# Patient Record
Sex: FEMALE | Race: WHITE | NOT HISPANIC OR LATINO | URBAN - METROPOLITAN AREA
[De-identification: names, ages, dates, MRNs, and addresses within clinical notes are randomized per-mention and may not be internally consistent; named-entity substitution may affect disease eponyms.]

---

## 2017-04-02 ENCOUNTER — INPATIENT (INPATIENT)
Facility: HOSPITAL | Age: 82
LOS: 1 days | Discharge: HOME HEALTH | End: 2017-04-04
Attending: INTERNAL MEDICINE | Admitting: INTERNAL MEDICINE

## 2017-06-27 DIAGNOSIS — K57.90 DIVERTICULOSIS OF INTESTINE, PART UNSPECIFIED, WITHOUT PERFORATION OR ABSCESS WITHOUT BLEEDING: ICD-10-CM

## 2017-06-27 DIAGNOSIS — R00.2 PALPITATIONS: ICD-10-CM

## 2017-06-27 DIAGNOSIS — M54.5 LOW BACK PAIN: ICD-10-CM

## 2017-06-27 DIAGNOSIS — Z85.048 PERSONAL HISTORY OF OTHER MALIGNANT NEOPLASM OF RECTUM, RECTOSIGMOID JUNCTION, AND ANUS: ICD-10-CM

## 2017-06-27 DIAGNOSIS — L89.152 PRESSURE ULCER OF SACRAL REGION, STAGE 2: ICD-10-CM

## 2017-06-27 DIAGNOSIS — I10 ESSENTIAL (PRIMARY) HYPERTENSION: ICD-10-CM

## 2017-06-27 DIAGNOSIS — N39.0 URINARY TRACT INFECTION, SITE NOT SPECIFIED: ICD-10-CM

## 2017-06-27 DIAGNOSIS — Z87.81 PERSONAL HISTORY OF (HEALED) TRAUMATIC FRACTURE: ICD-10-CM

## 2017-06-27 DIAGNOSIS — G89.29 OTHER CHRONIC PAIN: ICD-10-CM

## 2017-06-27 DIAGNOSIS — J44.9 CHRONIC OBSTRUCTIVE PULMONARY DISEASE, UNSPECIFIED: ICD-10-CM

## 2017-06-27 DIAGNOSIS — Z87.891 PERSONAL HISTORY OF NICOTINE DEPENDENCE: ICD-10-CM

## 2017-06-27 DIAGNOSIS — Z90.49 ACQUIRED ABSENCE OF OTHER SPECIFIED PARTS OF DIGESTIVE TRACT: ICD-10-CM

## 2017-08-18 VITALS
HEIGHT: 63 IN | DIASTOLIC BLOOD PRESSURE: 77 MMHG | OXYGEN SATURATION: 95 % | SYSTOLIC BLOOD PRESSURE: 186 MMHG | HEART RATE: 67 BPM | WEIGHT: 110.89 LBS | RESPIRATION RATE: 16 BRPM | TEMPERATURE: 97 F

## 2017-08-18 NOTE — H&P ADULT - NSHPPHYSICALEXAM_GEN_ALL_CORE
MSK:  back - limited ROM secondary to pain        Rest of PE per medical clearance note MSK:   spine - limited ROM secondary to pain  EHL/FHL/TA/GS 5/5 motor strength bilateral lower extremities  SLT in tact and equal to bilateral lower extremities   DP pulses palpable bilaterally  Skin warm and well perfused, no visible wounds, ecchymoses, erythema    Remainder of PE per medical clearance note

## 2017-08-18 NOTE — H&P ADULT - HISTORY OF PRESENT ILLNESS
82 y.o F c/o     Presents for elective spine surgery - insertion/replacement of neurostimulator implant 82F c/o back pain > 14 years. Pt has history of cervical and lumbar fusion in 90s. Pt has had spinal cord stimulator in place for over 14 years, most recent stimulator has been in place for 6 years. Approximately 1 month ago pt states her spinal cord stimulator remote was non functioning - she was evaluated by Dr. Sanders,  who placed the stimulator, and she was told the battery is dead and needs to be changed. Pt states her back pain currently radiates to her bilateral lower extremities. Pt reports numbness of toes of right foot; reports bilateral lower extremity weakness. She takes hydrocone 10 mg at home and reports medical marijuana use for pain control. Pt ambulates with a walker and cane at baseline. Denies DVT hx; pt takes ASA 81mg which she discontinued 5-7 days preoperatively.  Pt denies chest pain, SOB, n/v, palpitations, tactile fevers today.    Presents for elective spine surgery - revision of neurostimulator implant; battery change

## 2017-08-18 NOTE — H&P ADULT - PROBLEM SELECTOR PLAN 1
Presents today for elective revision of spinal cord stimulator - battery replacement  Pt medically stable and cleared for procedure today by Dr. Vu; Anesthesia clearance pending normal lab values   Per Dr. Amaya pt can be discharged today post op

## 2017-08-18 NOTE — H&P ADULT - NSHPOUTPATIENTPROVIDERS_GEN_ALL_CORE
Medical clearance per Dr. Vu (Dr. Sanders attempt to reach clearing DrAngelita at 830 AM without response - per Dr. Sanders verbal clearance was given)

## 2017-08-18 NOTE — PATIENT PROFILE ADULT. - PSH
H/O breast surgery  30 yrs. ago  History of back surgery  10 yrs. ago stimulator implant 2001, 2011 H/O breast surgery  39 yrs. ago, bilateral  History of back surgery  10 yrs. ago stimulator implant 2001, 2011

## 2017-08-18 NOTE — H&P ADULT - NSHPLABSRESULTS_GEN_ALL_CORE
Preop CBC, BMP, PT/PTT/INR - WNL per medical clearance   CBC and BMP redrawn DOS due to illegible Hgb/Hct, K values  Pre op EKG - sinus bradycardia  Preop CXR - WNL per medical clearance Preop CBC, BMP, PT/PTT/INR - WNL per medical clearance   CBC and BMP redrawn DOS due to illegible Hgb/Hct, K values - values WNL - ok to proceed per anesthesia  Pre op EKG - sinus bradycardia  Preop CXR - WNL per medical clearance

## 2017-08-21 ENCOUNTER — INPATIENT (INPATIENT)
Facility: HOSPITAL | Age: 82
LOS: 0 days | Discharge: HOME | DRG: 42 | End: 2017-08-21
Attending: ORTHOPAEDIC SURGERY | Admitting: ORTHOPAEDIC SURGERY
Payer: COMMERCIAL

## 2017-08-21 VITALS
OXYGEN SATURATION: 94 % | HEART RATE: 72 BPM | DIASTOLIC BLOOD PRESSURE: 48 MMHG | SYSTOLIC BLOOD PRESSURE: 137 MMHG | RESPIRATION RATE: 16 BRPM

## 2017-08-21 DIAGNOSIS — M19.90 UNSPECIFIED OSTEOARTHRITIS, UNSPECIFIED SITE: ICD-10-CM

## 2017-08-21 DIAGNOSIS — Z45.42 ENCOUNTER FOR ADJUSTMENT AND MANAGEMENT OF NEUROSTIMULATOR: ICD-10-CM

## 2017-08-21 DIAGNOSIS — Z79.51 LONG TERM (CURRENT) USE OF INHALED STEROIDS: ICD-10-CM

## 2017-08-21 DIAGNOSIS — M54.5 LOW BACK PAIN: ICD-10-CM

## 2017-08-21 DIAGNOSIS — Z98.890 OTHER SPECIFIED POSTPROCEDURAL STATES: Chronic | ICD-10-CM

## 2017-08-21 DIAGNOSIS — Z87.891 PERSONAL HISTORY OF NICOTINE DEPENDENCE: ICD-10-CM

## 2017-08-21 DIAGNOSIS — Z79.82 LONG TERM (CURRENT) USE OF ASPIRIN: ICD-10-CM

## 2017-08-21 DIAGNOSIS — Z79.891 LONG TERM (CURRENT) USE OF OPIATE ANALGESIC: ICD-10-CM

## 2017-08-21 DIAGNOSIS — Z98.1 ARTHRODESIS STATUS: ICD-10-CM

## 2017-08-21 LAB
ANION GAP SERPL CALC-SCNC: 12 MMOL/L — SIGNIFICANT CHANGE UP (ref 5–17)
BUN SERPL-MCNC: 21 MG/DL — SIGNIFICANT CHANGE UP (ref 7–23)
CALCIUM SERPL-MCNC: 9.2 MG/DL — SIGNIFICANT CHANGE UP (ref 8.4–10.5)
CHLORIDE SERPL-SCNC: 100 MMOL/L — SIGNIFICANT CHANGE UP (ref 96–108)
CO2 SERPL-SCNC: 25 MMOL/L — SIGNIFICANT CHANGE UP (ref 22–31)
CREAT SERPL-MCNC: 0.9 MG/DL — SIGNIFICANT CHANGE UP (ref 0.5–1.3)
GLUCOSE SERPL-MCNC: 118 MG/DL — HIGH (ref 70–99)
HCT VFR BLD CALC: 31.7 % — LOW (ref 34.5–45)
HGB BLD-MCNC: 10.7 G/DL — LOW (ref 11.5–15.5)
MCHC RBC-ENTMCNC: 32.2 PG — SIGNIFICANT CHANGE UP (ref 27–34)
MCHC RBC-ENTMCNC: 33.8 G/DL — SIGNIFICANT CHANGE UP (ref 32–36)
MCV RBC AUTO: 95.5 FL — SIGNIFICANT CHANGE UP (ref 80–100)
PLATELET # BLD AUTO: 165 K/UL — SIGNIFICANT CHANGE UP (ref 150–400)
POTASSIUM SERPL-MCNC: 4.5 MMOL/L — SIGNIFICANT CHANGE UP (ref 3.5–5.3)
POTASSIUM SERPL-SCNC: 4.5 MMOL/L — SIGNIFICANT CHANGE UP (ref 3.5–5.3)
RBC # BLD: 3.32 M/UL — LOW (ref 3.8–5.2)
RBC # FLD: 13.6 % — SIGNIFICANT CHANGE UP (ref 10.3–16.9)
SODIUM SERPL-SCNC: 137 MMOL/L — SIGNIFICANT CHANGE UP (ref 135–145)
WBC # BLD: 7.6 K/UL — SIGNIFICANT CHANGE UP (ref 3.8–10.5)
WBC # FLD AUTO: 7.6 K/UL — SIGNIFICANT CHANGE UP (ref 3.8–10.5)

## 2017-08-21 PROCEDURE — 80048 BASIC METABOLIC PNL TOTAL CA: CPT

## 2017-08-21 PROCEDURE — 85027 COMPLETE CBC AUTOMATED: CPT

## 2017-08-21 PROCEDURE — 86901 BLOOD TYPING SEROLOGIC RH(D): CPT

## 2017-08-21 PROCEDURE — C1883: CPT

## 2017-08-21 PROCEDURE — 86850 RBC ANTIBODY SCREEN: CPT

## 2017-08-21 PROCEDURE — C1787: CPT

## 2017-08-21 PROCEDURE — 36415 COLL VENOUS BLD VENIPUNCTURE: CPT

## 2017-08-21 PROCEDURE — 86900 BLOOD TYPING SEROLOGIC ABO: CPT

## 2017-08-21 PROCEDURE — C1820: CPT

## 2017-08-21 RX ORDER — MORPHINE SULFATE 50 MG/1
2 CAPSULE, EXTENDED RELEASE ORAL
Qty: 0 | Refills: 0 | Status: DISCONTINUED | OUTPATIENT
Start: 2017-08-21 | End: 2017-08-21

## 2017-08-21 RX ORDER — ONDANSETRON 8 MG/1
4 TABLET, FILM COATED ORAL EVERY 4 HOURS
Qty: 0 | Refills: 0 | Status: DISCONTINUED | OUTPATIENT
Start: 2017-08-21 | End: 2017-08-21

## 2017-08-21 RX ADMIN — MORPHINE SULFATE 2 MILLIGRAM(S): 50 CAPSULE, EXTENDED RELEASE ORAL at 10:55

## 2017-08-23 DIAGNOSIS — Z88.2 ALLERGY STATUS TO SULFONAMIDES: ICD-10-CM

## 2017-08-23 DIAGNOSIS — Z45.1 ENCOUNTER FOR ADJUSTMENT AND MANAGEMENT OF INFUSION PUMP: ICD-10-CM

## 2017-08-23 DIAGNOSIS — M54.40 LUMBAGO WITH SCIATICA, UNSPECIFIED SIDE: ICD-10-CM

## 2017-08-23 DIAGNOSIS — Z45.42 ENCOUNTER FOR ADJUSTMENT AND MANAGEMENT OF NEUROSTIMULATOR: ICD-10-CM

## 2017-08-23 DIAGNOSIS — J44.9 CHRONIC OBSTRUCTIVE PULMONARY DISEASE, UNSPECIFIED: ICD-10-CM

## 2017-08-23 DIAGNOSIS — I10 ESSENTIAL (PRIMARY) HYPERTENSION: ICD-10-CM

## 2018-09-18 ENCOUNTER — INPATIENT (INPATIENT)
Facility: HOSPITAL | Age: 83
LOS: 5 days | Discharge: HOME HEALTH | End: 2018-09-24
Attending: INTERNAL MEDICINE | Admitting: INTERNAL MEDICINE

## 2018-09-18 VITALS
SYSTOLIC BLOOD PRESSURE: 203 MMHG | RESPIRATION RATE: 20 BRPM | HEART RATE: 89 BPM | OXYGEN SATURATION: 96 % | DIASTOLIC BLOOD PRESSURE: 91 MMHG

## 2018-09-18 DIAGNOSIS — Z98.890 OTHER SPECIFIED POSTPROCEDURAL STATES: Chronic | ICD-10-CM

## 2018-09-18 LAB
BASE EXCESS BLDV CALC-SCNC: 0.5 MMOL/L — SIGNIFICANT CHANGE UP (ref -2–2)
BASOPHILS # BLD AUTO: 0.05 K/UL — SIGNIFICANT CHANGE UP (ref 0–0.2)
BASOPHILS NFR BLD AUTO: 0.3 % — SIGNIFICANT CHANGE UP (ref 0–1)
CA-I SERPL-SCNC: 1.14 MMOL/L — SIGNIFICANT CHANGE UP (ref 1.12–1.3)
EOSINOPHIL # BLD AUTO: 0.28 K/UL — SIGNIFICANT CHANGE UP (ref 0–0.7)
EOSINOPHIL NFR BLD AUTO: 1.9 % — SIGNIFICANT CHANGE UP (ref 0–8)
GAS PNL BLDV: 136 MMOL/L — SIGNIFICANT CHANGE UP (ref 136–145)
GAS PNL BLDV: SIGNIFICANT CHANGE UP
HCO3 BLDV-SCNC: 28 MMOL/L — SIGNIFICANT CHANGE UP (ref 22–29)
HCT VFR BLD CALC: 32 % — LOW (ref 37–47)
HCT VFR BLDA CALC: 32.6 % — LOW (ref 34–44)
HGB BLD CALC-MCNC: 10.6 G/DL — LOW (ref 14–18)
HGB BLD-MCNC: 10.3 G/DL — LOW (ref 12–16)
IMM GRANULOCYTES NFR BLD AUTO: 0.4 % — HIGH (ref 0.1–0.3)
LACTATE BLDV-MCNC: 0.7 MMOL/L — SIGNIFICANT CHANGE UP (ref 0.5–1.6)
LYMPHOCYTES # BLD AUTO: 0.84 K/UL — LOW (ref 1.2–3.4)
LYMPHOCYTES # BLD AUTO: 5.6 % — LOW (ref 20.5–51.1)
MCHC RBC-ENTMCNC: 32.2 G/DL — SIGNIFICANT CHANGE UP (ref 32–37)
MCHC RBC-ENTMCNC: 32.4 PG — HIGH (ref 27–31)
MCV RBC AUTO: 100.6 FL — HIGH (ref 81–99)
MONOCYTES # BLD AUTO: 0.48 K/UL — SIGNIFICANT CHANGE UP (ref 0.1–0.6)
MONOCYTES NFR BLD AUTO: 3.2 % — SIGNIFICANT CHANGE UP (ref 1.7–9.3)
NEUTROPHILS # BLD AUTO: 13.31 K/UL — HIGH (ref 1.4–6.5)
NEUTROPHILS NFR BLD AUTO: 88.6 % — HIGH (ref 42.2–75.2)
NRBC # BLD: 0 /100 WBCS — SIGNIFICANT CHANGE UP (ref 0–0)
PCO2 BLDV: 59 MMHG — HIGH (ref 41–51)
PH BLDV: 7.29 — SIGNIFICANT CHANGE UP (ref 7.26–7.43)
PLATELET # BLD AUTO: 182 K/UL — SIGNIFICANT CHANGE UP (ref 130–400)
PO2 BLDV: 31 MMHG — SIGNIFICANT CHANGE UP (ref 20–40)
POTASSIUM BLDV-SCNC: 4.6 MMOL/L — SIGNIFICANT CHANGE UP (ref 3.3–5.6)
RBC # BLD: 3.18 M/UL — LOW (ref 4.2–5.4)
RBC # FLD: 17.4 % — HIGH (ref 11.5–14.5)
SAO2 % BLDV: 46 % — SIGNIFICANT CHANGE UP
WBC # BLD: 15.02 K/UL — HIGH (ref 4.8–10.8)
WBC # FLD AUTO: 15.02 K/UL — HIGH (ref 4.8–10.8)

## 2018-09-18 RX ORDER — SODIUM CHLORIDE 9 MG/ML
1000 INJECTION INTRAMUSCULAR; INTRAVENOUS; SUBCUTANEOUS
Qty: 0 | Refills: 0 | Status: DISCONTINUED | OUTPATIENT
Start: 2018-09-18 | End: 2018-09-20

## 2018-09-18 RX ORDER — IPRATROPIUM/ALBUTEROL SULFATE 18-103MCG
3 AEROSOL WITH ADAPTER (GRAM) INHALATION
Qty: 0 | Refills: 0 | Status: COMPLETED | OUTPATIENT
Start: 2018-09-18 | End: 2018-09-19

## 2018-09-18 NOTE — ED ADULT NURSE NOTE - PMH
Anal cancer    Breast tumor  removal benign bl breasts  COPD (chronic obstructive pulmonary disease)    Hypertension    Lung disease  COPD

## 2018-09-18 NOTE — ED ADULT NURSE NOTE - PSH
H/O breast surgery  39 yrs. ago, bilateral  History of back surgery  10 yrs. ago stimulator implant 2001, 2011

## 2018-09-18 NOTE — ED ADULT NURSE NOTE - OBJECTIVE STATEMENT
Pt was brought by EMS for difficulty breathing as per EMS the O2 saturation was in 60s. Pt denies any fever, Has prod cough w yellow phlegm.

## 2018-09-18 NOTE — ED ADULT NURSE NOTE - NSIMPLEMENTINTERV_GEN_ALL_ED
Implemented All Fall with Harm Risk Interventions:  [place] to call system. Call bell, personal items and telephone within reach. Instruct patient to call for assistance. Room bathroom lighting operational. Non-slip footwear when patient is off stretcher. Physically safe environment: no spills, clutter or unnecessary equipment. Stretcher in lowest position, wheels locked, appropriate side rails in place. Provide visual cue, wrist band, yellow gown, etc. Monitor gait and stability. Monitor for mental status changes and reorient to person, place, and time. Review medications for side effects contributing to fall risk. Reinforce activity limits and safety measures with patient and family. Provide visual clues: red socks.

## 2018-09-19 PROBLEM — J98.4 OTHER DISORDERS OF LUNG: Chronic | Status: ACTIVE | Noted: 2017-08-18

## 2018-09-19 PROBLEM — I10 ESSENTIAL (PRIMARY) HYPERTENSION: Chronic | Status: ACTIVE | Noted: 2017-08-18

## 2018-09-19 LAB
ALBUMIN SERPL ELPH-MCNC: 4.3 G/DL — SIGNIFICANT CHANGE UP (ref 3.5–5.2)
ALP SERPL-CCNC: 75 U/L — SIGNIFICANT CHANGE UP (ref 30–115)
ALT FLD-CCNC: 20 U/L — SIGNIFICANT CHANGE UP (ref 0–41)
ANION GAP SERPL CALC-SCNC: 13 MMOL/L — SIGNIFICANT CHANGE UP (ref 7–14)
APTT BLD: 35 SEC — SIGNIFICANT CHANGE UP (ref 27–39.2)
AST SERPL-CCNC: 20 U/L — SIGNIFICANT CHANGE UP (ref 0–41)
BILIRUB SERPL-MCNC: 0.7 MG/DL — SIGNIFICANT CHANGE UP (ref 0.2–1.2)
BUN SERPL-MCNC: 24 MG/DL — HIGH (ref 10–20)
CALCIUM SERPL-MCNC: 8.7 MG/DL — SIGNIFICANT CHANGE UP (ref 8.5–10.1)
CHLORIDE SERPL-SCNC: 99 MMOL/L — SIGNIFICANT CHANGE UP (ref 98–110)
CO2 SERPL-SCNC: 25 MMOL/L — SIGNIFICANT CHANGE UP (ref 17–32)
CREAT SERPL-MCNC: 0.9 MG/DL — SIGNIFICANT CHANGE UP (ref 0.7–1.5)
GLUCOSE SERPL-MCNC: 142 MG/DL — HIGH (ref 70–99)
INR BLD: 1.07 RATIO — SIGNIFICANT CHANGE UP (ref 0.65–1.3)
LACTATE SERPL-SCNC: 0.8 MMOL/L — SIGNIFICANT CHANGE UP (ref 0.5–2.2)
NT-PROBNP SERPL-SCNC: 2241 PG/ML — HIGH (ref 0–300)
POTASSIUM SERPL-MCNC: 5.3 MMOL/L — HIGH (ref 3.5–5)
POTASSIUM SERPL-SCNC: 5.3 MMOL/L — HIGH (ref 3.5–5)
PROT SERPL-MCNC: 7.8 G/DL — SIGNIFICANT CHANGE UP (ref 6–8)
PROTHROM AB SERPL-ACNC: 11.6 SEC — SIGNIFICANT CHANGE UP (ref 9.95–12.87)
SODIUM SERPL-SCNC: 137 MMOL/L — SIGNIFICANT CHANGE UP (ref 135–146)
TROPONIN T SERPL-MCNC: <0.01 NG/ML — SIGNIFICANT CHANGE UP

## 2018-09-19 RX ORDER — MORPHINE SULFATE 50 MG/1
4 CAPSULE, EXTENDED RELEASE ORAL ONCE
Qty: 0 | Refills: 0 | Status: DISCONTINUED | OUTPATIENT
Start: 2018-09-19 | End: 2018-09-19

## 2018-09-19 RX ORDER — AMLODIPINE BESYLATE 2.5 MG/1
5 TABLET ORAL DAILY
Qty: 0 | Refills: 0 | Status: DISCONTINUED | OUTPATIENT
Start: 2018-09-19 | End: 2018-09-24

## 2018-09-19 RX ORDER — MORPHINE SULFATE 50 MG/1
2 CAPSULE, EXTENDED RELEASE ORAL EVERY 4 HOURS
Qty: 0 | Refills: 0 | Status: DISCONTINUED | OUTPATIENT
Start: 2018-09-19 | End: 2018-09-19

## 2018-09-19 RX ORDER — ATORVASTATIN CALCIUM 80 MG/1
1 TABLET, FILM COATED ORAL
Qty: 0 | Refills: 0 | COMMUNITY

## 2018-09-19 RX ORDER — CEFTRIAXONE 500 MG/1
2 INJECTION, POWDER, FOR SOLUTION INTRAMUSCULAR; INTRAVENOUS ONCE
Qty: 0 | Refills: 0 | Status: COMPLETED | OUTPATIENT
Start: 2018-09-19 | End: 2018-09-19

## 2018-09-19 RX ORDER — AZITHROMYCIN 500 MG/1
500 TABLET, FILM COATED ORAL EVERY 24 HOURS
Qty: 0 | Refills: 0 | Status: DISCONTINUED | OUTPATIENT
Start: 2018-09-19 | End: 2018-09-21

## 2018-09-19 RX ORDER — IPRATROPIUM/ALBUTEROL SULFATE 18-103MCG
3 AEROSOL WITH ADAPTER (GRAM) INHALATION EVERY 6 HOURS
Qty: 0 | Refills: 0 | Status: DISCONTINUED | OUTPATIENT
Start: 2018-09-19 | End: 2018-09-24

## 2018-09-19 RX ORDER — HYDROCODONE BITARTRATE 50 MG/1
0 CAPSULE, EXTENDED RELEASE ORAL
Qty: 0 | Refills: 0 | COMMUNITY

## 2018-09-19 RX ORDER — ASPIRIN/CALCIUM CARB/MAGNESIUM 324 MG
1 TABLET ORAL
Qty: 0 | Refills: 0 | COMMUNITY

## 2018-09-19 RX ORDER — ALBUTEROL 90 UG/1
2 AEROSOL, METERED ORAL EVERY 6 HOURS
Qty: 0 | Refills: 0 | Status: DISCONTINUED | OUTPATIENT
Start: 2018-09-19 | End: 2018-09-24

## 2018-09-19 RX ORDER — AZITHROMYCIN 500 MG/1
500 TABLET, FILM COATED ORAL ONCE
Qty: 0 | Refills: 0 | Status: COMPLETED | OUTPATIENT
Start: 2018-09-19 | End: 2018-09-19

## 2018-09-19 RX ORDER — HEPARIN SODIUM 5000 [USP'U]/ML
5000 INJECTION INTRAVENOUS; SUBCUTANEOUS EVERY 8 HOURS
Qty: 0 | Refills: 0 | Status: DISCONTINUED | OUTPATIENT
Start: 2018-09-19 | End: 2018-09-24

## 2018-09-19 RX ORDER — CEFTRIAXONE 500 MG/1
1 INJECTION, POWDER, FOR SOLUTION INTRAMUSCULAR; INTRAVENOUS EVERY 24 HOURS
Qty: 0 | Refills: 0 | Status: DISCONTINUED | OUTPATIENT
Start: 2018-09-19 | End: 2018-09-21

## 2018-09-19 RX ORDER — METOPROLOL TARTRATE 50 MG
50 TABLET ORAL DAILY
Qty: 0 | Refills: 0 | Status: DISCONTINUED | OUTPATIENT
Start: 2018-09-19 | End: 2018-09-24

## 2018-09-19 RX ORDER — CHLORHEXIDINE GLUCONATE 213 G/1000ML
1 SOLUTION TOPICAL
Qty: 0 | Refills: 0 | Status: DISCONTINUED | OUTPATIENT
Start: 2018-09-19 | End: 2018-09-24

## 2018-09-19 RX ORDER — INFLUENZA VIRUS VACCINE 15; 15; 15; 15 UG/.5ML; UG/.5ML; UG/.5ML; UG/.5ML
0.5 SUSPENSION INTRAMUSCULAR ONCE
Qty: 0 | Refills: 0 | Status: DISCONTINUED | OUTPATIENT
Start: 2018-09-19 | End: 2018-09-24

## 2018-09-19 RX ORDER — ETHACRYNIC ACID 25 MG/1
25 TABLET ORAL DAILY
Qty: 0 | Refills: 0 | Status: DISCONTINUED | OUTPATIENT
Start: 2018-09-19 | End: 2018-09-19

## 2018-09-19 RX ADMIN — Medication 50 MILLIGRAM(S): at 06:46

## 2018-09-19 RX ADMIN — HEPARIN SODIUM 5000 UNIT(S): 5000 INJECTION INTRAVENOUS; SUBCUTANEOUS at 22:11

## 2018-09-19 RX ADMIN — HEPARIN SODIUM 5000 UNIT(S): 5000 INJECTION INTRAVENOUS; SUBCUTANEOUS at 13:58

## 2018-09-19 RX ADMIN — MORPHINE SULFATE 2 MILLIGRAM(S): 50 CAPSULE, EXTENDED RELEASE ORAL at 07:45

## 2018-09-19 RX ADMIN — Medication 3 MILLILITER(S): at 13:59

## 2018-09-19 RX ADMIN — Medication 3 MILLILITER(S): at 00:09

## 2018-09-19 RX ADMIN — HEPARIN SODIUM 5000 UNIT(S): 5000 INJECTION INTRAVENOUS; SUBCUTANEOUS at 06:46

## 2018-09-19 RX ADMIN — AZITHROMYCIN 255 MILLIGRAM(S): 500 TABLET, FILM COATED ORAL at 00:53

## 2018-09-19 RX ADMIN — AZITHROMYCIN 255 MILLIGRAM(S): 500 TABLET, FILM COATED ORAL at 07:49

## 2018-09-19 RX ADMIN — Medication 60 MILLIGRAM(S): at 06:55

## 2018-09-19 RX ADMIN — CHLORHEXIDINE GLUCONATE 1 APPLICATION(S): 213 SOLUTION TOPICAL at 06:45

## 2018-09-19 RX ADMIN — AMLODIPINE BESYLATE 5 MILLIGRAM(S): 2.5 TABLET ORAL at 06:46

## 2018-09-19 RX ADMIN — CEFTRIAXONE 100 GRAM(S): 500 INJECTION, POWDER, FOR SOLUTION INTRAMUSCULAR; INTRAVENOUS at 00:53

## 2018-09-19 RX ADMIN — MORPHINE SULFATE 2 MILLIGRAM(S): 50 CAPSULE, EXTENDED RELEASE ORAL at 03:50

## 2018-09-19 RX ADMIN — Medication 3 MILLILITER(S): at 07:49

## 2018-09-19 RX ADMIN — SODIUM CHLORIDE 125 MILLILITER(S): 9 INJECTION INTRAMUSCULAR; INTRAVENOUS; SUBCUTANEOUS at 00:11

## 2018-09-19 RX ADMIN — MORPHINE SULFATE 4 MILLIGRAM(S): 50 CAPSULE, EXTENDED RELEASE ORAL at 01:30

## 2018-09-19 RX ADMIN — Medication 60 MILLIGRAM(S): at 19:59

## 2018-09-19 RX ADMIN — CEFTRIAXONE 100 GRAM(S): 500 INJECTION, POWDER, FOR SOLUTION INTRAMUSCULAR; INTRAVENOUS at 06:33

## 2018-09-19 RX ADMIN — MORPHINE SULFATE 4 MILLIGRAM(S): 50 CAPSULE, EXTENDED RELEASE ORAL at 00:52

## 2018-09-19 RX ADMIN — Medication 3 MILLILITER(S): at 00:10

## 2018-09-19 RX ADMIN — Medication 3 MILLILITER(S): at 00:00

## 2018-09-19 RX ADMIN — SODIUM CHLORIDE 125 MILLILITER(S): 9 INJECTION INTRAMUSCULAR; INTRAVENOUS; SUBCUTANEOUS at 06:39

## 2018-09-19 NOTE — ED PROVIDER NOTE - NS ED ROS FT
Constitutional: no fever, chills, no recent weight loss, change in appetite or malaise  Eyes: no redness/discharge/pain/vision changes  ENT: no rhinorrhea/ear pain/sore throat  Cardiac: No chest pain, SOB or edema.  Respiratory: see HPI  GI: No nausea, vomiting, diarrhea or abdominal pain.  : No dysuria, frequency, urgency or hematuria  MS: no pain to back or extremities, no loss of ROM, no weakness  Neuro: No headache or weakness. No LOC.  Skin: No skin rash.  Endocrine: No history of thyroid disease or diabetes.  Except as documented in the HPI, all other systems are negative.

## 2018-09-19 NOTE — ED PROVIDER NOTE - PHYSICAL EXAMINATION
CONSTITUTIONAL: Well-appearing; well-nourished; in no apparent distress.   EYES: PERRL; EOM intact.   NECK: Supple; non-tender; no cervical lymphadenopathy. No JVD.   CARDIOVASCULAR: Normal S1, S2; no murmurs, rubs, or gallops.   RESPIRATORY: + decreased BS b/l. RR - 20. no accessory muscles use. CPAP in place.no wheezes or rales.  GI/: Normal bowel sounds; non-distended; non-tender; no palpable organomegaly.   MS: trace edema to b/l ankles.   SKIN: Normal for age and race; warm; dry; good turgor; no apparent lesions or exudate.   NEURO/PSYCH: A & O x 4; grossly unremarkable. mood and manner are appropriate. Grooming and personal hygiene are appropriate. CONSTITUTIONAL: Well-appearing; well-nourished; in no apparent distress.   EYES: PERRL; EOM intact.   NECK: Supple; non-tender; no cervical lymphadenopathy. No JVD.   CARDIOVASCULAR: Normal S1, S2; no murmurs, rubs, or gallops.   RESPIRATORY: + decreased BS b/l. RR - 20. no accessory muscles use. CPAP in placebo wheezes or rales.  GI/: Normal bowel sounds; non-distended; non-tender; no palpable organomegaly.   MS: trace edema to b/l ankles.   SKIN: Normal for age and race; warm; dry; good turgor; no apparent lesions or exudate.   NEURO/PSYCH: A & O x 4; grossly unremarkable. mood and manner are appropriate. Grooming and personal hygiene are appropriate.

## 2018-09-19 NOTE — H&P ADULT - HISTORY OF PRESENT ILLNESS
84 yo F with a PMH of COPD ( previously has seen Dr. Donis once), HTN, HLD, and Chronic Back Pain with spinal stimulator was brought in by ambulance for sob x 1 day. Patients daughter is at beside. Patient lives with daughter. She states she has been using her inhaler more often. Daughter states mother called her for acute onset of sob and asked to be sent to ED for evaluation. Patient states she has been coughing for the last week, brown/tan in color but no blood. She notes increased fatigue and also swelling of lower extremities. Patient states she has been using her inhaler more often with relief occurring sometimes. She states that the sob is positional worse when laying flat and improved when sitting up. Patient does not ambulate much and as such could not comment to [facility]. She denies f/chills/n/v/d/c, head ache, light headedness, dizziness, chest pain, palpitations, abdominal pain. Chronic back pain. Denies dysuria, hematuria, urgency.

## 2018-09-19 NOTE — ED PROVIDER NOTE - OBJECTIVE STATEMENT
82 yo female hx of COPD/HTN/HLD/chronic back pain with spinal stimulator BIBA 2/2 worsening SOB over the past  few days. as per patient, she wasn't feeling well in the past few days and her breathing got really bad in the past few hours. Used nebs without much improvement. EMS found patient SaO2 - 60%. CPAP/125mg Solumedrol and Combivent given by EMS and SaO2 improved to the 90s. reported off/on yellow and brown sputum.   Denies fever/chill/HA/dizziness/chest pain/palpitation/abd pain/n/v/d/ black stool/bloody stool/urinary sxs

## 2018-09-19 NOTE — H&P ADULT - ATTENDING COMMENTS
pt seen and examined independently, I have read and agree with above exam and poa,    kaur, sob, cough, daughter at bedside extensive discussion    Physical Exam:  · Constitutional  detailed exam  · Constitutional Details  well-developed; well-groomed; well-nourished  · Constitutional Comments  bipap, no distress  · Eyes  detailed exam  · Eyes Details  conjunctiva clear  · Respiratory  detailed exam  · Respiratory Details  airway patent; breath sounds equal; good air movement; no rales; no rhonchi  · Respiratory Comments  bilateral rales  · Cardiovascular  detailed exam  · Cardiovascular Details  regular rate and rhythm  · Cardiovascular Details  positive S1; positive S2  · Gastrointestinal  detailed exam  · GI Normal  soft; nontender; no distention  · Extremities  detailed exam  · Extremities Details  pedal edema  · Pedal Edema Severity  1+  · Pedal Edema Type  pitting  · Neurological  detailed exam  · Neurological Details  alert and oriented x 3  · Psychiatric  detailed exam  · Psychiatric Details  normal affect      ASSESSMENT / PLAN:    84 yo F with a PMH of COPD  HTN, HLD, and Chronic Back Pain with spinal stimulator was brought in by ambulance for sob x 1 day associated with cough and LE edema.    # COPD Exacerbation  --  o2 , duonebs,  steroids (taper upon improvement)  --  iv abx:  Azithromycin and rocephin  pulm eval dr philip        # Possible CHF component given LE edema, cough however LE edema way be dependent edema due to decreased mobility and sedentary state,  --  pro-bnp: 2241  --  keep i < o,  trend daily weights, daily bmp  --  check tte  --  Consider Diuretics if ECHO with reduced EF: Ethacrynic Acid 25 mg PO qd for geriatric population Patient with Sulfa Allergy.  --  consider cardio    # HTN: continue amlodipine    dvt ppx  gi ppx not indicated

## 2018-09-19 NOTE — H&P ADULT - ASSESSMENT
ASSESSMENT / PLAN:    84 yo F with a PMH of COPD ( previously has seen Dr. Donis once), HTN, HLD, and Chronic Back Pain with spinal stimulator was brought in by ambulance for sob x 1 day associated with cough and LE edema.    # Likely CHF component given LE edema, cough   --  pro-bnp: 2241  --  keep i < o,  trend daily weights, daily bmp  --  check tte  --  iv diuresis: lasix 40 iv bid  --  cardio eval    # COPD Exacerbation  --  o2 , duonebs,  steroids (taper upon improvement)  --  iv abx:  Azithromycin and rocephin    # HTN: continue amlodipine    dvt ppx  gi ppx not indicated ASSESSMENT / PLAN:    82 yo F with a PMH of COPD ( previously has seen Dr. Donis once), HTN, HLD, and Chronic Back Pain with spinal stimulator was brought in by ambulance for sob x 1 day associated with cough and LE edema.    # Likely CHF component given LE edema, cough   --  pro-bnp: 2241  --  keep i < o,  trend daily weights, daily bmp  --  check tte  --  Diuretics: Ethacrynic Acid 25 mg PO qd for geriatric population Patient with Sulfa Allergy.  --  consider cardio eval     # COPD Exacerbation  --  o2 , duonebs,  steroids (taper upon improvement)  --  iv abx:  Azithromycin and rocephin    # HTN: continue amlodipine    dvt ppx  gi ppx not indicated ASSESSMENT / PLAN:    84 yo F with a PMH of COPD ( previously has seen Dr. Donis once), HTN, HLD, and Chronic Back Pain with spinal stimulator was brought in by ambulance for sob x 1 day associated with cough and LE edema.    # COPD Exacerbation  --  o2 , duonebs,  steroids (taper upon improvement)  --  iv abx:  Azithromycin and rocephin    # Possible CHF component given LE edema, cough however LE edema way be dependent edema due to decreased mobility and sedentary state,  --  pro-bnp: 2241  --  keep i < o,  trend daily weights, daily bmp  --  check tte  --  Consider Diuretics if ECHO with reduced EF: Ethacrynic Acid 25 mg PO qd for geriatric population Patient with Sulfa Allergy.  --  consider cardio eval if ECHO with reduced EF    # HTN: continue amlodipine    dvt ppx  gi ppx not indicated

## 2018-09-19 NOTE — H&P ADULT - NSHPLABSRESULTS_GEN_ALL_CORE
CBC Full  -  ( 18 Sep 2018 23:43 )  WBC Count : 15.02 K/uL  Hemoglobin : 10.3 g/dL  Hematocrit : 32.0 %  Platelet Count - Automated : 182 K/uL  Mean Cell Volume : 100.6 fL  Mean Cell Hemoglobin : 32.4 pg  Mean Cell Hemoglobin Concentration : 32.2 g/dL  Auto Neutrophil # : 13.31 K/uL  Auto Lymphocyte # : 0.84 K/uL  Auto Monocyte # : 0.48 K/uL  Auto Eosinophil # : 0.28 K/uL  Auto Basophil # : 0.05 K/uL  Auto Neutrophil % : 88.6 %  Auto Lymphocyte % : 5.6 %  Auto Monocyte % : 3.2 %  Auto Eosinophil % : 1.9 %  Auto Basophil % : 0.3 %  09-18    137  |  99  |  24<H>  ----------------------------<  142<H>  5.3<H>   |  25  |  0.9    Ca    8.7      18 Sep 2018 23:43    TPro  7.8  /  Alb  4.3  /  TBili  0.7  /  DBili  x   /  AST  20  /  ALT  20  /  AlkPhos  75  09-18  PT/INR - ( 18 Sep 2018 23:43 )   PT: 11.60 sec;   INR: 1.07 ratio         PTT - ( 18 Sep 2018 23:43 )  PTT:35.0 sec  CARDIAC MARKERS ( 18 Sep 2018 23:43 )  x     / <0.01 ng/mL / x     / x     / x        Blood Gas Profile - Venous (09.18.18 @ 23:39)    pH, Venous: 7.29    pCO2, Venous: 59 mmHg    pO2, Venous: 31 mmHg    HCO3, Venous: 28 mmoL/L    Base Excess, Venous: 0.5 mmoL/L    Oxygen Saturation, Venous: 46 %

## 2018-09-19 NOTE — ED PROVIDER NOTE - ATTENDING CONTRIBUTION TO CARE
I personally evaluated the patient. I reviewed the Resident’s or Physician Assistant’s note (as assigned above), and agree with the findings and plan except as documented in my note.  82 yo woman with COPD exacerbation and ?left lower lob pneumonia.  Nebs, corticosteroids, and IV antibiotics.  Admit for treatment and monitoring.

## 2018-09-20 LAB
ANION GAP SERPL CALC-SCNC: 13 MMOL/L — SIGNIFICANT CHANGE UP (ref 7–14)
BUN SERPL-MCNC: 28 MG/DL — HIGH (ref 10–20)
CALCIUM SERPL-MCNC: 8.1 MG/DL — LOW (ref 8.5–10.1)
CHLORIDE SERPL-SCNC: 101 MMOL/L — SIGNIFICANT CHANGE UP (ref 98–110)
CO2 SERPL-SCNC: 24 MMOL/L — SIGNIFICANT CHANGE UP (ref 17–32)
CREAT SERPL-MCNC: 0.8 MG/DL — SIGNIFICANT CHANGE UP (ref 0.7–1.5)
GLUCOSE SERPL-MCNC: 183 MG/DL — HIGH (ref 70–99)
HCT VFR BLD CALC: 28 % — LOW (ref 37–47)
HGB BLD-MCNC: 9.1 G/DL — LOW (ref 12–16)
MAGNESIUM SERPL-MCNC: 2.3 MG/DL — SIGNIFICANT CHANGE UP (ref 1.8–2.4)
MCHC RBC-ENTMCNC: 32.5 G/DL — SIGNIFICANT CHANGE UP (ref 32–37)
MCHC RBC-ENTMCNC: 32.5 PG — HIGH (ref 27–31)
MCV RBC AUTO: 100 FL — HIGH (ref 81–99)
NRBC # BLD: 0 /100 WBCS — SIGNIFICANT CHANGE UP (ref 0–0)
PLATELET # BLD AUTO: 163 K/UL — SIGNIFICANT CHANGE UP (ref 130–400)
POTASSIUM SERPL-MCNC: 5.2 MMOL/L — HIGH (ref 3.5–5)
POTASSIUM SERPL-SCNC: 5.2 MMOL/L — HIGH (ref 3.5–5)
RBC # BLD: 2.8 M/UL — LOW (ref 4.2–5.4)
RBC # FLD: 17.5 % — HIGH (ref 11.5–14.5)
SODIUM SERPL-SCNC: 138 MMOL/L — SIGNIFICANT CHANGE UP (ref 135–146)
WBC # BLD: 8.97 K/UL — SIGNIFICANT CHANGE UP (ref 4.8–10.8)
WBC # FLD AUTO: 8.97 K/UL — SIGNIFICANT CHANGE UP (ref 4.8–10.8)

## 2018-09-20 RX ORDER — ETHACRYNIC ACID 25 MG/1
25 TABLET ORAL DAILY
Qty: 0 | Refills: 0 | Status: DISCONTINUED | OUTPATIENT
Start: 2018-09-20 | End: 2018-09-24

## 2018-09-20 RX ORDER — LISINOPRIL 2.5 MG/1
5 TABLET ORAL DAILY
Qty: 0 | Refills: 0 | Status: DISCONTINUED | OUTPATIENT
Start: 2018-09-20 | End: 2018-09-24

## 2018-09-20 RX ADMIN — Medication 3 MILLILITER(S): at 20:19

## 2018-09-20 RX ADMIN — Medication 3 MILLILITER(S): at 01:29

## 2018-09-20 RX ADMIN — Medication 3 MILLILITER(S): at 14:09

## 2018-09-20 RX ADMIN — AZITHROMYCIN 255 MILLIGRAM(S): 500 TABLET, FILM COATED ORAL at 09:05

## 2018-09-20 RX ADMIN — Medication 60 MILLIGRAM(S): at 06:28

## 2018-09-20 RX ADMIN — CEFTRIAXONE 100 GRAM(S): 500 INJECTION, POWDER, FOR SOLUTION INTRAMUSCULAR; INTRAVENOUS at 12:36

## 2018-09-20 RX ADMIN — Medication 60 MILLIGRAM(S): at 16:22

## 2018-09-20 RX ADMIN — Medication 3 MILLILITER(S): at 08:39

## 2018-09-20 RX ADMIN — HEPARIN SODIUM 5000 UNIT(S): 5000 INJECTION INTRAVENOUS; SUBCUTANEOUS at 07:09

## 2018-09-20 RX ADMIN — AMLODIPINE BESYLATE 5 MILLIGRAM(S): 2.5 TABLET ORAL at 06:28

## 2018-09-20 RX ADMIN — Medication 50 MILLIGRAM(S): at 06:28

## 2018-09-20 RX ADMIN — CHLORHEXIDINE GLUCONATE 1 APPLICATION(S): 213 SOLUTION TOPICAL at 07:09

## 2018-09-20 RX ADMIN — HEPARIN SODIUM 5000 UNIT(S): 5000 INJECTION INTRAVENOUS; SUBCUTANEOUS at 21:56

## 2018-09-20 RX ADMIN — LISINOPRIL 5 MILLIGRAM(S): 2.5 TABLET ORAL at 16:22

## 2018-09-20 NOTE — PROGRESS NOTE ADULT - ASSESSMENT
84 yo F with a PMH of COPD ( previously has seen Dr. Donis once), HTN, HLD, and Chronic Back Pain with spinal stimulator was brought in by ambulance for sob x 1 day associated with cough and LE edema.    # COPD Exacerbation( 2/2 recent cold, viral URI)  --  c/w oxygen, Duonebs  - C/w Methylprednisolone   -- c/w  Azithromycin and rocephin  - Chest x ray- Pending read. ( opacity with effusion?)  - pending pulmonary consult.     # CHF ( Diastolic)  --  pro-bnp: 2241  -- ECHO- Diastolic HF, EF 60% as per Dr. Flannery.   --  keep i < o,  trend daily weights, daily bmp  --  Appreciate CArdio consult- As per cardio:  --start edicrin 25 qd for now if not improved increase to 50 qd   watch bun/cr       # HTN- 166/72   continue amlodipine      dvt ppx- lovenox.   gi ppx not indicated  dispo- Home 84 yo F with a PMH of COPD ( previously has seen Dr. Donis once), HTN, HLD, and Chronic Back Pain with spinal stimulator was brought in by ambulance for sob x 1 day associated with cough and LE edema.    # COPD Exacerbation( 2/2 recent cold, viral URI)  --  c/w oxygen, Duonebs  - C/w Methylprednisolone   -- c/w  Azithromycin and rocephin  - Chest x ray- Pending read. ( opacity with effusion?)  - pending pulmonary consult.     # CHF ( Diastolic)  --  pro-bnp: 2241  -- ECHO- Diastolic HF, EF 60% as per Dr. Flannery.   --  keep i < o,  trend daily weights, daily bmp  --  Appreciate CArdio consult- As per cardio:  --start edicrin 25 qd for now if not improved increase to 50 qd   watch bun/cr       # HTN- 166/72   continue amlodipine  - will add lisinopril 5 mg.       dvt ppx- lovenox.   gi ppx not indicated  dispo- Home

## 2018-09-20 NOTE — CONSULT NOTE ADULT - ASSESSMENT
copd   DHF acute new onset   htn   back pain   echo: normal lv funciton lvef 60%, mild LAE, RV normal function, DD1, RVSP 53   pulmonary htn   ekg; nsr rbbb     pt with exp wheeze and dhf acute   start edicrin 25 qd for now if not improved increase to 50 qd   watch bun/cr   get pulmonary still wheezing   pts sob multifactorial   on d/c f/u with dr. mancuso

## 2018-09-20 NOTE — CONSULT NOTE ADULT - ASSESSMENT
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

## 2018-09-20 NOTE — PROGRESS NOTE ADULT - SUBJECTIVE AND OBJECTIVE BOX
SUBJECTIVE:    Patient is a 83y old Female who presents with a chief complaint of COPD exacerbation (20 Sep 2018 13:25)    Currently admitted to medicine with the primary diagnosis of COPD exacerbation     Today is hospital day 1d. This morning she is resting in bed, on nasal canula 2l. Pt states that she had a cold few days   ago, before the presentation, likely exacerbating her COPD. Pt has been on nebs and oxygen and says her breathing is better. Says she slept fine after nebs on NC. States that cough is still the same but she is coughing  out a lot more than before.   Pt is an ex smoker ( quit 1.5 years ago). Pt is not on home O2.     PAST MEDICAL & SURGICAL HISTORY  Breast tumor: removal benign bl breasts  Anal cancer  COPD (chronic obstructive pulmonary disease)  Lung disease: COPD  Hypertension  H/O breast surgery: 39 yrs. ago, bilateral  History of back surgery: 10 yrs. ago stimulator implant 2001, 2011    SOCIAL HISTORY:  Negative for smoking/alcohol/drug use.     ALLERGIES:  hair dyes (Hives)  Originally Entered as [Unknown] reaction to [hair dye] (Unknown)  sulfa drugs (Headache)  sulfonamides (Other (Severe))    MEDICATIONS:  STANDING MEDICATIONS  ALBUTerol/ipratropium for Nebulization 3 milliLiter(s) Nebulizer every 6 hours  amLODIPine   Tablet 5 milliGRAM(s) Oral daily  azithromycin  IVPB 500 milliGRAM(s) IV Intermittent every 24 hours  cefTRIAXone   IVPB 1 Gram(s) IV Intermittent every 24 hours  chlorhexidine 4% Liquid 1 Application(s) Topical <User Schedule>  ethacrynic acid 25 milliGRAM(s) Oral daily  heparin  Injectable 5000 Unit(s) SubCutaneous every 8 hours  influenza   Vaccine 0.5 milliLiter(s) IntraMuscular once  methylPREDNISolone sodium succinate Injectable 60 milliGRAM(s) IV Push two times a day  metoprolol succinate ER 50 milliGRAM(s) Oral daily  sodium chloride 0.9%. 1000 milliLiter(s) IV Continuous <Continuous>    PRN MEDICATIONS  ALBUTerol    90 MICROgram(s) HFA Inhaler 2 Puff(s) Inhalation every 6 hours PRN  HYDROcodone 10 mG/acetaminophen 325 mG 1 Tablet(s) Oral every 4 hours PRN    VITALS:   T(F): 97  HR: 81  BP: 166/72  RR: 18  SpO2: 98%    LABS:                        9.1    8.97  )-----------( 163      ( 20 Sep 2018 09:17 )             28.0     09-20    138  |  101  |  28<H>  ----------------------------<  183<H>  5.2<H>   |  24  |  0.8    Ca    8.1<L>      20 Sep 2018 09:17  Mg     2.3     09-20    TPro  7.8  /  Alb  4.3  /  TBili  0.7  /  DBili  x   /  AST  20  /  ALT  20  /  AlkPhos  75  09-18    PT/INR - ( 18 Sep 2018 23:43 )   PT: 11.60 sec;   INR: 1.07 ratio         PTT - ( 18 Sep 2018 23:43 )  PTT:35.0 sec          Culture - Blood (collected 18 Sep 2018 23:43)  Source: .Blood Blood  Preliminary Report (20 Sep 2018 06:01):    No growth to date.    Culture - Blood (collected 18 Sep 2018 23:43)  Source: .Blood Blood  Preliminary Report (20 Sep 2018 06:01):    No growth to date.      CARDIAC MARKERS ( 18 Sep 2018 23:43 )  x     / <0.01 ng/mL / x     / x     / x          RADIOLOGY:    PHYSICAL EXAM:  GEN: No acute distress, on nasal canula.   LUNGS: wheezing b/l , with coarse crackles.   HEART: S1/S2 present. RRR. Abdominojugular reflex present   ABD: Soft, non-tender, non-distended. Bowel sounds present  EXT: NC/NC/NE/2+PP/SARAVIA, B/l LE edema Right greater than left.   NEURO: AAOX3

## 2018-09-20 NOTE — CONSULT NOTE ADULT - SUBJECTIVE AND OBJECTIVE BOX
Patient is a 83y old  Female who presents with a chief complaint of difficulty breathing, shortness of breath (19 Sep 2018 15:22)  pt with long h/o COPD former smoker stopped 1.5 years ago htn with sob worsening progressively         PAST MEDICAL & SURGICAL HISTORY:  Breast tumor: removal benign bl breasts  Anal cancer  COPD (chronic obstructive pulmonary disease)  Lung disease: COPD  Hypertension  H/O breast surgery: 39 yrs. ago, bilateral  History of back surgery: 10 yrs. ago stimulator implant ,       HPI:                PREVIOUS DIAGNOSTIC TESTING:      ECHO  FINDINGS:    STRESS  FINDINGS:    CATHETERIZATION  FINDINGS:    ELECTROPHYSIOLOGY STUDY  FINDINGS:    CAROTID ULTRASOUND:  FINDINGS    VENOUS DUPLEX SCAN:  FINDINGS:    CHEST CT PULMONARY ANGIO with IV Contrast:  FINDINGS:  MEDICATIONS  (STANDING):  ALBUTerol/ipratropium for Nebulization 3 milliLiter(s) Nebulizer every 6 hours  amLODIPine   Tablet 5 milliGRAM(s) Oral daily  azithromycin  IVPB 500 milliGRAM(s) IV Intermittent every 24 hours  cefTRIAXone   IVPB 1 Gram(s) IV Intermittent every 24 hours  chlorhexidine 4% Liquid 1 Application(s) Topical <User Schedule>  ethacrynic acid 25 milliGRAM(s) Oral daily  heparin  Injectable 5000 Unit(s) SubCutaneous every 8 hours  influenza   Vaccine 0.5 milliLiter(s) IntraMuscular once  methylPREDNISolone sodium succinate Injectable 60 milliGRAM(s) IV Push two times a day  metoprolol succinate ER 50 milliGRAM(s) Oral daily  sodium chloride 0.9%. 1000 milliLiter(s) (125 mL/Hr) IV Continuous <Continuous>    MEDICATIONS  (PRN):  ALBUTerol    90 MICROgram(s) HFA Inhaler 2 Puff(s) Inhalation every 6 hours PRN Shortness of Breath and/or Wheezing  HYDROcodone 10 mG/acetaminophen 325 mG 1 Tablet(s) Oral every 4 hours PRN Severe Pain (7 - 10)      FAMILY HISTORY:  No pertinent family history in first degree relatives      SOCIAL HISTORY:    CIGARETTES:    ALCOHOL:    REVIEW OF SYSTEMS:      RESPIRATORY: SEE HPI   CARDIOVASCULAR: SEE HPI   GASTROINTESTINAL: No abdominal pain  NEUROLOGICAL: grossly intact motor and sensory   ENDOCRINE: No heat or cold intolerance, or hair loss  MUSCULOSKELETAL: No joint pain or swelling, muscle, back, or extremity pain  HEME/LYMPH: No easy bruising or bleeding gums      Vital Signs Last 24 Hrs  T(C): 36.1 (20 Sep 2018 05:28), Max: 36.7 (19 Sep 2018 15:37)  T(F): 97 (20 Sep 2018 05:28), Max: 98 (19 Sep 2018 15:37)  HR: 81 (20 Sep 2018 05:28) (81 - 82)  BP: 166/72 (20 Sep 2018 05:28) (153/66 - 166/72)  BP(mean): --  RR: 18 (20 Sep 2018 05:28) (18 - 20)  SpO2: 98% (19 Sep 2018 15:37) (98% - 98%)    PHYSICAL EXAM:    GENERAL: In no apparent distress, well nourished, and hydrated.  HEAD:  Atraumatic, Normocephalic  EYES: EOMI, PERRLA, conjunctiva and sclera clear  ENMT: No tonsillar erythema, exudates, or enlargement; Moist mucous membranes, Good dentition, No lesions  NECK: Supple and normal thyroid.  No JVD or carotid bruit.  Carotid pulse is 2+ bilaterally.  HEART: Regular rate and rhythm; No murmurs, rubs, or gallops.  PULMONARY: decreased in bases with expiratory wheeze   ABDOMEN: Soft, Nontender, Nondistended; Bowel sounds present  EXTREMITIES:  2+ Peripheral Pulses, No clubbing, cyanosis, or edema  LYMPH: No lymphadenopathy noted  NEUROLOGICAL: Grossly nonfocal          INTERPRETATION OF TELEMETRY:    ECG:    I&O's Detail    19 Sep 2018 07:01  -  20 Sep 2018 07:00  --------------------------------------------------------  IN:    IV PiggyBack: 250 mL    sodium chloride 0.9%.: 1375 mL  Total IN: 1625 mL    OUT:  Total OUT: 0 mL    Total NET: 1625 mL          LABS:                        9.1    8.97  )-----------( 163      ( 20 Sep 2018 09:17 )             28.0     09-20    138  |  101  |  28<H>  ----------------------------<  183<H>  5.2<H>   |  24  |  0.8    Ca    8.1<L>      20 Sep 2018 09:17  Mg     2.3     -    TPro  7.8  /  Alb  4.3  /  TBili  0.7  /  DBili  x   /  AST  20  /  ALT  20  /  AlkPhos  75  09-18    CARDIAC MARKERS ( 18 Sep 2018 23:43 )  x     / <0.01 ng/mL / x     / x     / x          PT/INR - ( 18 Sep 2018 23:43 )   PT: 11.60 sec;   INR: 1.07 ratio         PTT - ( 18 Sep 2018 23:43 )  PTT:35.0 sec    BNP  I&O's Detail    19 Sep 2018 07:01  -  20 Sep 2018 07:00  --------------------------------------------------------  IN:    IV PiggyBack: 250 mL    sodium chloride 0.9%.: 1375 mL  Total IN: 1625 mL    OUT:  Total OUT: 0 mL    Total NET: 1625 mL        Daily Height in cm: 157.48 (19 Sep 2018 15:22)    Daily Weight in k.6 (19 Sep 2018 15:22)    RADIOLOGY & ADDITIONAL STUDIES:

## 2018-09-20 NOTE — CONSULT NOTE ADULT - SUBJECTIVE AND OBJECTIVE BOX
KOTA SCHAFER  MRN-[id number]    HISTORY OF PRESENT ILLNESS:    82 yo F with a PMH of COPD not oxygen dependent HTN, HLD, and Chronic Back Pain with spinal stimulator was brought in by ambulance for sob x 1 day. Pt complainig of increased cough sputum production shortness of breath and wheezing. She notes increased fatigue and also swelling of lower extremities. Patient states she has been using her inhaler more often with relief occurring sometimes. She states that the sob is positional worse when laying flat and improved when sitting up. Denies chest pain, fever chills or myalgias    PMH/PSH:  PAST MEDICAL & SURGICAL HISTORY:  Breast tumor: removal benign bl breasts  Anal cancer  COPD (chronic obstructive pulmonary disease)  Hypertension  H/O breast surgery: 39 yrs. ago, bilateral  History of back surgery: 10 yrs. ago stimulator implant 2001, 2011    ALLERGIES:  Allergies    hair dyes (Hives)  Originally Entered as [Unknown] reaction to [hair dye] (Unknown)  sulfa drugs (Headache)  sulfonamides (Other (Severe))    Intolerances      SOCIAL HABITS:  >120 pack years    FAMILY HISTORY:   FAMILY HISTORY:  No pertinent family history in first degree relatives      REVIEW OF SYSTEM:  Elements of review of systems are negative or non-applicable except as noted above in HPI section.       HOME MEDICATIONS:  amLODIPine 5 mg oral tablet  Calcium 500+D oral tablet, chewable  Centrum Adults oral tablet  HYDROcodone 10 mg oral capsule, extended release  metoprolol succinate 50 mg oral tablet, extended release  ProAir HFA 90 mcg/inh inhalation aerosol  Vitamin D2 50,000 intl units (1.25 mg) oral capsule    MEDICATIONS:  MEDICATIONS  (STANDING):  ALBUTerol/ipratropium for Nebulization 3 milliLiter(s) Nebulizer every 6 hours  amLODIPine   Tablet 5 milliGRAM(s) Oral daily  azithromycin  IVPB 500 milliGRAM(s) IV Intermittent every 24 hours  cefTRIAXone   IVPB 1 Gram(s) IV Intermittent every 24 hours  chlorhexidine 4% Liquid 1 Application(s) Topical <User Schedule>  ethacrynic acid 25 milliGRAM(s) Oral daily  heparin  Injectable 5000 Unit(s) SubCutaneous every 8 hours  influenza   Vaccine 0.5 milliLiter(s) IntraMuscular once  lisinopril 5 milliGRAM(s) Oral daily  methylPREDNISolone sodium succinate Injectable 60 milliGRAM(s) IV Push two times a day  metoprolol succinate ER 50 milliGRAM(s) Oral daily    MEDICATIONS  (PRN):  ALBUTerol    90 MICROgram(s) HFA Inhaler 2 Puff(s) Inhalation every 6 hours PRN Shortness of Breath and/or Wheezing  HYDROcodone 10 mG/acetaminophen 325 mG 1 Tablet(s) Oral every 4 hours PRN Severe Pain (7 - 10)        VITALS:   Vital Signs Last 24 Hrs  T(C): 36.7 (20 Sep 2018 13:45), Max: 36.7 (20 Sep 2018 13:45)  T(F): 98.1 (20 Sep 2018 13:45), Max: 98.1 (20 Sep 2018 13:45)  HR: 75 (20 Sep 2018 18:07) (75 - 87)  BP: 135/70 (20 Sep 2018 18:07) (135/70 - 166/72)  BP(mean): --  RR: 18 (20 Sep 2018 13:45) (18 - 18)  SpO2: --        PHYSICAL EXAM:    GENERAL: NAD, well-developed  HEAD:  Atraumatic, Normocephalic  NECK: Supple, No JVD  CHEST/LUNG: diffuse wheeze  HEART: Regular rate and rhythm; No murmurs, rubs, or gallops  ABDOMEN: Soft, Nontender, Nondistended; Bowel sounds present  EXTREMITIES:  Good peripheral Pulses, No clubbing, cyanosis, trace edema      LABS:           Complete Blood Count + Automated Diff (09.18.18 @ 23:43)    WBC Count: 15.02 K/uL                        9.1    8.97  )-----------( 163      ( 20 Sep 2018 09:17 )             28.0     09-20    138  |  101  |  28<H>  ----------------------------<  183<H>  5.2<H>   |  24  |  0.8    Ca    8.1<L>      20 Sep 2018 09:17  Mg     2.3     09-20    TPro  7.8  /  Alb  4.3  /  TBili  0.7  /  DBili  x   /  AST  20  /  ALT  20  /  AlkPhos  75  09-18    LIVER FUNCTIONS - ( 18 Sep 2018 23:43 )  Alb: 4.3 g/dL / Pro: 7.8 g/dL / ALK PHOS: 75 U/L / ALT: 20 U/L / AST: 20 U/L / GGT: x           CARDIAC MARKERS ( 18 Sep 2018 23:43 )  x     / <0.01 ng/mL / x     / x     / x          PT/INR - ( 18 Sep 2018 23:43 )   PT: 11.60 sec;   INR: 1.07 ratio         PTT - ( 18 Sep 2018 23:43 )  PTT:35.0 sec    Culture - Blood (collected 09-18-18 @ 23:43)  Source: .Blood Blood  Preliminary Report (09-20-18 @ 06:01):    No growth to date.    Culture - Blood (collected 09-18-18 @ 23:43)  Source: .Blood Blood  Preliminary Report (09-20-18 @ 06:01):    No growth to date.            ABG & VENT SETTINGS (when applicable)        DIAGNOSTIC STUDIES:    < from: Xray Chest 2 Views PA/Lat (09.20.18 @ 12:03) >  Impression:      Bilateral basilar opacifications and effusions, increased since prior.     < end of copied text >

## 2018-09-21 ENCOUNTER — TRANSCRIPTION ENCOUNTER (OUTPATIENT)
Age: 83
End: 2018-09-21

## 2018-09-21 DIAGNOSIS — Z86.39 PERSONAL HISTORY OF OTHER ENDOCRINE, NUTRITIONAL AND METABOLIC DISEASE: ICD-10-CM

## 2018-09-21 DIAGNOSIS — J44.1 CHRONIC OBSTRUCTIVE PULMONARY DISEASE WITH (ACUTE) EXACERBATION: ICD-10-CM

## 2018-09-21 DIAGNOSIS — Z87.09 PERSONAL HISTORY OF OTHER DISEASES OF THE RESPIRATORY SYSTEM: ICD-10-CM

## 2018-09-21 DIAGNOSIS — Z86.79 PERSONAL HISTORY OF OTHER DISEASES OF THE CIRCULATORY SYSTEM: ICD-10-CM

## 2018-09-21 PROBLEM — Z00.00 ENCOUNTER FOR PREVENTIVE HEALTH EXAMINATION: Status: ACTIVE | Noted: 2018-09-21

## 2018-09-21 LAB
ANION GAP SERPL CALC-SCNC: 13 MMOL/L — SIGNIFICANT CHANGE UP (ref 7–14)
BASOPHILS # BLD AUTO: 0.01 K/UL — SIGNIFICANT CHANGE UP (ref 0–0.2)
BASOPHILS NFR BLD AUTO: 0.1 % — SIGNIFICANT CHANGE UP (ref 0–1)
BUN SERPL-MCNC: 39 MG/DL — HIGH (ref 10–20)
CALCIUM SERPL-MCNC: 8.5 MG/DL — SIGNIFICANT CHANGE UP (ref 8.5–10.1)
CHLORIDE SERPL-SCNC: 101 MMOL/L — SIGNIFICANT CHANGE UP (ref 98–110)
CO2 SERPL-SCNC: 25 MMOL/L — SIGNIFICANT CHANGE UP (ref 17–32)
CREAT SERPL-MCNC: 1 MG/DL — SIGNIFICANT CHANGE UP (ref 0.7–1.5)
EOSINOPHIL # BLD AUTO: 0 K/UL — SIGNIFICANT CHANGE UP (ref 0–0.7)
EOSINOPHIL NFR BLD AUTO: 0 % — SIGNIFICANT CHANGE UP (ref 0–8)
GLUCOSE SERPL-MCNC: 192 MG/DL — HIGH (ref 70–99)
HCT VFR BLD CALC: 27 % — LOW (ref 37–47)
HGB BLD-MCNC: 8.7 G/DL — LOW (ref 12–16)
IMM GRANULOCYTES NFR BLD AUTO: 0.6 % — HIGH (ref 0.1–0.3)
LYMPHOCYTES # BLD AUTO: 0.27 K/UL — LOW (ref 1.2–3.4)
LYMPHOCYTES # BLD AUTO: 3.7 % — LOW (ref 20.5–51.1)
MCHC RBC-ENTMCNC: 32.2 G/DL — SIGNIFICANT CHANGE UP (ref 32–37)
MCHC RBC-ENTMCNC: 32.2 PG — HIGH (ref 27–31)
MCV RBC AUTO: 100 FL — HIGH (ref 81–99)
MONOCYTES # BLD AUTO: 0.27 K/UL — SIGNIFICANT CHANGE UP (ref 0.1–0.6)
MONOCYTES NFR BLD AUTO: 3.7 % — SIGNIFICANT CHANGE UP (ref 1.7–9.3)
NEUTROPHILS # BLD AUTO: 6.62 K/UL — HIGH (ref 1.4–6.5)
NEUTROPHILS NFR BLD AUTO: 91.9 % — HIGH (ref 42.2–75.2)
NRBC # BLD: 0 /100 WBCS — SIGNIFICANT CHANGE UP (ref 0–0)
PLATELET # BLD AUTO: 173 K/UL — SIGNIFICANT CHANGE UP (ref 130–400)
POTASSIUM SERPL-MCNC: 4.7 MMOL/L — SIGNIFICANT CHANGE UP (ref 3.5–5)
POTASSIUM SERPL-SCNC: 4.7 MMOL/L — SIGNIFICANT CHANGE UP (ref 3.5–5)
RBC # BLD: 2.7 M/UL — LOW (ref 4.2–5.4)
RBC # FLD: 17.4 % — HIGH (ref 11.5–14.5)
SODIUM SERPL-SCNC: 139 MMOL/L — SIGNIFICANT CHANGE UP (ref 135–146)
WBC # BLD: 7.21 K/UL — SIGNIFICANT CHANGE UP (ref 4.8–10.8)
WBC # FLD AUTO: 7.21 K/UL — SIGNIFICANT CHANGE UP (ref 4.8–10.8)

## 2018-09-21 RX ORDER — BUDESONIDE AND FORMOTEROL FUMARATE DIHYDRATE 160; 4.5 UG/1; UG/1
2 AEROSOL RESPIRATORY (INHALATION)
Qty: 0 | Refills: 0 | Status: DISCONTINUED | OUTPATIENT
Start: 2018-09-21 | End: 2018-09-24

## 2018-09-21 RX ADMIN — Medication 60 MILLIGRAM(S): at 16:47

## 2018-09-21 RX ADMIN — LISINOPRIL 5 MILLIGRAM(S): 2.5 TABLET ORAL at 05:02

## 2018-09-21 RX ADMIN — AMLODIPINE BESYLATE 5 MILLIGRAM(S): 2.5 TABLET ORAL at 05:01

## 2018-09-21 RX ADMIN — CHLORHEXIDINE GLUCONATE 1 APPLICATION(S): 213 SOLUTION TOPICAL at 06:33

## 2018-09-21 RX ADMIN — HEPARIN SODIUM 5000 UNIT(S): 5000 INJECTION INTRAVENOUS; SUBCUTANEOUS at 05:02

## 2018-09-21 RX ADMIN — Medication 60 MILLIGRAM(S): at 05:02

## 2018-09-21 RX ADMIN — Medication 3 MILLILITER(S): at 07:42

## 2018-09-21 RX ADMIN — Medication 50 MILLIGRAM(S): at 05:01

## 2018-09-21 RX ADMIN — CEFTRIAXONE 100 GRAM(S): 500 INJECTION, POWDER, FOR SOLUTION INTRAMUSCULAR; INTRAVENOUS at 05:16

## 2018-09-21 RX ADMIN — Medication 3 MILLILITER(S): at 19:19

## 2018-09-21 RX ADMIN — ETHACRYNIC ACID 25 MILLIGRAM(S): 25 TABLET ORAL at 05:03

## 2018-09-21 RX ADMIN — AZITHROMYCIN 255 MILLIGRAM(S): 500 TABLET, FILM COATED ORAL at 07:13

## 2018-09-21 RX ADMIN — HEPARIN SODIUM 5000 UNIT(S): 5000 INJECTION INTRAVENOUS; SUBCUTANEOUS at 21:47

## 2018-09-21 RX ADMIN — Medication 3 MILLILITER(S): at 13:33

## 2018-09-21 RX ADMIN — HEPARIN SODIUM 5000 UNIT(S): 5000 INJECTION INTRAVENOUS; SUBCUTANEOUS at 15:18

## 2018-09-21 RX ADMIN — Medication 100 MILLIGRAM(S): at 17:57

## 2018-09-21 NOTE — PROGRESS NOTE ADULT - ASSESSMENT
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

## 2018-09-21 NOTE — DISCHARGE NOTE ADULT - HOSPITAL COURSE
Patient is a 83y old Female who presents with a chief complaint of COPD exacerbation.  CXR showed Bilateral basilar opacifications and effusions.   Pt was treated with antibiotics, nebulizers, oxygen and steroids. Pt was saturating at 87 on Room air and will be going home on home oxygen . Pt is being discharged in improved condition with advise to take prednisone 40mg for 4 days and take doxycycline 100mg twice a day for 4 days.  Pt is advised to follow up with Dr nando johnson 3-5 days for the current episode for further management.     Echo showed Ef of 60% with Diastolic dysfunction. Pt is started on ethacrynic acid 25mg and is advised to follow up Dr mancuso for further management. Patient is a 83y old Female who presents with a chief complaint of COPD exacerbation.  CXR showed Bilateral basilar opacifications and effusions.   Pt was treated with antibiotics, nebulizers, oxygen and steroids. Pt was saturating at 87 on Room air and will be going home on home oxygen . Pt is being discharged in improved condition with advise to take prednisone 40mg for 2 more days and take doxycycline 100mg twice a day for 2 more days days.  Pt is advised to follow up with Dr nando johnson 3-5 days for the current episode for further management.     Echo showed Ef of 60% with Diastolic dysfunction. Pt is started on ethacrynic acid 25mg and is advised to follow up Dr mancuso for further management.

## 2018-09-21 NOTE — DISCHARGE NOTE ADULT - CARE PLAN
Principal Discharge DX:	COPD (chronic obstructive pulmonary disease)  Goal:	optimize medical management  Assessment and plan of treatment:	You came in with Shortness of breath and increased cough.  You were diagnosed with copd exacerbation.  we treated you with antibiotics, nebulizers, oxygen and steroids.  Please take prednisone 40mg for 4 days and take doxycycline 100mg twice a day for 4 days.     your pharmacy has extra prescription for prednisone and doxycycline for future use. use that when you are in yellow zone and contact you PMD or lung doctor at that time.     Please follow up with Dr nando Yan win 3-5 days for the current episode for further management.   You will be going home on home oxygen for COPD. Please use it at all times.  Secondary Diagnosis:	Congestive heart failure  Goal:	optimize medical management  Assessment and plan of treatment:	you came in with SOB and cough. Echo revealed you had CHF with diastolic dysfunction.   Dr flannery started you on ethacrynic acid for optimization.   Please take ethacrynic acid 25 mg daily and follow up with Dr. Flannery for further management in 10- 14 days. Principal Discharge DX:	COPD (chronic obstructive pulmonary disease)  Goal:	optimize medical management  Assessment and plan of treatment:	You came in with Shortness of breath and increased cough.  You were diagnosed with copd exacerbation.  we treated you with antibiotics, nebulizers, oxygen and steroids.  Please take prednisone 40mg for 2 days and take doxycycline 100mg twice a day for 2 days.     your pharmacy has extra prescription for prednisone and doxycycline for future use. use that when you are in yellow zone and contact you PMD or lung doctor at that time.     Please follow up with Dr nando Yan win 3-5 days for the current episode for further management.   You will be going home on home oxygen for COPD. Please use it at all times.  Secondary Diagnosis:	Congestive heart failure  Goal:	optimize medical management  Assessment and plan of treatment:	you came in with SOB and cough. Echo revealed you had CHF with diastolic dysfunction.   Dr flannery started you on ethacrynic acid for optimization.   Please take ethacrynic acid 25 mg daily and follow up with Dr. Flannery for further management in 10- 14 days. [phone]

## 2018-09-21 NOTE — DISCHARGE NOTE ADULT - OTHER SIGNIFICANT FINDINGS
< from: Transthoracic Echocardiogram (09.19.18 @ 13:06) >       Summary:   1. Left ventricular ejection fraction, by visual estimation, is 60 to   65%.   2. Normal left ventricular size and wall thicknesses, with normal   systolic function.   3. Spectral Doppler shows impaired relaxation pattern of left   ventricular myocardial filling (Grade I diastolic dysfunction).   4. Mildly enlarged left atrium.   5. Mild mitral valve regurgitation.   6. Mild-moderate tricuspid regurgitation.   7. Mild to moderate pulmonic valve regurgitation.   8. Estimated pulmonary artery systolic pressure is 60.3 mmHg assuming a   right atrial pressure of 15 mmHg, which is consistent with severe   pulmonary hypertension.    PHYSICIAN INTERPRETATION:  Left Ventricle: Normal left ventricular size and wall thicknesses, with   normal systolic function. There is no left ventricular hypertrophy. Left   ventricular ejection fraction, by visual estimation, is 60 to 65%.   Spectral Doppler shows impaired relaxation pattern of left ventricular   myocardial filling (Grade I diastolic dysfunction).  Right Ventricle: Normal right ventricular size and function.  Left Atrium: Mildly enlarged left atrium.  Right Atrium: Normal right atrial size.  Pericardium: Thereis no evidence of pericardial effusion.  Mitral Valve: Mild mitral valve regurgitation is seen. No evidence of   mitral stenosis.  Tricuspid Valve: Structurally normal tricuspid valve, with normal leaflet   excursion. Mild-moderate tricuspid regurgitation is visualized. Estimated   pulmonary artery systolic pressure is 60.3 mmHg assuming a right atrial   pressure of 15 mmHg, which is consistent with severe pulmonary   hypertension.  Aortic Valve: Trivial aortic valve regurgitation is seen. No evidence of   aortic stenosis.  Pulmonic Valve: Structurally normal pulmonic valve, with normal leaflet   excursion. Mild to moderate pulmonic valve regurgitation.  Aorta: The aortic root and ascending aorta are structurally normal, with   no evidence ofdilitation.  Pulmonary Artery: The main pulmonary artery is normal in size.  Venous: The inferior vena cava was dilated, with respiratory size   variation less than 50%, consistent with elevated right atrial pressure.       < end of copied text >

## 2018-09-21 NOTE — DISCHARGE NOTE ADULT - PATIENT PORTAL LINK FT
You can access the [facility] Patient Portal, offered by [facility], by registering with the following website: [email or web address]

## 2018-09-21 NOTE — DISCHARGE NOTE ADULT - MEDICATION SUMMARY - MEDICATIONS TO TAKE
I will START or STAY ON the medications listed below when I get home from the hospital:    predniSONE 20 mg oral tablet  -- 2 tab(s) by mouth once a day  -- Indication: For COPD (chronic obstructive pulmonary disease)    predniSONE 20 mg oral tablet  -- 2 tab(s) by mouth once a day- Keep in medicine cabinet for future use when you are in yellow zone, contact doctor  -- It is very important that you take or use this exactly as directed.  Do not skip doses or discontinue unless directed by your doctor.  Obtain medical advice before taking any non-prescription drugs as some may affect the action of this medication.  Take with food or milk.    -- Indication: For COPD (chronic obstructive pulmonary disease)    HYDROcodone 10 mg oral capsule, extended release  -- orally every 4 hours  -- Indication: For pain    doxycycline monohydrate 100 mg oral capsule  -- 1 cap(s) by mouth every 12 hours  -- Indication: For COPD (chronic obstructive pulmonary disease)    doxycycline hyclate 100 mg oral capsule  -- 1 cap(s) by mouth 2 times a day- not for present episode. Keep in medicine cabinet- when you  in yellow zone, contact doctor  -- Avoid prolonged or excessive exposure to direct and/or artificial sunlight while taking this medication.  Do not take this drug if you are pregnant.  Finish all this medication unless otherwise directed by prescriber.  Medication should be taken with plenty of water.    -- Indication: For COPD (chronic obstructive pulmonary disease)    metoprolol succinate 50 mg oral tablet, extended release  -- 1 tab(s) by mouth once a day  -- Indication: For hypertension    ProAir HFA 90 mcg/inh inhalation aerosol  -- 2 puff(s) inhaled 4 times a day  -- Indication: For COPD (chronic obstructive pulmonary disease)    budesonide-formoterol 160 mcg-4.5 mcg/inh inhalation aerosol  -- 2 puff(s) inhaled 2 times a day   -- Indication: For COPD (chronic obstructive pulmonary disease)    amLODIPine 5 mg oral tablet  -- 1 tab(s) by mouth once a day  -- Indication: For Blood pressure    ethacrynic acid 25 mg oral tablet  -- 1 tab(s) by mouth once a day  -- Indication: For COngestive heart failure    Centrum Adults oral tablet  -- 1 tab(s) by mouth once a day  -- Indication: For supplement    Calcium 500+D oral tablet, chewable  -- 1 tab(s) by mouth 2 times a day  -- Indication: For supplement    Vitamin D2 50,000 intl units (1.25 mg) oral capsule  -- 1 cap(s) by mouth once a month  -- Indication: For supplement

## 2018-09-21 NOTE — DISCHARGE NOTE ADULT - INSTRUCTIONS
please follow a diet that is low in sodium and cholesterol. please adhere to the diet chart provided.

## 2018-09-21 NOTE — DISCHARGE NOTE ADULT - CARE PROVIDER_API CALL
Gayatri Arango), Internal Medicine  [address] [place] [address]  Phone: [phone]  Fax: [phone]    Rylan Flannery), Cardiovascular Disease  [address] [place] [address]  Phone: [phone]  Fax: [phone]

## 2018-09-21 NOTE — PROGRESS NOTE ADULT - ASSESSMENT
84 yo F with a PMH of COPD ( previously has seen Dr. Donis once), HTN, HLD, and Chronic Back Pain with spinal stimulator was brought in by ambulance for sob x 1 day associated with cough and LE edema.    # COPD Exacerbation( 2/2 recent cold, viral URI)  --  c/w oxygen, Duonebs  - switch to prednisone 40mg for 5 days as per pulm.   --switch to doxycycline 100mg po q12 for 5 days.  - Chest x ray- see above  - appreciate pulmonary consult- as per pulm:    monitor 02 sat-now on ra    d/c iv steroids    prednisone 40 mg for 5 days    d/c iv abx    start doxycyxline 100 mg po [id number] x 5 days  - cleared by pulmonary for discharge.     # CHF ( Diastolic)  --  pro-bnp: 2241  -- ECHO- Diastolic HF, EF 60% as per Dr. Flannery.   --  keep i < o,  trend daily weights, daily bmp  --  Appreciate CArdio consult- As per cardio:  --start edicrin 25 qd for now if not improved increase to 50 qd   -watch bun/cr       # HTN-    -continue amlodipine  - c/w lisinopril 5 mg.  - monitor.       dvt ppx- lovenox.   gi ppx not indicated  dispo- Home 84 yo F with a PMH of COPD ( previously has seen Dr. Donis once), HTN, HLD, and Chronic Back Pain with spinal stimulator was brought in by ambulance for sob x 1 day associated with cough and LE edema.    # COPD Exacerbation( 2/2 recent cold, viral URI)  --  c/w oxygen, Duonebs  - switch to prednisone 40mg for 5 days as per pulm.   --switch to doxycycline 100mg po q12 for 5 days.  - Chest x ray- see above  - appreciate pulmonary consult- as per pulm:    monitor 02 sat-now on ra    d/c iv steroids    prednisone 40 mg for 5 days    d/c iv abx    start doxycyxline 100 mg po [id number] x 5 days  - cleared by pulmonary for discharge.     COPD Discharge Checklist (48 hours prior to DC day):     [  ] Verify appropriate discharge disposition with team (e.g. home, home with services, hospice, SNF)- tomorrow.    [x  ] Educate patient and/or caregiver on the COPD ACTION PLAN and provide patient with the completed form.    [  ] Review discharge medications with RN and patient/care giver- tomorrow.     [ x ] Emphasize smoking cessation options for smokers    [  ] Switch to nebulizers if patient has difficulty using inhalers    [  ] Verify and document outpatient follow-up within 3 days with PMD or Pulmonary specialist    [  ] Referal to outpatient pulmonary rehab within 2 weeks for eligible patients (discuss with pulmonary specialist)                # CHF ( Diastolic)  --  pro-bnp: 2241  -- ECHO- Diastolic HF, EF 60% as per Dr. Flannery.   --  keep i < o,  trend daily weights, daily bmp  --  Appreciate CArdio consult- As per cardio:  --start edicrin 25 qd for now if not improved increase to 50 qd   -watch bun/cr       # HTN-    -continue amlodipine  - c/w lisinopril 5 mg.  - monitor.       dvt ppx- lovenox.   gi ppx not indicated  dispo- Home 84 yo F with a PMH of COPD ( previously has seen Dr. Donis once), HTN, HLD, and Chronic Back Pain with spinal stimulator was brought in by ambulance for sob x 1 day associated with cough and LE edema.    # COPD Exacerbation( 2/2 recent cold, viral URI)  --  c/w oxygen, Duonebs  - switch to prednisone 40mg for 5 days as per pulm.   --switch to doxycycline 100mg po q12 for 5 days.  - Chest x ray- see above  - appreciate pulmonary consult- as per pulm:    monitor 02 sat-now on ra    d/c iv steroids    prednisone 40 mg for 5 days    d/c iv abx    start doxycyxline 100 mg po [id number] x 5 days  - cleared by pulmonary for discharge.     COPD Discharge Checklist (48 hours prior to DC day):     [  ] Verify appropriate discharge disposition with team (e.g. home, home with services, hospice, SNF)- tomorrow.    [x  ] Educate patient and/or caregiver on the COPD ACTION PLAN and provide patient with the completed form.    [  ] Review discharge medications with RN and patient/care giver- tomorrow.     [ x ] Emphasize smoking cessation options for smokers    [  ] Switch to nebulizers if patient has difficulty using inhalers    [  ] Verify and document outpatient follow-up within 3 days with PMD or Pulmonary specialist- tomorrow    [  ] Referal to outpatient pulmonary rehab within 2 weeks for eligible patients (discuss with pulmonary specialist)                # CHF ( Diastolic)  --  pro-bnp: 2241  -- ECHO- Diastolic HF, EF 60% as per Dr. Flannery.   --  keep i < o,  trend daily weights, daily bmp  --  Appreciate CArdio consult- As per cardio:  --start edicrin 25 qd for now if not improved increase to 50 qd   -watch bun/cr       # HTN-    -continue amlodipine  - c/w lisinopril 5 mg.  - monitor.       dvt ppx- lovenox.   gi ppx not indicated  dispo- Home

## 2018-09-21 NOTE — PROGRESS NOTE ADULT - SUBJECTIVE AND OBJECTIVE BOX
KOTA SCHAFER  83y, Female  Allergy: hair dyes (Hives)  Originally Entered as [Unknown] reaction to [hair dye] (Unknown)  sulfa drugs (Headache)  sulfonamides (Other (Severe))        LAST 24-Hr EVENTS:  pt seen examined   cough shortness of breath improved    VITALS:  T(F): 96.9 (09-21-18 @ 12:25), Max: 99 (09-20-18 @ 20:04)  HR: 84 (09-21-18 @ 12:25)  BP: 139/52 (09-21-18 @ 12:25) (135/70 - 165/66)  RR: 18 (09-21-18 @ 12:25)  SpO2: --    PHYSICAL EXAM:    GENERAL: NAD  NECK: Supple, No JVD  CHEST/LUNG: no wheeze- dec bs b/l bases  HEART: Regular rate and rhythm; No murmurs.  ABDOMEN: Soft, Nontender, Nondistended; Bowel sounds present  EXTREMITIES:  No clubbing, edema absent        TESTS & MEASUREMENTS:    IN: 1625 mL / OUT: 50 mL / NET: 1575 mL    IN: 300 mL / OUT: 950 mL / NET: -650 mL    IN: 6 mL / OUT: 100 mL / NET: -94 mL                            8.7    7.21  )-----------( 173      ( 21 Sep 2018 09:34 )             27.0       09-21    139  |  101  |  39<H>  ----------------------------<  192<H>  4.7   |  25  |  1.0    Ca    8.5      21 Sep 2018 09:34  Mg     2.3     09-20              Culture - Blood (collected 09-18-18 @ 23:43)  Source: .Blood Blood  Preliminary Report (09-20-18 @ 06:01):    No growth to date.    Culture - Blood (collected 09-18-18 @ 23:43)  Source: .Blood Blood  Preliminary Report (09-20-18 @ 06:01):    No growth to date.          MEDICATIONS:  MEDICATIONS  (STANDING):  ALBUTerol/ipratropium for Nebulization 3 milliLiter(s) Nebulizer every 6 hours  amLODIPine   Tablet 5 milliGRAM(s) Oral daily  azithromycin  IVPB 500 milliGRAM(s) IV Intermittent every 24 hours  buDESOnide 160 MICROgram(s)/formoterol 4.5 MICROgram(s) Inhaler 2 Puff(s) Inhalation two times a day  cefTRIAXone   IVPB 1 Gram(s) IV Intermittent every 24 hours  chlorhexidine 4% Liquid 1 Application(s) Topical <User Schedule>  ethacrynic acid 25 milliGRAM(s) Oral daily  heparin  Injectable 5000 Unit(s) SubCutaneous every 8 hours  influenza   Vaccine 0.5 milliLiter(s) IntraMuscular once  lisinopril 5 milliGRAM(s) Oral daily  methylPREDNISolone sodium succinate Injectable 60 milliGRAM(s) IV Push two times a day  metoprolol succinate ER 50 milliGRAM(s) Oral daily    MEDICATIONS  (PRN):  ALBUTerol    90 MICROgram(s) HFA Inhaler 2 Puff(s) Inhalation every 6 hours PRN Shortness of Breath and/or Wheezing  HYDROcodone 10 mG/acetaminophen 325 mG 1 Tablet(s) Oral every 4 hours PRN Severe Pain (7 - 10)        < from: Transthoracic Echocardiogram (09.19.18 @ 13:06) >  Summary:   1. Left ventricular ejection fraction, by visual estimation, is 60 to   65%.   2. Normal left ventricular size and wall thicknesses, with normal   systolic function.   3. Spectral Doppler shows impaired relaxation pattern of left   ventricular myocardial filling (Grade I diastolic dysfunction).   4. Mildly enlarged left atrium.   5. Mild mitral valve regurgitation.   6. Mild-moderate tricuspid regurgitation.   7. Mild to moderate pulmonic valve regurgitation.   8. Estimated pulmonary artery systolic pressure is 60.3 mmHg assuming a   right atrial pressure of 15 mm    < end of copied text >

## 2018-09-21 NOTE — PROGRESS NOTE ADULT - SUBJECTIVE AND OBJECTIVE BOX
SUBJECTIVE:    Patient is a 83y old Female who presents with a chief complaint of COPD exacerbation (21 Sep 2018 14:22)    Currently admitted to medicine with the primary diagnosis of COPD exacerbation     Today is hospital day 2d. This morning she is resting comfortably in bed and reports no new issues or overnight events. Pt states her breathing is better. She  is on room air, states the cough has improved and she is basically forcing herself to cough.  pt seen by cardiology yesterday, started on ethacrynic acid.     PAST MEDICAL & SURGICAL HISTORY  Breast tumor: removal benign bl breasts  Anal cancer  COPD (chronic obstructive pulmonary disease)  Lung disease: COPD  Hypertension  H/O breast surgery: 39 yrs. ago, bilateral  History of back surgery: 10 yrs. ago stimulator implant 2001, 2011    SOCIAL HISTORY:  Negative for smoking/alcohol/drug use.     ALLERGIES:  hair dyes (Hives)  Originally Entered as [Unknown] reaction to [hair dye] (Unknown)  sulfa drugs (Headache)  sulfonamides (Other (Severe))    MEDICATIONS:  STANDING MEDICATIONS  ALBUTerol/ipratropium for Nebulization 3 milliLiter(s) Nebulizer every 6 hours  amLODIPine   Tablet 5 milliGRAM(s) Oral daily  azithromycin  IVPB 500 milliGRAM(s) IV Intermittent every 24 hours  buDESOnide 160 MICROgram(s)/formoterol 4.5 MICROgram(s) Inhaler 2 Puff(s) Inhalation two times a day  cefTRIAXone   IVPB 1 Gram(s) IV Intermittent every 24 hours  chlorhexidine 4% Liquid 1 Application(s) Topical <User Schedule>  ethacrynic acid 25 milliGRAM(s) Oral daily  heparin  Injectable 5000 Unit(s) SubCutaneous every 8 hours  influenza   Vaccine 0.5 milliLiter(s) IntraMuscular once  lisinopril 5 milliGRAM(s) Oral daily  methylPREDNISolone sodium succinate Injectable 60 milliGRAM(s) IV Push two times a day  metoprolol succinate ER 50 milliGRAM(s) Oral daily    PRN MEDICATIONS  ALBUTerol    90 MICROgram(s) HFA Inhaler 2 Puff(s) Inhalation every 6 hours PRN  HYDROcodone 10 mG/acetaminophen 325 mG 1 Tablet(s) Oral every 4 hours PRN    VITALS:   T(F): 96.9  HR: 84  BP: 139/52  RR: 18  SpO2: --    LABS:                        8.7    7.21  )-----------( 173      ( 21 Sep 2018 09:34 )             27.0     09-21    139  |  101  |  39<H>  ----------------------------<  192<H>  4.7   |  25  |  1.0    Ca    8.5      21 Sep 2018 09:34  Mg     2.3     09-20                Culture - Blood (collected 18 Sep 2018 23:43)  Source: .Blood Blood  Preliminary Report (20 Sep 2018 06:01):    No growth to date.    Culture - Blood (collected 18 Sep 2018 23:43)  Source: .Blood Blood  Preliminary Report (20 Sep 2018 06:01):    No growth to date.          RADIOLOGY:  < from: Xray Chest 2 Views PA/Lat (09.20.18 @ 12:03) >  Impression:      Bilateral basilar opacifications and effusions, increased since prior.     < end of copied text >      < from: Xray Chest 1 View AP/PA (09.19.18 @ 00:07) >    Impression:      Left basilar opacity and effusion.    < end of copied text >          < from: Transthoracic Echocardiogram (09.19.18 @ 13:06) >  Summary:   1. Left ventricular ejection fraction, by visual estimation, is 60 to   65%.   2. Normal left ventricular size and wall thicknesses, with normal   systolic function.   3. Spectral Doppler shows impaired relaxation pattern of left   ventricular myocardial filling (Grade I diastolic dysfunction).   4. Mildly enlarged left atrium.   5. Mild mitral valve regurgitation.   6. Mild-moderate tricuspid regurgitation.   7. Mild to moderate pulmonic valve regurgitation.   8. Estimated pulmonary artery systolic pressure is 60.3 mmHg assuming a   right atrial pressure of 15 mmHg, which is consistent with severe   pulmonary hypertension.    < end of copied text >        PHYSICAL EXAM:  GEN: No acute distress, off room air.   LUNGS: Clear to auscultation bilaterally, much better. no wheezing.   HEART: S1/S2 present. RRR.   ABD: Soft, non-tender, non-distended. Bowel sounds present  EXT: NC/NC/NE/2+PP/SARAVIA  NEURO: AAOX3 SUBJECTIVE:    Patient is a 83y old Female who presents with a chief complaint of COPD exacerbation (21 Sep 2018 14:22)    Currently admitted to medicine with the primary diagnosis of COPD exacerbation     Today is hospital day 2d. This morning she is resting comfortably in bed and reports no new issues or overnight events. Pt states her breathing is better. She  is on room air, states the cough has improved and she is basically forcing herself to cough.  pt seen by cardiology yesterday, started on ethacrynic acid.   Pt seen again in the evening. Pt desaturated on ambulation, back on Nasal canula. Pt may need home oxygen evaluation.   Had extensive discussion with family and patient about COPD and CHF. Provided COPD and CHF action plan.     PAST MEDICAL & SURGICAL HISTORY  Breast tumor: removal benign bl breasts  Anal cancer  COPD (chronic obstructive pulmonary disease)  Lung disease: COPD  Hypertension  H/O breast surgery: 39 yrs. ago, bilateral  History of back surgery: 10 yrs. ago stimulator implant 2001, 2011    SOCIAL HISTORY:  Negative for smoking/alcohol/drug use.     ALLERGIES:  hair dyes (Hives)  Originally Entered as [Unknown] reaction to [hair dye] (Unknown)  sulfa drugs (Headache)  sulfonamides (Other (Severe))    MEDICATIONS:  STANDING MEDICATIONS  ALBUTerol/ipratropium for Nebulization 3 milliLiter(s) Nebulizer every 6 hours  amLODIPine   Tablet 5 milliGRAM(s) Oral daily  azithromycin  IVPB 500 milliGRAM(s) IV Intermittent every 24 hours  buDESOnide 160 MICROgram(s)/formoterol 4.5 MICROgram(s) Inhaler 2 Puff(s) Inhalation two times a day  cefTRIAXone   IVPB 1 Gram(s) IV Intermittent every 24 hours  chlorhexidine 4% Liquid 1 Application(s) Topical <User Schedule>  ethacrynic acid 25 milliGRAM(s) Oral daily  heparin  Injectable 5000 Unit(s) SubCutaneous every 8 hours  influenza   Vaccine 0.5 milliLiter(s) IntraMuscular once  lisinopril 5 milliGRAM(s) Oral daily  methylPREDNISolone sodium succinate Injectable 60 milliGRAM(s) IV Push two times a day  metoprolol succinate ER 50 milliGRAM(s) Oral daily    PRN MEDICATIONS  ALBUTerol    90 MICROgram(s) HFA Inhaler 2 Puff(s) Inhalation every 6 hours PRN  HYDROcodone 10 mG/acetaminophen 325 mG 1 Tablet(s) Oral every 4 hours PRN    VITALS:   T(F): 96.9  HR: 84  BP: 139/52  RR: 18  SpO2: --    LABS:                        8.7    7.21  )-----------( 173      ( 21 Sep 2018 09:34 )             27.0     09-21    139  |  101  |  39<H>  ----------------------------<  192<H>  4.7   |  25  |  1.0    Ca    8.5      21 Sep 2018 09:34  Mg     2.3     09-20                Culture - Blood (collected 18 Sep 2018 23:43)  Source: .Blood Blood  Preliminary Report (20 Sep 2018 06:01):    No growth to date.    Culture - Blood (collected 18 Sep 2018 23:43)  Source: .Blood Blood  Preliminary Report (20 Sep 2018 06:01):    No growth to date.          RADIOLOGY:  < from: Xray Chest 2 Views PA/Lat (09.20.18 @ 12:03) >  Impression:      Bilateral basilar opacifications and effusions, increased since prior.     < end of copied text >      < from: Xray Chest 1 View AP/PA (09.19.18 @ 00:07) >    Impression:      Left basilar opacity and effusion.    < end of copied text >          < from: Transthoracic Echocardiogram (09.19.18 @ 13:06) >  Summary:   1. Left ventricular ejection fraction, by visual estimation, is 60 to   65%.   2. Normal left ventricular size and wall thicknesses, with normal   systolic function.   3. Spectral Doppler shows impaired relaxation pattern of left   ventricular myocardial filling (Grade I diastolic dysfunction).   4. Mildly enlarged left atrium.   5. Mild mitral valve regurgitation.   6. Mild-moderate tricuspid regurgitation.   7. Mild to moderate pulmonic valve regurgitation.   8. Estimated pulmonary artery systolic pressure is 60.3 mmHg assuming a   right atrial pressure of 15 mmHg, which is consistent with severe   pulmonary hypertension.    < end of copied text >        PHYSICAL EXAM:  GEN: No acute distress, off room air.   LUNGS: Clear to auscultation bilaterally, much better. no wheezing.   HEART: S1/S2 present. RRR.   ABD: Soft, non-tender, non-distended. Bowel sounds present  EXT: NC/NC/NE/2+PP/SARAVIA  NEURO: AAOX3

## 2018-09-21 NOTE — PROGRESS NOTE ADULT - ASSESSMENT
Assessment and Plan:   · Assessment		  84 yo F with a PMH of COPD ( previously has seen Dr. Donis once), HTN, HLD, and Chronic Back Pain with spinal stimulator was brought in by ambulance for sob x 1 day associated with cough and LE edema.    # COPD Exacerbation( 2/2 recent cold, viral URI)  --  c/w oxygen, Duonebs  - C/w Methylprednisolone   -- c/w  Azithromycin and rocephin  - Chest x ray- Pending read. ( opacity with effusion?)  - pending pulmonary consult.     # CHF ( Diastolic)  --  pro-bnp: 2241  -- ECHO- Diastolic HF, EF 60% as per Dr. Flannery.   --  keep i < o,  trend daily weights, daily bmp  --  Appreciate CArdio consult- As per cardio:  --start edicrin 25 qd for now if not improved increase to 50 qd   watch bun/cr       # HTN- 166/72   continue amlodipine  - will add lisinopril 5 mg.       dvt ppx- lovenox.   gi ppx not indicated  dispo- Home

## 2018-09-21 NOTE — PROGRESS NOTE ADULT - SUBJECTIVE AND OBJECTIVE BOX
Patient was seen and examined. Spoke with RN. Chart reviewed.    No events overnight.  Vital Signs Last 24 Hrs  T(F): 96.9 (21 Sep 2018 12:25), Max: 99 (20 Sep 2018 20:04)  HR: 84 (21 Sep 2018 12:25) (75 - 87)  BP: 139/52 (21 Sep 2018 12:25) (135/70 - 165/66)  SpO2: --  MEDICATIONS  (STANDING):  ALBUTerol/ipratropium for Nebulization 3 milliLiter(s) Nebulizer every 6 hours  amLODIPine   Tablet 5 milliGRAM(s) Oral daily  azithromycin  IVPB 500 milliGRAM(s) IV Intermittent every 24 hours  buDESOnide 160 MICROgram(s)/formoterol 4.5 MICROgram(s) Inhaler 2 Puff(s) Inhalation two times a day  cefTRIAXone   IVPB 1 Gram(s) IV Intermittent every 24 hours  chlorhexidine 4% Liquid 1 Application(s) Topical <User Schedule>  ethacrynic acid 25 milliGRAM(s) Oral daily  heparin  Injectable 5000 Unit(s) SubCutaneous every 8 hours  influenza   Vaccine 0.5 milliLiter(s) IntraMuscular once  lisinopril 5 milliGRAM(s) Oral daily  methylPREDNISolone sodium succinate Injectable 60 milliGRAM(s) IV Push two times a day  metoprolol succinate ER 50 milliGRAM(s) Oral daily    MEDICATIONS  (PRN):  ALBUTerol    90 MICROgram(s) HFA Inhaler 2 Puff(s) Inhalation every 6 hours PRN Shortness of Breath and/or Wheezing  HYDROcodone 10 mG/acetaminophen 325 mG 1 Tablet(s) Oral every 4 hours PRN Severe Pain (7 - 10)    Labs:                        8.7    7.21  )-----------( 173      ( 21 Sep 2018 09:34 )             27.0                         9.1    8.97  )-----------( 163      ( 20 Sep 2018 09:17 )             28.0     21 Sep 2018 09:34    139    |  101    |  39     ----------------------------<  192    4.7     |  25     |  1.0    20 Sep 2018 09:17    138    |  101    |  28     ----------------------------<  183    5.2     |  24     |  0.8      Ca    8.5        21 Sep 2018 09:34  Ca    8.1        20 Sep 2018 09:17  Mg     2.3       20 Sep 2018 09:17            Culture - Blood (collected 18 Sep 2018 23:43)  Source: .Blood Blood  Preliminary Report (20 Sep 2018 06:01):    No growth to date.    Culture - Blood (collected 18 Sep 2018 23:43)  Source: .Blood Blood  Preliminary Report (20 Sep 2018 06:01):    No growth to date.        Radiology:    General: comfortable, NAD  Head:  Normocephalic, atraumatic  ENT:  Mucosa moist, no ulcerations  Neck:  Supple, no JVD,   Resp: CTA B/L  CV: RRR, S1S2,   GI: Soft, NT, bowel sounds  MS: No edema, + peripheral pulses, FROM all 4 extremity

## 2018-09-21 NOTE — DISCHARGE NOTE ADULT - PLAN OF CARE
optimize medical management You came in with Shortness of breath and increased cough.  You were diagnosed with copd exacerbation.  we treated you with antibiotics, nebulizers, oxygen and steroids.  Please take prednisone 40mg for 4 days and take doxycycline 100mg twice a day for 4 days.     your pharmacy has extra prescription for prednisone and doxycycline for future use. use that when you are in yellow zone and contact you PMD or lung doctor at that time.     Please follow up with Dr nando johnson 3-5 days for the current episode for further management.   You will be going home on home oxygen for COPD. Please use it at all times. you came in with SOB and cough. Echo revealed you had CHF with diastolic dysfunction.   Dr flannery started you on ethacrynic acid for optimization.   Please take ethacrynic acid 25 mg daily and follow up with Dr. Flannery for further management in 10- 14 days. You came in with Shortness of breath and increased cough.  You were diagnosed with copd exacerbation.  we treated you with antibiotics, nebulizers, oxygen and steroids.  Please take prednisone 40mg for 2 days and take doxycycline 100mg twice a day for 2 days.     your pharmacy has extra prescription for prednisone and doxycycline for future use. use that when you are in yellow zone and contact you PMD or lung doctor at that time.     Please follow up with Dr anndo johnson 3-5 days for the current episode for further management.   You will be going home on home oxygen for COPD. Please use it at all times. you came in with SOB and cough. Echo revealed you had CHF with diastolic dysfunction.   Dr flannery started you on ethacrynic acid for optimization.   Please take ethacrynic acid 25 mg daily and follow up with Dr. Flannery for further management in 10- 14 days. [phone]

## 2018-09-22 LAB
ANION GAP SERPL CALC-SCNC: 8 MMOL/L — SIGNIFICANT CHANGE UP (ref 7–14)
BASOPHILS # BLD AUTO: 0 K/UL — SIGNIFICANT CHANGE UP (ref 0–0.2)
BASOPHILS NFR BLD AUTO: 0 % — SIGNIFICANT CHANGE UP (ref 0–1)
BUN SERPL-MCNC: 45 MG/DL — HIGH (ref 10–20)
CALCIUM SERPL-MCNC: 8.5 MG/DL — SIGNIFICANT CHANGE UP (ref 8.5–10.1)
CHLORIDE SERPL-SCNC: 101 MMOL/L — SIGNIFICANT CHANGE UP (ref 98–110)
CO2 SERPL-SCNC: 29 MMOL/L — SIGNIFICANT CHANGE UP (ref 17–32)
CREAT SERPL-MCNC: 1 MG/DL — SIGNIFICANT CHANGE UP (ref 0.7–1.5)
EOSINOPHIL # BLD AUTO: 0 K/UL — SIGNIFICANT CHANGE UP (ref 0–0.7)
EOSINOPHIL NFR BLD AUTO: 0 % — SIGNIFICANT CHANGE UP (ref 0–8)
GLUCOSE SERPL-MCNC: 143 MG/DL — HIGH (ref 70–99)
HCT VFR BLD CALC: 26.8 % — LOW (ref 37–47)
HGB BLD-MCNC: 8.6 G/DL — LOW (ref 12–16)
IMM GRANULOCYTES NFR BLD AUTO: 1.2 % — HIGH (ref 0.1–0.3)
LYMPHOCYTES # BLD AUTO: 0.34 K/UL — LOW (ref 1.2–3.4)
LYMPHOCYTES # BLD AUTO: 5.3 % — LOW (ref 20.5–51.1)
MCHC RBC-ENTMCNC: 32.1 G/DL — SIGNIFICANT CHANGE UP (ref 32–37)
MCHC RBC-ENTMCNC: 32.1 PG — HIGH (ref 27–31)
MCV RBC AUTO: 100 FL — HIGH (ref 81–99)
MONOCYTES # BLD AUTO: 0.37 K/UL — SIGNIFICANT CHANGE UP (ref 0.1–0.6)
MONOCYTES NFR BLD AUTO: 5.7 % — SIGNIFICANT CHANGE UP (ref 1.7–9.3)
NEUTROPHILS # BLD AUTO: 5.66 K/UL — SIGNIFICANT CHANGE UP (ref 1.4–6.5)
NEUTROPHILS NFR BLD AUTO: 87.8 % — HIGH (ref 42.2–75.2)
NRBC # BLD: 0 /100 WBCS — SIGNIFICANT CHANGE UP (ref 0–0)
PLATELET # BLD AUTO: 154 K/UL — SIGNIFICANT CHANGE UP (ref 130–400)
POTASSIUM SERPL-MCNC: 5.7 MMOL/L — HIGH (ref 3.5–5)
POTASSIUM SERPL-SCNC: 5.7 MMOL/L — HIGH (ref 3.5–5)
RBC # BLD: 2.68 M/UL — LOW (ref 4.2–5.4)
RBC # FLD: 17.2 % — HIGH (ref 11.5–14.5)
SODIUM SERPL-SCNC: 138 MMOL/L — SIGNIFICANT CHANGE UP (ref 135–146)
WBC # BLD: 6.45 K/UL — SIGNIFICANT CHANGE UP (ref 4.8–10.8)
WBC # FLD AUTO: 6.45 K/UL — SIGNIFICANT CHANGE UP (ref 4.8–10.8)

## 2018-09-22 RX ORDER — BUDESONIDE AND FORMOTEROL FUMARATE DIHYDRATE 160; 4.5 UG/1; UG/1
2 AEROSOL RESPIRATORY (INHALATION)
Qty: 1 | Refills: 0 | OUTPATIENT
Start: 2018-09-22 | End: 2018-10-21

## 2018-09-22 RX ORDER — ETHACRYNIC ACID 25 MG/1
1 TABLET ORAL
Qty: 30 | Refills: 0 | OUTPATIENT
Start: 2018-09-22 | End: 2018-10-21

## 2018-09-22 RX ADMIN — ETHACRYNIC ACID 25 MILLIGRAM(S): 25 TABLET ORAL at 06:34

## 2018-09-22 RX ADMIN — Medication 40 MILLIGRAM(S): at 06:34

## 2018-09-22 RX ADMIN — HEPARIN SODIUM 5000 UNIT(S): 5000 INJECTION INTRAVENOUS; SUBCUTANEOUS at 06:33

## 2018-09-22 RX ADMIN — Medication 100 MILLIGRAM(S): at 06:34

## 2018-09-22 RX ADMIN — HEPARIN SODIUM 5000 UNIT(S): 5000 INJECTION INTRAVENOUS; SUBCUTANEOUS at 13:14

## 2018-09-22 RX ADMIN — LISINOPRIL 5 MILLIGRAM(S): 2.5 TABLET ORAL at 06:34

## 2018-09-22 RX ADMIN — Medication 3 MILLILITER(S): at 13:32

## 2018-09-22 RX ADMIN — CHLORHEXIDINE GLUCONATE 1 APPLICATION(S): 213 SOLUTION TOPICAL at 06:47

## 2018-09-22 RX ADMIN — BUDESONIDE AND FORMOTEROL FUMARATE DIHYDRATE 2 PUFF(S): 160; 4.5 AEROSOL RESPIRATORY (INHALATION) at 21:03

## 2018-09-22 RX ADMIN — AMLODIPINE BESYLATE 5 MILLIGRAM(S): 2.5 TABLET ORAL at 06:34

## 2018-09-22 RX ADMIN — HEPARIN SODIUM 5000 UNIT(S): 5000 INJECTION INTRAVENOUS; SUBCUTANEOUS at 21:04

## 2018-09-22 RX ADMIN — Medication 50 MILLIGRAM(S): at 06:34

## 2018-09-22 RX ADMIN — BUDESONIDE AND FORMOTEROL FUMARATE DIHYDRATE 2 PUFF(S): 160; 4.5 AEROSOL RESPIRATORY (INHALATION) at 08:15

## 2018-09-22 RX ADMIN — Medication 100 MILLIGRAM(S): at 17:01

## 2018-09-22 RX ADMIN — Medication 3 MILLILITER(S): at 02:23

## 2018-09-22 NOTE — PROGRESS NOTE ADULT - SUBJECTIVE AND OBJECTIVE BOX
Interval history and ROS:    Feels better but because dyspneic with exertion yesterday    MEDICATIONS:  ALBUTerol    90 MICROgram(s) HFA Inhaler 2 Puff(s) Inhalation every 6 hours PRN  ALBUTerol/ipratropium for Nebulization 3 milliLiter(s) Nebulizer every 6 hours  amLODIPine   Tablet 5 milliGRAM(s) Oral daily  buDESOnide 160 MICROgram(s)/formoterol 4.5 MICROgram(s) Inhaler 2 Puff(s) Inhalation two times a day  chlorhexidine 4% Liquid 1 Application(s) Topical <User Schedule>  doxycycline hyclate Capsule 100 milliGRAM(s) Oral every 12 hours  ethacrynic acid 25 milliGRAM(s) Oral daily  heparin  Injectable 5000 Unit(s) SubCutaneous every 8 hours  HYDROcodone 10 mG/acetaminophen 325 mG 1 Tablet(s) Oral every 4 hours PRN  influenza   Vaccine 0.5 milliLiter(s) IntraMuscular once  lisinopril 5 milliGRAM(s) Oral daily  metoprolol succinate ER 50 milliGRAM(s) Oral daily  predniSONE   Tablet 40 milliGRAM(s) Oral daily      VITAL SIGNS:  Vital Signs Last 24 Hrs  T(C): 36.2 (22 Sep 2018 04:45), Max: 36.2 (22 Sep 2018 04:45)  T(F): 97.2 (22 Sep 2018 04:45), Max: 97.2 (22 Sep 2018 04:45)  HR: 85 (22 Sep 2018 04:45) (84 - 89)  BP: 169/71 (22 Sep 2018 04:45) (139/52 - 179/75)  BP(mean): --  RR: 18 (22 Sep 2018 04:45) (18 - 18)  SpO2: 95% (21 Sep 2018 19:40) (95% - 95%) on NC 2 lpm        PHYSICAL EXAM:  Awake and alert NAD  Neck supple, no LN  S1 and S2 no murmur  Lungs are clear without wheeze, rhonchi or rales  Abdomen is soft and non tender  There is no edema  Neurologically non focal        LABS:                        8.7    7.21  )-----------( 173      ( 21 Sep 2018 09:34 )             27.0     09-21    139  |  101  |  39<H>  ----------------------------<  192<H>  4.7   |  25  |  1.0    Ca    8.5      21 Sep 2018 09:34  Mg     2.3     09-20

## 2018-09-22 NOTE — PROGRESS NOTE ADULT - SUBJECTIVE AND OBJECTIVE BOX
Patient was seen and examined. Spoke with RN. Chart reviewed.    No events overnight.  Vital Signs Last 24 Hrs  T(F): 97 (22 Sep 2018 12:27), Max: 97.2 (22 Sep 2018 04:45)  HR: 80 (22 Sep 2018 12:27) (80 - 89)  BP: 134/64 (22 Sep 2018 12:27) (134/64 - 179/75)  SpO2: 95% (21 Sep 2018 19:40) (95% - 95%)  MEDICATIONS  (STANDING):  ALBUTerol/ipratropium for Nebulization 3 milliLiter(s) Nebulizer every 6 hours  amLODIPine   Tablet 5 milliGRAM(s) Oral daily  buDESOnide 160 MICROgram(s)/formoterol 4.5 MICROgram(s) Inhaler 2 Puff(s) Inhalation two times a day  chlorhexidine 4% Liquid 1 Application(s) Topical <User Schedule>  doxycycline hyclate Capsule 100 milliGRAM(s) Oral every 12 hours  ethacrynic acid 25 milliGRAM(s) Oral daily  heparin  Injectable 5000 Unit(s) SubCutaneous every 8 hours  influenza   Vaccine 0.5 milliLiter(s) IntraMuscular once  lisinopril 5 milliGRAM(s) Oral daily  metoprolol succinate ER 50 milliGRAM(s) Oral daily  predniSONE   Tablet 40 milliGRAM(s) Oral daily    MEDICATIONS  (PRN):  ALBUTerol    90 MICROgram(s) HFA Inhaler 2 Puff(s) Inhalation every 6 hours PRN Shortness of Breath and/or Wheezing  HYDROcodone 10 mG/acetaminophen 325 mG 1 Tablet(s) Oral every 4 hours PRN Severe Pain (7 - 10)    Labs:                        8.6    6.45  )-----------( 154      ( 22 Sep 2018 10:35 )             26.8                         8.7    7.21  )-----------( 173      ( 21 Sep 2018 09:34 )             27.0     22 Sep 2018 10:35    138    |  101    |  45     ----------------------------<  143    5.7     |  29     |  1.0    21 Sep 2018 09:34    139    |  101    |  39     ----------------------------<  192    4.7     |  25     |  1.0      Ca    8.5        22 Sep 2018 10:35  Ca    8.5        21 Sep 2018 09:34              Radiology:    General: comfortable, NAD  Neurology: A&Ox3, nonfocal  Head:  Normocephalic, atraumatic  ENT:  Mucosa moist, no ulcerations  Neck:  Supple, no JVD,   Skin: no breakdowns (as per RN)  Resp: CTA B/L  CV: RRR, S1S2,   GI: Soft, NT, bowel sounds  MS: No edema, + peripheral pulses, FROM all 4 extremity

## 2018-09-22 NOTE — PROGRESS NOTE ADULT - SUBJECTIVE AND OBJECTIVE BOX
SUBJECTIVE:    Patient is a 83y old Female who presents with a chief complaint of COPD exacerbation (22 Sep 2018 08:14)    Currently admitted to medicine with the primary diagnosis of COPD exacerbation     Today is hospital day 3d. This morning she is resting comfortably in bed on nasal canula.   Pt states that she slept fine, breathing is better, still has some cough.  Pt is saturating at 87% on room air at rest. Pt is saturating at 94% on 2LPM via nasal canula at rest.    Pt is saturating at 70% on room air during ambulation.   pt has COPD and is aware that she will be going home on home oxygen.   Pt was tested in a chronic stable state.       PAST MEDICAL & SURGICAL HISTORY  Breast tumor: removal benign bl breasts  Anal cancer  COPD (chronic obstructive pulmonary disease)  Lung disease: COPD  Hypertension  H/O breast surgery: 39 yrs. ago, bilateral  History of back surgery: 10 yrs. ago stimulator implant 2001, 2011    SOCIAL HISTORY:  Negative for smoking/alcohol/drug use.     ALLERGIES:  hair dyes (Hives)  Originally Entered as [Unknown] reaction to [hair dye] (Unknown)  sulfa drugs (Headache)  sulfonamides (Other (Severe))    MEDICATIONS:  STANDING MEDICATIONS  ALBUTerol/ipratropium for Nebulization 3 milliLiter(s) Nebulizer every 6 hours  amLODIPine   Tablet 5 milliGRAM(s) Oral daily  buDESOnide 160 MICROgram(s)/formoterol 4.5 MICROgram(s) Inhaler 2 Puff(s) Inhalation two times a day  chlorhexidine 4% Liquid 1 Application(s) Topical <User Schedule>  doxycycline hyclate Capsule 100 milliGRAM(s) Oral every 12 hours  ethacrynic acid 25 milliGRAM(s) Oral daily  heparin  Injectable 5000 Unit(s) SubCutaneous every 8 hours  influenza   Vaccine 0.5 milliLiter(s) IntraMuscular once  lisinopril 5 milliGRAM(s) Oral daily  metoprolol succinate ER 50 milliGRAM(s) Oral daily  predniSONE   Tablet 40 milliGRAM(s) Oral daily    PRN MEDICATIONS  ALBUTerol    90 MICROgram(s) HFA Inhaler 2 Puff(s) Inhalation every 6 hours PRN  HYDROcodone 10 mG/acetaminophen 325 mG 1 Tablet(s) Oral every 4 hours PRN    VITALS:   T(F): 97.2  HR: 85  BP: 169/71  RR: 18  SpO2: 95% ( on 2lPM via nasal canula)    LABS:                        8.7    7.21  )-----------( 173      ( 21 Sep 2018 09:34 )             27.0     09-21    139  |  101  |  39<H>  ----------------------------<  192<H>  4.7   |  25  |  1.0    Ca    8.5      21 Sep 2018 09:34                    RADIOLOGY:  < from: Xray Chest 2 Views PA/Lat (09.20.18 @ 12:03) >    Impression:      Bilateral basilar opacifications and effusions, increased since prior.     < end of copied text >    PHYSICAL EXAM:  GEN: No acute distress, on nasal canula at 2lpm.   LUNGS: Clear to auscultation bilaterally, no wheezing.    HEART: S1/S2 present. RRR.   ABD: Soft, non-tender, non-distended. Bowel sounds present  EXT: NC/NC/NE/2+PP/SARAVIA, 1+ pitting edema ( r>L)  NEURO: AAOX3            COPD Discharge Checklist (48 hours prior to DC day):     [  ] Verify appropriate discharge disposition with team (e.g. home, home with services, hospice, SNF)- tomorrow.    [x  ] Educate patient and/or caregiver on the COPD ACTION PLAN and provide patient with the completed form.    [  ] Review discharge medications with RN and patient/care giver- tomorrow.     [ x ] Emphasize smoking cessation options for smokers    [  ] Switch to nebulizers if patient has difficulty using inhalers    [  ] Verify and document outpatient follow-up within 3 days with PMD or Pulmonary specialist- tomorrow    [  ] Referal to outpatient pulmonary rehab within 2 weeks for eligible patients (discuss with pulmonary specialist

## 2018-09-22 NOTE — PROGRESS NOTE ADULT - ASSESSMENT
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

## 2018-09-22 NOTE — PROGRESS NOTE ADULT - ASSESSMENT
· Assessment		  84 yo F with a PMH of COPD ( previously has seen Dr. Donis once), HTN, HLD, and Chronic Back Pain with spinal stimulator was brought in by ambulance for sob x 1 day associated with cough and LE edema.    # COPD Exacerbation( 2/2 recent cold, viral URI)  --  c/w oxygen, Duonebs  - switch to prednisone 40mg for 5 days as per pulm.   --switch to doxycycline 100mg po q12 for 5 days.  - Chest x ray- see above  - appreciate pulmonary consult- as per pulm:    d/c iv steroids    prednisone 40 mg for 5 days    d/c iv abx    start doxycyxline 100 mg po [id number] x 5 days  - cleared by pulmonary for discharge.     --Pt is saturating at 87% on room air at rest. Pt is saturating at 94% on 2LPM via nasal canula at rest.    -Pt is saturating at 70% on room air during ambulation.   -pt has COPD and is aware that she will be going home on home oxygen.   -Pt was tested in a chronic stable state.         # CHF ( Diastolic)  --  pro-bnp: 2241  -- ECHO- Diastolic HF, EF 60% as per Dr. Flannery.   --  keep i < o,  trend daily weights, daily bmp  --  Appreciate CArdio consult- As per cardio:  --start edicrin 25 qd for now if not improved increase to 50 qd   -watch bun/cr       # HTN-    -continue amlodipine  - c/w lisinopril 5 mg.  - monitor.       dvt ppx- lovenox.   gi ppx not indicated  dispo- Home  dc home

## 2018-09-22 NOTE — PROGRESS NOTE ADULT - ASSESSMENT
84 yo F with a PMH of COPD ( previously has seen Dr. Donis once), HTN, HLD, and Chronic Back Pain with spinal stimulator was brought in by ambulance for sob x 1 day associated with cough and LE edema.    # COPD Exacerbation( 2/2 recent cold, viral URI)  --  c/w oxygen, Duonebs  - switch to prednisone 40mg for 5 days as per pulm.   --switch to doxycycline 100mg po q12 for 5 days.  - Chest x ray- see above  - appreciate pulmonary consult- as per pulm:    d/c iv steroids    prednisone 40 mg for 5 days    d/c iv abx    start doxycyxline 100 mg po [id number] x 5 days  - cleared by pulmonary for discharge.     --Pt is saturating at 87% on room air at rest. Pt is saturating at 94% on 2LPM via nasal canula at rest.    -Pt is saturating at 70% on room air during ambulation.   -pt has COPD and is aware that she will be going home on home oxygen.   -Pt was tested in a chronic stable state.         # CHF ( Diastolic)  --  pro-bnp: 2241  -- ECHO- Diastolic HF, EF 60% as per Dr. Flannery.   --  keep i < o,  trend daily weights, daily bmp  --  Appreciate CArdio consult- As per cardio:  --start edicrin 25 qd for now if not improved increase to 50 qd   -watch bun/cr       # HTN-    -continue amlodipine  - c/w lisinopril 5 mg.  - monitor.       dvt ppx- lovenox.   gi ppx not indicated  dispo- Home

## 2018-09-23 LAB
ANION GAP SERPL CALC-SCNC: 9 MMOL/L — SIGNIFICANT CHANGE UP (ref 7–14)
BASOPHILS # BLD AUTO: 0.01 K/UL — SIGNIFICANT CHANGE UP (ref 0–0.2)
BASOPHILS NFR BLD AUTO: 0.1 % — SIGNIFICANT CHANGE UP (ref 0–1)
BUN SERPL-MCNC: 45 MG/DL — HIGH (ref 10–20)
CALCIUM SERPL-MCNC: 8.6 MG/DL — SIGNIFICANT CHANGE UP (ref 8.5–10.1)
CHLORIDE SERPL-SCNC: 98 MMOL/L — SIGNIFICANT CHANGE UP (ref 98–110)
CO2 SERPL-SCNC: 33 MMOL/L — HIGH (ref 17–32)
CREAT SERPL-MCNC: 1 MG/DL — SIGNIFICANT CHANGE UP (ref 0.7–1.5)
EOSINOPHIL # BLD AUTO: 0 K/UL — SIGNIFICANT CHANGE UP (ref 0–0.7)
EOSINOPHIL NFR BLD AUTO: 0 % — SIGNIFICANT CHANGE UP (ref 0–8)
GLUCOSE SERPL-MCNC: 146 MG/DL — HIGH (ref 70–99)
HCT VFR BLD CALC: 26.4 % — LOW (ref 37–47)
HGB BLD-MCNC: 8.6 G/DL — LOW (ref 12–16)
IMM GRANULOCYTES NFR BLD AUTO: 1.5 % — HIGH (ref 0.1–0.3)
LYMPHOCYTES # BLD AUTO: 0.35 K/UL — LOW (ref 1.2–3.4)
LYMPHOCYTES # BLD AUTO: 3.4 % — LOW (ref 20.5–51.1)
MCHC RBC-ENTMCNC: 32.3 PG — HIGH (ref 27–31)
MCHC RBC-ENTMCNC: 32.6 G/DL — SIGNIFICANT CHANGE UP (ref 32–37)
MCV RBC AUTO: 99.2 FL — HIGH (ref 81–99)
MONOCYTES # BLD AUTO: 0.36 K/UL — SIGNIFICANT CHANGE UP (ref 0.1–0.6)
MONOCYTES NFR BLD AUTO: 3.5 % — SIGNIFICANT CHANGE UP (ref 1.7–9.3)
NEUTROPHILS # BLD AUTO: 9.49 K/UL — HIGH (ref 1.4–6.5)
NEUTROPHILS NFR BLD AUTO: 91.5 % — HIGH (ref 42.2–75.2)
NRBC # BLD: 0 /100 WBCS — SIGNIFICANT CHANGE UP (ref 0–0)
PLATELET # BLD AUTO: 154 K/UL — SIGNIFICANT CHANGE UP (ref 130–400)
POTASSIUM SERPL-MCNC: 5.9 MMOL/L — HIGH (ref 3.5–5)
POTASSIUM SERPL-SCNC: 5.9 MMOL/L — HIGH (ref 3.5–5)
RBC # BLD: 2.66 M/UL — LOW (ref 4.2–5.4)
RBC # FLD: 16.7 % — HIGH (ref 11.5–14.5)
SODIUM SERPL-SCNC: 140 MMOL/L — SIGNIFICANT CHANGE UP (ref 135–146)
WBC # BLD: 10.37 K/UL — SIGNIFICANT CHANGE UP (ref 4.8–10.8)
WBC # FLD AUTO: 10.37 K/UL — SIGNIFICANT CHANGE UP (ref 4.8–10.8)

## 2018-09-23 RX ADMIN — ETHACRYNIC ACID 25 MILLIGRAM(S): 25 TABLET ORAL at 05:35

## 2018-09-23 RX ADMIN — BUDESONIDE AND FORMOTEROL FUMARATE DIHYDRATE 2 PUFF(S): 160; 4.5 AEROSOL RESPIRATORY (INHALATION) at 22:33

## 2018-09-23 RX ADMIN — Medication 3 MILLILITER(S): at 19:28

## 2018-09-23 RX ADMIN — Medication 50 MILLIGRAM(S): at 05:32

## 2018-09-23 RX ADMIN — Medication 100 MILLIGRAM(S): at 18:05

## 2018-09-23 RX ADMIN — BUDESONIDE AND FORMOTEROL FUMARATE DIHYDRATE 2 PUFF(S): 160; 4.5 AEROSOL RESPIRATORY (INHALATION) at 08:22

## 2018-09-23 RX ADMIN — LISINOPRIL 5 MILLIGRAM(S): 2.5 TABLET ORAL at 05:31

## 2018-09-23 RX ADMIN — HEPARIN SODIUM 5000 UNIT(S): 5000 INJECTION INTRAVENOUS; SUBCUTANEOUS at 22:34

## 2018-09-23 RX ADMIN — Medication 3 MILLILITER(S): at 07:35

## 2018-09-23 RX ADMIN — AMLODIPINE BESYLATE 5 MILLIGRAM(S): 2.5 TABLET ORAL at 05:35

## 2018-09-23 RX ADMIN — HEPARIN SODIUM 5000 UNIT(S): 5000 INJECTION INTRAVENOUS; SUBCUTANEOUS at 05:34

## 2018-09-23 RX ADMIN — CHLORHEXIDINE GLUCONATE 1 APPLICATION(S): 213 SOLUTION TOPICAL at 05:36

## 2018-09-23 RX ADMIN — Medication 100 MILLIGRAM(S): at 05:35

## 2018-09-23 RX ADMIN — Medication 3 MILLILITER(S): at 13:31

## 2018-09-23 RX ADMIN — Medication 40 MILLIGRAM(S): at 05:32

## 2018-09-23 RX ADMIN — HEPARIN SODIUM 5000 UNIT(S): 5000 INJECTION INTRAVENOUS; SUBCUTANEOUS at 13:05

## 2018-09-23 NOTE — PROGRESS NOTE ADULT - SUBJECTIVE AND OBJECTIVE BOX
Patient was seen and examined. Spoke with RN. Chart reviewed.  No events overnight.  Vital Signs Last 24 Hrs  T(F): 96.2 (23 Sep 2018 04:52), Max: 97.6 (22 Sep 2018 20:05)  HR: 77 (23 Sep 2018 04:52) (76 - 80)  BP: 124/45 (23 Sep 2018 04:52) (124/45 - 167/72)  SpO2: 93% (23 Sep 2018 08:08) (70% - 94%)  MEDICATIONS  (STANDING):  ALBUTerol/ipratropium for Nebulization 3 milliLiter(s) Nebulizer every 6 hours  amLODIPine   Tablet 5 milliGRAM(s) Oral daily  buDESOnide 160 MICROgram(s)/formoterol 4.5 MICROgram(s) Inhaler 2 Puff(s) Inhalation two times a day  chlorhexidine 4% Liquid 1 Application(s) Topical <User Schedule>  doxycycline hyclate Capsule 100 milliGRAM(s) Oral every 12 hours  ethacrynic acid 25 milliGRAM(s) Oral daily  heparin  Injectable 5000 Unit(s) SubCutaneous every 8 hours  influenza   Vaccine 0.5 milliLiter(s) IntraMuscular once  lisinopril 5 milliGRAM(s) Oral daily  metoprolol succinate ER 50 milliGRAM(s) Oral daily  predniSONE   Tablet 40 milliGRAM(s) Oral daily    MEDICATIONS  (PRN):  ALBUTerol    90 MICROgram(s) HFA Inhaler 2 Puff(s) Inhalation every 6 hours PRN Shortness of Breath and/or Wheezing  HYDROcodone 10 mG/acetaminophen 325 mG 1 Tablet(s) Oral every 4 hours PRN Severe Pain (7 - 10)    Labs:                        8.6    6.45  )-----------( 154      ( 22 Sep 2018 10:35 )             26.8     22 Sep 2018 10:35    138    |  101    |  45     ----------------------------<  143    5.7     |  29     |  1.0      Ca    8.5        22 Sep 2018 10:35            General: comfortable, NAD  Neurology: A&Ox3, nonfocal  Head:  Normocephalic, atraumatic  ENT:  Mucosa moist, no ulcerations  Neck:  Supple, no JVD,   Skin: no breakdowns (as per RN)  Resp: CTA B/L  CV: RRR, S1S2,   GI: Soft, NT, bowel sounds  MS: No edema, + peripheral pulses, FROM all 4 extremity      A/P:  82 yo female with a history of COPD  Copd Exacerbation  CHF - diastolic failure  Pleural effusions  PHTN Group 2/3  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

## 2018-09-23 NOTE — PROGRESS NOTE ADULT - SUBJECTIVE AND OBJECTIVE BOX
Interval history and ROS:    The patient offers no new complaints  No worsening dyspnea cough  requiring O2 to maintain saturation with ambulation      MEDICATIONS:  ALBUTerol    90 MICROgram(s) HFA Inhaler 2 Puff(s) Inhalation every 6 hours PRN  ALBUTerol/ipratropium for Nebulization 3 milliLiter(s) Nebulizer every 6 hours  amLODIPine   Tablet 5 milliGRAM(s) Oral daily  buDESOnide 160 MICROgram(s)/formoterol 4.5 MICROgram(s) Inhaler 2 Puff(s) Inhalation two times a day  chlorhexidine 4% Liquid 1 Application(s) Topical <User Schedule>  doxycycline hyclate Capsule 100 milliGRAM(s) Oral every 12 hours  ethacrynic acid 25 milliGRAM(s) Oral daily  heparin  Injectable 5000 Unit(s) SubCutaneous every 8 hours  HYDROcodone 10 mG/acetaminophen 325 mG 1 Tablet(s) Oral every 4 hours PRN  influenza   Vaccine 0.5 milliLiter(s) IntraMuscular once  lisinopril 5 milliGRAM(s) Oral daily  metoprolol succinate ER 50 milliGRAM(s) Oral daily  predniSONE   Tablet 40 milliGRAM(s) Oral daily      VITAL SIGNS:  Vital Signs Last 24 Hrs  T(C): 35.7 (23 Sep 2018 04:52), Max: 36.4 (22 Sep 2018 20:05)  T(F): 96.2 (23 Sep 2018 04:52), Max: 97.6 (22 Sep 2018 20:05)  HR: 77 (23 Sep 2018 04:52) (76 - 80)  BP: 124/45 (23 Sep 2018 04:52) (124/45 - 167/72)  BP(mean): --  RR: 18 (23 Sep 2018 04:52) (18 - 18)  SpO2: 70% (22 Sep 2018 09:52) (70% - 94%) on NC O2 2lpm        PHYSICAL EXAM:  Awake and alert NAD  Neck supple, no LN  S1 and S2 no murmur  Lungs decreased BS, no wheeze  Abdomen is soft and non tender  There is no edema  Neurologically non focal        LABS:                        8.6    6.45  )-----------( 154                 26.8     138  |  101  |  45<H>  ----------------------------<  143<H>  5.7<H>   |  29  |  1.0    Ca    8.5

## 2018-09-23 NOTE — PROGRESS NOTE ADULT - ASSESSMENT
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

## 2018-09-24 VITALS
HEART RATE: 80 BPM | TEMPERATURE: 99 F | DIASTOLIC BLOOD PRESSURE: 75 MMHG | SYSTOLIC BLOOD PRESSURE: 176 MMHG | RESPIRATION RATE: 18 BRPM

## 2018-09-24 LAB
ANION GAP SERPL CALC-SCNC: 8 MMOL/L — SIGNIFICANT CHANGE UP (ref 7–14)
BUN SERPL-MCNC: 38 MG/DL — HIGH (ref 10–20)
CALCIUM SERPL-MCNC: 9 MG/DL — SIGNIFICANT CHANGE UP (ref 8.5–10.1)
CHLORIDE SERPL-SCNC: 98 MMOL/L — SIGNIFICANT CHANGE UP (ref 98–110)
CO2 SERPL-SCNC: 34 MMOL/L — HIGH (ref 17–32)
CREAT SERPL-MCNC: 0.9 MG/DL — SIGNIFICANT CHANGE UP (ref 0.7–1.5)
CULTURE RESULTS: SIGNIFICANT CHANGE UP
CULTURE RESULTS: SIGNIFICANT CHANGE UP
GLUCOSE SERPL-MCNC: 124 MG/DL — HIGH (ref 70–99)
HCT VFR BLD CALC: 28.5 % — LOW (ref 37–47)
HGB BLD-MCNC: 9.4 G/DL — LOW (ref 12–16)
MCHC RBC-ENTMCNC: 32.4 PG — HIGH (ref 27–31)
MCHC RBC-ENTMCNC: 33 G/DL — SIGNIFICANT CHANGE UP (ref 32–37)
MCV RBC AUTO: 98.3 FL — SIGNIFICANT CHANGE UP (ref 81–99)
NRBC # BLD: 0 /100 WBCS — SIGNIFICANT CHANGE UP (ref 0–0)
PLATELET # BLD AUTO: 190 K/UL — SIGNIFICANT CHANGE UP (ref 130–400)
POTASSIUM SERPL-MCNC: 4.9 MMOL/L — SIGNIFICANT CHANGE UP (ref 3.5–5)
POTASSIUM SERPL-SCNC: 4.9 MMOL/L — SIGNIFICANT CHANGE UP (ref 3.5–5)
RBC # BLD: 2.9 M/UL — LOW (ref 4.2–5.4)
RBC # FLD: 16.7 % — HIGH (ref 11.5–14.5)
SODIUM SERPL-SCNC: 140 MMOL/L — SIGNIFICANT CHANGE UP (ref 135–146)
SPECIMEN SOURCE: SIGNIFICANT CHANGE UP
SPECIMEN SOURCE: SIGNIFICANT CHANGE UP
WBC # BLD: 15.48 K/UL — HIGH (ref 4.8–10.8)
WBC # FLD AUTO: 15.48 K/UL — HIGH (ref 4.8–10.8)

## 2018-09-24 RX ORDER — ETHACRYNIC ACID 25 MG/1
1 TABLET ORAL
Qty: 30 | Refills: 0 | OUTPATIENT
Start: 2018-09-24 | End: 2018-10-23

## 2018-09-24 RX ORDER — BUDESONIDE AND FORMOTEROL FUMARATE DIHYDRATE 160; 4.5 UG/1; UG/1
2 AEROSOL RESPIRATORY (INHALATION)
Qty: 1 | Refills: 0 | OUTPATIENT
Start: 2018-09-24 | End: 2018-10-23

## 2018-09-24 RX ORDER — ALBUTEROL 90 UG/1
2 AEROSOL, METERED ORAL
Qty: 0 | Refills: 0 | COMMUNITY

## 2018-09-24 RX ADMIN — CHLORHEXIDINE GLUCONATE 1 APPLICATION(S): 213 SOLUTION TOPICAL at 06:00

## 2018-09-24 RX ADMIN — ETHACRYNIC ACID 25 MILLIGRAM(S): 25 TABLET ORAL at 06:00

## 2018-09-24 RX ADMIN — Medication 40 MILLIGRAM(S): at 06:23

## 2018-09-24 RX ADMIN — HEPARIN SODIUM 5000 UNIT(S): 5000 INJECTION INTRAVENOUS; SUBCUTANEOUS at 06:00

## 2018-09-24 RX ADMIN — LISINOPRIL 5 MILLIGRAM(S): 2.5 TABLET ORAL at 06:24

## 2018-09-24 RX ADMIN — Medication 3 MILLILITER(S): at 08:55

## 2018-09-24 RX ADMIN — AMLODIPINE BESYLATE 5 MILLIGRAM(S): 2.5 TABLET ORAL at 06:24

## 2018-09-24 RX ADMIN — Medication 100 MILLIGRAM(S): at 06:24

## 2018-09-24 RX ADMIN — HEPARIN SODIUM 5000 UNIT(S): 5000 INJECTION INTRAVENOUS; SUBCUTANEOUS at 14:02

## 2018-09-24 RX ADMIN — Medication 50 MILLIGRAM(S): at 06:24

## 2018-09-24 NOTE — PROGRESS NOTE ADULT - REASON FOR ADMISSION
COPD exacerbation

## 2018-09-24 NOTE — MEDICAL STUDENT PROGRESS NOTE(EDUCATION) - NS MD HP STUD ASPLAN ASSES FT
84 yo F w PMHx of HTN, DLD, COPD, chronic back pain w spinal stimulator was brought in by ambulance for SOB for 1 day.

## 2018-09-24 NOTE — MEDICAL STUDENT PROGRESS NOTE(EDUCATION) - NS MD HP STUD ASPLAN PLAN FT
#) COPD Exacerbation  - Pulm consulted: cont O2 via nasal cannula to maintain saturation >90%  - Complete Prednisone 40 mg for 5 days  (started 9/21, today is day 4)  - Complete doxycycline 100 mg PO q12 hrs x5 days (started 9/21, today is day 4)  - Cont Symbicort 160/4.5 2 puffs BID  - Cont Albuterol/Atrovent nebs q6 hrs     #) Hyperkalemia (5.9 9/23)  - Resolved, 4.9 this morning  - D/c Lisinopril  - EKG ordered, f/u    DVT ppx: Heparin 5000 Units sq q 8 hrs  Diet: DASH  Dispo: d/c to home, possibly today #) COPD Exacerbation, 2/2 recent cold, viral URI  - Pulm consulted: cont O2 via nasal cannula to maintain saturation >90%  - Complete Prednisone 40 mg for 5 days  (started 9/21, today is day 4)  - Complete doxycycline 100 mg PO q12 hrs x5 days (started 9/21, today is day 4)  - Cont Symbicort 160/4.5 2 puffs BID  - Cont Albuterol/Atrovent nebs q6 hrs   - Pt is saturating at 93% on 2L via nasal cannula, aware she will be going home on home oxygen    #) Hyperkalemia (5.9 9/23)  - Resolved, 4.9 this morning  - D/c Lisinopril  - EKG ordered, f/u    #) CHF (diastolic)  - Pro BNP: 2241  - ECHO - Diastolic HF, EF 60% as per Dr. Flannery  - Keep i < o, trend daily weights, daily bmp  - On Edicrin 25 q daily  - Cont monitor BUN/Cr (BUN 38, Cr 0.9 (9/24))    #) HTN  - Cont Amlodipine  - C/w Lisinopril 5 mg  - Cont to monitor    DVT ppx: Heparin 5000 Units sq q 8 hrs  Diet: DASH  Dispo: d/c to home, possibly today

## 2018-09-24 NOTE — PROGRESS NOTE ADULT - ASSESSMENT
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

## 2018-09-24 NOTE — PROGRESS NOTE ADULT - PROVIDER SPECIALTY LIST ADULT
Internal Medicine
Pulmonology
Internal Medicine
Pulmonology

## 2018-09-24 NOTE — MEDICAL STUDENT PROGRESS NOTE(EDUCATION) - SUBJECTIVE AND OBJECTIVE BOX
82 yo F w PMHx of HTN, DLD, COPD, chronic back pain w spinal stimulator was brought in by ambulance for SOB for 1 day. She had been coughing for 1 week, brown/tan in color but no blood, as well as feeling fatigued and lower extremity swelling.  Today is hospital day 5, pt is resting comfortably in bed and reports feeling better. She continues to cough, occasionally producing brown/tan sputum, but reports less edema.    Vital Signs Last 24 Hrs  T(F): 97.4 (24 Sep 2018 04:48), Max: 97.6 (22 Sep 2018 20:05)  HR: 80 (24 Sep 2018 04:48) (76 - 80)  BP: 153/65 (24 Sep 2018 04:48) (124/45 - 167/72)  SpO2: 93% (23 Sep 2018 08:08) (70% - 94%)  MEDICATIONS  (STANDING):  ALBUTerol/ipratropium for Nebulization 3 milliLiter(s) Nebulizer every 6 hours  amLODIPine   Tablet 5 milliGRAM(s) Oral daily  buDESOnide 160 MICROgram(s)/formoterol 4.5 MICROgram(s) Inhaler 2 Puff(s) Inhalation two times a day  chlorhexidine 4% Liquid 1 Application(s) Topical <User Schedule>  doxycycline hyclate Capsule 100 milliGRAM(s) Oral every 12 hours  ethacrynic acid 25 milliGRAM(s) Oral daily  heparin  Injectable 5000 Unit(s) SubCutaneous every 8 hours  influenza   Vaccine 0.5 milliLiter(s) IntraMuscular once  lisinopril 5 milliGRAM(s) Oral daily  metoprolol succinate ER 50 milliGRAM(s) Oral daily  predniSONE   Tablet 40 milliGRAM(s) Oral daily    MEDICATIONS  (PRN):  ALBUTerol    90 MICROgram(s) HFA Inhaler 2 Puff(s) Inhalation every 6 hours PRN Shortness of Breath and/or Wheezing  HYDROcodone 10 mG/acetaminophen 325 mG 1 Tablet(s) Oral every 4 hours PRN Severe Pain (7 - 10)      Labs:               9.4  15.48 )------( 190             28.5    140 | 98 | 38  4.9  | 34 | 0.9    Physical Exam:  General: Well appearing women in no acute distress, resting comfortably in bed w nasal cannula.  Cardiac: RRR. No murmurs, rubs, or gallops.  Abdomen: Soft, non tender, non distended. Normal bowel sounds.  Extremities: No cyanosis or edema. 2+ peripheral pulses.  Neuro: AAO x3 84 yo F w PMHx of HTN, DLD, COPD, chronic back pain w spinal stimulator was brought in by ambulance for SOB for 1 day. She had been coughing for 1 week, brown/tan in color but no blood, as well as feeling fatigued and lower extremity swelling.  Today is hospital day 5, pt is resting comfortably in bed and reports feeling better. She continues to cough, occasionally producing brown/tan sputum, but reports less edema.    Vital Signs Last 24 Hrs  T(F): 97.4 (24 Sep 2018 04:48), Max: 97.6 (22 Sep 2018 20:05)  HR: 80 (24 Sep 2018 04:48) (76 - 80)  BP: 153/65 (24 Sep 2018 04:48) (124/45 - 167/72)  SpO2: 93% (23 Sep 2018 08:08) (70% - 94%)  MEDICATIONS  (STANDING):  ALBUTerol/ipratropium for Nebulization 3 milliLiter(s) Nebulizer every 6 hours  amLODIPine   Tablet 5 milliGRAM(s) Oral daily  buDESOnide 160 MICROgram(s)/formoterol 4.5 MICROgram(s) Inhaler 2 Puff(s) Inhalation two times a day  chlorhexidine 4% Liquid 1 Application(s) Topical <User Schedule>  doxycycline hyclate Capsule 100 milliGRAM(s) Oral every 12 hours  ethacrynic acid 25 milliGRAM(s) Oral daily  heparin  Injectable 5000 Unit(s) SubCutaneous every 8 hours  influenza   Vaccine 0.5 milliLiter(s) IntraMuscular once  lisinopril 5 milliGRAM(s) Oral daily  metoprolol succinate ER 50 milliGRAM(s) Oral daily  predniSONE   Tablet 40 milliGRAM(s) Oral daily    MEDICATIONS  (PRN):  ALBUTerol    90 MICROgram(s) HFA Inhaler 2 Puff(s) Inhalation every 6 hours PRN Shortness of Breath and/or Wheezing  HYDROcodone 10 mG/acetaminophen 325 mG 1 Tablet(s) Oral every 4 hours PRN Severe Pain (7 - 10)      Labs:               9.4  15.48 )------( 190             28.5    140 | 98 | 38  4.9  | 34 | 0.9    Physical Exam:  General: Well appearing women in no acute distress, resting comfortably in bed w nasal cannula.  Cardiac: RRR. No murmurs, rubs, or gallops.  Lungs: Clear to auscultation bilaterally. No wheezes, rhonchi or rales.  Abdomen: Soft, non tender, non distended. Normal bowel sounds.  Extremities: No cyanosis or edema. 2+ peripheral pulses.  Neuro: AAO x3

## 2018-09-24 NOTE — PROGRESS NOTE ADULT - SUBJECTIVE AND OBJECTIVE BOX
HANHEARLN  83y, Female  Allergy: hair dyes (Hives)  Originally Entered as [Unknown] reaction to [hair dye] (Unknown)  sulfa drugs (Headache)  sulfonamides (Other (Severe))        LAST 24-Hr EVENTS:  pt seen examined  sob improved  feeling better overall  wanting to go home    VITALS:  T(F): 98.7 (09-24-18 @ 13:05), Max: 98.7 (09-24-18 @ 13:05)  HR: 80 (09-24-18 @ 13:05)  BP: 176/75 (09-24-18 @ 13:05) (148/60 - 176/75)  RR: 18 (09-24-18 @ 13:05)  SpO2: --    PHYSICAL EXAM:    GENERAL: NAD, well-developed  NECK: Supple, No JVD  CHEST/LUNG: CTA bilaterally  HEART: Regular rate and rhythm; No murmurs.  ABDOMEN: Soft, Nontender, Nondistended; Bowel sounds present  EXTREMITIES:  No clubbing, edema absent        TESTS & MEASUREMENTS:    IN: 0 mL / OUT: 600 mL / NET: -600 mL    IN: 0 mL / OUT: 380 mL / NET: -380 mL                            9.4    15.48 )-----------( 190      ( 24 Sep 2018 09:09 )             28.5       09-24    140  |  98  |  38<H>  ----------------------------<  124<H>  4.9   |  34<H>  |  0.9    Ca    9.0      24 Sep 2018 09:09              Culture - Blood (collected 09-18-18 @ 23:43)  Source: .Blood Blood  Final Report (09-24-18 @ 06:00):    No growth at 5 days.    Culture - Blood (collected 09-18-18 @ 23:43)  Source: .Blood Blood  Final Report (09-24-18 @ 06:00):    No growth at 5 days.        MEDICATIONS:  MEDICATIONS  (STANDING):  ALBUTerol/ipratropium for Nebulization 3 milliLiter(s) Nebulizer every 6 hours  amLODIPine   Tablet 5 milliGRAM(s) Oral daily  buDESOnide 160 MICROgram(s)/formoterol 4.5 MICROgram(s) Inhaler 2 Puff(s) Inhalation two times a day  chlorhexidine 4% Liquid 1 Application(s) Topical <User Schedule>  doxycycline hyclate Capsule 100 milliGRAM(s) Oral every 12 hours  ethacrynic acid 25 milliGRAM(s) Oral daily  heparin  Injectable 5000 Unit(s) SubCutaneous every 8 hours  influenza   Vaccine 0.5 milliLiter(s) IntraMuscular once  metoprolol succinate ER 50 milliGRAM(s) Oral daily  predniSONE   Tablet 40 milliGRAM(s) Oral daily    MEDICATIONS  (PRN):  ALBUTerol    90 MICROgram(s) HFA Inhaler 2 Puff(s) Inhalation every 6 hours PRN Shortness of Breath and/or Wheezing  HYDROcodone 10 mG/acetaminophen 325 mG 1 Tablet(s) Oral every 4 hours PRN Severe Pain (7 - 10)

## 2018-09-24 NOTE — PROGRESS NOTE ADULT - ASSESSMENT
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

## 2018-09-24 NOTE — PROGRESS NOTE ADULT - SUBJECTIVE AND OBJECTIVE BOX
Patient was seen and examined. Spoke with RN. Chart reviewed.    No events overnight.  Vital Signs Last 24 Hrs  T(F): 98.7 (24 Sep 2018 13:05), Max: 98.7 (24 Sep 2018 13:05)  HR: 80 (24 Sep 2018 13:05) (80 - 81)  BP: 176/75 (24 Sep 2018 13:05) (148/60 - 176/75)  SpO2: --  MEDICATIONS  (STANDING):  ALBUTerol/ipratropium for Nebulization 3 milliLiter(s) Nebulizer every 6 hours  amLODIPine   Tablet 5 milliGRAM(s) Oral daily  buDESOnide 160 MICROgram(s)/formoterol 4.5 MICROgram(s) Inhaler 2 Puff(s) Inhalation two times a day  chlorhexidine 4% Liquid 1 Application(s) Topical <User Schedule>  doxycycline hyclate Capsule 100 milliGRAM(s) Oral every 12 hours  ethacrynic acid 25 milliGRAM(s) Oral daily  heparin  Injectable 5000 Unit(s) SubCutaneous every 8 hours  influenza   Vaccine 0.5 milliLiter(s) IntraMuscular once  metoprolol succinate ER 50 milliGRAM(s) Oral daily  predniSONE   Tablet 40 milliGRAM(s) Oral daily    MEDICATIONS  (PRN):  ALBUTerol    90 MICROgram(s) HFA Inhaler 2 Puff(s) Inhalation every 6 hours PRN Shortness of Breath and/or Wheezing  HYDROcodone 10 mG/acetaminophen 325 mG 1 Tablet(s) Oral every 4 hours PRN Severe Pain (7 - 10)    Labs:                        9.4    15.48 )-----------( 190      ( 24 Sep 2018 09:09 )             28.5                         8.6    10.37 )-----------( 154      ( 23 Sep 2018 10:25 )             26.4     24 Sep 2018 09:09    140    |  98     |  38     ----------------------------<  124    4.9     |  34     |  0.9    23 Sep 2018 10:25    140    |  98     |  45     ----------------------------<  146    5.9     |  33     |  1.0      Ca    9.0        24 Sep 2018 09:09  Ca    8.6        23 Sep 2018 10:25              Radiology:    General: comfortable, NAD  Neurology: A&Ox3, nonfocal  Head:  Normocephalic, atraumatic  ENT:  Mucosa moist, no ulcerations  Neck:  Supple, no JVD,   Skin: no breakdowns (as per RN)  Resp: CTA B/L  CV: RRR, S1S2,   GI: Soft, NT, bowel sounds  MS: No edema, + peripheral pulses, FROM all 4 extremity

## 2018-09-26 RX ORDER — SPIRONOLACTONE 25 MG/1
25 TABLET ORAL DAILY
Refills: 0 | Status: ACTIVE | COMMUNITY

## 2018-09-26 RX ORDER — OXYCODONE 10 MG/1
10 TABLET ORAL
Refills: 0 | Status: ACTIVE | COMMUNITY

## 2018-09-26 RX ORDER — DOXYCYCLINE HYCLATE 100 MG/1
100 CAPSULE ORAL DAILY
Refills: 0 | Status: ACTIVE | COMMUNITY

## 2018-09-26 RX ORDER — BUDESONIDE AND FORMOTEROL FUMARATE DIHYDRATE 80; 4.5 UG/1; UG/1
80-4.5 AEROSOL RESPIRATORY (INHALATION) TWICE DAILY
Refills: 0 | Status: ACTIVE | COMMUNITY

## 2018-09-26 RX ORDER — ALBUTEROL SULFATE 90 UG/1
AEROSOL, METERED RESPIRATORY (INHALATION)
Refills: 0 | Status: ACTIVE | COMMUNITY

## 2018-09-26 RX ORDER — AMLODIPINE BESYLATE 5 MG/1
5 TABLET ORAL DAILY
Refills: 0 | Status: ACTIVE | COMMUNITY

## 2018-09-26 RX ORDER — METOPROLOL TARTRATE 50 MG/1
50 TABLET, FILM COATED ORAL DAILY
Refills: 0 | Status: ACTIVE | COMMUNITY

## 2018-09-26 RX ORDER — PREDNISONE 20 MG/1
20 TABLET ORAL
Refills: 0 | Status: ACTIVE | COMMUNITY

## 2018-10-02 DIAGNOSIS — Z86.018 PERSONAL HISTORY OF OTHER BENIGN NEOPLASM: ICD-10-CM

## 2018-10-02 DIAGNOSIS — Z96.89 PRESENCE OF OTHER SPECIFIED FUNCTIONAL IMPLANTS: ICD-10-CM

## 2018-10-02 DIAGNOSIS — M54.9 DORSALGIA, UNSPECIFIED: ICD-10-CM

## 2018-10-02 DIAGNOSIS — J44.1 CHRONIC OBSTRUCTIVE PULMONARY DISEASE WITH (ACUTE) EXACERBATION: ICD-10-CM

## 2018-10-02 DIAGNOSIS — I50.31 ACUTE DIASTOLIC (CONGESTIVE) HEART FAILURE: ICD-10-CM

## 2018-10-02 DIAGNOSIS — Z87.891 PERSONAL HISTORY OF NICOTINE DEPENDENCE: ICD-10-CM

## 2018-10-02 DIAGNOSIS — I11.0 HYPERTENSIVE HEART DISEASE WITH HEART FAILURE: ICD-10-CM

## 2018-10-02 DIAGNOSIS — R06.02 SHORTNESS OF BREATH: ICD-10-CM

## 2018-10-02 DIAGNOSIS — E78.5 HYPERLIPIDEMIA, UNSPECIFIED: ICD-10-CM

## 2018-10-02 DIAGNOSIS — G89.29 OTHER CHRONIC PAIN: ICD-10-CM

## 2018-10-02 DIAGNOSIS — J06.9 ACUTE UPPER RESPIRATORY INFECTION, UNSPECIFIED: ICD-10-CM

## 2018-10-02 DIAGNOSIS — Z85.048 PERSONAL HISTORY OF OTHER MALIGNANT NEOPLASM OF RECTUM, RECTOSIGMOID JUNCTION, AND ANUS: ICD-10-CM

## 2018-10-02 DIAGNOSIS — I27.20 PULMONARY HYPERTENSION, UNSPECIFIED: ICD-10-CM

## 2018-10-17 ENCOUNTER — INPATIENT (INPATIENT)
Facility: HOSPITAL | Age: 83
LOS: 8 days | Discharge: NURSING, REHAB OR LONG-TERM CARE | End: 2018-10-26
Attending: INTERNAL MEDICINE | Admitting: INTERNAL MEDICINE
Payer: MEDICARE

## 2018-10-17 VITALS
TEMPERATURE: 98 F | DIASTOLIC BLOOD PRESSURE: 80 MMHG | RESPIRATION RATE: 18 BRPM | OXYGEN SATURATION: 98 % | SYSTOLIC BLOOD PRESSURE: 131 MMHG | HEART RATE: 66 BPM

## 2018-10-17 DIAGNOSIS — Z92.21 PERSONAL HISTORY OF ANTINEOPLASTIC CHEMOTHERAPY: ICD-10-CM

## 2018-10-17 DIAGNOSIS — C67.9 MALIGNANT NEOPLASM OF BLADDER, UNSPECIFIED: ICD-10-CM

## 2018-10-17 DIAGNOSIS — Z85.048 PERSONAL HISTORY OF OTHER MALIGNANT NEOPLASM OF RECTUM, RECTOSIGMOID JUNCTION, AND ANUS: ICD-10-CM

## 2018-10-17 DIAGNOSIS — Z91.09 OTHER ALLERGY STATUS, OTHER THAN TO DRUGS AND BIOLOGICAL SUBSTANCES: ICD-10-CM

## 2018-10-17 DIAGNOSIS — M54.9 DORSALGIA, UNSPECIFIED: ICD-10-CM

## 2018-10-17 DIAGNOSIS — I11.0 HYPERTENSIVE HEART DISEASE WITH HEART FAILURE: ICD-10-CM

## 2018-10-17 DIAGNOSIS — G89.29 OTHER CHRONIC PAIN: ICD-10-CM

## 2018-10-17 DIAGNOSIS — E87.1 HYPO-OSMOLALITY AND HYPONATREMIA: ICD-10-CM

## 2018-10-17 DIAGNOSIS — Z98.890 OTHER SPECIFIED POSTPROCEDURAL STATES: Chronic | ICD-10-CM

## 2018-10-17 DIAGNOSIS — E87.5 HYPERKALEMIA: ICD-10-CM

## 2018-10-17 DIAGNOSIS — N13.30 UNSPECIFIED HYDRONEPHROSIS: ICD-10-CM

## 2018-10-17 DIAGNOSIS — B95.2 ENTEROCOCCUS AS THE CAUSE OF DISEASES CLASSIFIED ELSEWHERE: ICD-10-CM

## 2018-10-17 DIAGNOSIS — I50.30 UNSPECIFIED DIASTOLIC (CONGESTIVE) HEART FAILURE: ICD-10-CM

## 2018-10-17 DIAGNOSIS — R31.9 HEMATURIA, UNSPECIFIED: ICD-10-CM

## 2018-10-17 DIAGNOSIS — E78.5 HYPERLIPIDEMIA, UNSPECIFIED: ICD-10-CM

## 2018-10-17 DIAGNOSIS — Z96.89 PRESENCE OF OTHER SPECIFIED FUNCTIONAL IMPLANTS: ICD-10-CM

## 2018-10-17 DIAGNOSIS — J44.9 CHRONIC OBSTRUCTIVE PULMONARY DISEASE, UNSPECIFIED: ICD-10-CM

## 2018-10-17 DIAGNOSIS — Z87.891 PERSONAL HISTORY OF NICOTINE DEPENDENCE: ICD-10-CM

## 2018-10-17 DIAGNOSIS — I27.20 PULMONARY HYPERTENSION, UNSPECIFIED: ICD-10-CM

## 2018-10-17 DIAGNOSIS — D62 ACUTE POSTHEMORRHAGIC ANEMIA: ICD-10-CM

## 2018-10-17 DIAGNOSIS — Z92.3 PERSONAL HISTORY OF IRRADIATION: ICD-10-CM

## 2018-10-17 DIAGNOSIS — Z88.2 ALLERGY STATUS TO SULFONAMIDES: ICD-10-CM

## 2018-10-17 DIAGNOSIS — N39.0 URINARY TRACT INFECTION, SITE NOT SPECIFIED: ICD-10-CM

## 2018-10-17 PROBLEM — D49.3 NEOPLASM OF UNSPECIFIED BEHAVIOR OF BREAST: Chronic | Status: ACTIVE | Noted: 2018-09-19

## 2018-10-17 LAB
ABO RH CONFIRMATION: SIGNIFICANT CHANGE UP
ALBUMIN SERPL ELPH-MCNC: 3.9 G/DL — SIGNIFICANT CHANGE UP (ref 3.5–5.2)
ALP SERPL-CCNC: 94 U/L — SIGNIFICANT CHANGE UP (ref 30–115)
ALT FLD-CCNC: 8 U/L — SIGNIFICANT CHANGE UP (ref 0–41)
ANION GAP SERPL CALC-SCNC: 10 MMOL/L — SIGNIFICANT CHANGE UP (ref 7–14)
APPEARANCE UR: ABNORMAL
APTT BLD: 30 SEC — SIGNIFICANT CHANGE UP (ref 27–39.2)
AST SERPL-CCNC: 10 U/L — SIGNIFICANT CHANGE UP (ref 0–41)
BASOPHILS # BLD AUTO: 0.02 K/UL — SIGNIFICANT CHANGE UP (ref 0–0.2)
BASOPHILS NFR BLD AUTO: 0.3 % — SIGNIFICANT CHANGE UP (ref 0–1)
BILIRUB SERPL-MCNC: 0.4 MG/DL — SIGNIFICANT CHANGE UP (ref 0.2–1.2)
BILIRUB UR-MCNC: NEGATIVE — SIGNIFICANT CHANGE UP
BLD GP AB SCN SERPL QL: SIGNIFICANT CHANGE UP
BUN SERPL-MCNC: 33 MG/DL — HIGH (ref 10–20)
CALCIUM SERPL-MCNC: 8.9 MG/DL — SIGNIFICANT CHANGE UP (ref 8.5–10.1)
CHLORIDE SERPL-SCNC: 96 MMOL/L — LOW (ref 98–110)
CO2 SERPL-SCNC: 28 MMOL/L — SIGNIFICANT CHANGE UP (ref 17–32)
COLOR SPEC: ABNORMAL
CREAT SERPL-MCNC: 1 MG/DL — SIGNIFICANT CHANGE UP (ref 0.7–1.5)
DIFF PNL FLD: ABNORMAL
EOSINOPHIL # BLD AUTO: 0.3 K/UL — SIGNIFICANT CHANGE UP (ref 0–0.7)
EOSINOPHIL NFR BLD AUTO: 4.8 % — SIGNIFICANT CHANGE UP (ref 0–8)
GLUCOSE SERPL-MCNC: 107 MG/DL — HIGH (ref 70–99)
GLUCOSE UR QL: NEGATIVE MG/DL — SIGNIFICANT CHANGE UP
HCT VFR BLD CALC: 19.5 % — LOW (ref 37–47)
HGB BLD-MCNC: 6.4 G/DL — CRITICAL LOW (ref 12–16)
IMM GRANULOCYTES NFR BLD AUTO: 0.5 % — HIGH (ref 0.1–0.3)
INR BLD: 0.99 RATIO — SIGNIFICANT CHANGE UP (ref 0.65–1.3)
KETONES UR-MCNC: NEGATIVE — SIGNIFICANT CHANGE UP
LEUKOCYTE ESTERASE UR-ACNC: ABNORMAL
LYMPHOCYTES # BLD AUTO: 1.11 K/UL — LOW (ref 1.2–3.4)
LYMPHOCYTES # BLD AUTO: 17.8 % — LOW (ref 20.5–51.1)
MCHC RBC-ENTMCNC: 32.5 PG — HIGH (ref 27–31)
MCHC RBC-ENTMCNC: 32.8 G/DL — SIGNIFICANT CHANGE UP (ref 32–37)
MCV RBC AUTO: 99 FL — SIGNIFICANT CHANGE UP (ref 81–99)
MONOCYTES # BLD AUTO: 0.34 K/UL — SIGNIFICANT CHANGE UP (ref 0.1–0.6)
MONOCYTES NFR BLD AUTO: 5.4 % — SIGNIFICANT CHANGE UP (ref 1.7–9.3)
NEUTROPHILS # BLD AUTO: 4.45 K/UL — SIGNIFICANT CHANGE UP (ref 1.4–6.5)
NEUTROPHILS NFR BLD AUTO: 71.2 % — SIGNIFICANT CHANGE UP (ref 42.2–75.2)
NITRITE UR-MCNC: NEGATIVE — SIGNIFICANT CHANGE UP
NRBC # BLD: 0 /100 WBCS — SIGNIFICANT CHANGE UP (ref 0–0)
PH UR: 6 — SIGNIFICANT CHANGE UP (ref 5–8)
PLATELET # BLD AUTO: 205 K/UL — SIGNIFICANT CHANGE UP (ref 130–400)
POTASSIUM SERPL-MCNC: 6.6 MMOL/L — CRITICAL HIGH (ref 3.5–5)
POTASSIUM SERPL-SCNC: 6.6 MMOL/L — CRITICAL HIGH (ref 3.5–5)
PROT SERPL-MCNC: 6.2 G/DL — SIGNIFICANT CHANGE UP (ref 6–8)
PROT UR-MCNC: >=300 MG/DL
PROTHROM AB SERPL-ACNC: 11.4 SEC — SIGNIFICANT CHANGE UP (ref 9.95–12.87)
RBC # BLD: 1.97 M/UL — LOW (ref 4.2–5.4)
RBC # FLD: 16.6 % — HIGH (ref 11.5–14.5)
RBC CASTS # UR COMP ASSIST: >50 /HPF
SODIUM SERPL-SCNC: 134 MMOL/L — LOW (ref 135–146)
SP GR SPEC: 1.01 — SIGNIFICANT CHANGE UP (ref 1.01–1.03)
TYPE + AB SCN PNL BLD: SIGNIFICANT CHANGE UP
UROBILINOGEN FLD QL: 0.2 MG/DL — SIGNIFICANT CHANGE UP (ref 0.2–0.2)
WBC # BLD: 6.25 K/UL — SIGNIFICANT CHANGE UP (ref 4.8–10.8)
WBC # FLD AUTO: 6.25 K/UL — SIGNIFICANT CHANGE UP (ref 4.8–10.8)

## 2018-10-17 PROCEDURE — 99222 1ST HOSP IP/OBS MODERATE 55: CPT

## 2018-10-17 RX ORDER — IPRATROPIUM/ALBUTEROL SULFATE 18-103MCG
3 AEROSOL WITH ADAPTER (GRAM) INHALATION EVERY 6 HOURS
Qty: 0 | Refills: 0 | Status: DISCONTINUED | OUTPATIENT
Start: 2018-10-17 | End: 2018-10-24

## 2018-10-17 RX ORDER — ALBUTEROL 90 UG/1
2.5 AEROSOL, METERED ORAL ONCE
Qty: 0 | Refills: 0 | Status: COMPLETED | OUTPATIENT
Start: 2018-10-17 | End: 2018-10-17

## 2018-10-17 RX ORDER — ATORVASTATIN CALCIUM 80 MG/1
20 TABLET, FILM COATED ORAL AT BEDTIME
Qty: 0 | Refills: 0 | Status: DISCONTINUED | OUTPATIENT
Start: 2018-10-17 | End: 2018-10-24

## 2018-10-17 RX ORDER — DEXTROSE 50 % IN WATER 50 %
50 SYRINGE (ML) INTRAVENOUS ONCE
Qty: 0 | Refills: 0 | Status: COMPLETED | OUTPATIENT
Start: 2018-10-17 | End: 2018-10-17

## 2018-10-17 RX ORDER — METOPROLOL TARTRATE 50 MG
50 TABLET ORAL DAILY
Qty: 0 | Refills: 0 | Status: DISCONTINUED | OUTPATIENT
Start: 2018-10-17 | End: 2018-10-24

## 2018-10-17 RX ORDER — ASPIRIN/CALCIUM CARB/MAGNESIUM 324 MG
81 TABLET ORAL DAILY
Qty: 0 | Refills: 0 | Status: DISCONTINUED | OUTPATIENT
Start: 2018-10-17 | End: 2018-10-24

## 2018-10-17 RX ORDER — INSULIN HUMAN 100 [IU]/ML
10 INJECTION, SOLUTION SUBCUTANEOUS ONCE
Qty: 0 | Refills: 0 | Status: COMPLETED | OUTPATIENT
Start: 2018-10-17 | End: 2018-10-17

## 2018-10-17 RX ORDER — ERGOCALCIFEROL 1.25 MG/1
1 CAPSULE ORAL
Qty: 0 | Refills: 0 | COMMUNITY

## 2018-10-17 RX ORDER — AMLODIPINE BESYLATE 2.5 MG/1
5 TABLET ORAL DAILY
Qty: 0 | Refills: 0 | Status: DISCONTINUED | OUTPATIENT
Start: 2018-10-17 | End: 2018-10-24

## 2018-10-17 RX ORDER — HYDROCODONE BITARTRATE 50 MG/1
0 CAPSULE, EXTENDED RELEASE ORAL
Qty: 0 | Refills: 0 | COMMUNITY

## 2018-10-17 RX ORDER — ALBUTEROL 90 UG/1
1 AEROSOL, METERED ORAL EVERY 4 HOURS
Qty: 0 | Refills: 0 | Status: DISCONTINUED | OUTPATIENT
Start: 2018-10-17 | End: 2018-10-23

## 2018-10-17 RX ORDER — BUDESONIDE AND FORMOTEROL FUMARATE DIHYDRATE 160; 4.5 UG/1; UG/1
2 AEROSOL RESPIRATORY (INHALATION)
Qty: 0 | Refills: 0 | Status: DISCONTINUED | OUTPATIENT
Start: 2018-10-17 | End: 2018-10-23

## 2018-10-17 RX ORDER — PANTOPRAZOLE SODIUM 20 MG/1
40 TABLET, DELAYED RELEASE ORAL ONCE
Qty: 0 | Refills: 0 | Status: DISCONTINUED | OUTPATIENT
Start: 2018-10-17 | End: 2018-10-17

## 2018-10-17 RX ADMIN — Medication 50 MILLILITER(S): at 18:50

## 2018-10-17 RX ADMIN — ALBUTEROL 2.5 MILLIGRAM(S): 90 AEROSOL, METERED ORAL at 18:51

## 2018-10-17 RX ADMIN — INSULIN HUMAN 10 UNIT(S): 100 INJECTION, SOLUTION SUBCUTANEOUS at 18:50

## 2018-10-17 NOTE — ED ADULT NURSE NOTE - OBJECTIVE STATEMENT
Pt complains of abdominal pain and perineal bleeding. Pt states she had rectal cancer with radiation treatment.

## 2018-10-17 NOTE — H&P ADULT - RS GEN PE MLT RESP DETAILS PC
normal/clear to auscultation bilaterally/breath sounds equal/respirations non-labored/airway patent/good air movement

## 2018-10-17 NOTE — H&P ADULT - PMH
Anal cancer    Breast tumor  removal benign bl breasts  CHF (congestive heart failure)    COPD (chronic obstructive pulmonary disease)    Hypertension    Lung disease  COPD

## 2018-10-17 NOTE — H&P ADULT - ATTENDING COMMENTS
82 yo F with a PMH of COPD ( previously has seen Dr. Donis once), HTN, HLD, and Chronic Back Pain with spinal stimulator, anal Ca, CHF (GIDD) brought in for lanie hematuria    Pt seen and examined in Ed bed 3b    No active complaints; comfortable    Chart reviewed, agree with initial plan    f/u repeat CBC    plan as per - ?cysto    DC when cleared by  for outpt f/u

## 2018-10-17 NOTE — ED PROVIDER NOTE - MEDICAL DECISION MAKING DETAILS
wrap up note: pt with drop in hemoglobin. pt in no distress. seen at bedside by urology pa and attending, 24G placed by urology. large hematuria. likely source of bleeding from urine. family on further report states that pt last time in hospital had dark urine. pt consented and transfused blood. hyper K w/o ekg changes. insulin/dextrose, albuterol. repeat vbg to be sent and inpatient team will follow

## 2018-10-17 NOTE — H&P ADULT - NSHPLABSRESULTS_GEN_ALL_CORE
6.4    6.25  )-----------( 205      ( 17 Oct 2018 15:46 )             19.5     10-17    134<L>  |  96<L>  |  33<H>  ----------------------------<  107<H>  6.6<HH>   |  28  |  1.0    Ca    8.9      17 Oct 2018 15:46    TPro  6.2  /  Alb  3.9  /  TBili  0.4  /  DBili  x   /  AST  10  /  ALT  8   /  AlkPhos  94  10-17

## 2018-10-17 NOTE — ED ADULT NURSE REASSESSMENT NOTE - NS ED NURSE REASSESS COMMENT FT1
Pt's home medication, Vicodin (Hydrocodone/Acetaminophen) 10/325 mg tablets brought to Pharmacy for storage. 24 tablets given in own patient's pill bottle. Pt does not want medication at this time.

## 2018-10-17 NOTE — CONSULT NOTE ADULT - SUBJECTIVE AND OBJECTIVE BOX
82 yo female admitted for anemia and hematuria, pt has had blood in the urine on/off for several weeks. Denies recurrent urinary tract infections or difficulty voiding. Pt has hx of rectal ca s/p xrt many years ago.Pt currently on Lovenox injections for PE    PAST MEDICAL & SURGICAL HISTORY:  Breast tumor: removal benign bl breasts  Anal cancer  COPD (chronic obstructive pulmonary disease)  Lung disease: COPD  Hypertension  H/O breast surgery: 39 yrs. ago, bilateral  History of back surgery: 10 yrs. ago stimulator implant ,     Home Medications:  amLODIPine 5 mg oral tablet: 1 tab(s) orally once a day (19 Sep 2018 03:29)  Calcium 500+D oral tablet, chewable: 1 tab(s) orally 2 times a day (19 Sep 2018 03:29)  Centrum Adults oral tablet: 1 tab(s) orally once a day (19 Sep 2018 03:29)  HYDROcodone 10 mg oral capsule, extended release: orally every 4 hours (19 Sep 2018 03:29)  metoprolol succinate 50 mg oral tablet, extended release: 1 tab(s) orally once a day (19 Sep 2018 03:29)  ProAir HFA 90 mcg/inh inhalation aerosol: 2 puff(s) inhaled 4 times a day (24 Sep 2018 14:55)  Vitamin D2 50,000 intl units (1.25 mg) oral capsule: 1 cap(s) orally once a month (19 Sep 2018 03:29)  Lovenox    Allergies    hair dyes (Hives)  Originally Entered as [Unknown] reaction to [hair dye] (Unknown)  sulfa drugs (Headache)  sulfonamides (Other (Severe))    Intolerances    SHx - NC    FHx - NC    Vital Signs Last 24 Hrs  T(C): 36.8 (17 Oct 2018 14:39), Max: 36.8 (17 Oct 2018 14:39)  T(F): 98.3 (17 Oct 2018 14:39), Max: 98.3 (17 Oct 2018 14:39)  HR: 66 (17 Oct 2018 14:39) (66 - 66)  BP: 131/80 (17 Oct 2018 14:39) (131/80 - 131/80)  RR: 18 (17 Oct 2018 14:39) (18 - 18)  SpO2: 98% (17 Oct 2018 14:39) (98% - 98%)    pt seen and examined at bedside  a+ox3, nad, non toxic  abd - soft, nd, nttp, +diffuse ecchymoses from SC Lovenox injections  gu - no cvat b/l, under sterile technique an 18 fr 3 way catheter was placed w/o issue, red-brown, clear, no clots, around 400cc evacuated, pt tolerated procedure well                          6.4    6.25  )-----------( 205      ( 17 Oct 2018 15:46 )             19.5   10-17    134<L>  |  96<L>  |  33<H>  ----------------------------<  107<H>  6.6<HH>   |  28  |  1.0    Ca    8.9      17 Oct 2018 15:46    TPro  6.2  /  Alb  3.9  /  TBili  0.4  /  DBili  x   /  AST  10  /  ALT  8   /  AlkPhos  94  10-17    PT/INR - ( 17 Oct 2018 15:46 )   PT: 11.40 sec;   INR: 0.99 ratio         PTT - ( 17 Oct 2018 15:46 )  PTT:30.0 sec    Urinalysis Basic - ( 17 Oct 2018 15:46 )    Color: Red / Appearance: Turbid / S.015 / pH: x  Gluc: x / Ketone: Negative  / Bili: Negative / Urobili: 0.2 mg/dL   Blood: x / Protein: >=300 mg/dL / Nitrite: Negative   Leuk Esterase: Trace / RBC: >50 /HPF / WBC x   Sq Epi: x / Non Sq Epi: x / Bacteria: x

## 2018-10-17 NOTE — CONSULT NOTE ADULT - ASSESSMENT
82 yo female with anemia and hematuria, pmhx of anal ca s/p xrt    plan  -admit to medicine  -transfuse  -trend cbc  -ucx  -ct urogram  -cystoscopy      will follow

## 2018-10-17 NOTE — CONSULT NOTE ADULT - ATTENDING COMMENTS
Pt seen and evaluated. Cont to trend CBC. Follow up CTU. Monitor lynne output.   to continue to follow .

## 2018-10-17 NOTE — H&P ADULT - HISTORY OF PRESENT ILLNESS
82 yo F with a PMH of COPD ( previously has seen Dr. Donis once), HTN, HLD, and Chronic Back Pain with spinal stimulator, CHF (GIDD) brought in for lanie hematuria. Pt reports she noticed some blood in her urine for the past month, however within the last 2 days it has become much worse. Pt denies any trauma, abd pain, dysuria, fever, chills, vaginal bleeding or rectal bleeding.   Pt recently admitted for COPD and CHF exacerbation  On presentation /80 HR 66 T98.3. Hgb 6.4 (baseline 9), K 6.6 w/o EKG changes.  Pt received 2 unit PRBC in ED, Insulin, D50

## 2018-10-17 NOTE — ED PROVIDER NOTE - NS ED ROS FT
Review of Systems    Constitutional: (-) fever  Eyes/ENT: (-) blurry vision, (-) epistaxis  Cardiovascular: (-) chest pain, (-) syncope  Respiratory: (-) cough, (-) shortness of breath  Gastrointestinal: (-) vomiting, (-) diarrhea see hpi  Musculoskeletal: (-) neck pain, (-) back pain, (-) joint pain  Integumentary: (-) rash, (-) edema  Neurological: (-) headache, (-) altered mental status  Psychiatric: (-) hallucinations  Allergic/Immunologic: (-) pruritus

## 2018-10-17 NOTE — ED CLERICAL - NS ED CLERK NOTE PRE-ARRIVAL INFORMATION; ADDITIONAL PRE-ARRIVAL INFORMATION
This patient is enrolled in the STARS readmission reduction initiative and has active care navigation. This patient can be followed up by the care navigation team within 24 hours.  To arrange close follow-up or to obtain additional clinical information, please call the contact number above. The on call [facility] Hospitalist has been notified and will coordinate care in concert with the ED Physician including consults as necessary. Call [phone] (North), [phone] (South) to reach the hospitalist. Consider CDU for management per guidelines

## 2018-10-17 NOTE — ED PROVIDER NOTE - PHYSICAL EXAMINATION
CON: ao x 3, HENMT: clear oropharynx,  neck supple,  CV: rrr, equal pulses b/l, RESP: cta b/l, GI:  soft, nontender, no rebound, no guarding, no lanie blood on rectal exam. no blood to vaginal canal. no blood on sanitary pad.SKIN: no rash, MSK: no deformities, NEURO: no gross motor or sensory deficit Psychiatric: appropriate mood, appropriate affect

## 2018-10-17 NOTE — ED ADULT NURSE NOTE - NSIMPLEMENTINTERV_GEN_ALL_ED
Implemented All Fall Risk Interventions:  [place] to call system. Call bell, personal items and telephone within reach. Instruct patient to call for assistance. Room bathroom lighting operational. Non-slip footwear when patient is off stretcher. Physically safe environment: no spills, clutter or unnecessary equipment. Stretcher in lowest position, wheels locked, appropriate side rails in place. Provide visual cue, wrist band, yellow gown, etc. Monitor gait and stability. Monitor for mental status changes and reorient to person, place, and time. Review medications for side effects contributing to fall risk. Reinforce activity limits and safety measures with patient and family.

## 2018-10-17 NOTE — ED PROVIDER NOTE - OBJECTIVE STATEMENT
83yr old female no blood thinner here for eval of blood in toilet bowl. pt reports she was on bowl, urinated and saw blood in bowl. pt did not have bowel mvt. pt unsure where blood is coming from. was admitted to hospital last month, and was on lovenox previuosly. pt has no urinary sx's, weakness, numbness. 83yr old female no blood thinner, baby asa here for eval of blood in toilet bowl. pt reports she was on bowl, urinated and saw blood in bowl. pt did not have bowel mvt. pt unsure where blood is coming from. was admitted to hospital last month, and was on lovenox previuosly. pt has no urinary sx's, weakness, numbness.

## 2018-10-17 NOTE — H&P ADULT - ASSESSMENT
82 yo F with a PMH of COPD ( previously has seen Dr. Donis once), HTN, HLD, and Chronic Back Pain with spinal stimulator, CHF (GIDD) brought in for lanie hematuria    #) Lanie Hematuria r/o bladder cancer  - Admit to telemetry  - Vitals stable on presentation  - Hgb 6.4 s/p 2 unit PRBC. F/U repeat CBC  - Active type and screen  - f/u Urine culture  - F/u CT urogram  - Urology following possible cystoscopy    #) Hyperkalemia  - K 6.6 without EKG changes  - Received D50 and insulin  - F/U repeat BMP  - Keep on telemetry    #) Hx of COPD  - Stable  - Cont Duaneb and Symbicort    #) Hx of CHF  - Cont Metoprolol and Aspirin  - unclear why pt on spirolactone alone. Will hold  - No sign of fluid overload    #) Hx HTN  - Cont Amlodipine    #) hx of Severe back pain s/p spinal fusion  - Cont Home dose of Hydrocodone / Tylenol 10-325mg q4hr PRN    DVT PPX Hold given bleeding for now  DASH diet

## 2018-10-18 LAB
ANION GAP SERPL CALC-SCNC: 11 MMOL/L — SIGNIFICANT CHANGE UP (ref 7–14)
ANION GAP SERPL CALC-SCNC: 12 MMOL/L — SIGNIFICANT CHANGE UP (ref 7–14)
BASE EXCESS BLDV CALC-SCNC: 2 MMOL/L — SIGNIFICANT CHANGE UP (ref -2–2)
BUN SERPL-MCNC: 24 MG/DL — HIGH (ref 10–20)
BUN SERPL-MCNC: 30 MG/DL — HIGH (ref 10–20)
CA-I SERPL-SCNC: 1.16 MMOL/L — SIGNIFICANT CHANGE UP (ref 1.12–1.3)
CALCIUM SERPL-MCNC: 8 MG/DL — LOW (ref 8.5–10.1)
CALCIUM SERPL-MCNC: 8.4 MG/DL — LOW (ref 8.5–10.1)
CHLORIDE SERPL-SCNC: 97 MMOL/L — LOW (ref 98–110)
CHLORIDE SERPL-SCNC: 98 MMOL/L — SIGNIFICANT CHANGE UP (ref 98–110)
CO2 SERPL-SCNC: 25 MMOL/L — SIGNIFICANT CHANGE UP (ref 17–32)
CO2 SERPL-SCNC: 26 MMOL/L — SIGNIFICANT CHANGE UP (ref 17–32)
CREAT SERPL-MCNC: 0.9 MG/DL — SIGNIFICANT CHANGE UP (ref 0.7–1.5)
CREAT SERPL-MCNC: 0.9 MG/DL — SIGNIFICANT CHANGE UP (ref 0.7–1.5)
GAS PNL BLDV: 130 MMOL/L — LOW (ref 136–145)
GAS PNL BLDV: SIGNIFICANT CHANGE UP
GLUCOSE SERPL-MCNC: 86 MG/DL — SIGNIFICANT CHANGE UP (ref 70–99)
GLUCOSE SERPL-MCNC: 90 MG/DL — SIGNIFICANT CHANGE UP (ref 70–99)
HCO3 BLDV-SCNC: 28 MMOL/L — SIGNIFICANT CHANGE UP (ref 22–29)
HCT VFR BLD CALC: 28.7 % — LOW (ref 37–47)
HCT VFR BLDA CALC: 18.3 % — LOW (ref 34–44)
HGB BLD CALC-MCNC: 6 G/DL — LOW (ref 14–18)
HGB BLD-MCNC: 9.8 G/DL — LOW (ref 12–16)
LACTATE BLDV-MCNC: 0.7 MMOL/L — SIGNIFICANT CHANGE UP (ref 0.5–1.6)
MAGNESIUM SERPL-MCNC: 2.3 MG/DL — SIGNIFICANT CHANGE UP (ref 1.8–2.4)
MCHC RBC-ENTMCNC: 32.2 PG — HIGH (ref 27–31)
MCHC RBC-ENTMCNC: 34.1 G/DL — SIGNIFICANT CHANGE UP (ref 32–37)
MCV RBC AUTO: 94.4 FL — SIGNIFICANT CHANGE UP (ref 81–99)
NRBC # BLD: 0 /100 WBCS — SIGNIFICANT CHANGE UP (ref 0–0)
PCO2 BLDV: 52 MMHG — HIGH (ref 41–51)
PH BLDV: 7.34 — SIGNIFICANT CHANGE UP (ref 7.26–7.43)
PLATELET # BLD AUTO: 177 K/UL — SIGNIFICANT CHANGE UP (ref 130–400)
PO2 BLDV: 60 MMHG — HIGH (ref 20–40)
POTASSIUM BLDV-SCNC: 4.9 MMOL/L — SIGNIFICANT CHANGE UP (ref 3.3–5.6)
POTASSIUM SERPL-MCNC: 5.3 MMOL/L — HIGH (ref 3.5–5)
POTASSIUM SERPL-MCNC: 5.6 MMOL/L — HIGH (ref 3.5–5)
POTASSIUM SERPL-SCNC: 5.3 MMOL/L — HIGH (ref 3.5–5)
POTASSIUM SERPL-SCNC: 5.6 MMOL/L — HIGH (ref 3.5–5)
RBC # BLD: 3.04 M/UL — LOW (ref 4.2–5.4)
RBC # FLD: 17 % — HIGH (ref 11.5–14.5)
SAO2 % BLDV: 90 % — SIGNIFICANT CHANGE UP
SODIUM SERPL-SCNC: 134 MMOL/L — LOW (ref 135–146)
SODIUM SERPL-SCNC: 135 MMOL/L — SIGNIFICANT CHANGE UP (ref 135–146)
WBC # BLD: 7.67 K/UL — SIGNIFICANT CHANGE UP (ref 4.8–10.8)
WBC # FLD AUTO: 7.67 K/UL — SIGNIFICANT CHANGE UP (ref 4.8–10.8)

## 2018-10-18 PROCEDURE — 99232 SBSQ HOSP IP/OBS MODERATE 35: CPT

## 2018-10-18 RX ORDER — INSULIN HUMAN 100 [IU]/ML
10 INJECTION, SOLUTION SUBCUTANEOUS ONCE
Qty: 0 | Refills: 0 | Status: COMPLETED | OUTPATIENT
Start: 2018-10-18 | End: 2018-10-18

## 2018-10-18 RX ORDER — BUDESONIDE AND FORMOTEROL FUMARATE DIHYDRATE 160; 4.5 UG/1; UG/1
2 AEROSOL RESPIRATORY (INHALATION)
Qty: 0 | Refills: 0 | Status: DISCONTINUED | OUTPATIENT
Start: 2018-10-18 | End: 2018-10-24

## 2018-10-18 RX ORDER — DEXTROSE 50 % IN WATER 50 %
50 SYRINGE (ML) INTRAVENOUS ONCE
Qty: 0 | Refills: 0 | Status: COMPLETED | OUTPATIENT
Start: 2018-10-18 | End: 2018-10-18

## 2018-10-18 RX ADMIN — AMLODIPINE BESYLATE 5 MILLIGRAM(S): 2.5 TABLET ORAL at 06:38

## 2018-10-18 RX ADMIN — ATORVASTATIN CALCIUM 20 MILLIGRAM(S): 80 TABLET, FILM COATED ORAL at 23:08

## 2018-10-18 RX ADMIN — Medication 81 MILLIGRAM(S): at 11:00

## 2018-10-18 RX ADMIN — INSULIN HUMAN 10 UNIT(S): 100 INJECTION, SOLUTION SUBCUTANEOUS at 16:48

## 2018-10-18 RX ADMIN — Medication 50 MILLILITER(S): at 16:48

## 2018-10-18 RX ADMIN — Medication 3 MILLILITER(S): at 07:56

## 2018-10-18 RX ADMIN — Medication 50 MILLIGRAM(S): at 06:38

## 2018-10-18 RX ADMIN — Medication 3 MILLILITER(S): at 16:04

## 2018-10-18 NOTE — PROGRESS NOTE ADULT - SUBJECTIVE AND OBJECTIVE BOX
83 y.o F with gross hematuria, Rowe catheter was placed for I&O as well bladder irrigation if needed. Pt. seen and examined at bedside togethter with Dr. Sanchez Pt. in NAD, Rowe catheter draining bloody urine no clots. Pt. denies fever, chills, SOB.    Vital Signs Last 24 Hrs  T(C): 36.2 (18 Oct 2018 07:20), Max: 36.8 (17 Oct 2018 14:39)  T(F): 97.1 (18 Oct 2018 07:20), Max: 98.3 (17 Oct 2018 14:39)  HR: 68 (18 Oct 2018 07:20) (66 - 78)  BP: 130/61 (18 Oct 2018 07:20) (130/60 - 181/73)  RR: 18 (18 Oct 2018 07:20) (16 - 20)  SpO2: 98% (18 Oct 2018 07:20) (98% - 100%)    MEDICATIONS  (STANDING):  ALBUTerol    90 MICROgram(s) HFA Inhaler 1 Puff(s) Inhalation every 4 hours  ALBUTerol/ipratropium for Nebulization 3 milliLiter(s) Nebulizer every 6 hours  amLODIPine   Tablet 5 milliGRAM(s) Oral daily  aspirin  chewable 81 milliGRAM(s) Oral daily  atorvastatin 20 milliGRAM(s) Oral at bedtime  buDESOnide 160 MICROgram(s)/formoterol 4.5 MICROgram(s) Inhaler 2 Puff(s) Inhalation two times a day  metoprolol succinate ER 50 milliGRAM(s) Oral daily    MEDICATIONS  (PRN):  hydrocodone-acetaminophen 10/325 10 milliGRAM(s) 10 milliGRAM(s) Oral every 4 hours PRN pain    PE:  General: Well nourished, Well developed in NAD  HEENT: NC/AT, EOMI  Resp: No accessory muscle use  Abd: soft, ND, + TTP to left suprapubic area.  : Rowe 3 way w bloody urine , no clots  Extremities: SARAVIA x 4     I&O's Detail    17 Oct 2018 07:01  -  18 Oct 2018 07:00  --------------------------------------------------------  IN:  Total IN: 0 mL    OUT:    Indwelling Catheter - Urethral: 500 mL  Total OUT: 500 mL    Total NET: -500 mL                          9.8    7.67  )-----------( 177      ( 18 Oct 2018 08:06 )             28.7     10-18    135  |  97<L>  |  24<H>  ----------------------------<  90  5.6<H>   |  26  |  0.9    Ca    8.4<L>      18 Oct 2018 08:06  Mg     2.3     10-18    TPro  6.2  /  Alb  3.9  /  TBili  0.4  /  DBili  x   /  AST  10  /  ALT  8   /  AlkPhos  94  10-17      PT/INR - ( 17 Oct 2018 15:46 )   PT: 11.40 sec;   INR: 0.99 ratio         PTT - ( 17 Oct 2018 15:46 )  PTT:30.0 sec        Radiology:    < from: CT Abdomen and Pelvis w/ IV Cont (10.18.18 @ 10:23) >  EXAM:  CT ABDOMEN AND PELVIS IC            PROCEDURE DATE:  10/18/2018            INTERPRETATION:  CLINICAL STATEMENT: Hematuria.      TECHNIQUE: Contiguous axial CT images were obtained from the lower chest   to the pubic symphysis following administration of 100cc Optiray 320   intravenous contrast.  Oral contrast was not administered.  Reformatted   images in the coronal and sagittal planes were acquired.    COMPARISON CT: December 15, 2015      FINDINGS:    LOWER CHEST: Bibasilar nodular opacities are present measuring up to 1.0   x 0.7 cm (series 5 image 34), right lower lobe.    HEPATOBILIARY: Distended gallbladder with layering sludge/cholelithiasis.   Enhancing 0.8 cm nodule along the dependent gallbladder wall (series 5   image 135), new. Mild intrahepatic biliary ductal dilatation.   Indeterminate bilobar hypodensities too small to further characterize.   Likely right hepatic lobe cyst.    SPLEEN: Unremarkable.    PANCREAS: No pancreatic ductal dilatation. Essentially unchanged   pancreatic neck 0.8 cm cystic lesion consistent with IPMN (series 5 image   141).    ADRENAL GLANDS: Left adrenal gland nodular thickening, unchanged.    KIDNEYS: Symmetric renal enhancement with bilateral renal cysts. New mild   right hydronephrosis with ureteral wall enhancement.    ABDOMINOPELVIC NODES: Unremarkable.    PELVIC ORGANS: Enhancing 3.0 x 1.5 cm mass along the right posterior wall   of the urinary bladder (series 5 image 335) with new masslike soft tissue   nodularity measuring 7.1 x 3.8 cm extending to the region of the uterus.   Diffuse bladder wall thickening. Rowe catheter within the bladder..   Dilated gonadal veins.    PERITONEUM/MESENTERY/BOWEL: No bowel obstruction, pneumatosis,   pneumoperitoneum or ascites..    BONES/SOFT TISSUES: Diffuse osteopenia with chronic deformity of the   right iliac bone. Spinal stimulator with perihepatic located in the right   gluteal soft tissues. Post L4-5 fusion and laminectomy..    OTHER: Extensive atherosclerotic calcifications of the abdominal aorta   and branches. Infrarenal abdominal aortic ectasia measuring 2.5 x 2.1 cm,   unchanged.      IMPRESSION:     1. Enhancing 3.0 x 1.5 cm mass along the right urinary bladder posterior   wall with mild right sided hydronephrosis. Enhancement of the right   ureteral wall may reflect ascending infection versus malignancy.     2. Additional 7.1 x 3.8 cm soft tissue mass extending from the bladder   posteriorly to the region of the uterus.     3. Bibasilar nodular opacities measuring up to 1.0 cm, nonspecific.    4. Distended gallbladder with sludge/cholelithiasis and mild intrahepatic   biliary ductal dilatation, new.    5. New 0.8 cm enhancing nodule along the dependent gallbladder wall.   Findings may reflect adenomyomatosis.    < end of copied text >

## 2018-10-18 NOTE — PROGRESS NOTE ADULT - SUBJECTIVE AND OBJECTIVE BOX
Patient is a 83y old  Female who presents with a chief complaint of José Miguel Hematuria (18 Oct 2018 14:28)      Overnight events:   No acute event overnight. Patient's Hb stable s/p 2u PRBC.   Patient still having hematuria.         PAST MEDICAL & SURGICAL HISTORY:  CHF (congestive heart failure)  Breast tumor: removal benign bl breasts  Anal cancer  COPD (chronic obstructive pulmonary disease)  Lung disease: COPD  Hypertension  H/O breast surgery: 39 yrs. ago, bilateral  History of back surgery: 10 yrs. ago stimulator implant ,       Allergies    hair dyes (Hives)  sulfa drugs (Headache)    Intolerances        MEDICATIONS  (STANDING):  ALBUTerol    90 MICROgram(s) HFA Inhaler 1 Puff(s) Inhalation every 4 hours  ALBUTerol/ipratropium for Nebulization 3 milliLiter(s) Nebulizer every 6 hours  amLODIPine   Tablet 5 milliGRAM(s) Oral daily  aspirin  chewable 81 milliGRAM(s) Oral daily  atorvastatin 20 milliGRAM(s) Oral at bedtime  buDESOnide 160 MICROgram(s)/formoterol 4.5 MICROgram(s) Inhaler 2 Puff(s) Inhalation two times a day  metoprolol succinate ER 50 milliGRAM(s) Oral daily    MEDICATIONS  (PRN):  hydrocodone-acetaminophen 10/325 10 milliGRAM(s) 10 milliGRAM(s) Oral every 4 hours PRN pain        Vital Signs Last 24 Hrs  T(C): 36.2 (18 Oct 2018 07:20), Max: 36.7 (17 Oct 2018 23:31)  T(F): 97.1 (18 Oct 2018 07:20), Max: 98 (17 Oct 2018 23:31)  HR: 68 (18 Oct 2018 07:20) (68 - 78)  BP: 130/61 (18 Oct 2018 07:20) (130/60 - 181/73)  BP(mean): --  RR: 18 (18 Oct 2018 07:20) (16 - 20)  SpO2: 98% (18 Oct 2018 07:20) (98% - 100%)  CAPILLARY BLOOD GLUCOSE        I&O's Summary    17 Oct 2018 07:01  -  18 Oct 2018 07:00  --------------------------------------------------------  IN: 0 mL / OUT: 500 mL / NET: -500 mL        Physical Exam:    -     General : Not in acute distress.    -      HEENT: PEERLA    -      Cardiac: S1 & S2. No murmur/gallop/rubs.     -      Pulm: clear to auscultate b/l lung field.     -      GI: positive BS. No tenderness/rigidity/rebound.     -      Musculoskeletal: no pitting edema.     -      Neuro: A & O3.     Labs:                        9.8    7.67  )-----------( 177      ( 18 Oct 2018 08:06 )             28.7                           10-18    135  |  97<L>  |  24<H>  ----------------------------<  90  5.6<H>   |  26  |  0.9    Ca    8.4<L>      18 Oct 2018 08:06  Mg     2.3     10-18    TPro  6.2  /  Alb  3.9  /  TBili  0.4  /  DBili  x   /  AST  10  /  ALT  8   /  AlkPhos  94  10-17    LIVER FUNCTIONS - ( 17 Oct 2018 15:46 )  Alb: 3.9 g/dL / Pro: 6.2 g/dL / ALK PHOS: 94 U/L / ALT: 8 U/L / AST: 10 U/L / GGT: x                              PT/INR - ( 17 Oct 2018 15:46 )   PT: 11.40 sec;   INR: 0.99 ratio         PTT - ( 17 Oct 2018 15:46 )  PTT:30.0 sec                           Urinalysis Basic - ( 17 Oct 2018 15:46 )    Color: Red / Appearance: Turbid / S.015 / pH: x  Gluc: x / Ketone: Negative  / Bili: Negative / Urobili: 0.2 mg/dL   Blood: x / Protein: >=300 mg/dL / Nitrite: Negative   Leuk Esterase: Trace / RBC: >50 /HPF / WBC x   Sq Epi: x / Non Sq Epi: x / Bacteria: x              Imaging:    ECG:

## 2018-10-18 NOTE — PROGRESS NOTE ADULT - ASSESSMENT
82 yo F with a PMH of COPD ( previously has seen Dr. Donis once), HTN, HLD, and Chronic Back Pain with spinal stimulator, CHF (GIDD) brought in for lanie hematuria    Lanie Hematuria r/o bladder cancer  - Hgb 6.4 on admission, s/p 2 unit PRBC\  - monitor CBC  - Active type and screen  -c/w Rowe for now   - f/u Urine culture  - f/u CT urogram  - f/u Urology    mass like tissue extending from Uterus  - GYN consulted.     Hyperkalemia (improved)  - K 6.6 without EKG changes  - Received D50 and insulin  -monitor BMP    Hx of COPD  - Stable  - Cont Duaneb and Symbicort    Hx of CHF  - Cont Metoprolol and Aspirin  - unclear why pt on spirolactone alone. Will hold  - No sign of fluid overload    Hx HTN  - Cont Amlodipine    hx of Severe back pain s/p spinal fusion  - Cont Home dose of Hydrocodone / Tylenol 10-325mg q4hr PRN    DVT PPX Hold given bleeding for now  DASH diet

## 2018-10-18 NOTE — PROGRESS NOTE ADULT - ASSESSMENT
83 y.o F with gross hematuria CT A/P findings suggestive of right posterior bladder wall mass with mild right hydronephrosis with ureteral wall enhancement.  New masslike soft tissue nodularity measuring 7.1 x 3.8 cm extending to the region of the uterus.      Plan:  Please obtain CT urogram Tomorrow.  Monitor H/H, transfuse as necessary.   Please obtain GYN consult for evaluation of masslike tissue extending from the region of the uterus.  Encourage water intake.   Irrigate Rowe catheter as needed.  Pt. was not in Urinary retention. If Rowe catheter clears up tomorrow, will need manual irrigation to re-assess for blood clots, than can D/C.    service will F/U  D/W Dr. Sanchez

## 2018-10-19 ENCOUNTER — RESULT REVIEW (OUTPATIENT)
Age: 83
End: 2018-10-19

## 2018-10-19 LAB
ALBUMIN SERPL ELPH-MCNC: 3.8 G/DL — SIGNIFICANT CHANGE UP (ref 3.5–5.2)
ALP SERPL-CCNC: 97 U/L — SIGNIFICANT CHANGE UP (ref 30–115)
ALT FLD-CCNC: 8 U/L — SIGNIFICANT CHANGE UP (ref 0–41)
ANION GAP SERPL CALC-SCNC: 10 MMOL/L — SIGNIFICANT CHANGE UP (ref 7–14)
AST SERPL-CCNC: 11 U/L — SIGNIFICANT CHANGE UP (ref 0–41)
BASOPHILS # BLD AUTO: 0.03 K/UL — SIGNIFICANT CHANGE UP (ref 0–0.2)
BASOPHILS NFR BLD AUTO: 0.3 % — SIGNIFICANT CHANGE UP (ref 0–1)
BILIRUB SERPL-MCNC: 0.8 MG/DL — SIGNIFICANT CHANGE UP (ref 0.2–1.2)
BUN SERPL-MCNC: 24 MG/DL — HIGH (ref 10–20)
CALCIUM SERPL-MCNC: 8.7 MG/DL — SIGNIFICANT CHANGE UP (ref 8.5–10.1)
CHLORIDE SERPL-SCNC: 98 MMOL/L — SIGNIFICANT CHANGE UP (ref 98–110)
CO2 SERPL-SCNC: 28 MMOL/L — SIGNIFICANT CHANGE UP (ref 17–32)
CREAT SERPL-MCNC: 0.9 MG/DL — SIGNIFICANT CHANGE UP (ref 0.7–1.5)
EOSINOPHIL # BLD AUTO: 0.28 K/UL — SIGNIFICANT CHANGE UP (ref 0–0.7)
EOSINOPHIL NFR BLD AUTO: 2.9 % — SIGNIFICANT CHANGE UP (ref 0–8)
GLUCOSE BLDC GLUCOMTR-MCNC: 138 MG/DL — HIGH (ref 70–99)
GLUCOSE SERPL-MCNC: 108 MG/DL — HIGH (ref 70–99)
HCT VFR BLD CALC: 29.7 % — LOW (ref 37–47)
HGB BLD-MCNC: 10.1 G/DL — LOW (ref 12–16)
IMM GRANULOCYTES NFR BLD AUTO: 0.4 % — HIGH (ref 0.1–0.3)
LYMPHOCYTES # BLD AUTO: 1.07 K/UL — LOW (ref 1.2–3.4)
LYMPHOCYTES # BLD AUTO: 11.2 % — LOW (ref 20.5–51.1)
MAGNESIUM SERPL-MCNC: 2.2 MG/DL — SIGNIFICANT CHANGE UP (ref 1.8–2.4)
MCHC RBC-ENTMCNC: 32.5 PG — HIGH (ref 27–31)
MCHC RBC-ENTMCNC: 34 G/DL — SIGNIFICANT CHANGE UP (ref 32–37)
MCV RBC AUTO: 95.5 FL — SIGNIFICANT CHANGE UP (ref 81–99)
MONOCYTES # BLD AUTO: 0.61 K/UL — HIGH (ref 0.1–0.6)
MONOCYTES NFR BLD AUTO: 6.4 % — SIGNIFICANT CHANGE UP (ref 1.7–9.3)
NEUTROPHILS # BLD AUTO: 7.49 K/UL — HIGH (ref 1.4–6.5)
NEUTROPHILS NFR BLD AUTO: 78.8 % — HIGH (ref 42.2–75.2)
NRBC # BLD: 0 /100 WBCS — SIGNIFICANT CHANGE UP (ref 0–0)
PLATELET # BLD AUTO: 193 K/UL — SIGNIFICANT CHANGE UP (ref 130–400)
POTASSIUM SERPL-MCNC: 5.9 MMOL/L — HIGH (ref 3.5–5)
POTASSIUM SERPL-SCNC: 5.9 MMOL/L — HIGH (ref 3.5–5)
PROT SERPL-MCNC: 6.3 G/DL — SIGNIFICANT CHANGE UP (ref 6–8)
RBC # BLD: 3.11 M/UL — LOW (ref 4.2–5.4)
RBC # FLD: 16.7 % — HIGH (ref 11.5–14.5)
SODIUM SERPL-SCNC: 136 MMOL/L — SIGNIFICANT CHANGE UP (ref 135–146)
WBC # BLD: 9.52 K/UL — SIGNIFICANT CHANGE UP (ref 4.8–10.8)
WBC # FLD AUTO: 9.52 K/UL — SIGNIFICANT CHANGE UP (ref 4.8–10.8)

## 2018-10-19 PROCEDURE — 99232 SBSQ HOSP IP/OBS MODERATE 35: CPT

## 2018-10-19 RX ORDER — AMPICILLIN SODIUM AND SULBACTAM SODIUM 250; 125 MG/ML; MG/ML
3 INJECTION, POWDER, FOR SUSPENSION INTRAMUSCULAR; INTRAVENOUS ONCE
Qty: 0 | Refills: 0 | Status: COMPLETED | OUTPATIENT
Start: 2018-10-19 | End: 2018-10-19

## 2018-10-19 RX ORDER — LACTULOSE 10 G/15ML
10 SOLUTION ORAL ONCE
Qty: 0 | Refills: 0 | Status: COMPLETED | OUTPATIENT
Start: 2018-10-19 | End: 2018-10-19

## 2018-10-19 RX ORDER — DEXTROSE 50 % IN WATER 50 %
50 SYRINGE (ML) INTRAVENOUS ONCE
Qty: 0 | Refills: 0 | Status: COMPLETED | OUTPATIENT
Start: 2018-10-19 | End: 2018-10-19

## 2018-10-19 RX ORDER — AMPICILLIN SODIUM AND SULBACTAM SODIUM 250; 125 MG/ML; MG/ML
INJECTION, POWDER, FOR SUSPENSION INTRAMUSCULAR; INTRAVENOUS
Qty: 0 | Refills: 0 | Status: DISCONTINUED | OUTPATIENT
Start: 2018-10-19 | End: 2018-10-24

## 2018-10-19 RX ORDER — SODIUM POLYSTYRENE SULFONATE 4.1 MEQ/G
30 POWDER, FOR SUSPENSION ORAL ONCE
Qty: 0 | Refills: 0 | Status: COMPLETED | OUTPATIENT
Start: 2018-10-19 | End: 2018-10-19

## 2018-10-19 RX ORDER — INSULIN HUMAN 100 [IU]/ML
10 INJECTION, SOLUTION SUBCUTANEOUS ONCE
Qty: 0 | Refills: 0 | Status: COMPLETED | OUTPATIENT
Start: 2018-10-19 | End: 2018-10-19

## 2018-10-19 RX ORDER — AMPICILLIN SODIUM AND SULBACTAM SODIUM 250; 125 MG/ML; MG/ML
3 INJECTION, POWDER, FOR SUSPENSION INTRAMUSCULAR; INTRAVENOUS EVERY 6 HOURS
Qty: 0 | Refills: 0 | Status: DISCONTINUED | OUTPATIENT
Start: 2018-10-20 | End: 2018-10-24

## 2018-10-19 RX ADMIN — Medication 3 MILLILITER(S): at 19:35

## 2018-10-19 RX ADMIN — BUDESONIDE AND FORMOTEROL FUMARATE DIHYDRATE 2 PUFF(S): 160; 4.5 AEROSOL RESPIRATORY (INHALATION) at 09:03

## 2018-10-19 RX ADMIN — SODIUM POLYSTYRENE SULFONATE 30 GRAM(S): 4.1 POWDER, FOR SUSPENSION ORAL at 17:31

## 2018-10-19 RX ADMIN — Medication 3 MILLILITER(S): at 08:31

## 2018-10-19 RX ADMIN — AMPICILLIN SODIUM AND SULBACTAM SODIUM 200 GRAM(S): 250; 125 INJECTION, POWDER, FOR SUSPENSION INTRAMUSCULAR; INTRAVENOUS at 17:30

## 2018-10-19 RX ADMIN — Medication 81 MILLIGRAM(S): at 11:19

## 2018-10-19 RX ADMIN — Medication 50 MILLIGRAM(S): at 06:06

## 2018-10-19 RX ADMIN — LACTULOSE 10 GRAM(S): 10 SOLUTION ORAL at 09:51

## 2018-10-19 RX ADMIN — Medication 3 MILLILITER(S): at 14:25

## 2018-10-19 RX ADMIN — Medication 50 MILLILITER(S): at 11:19

## 2018-10-19 RX ADMIN — BUDESONIDE AND FORMOTEROL FUMARATE DIHYDRATE 2 PUFF(S): 160; 4.5 AEROSOL RESPIRATORY (INHALATION) at 21:05

## 2018-10-19 RX ADMIN — Medication 10 MILLIGRAM(S): at 21:06

## 2018-10-19 RX ADMIN — AMPICILLIN SODIUM AND SULBACTAM SODIUM 200 GRAM(S): 250; 125 INJECTION, POWDER, FOR SUSPENSION INTRAMUSCULAR; INTRAVENOUS at 23:19

## 2018-10-19 RX ADMIN — ATORVASTATIN CALCIUM 20 MILLIGRAM(S): 80 TABLET, FILM COATED ORAL at 21:05

## 2018-10-19 RX ADMIN — AMLODIPINE BESYLATE 5 MILLIGRAM(S): 2.5 TABLET ORAL at 06:06

## 2018-10-19 RX ADMIN — INSULIN HUMAN 10 UNIT(S): 100 INJECTION, SOLUTION SUBCUTANEOUS at 11:19

## 2018-10-19 NOTE — CONSULT NOTE ADULT - ASSESSMENT
Hyperkalemia with normal renal function - due to long acting effects of aldactone  gross hematuria  Bladder mass with mild right hydronephrosis  s/p IV dye exposure  remote history of anal ca s/p chemo / RT  COPD  CHF  chronic back pain with spinal stimulator  enterococcus UTI    plan:    low K+ diet  keep off aldactone  kayexalate 30g PO x 1 now and PRN for K+ > 5.6  f/u  / GynOnc  cont iv abx  lynne with frequent flushes  f/u BMP

## 2018-10-19 NOTE — CONSULT NOTE ADULT - SUBJECTIVE AND OBJECTIVE BOX
NEPHROLOGY CONSULTATION NOTE    Patient is a 83y Female with PMH as below, recently hospitalized for COPD and CHF exacerbation and on oupt spironolactone.  Rehospitalized for gross hematuria and found to have bladder mass on CT imaging.  Pt with persistent hyperkalemia during hospitalization therefore renal consulted.  Spoke with pt, daughter was on phone and with resident.      PAST MEDICAL & SURGICAL HISTORY:  CHF (congestive heart failure)  Breast tumor: removal benign bl breasts  Anal cancer  COPD (chronic obstructive pulmonary disease)  Lung disease: COPD  Hypertension  H/O breast surgery: 39 yrs. ago, bilateral  History of back surgery: 10 yrs. ago stimulator implant ,     Allergies:  hair dyes (Hives)  sulfa drugs (Headache)    Home Medications Reviewed    SOCIAL HISTORY:  Denies ETOH,Smoking,   FAMILY HISTORY:  No pertinent family history in first degree relatives        REVIEW OF SYSTEMS:  All other review of systems is negative unless indicated above.    PHYSICAL EXAM:  NAD  awake and alert  moist mm  no jvd  decreased BS bl  rrr  soft, + LLQ tenderness  no edema  foely - gross hematuria with clots    Hospital Medications:   MEDICATIONS  (STANDING):  ALBUTerol    90 MICROgram(s) HFA Inhaler 1 Puff(s) Inhalation every 4 hours  ALBUTerol/ipratropium for Nebulization 3 milliLiter(s) Nebulizer every 6 hours  amLODIPine   Tablet 5 milliGRAM(s) Oral daily  ampicillin/sulbactam  IVPB      ampicillin/sulbactam  IVPB 3 Gram(s) IV Intermittent once  aspirin  chewable 81 milliGRAM(s) Oral daily  atorvastatin 20 milliGRAM(s) Oral at bedtime  buDESOnide 160 MICROgram(s)/formoterol 4.5 MICROgram(s) Inhaler 2 Puff(s) Inhalation two times a day  buDESOnide 160 MICROgram(s)/formoterol 4.5 MICROgram(s) Inhaler 2 Puff(s) Inhalation two times a day  metoprolol succinate ER 50 milliGRAM(s) Oral daily  sodium polystyrene sulfonate Suspension 30 Gram(s) Oral once        VITALS:  T(F): 97.9 (10-19-18 @ 14:23), Max: 97.9 (10-19-18 @ 14:23)  HR: 63 (10-19-18 @ 14:23)  BP: 121/56 (10-19-18 @ 14:23)  RR: 18 (10-19-18 @ 14:23)  SpO2: 96% (10-19-18 @ 07:07)  Wt(kg): --    10-17 @ 07:01  -  10-18 @ 07:00  --------------------------------------------------------  IN: 0 mL / OUT: 500 mL / NET: -500 mL    10-18 @ 07:01  -  10-19 @ 07:00  --------------------------------------------------------  IN: 0 mL / OUT: 1625 mL / NET: -1625 mL    10-19 @ 07:01  -  10-19 @ 16:27  --------------------------------------------------------  IN: 0 mL / OUT: 300 mL / NET: -300 mL      Height (cm): 160.02 (10-18 @ 21:02)    LABS:  10-19    136  |  98  |  24<H>  ----------------------------<  108<H>  5.9<H>   |  28  |  0.9    Ca    8.7      19 Oct 2018 08:45  Mg     2.2     10-19    TPro  6.3  /  Alb  3.8  /  TBili  0.8  /  DBili      /  AST  11  /  ALT  8   /  AlkPhos  97  10-19                          10.1   9.52  )-----------( 193      ( 19 Oct 2018 08:45 )             29.7       Urine Studies:  Urinalysis Basic - ( 17 Oct 2018 15:46 )    Color: Red / Appearance: Turbid / S.015 / pH:   Gluc:  / Ketone: Negative  / Bili: Negative / Urobili: 0.2 mg/dL   Blood:  / Protein: >=300 mg/dL / Nitrite: Negative   Leuk Esterase: Trace / RBC: >50 /HPF / WBC    Sq Epi:  / Non Sq Epi:  / Bacteria:           RADIOLOGY & ADDITIONAL STUDIES:

## 2018-10-19 NOTE — CONSULT NOTE ADULT - SUBJECTIVE AND OBJECTIVE BOX
Chief Complaint: lanie hematuria    HPI: 82 y/o, P3, postmenopausal, h/o HTN, HLD, COPD, CHF, chronic back pain with spinal stimulator, anal cancer 25 years ago s/p surgery and chemo/radiation, admitted to medicine for lanie hematuria to r/o bladder cancer. Patient reports that in the past several weeks she noted blood with urination. Initially only saw blood when she wiped but in the several days before admission saw bright blood in the toilet when she urinated. Denies vaginal bleeding since LMP at age 49 and rectal bleeding. Denies fever, nausea, vomiting, chest pain, SOB, dysuria, night sweats, recent weight changes, bloating, early satiety. Has chronic constipation unchanged. Reports moderate pain in the LLQ, constant, nonradiating which she relates to the lovenox she received on her admission several weeks ago.     Ob/Gyn History: NSVDX3                   LMP - age 49                    Denies history of ovarian cysts, uterine fibroids, abnormal paps, or STIs  Last Pap Smear - 25 years ago normal per patient  Last Mammogram - several years ago normal per patient  Last Colonoscopy - 10 years ago normal per patient    Denies the following: constitutional symptoms, visual symptoms, cardiovascular symptoms, respiratory symptoms, GI symptoms, musculoskeletal symptoms, skin symptoms, neurologic symptoms, hematologic symptoms, allergic symptoms, psychiatric symptoms  Except any pertinent positives listed.     Home Medications:  amLODIPine 5 mg oral tablet: 1 tab(s) orally once a day (17 Oct 2018 23:37)  atorvastatin 20 mg oral tablet: 1 tab(s) orally once a day (at bedtime) (17 Oct 2018 23:37)  Calcium 500+D oral tablet, chewable: 1 tab(s) orally 2 times a day (17 Oct 2018 23:37)  Centrum Adults oral tablet: 1 tab(s) orally once a day (17 Oct 2018 23:37)  HYDROcodone 10 mg oral capsule, extended release: 1 cap(s) orally every 4 hours, As Needed (17 Oct 2018 23:39)  metoprolol succinate 50 mg oral tablet, extended release: 1 tab(s) orally once a day (17 Oct 2018 23:37)  ProAir HFA 90 mcg/inh inhalation aerosol: 2 puff(s) inhaled 4 times a day (17 Oct 2018 23:37)  spironolactone 25 mg oral tablet: 1 tab(s) orally 2 times a day (17 Oct 2018 23:37)      Allergies    hair dyes (Hives)  sulfa drugs (Headache)      PAST MEDICAL & SURGICAL HISTORY:  CHF (congestive heart failure)  Breast tumor: removal benign bilateral masses  Anal cancer  COPD (chronic obstructive pulmonary disease)  Lung disease: COPD  Hypertension  H/O breast surgery: 39 yrs. ago, bilateral  History of back surgery: 10 yrs. ago stimulator implant ,       FAMILY HISTORY:  father prostate cancer      SOCIAL HISTORY: H/o smoking 1-2 PPD for 50 years, quit 3 years ago. Denies alcohol use or illicit drug use    Vital Signs Last 24 Hrs  T(F): 96.1 (19 Oct 2018 04:39), Max: 97.8 (18 Oct 2018 21:02)  HR: 63 (19 Oct 2018 04:39) (62 - 64)  BP: 146/48 (19 Oct 2018 04:39) (137/44 - 158/71)  RR: 18 (19 Oct 2018 04:39) (18 - 19)    Physical Exam:  Constitutional: AAOx3, NAD  Respiratory: CTAB  Cardiovascular: NL S1/S2, RRR  Gastrointestinal: Soft, mild LLQ tenderness, nondistended, no rebound, guarding, or rigidity, ecchymoses in lower abdomen.  : Rowe draining urine with lanie blood   Pelvic: Limited exam due to patient intolerance. 1.5 cm in diameter black nevus on mons pubis. Normal vulva, normal vagina, no vaginal bleeding and no lesions. Cervix could not be visualized. On bimanual exam narrow vagina with hard walls but no masses palpated. Cervix could not be palpated due to patient intolerance. Normal sized uterus, nontender, no adnexal masses or tenderness    LABS:                        9.8    7.67  )-----------( 177      ( 18 Oct 2018 08:06 )             28.7         10-18    135  |  97<L>  |  24<H>  ----------------------------<  90  5.6<H>   |  26  |  0.9    Ca    8.4<L>      18 Oct 2018 08:06  Mg     2.3     10-18    TPro  6.2  /  Alb  3.9  /  TBili  0.4  /  DBili  x   /  AST  10  /  ALT  8   /  AlkPhos  94  10-17    PT/INR - ( 17 Oct 2018 15:46 )   PT: 11.40 sec;   INR: 0.99 ratio         PTT - ( 17 Oct 2018 15:46 )  PTT:30.0 sec  Urinalysis Basic - ( 17 Oct 2018 15:46 )    Color: Red / Appearance: Turbid / S.015 / pH: x  Gluc: x / Ketone: Negative  / Bili: Negative / Urobili: 0.2 mg/dL   Blood: x / Protein: >=300 mg/dL / Nitrite: Negative   Leuk Esterase: Trace / RBC: >50 /HPF / WBC x   Sq Epi: x / Non Sq Epi: x / Bacteria: x        Culture - Urine (collected 10-17-18 @ 15:46)  Source: .Urine Clean Catch (Midstream)  Preliminary Report (10-18-18 @ 17:41):    >100,000 CFU/ml Enterococcus faecalis        RADIOLOGY & ADDITIONAL STUDIES:    CT abdomen/pelvis:    LOWER CHEST: Bibasilar nodular opacities are present measuring up to 1.0   x 0.7 cm (series 5 image 34), right lower lobe.    HEPATOBILIARY: Distended gallbladder with layering sludge/cholelithiasis.   Enhancing 0.8 cm nodule along the dependent gallbladder wall (series 5   image 135), new. Mild intrahepatic biliary ductal dilatation.   Indeterminate bilobar hypodensities too small to further characterize.   Likely right hepatic lobe cyst.    SPLEEN: Unremarkable.    PANCREAS: No pancreatic ductal dilatation. Essentially unchanged   pancreatic neck 0.8 cm cystic lesion consistent with IPMN (series 5 image   141).    ADRENAL GLANDS: Left adrenal gland nodular thickening, unchanged.    KIDNEYS: Symmetric renal enhancement with bilateral renal cysts. New mild   right hydronephrosis with ureteral wall enhancement.    ABDOMINOPELVIC NODES: Unremarkable.    PELVIC ORGANS: Enhancing 3.0 x 1.5 cm mass along the right posterior wall   of the urinary bladder (series 5 image 335) with new masslike soft tissue   nodularity measuring 7.1 x 3.8 cm extending to the region of the uterus.   Diffuse bladder wall thickening. Rowe catheter within the bladder..   Dilated gonadal veins.    PERITONEUM/MESENTERY/BOWEL: No bowel obstruction, pneumatosis,   pneumoperitoneum or ascites..    BONES/SOFT TISSUES: Diffuse osteopenia with chronic deformity of the   right iliac bone. Spinal stimulator with perihepatic located in the right   gluteal soft tissues. Post L4-5 fusion and laminectomy..    OTHER: Extensive atherosclerotic calcifications of the abdominal aorta   and branches. Infrarenal abdominal aortic ectasia measuring 2.5 x 2.1 cm,   unchanged.      IMPRESSION:     1. Enhancing 3.0 x 1.5 cm mass along the right urinary bladder posterior   wall with mild right sided hydronephrosis. Enhancement of the right   ureteral wall may reflect ascending infection versus malignancy.     2. Additional 7.1 x 3.8 cm soft tissue mass extending from the bladder   posteriorly to the region of the uterus.     3. Bibasilar nodular opacities measuring up to 1.0 cm, nonspecific.    4. Distended gallbladder with sludge/cholelithiasis and mild intrahepatic   biliary ductal dilatation, new.    5. New 0.8 cm enhancing nodule along the dependent gallbladder wall.   Findings may reflect adenomyomatosis.

## 2018-10-19 NOTE — PROGRESS NOTE ADULT - SUBJECTIVE AND OBJECTIVE BOX
83 y.o F with gross hematuria CT A/P findings suggestive of right posterior bladder wall mass with mild right hydronephrosis with ureteral wall enhancement. New masslike soft tissue nodularity measuring 7.1 x 3.8 cm extending to the region of the uterus. Pt. seen and examined at bedside in NAD, Rowe catheter drainig burgundy color urine small clots. Urinary bladder irrifgated with 500 cc until slightly tinged with blood output, and all blood clots removed. Pt. tollerated procedure well left in NAD.      Vital Signs Last 24 Hrs  T(C): 35.6 (19 Oct 2018 04:39), Max: 36.6 (18 Oct 2018 21:02)  T(F): 96.1 (19 Oct 2018 04:39), Max: 97.8 (18 Oct 2018 21:02)  HR: 63 (19 Oct 2018 04:39) (62 - 64)  BP: 146/48 (19 Oct 2018 04:39) (137/44 - 158/71)  RR: 18 (19 Oct 2018 04:39) (18 - 19)  SpO2: 96% (19 Oct 2018 07:07) (96% - 99%)      PE:  General: WN/WD in [facility]  HEENT: NC/AT, EOMI  Respiratory: No accessory muscle use.  Abdomen: Soft , + TTP to LLQ, ND.  : + 3 way Rowe catheter  : MANJIT       I&O's Detail    18 Oct 2018 07:01  -  19 Oct 2018 07:00  --------------------------------------------------------  IN:  Total IN: 0 mL    OUT:    Indwelling Catheter - Urethral: 1625 mL  Total OUT: 1625 mL    Total NET: -1625 mL         LABS:                        10.1   9.52  )-----------( 193      ( 19 Oct 2018 08:45 )             29.7     10-19    136  |  98  |  24<H>  ----------------------------<  108<H>  5.9<H>   |  28  |  0.9    Ca    8.7      19 Oct 2018 08:45  Mg     2.2     10-19    TPro  6.3  /  Alb  3.8  /  TBili  0.8  /  DBili  x   /  AST  11  /  ALT  8   /  AlkPhos  97  10-19    Urinalysis (10.17.18 @ 15:46)    pH Urine: 6.0    Glucose Qualitative, Urine: Negative mg/dL    Blood, Urine: Large    Color: Red    Urine Appearance: Turbid    Bilirubin: Negative    Ketone - Urine: Negative    Specific Gravity: 1.015    Protein, Urine: >=300 mg/dL    Urobilinogen: 0.2 mg/dL    Nitrite: Negative    Leukocyte Esterase Concentration: Trace    UCulture - Urine (10.17.18 @ 15:46)    Specimen Source: .Urine Clean Catch (Midstream)    Culture Results:   >100,000 CFU/ml Enterococcus faecalis

## 2018-10-19 NOTE — PROGRESS NOTE ADULT - ASSESSMENT
83 y.o F with gross hematuria, bladder mass, UTI      Plan:  Abx as per ID  F/U recommendations GYN oncology  Please obtain Urine cytology and CT Urogram  Monitor Rowe catheter UO, irrigate PRN  Monitor H/H, BUN/Cr  Cystoscopy and TURBT as an outpatient.   attending will F/U

## 2018-10-19 NOTE — CONSULT NOTE ADULT - ASSESSMENT
84 y/o, P3, postmenopausal, h/o multiple comorbidities, with lanie hematuria, on CT 7 cm soft tissue mass extending from bladder to uterus, likely bladder cancer r/o gyn malignancy.   - recommend transvaginal ultrasound  - GI consult to r/o GI malignancy given history of anal cancer    Dr. Prieto aware. To be discussed with Dr. Gaxiola (gyn oncology attending). 82 y/o, P3, postmenopausal, h/o multiple comorbidities, with lanie hematuria, on CT 7 cm soft tissue mass extending from bladder to uterus, likely primary bladder malignancy  - recommend transvaginal ultrasound  - GI consult to r/o GI malignancy given history of anal cancer  - patient may follow up in outpatient clinic with GYN/ONC at [facility] ([phone]) AFTER cystoscopy by urology. If findings consistent with primary bladder malignancy then no need for follow with GYN/ONC.     D/w Dr. Gaxiola

## 2018-10-19 NOTE — PROGRESS NOTE ADULT - ASSESSMENT
84 yo F with a PMH of COPD ( previously has seen Dr. Donis once), HTN, HLD, and Chronic Back Pain with spinal stimulator, CHF (GIDD) brought in for lanie hematuria    Lanie Hematuria r/o bladder cancer  - Hgb 6.4 on admission, s/p 2 unit PRBC  - monitor CBC  - Active type and screen  -c/w Rowe for now, monitor urine output. flush PRN  - f/u Urine culture -prelim shows e. faecalis.   -will start antibiotics today  - f/u CT urogram -to be done today.  - as per urology outpatient cystoscopy and TURBT    mass like tissue extending from Uterus  - GYN consulted, awaiting attending recs     Hyperkalemia recurrent/persisitent  - K 6.6 without EKG changes on admission  -this am 5.9  pending stat EKG  - Received D50 and insulin  -monitor BMP    Hx of COPD  - Stable  - Cont Duaneb and Symbicort    Hx of CHF  - Cont Metoprolol and Aspirin  - unclear why pt on spirolactone alone. holding  - No sign of fluid overload    Hx HTN  - Cont Amlodipine    hx of Severe back pain s/p spinal fusion  - Cont Home dose of Hydrocodone / Tylenol 10-325mg q4hr PRN    DVT PPX Hold given bleeding for now  DASH diet 82 yo F with a PMH of COPD ( previously has seen Dr. Donis once), HTN, HLD, and Chronic Back Pain with spinal stimulator, CHF (GIDD) brought in for lanie hematuria    Lanie Hematuria r/o bladder cancer  - Hgb 6.4 on admission, s/p 2 unit PRBC  - monitor CBC  - Active type and screen  -c/w Lynne for now, monitor urine output. flush PRN  - f/u Urine culture -prelim shows e. faecalis.   -will start antibiotics today  - f/u CT urogram -to be done today. still pending  - as per urology outpatient cystoscopy and TURBT  -removing lynne as per urology    UTI:  -e faecalis  unasyn started  -f/u ID  mass like tissue extending from Uterus  - GYN consulted  - patient may follow up in outpatient clinic with GYN/ONC at [facility] ([phone]) AFTER cystoscopy by urology. If findings consistent with primary bladder malignancy then no need for follow with GYN/ONC.     Hyperkalemia recurrent/persisitent  - K 6.6 without EKG changes on admission  -this am 5.9  pending stat EKG  - Received D50 and insulin  -monitor BMP    Hx of COPD  - Stable  - Cont Duaneb and Symbicort    Hx of CHF  - Cont Metoprolol and Aspirin  - unclear why pt on spirolactone alone. holding  - No sign of fluid overload    Hx HTN  - Cont Amlodipine    hx of Severe back pain s/p spinal fusion  - Cont Home dose of Hydrocodone / Tylenol 10-325mg q4hr PRN    DVT PPX Hold given bleeding for now  DASH diet

## 2018-10-19 NOTE — PROGRESS NOTE ADULT - SUBJECTIVE AND OBJECTIVE BOX
82 yo F with a PMH of COPD ( previously has seen Dr. Donis once), HTN, HLD, and Chronic Back Pain with spinal stimulator, CHF (GIDD) brought in for lanie hematuria. Pt reports she noticed some blood in her urine for the past month, however within the last 2 days it has become much worse. Pt denies any trauma, abd pain, dysuria, fever, chills, vaginal bleeding or rectal bleeding.   Pt recently admitted for COPD and CHF exacerbation  On presentation /80 HR 66 T98.3. Hgb 6.4 (baseline 9), K 6.6 w/o EKG changes.  Pt received 2 unit PRBC in ED, Insulin, D50    Interval hx:  ct abd and pelvis with Iv contrast showed bright mass in bladder and mass in the uterus. As per urology patient requires additional ct urogram with and without iv contrast with delayed images as well to confirm enhancement. patient to follow up outpatient with urology for cystoscopy and TURBT. GYN onc consult placed, awaiting attending recs.     10/19/19  patient noted to have persistent Hyperkalemia, more insulin and dextrose given, EKG ordered. Urine culture came back positive for prelim e faecalis, patient will be placed on antibiotics today. 84 yo F with a PMH of COPD ( previously has seen Dr. Donis once), HTN, HLD, and Chronic Back Pain with spinal stimulator, CHF (GIDD) brought in for lanie hematuria. Pt reports she noticed some blood in her urine for the past month, however within the last 2 days it has become much worse. Pt denies any trauma, abd pain, dysuria, fever, chills, vaginal bleeding or rectal bleeding.   Pt recently admitted for COPD and CHF exacerbation  On presentation /80 HR 66 T98.3. Hgb 6.4 (baseline 9), K 6.6 w/o EKG changes.  Pt received 2 unit PRBC in ED, Insulin, D50    Interval hx:  ct abd and pelvis with Iv contrast showed bright mass in bladder and mass in the uterus. As per urology patient requires additional ct urogram with and without iv contrast with delayed images as well to confirm enhancement. patient to follow up outpatient with urology for cystoscopy and TURBT. GYN onc consult placed, awaiting attending recs.     10/19/19  hospital day 2  patient noted to have persistent Hyperkalemia, more insulin and dextrose given, EKG ordered. Urine culture came back positive for prelim e faecalis, patient will be placed on antibiotics today. (unasyn 3 mg q6 for 14 days per stacey, senior)  urology requested stat cbc with differential at 5am Saturday morning, will sign out.    PAST MEDICAL & SURGICAL HISTORY  CHF (congestive heart failure)  Breast tumor: removal benign bl breasts  Anal cancer  COPD (chronic obstructive pulmonary disease)  Lung disease: COPD  Hypertension  H/O breast surgery: 39 yrs. ago, bilateral  History of back surgery: 10 yrs. ago stimulator implant ,     SOCIAL HISTORY:  Negative for smoking/alcohol/drug use.     ALLERGIES:  hair dyes (Hives)  sulfa drugs (Headache)    MEDICATIONS:  STANDING MEDICATIONS  ALBUTerol    90 MICROgram(s) HFA Inhaler 1 Puff(s) Inhalation every 4 hours  ALBUTerol/ipratropium for Nebulization 3 milliLiter(s) Nebulizer every 6 hours  amLODIPine   Tablet 5 milliGRAM(s) Oral daily  aspirin  chewable 81 milliGRAM(s) Oral daily  atorvastatin 20 milliGRAM(s) Oral at bedtime  buDESOnide 160 MICROgram(s)/formoterol 4.5 MICROgram(s) Inhaler 2 Puff(s) Inhalation two times a day  buDESOnide 160 MICROgram(s)/formoterol 4.5 MICROgram(s) Inhaler 2 Puff(s) Inhalation two times a day  metoprolol succinate ER 50 milliGRAM(s) Oral daily  sodium polystyrene sulfonate Suspension 30 Gram(s) Oral once    PRN MEDICATIONS  hydrocodone-acetaminophen 10/325 10 milliGRAM(s) 10 milliGRAM(s) Oral every 4 hours PRN    VITALS:   T(F): 97.9  HR: 63  BP: 121/56  RR: 18  SpO2: 96%    LABS:                        10.1   9.52  )-----------( 193      ( 19 Oct 2018 08:45 )             29.7     10-    136  |  98  |  24<H>  ----------------------------<  108<H>  5.9<H>   |  28  |  0.9    Ca    8.7      19 Oct 2018 08:45  Mg     2.2     10-    TPro  6.3  /  Alb  3.8  /  TBili  0.8  /  DBili  x   /  AST  11  /  ALT  8   /  AlkPhos  97  10-    PT/INR - ( 17 Oct 2018 15:46 )   PT: 11.40 sec;   INR: 0.99 ratio         PTT - ( 17 Oct 2018 15:46 )  PTT:30.0 sec  Urinalysis Basic - ( 17 Oct 2018 15:46 )    Color: Red / Appearance: Turbid / S.015 / pH: x  Gluc: x / Ketone: Negative  / Bili: Negative / Urobili: 0.2 mg/dL   Blood: x / Protein: >=300 mg/dL / Nitrite: Negative   Leuk Esterase: Trace / RBC: >50 /HPF / WBC x   Sq Epi: x / Non Sq Epi: x / Bacteria: x        Culture - Urine (collected 17 Oct 2018 15:46)  Source: .Urine Clean Catch (Midstream)  Preliminary Report (18 Oct 2018 17:41):    >100,000 CFU/ml Enterococcus faecalis              RADIOLOGY:    PHYSICAL EXAM:  patient refused exam this am because of back pain for a few hours after gyn exam.

## 2018-10-20 DIAGNOSIS — N32.89 OTHER SPECIFIED DISORDERS OF BLADDER: ICD-10-CM

## 2018-10-20 LAB
-  AMPICILLIN: SIGNIFICANT CHANGE UP
-  CIPROFLOXACIN: SIGNIFICANT CHANGE UP
-  LEVOFLOXACIN: SIGNIFICANT CHANGE UP
-  NITROFURANTOIN: SIGNIFICANT CHANGE UP
-  TETRACYCLINE: SIGNIFICANT CHANGE UP
-  VANCOMYCIN: SIGNIFICANT CHANGE UP
ANION GAP SERPL CALC-SCNC: 10 MMOL/L — SIGNIFICANT CHANGE UP (ref 7–14)
ANION GAP SERPL CALC-SCNC: 11 MMOL/L — SIGNIFICANT CHANGE UP (ref 7–14)
APTT BLD: 29.7 SEC — SIGNIFICANT CHANGE UP (ref 27–39.2)
BLD GP AB SCN SERPL QL: SIGNIFICANT CHANGE UP
BUN SERPL-MCNC: 24 MG/DL — HIGH (ref 10–20)
BUN SERPL-MCNC: 25 MG/DL — HIGH (ref 10–20)
CALCIUM SERPL-MCNC: 7.8 MG/DL — LOW (ref 8.5–10.1)
CALCIUM SERPL-MCNC: 8.3 MG/DL — LOW (ref 8.5–10.1)
CHLORIDE SERPL-SCNC: 95 MMOL/L — LOW (ref 98–110)
CHLORIDE SERPL-SCNC: 96 MMOL/L — LOW (ref 98–110)
CO2 SERPL-SCNC: 26 MMOL/L — SIGNIFICANT CHANGE UP (ref 17–32)
CO2 SERPL-SCNC: 28 MMOL/L — SIGNIFICANT CHANGE UP (ref 17–32)
CREAT SERPL-MCNC: 1 MG/DL — SIGNIFICANT CHANGE UP (ref 0.7–1.5)
CREAT SERPL-MCNC: 1.1 MG/DL — SIGNIFICANT CHANGE UP (ref 0.7–1.5)
CULTURE RESULTS: SIGNIFICANT CHANGE UP
GLUCOSE SERPL-MCNC: 112 MG/DL — HIGH (ref 70–99)
GLUCOSE SERPL-MCNC: 166 MG/DL — HIGH (ref 70–99)
HCT VFR BLD CALC: 25.6 % — LOW (ref 37–47)
HGB BLD-MCNC: 8.5 G/DL — LOW (ref 12–16)
INR BLD: 1.08 RATIO — SIGNIFICANT CHANGE UP (ref 0.65–1.3)
MCHC RBC-ENTMCNC: 31.4 PG — HIGH (ref 27–31)
MCHC RBC-ENTMCNC: 33.2 G/DL — SIGNIFICANT CHANGE UP (ref 32–37)
MCV RBC AUTO: 94.5 FL — SIGNIFICANT CHANGE UP (ref 81–99)
METHOD TYPE: SIGNIFICANT CHANGE UP
NRBC # BLD: 0 /100 WBCS — SIGNIFICANT CHANGE UP (ref 0–0)
ORGANISM # SPEC MICROSCOPIC CNT: SIGNIFICANT CHANGE UP
ORGANISM # SPEC MICROSCOPIC CNT: SIGNIFICANT CHANGE UP
PLATELET # BLD AUTO: 156 K/UL — SIGNIFICANT CHANGE UP (ref 130–400)
POTASSIUM SERPL-MCNC: 5.3 MMOL/L — HIGH (ref 3.5–5)
POTASSIUM SERPL-MCNC: 5.4 MMOL/L — HIGH (ref 3.5–5)
POTASSIUM SERPL-SCNC: 5.3 MMOL/L — HIGH (ref 3.5–5)
POTASSIUM SERPL-SCNC: 5.4 MMOL/L — HIGH (ref 3.5–5)
PROTHROM AB SERPL-ACNC: 12.4 SEC — SIGNIFICANT CHANGE UP (ref 9.95–12.87)
RBC # BLD: 2.71 M/UL — LOW (ref 4.2–5.4)
RBC # FLD: 16.2 % — HIGH (ref 11.5–14.5)
SODIUM SERPL-SCNC: 132 MMOL/L — LOW (ref 135–146)
SODIUM SERPL-SCNC: 134 MMOL/L — LOW (ref 135–146)
SPECIMEN SOURCE: SIGNIFICANT CHANGE UP
TYPE + AB SCN PNL BLD: SIGNIFICANT CHANGE UP
WBC # BLD: 10.02 K/UL — SIGNIFICANT CHANGE UP (ref 4.8–10.8)
WBC # FLD AUTO: 10.02 K/UL — SIGNIFICANT CHANGE UP (ref 4.8–10.8)

## 2018-10-20 PROCEDURE — 99233 SBSQ HOSP IP/OBS HIGH 50: CPT

## 2018-10-20 RX ADMIN — AMPICILLIN SODIUM AND SULBACTAM SODIUM 200 GRAM(S): 250; 125 INJECTION, POWDER, FOR SUSPENSION INTRAMUSCULAR; INTRAVENOUS at 05:48

## 2018-10-20 RX ADMIN — BUDESONIDE AND FORMOTEROL FUMARATE DIHYDRATE 2 PUFF(S): 160; 4.5 AEROSOL RESPIRATORY (INHALATION) at 21:20

## 2018-10-20 RX ADMIN — Medication 3 MILLILITER(S): at 08:49

## 2018-10-20 RX ADMIN — AMPICILLIN SODIUM AND SULBACTAM SODIUM 200 GRAM(S): 250; 125 INJECTION, POWDER, FOR SUSPENSION INTRAMUSCULAR; INTRAVENOUS at 17:39

## 2018-10-20 RX ADMIN — BUDESONIDE AND FORMOTEROL FUMARATE DIHYDRATE 2 PUFF(S): 160; 4.5 AEROSOL RESPIRATORY (INHALATION) at 12:40

## 2018-10-20 RX ADMIN — ATORVASTATIN CALCIUM 20 MILLIGRAM(S): 80 TABLET, FILM COATED ORAL at 21:22

## 2018-10-20 RX ADMIN — Medication 50 MILLIGRAM(S): at 05:48

## 2018-10-20 RX ADMIN — AMLODIPINE BESYLATE 5 MILLIGRAM(S): 2.5 TABLET ORAL at 05:48

## 2018-10-20 RX ADMIN — AMPICILLIN SODIUM AND SULBACTAM SODIUM 200 GRAM(S): 250; 125 INJECTION, POWDER, FOR SUSPENSION INTRAMUSCULAR; INTRAVENOUS at 12:25

## 2018-10-20 RX ADMIN — Medication 81 MILLIGRAM(S): at 12:25

## 2018-10-20 NOTE — PROGRESS NOTE ADULT - ASSESSMENT
82 yo female with gross hematuria and bladder mass extending to uterus on CT    hg stable at 10 s/p transfusion    repeat CT a/p done 10/19, f/u report    further recommendations to follow s/p d/w Dr. Sanchez 84 yo female with gross hematuria and bladder mass    repeat CT a/p done 10/19, f/u report    further recommendations to follow s/p d/w Dr. Sanchez

## 2018-10-20 NOTE — PROGRESS NOTE ADULT - SUBJECTIVE AND OBJECTIVE BOX
KOTA SCHAFER  83y  Female    Patient is a 83y old  Female who presents with a chief complaint of José Miguel Hematuria (20 Oct 2018 10:21)    ALLERGIES:  hair dyes (Hives)  sulfa drugs (Headache)      INTERVAL HPI/OVERNIGHT EVENTS:    VITALS:  T(F): 99.2 (10-20-18 @ 05:31), Max: 99.2 (10-20-18 @ 05:31)  HR: 71 (10-20-18 @ 05:31) (63 - 86)  BP: 129/36 (10-20-18 @ 05:31) (121/56 - 148/49)  RR: 18 (10-20-18 @ 05:31) (18 - 19)  SpO2: 98% (10-20-18 @ 03:06) (98% - 98%)    LABS:  10-20    134<L>  |  95<L>  |  24<H>  ----------------------------<  166<H>  5.3<H>   |  28  |  1.1    Ca    7.8<L>      20 Oct 2018 10:34  Mg     2.2     10-19    TPro  6.3  /  Alb  3.8  /  TBili  0.8  /  DBili  x   /  AST  11  /  ALT  8   /  AlkPhos  97  10-19    MICROBIOLOGY:    MEDICATION:  ALBUTerol    90 MICROgram(s) HFA Inhaler 1 Puff(s) Inhalation every 4 hours  ALBUTerol/ipratropium for Nebulization 3 milliLiter(s) Nebulizer every 6 hours  amLODIPine   Tablet 5 milliGRAM(s) Oral daily  ampicillin/sulbactam  IVPB      ampicillin/sulbactam  IVPB 3 Gram(s) IV Intermittent every 6 hours  aspirin  chewable 81 milliGRAM(s) Oral daily  atorvastatin 20 milliGRAM(s) Oral at bedtime  bisacodyl Suppository 10 milliGRAM(s) Rectal daily PRN  buDESOnide 160 MICROgram(s)/formoterol 4.5 MICROgram(s) Inhaler 2 Puff(s) Inhalation two times a day  buDESOnide 160 MICROgram(s)/formoterol 4.5 MICROgram(s) Inhaler 2 Puff(s) Inhalation two times a day  hydrocodone-acetaminophen 10/325 10 milliGRAM(s) 10 milliGRAM(s) Oral every 4 hours PRN  metoprolol succinate ER 50 milliGRAM(s) Oral daily    RADIOLOGY & ADDITIONAL TESTS:

## 2018-10-20 NOTE — PROGRESS NOTE ADULT - ASSESSMENT
Hyperkalemia with normal renal function - due to long acting effects of aldactone  gross hematuria  Bladder mass with mild right hydronephrosis  s/p IV dye exposure  remote history of anal ca s/p chemo / RT  COPD  CHF  chronic back pain with spinal stimulator  enterococcus UTI    plan:    low K+ diet  keep off aldactone  kayexalate PRN for K+ > 5.6  f/u  / GynOnc  cont iv abx  lynne with frequent flushes  f/u BMP

## 2018-10-20 NOTE — CONSULT NOTE ADULT - SUBJECTIVE AND OBJECTIVE BOX
Patient is a 83y old  Female who presents with a chief complaint of José Miguel Hematuria (20 Oct 2018 12:09)      INTERVAL HPI/OVERNIGHT EVENTS:  T(C): 37.2 (10-20-18 @ 13:00), Max: 37.3 (10-20-18 @ 05:31)  HR: 84 (10-20-18 @ 13:00) (71 - 86)  BP: 115/50 (10-20-18 @ 13:00) (115/50 - 148/49)  RR: 18 (10-20-18 @ 13:00) (18 - 19)  SpO2: 98% (10-20-18 @ 03:06) (98% - 98%)  Wt(kg): --  I&O's Summary    19 Oct 2018 07:01  -  20 Oct 2018 07:00  --------------------------------------------------------  IN: 0 mL / OUT: 1025 mL / NET: -1025 mL    20 Oct 2018 07:01  -  20 Oct 2018 20:15  --------------------------------------------------------  IN: 330 mL / OUT: 0 mL / NET: 330 mL        PAST MEDICAL & SURGICAL HISTORY:  CHF (congestive heart failure)  Breast tumor: removal benign bl breasts  Anal cancer  COPD (chronic obstructive pulmonary disease)  Lung disease: COPD  Hypertension  H/O breast surgery: 39 yrs. ago, bilateral  History of back surgery: 10 yrs. ago stimulator implant 2001, 2011      SOCIAL HISTORY  Alcohol:  Tobacco:  Illicit substance use:      FAMILY HISTORY:      LABS:                        8.5    10.02 )-----------( 156      ( 20 Oct 2018 10:34 )             25.6     10-20    134<L>  |  95<L>  |  24<H>  ----------------------------<  166<H>  5.3<H>   |  28  |  1.1    Ca    7.8<L>      20 Oct 2018 10:34  Mg     2.2     10-19    TPro  6.3  /  Alb  3.8  /  TBili  0.8  /  DBili  x   /  AST  11  /  ALT  8   /  AlkPhos  97  10-19    PT/INR - ( 20 Oct 2018 10:34 )   PT: 12.40 sec;   INR: 1.08 ratio         PTT - ( 20 Oct 2018 10:34 )  PTT:29.7 sec    CAPILLARY BLOOD GLUCOSE                MEDICATIONS  (STANDING):  ALBUTerol    90 MICROgram(s) HFA Inhaler 1 Puff(s) Inhalation every 4 hours  ALBUTerol/ipratropium for Nebulization 3 milliLiter(s) Nebulizer every 6 hours  amLODIPine   Tablet 5 milliGRAM(s) Oral daily  ampicillin/sulbactam  IVPB      ampicillin/sulbactam  IVPB 3 Gram(s) IV Intermittent every 6 hours  aspirin  chewable 81 milliGRAM(s) Oral daily  atorvastatin 20 milliGRAM(s) Oral at bedtime  buDESOnide 160 MICROgram(s)/formoterol 4.5 MICROgram(s) Inhaler 2 Puff(s) Inhalation two times a day  buDESOnide 160 MICROgram(s)/formoterol 4.5 MICROgram(s) Inhaler 2 Puff(s) Inhalation two times a day  metoprolol succinate ER 50 milliGRAM(s) Oral daily    MEDICATIONS  (PRN):  bisacodyl Suppository 10 milliGRAM(s) Rectal daily PRN Constipation  hydrocodone-acetaminophen 10/325 10 milliGRAM(s) 10 milliGRAM(s) Oral every 4 hours PRN pain      REVIEW OF SYSTEMS:  CONSTITUTIONAL: No fever, weight loss, or fatigue  EYES: No eye pain, visual disturbances, or discharge  ENMT:  No difficulty hearing, tinnitus, vertigo; No sinus or throat pain  NECK: No pain or stiffness  RESPIRATORY: No cough, wheezing, chills or hemoptysis; No shortness of breath  CARDIOVASCULAR: No chest pain, palpitations, dizziness, or leg swelling  GASTROINTESTINAL: No abdominal or epigastric pain. No nausea, vomiting, or hematemesis; No diarrhea or constipation. No melena or hematochezia.  GENITOURINARY: No dysuria, frequency, hematuria, or incontinence  NEUROLOGICAL: No headaches, memory loss, loss of strength, numbness, or tremors  SKIN: No itching, burning, rashes, or lesions   LYMPH NODES: No enlarged glands  ENDOCRINE: No heat or cold intolerance; No hair loss  MUSCULOSKELETAL: No joint pain or swelling; No muscle, back, or extremity pain  PSYCHIATRIC: No depression, anxiety, mood swings, or difficulty sleeping  HEME/LYMPH: No easy bruising, or bleeding gums  ALLERY AND IMMUNOLOGIC: No hives or eczema    RADIOLOGY & ADDITIONAL TESTS:    Imaging Personally Reviewed:  [ ] YES  [ ] NO    Consultant(s) Notes Reviewed:  [ ] YES  [ ] NO    PHYSICAL EXAM:  GENERAL: NAD, well-groomed, well-developed  HEAD:  Atraumatic, Normocephalic  EYES: EOMI, PERRLA, conjunctiva and sclera clear  ENMT: No tonsillar erythema, exudates, or enlargement; Moist mucous membranes, Good dentition, No lesions  NECK: Supple, No JVD, Normal thyroid  NERVOUS SYSTEM:  Alert & Oriented X3, Good concentration; Motor Strength 5/5 B/L upper and lower extremities; DTRs 2+ intact and symmetric  CHEST/LUNG: Clear to percussion bilaterally; No rales, rhonchi, wheezing, or rubs  HEART: Regular rate and rhythm; No murmurs, rubs, or gallops  ABDOMEN: Soft, Nontender, Nondistended; Bowel sounds present  EXTREMITIES:  2+ Peripheral Pulses, No clubbing, cyanosis, or edema  LYMPH: No lymphadenopathy noted  SKIN: No rashes or lesions    Care Discussed with Consultants/Other Providers [ ] YES  [ ] NO Patient is a 83y old  Female who presents with a chief complaint of José Miguel Hematuria (20 Oct 2018 12:09)      REVIEW OF SYSTEMS: Total of twelve systems have been reviewed with patient and found to be negative unless mentioned in HPI        PAST MEDICAL & SURGICAL HISTORY:  CHF (congestive heart failure)  Breast tumor: removal benign bl breasts  Anal cancer  COPD (chronic obstructive pulmonary disease)  Lung disease: COPD  Hypertension  H/O breast surgery: 39 yrs. ago, bilateral  History of back surgery: 10 yrs. ago stimulator implant 2001, 2011        SOCIAL HISTORY  Alcohol: Does not drink   Tobacco: Does not smoke  Illicit substance use: None      FAMILY HISTORY: Non contributory to the present illness      ALLERGIES: Sulfa ( loose hair and headache )        T(C): 37.2 (10-20-18 @ 13:00), Max: 37.3 (10-20-18 @ 05:31)  HR: 84 (10-20-18 @ 13:00) (71 - 86)  BP: 115/50 (10-20-18 @ 13:00) (115/50 - 148/49)  RR: 18 (10-20-18 @ 13:00) (18 - 19)  SpO2: 98% (10-20-18 @ 03:06) (98% - 98%)  Wt(kg): --  I&O's Summary        PHYSICAL EXAM:  GENERAL: Not in distress  CHEST/LUNG: Air entry  bilaterally  HEART: s1 and s2 present   ABDOMEN: Nontender and Nondistended  : s/p removal of lynne catheter  EXTREMITIES: No pedal  edema  CNS: Awake and  alert          LABS:                        8.5    10.02 )-----------( 156      ( 20 Oct 2018 10:34 )             25.6         10-20    134<L>  |  95<L>  |  24<H>  ----------------------------<  166<H>  5.3<H>   |  28  |  1.1    Ca    7.8<L>      20 Oct 2018 10:34  Mg     2.2     10-19    TPro  6.3  /  Alb  3.8  /  TBili  0.8  /  DBili  x   /  AST  11  /  ALT  8   /  AlkPhos  97  10-19    PT/INR - ( 20 Oct 2018 10:34 )   PT: 12.40 sec;   INR: 1.08 ratio         PTT - ( 20 Oct 2018 10:34 )  PTT:29.7 sec      MEDICATIONS  (STANDING):  ALBUTerol    90 MICROgram(s) HFA Inhaler 1 Puff(s) Inhalation every 4 hours  ALBUTerol/ipratropium for Nebulization 3 milliLiter(s) Nebulizer every 6 hours  amLODIPine   Tablet 5 milliGRAM(s) Oral daily  ampicillin/sulbactam  IVPB      ampicillin/sulbactam  IVPB 3 Gram(s) IV Intermittent every 6 hours  aspirin  chewable 81 milliGRAM(s) Oral daily  atorvastatin 20 milliGRAM(s) Oral at bedtime  buDESOnide 160 MICROgram(s)/formoterol 4.5 MICROgram(s) Inhaler 2 Puff(s) Inhalation two times a day  buDESOnide 160 MICROgram(s)/formoterol 4.5 MICROgram(s) Inhaler 2 Puff(s) Inhalation two times a day  metoprolol succinate ER 50 milliGRAM(s) Oral daily    MEDICATIONS  (PRN):  bisacodyl Suppository 10 milliGRAM(s) Rectal daily PRN Constipation  hydrocodone-acetaminophen 10/325 10 milliGRAM(s) 10 milliGRAM(s) Oral every 4 hours PRN pain      RADIOLOGY & ADDITIONAL TESTS:    < from: CT Abdomen and Pelvis w/ IV Cont (10.18.18 @ 10:23) >  1. Enhancing 3.0 x 1.5 cm mass along the right urinary bladder posterior   wall with mild right sided hydronephrosis. Enhancement of the right   ureteral wall may reflect ascending infection versus malignancy.     2. Additional 7.1 x 3.8 cm soft tissue mass extending from the bladder   posteriorly to the region of the uterus.     3. Bibasilar nodular opacities measuring up to 1.0 cm, nonspecific.    4. Distended gallbladder with sludge/cholelithiasis and mild intrahepatic   biliary ductal dilatation, new.    5. New 0.8 cm enhancing nodule along the dependent gallbladder wall.   Findings may reflect adenomyomatosis.      < end of copied text >            MICROBIOLOGY DATA:    Urine Microscopic-Add On (NC) (10.17.18 @ 15:46)    Red Blood Cell - Urine: >50 /HPF    Culture - Urine (10.17.18 @ 15:46)    -  Ampicillin: S <=2 Predicts results to ampicillin/sulbactam, amoxacillin-clavulanate and  piperacillin-tazobactam.    -  Ciprofloxacin: S <=1    -  Levofloxacin: S 2    -  Nitrofurantoin: S <=32 Should not be used to treat pyelonephritis.    -  Tetra/Doxy: R >8    -  Vancomycin: S 1    Specimen Source: .Urine Clean Catch (Midstream)    Culture Results:   >100,000 CFU/ml Enterococcus faecalis    Organism Identification: Enterococcus faecalis    Organism: Enterococcus faecalis    Method Type: ANUM

## 2018-10-20 NOTE — PROGRESS NOTE ADULT - SUBJECTIVE AND OBJECTIVE BOX
[place] [facility] FOLLOW UP NOTE/COVERING DR PARSON  --------------------------------------------------------------------------------  24 hour events/subjective: Patient examined. Appears comfortable.    PAST HISTORY  --------------------------------------------------------------------------------  No significant changes to PMH, PSH, FHx, SHx, unless otherwise noted    ALLERGIES & MEDICATIONS  --------------------------------------------------------------------------------  Allergies    hair dyes (Hives)  sulfa drugs (Headache)    Standing Inpatient Medications  ALBUTerol    90 MICROgram(s) HFA Inhaler 1 Puff(s) Inhalation every 4 hours  ALBUTerol/ipratropium for Nebulization 3 milliLiter(s) Nebulizer every 6 hours  amLODIPine   Tablet 5 milliGRAM(s) Oral daily  ampicillin/sulbactam  IVPB      ampicillin/sulbactam  IVPB 3 Gram(s) IV Intermittent every 6 hours  aspirin  chewable 81 milliGRAM(s) Oral daily  atorvastatin 20 milliGRAM(s) Oral at bedtime  buDESOnide 160 MICROgram(s)/formoterol 4.5 MICROgram(s) Inhaler 2 Puff(s) Inhalation two times a day  buDESOnide 160 MICROgram(s)/formoterol 4.5 MICROgram(s) Inhaler 2 Puff(s) Inhalation two times a day  metoprolol succinate ER 50 milliGRAM(s) Oral daily    PRN Inpatient Medications  bisacodyl Suppository 10 milliGRAM(s) Rectal daily PRN  hydrocodone-acetaminophen 10/325 10 milliGRAM(s) 10 milliGRAM(s) Oral every 4 hours PRN    VITALS/PHYSICAL EXAM  --------------------------------------------------------------------------------  T(C): 37.3 (10-20-18 @ 05:31), Max: 37.3 (10-20-18 @ 05:31)  HR: 71 (10-20-18 @ 05:31) (63 - 86)  BP: 129/36 (10-20-18 @ 05:31) (121/56 - 148/49)  RR: 18 (10-20-18 @ 05:31) (18 - 19)  SpO2: 98% (10-20-18 @ 03:06) (98% - 98%)  Height (cm): 160.02 (10-18-18 @ 21:02)    10-19-18 @ 07:01  -  10-20-18 @ 07:00  --------------------------------------------------------  IN: 0 mL / OUT: 1025 mL / NET: -1025 mL    10-20-18 @ 07:01  -  10-20-18 @ 10:22  --------------------------------------------------------  IN: 330 mL / OUT: 0 mL / NET: 330 mL    Physical Exam:  	Gen: NAD  	Pulm: CTA B/L  	CV: RRR, S1S2  	Abd: +BS, soft, nontender/nondistended  	: No suprapubic tenderness  	LE: Warm, no edema    LABS/STUDIES  --------------------------------------------------------------------------------              10.1   9.52  >-----------<  193      [10-19-18 @ 08:45]              29.7     132  |  96  |  25  ----------------------------<  112      [10-20-18 @ 01:05]  5.4   |  26  |  1.0        Ca     8.3     [10-20-18 @ 01:05]      Mg     2.2     [10-19-18 @ 08:45]    TPro  6.3  /  Alb  3.8  /  TBili  0.8  /  DBili  x   /  AST  11  /  ALT  8   /  AlkPhos  97  [10-19-18 @ 08:45]    Creatinine Trend:  SCr 1.0 [10-20 @ 01:05]  SCr 0.9 [10-19 @ 08:45]  SCr 0.9 [10-18 @ 08:06]  SCr 0.9 [10-18 @ 00:12]  SCr 1.0 [10-17 @ 15:46]    Urinalysis - [10-17-18 @ 15:46]      Color Red / Appearance Turbid / SG 1.015 / pH 6.0      Gluc Negative / Ketone Negative  / Bili Negative / Urobili 0.2       Blood Large / Protein >=300 / Leuk Est Trace / Nitrite Negative      RBC >50 / WBC  / Hyaline  / Gran  / Sq Epi  / Non Sq Epi  / Bacteria

## 2018-10-20 NOTE — CONSULT NOTE ADULT - ASSESSMENT
# UTI- Enterococcus faecalis   # Mild right hydronephrosis   # ? Bladder Mass  #  Distended gallbladder with sludge/cholelithiasis and mild intrahepatic  biliary ductal dilatation, new.    would recommend:   1. Abdominal sono and HIDA scan to rule out cholecystitis  2. Repeat Urine analysis   3. continue Unasyn until work  up is done    d/w Patient     will follow the patient with you and make further recommendation based on the clinical course and Lab results  Thank you for the opportunity to participate in Ms. SCHAFER's care

## 2018-10-20 NOTE — PROGRESS NOTE ADULT - SUBJECTIVE AND OBJECTIVE BOX
82 yo female with gross hematuria  no events noted over night  pt denies catheter discomfort    Vital Signs Last 24 Hrs  T(C): 37.3 (20 Oct 2018 05:31), Max: 37.3 (20 Oct 2018 05:31)  T(F): 99.2 (20 Oct 2018 05:31), Max: 99.2 (20 Oct 2018 05:31)  HR: 71 (20 Oct 2018 05:31) (63 - 86)  BP: 129/36 (20 Oct 2018 05:31) (121/56 - 148/49  RR: 18 (20 Oct 2018 05:31) (18 - 19)  SpO2: 98% (20 Oct 2018 03:06) (98% - 98%)    pt seen and examine darin bedside  a+ox3, nad, non toxic  abd - soft, nd, nttp, no guarding, no rebound tenderness  gu - +lynne, uirne cranbery juice color, no clots                          10.1   9.52  )-----------( 193      ( 19 Oct 2018 08:45 )             29.7     10-20    132<L>  |  96<L>  |  25<H>  ----------------------------<  112<H>  5.4<H>   |  26  |  1.0    Ca    8.3<L>      20 Oct 2018 01:05  Mg     2.2     10-19    TPro  6.3  /  Alb  3.8  /  TBili  0.8  /  DBili  x   /  AST  11  /  ALT  8   /  AlkPhos  97  10-19    Culture - Urine (10.17.18 @ 15:46)    Specimen Source: .Urine Clean Catch (Midstream)    Culture Results:   >100,000 CFU/ml Enterococcus faecalis    < from: CT Abdomen and Pelvis w/ IV Cont (10.18.18 @ 10:23) >  IMPRESSION:     1. Enhancing 3.0 x 1.5 cm mass along the right urinary bladder posterior   wall with mild right sided hydronephrosis. Enhancement of the right   ureteral wall may reflect ascending infection versus malignancy.     2. Additional 7.1 x 3.8 cm soft tissue mass extending from the bladder   posteriorly to the region of the uterus.     3. Bibasilar nodular opacities measuring up to 1.0 cm, nonspecific.    4. Distended gallbladder with sludge/cholelithiasis and mild intrahepatic   biliary ductal dilatation, new.    5. New 0.8 cm enhancing nodule along the dependent gallbladder wall.   Findings may reflect adenomyomatosis.        < end of copied text >

## 2018-10-21 LAB
ANION GAP SERPL CALC-SCNC: 10 MMOL/L — SIGNIFICANT CHANGE UP (ref 7–14)
BUN SERPL-MCNC: 29 MG/DL — HIGH (ref 10–20)
CALCIUM SERPL-MCNC: 7.6 MG/DL — LOW (ref 8.5–10.1)
CHLORIDE SERPL-SCNC: 100 MMOL/L — SIGNIFICANT CHANGE UP (ref 98–110)
CO2 SERPL-SCNC: 26 MMOL/L — SIGNIFICANT CHANGE UP (ref 17–32)
CREAT SERPL-MCNC: 1.2 MG/DL — SIGNIFICANT CHANGE UP (ref 0.7–1.5)
GLUCOSE SERPL-MCNC: 100 MG/DL — HIGH (ref 70–99)
HCT VFR BLD CALC: 23.8 % — LOW (ref 37–47)
HCT VFR BLD CALC: 24.4 % — LOW (ref 37–47)
HGB BLD-MCNC: 7.9 G/DL — LOW (ref 12–16)
HGB BLD-MCNC: 8.2 G/DL — LOW (ref 12–16)
MCHC RBC-ENTMCNC: 31.9 PG — HIGH (ref 27–31)
MCHC RBC-ENTMCNC: 32.2 PG — HIGH (ref 27–31)
MCHC RBC-ENTMCNC: 33.2 G/DL — SIGNIFICANT CHANGE UP (ref 32–37)
MCHC RBC-ENTMCNC: 33.6 G/DL — SIGNIFICANT CHANGE UP (ref 32–37)
MCV RBC AUTO: 95.7 FL — SIGNIFICANT CHANGE UP (ref 81–99)
MCV RBC AUTO: 96 FL — SIGNIFICANT CHANGE UP (ref 81–99)
NRBC # BLD: 0 /100 WBCS — SIGNIFICANT CHANGE UP (ref 0–0)
NRBC # BLD: 0 /100 WBCS — SIGNIFICANT CHANGE UP (ref 0–0)
PLATELET # BLD AUTO: 150 K/UL — SIGNIFICANT CHANGE UP (ref 130–400)
PLATELET # BLD AUTO: 150 K/UL — SIGNIFICANT CHANGE UP (ref 130–400)
POTASSIUM SERPL-MCNC: 5 MMOL/L — SIGNIFICANT CHANGE UP (ref 3.5–5)
POTASSIUM SERPL-SCNC: 5 MMOL/L — SIGNIFICANT CHANGE UP (ref 3.5–5)
RBC # BLD: 2.48 M/UL — LOW (ref 4.2–5.4)
RBC # BLD: 2.55 M/UL — LOW (ref 4.2–5.4)
RBC # FLD: 16.2 % — HIGH (ref 11.5–14.5)
RBC # FLD: 16.4 % — HIGH (ref 11.5–14.5)
SODIUM SERPL-SCNC: 136 MMOL/L — SIGNIFICANT CHANGE UP (ref 135–146)
WBC # BLD: 7.76 K/UL — SIGNIFICANT CHANGE UP (ref 4.8–10.8)
WBC # BLD: 9.71 K/UL — SIGNIFICANT CHANGE UP (ref 4.8–10.8)
WBC # FLD AUTO: 7.76 K/UL — SIGNIFICANT CHANGE UP (ref 4.8–10.8)
WBC # FLD AUTO: 9.71 K/UL — SIGNIFICANT CHANGE UP (ref 4.8–10.8)

## 2018-10-21 RX ADMIN — Medication 50 MILLIGRAM(S): at 06:09

## 2018-10-21 RX ADMIN — BUDESONIDE AND FORMOTEROL FUMARATE DIHYDRATE 2 PUFF(S): 160; 4.5 AEROSOL RESPIRATORY (INHALATION) at 21:18

## 2018-10-21 RX ADMIN — AMLODIPINE BESYLATE 5 MILLIGRAM(S): 2.5 TABLET ORAL at 06:09

## 2018-10-21 RX ADMIN — BUDESONIDE AND FORMOTEROL FUMARATE DIHYDRATE 2 PUFF(S): 160; 4.5 AEROSOL RESPIRATORY (INHALATION) at 11:57

## 2018-10-21 RX ADMIN — AMPICILLIN SODIUM AND SULBACTAM SODIUM 200 GRAM(S): 250; 125 INJECTION, POWDER, FOR SUSPENSION INTRAMUSCULAR; INTRAVENOUS at 19:05

## 2018-10-21 RX ADMIN — ATORVASTATIN CALCIUM 20 MILLIGRAM(S): 80 TABLET, FILM COATED ORAL at 21:13

## 2018-10-21 RX ADMIN — AMPICILLIN SODIUM AND SULBACTAM SODIUM 200 GRAM(S): 250; 125 INJECTION, POWDER, FOR SUSPENSION INTRAMUSCULAR; INTRAVENOUS at 06:10

## 2018-10-21 RX ADMIN — AMPICILLIN SODIUM AND SULBACTAM SODIUM 200 GRAM(S): 250; 125 INJECTION, POWDER, FOR SUSPENSION INTRAMUSCULAR; INTRAVENOUS at 11:58

## 2018-10-21 RX ADMIN — Medication 81 MILLIGRAM(S): at 11:57

## 2018-10-21 RX ADMIN — AMPICILLIN SODIUM AND SULBACTAM SODIUM 200 GRAM(S): 250; 125 INJECTION, POWDER, FOR SUSPENSION INTRAMUSCULAR; INTRAVENOUS at 00:21

## 2018-10-21 NOTE — PROGRESS NOTE ADULT - SUBJECTIVE AND OBJECTIVE BOX
82 yo F with a PMH of COPD ( previously has seen Dr. Donis once), HTN, HLD, and Chronic Back Pain with spinal stimulator, CHF (GIDD) brought in for lanie hematuria. Pt reports she noticed some blood in her urine for the past month, however within the last 2 days it has become much worse. Pt denies any trauma, abd pain, dysuria, fever, chills, vaginal bleeding or rectal bleeding.   Pt recently admitted for COPD and CHF exacerbation  On presentation /80 HR 66 T98.3. Hgb 6.4 (baseline 9), K 6.6 w/o EKG changes.  Pt received 2 unit PRBC in ED, Insulin, D50    Interval hx:  ct abd and pelvis with Iv contrast showed bright mass in bladder and mass in the uterus. As per urology patient requires additional ct urogram with and without iv contrast with delayed images as well to confirm enhancement. patient to follow up outpatient with urology for cystoscopy and TURBT. GYN onc consult placed, awaiting attending recs.     10/19/18  hospital day 2  patient noted to have persistent Hyperkalemia, more insulin and dextrose given, EKG ordered. Urine culture came back positive for prelim e faecalis, patient will be placed on antibiotics today. (unasyn 3 mg q6 for 14 days per stacey, senior)  urology requested stat cbc with differential at 5am Saturday morning, will sign out.    10/20/18  Hospital day 4  awaiting results of CT urogram with/without IV contrast. Urology to f/u. Id recommended repeat UA, which was ordered. ID to f/u afterward.   Narrow antibiotics per ID.    PAST MEDICAL & SURGICAL HISTORY  CHF (congestive heart failure)  Breast tumor: removal benign bl breasts  Anal cancer  COPD (chronic obstructive pulmonary disease)  Lung disease: COPD  Hypertension  H/O breast surgery: 39 yrs. ago, bilateral  History of back surgery: 10 yrs. ago stimulator implant 2001, 2011    SOCIAL HISTORY:  Negative for smoking/alcohol/drug use.     ALLERGIES:  hair dyes (Hives)  sulfa drugs (Headache)    MEDICATIONS:  STANDING MEDICATIONS  ALBUTerol    90 MICROgram(s) HFA Inhaler 1 Puff(s) Inhalation every 4 hours  ALBUTerol/ipratropium for Nebulization 3 milliLiter(s) Nebulizer every 6 hours  amLODIPine   Tablet 5 milliGRAM(s) Oral daily  ampicillin/sulbactam  IVPB      ampicillin/sulbactam  IVPB 3 Gram(s) IV Intermittent every 6 hours  aspirin  chewable 81 milliGRAM(s) Oral daily  atorvastatin 20 milliGRAM(s) Oral at bedtime  buDESOnide 160 MICROgram(s)/formoterol 4.5 MICROgram(s) Inhaler 2 Puff(s) Inhalation two times a day  buDESOnide 160 MICROgram(s)/formoterol 4.5 MICROgram(s) Inhaler 2 Puff(s) Inhalation two times a day  metoprolol succinate ER 50 milliGRAM(s) Oral daily    PRN MEDICATIONS  bisacodyl Suppository 10 milliGRAM(s) Rectal daily PRN  hydrocodone-acetaminophen 10/325 10 milliGRAM(s) 10 milliGRAM(s) Oral every 4 hours PRN    VITALS:   T(F): 98.2  HR: 72  BP: 104/50  RR: 18  SpO2: --    LABS:                        8.2    7.76  )-----------( 150      ( 21 Oct 2018 06:40 )             24.4     10-21    136  |  100  |  29<H>  ----------------------------<  100<H>  5.0   |  26  |  1.2    Ca    7.6<L>      21 Oct 2018 06:40      PT/INR - ( 20 Oct 2018 10:34 )   PT: 12.40 sec;   INR: 1.08 ratio         PTT - ( 20 Oct 2018 10:34 )  PTT:29.7 sec    RADIOLOGY:    PHYSICAL EXAM:  patient declined exam again today. 84 yo F with a PMH of COPD ( previously has seen Dr. Donis once), HTN, HLD, and Chronic Back Pain with spinal stimulator, CHF (GIDD) brought in for lanie hematuria. Pt reports she noticed some blood in her urine for the past month, however within the last 2 days it has become much worse. Pt denies any trauma, abd pain, dysuria, fever, chills, vaginal bleeding or rectal bleeding.   Pt recently admitted for COPD and CHF exacerbation  On presentation /80 HR 66 T98.3. Hgb 6.4 (baseline 9), K 6.6 w/o EKG changes.  Pt received 2 unit PRBC in ED, Insulin, D50    Interval hx:  ct abd and pelvis with Iv contrast showed bright mass in bladder and mass in the uterus. As per urology patient requires additional ct urogram with and without iv contrast with delayed images as well to confirm enhancement. patient to follow up outpatient with urology for cystoscopy and TURBT. GYN onc consult placed, awaiting attending recs.     10/19/18  hospital day 2  patient noted to have persistent Hyperkalemia, more insulin and dextrose given, EKG ordered. Urine culture came back positive for prelim e faecalis, patient will be placed on antibiotics today. (unasyn 3 mg q6 for 14 days per stacey, senior)  urology requested stat cbc with differential at 5am Saturday morning, will sign out.    10/20-21/18  Hospital day 3-4  awaiting results of CT urogram with/without IV contrast. Urology to f/u. Id recommended repeat UA, which was ordered. ID to f/u afterward.   Narrow antibiotics per ID. f/u transvaginal sonogram prior to discharge as per hemonc.     PAST MEDICAL & SURGICAL HISTORY  CHF (congestive heart failure)  Breast tumor: removal benign bl breasts  Anal cancer  COPD (chronic obstructive pulmonary disease)  Lung disease: COPD  Hypertension  H/O breast surgery: 39 yrs. ago, bilateral  History of back surgery: 10 yrs. ago stimulator implant 2001, 2011    SOCIAL HISTORY:  Negative for smoking/alcohol/drug use.     ALLERGIES:  hair dyes (Hives)  sulfa drugs (Headache)    MEDICATIONS:  STANDING MEDICATIONS  ALBUTerol    90 MICROgram(s) HFA Inhaler 1 Puff(s) Inhalation every 4 hours  ALBUTerol/ipratropium for Nebulization 3 milliLiter(s) Nebulizer every 6 hours  amLODIPine   Tablet 5 milliGRAM(s) Oral daily  ampicillin/sulbactam  IVPB      ampicillin/sulbactam  IVPB 3 Gram(s) IV Intermittent every 6 hours  aspirin  chewable 81 milliGRAM(s) Oral daily  atorvastatin 20 milliGRAM(s) Oral at bedtime  buDESOnide 160 MICROgram(s)/formoterol 4.5 MICROgram(s) Inhaler 2 Puff(s) Inhalation two times a day  buDESOnide 160 MICROgram(s)/formoterol 4.5 MICROgram(s) Inhaler 2 Puff(s) Inhalation two times a day  metoprolol succinate ER 50 milliGRAM(s) Oral daily    PRN MEDICATIONS  bisacodyl Suppository 10 milliGRAM(s) Rectal daily PRN  hydrocodone-acetaminophen 10/325 10 milliGRAM(s) 10 milliGRAM(s) Oral every 4 hours PRN    VITALS:   T(F): 98.2  HR: 72  BP: 104/50  RR: 18  SpO2: --    LABS:                        8.2    7.76  )-----------( 150      ( 21 Oct 2018 06:40 )             24.4     10-21    136  |  100  |  29<H>  ----------------------------<  100<H>  5.0   |  26  |  1.2    Ca    7.6<L>      21 Oct 2018 06:40      PT/INR - ( 20 Oct 2018 10:34 )   PT: 12.40 sec;   INR: 1.08 ratio         PTT - ( 20 Oct 2018 10:34 )  PTT:29.7 sec    RADIOLOGY:    PHYSICAL EXAM:  patient declined exam again today.

## 2018-10-21 NOTE — PROGRESS NOTE ADULT - ASSESSMENT
84 yo F with a PMH of COPD ( previously has seen Dr. Donis once), HTN, HLD, and Chronic Back Pain with spinal stimulator, CHF (GIDD) brought in for lanie hematuria    Lanie Hematuria r/o bladder cancer  hematuria improving per urology over the weekend, they removed lynne.  - Hgb 6.4 on admission, s/p 2 unit PRBC  - monitor CBC daily  - Active type and screen  - f/u Urine culture, e. faecalis.   -antibiotic adjustment per ID  - f/u CT urogram -performed, f/u results  - as per urology outpatient cystoscopy and TURBT    UTI:  -e faecalis  -unasyn started  -f/u ID for narrowing of antibiotics and possible oral regimen for discharge    mass like tissue extending from Uterus seen on first abdominal CT  - GYN consulted  - patient may follow up in outpatient clinic with GYN/ONC at [facility] ([phone]) AFTER cystoscopy by urology. If findings consistent with primary bladder malignancy then no need for follow with GYN/ONC.     Hyperkalemia recurrent/persisitent  -6.6 on admission, 5 today.  -kayexalate for >5.6  -monitor BMP    Hx of COPD  - Stable  - Cont Duaneb and Symbicort    Hx of CHF  - Cont Metoprolol and Aspirin  - unclear why pt on spirolactone alone. holding  -no fluid overload on admission, no vital sign derangement, patient refused exam this am.    Hx HTN  - Cont Amlodipine    hx of Severe back pain s/p spinal fusion  - Cont Home dose of Hydrocodone / Tylenol 10-325mg q4hr PRN    DVT PPX Hold given bleeding for now  DASH diet 84 yo F with a PMH of COPD ( previously has seen Dr. Donis once), HTN, HLD, and Chronic Back Pain with spinal stimulator, CHF (GIDD) brought in for lanie hematuria    Lanie Hematuria r/o bladder cancer  hematuria improving per urology over the weekend, they removed lynne.  - Hgb 6.4 on admission, s/p 2 unit PRBC  - monitor CBC daily  - Active type and screen  - f/u Urine culture, e. faecalis.   -antibiotic adjustment per ID  - f/u CT urogram -performed, f/u results  - as per urology outpatient cystoscopy and TURBT    UTI:  -e faecalis  -unasyn started  -f/u ID for narrowing of antibiotics and possible oral regimen for discharge    mass like tissue extending from Uterus seen on first abdominal CT  -f/u transvaginal sonogram prior to discharge  - GYN consulted  - patient may follow up in outpatient clinic with GYN/ONC at [facility] ([phone]) AFTER cystoscopy by urology. If findings consistent with primary bladder malignancy then no need for follow with GYN/ONC.     Hyperkalemia recurrent/persisitent  -6.6 on admission, 5 today.  -kayexalate for >5.6  -monitor BMP    Hx of COPD  - Stable  - Cont Duaneb and Symbicort    Hx of CHF  - Cont Metoprolol and Aspirin  - unclear why pt on spirolactone alone. holding  -no fluid overload on admission, no vital sign derangement, patient refused exam this am.    Hx HTN  - Cont Amlodipine    hx of Severe back pain s/p spinal fusion  - Cont Home dose of Hydrocodone / Tylenol 10-325mg q4hr PRN    DVT PPX Hold given bleeding for now  DASH diet

## 2018-10-21 NOTE — PROGRESS NOTE ADULT - SUBJECTIVE AND OBJECTIVE BOX
[facility] FOLLOW UP NOTE  --------------------------------------------------------------------------------  24 hour events/subjective: Patient examined. Appears comfortable.    PAST HISTORY  --------------------------------------------------------------------------------  No significant changes to PMH, PSH, FHx, SHx, unless otherwise noted    ALLERGIES & MEDICATIONS  --------------------------------------------------------------------------------  Allergies    hair dyes (Hives)  sulfa drugs (Headache)    Standing Inpatient Medications  ALBUTerol    90 MICROgram(s) HFA Inhaler 1 Puff(s) Inhalation every 4 hours  ALBUTerol/ipratropium for Nebulization 3 milliLiter(s) Nebulizer every 6 hours  amLODIPine   Tablet 5 milliGRAM(s) Oral daily  ampicillin/sulbactam  IVPB      ampicillin/sulbactam  IVPB 3 Gram(s) IV Intermittent every 6 hours  aspirin  chewable 81 milliGRAM(s) Oral daily  atorvastatin 20 milliGRAM(s) Oral at bedtime  buDESOnide 160 MICROgram(s)/formoterol 4.5 MICROgram(s) Inhaler 2 Puff(s) Inhalation two times a day  buDESOnide 160 MICROgram(s)/formoterol 4.5 MICROgram(s) Inhaler 2 Puff(s) Inhalation two times a day  metoprolol succinate ER 50 milliGRAM(s) Oral daily    PRN Inpatient Medications  bisacodyl Suppository 10 milliGRAM(s) Rectal daily PRN  hydrocodone-acetaminophen 10/325 10 milliGRAM(s) 10 milliGRAM(s) Oral every 4 hours PRN    VITALS/PHYSICAL EXAM  --------------------------------------------------------------------------------  T(C): 36.3 (10-21-18 @ 05:00), Max: 37.2 (10-20-18 @ 13:00)  HR: 72 (10-21-18 @ 05:00) (71 - 84)  BP: 156/59 (10-21-18 @ 05:00) (115/50 - 156/59)  RR: 19 (10-21-18 @ 05:00) (18 - 19)  Height (cm): 160.02 (10-20-18 @ 12:09)    10-20-18 @ 07:01  -  10-21-18 @ 07:00  --------------------------------------------------------  IN: 690 mL / OUT: 400 mL / NET: 290 mL    10-21-18 @ 07:01  -  10-21-18 @ 12:15  --------------------------------------------------------  IN: 310 mL / OUT: 400 mL / NET: -90 mL    Physical Exam:  	Gen: NAD  	Pulm: CTA B/L  	CV: RRR, S1S2  	Abd: +BS, soft, nontender/nondistended  	: No suprapubic tenderness  	LE: Warm, no edema    LABS/STUDIES  --------------------------------------------------------------------------------              8.2    7.76  >-----------<  150      [10-21-18 @ 06:40]              24.4     136  |  100  |  29  ----------------------------<  100      [10-21-18 @ 06:40]  5.0   |  26  |  1.2        Ca     7.6     [10-21-18 @ 06:40]      PT/INR: PT 12.40, INR 1.08       [10-20-18 @ 10:34]  PTT: 29.7       [10-20-18 @ 10:34]      Creatinine Trend:  SCr 1.2 [10-21 @ 06:40]  SCr 1.1 [10-20 @ 10:34]  SCr 1.0 [10-20 @ 01:05]  SCr 0.9 [10-19 @ 08:45]  SCr 0.9 [10-18 @ 08:06]    Urinalysis - [10-17-18 @ 15:46]      Color Red / Appearance Turbid / SG 1.015 / pH 6.0      Gluc Negative / Ketone Negative  / Bili Negative / Urobili 0.2       Blood Large / Protein >=300 / Leuk Est Trace / Nitrite Negative      RBC >50 / WBC  / Hyaline  / Gran  / Sq Epi  / Non Sq Epi  / Bacteria

## 2018-10-21 NOTE — PROGRESS NOTE ADULT - SUBJECTIVE AND OBJECTIVE BOX
Patient was seen and examined. Spoke with RN. Chart reviewed.  No events overnight.  Vital Signs Last 24 Hrs  T(F): 97.3 (21 Oct 2018 05:00), Max: 98.9 (20 Oct 2018 13:00)  HR: 72 (21 Oct 2018 05:00) (71 - 84)  BP: 156/59 (21 Oct 2018 05:00) (115/50 - 156/59)  SpO2: --  MEDICATIONS  (STANDING):  ALBUTerol    90 MICROgram(s) HFA Inhaler 1 Puff(s) Inhalation every 4 hours  ALBUTerol/ipratropium for Nebulization 3 milliLiter(s) Nebulizer every 6 hours  amLODIPine   Tablet 5 milliGRAM(s) Oral daily  ampicillin/sulbactam  IVPB      ampicillin/sulbactam  IVPB 3 Gram(s) IV Intermittent every 6 hours  aspirin  chewable 81 milliGRAM(s) Oral daily  atorvastatin 20 milliGRAM(s) Oral at bedtime  buDESOnide 160 MICROgram(s)/formoterol 4.5 MICROgram(s) Inhaler 2 Puff(s) Inhalation two times a day  buDESOnide 160 MICROgram(s)/formoterol 4.5 MICROgram(s) Inhaler 2 Puff(s) Inhalation two times a day  metoprolol succinate ER 50 milliGRAM(s) Oral daily    MEDICATIONS  (PRN):  bisacodyl Suppository 10 milliGRAM(s) Rectal daily PRN Constipation  hydrocodone-acetaminophen 10/325 10 milliGRAM(s) 10 milliGRAM(s) Oral every 4 hours PRN pain    Labs:                        8.2    7.76  )-----------( 150      ( 21 Oct 2018 06:40 )             24.4                         8.5    10.02 )-----------( 156      ( 20 Oct 2018 10:34 )             25.6     21 Oct 2018 06:40    136    |  100    |  29     ----------------------------<  100    5.0     |  26     |  1.2    20 Oct 2018 10:34    134    |  95     |  24     ----------------------------<  166    5.3     |  28     |  1.1      Ca    7.6        21 Oct 2018 06:40  Ca    7.8        20 Oct 2018 10:34      PT/INR - ( 20 Oct 2018 10:34 )   PT: 12.40 sec;   INR: 1.08 ratio         PTT - ( 20 Oct 2018 10:34 )  PTT:29.7 sec      General: comfortable, NAD  Neurology: A&Ox3, nonfocal  Head:  Normocephalic, atraumatic  ENT:  Mucosa moist, no ulcerations  Neck:  Supple, no JVD,   Skin: no breakdowns (as per RN)  Resp: CTA B/L  CV: RRR, S1S2,   GI: Soft, NT, bowel sounds  MS: No edema, + peripheral pulses, FROM all 4 extremity      A/P:  82 yo F with a PMH of COPD ( previously has seen Dr. Donis once), HTN, HLD, and Chronic Back Pain with spinal stimulator, CHF (GIDD) brought in for lanie hematuria    Lanie Hematuria r/o bladder cancer  - monitor CBC  - Active type and screen  - f/u Urine culture -prelim shows e. faecalis.   - antibiotics  - f/u CT urogram   - as per urology outpatient cystoscopy and TURBT      UTI:  -e faecalis  unasyn started  -f/u ID      DVT prophylaxis  Decubitus prevention- all measures as per RN protocol  Please call or text me with any questions or updates

## 2018-10-22 LAB
ANION GAP SERPL CALC-SCNC: 11 MMOL/L — SIGNIFICANT CHANGE UP (ref 7–14)
APPEARANCE UR: ABNORMAL
BACTERIA # UR AUTO: ABNORMAL /HPF
BASOPHILS # BLD AUTO: 0.03 K/UL — SIGNIFICANT CHANGE UP (ref 0–0.2)
BASOPHILS NFR BLD AUTO: 0.2 % — SIGNIFICANT CHANGE UP (ref 0–1)
BILIRUB UR-MCNC: ABNORMAL
BUN SERPL-MCNC: 30 MG/DL — HIGH (ref 10–20)
CALCIUM SERPL-MCNC: 7.8 MG/DL — LOW (ref 8.5–10.1)
CHLORIDE SERPL-SCNC: 96 MMOL/L — LOW (ref 98–110)
CO2 SERPL-SCNC: 25 MMOL/L — SIGNIFICANT CHANGE UP (ref 17–32)
COLOR SPEC: ABNORMAL
CREAT SERPL-MCNC: 1.1 MG/DL — SIGNIFICANT CHANGE UP (ref 0.7–1.5)
DIFF PNL FLD: ABNORMAL
EOSINOPHIL # BLD AUTO: 0.05 K/UL — SIGNIFICANT CHANGE UP (ref 0–0.7)
EOSINOPHIL NFR BLD AUTO: 0.4 % — SIGNIFICANT CHANGE UP (ref 0–8)
EPI CELLS # UR: ABNORMAL /HPF
GLUCOSE SERPL-MCNC: 110 MG/DL — HIGH (ref 70–99)
GLUCOSE UR QL: NEGATIVE MG/DL — SIGNIFICANT CHANGE UP
HCT VFR BLD CALC: 23.5 % — LOW (ref 37–47)
HCT VFR BLD CALC: 26.2 % — LOW (ref 37–47)
HGB BLD-MCNC: 7.8 G/DL — LOW (ref 12–16)
HGB BLD-MCNC: 8.5 G/DL — LOW (ref 12–16)
IMM GRANULOCYTES NFR BLD AUTO: 0.6 % — HIGH (ref 0.1–0.3)
KETONES UR-MCNC: 40
LEUKOCYTE ESTERASE UR-ACNC: ABNORMAL
LYMPHOCYTES # BLD AUTO: 0.46 K/UL — LOW (ref 1.2–3.4)
LYMPHOCYTES # BLD AUTO: 3.2 % — LOW (ref 20.5–51.1)
MCHC RBC-ENTMCNC: 31.6 PG — HIGH (ref 27–31)
MCHC RBC-ENTMCNC: 32 PG — HIGH (ref 27–31)
MCHC RBC-ENTMCNC: 32.4 G/DL — SIGNIFICANT CHANGE UP (ref 32–37)
MCHC RBC-ENTMCNC: 33.2 G/DL — SIGNIFICANT CHANGE UP (ref 32–37)
MCV RBC AUTO: 96.3 FL — SIGNIFICANT CHANGE UP (ref 81–99)
MCV RBC AUTO: 97.4 FL — SIGNIFICANT CHANGE UP (ref 81–99)
MONOCYTES # BLD AUTO: 0.85 K/UL — HIGH (ref 0.1–0.6)
MONOCYTES NFR BLD AUTO: 6 % — SIGNIFICANT CHANGE UP (ref 1.7–9.3)
NEUTROPHILS # BLD AUTO: 12.69 K/UL — HIGH (ref 1.4–6.5)
NEUTROPHILS NFR BLD AUTO: 89.6 % — HIGH (ref 42.2–75.2)
NITRITE UR-MCNC: POSITIVE
NRBC # BLD: 0 /100 WBCS — SIGNIFICANT CHANGE UP (ref 0–0)
PH UR: 5.5 — SIGNIFICANT CHANGE UP (ref 5–8)
PLATELET # BLD AUTO: 138 K/UL — SIGNIFICANT CHANGE UP (ref 130–400)
PLATELET # BLD AUTO: 166 K/UL — SIGNIFICANT CHANGE UP (ref 130–400)
POTASSIUM SERPL-MCNC: 4.6 MMOL/L — SIGNIFICANT CHANGE UP (ref 3.5–5)
POTASSIUM SERPL-SCNC: 4.6 MMOL/L — SIGNIFICANT CHANGE UP (ref 3.5–5)
PROT UR-MCNC: >=300 MG/DL
RBC # BLD: 2.44 M/UL — LOW (ref 4.2–5.4)
RBC # BLD: 2.69 M/UL — LOW (ref 4.2–5.4)
RBC # FLD: 16.2 % — HIGH (ref 11.5–14.5)
RBC # FLD: 16.3 % — HIGH (ref 11.5–14.5)
RBC CASTS # UR COMP ASSIST: >50 /HPF
SODIUM SERPL-SCNC: 132 MMOL/L — LOW (ref 135–146)
SP GR SPEC: 1.02 — SIGNIFICANT CHANGE UP (ref 1.01–1.03)
UROBILINOGEN FLD QL: 1 MG/DL (ref 0.2–0.2)
WBC # BLD: 14.16 K/UL — HIGH (ref 4.8–10.8)
WBC # BLD: 9.89 K/UL — SIGNIFICANT CHANGE UP (ref 4.8–10.8)
WBC # FLD AUTO: 14.16 K/UL — HIGH (ref 4.8–10.8)
WBC # FLD AUTO: 9.89 K/UL — SIGNIFICANT CHANGE UP (ref 4.8–10.8)
WBC UR QL: ABNORMAL /HPF

## 2018-10-22 PROCEDURE — 99232 SBSQ HOSP IP/OBS MODERATE 35: CPT

## 2018-10-22 RX ADMIN — Medication 3 MILLILITER(S): at 07:49

## 2018-10-22 RX ADMIN — AMLODIPINE BESYLATE 5 MILLIGRAM(S): 2.5 TABLET ORAL at 05:11

## 2018-10-22 RX ADMIN — AMPICILLIN SODIUM AND SULBACTAM SODIUM 200 GRAM(S): 250; 125 INJECTION, POWDER, FOR SUSPENSION INTRAMUSCULAR; INTRAVENOUS at 17:42

## 2018-10-22 RX ADMIN — BUDESONIDE AND FORMOTEROL FUMARATE DIHYDRATE 2 PUFF(S): 160; 4.5 AEROSOL RESPIRATORY (INHALATION) at 08:17

## 2018-10-22 RX ADMIN — Medication 3 MILLILITER(S): at 13:53

## 2018-10-22 RX ADMIN — Medication 50 MILLIGRAM(S): at 05:11

## 2018-10-22 RX ADMIN — Medication 81 MILLIGRAM(S): at 12:52

## 2018-10-22 RX ADMIN — AMPICILLIN SODIUM AND SULBACTAM SODIUM 200 GRAM(S): 250; 125 INJECTION, POWDER, FOR SUSPENSION INTRAMUSCULAR; INTRAVENOUS at 00:33

## 2018-10-22 RX ADMIN — AMPICILLIN SODIUM AND SULBACTAM SODIUM 200 GRAM(S): 250; 125 INJECTION, POWDER, FOR SUSPENSION INTRAMUSCULAR; INTRAVENOUS at 05:11

## 2018-10-22 RX ADMIN — AMPICILLIN SODIUM AND SULBACTAM SODIUM 200 GRAM(S): 250; 125 INJECTION, POWDER, FOR SUSPENSION INTRAMUSCULAR; INTRAVENOUS at 23:08

## 2018-10-22 RX ADMIN — BUDESONIDE AND FORMOTEROL FUMARATE DIHYDRATE 2 PUFF(S): 160; 4.5 AEROSOL RESPIRATORY (INHALATION) at 20:08

## 2018-10-22 RX ADMIN — ATORVASTATIN CALCIUM 20 MILLIGRAM(S): 80 TABLET, FILM COATED ORAL at 21:57

## 2018-10-22 RX ADMIN — AMPICILLIN SODIUM AND SULBACTAM SODIUM 200 GRAM(S): 250; 125 INJECTION, POWDER, FOR SUSPENSION INTRAMUSCULAR; INTRAVENOUS at 12:52

## 2018-10-22 NOTE — CONSULT NOTE ADULT - ASSESSMENT
Hematuria secondary to .8 x 1.5 x 1.4 cm mass along the right superoposterior wall  of the urinary bladder with diffuse bladder wall thickening. suspicious for malignancy.   COPD ( previously has seen Dr. Donis once),   HTN,   HLD,   Chronic Back Pain with spinal stimulator,   CHF  recurrent hyperkalemia.  Pulmonary HTN    Adv  May hold aspirin for 5-7 days before if needed.   At this time DO NOT GIVE SPIRONOLACTONE since it causes hyperkalemia. apparently was on it at home.   patient can undergo bladder biopsy with low to moderate risk of perioperative CV complications from same  no antibiotic prophylaxis needed.   avoid fluid overload. Monitor perioperatively for arrhythmias.   Please recall us prn.     Please rcall us prn

## 2018-10-22 NOTE — CONSULT NOTE ADULT - SUBJECTIVE AND OBJECTIVE BOX
KOTA SCHAFER 83y (1935) Female    Daily Weight in k.2 (22 Oct 2018 05:00)    Patient is a 83y old  Female who presents with a chief complaint of José Miguel Hematuria (22 Oct 2018 17:05)    HPI:  84 yo F with a PMH of COPD ( previously has seen Dr. Donis once), HTN, HLD, and Chronic Back Pain with spinal stimulator, CHF (GIDD) brought in for josé miguel hematuria. Pt reports she noticed some blood in her urine for the past month, however within the last 2 days it has become much worse. Pt denies any trauma, abd pain, dysuria, fever, chills, vaginal bleeding or rectal bleeding.   Pt recently admitted for COPD and CHF exacerbation  On presentation /80 HR 66 T98.3. Hgb 6.4 (baseline 9), K 6.6 w/o EKG changes.  Pt received 2 unit PRBC in ED, Insulin, D50 (17 Oct 2018 23:24)      Cardiology addendum  above reviewed. pt interviewed. says she sees dr. Flannery for episode of heart failure she had few months ago. has chronic dyspnea due to copd.   no cp no palpitations no orthopnea or pnd. no leg edema. able to walk a block.    PAST MEDICAL & SURGICAL HISTORY:  CHF (congestive heart failure)  Breast tumor: removal benign bl breasts  Anal cancer  COPD (chronic obstructive pulmonary disease)  Lung disease: COPD  Hypertension  H/O breast surgery: 39 yrs. ago, bilateral  History of back surgery: 10 yrs. ago stimulator implant ,       FAMILY HISTORY:  No pertinent family history in first degree relatives      Home Medications:  amLODIPine 5 mg oral tablet: 1 tab(s) orally once a day (17 Oct 2018 23:37)  atorvastatin 20 mg oral tablet: 1 tab(s) orally once a day (at bedtime) (17 Oct 2018 23:37)  Calcium 500+D oral tablet, chewable: 1 tab(s) orally 2 times a day (17 Oct 2018 23:37)  Centrum Adults oral tablet: 1 tab(s) orally once a day (17 Oct 2018 23:37)  HYDROcodone 10 mg oral capsule, extended release: 1 cap(s) orally every 4 hours, As Needed (17 Oct 2018 23:39)  metoprolol succinate 50 mg oral tablet, extended release: 1 tab(s) orally once a day (17 Oct 2018 23:37)  ProAir HFA 90 mcg/inh inhalation aerosol: 2 puff(s) inhaled 4 times a day (17 Oct 2018 23:37)  spironolactone 25 mg oral tablet: 1 tab(s) orally 2 times a day (17 Oct 2018 23:37)      REVIEW OF SYSTEMS:    CONSTITUTIONAL: No weakness, fevers or chills  EYES/ENT: No visual changes;  No vertigo or throat pain   NECK: No pain or stiffness  RESPIRATORY: see HPI  CARDIOVASCULAR: see HPI  GASTROINTESTINAL: No abdominal or epigastric pain. No nausea, vomiting, or hematemesis; No diarrhea or constipation. No melena or hematochezia.  GENITOURINARY: ++ hematuria  NEUROLOGICAL: No numbness or weakness  SKIN: No itching, rashes    ON EXAM:  Vital Signs Last 24 Hrs  T(C): 35.7 (22 Oct 2018 12:26), Max: 37.3 (21 Oct 2018 20:11)  T(F): 96.3 (22 Oct 2018 12:26), Max: 99.2 (21 Oct 2018 20:11)  HR: 94 (22 Oct 2018 12:26) (70 - 94)  BP: 149/80 (22 Oct 2018 12:26) (124/83 - 149/80)    RR: 19 (22 Oct 2018 12:26) (18 - 19)  SpO2: 91% (22 Oct 2018 11:18) (91% - 91%)    GENERAL: NAD  SKIN: No rashes or lesions  HEAD:  Atraumatic, Normocephalic  EYES:  conjunctiva and sclera clear  ENMT: Moist mucous membranes  NECK: Supple, No JVD  CHEST/LUNG: decreased air entry b/l  HEART: S1, S2 appreciated. Rate and Rythm are regular.  ABDOMEN/GI: Soft, Nontender, Nondistended; Bowel sounds present  EXTREMITIES: No edema  NERVOUS SYSTEM:  Alert & Oriented X3, Good concentration    EKg sr vr 69/min qtc 441 ms. rbbb.     LABS:                       8.5    14.16 )-----------( 166      ( 22 Oct 2018 12:26 )             26.2       CBC Full  -  ( 22 Oct 2018 12:26 )  WBC Count : 14.16 K/uL  Hemoglobin : 8.5 g/dL  Hematocrit : 26.2 %  Platelet Count - Automated : 166 K/uL  Mean Cell Volume : 97.4 fL  Mean Cell Hemoglobin : 31.6 pg  Mean Cell Hemoglobin Concentration : 32.4 g/dL  Auto Neutrophil # : 12.69 K/uL  Auto Lymphocyte # : 0.46 K/uL  Auto Monocyte # : 0.85 K/uL  Auto Eosinophil # : 0.05 K/uL  Auto Basophil # : 0.03 K/uL  Auto Neutrophil % : 89.6 %  Auto Lymphocyte % : 3.2 %  Auto Monocyte % : 6.0 %  Auto Eosinophil % : 0.4 %  Auto Basophil % : 0.2 %      10-22    132<L>  |  96<L>  |  30<H>  ----------------------------<  110<H>  4.6   |  25  |  1.1    Ca    7.8<L>      22 Oct 2018 05:58        MEDICATIONS  (STANDING):  ALBUTerol    90 MICROgram(s) HFA Inhaler 1 Puff(s) Inhalation every 4 hours  ALBUTerol/ipratropium for Nebulization 3 milliLiter(s) Nebulizer every 6 hours  amLODIPine   Tablet 5 milliGRAM(s) Oral daily  ampicillin/sulbactam  IVPB      ampicillin/sulbactam  IVPB 3 Gram(s) IV Intermittent every 6 hours  aspirin  chewable 81 milliGRAM(s) Oral daily  atorvastatin 20 milliGRAM(s) Oral at bedtime  buDESOnide 160 MICROgram(s)/formoterol 4.5 MICROgram(s) Inhaler 2 Puff(s) Inhalation two times a day  buDESOnide 160 MICROgram(s)/formoterol 4.5 MICROgram(s) Inhaler 2 Puff(s) Inhalation two times a day  metoprolol succinate ER 50 milliGRAM(s) Oral daily    MEDICATIONS  (PRN):  bisacodyl Suppository 10 milliGRAM(s) Rectal daily PRN Constipation  hydrocodone-acetaminophen 10/325 10 milliGRAM(s) 10 milliGRAM(s) Oral every 4 hours PRN pain      10-22-18 @ 17:50

## 2018-10-22 NOTE — PROGRESS NOTE ADULT - SUBJECTIVE AND OBJECTIVE BOX
Patient was seen and examined. Spoke with RN. Chart reviewed.  recurrent hematuria,  awaiting custo/turbt  for cardio periop eval  discussed with ho    No events overnight.  Vital Signs Last 24 Hrs  T(F): 96.3 (22 Oct 2018 12:26), Max: 99.2 (21 Oct 2018 20:11)  HR: 94 (22 Oct 2018 12:26) (70 - 94)  BP: 149/80 (22 Oct 2018 12:26) (124/83 - 149/80)  SpO2: 91% (22 Oct 2018 11:18) (91% - 91%)  MEDICATIONS  (STANDING):  ALBUTerol    90 MICROgram(s) HFA Inhaler 1 Puff(s) Inhalation every 4 hours  ALBUTerol/ipratropium for Nebulization 3 milliLiter(s) Nebulizer every 6 hours  amLODIPine   Tablet 5 milliGRAM(s) Oral daily  ampicillin/sulbactam  IVPB      ampicillin/sulbactam  IVPB 3 Gram(s) IV Intermittent every 6 hours  aspirin  chewable 81 milliGRAM(s) Oral daily  atorvastatin 20 milliGRAM(s) Oral at bedtime  buDESOnide 160 MICROgram(s)/formoterol 4.5 MICROgram(s) Inhaler 2 Puff(s) Inhalation two times a day  buDESOnide 160 MICROgram(s)/formoterol 4.5 MICROgram(s) Inhaler 2 Puff(s) Inhalation two times a day  metoprolol succinate ER 50 milliGRAM(s) Oral daily    MEDICATIONS  (PRN):  bisacodyl Suppository 10 milliGRAM(s) Rectal daily PRN Constipation  hydrocodone-acetaminophen 10/325 10 milliGRAM(s) 10 milliGRAM(s) Oral every 4 hours PRN pain    Labs:                        8.5    14.16 )-----------( 166      ( 22 Oct 2018 12:26 )             26.2                         7.8    9.89  )-----------( 138      ( 22 Oct 2018 05:58 )             23.5     22 Oct 2018 05:58    132    |  96     |  30     ----------------------------<  110    4.6     |  25     |  1.1    21 Oct 2018 06:40    136    |  100    |  29     ----------------------------<  100    5.0     |  26     |  1.2      Ca    7.8        22 Oct 2018 05:58  Ca    7.6        21 Oct 2018 06:40              Radiology:    General: comfortable, NAD  Neurology: A&Ox3, nonfocal  Head:  Normocephalic, atraumatic  ENT:  Mucosa moist, no ulcerations  Neck:  Supple, no JVD,   Skin: no breakdowns (as per RN)  Resp: CTA B/L  CV: RRR, S1S2,   GI: Soft, NT, bowel sounds  MS: No edema, + peripheral pulses, FROM all 4 extremity

## 2018-10-22 NOTE — PROGRESS NOTE ADULT - SUBJECTIVE AND OBJECTIVE BOX
NEPHROLOGY FOLLOW UP NOTE    for cysto this week  K+ better  lynne out  no cp  / fever / leg swelling    PAST MEDICAL & SURGICAL HISTORY:  CHF (congestive heart failure)  Breast tumor: removal benign bl breasts  Anal cancer  COPD (chronic obstructive pulmonary disease)  Lung disease: COPD  Hypertension  H/O breast surgery: 39 yrs. ago, bilateral  History of back surgery: 10 yrs. ago stimulator implant ,     Allergies:  hair dyes (Hives)  sulfa drugs (Headache)    Home Medications Reviewed    SOCIAL HISTORY:  Denies ETOH,Smoking,   FAMILY HISTORY:  No pertinent family history in first degree relatives        REVIEW OF SYSTEMS:  All other review of systems is negative unless indicated above.    PHYSICAL EXAM:  NAD  awake and alert  moist mm  no jvd  decreased BS bl  rrr  soft, + LLQ tenderness  no edema    Hospital Medications:   MEDICATIONS  (STANDING):  ALBUTerol    90 MICROgram(s) HFA Inhaler 1 Puff(s) Inhalation every 4 hours  ALBUTerol/ipratropium for Nebulization 3 milliLiter(s) Nebulizer every 6 hours  amLODIPine   Tablet 5 milliGRAM(s) Oral daily  ampicillin/sulbactam  IVPB      ampicillin/sulbactam  IVPB 3 Gram(s) IV Intermittent every 6 hours  aspirin  chewable 81 milliGRAM(s) Oral daily  atorvastatin 20 milliGRAM(s) Oral at bedtime  buDESOnide 160 MICROgram(s)/formoterol 4.5 MICROgram(s) Inhaler 2 Puff(s) Inhalation two times a day  buDESOnide 160 MICROgram(s)/formoterol 4.5 MICROgram(s) Inhaler 2 Puff(s) Inhalation two times a day  metoprolol succinate ER 50 milliGRAM(s) Oral daily        VITALS:  T(F): 96.3 (10-22-18 @ 12:26), Max: 99.2 (10-21-18 @ 20:11)  HR: 94 (10-22-18 @ 12:26)  BP: 149/80 (10-22-18 @ 12:26)  RR: 19 (10-22-18 @ 12:26)  SpO2: 91% (10-22-18 @ 11:18)  Wt(kg): --    10-20 @ 07:01  -  10-21 @ 07:00  --------------------------------------------------------  IN: 690 mL / OUT: 400 mL / NET: 290 mL    10-21 @ 07:01  -  10-22 @ 07:00  --------------------------------------------------------  IN: 540 mL / OUT: 650 mL / NET: -110 mL          LABS:  10-22    132<L>  |  96<L>  |  30<H>  ----------------------------<  110<H>  4.6   |  25  |  1.1    Ca    7.8<L>      22 Oct 2018 05:58                            8.5    14.16 )-----------( 166      ( 22 Oct 2018 12:26 )             26.2       Urine Studies:  Urinalysis Basic - ( 17 Oct 2018 15:46 )    Color: Red / Appearance: Turbid / S.015 / pH:   Gluc:  / Ketone: Negative  / Bili: Negative / Urobili: 0.2 mg/dL   Blood:  / Protein: >=300 mg/dL / Nitrite: Negative   Leuk Esterase: Trace / RBC: >50 /HPF / WBC    Sq Epi:  / Non Sq Epi:  / Bacteria:           RADIOLOGY & ADDITIONAL STUDIES:          RADIOLOGY & ADDITIONAL STUDIES:

## 2018-10-22 NOTE — PROGRESS NOTE ADULT - SUBJECTIVE AND OBJECTIVE BOX
SUBJECTIVE:    Patient is a 83y old Female who presents with a chief complaint of José Miguel Hematuria (22 Oct 2018 16:40)    Currently admitted to medicine with the primary diagnosis of Anemia     Today is hospital day 5d. This morning she is resting comfortably in bed. Patient had another bleeding episode this morning. Awaiting bladder mass biopsy on Wednesday.     PAST MEDICAL & SURGICAL HISTORY  CHF (congestive heart failure)  Breast tumor: removal benign bl breasts  Anal cancer  COPD (chronic obstructive pulmonary disease)  Lung disease: COPD  Hypertension  H/O breast surgery: 39 yrs. ago, bilateral  History of back surgery: 10 yrs. ago stimulator implant 2001, 2011    SOCIAL HISTORY:  Negative for smoking/alcohol/drug use.     ALLERGIES:  hair dyes (Hives)  sulfa drugs (Headache)    MEDICATIONS:  STANDING MEDICATIONS  ALBUTerol    90 MICROgram(s) HFA Inhaler 1 Puff(s) Inhalation every 4 hours  ALBUTerol/ipratropium for Nebulization 3 milliLiter(s) Nebulizer every 6 hours  amLODIPine   Tablet 5 milliGRAM(s) Oral daily  ampicillin/sulbactam  IVPB      ampicillin/sulbactam  IVPB 3 Gram(s) IV Intermittent every 6 hours  aspirin  chewable 81 milliGRAM(s) Oral daily  atorvastatin 20 milliGRAM(s) Oral at bedtime  buDESOnide 160 MICROgram(s)/formoterol 4.5 MICROgram(s) Inhaler 2 Puff(s) Inhalation two times a day  buDESOnide 160 MICROgram(s)/formoterol 4.5 MICROgram(s) Inhaler 2 Puff(s) Inhalation two times a day  metoprolol succinate ER 50 milliGRAM(s) Oral daily    PRN MEDICATIONS  bisacodyl Suppository 10 milliGRAM(s) Rectal daily PRN  hydrocodone-acetaminophen 10/325 10 milliGRAM(s) 10 milliGRAM(s) Oral every 4 hours PRN    VITALS:   T(F): 96.3  HR: 94  BP: 149/80  RR: 19  SpO2: 91%    LABS:                        8.5    14.16 )-----------( 166      ( 22 Oct 2018 12:26 )             26.2     10-22    132<L>  |  96<L>  |  30<H>  ----------------------------<  110<H>  4.6   |  25  |  1.1    Ca    7.8<L>      22 Oct 2018 05:58          RADIOLOGY:    < from: CT Abdomen and Pelvis w/wo IV Cont (10.19.18 @ 18:56) >  IMPRESSION:    Again seen is a 3.8 x 1.5 x 1.4 cm mass along the right superoposterior   wall of the urinary bladder with diffuse bladder wall thickening. There   is apparent enhancement of this mass, but limited in evaluation as the   bladder is collapsed. Findings are suspicious for malignancy.    No change in mild right hydronephrosis    Previously noted 7 x 4 cm soft tissue mass posterior to the urinary   bladder is felt to likely represent uterus. There is a large left adnexal   lesion measuring 4.0 x 2 5 cm, stable since prior exam. Continued   interval follow-up is recommended    Indeterminate left lower pole renal lesion measuring 1.8 x 1.8 cm, not   well-seen on prior examination due to differences in technique. However,   this is new since December 15, 2015 and a neoplastic process is not   excluded.    Stable biliary duct dilatation with distended gallbladder containing   sludge and/or stones. Further evaluation with right quadrant ultrasound    Spinal stimulator is present; preventing further workup with MRI    < end of copied text >      PHYSICAL EXAM:  GEN: No acute distress  LUNGS: Decreased sounds B/L  HEART: Regular  ABD: Soft, non-tender, non-distended.  EXT: NC/NC/NE/2+PP/SARAVIA/Skin Intact  NEURO: [id number]    Intravenous access: PIV

## 2018-10-22 NOTE — PROGRESS NOTE ADULT - SUBJECTIVE AND OBJECTIVE BOX
83y old Female with acute blood loss anemia 2* gross hematuria found to have posterior bladder mass. Patient seen and examined at bedside. No events overnight. Pt currently without Rowe catheter, no issues voiding.    VS: T(F): 97, Max: 99.2 (10-21-18 @ 20:11)  HR: 70 (70 - 83)  BP: 124/83 (104/50 - 139/49)  RR: 19  GEN: Awake, alert, NAD  ABD: soft, NT/ND, non palpable bladder, no suprapubic TTP    LABS/IMAGIN.8    9.89  )-----------( 138      ( 22 Oct 2018 05:58 )             23.5     Hemoglobin: 7.9 g/dL (10.21.18 @ 18:43)  Hematocrit: 23.8 % (10.21.18 @ 18:43)    Hemoglobin: 8.5 g/dL (10.20.18 @ 10:34)  Hematocrit: 25.6 % (10.20.18 @ 10:34)    10-22    132<L>  |  96<L>  |  30<H>  ----------------------------<  110<H>  4.6   |  25  |  1.1    Ca    7.8<L>      22 Oct 2018 05:58    PT/INR - ( 20 Oct 2018 10:34 )   PT: 12.40 sec;   INR: 1.08 ratio  PTT - ( 20 Oct 2018 10:34 )  PTT:29.7 sec    Culture - Urine (10.17.18 @ 15:46)    -  Ampicillin: S <=2 Predicts results to ampicillin/sulbactam, amoxacillin-clavulanate and  piperacillin-tazobactam.    -  Ciprofloxacin: S <=1    -  Levofloxacin: S 2    -  Nitrofurantoin: S <=32 Should not be used to treat pyelonephritis.    -  Tetra/Doxy: R >8    -  Vancomycin: S 1    Specimen Source: .Urine Clean Catch (Midstream)    Culture Results:   >100,000 CFU/ml Enterococcus faecalis    Organism Identification: Enterococcus faecalis    Organism: Enterococcus faecalis    Method Type: ANUM

## 2018-10-22 NOTE — PROGRESS NOTE ADULT - ASSESSMENT
84 yo F with a PMH of COPD ( previously has seen Dr. Donis once), HTN, HLD, and Chronic Back Pain with spinal stimulator, CHF (GIDD) brought in for lanie hematuria    Lanie Hematuria r/o bladder cancer  hematuria improving per urology over the weekend, they removed lynne - another bleeding episode today  - Hgb 6.4 on admission, s/p 2 unit PRBC  - monitor CBC daily and active T&S  - f/u Urine culture, e. faecalis.   -antibiotic adjustment per ID  - CT A/P - mass along superoposterior wall of urinary bladder  - Urology planning cysto/TURBT Tues or Wed - medical and cardiac clearance for Anesthesia    UTI:  -e faecalis  -unasyn started  -f/u ID for narrowing of antibiotics and possible oral regimen for discharge  - Repeat UCx    mass like tissue extending from Uterus seen on first abdominal CT - likely represents uterus (2nd CT)  -f/u transvaginal sonogram prior to discharge  - GYN consulted  - patient may follow up in outpatient clinic with GYN/ON[facility] at [facility] ([phone]) AFTER cystoscopy by urology. If findings consistent with primary bladder malignancy then no need for follow with GYN/ONC.     Hyperkalemia recurrent/persisitent  -6.6 on admission, 4.6 today  - Kayexalate for >5.6  -monitor BMP    Hx of COPD  - Stable  - Cont Duaneb and Symbicort    Hx of CHF  - Cont Metoprolol and Aspirin  - unclear why pt on spirolactone alone. holding  -no fluid overload on admission, no vital sign derangement, patient refused exam this am.    Hx HTN  - Cont Amlodipine    hx of Severe back pain s/p spinal fusion  - Cont Home dose of Hydrocodone / Tylenol 10-325mg q4hr PRN    DVT PPX Hold given bleeding for now  DASH diet

## 2018-10-22 NOTE — PROGRESS NOTE ADULT - ASSESSMENT
Tmax 99.2 with wbc 14.16    On ampicillin/sulbactam  IVPB 3 Gram(s) IV Intermittent every 6 hours (Pt with Ampi S E faecalis UTI)    Allergies  sulfa drugs (Headache)    Pt with hyponatremia, bladder mass    10/19 LOWER CHEST CT with no change in the right lower lobe mucus plugging   and bibasilar nodular densities. These are likely infectious in nature.    PLAN  Repeat wbc Tmax 99.2 with wbc 14.16    On ampicillin/sulbactam  IVPB 3 Gram(s) IV Intermittent every 6 hours (Pt with Ampi S E faecalis UTI)    Allergies  sulfa drugs (Headache)    Pt with hyponatremia, bladder mass    10/19 LOWER CHEST CT with no change in the right lower lobe mucus plugging   and bibasilar nodular densities. These are likely infectious in nature.    Pt without pulmonary symptomatology    PLAN  Repeat wbc  Continue Unasyn for now  Await bladder mass Bx

## 2018-10-22 NOTE — PROGRESS NOTE ADULT - ASSESSMENT
· Assessment		  82 yo F with a PMH of COPD ( previously has seen Dr. Donis once), HTN, HLD, and Chronic Back Pain with spinal stimulator, CHF (GIDD) brought in for lanie hematuria    Lanie Hematuria r/o bladder cancer  hematuria improving per urology over the weekend, they removed lynne.  - Hgb 6.4 on admission, s/p 2 unit PRBC  - monitor CBC daily  - Active type and screen  - f/u Urine culture, e. faecalis.   -antibiotic adjustment per ID  - f/u CT urogram -performed, f/u results  - as per urology outpatient cystoscopy and TURBT  cardio clearance    UTI:  -e faecalis  -unasyn started  -f/u ID for narrowing of antibiotics and possible oral regimen for discharge    mass like tissue extending from Uterus seen on first abdominal CT  -f/u transvaginal sonogram prior to discharge  - GYN consulted  - patient may follow up in outpatient clinic with GYN/ONC at [facility] ([phone]) AFTER cystoscopy by urology. If findings consistent with primary bladder malignancy then no need for follow with GYN/ONC.     Hyperkalemia recurrent/persisitent  -6.6 on admission, 5 today.  -kayexalate for >5.6  -monitor BMP    Hx of COPD  - Stable  - Cont Duaneb and Symbicort    Hx of CHF  - Cont Metoprolol and Aspirin  - unclear why pt on spirolactone alone. holding  -no fluid overload on admission, no vital sign derangement, patient refused exam this am.    Hx HTN  - Cont Amlodipine    hx of Severe back pain s/p spinal fusion  - Cont Home dose of Hydrocodone / Tylenol 10-325mg q4hr PRN    sequentials for dvt proph

## 2018-10-22 NOTE — PROGRESS NOTE ADULT - SUBJECTIVE AND OBJECTIVE BOX
infectious diseases progress note:  KOTA SCHAFER is a 83yFemale patient    ANEMIA HEMATURIA HYPERKALEMIA    Bladder mass      ROS:  CONSTITUTIONAL:  Negative fever or chills, feels well, good appetite  EYES:  Negative  blurry vision or double vision  CARDIOVASCULAR:  Negative for chest pain or palpitations  RESPIRATORY:  Negative for cough, wheezing, or SOB   GASTROINTESTINAL:  Negative for nausea, vomiting, diarrhea, constipation, or abdominal pain  GENITOURINARY:  Negative frequency, urgency or dysuria  NEUROLOGIC:  No headache, confusion, dizziness, lightheadedness    Allergies    hair dyes (Hives)  sulfa drugs (Headache)    Intolerances        ANTIBIOTICS/RELEVANT:  antimicrobials  ampicillin/sulbactam  IVPB      ampicillin/sulbactam  IVPB 3 Gram(s) IV Intermittent every 6 hours    immunologic:    OTHER:  ALBUTerol    90 MICROgram(s) HFA Inhaler 1 Puff(s) Inhalation every 4 hours  ALBUTerol/ipratropium for Nebulization 3 milliLiter(s) Nebulizer every 6 hours  amLODIPine   Tablet 5 milliGRAM(s) Oral daily  aspirin  chewable 81 milliGRAM(s) Oral daily  atorvastatin 20 milliGRAM(s) Oral at bedtime  bisacodyl Suppository 10 milliGRAM(s) Rectal daily PRN  buDESOnide 160 MICROgram(s)/formoterol 4.5 MICROgram(s) Inhaler 2 Puff(s) Inhalation two times a day  buDESOnide 160 MICROgram(s)/formoterol 4.5 MICROgram(s) Inhaler 2 Puff(s) Inhalation two times a day  hydrocodone-acetaminophen 10/325 10 milliGRAM(s) 10 milliGRAM(s) Oral every 4 hours PRN  metoprolol succinate ER 50 milliGRAM(s) Oral daily      Objective:  T(F): 96.3 (10-22-18 @ 12:26), Max: 99.2 (10-21-18 @ 20:11)  HR: 94 (10-22-18 @ 12:26) (70 - 94)  BP: 149/80 (10-22-18 @ 12:26) (124/83 - 149/80)  RR: 19 (10-22-18 @ 12:26) (18 - 19)  SpO2: 91% (10-22-18 @ 11:18) (91% - 91%)    PHYSICAL EXAM  Constitutional:Well-developed, well nourished  Eyes:ANDRÉS, EOMI  Ear/Nose/Throat: no oral lesion, no sinus tenderness on percussion	  Neck:no JVD, no lymphadenopathy, supple  Respiratory: CTA dalia  Cardiovascular: S1S2 RRR, no murmurs  Gastrointestinal:soft, (+) BS, no HSM  Extremities:no e/e/c    10-22    132<L>  |  96<L>  |  30<H>  ----------------------------<  110<H>  4.6   |  25  |  1.1                                8.5    14.16 )-----------( 166      ( 22 Oct 2018 12:26 )             26.2           Culture - Urine (collected 17 Oct 2018 15:46)  Source: .Urine Clean Catch (Midstream)  Final Report (20 Oct 2018 21:17):    >100,000 CFU/ml Enterococcus faecalis  Organism: Enterococcus faecalis (20 Oct 2018 21:17)  Organism: Enterococcus faecalis (20 Oct 2018 21:17)      -  Ampicillin: S <=2 Predicts results to ampicillin/sulbactam, amoxacillin-clavulanate and  piperacillin-tazobactam.      -  Ciprofloxacin: S <=1      -  Levofloxacin: S 2      -  Nitrofurantoin: S <=32 Should not be used to treat pyelonephritis.      -  Tetra/Doxy: R >8      -  Vancomycin: S 1      Method Type: ANUM

## 2018-10-22 NOTE — PROGRESS NOTE ADULT - ASSESSMENT
resolved hyperkalemia  ckd 2-3  gross hematuria  Bladder mass with mild right hydronephrosis  remote history of anal ca s/p chemo / RT  COPD  CHF  chronic back pain with spinal stimulator  enterococcus UTI    plan:     off aldactone  cysto / TURBT this week  f/u   cont unasyn

## 2018-10-22 NOTE — PROGRESS NOTE ADULT - ASSESSMENT
acute blood loss anemia 2* hematuria   3cm R posterior bladder mass  mild R hydronephrosis  Enterococcus  faecalis UTI    PLAN:  1.	Monitor H/H, transfuse prn  2.	Med/cardiac clearance for cysto/TURBT -- will possibly be done Tues 10/23 or Wed 10/24  3.	Cont abx for UTI  4.	Please send repeat urine cx 83y old Female with:  ·	acute blood loss anemia 2* hematuria  ·	3cm R posterior bladder mass  ·	mild R hydronephrosis  ·	Enterococcus  faecalis UTI    PLAN:  1.	Monitor H/H, transfuse prn  2.	Please provide med/cardiac clearance for general anesthesia -- cysto/TURBT will possibly be done Tues 10/23 or Wed 10/24  3.	Cont abx for UTI  4.	Please send repeat urine cx    Pt seen and examined with Dr. Sanchez

## 2018-10-23 ENCOUNTER — TRANSCRIPTION ENCOUNTER (OUTPATIENT)
Age: 83
End: 2018-10-23

## 2018-10-23 LAB
ANION GAP SERPL CALC-SCNC: 10 MMOL/L — SIGNIFICANT CHANGE UP (ref 7–14)
BASOPHILS # BLD AUTO: 0.03 K/UL — SIGNIFICANT CHANGE UP (ref 0–0.2)
BASOPHILS NFR BLD AUTO: 0.3 % — SIGNIFICANT CHANGE UP (ref 0–1)
BLD GP AB SCN SERPL QL: SIGNIFICANT CHANGE UP
BUN SERPL-MCNC: 33 MG/DL — HIGH (ref 10–20)
CALCIUM SERPL-MCNC: 7.9 MG/DL — LOW (ref 8.5–10.1)
CHLORIDE SERPL-SCNC: 101 MMOL/L — SIGNIFICANT CHANGE UP (ref 98–110)
CO2 SERPL-SCNC: 27 MMOL/L — SIGNIFICANT CHANGE UP (ref 17–32)
CREAT SERPL-MCNC: 1.1 MG/DL — SIGNIFICANT CHANGE UP (ref 0.7–1.5)
EOSINOPHIL # BLD AUTO: 0.31 K/UL — SIGNIFICANT CHANGE UP (ref 0–0.7)
EOSINOPHIL NFR BLD AUTO: 3.3 % — SIGNIFICANT CHANGE UP (ref 0–8)
GLUCOSE SERPL-MCNC: 93 MG/DL — SIGNIFICANT CHANGE UP (ref 70–99)
HCT VFR BLD CALC: 22.2 % — LOW (ref 37–47)
HGB BLD-MCNC: 7.2 G/DL — CRITICAL LOW (ref 12–16)
IMM GRANULOCYTES NFR BLD AUTO: 0.4 % — HIGH (ref 0.1–0.3)
LYMPHOCYTES # BLD AUTO: 0.94 K/UL — LOW (ref 1.2–3.4)
LYMPHOCYTES # BLD AUTO: 10 % — LOW (ref 20.5–51.1)
MCHC RBC-ENTMCNC: 31.9 PG — HIGH (ref 27–31)
MCHC RBC-ENTMCNC: 32.4 G/DL — SIGNIFICANT CHANGE UP (ref 32–37)
MCV RBC AUTO: 98.2 FL — SIGNIFICANT CHANGE UP (ref 81–99)
MONOCYTES # BLD AUTO: 0.83 K/UL — HIGH (ref 0.1–0.6)
MONOCYTES NFR BLD AUTO: 8.8 % — SIGNIFICANT CHANGE UP (ref 1.7–9.3)
NEUTROPHILS # BLD AUTO: 7.28 K/UL — HIGH (ref 1.4–6.5)
NEUTROPHILS NFR BLD AUTO: 77.2 % — HIGH (ref 42.2–75.2)
NON-GYNECOLOGICAL CYTOLOGY STUDY: SIGNIFICANT CHANGE UP
NRBC # BLD: 0 /100 WBCS — SIGNIFICANT CHANGE UP (ref 0–0)
PLATELET # BLD AUTO: 127 K/UL — LOW (ref 130–400)
POTASSIUM SERPL-MCNC: 4.6 MMOL/L — SIGNIFICANT CHANGE UP (ref 3.5–5)
POTASSIUM SERPL-SCNC: 4.6 MMOL/L — SIGNIFICANT CHANGE UP (ref 3.5–5)
RBC # BLD: 2.26 M/UL — LOW (ref 4.2–5.4)
RBC # FLD: 16.4 % — HIGH (ref 11.5–14.5)
SODIUM SERPL-SCNC: 138 MMOL/L — SIGNIFICANT CHANGE UP (ref 135–146)
TYPE + AB SCN PNL BLD: SIGNIFICANT CHANGE UP
WBC # BLD: 9.43 K/UL — SIGNIFICANT CHANGE UP (ref 4.8–10.8)
WBC # FLD AUTO: 9.43 K/UL — SIGNIFICANT CHANGE UP (ref 4.8–10.8)

## 2018-10-23 PROCEDURE — 99232 SBSQ HOSP IP/OBS MODERATE 35: CPT

## 2018-10-23 RX ORDER — ASPIRIN/CALCIUM CARB/MAGNESIUM 324 MG
1 TABLET ORAL
Qty: 0 | Refills: 0 | COMMUNITY
Start: 2018-10-23

## 2018-10-23 RX ORDER — AMPICILLIN SODIUM AND SULBACTAM SODIUM 250; 125 MG/ML; MG/ML
3 INJECTION, POWDER, FOR SUSPENSION INTRAMUSCULAR; INTRAVENOUS
Qty: 0 | Refills: 0 | COMMUNITY
Start: 2018-10-23

## 2018-10-23 RX ORDER — SPIRONOLACTONE 25 MG/1
1 TABLET, FILM COATED ORAL
Qty: 0 | Refills: 0 | COMMUNITY

## 2018-10-23 RX ORDER — HYDROCODONE BITARTRATE 50 MG/1
1 CAPSULE, EXTENDED RELEASE ORAL
Qty: 0 | Refills: 0 | COMMUNITY

## 2018-10-23 RX ORDER — IPRATROPIUM/ALBUTEROL SULFATE 18-103MCG
3 AEROSOL WITH ADAPTER (GRAM) INHALATION
Qty: 0 | Refills: 0 | COMMUNITY
Start: 2018-10-23

## 2018-10-23 RX ADMIN — AMPICILLIN SODIUM AND SULBACTAM SODIUM 200 GRAM(S): 250; 125 INJECTION, POWDER, FOR SUSPENSION INTRAMUSCULAR; INTRAVENOUS at 18:19

## 2018-10-23 RX ADMIN — Medication 3 MILLILITER(S): at 07:56

## 2018-10-23 RX ADMIN — Medication 81 MILLIGRAM(S): at 11:40

## 2018-10-23 RX ADMIN — Medication 3 MILLILITER(S): at 13:59

## 2018-10-23 RX ADMIN — AMPICILLIN SODIUM AND SULBACTAM SODIUM 200 GRAM(S): 250; 125 INJECTION, POWDER, FOR SUSPENSION INTRAMUSCULAR; INTRAVENOUS at 11:40

## 2018-10-23 RX ADMIN — ATORVASTATIN CALCIUM 20 MILLIGRAM(S): 80 TABLET, FILM COATED ORAL at 21:49

## 2018-10-23 RX ADMIN — Medication 50 MILLIGRAM(S): at 05:11

## 2018-10-23 RX ADMIN — AMLODIPINE BESYLATE 5 MILLIGRAM(S): 2.5 TABLET ORAL at 05:11

## 2018-10-23 RX ADMIN — AMPICILLIN SODIUM AND SULBACTAM SODIUM 200 GRAM(S): 250; 125 INJECTION, POWDER, FOR SUSPENSION INTRAMUSCULAR; INTRAVENOUS at 05:11

## 2018-10-23 NOTE — PROGRESS NOTE ADULT - SUBJECTIVE AND OBJECTIVE BOX
NEPHROLOGY FOLLOW UP NOTE    for cysto tomorrow  K+ better  lynne out  no cp  / fever / leg swelling  for prbc transf    PAST MEDICAL & SURGICAL HISTORY:  CHF (congestive heart failure)  Breast tumor: removal benign bl breasts  Anal cancer  COPD (chronic obstructive pulmonary disease)  Lung disease: COPD  Hypertension  H/O breast surgery: 39 yrs. ago, bilateral  History of back surgery: 10 yrs. ago stimulator implant ,     Allergies:  hair dyes (Hives)  sulfa drugs (Headache)    Home Medications Reviewed    SOCIAL HISTORY:  Denies ETOH,Smoking,   FAMILY HISTORY:  No pertinent family history in first degree relatives        REVIEW OF SYSTEMS:  All other review of systems is negative unless indicated above.    PHYSICAL EXAM:  NAD  awake and alert  moist mm  no jvd  decreased BS bl  rrr  soft, + LLQ tenderness  no edema    Hospital Medications:   MEDICATIONS  (STANDING):  ALBUTerol/ipratropium for Nebulization 3 milliLiter(s) Nebulizer every 6 hours  amLODIPine   Tablet 5 milliGRAM(s) Oral daily  ampicillin/sulbactam  IVPB      ampicillin/sulbactam  IVPB 3 Gram(s) IV Intermittent every 6 hours  aspirin  chewable 81 milliGRAM(s) Oral daily  atorvastatin 20 milliGRAM(s) Oral at bedtime  buDESOnide 160 MICROgram(s)/formoterol 4.5 MICROgram(s) Inhaler 2 Puff(s) Inhalation two times a day  metoprolol succinate ER 50 milliGRAM(s) Oral daily        VITALS:  T(F): 99.1 (10-23-18 @ 15:46), Max: 99.1 (10-23-18 @ 15:46)  HR: 75 (10-23-18 @ 15:46)  BP: 135/59 (10-23-18 @ 15:46)  RR: 18 (10-23-18 @ 15:46)  SpO2: 97% (10-23-18 @ 04:18)  Wt(kg): --    10-21 @ 07:01  -  10-22 @ 07:00  --------------------------------------------------------  IN: 540 mL / OUT: 650 mL / NET: -110 mL    10-22 @ 07:01  -  10-23 @ 07:00  --------------------------------------------------------  IN: 200 mL / OUT: 0 mL / NET: 200 mL          LABS:  10-23    138  |  101  |  33<H>  ----------------------------<  93  4.6   |  27  |  1.1    Ca    7.9<L>      23 Oct 2018 05:35                            7.2    9.43  )-----------( 127      ( 23 Oct 2018 05:35 )             22.2       Urine Studies:  Urinalysis Basic - ( 22 Oct 2018 21:10 )    Color: Red / Appearance: Turbid / S.025 / pH:   Gluc:  / Ketone: 40  / Bili: Large / Urobili: 1.0 mg/dL   Blood:  / Protein: >=300 mg/dL / Nitrite: Positive   Leuk Esterase: Large / RBC: >50 /HPF / WBC 10-25 /HPF   Sq Epi:  / Non Sq Epi: Occasional /HPF / Bacteria: Moderate /HPF          RADIOLOGY & ADDITIONAL STUDIES:

## 2018-10-23 NOTE — PROGRESS NOTE ADULT - ASSESSMENT
83y old Female with:  ·	acute blood loss anemia 2* hematuria  ·	3cm R posterior bladder mass  ·	mild R hydronephrosis  ·	Enterococcus  faecalis UTI  gu apprec  PLAN:  1.	Transfuse 2U PRBCs today  2.	Cardiac clearance noted  3.	Cont abx for UTI  4.	Repeat urine cx  5.	Pt needs to be transferred to [facility] this afternoon or tomorrow AM. She is scheduled for OR tomorrow 10/24 at the [facility] with Dr. Sanchez

## 2018-10-23 NOTE — PROGRESS NOTE ADULT - ASSESSMENT
resolved hyperkalemia  ckd 2-3  gross hematuria  Bladder mass with mild right hydronephrosis  remote history of anal ca s/p chemo / RT  COPD  CHF  chronic back pain with spinal stimulator  enterococcus UTI    plan:     off aldactone  cysto / TURB tomorrow  prbc transfusion  f/u   cont unasyn

## 2018-10-23 NOTE — DISCHARGE NOTE ADULT - PATIENT PORTAL LINK FT
You can access the [facility] Patient Portal, offered by [facility], by registering with the following website: [email or web address]

## 2018-10-23 NOTE — DISCHARGE NOTE ADULT - MEDICATION SUMMARY - MEDICATIONS TO TAKE
I will START or STAY ON the medications listed below when I get home from the hospital:    freetext medication  -  -- 10 milligram(s) by mouth every 4 hours, As needed, pain  -- Indication: For pain    aspirin 81 mg oral tablet, chewable  -- 1 tab(s) by mouth once a day  -- Indication: For Cardioprotective    atorvastatin 20 mg oral tablet  -- 1 tab(s) by mouth once a day (at bedtime)  -- Indication: For Hypertension    metoprolol succinate 50 mg oral tablet, extended release  -- 1 tab(s) by mouth once a day  -- Indication: For Hypertension    budesonide-formoterol 160 mcg-4.5 mcg/inh inhalation aerosol  -- 2 puff(s) inhaled 2 times a day   -- Indication: For COPD (chronic obstructive pulmonary disease)    ProAir HFA 90 mcg/inh inhalation aerosol  -- 2 puff(s) inhaled 4 times a day  -- Indication: For COPD (chronic obstructive pulmonary disease)    ipratropium-albuterol 0.5 mg-2.5 mg/3 mLinhalation solution  -- 3 milliliter(s) inhaled every 6 hours  -- Indication: For COPD (chronic obstructive pulmonary disease)    amLODIPine 5 mg oral tablet  -- 1 tab(s) by mouth once a day  -- Indication: For Hypertension    bisacodyl 10 mg rectal suppository  -- 1 suppository(ies) rectally once a day, As needed, Constipation  -- Indication: For Constipation    ampicillin-sulbactam  -- 3 gram(s) intravenous every 6 hours  -- Indication: For UTI    Centrum Adults oral tablet  -- 1 tab(s) by mouth once a day  -- Indication: For Supplement    Calcium 500+D oral tablet, chewable  -- 1 tab(s) by mouth 2 times a day  -- Indication: For Supplement

## 2018-10-23 NOTE — CONSULT NOTE ADULT - ASSESSMENT
IMPRESSION: Rehab for    PRECAUTIONS: [  ] Cardiac  [  ] Respiratory  [  ] Seizures [  ] Contact Isolation  [  ] Droplet Isolation  [ X ] Other:  No e-stim (has spinal stimulator)    Weight Bearing Status:  WBAT    RECOMMENDATION:    Needs orders for Out of Bed to Chair     DVT/Decubiti Prophylaxis    REHAB PLAN:     [  X ] Bedside P/T 3-5 times a week   [   ]   Bedside O/T  2-3 times a week             [   ] No Rehab Therapy Indicated                   [   ]  Speech Therapy   Conditioning/ROM                                    ADL  Bed Mobility                                               Conditioning/ROM  Transfers                                                     Bed Mobility  Sitting /Standing Balance                         Transfers                                        Gait Training                                               Sitting/Standing Balance  Stair Training [   ]Applicable                    Home equipment Eval                                                                        Splinting  [   ] Only      GOALS:   ADL   [   ]   Independent                    Transfers  [  X ] Independent                          Ambulation  [ X  ] Independent     [  X  ] With device                            [   ]  CG                                                         [   ]  CG                                                                  [   ] CG                            [    ] Min A                                                   [   ] Min A                                                              [   ] Min  A          DISCHARGE PLAN:   [   ]  Good candidate for Intensive Rehabilitation/Hospital based-[facility]A [facility]                                             Will tolerate 3hrs Intensive Rehab Daily                                       [   X ]  Short Term Rehab in Skilled Nursing Facility                                       [  X  ]  Home with Outpatient or VN services                                         [    ]  Possible Candidate for Intensive Hospital based Rehab      Thank you. IMPRESSION: Rehab for deconditoining    PRECAUTIONS: [  ] Cardiac  [  ] Respiratory  [  ] Seizures [  ] Contact Isolation  [  ] Droplet Isolation  [ X ] Other:  No e-stim (has spinal stimulator)    Weight Bearing Status:  WBAT    RECOMMENDATION:    Needs orders for Out of Bed to Chair     DVT/Decubiti Prophylaxis    REHAB PLAN:     [  X ] Bedside P/T 3-5 times a week   [   ]   Bedside O/T  2-3 times a week             [   ] No Rehab Therapy Indicated                   [   ]  Speech Therapy   Conditioning/ROM                                    ADL  Bed Mobility                                               Conditioning/ROM  Transfers                                                     Bed Mobility  Sitting /Standing Balance                         Transfers                                        Gait Training                                               Sitting/Standing Balance  Stair Training [   ]Applicable                    Home equipment Eval                                                                        Splinting  [   ] Only      GOALS:   ADL   [   ]   Independent                    Transfers  [  X ] Independent                          Ambulation  [ X  ] Independent     [  X  ] With device                            [   ]  CG                                                         [   ]  CG                                                                  [   ] CG                            [    ] Min A                                                   [   ] Min A                                                              [   ] Min  A          DISCHARGE PLAN:   [   ]  Good candidate for Intensive Rehabilitation/Hospital based-4A [facility]                                             Will tolerate 3hrs Intensive Rehab Daily                                       [   X ]  Short Term Rehab in Skilled Nursing Facility                                       [  X  ]  Home with Outpatient or VN services                                         [    ]  Possible Candidate for Intensive Hospital based Rehab      Thank you.

## 2018-10-23 NOTE — PROGRESS NOTE ADULT - SUBJECTIVE AND OBJECTIVE BOX
Patient was seen and examined. Spoke with RN. Chart reviewed.  comf, awaiting transfer to [facility] for or  case discussed with ho/ to receive 2u prbc    No events overnight.  Vital Signs Last 24 Hrs  T(F): 97.2 (23 Oct 2018 07:32), Max: 98.3 (22 Oct 2018 23:10)  HR: 66 (23 Oct 2018 07:32) (66 - 99)  BP: 129/57 (23 Oct 2018 07:32) (129/57 - 162/68)  SpO2: 97% (23 Oct 2018 04:18) (91% - 97%)  MEDICATIONS  (STANDING):  ALBUTerol    90 MICROgram(s) HFA Inhaler 1 Puff(s) Inhalation every 4 hours  ALBUTerol/ipratropium for Nebulization 3 milliLiter(s) Nebulizer every 6 hours  amLODIPine   Tablet 5 milliGRAM(s) Oral daily  ampicillin/sulbactam  IVPB      ampicillin/sulbactam  IVPB 3 Gram(s) IV Intermittent every 6 hours  aspirin  chewable 81 milliGRAM(s) Oral daily  atorvastatin 20 milliGRAM(s) Oral at bedtime  buDESOnide 160 MICROgram(s)/formoterol 4.5 MICROgram(s) Inhaler 2 Puff(s) Inhalation two times a day  buDESOnide 160 MICROgram(s)/formoterol 4.5 MICROgram(s) Inhaler 2 Puff(s) Inhalation two times a day  metoprolol succinate ER 50 milliGRAM(s) Oral daily    MEDICATIONS  (PRN):  bisacodyl Suppository 10 milliGRAM(s) Rectal daily PRN Constipation  hydrocodone-acetaminophen 10/325 10 milliGRAM(s) 10 milliGRAM(s) Oral every 4 hours PRN pain    Labs:                        7.2    9.43  )-----------( 127      ( 23 Oct 2018 05:35 )             22.2                         8.5    14.16 )-----------( 166      ( 22 Oct 2018 12:26 )             26.2     23 Oct 2018 05:35    138    |  101    |  33     ----------------------------<  93     4.6     |  27     |  1.1    22 Oct 2018 05:58    132    |  96     |  30     ----------------------------<  110    4.6     |  25     |  1.1      Ca    7.9        23 Oct 2018 05:35  Ca    7.8        22 Oct 2018 05:58        Urinalysis Basic - ( 22 Oct 2018 21:10 )    Color: Red / Appearance: Turbid / S.025 / pH: x  Gluc: x / Ketone: 40  / Bili: Large / Urobili: 1.0 mg/dL   Blood: x / Protein: >=300 mg/dL / Nitrite: Positive   Leuk Esterase: Large / RBC: >50 /HPF / WBC 10-25 /HPF   Sq Epi: x / Non Sq Epi: Occasional /HPF / Bacteria: Moderate /HPF          Radiology:    General: comfortable, NAD  Neurology: A&Ox3, nonfocal  Head:  Normocephalic, atraumatic  ENT:  Mucosa moist, no ulcerations  Neck:  Supple, no JVD,   Resp: CTA B/L  CV: RRR, S1S2,   GI: Soft, NT, bowel sounds  MS: No edema, + peripheral pulses, FROM all 4 extremity

## 2018-10-23 NOTE — PROGRESS NOTE ADULT - ASSESSMENT
83y old Female with:  ·	acute blood loss anemia 2* hematuria  ·	3cm R posterior bladder mass  ·	mild R hydronephrosis  ·	Enterococcus  faecalis UTI    PLAN:  1.	Transfuse 2U PRBCs today  2.	Cardiac clearance noted  3.	Cont abx for UTI  4.	Repeat urine cx  5.	Pt needs to be transferred to [facility] hospital this afternoon or tomorrow AM. She is scheduled for OR tomorrow 10/24 at the [facility] with Dr. Sanchez

## 2018-10-23 NOTE — DISCHARGE NOTE ADULT - CARE PROVIDERS DIRECT ADDRESSES
[email or web address]

## 2018-10-23 NOTE — DISCHARGE NOTE ADULT - CARE PROVIDER_API CALL
William Hooks), Urology  [address]  [address]  [place] [address]  Phone: [phone]  Fax: [phone]    José Miguel Gerber (), Infectious Disease; Internal Medicine  [address] [place] [id number]  Phone: [phone]  Fax: [phone]

## 2018-10-23 NOTE — DISCHARGE NOTE ADULT - HOSPITAL COURSE
84 yo F with a PMH of COPD ( previously has seen Dr. Donis once), HTN, HLD, and Chronic Back Pain with spinal stimulator, CHF (GIDD) brought in for lanie hematuria. Pt reports she noticed some blood in her urine for the past month, however within the last 2 days it has become much worse. Pt denies any trauma, abd pain, dysuria, fever, chills, vaginal bleeding or rectal bleeding.   Pt recently admitted for COPD and CHF exacerbation  On presentation /80 HR 66 T98.3. Hgb 6.4 (baseline 9), K 6.6 w/o EKG changes.  Pt received 2 unit PRBC in ED, Insulin, D50    < from: CT Abdomen and Pelvis w/wo IV Cont (10.19.18 @ 18:56) >  Again seen is a 3.8 x 1.5 x 1.4 cm mass along the right superoposterior   wall of the urinary bladder with diffuse bladder wall thickening. There   is apparent enhancement of this mass, but limited in evaluation as the   bladder is collapsed. Findings are suspicious for malignancy.    No change in mild right hydronephrosis    Previously noted 7 x 4 cm soft tissue mass posterior to the urinary   bladder is felt to likely represent uterus. There is a large left adnexal   lesion measuring 4.0 x 2 5 cm, stable since prior exam. Continued   interval follow-up is recommended    Indeterminate left lower pole renal lesion measuring 1.8 x 1.8 cm, not   well-seen on prior examination due to differences in technique. However,   this is new since December 15, 2015 and a neoplastic process is not   excluded.    Stable biliary duct dilatation with distended gallbladder containing   sludge and/or stones. Further evaluation with right quadrant ultrasound    Spinal stimulator is present; preventing further workup with MRI    ------------------------------ 82 yo F with a PMH of COPD ( previously has seen Dr. Donis once), HTN, HLD, and Chronic Back Pain with spinal stimulator, CHF (GIDD) brought in for lanie hematuria. Pt reports she noticed some blood in her urine for the past month, however within the last 2 days it has become much worse. Pt denies any trauma, abd pain, dysuria, fever, chills, vaginal bleeding or rectal bleeding.   Pt recently admitted for COPD and CHF exacerbation  On presentation /80 HR 66 T98.3. Hgb 6.4 (baseline 9), K 6.6 w/o EKG changes.  Pt received 2 unit PRBC in ED, Insulin, D50    < from: CT Abdomen and Pelvis w/wo IV Cont (10.19.18 @ 18:56) >  Again seen is a 3.8 x 1.5 x 1.4 cm mass along the right superoposterior   wall of the urinary bladder with diffuse bladder wall thickening. There   is apparent enhancement of this mass, but limited in evaluation as the   bladder is collapsed. Findings are suspicious for malignancy.    No change in mild right hydronephrosis    Previously noted 7 x 4 cm soft tissue mass posterior to the urinary   bladder is felt to likely represent uterus. There is a large left adnexal   lesion measuring 4.0 x 2 5 cm, stable since prior exam. Continued   interval follow-up is recommended    Indeterminate left lower pole renal lesion measuring 1.8 x 1.8 cm, not   well-seen on prior examination due to differences in technique. However,   this is new since December 15, 2015 and a neoplastic process is not   excluded.    Stable biliary duct dilatation with distended gallbladder containing   sludge and/or stones. Further evaluation with right quadrant ultrasound    Spinal stimulator is present; preventing further workup with MRI    ------------------------------    Per Nephro, patient is to be kept off the aldactone.    The patient is scheduled tentatively for cystoscopy/transurethral resection by Dr. Sanchez of bladder upon transfer to [facility].    Per Cardio, the patient can undergo bladder biopsy with low to moderate risk of perioperative CV complications.     The patient is to continue Unasyn until further evaluation by Infectious Disease.    The patient is scheduled for transfusion of 2 units prbcs 10/23 for Hb of 7.2 from 8.5, after which she can be transferred to [facility]. She is stable for transfer to [facility].

## 2018-10-23 NOTE — PROGRESS NOTE ADULT - ATTENDING COMMENTS
Patient seen and examined. Rowe draining well, HCT stable and pt not requiring transfusions.   also pt has UTI.  Recommending tx UTI, CTU and tomorrow we will likely perform tov.  Patient will require outpatient TURBT
Patient seen and examined. her HCT responded appropriately to 2u prbcs.  CT report shows --PELVIC ORGANS: Enhancing 3.0 x 1.5 cm mass along the right posterior wall of   the urinary bladder (series 5 image 335) with new masslike soft tissue   nodularity measuring 7.1 x 3.8 cm extending to the region of the uterus.   Diffuse bladder wall thickening    Ct visualized and I do see the bladder mass however this study was only WITH contrast. We need a CT urogram study (with and with contrast and delays) to confirm enhancement, i.e. this is not a clot in the bladder. please send urine cytology and consult gynecologic oncology (or just gyn) for ?mass like soft tissue in uterus.   Urology to continue to follow. If HCT is stable and lynne draining well tomorrow, we can plan for TOV and outpatient cystoscopy, transurethral resection bladder tumor
pt seen and examined independently, I have read and agree with above exam and poa,  pt with hematuria,   enterococus ucx+  ID eval  awaiting gyn onc  Iv abx  gu apprec  fu cbc  hyperkalemia, renal DR ngo  continue current mgmt  discussed with ho in detail
I spoke with the patient and family after introducing myself as a partner of Dr. Sanchez.   R/B of surgery (no good alternative) reviewed and what possible post op courses d/c dates existed    [place] at a more reasonable time there then here    all questions answered
Pt seen and examined. Hgb slowly drifting down. Patient is comfortable and voiding without lynne, as she was prior to admission. Plan for operating room for cystoscopy, clot evacuation fulguration Wednesday. Please obtain medical clearance, transfuse PRN
Pt seen and examined. Rowe appears clear tan color today, indicative of old blood, catheter flushed w no clots. Rowe removed as she was also not in retention when she came in. Continue to trend CBC, Follow up CTU final read, f/u gynecologic oncology recs. UTI tx/

## 2018-10-23 NOTE — CONSULT NOTE ADULT - SUBJECTIVE AND OBJECTIVE BOX
HPI:  83 y.o. F with a PMH of COPD (previously has seen Dr. Donis once), HTN, HLD, and Chronic Back Pain with spinal stimulator, CHF (GIDD) brought in for lanie hematuria. Pt reports she noticed some blood in her urine for the past month, however within the last 2 days it has become much worse. Pt denies any trauma, abd pain, dysuria, fever, chills, vaginal bleeding or rectal bleeding.   Pt recently admitted for COPD and CHF exacerbation  On presentation /80 HR 66 T98.3. Hgb 6.4 (baseline 9), K 6.6 w/o EKG changes.  Pt received 2 unit PRBC in ED, Insulin, D50 (17 Oct 2018 23:24)      PAST MEDICAL & SURGICAL HISTORY:  CHF (congestive heart failure)  Breast tumor: removal benign bl breasts  Anal cancer  COPD (chronic obstructive pulmonary disease)  Lung disease: COPD  Hypertension  H/O breast surgery: 39 yrs. ago, bilateral  History of back surgery: 10 yrs. ago stimulator implant ,       Hospital Course:  Receiving IV ampicillin/sulbactam    TODAY'S SUBJECTIVE & REVIEW OF SYMPTOMS:     Constitutional WNL   Cardio WNL   Resp WNL   GI WNL  Heme WNL  Endo WNL  Skin WNL  MSK WNL  Neuro WNL  Cognitive WNL  Psych WNL      MEDICATIONS  (STANDING):  ALBUTerol    90 MICROgram(s) HFA Inhaler 1 Puff(s) Inhalation every 4 hours  ALBUTerol/ipratropium for Nebulization 3 milliLiter(s) Nebulizer every 6 hours  amLODIPine   Tablet 5 milliGRAM(s) Oral daily  ampicillin/sulbactam  IVPB      ampicillin/sulbactam  IVPB 3 Gram(s) IV Intermittent every 6 hours  aspirin  chewable 81 milliGRAM(s) Oral daily  atorvastatin 20 milliGRAM(s) Oral at bedtime  buDESOnide 160 MICROgram(s)/formoterol 4.5 MICROgram(s) Inhaler 2 Puff(s) Inhalation two times a day  buDESOnide 160 MICROgram(s)/formoterol 4.5 MICROgram(s) Inhaler 2 Puff(s) Inhalation two times a day  metoprolol succinate ER 50 milliGRAM(s) Oral daily    MEDICATIONS  (PRN):  bisacodyl Suppository 10 milliGRAM(s) Rectal daily PRN Constipation  hydrocodone-acetaminophen 10/325 10 milliGRAM(s) 10 milliGRAM(s) Oral every 4 hours PRN pain      FAMILY HISTORY:  No pertinent family history in first degree relatives      Allergies    hair dyes (Hives)  sulfa drugs (Headache)    Intolerances        SOCIAL HISTORY:    [  ] EtOH  [  ] Smoking  [  ] Substance abuse     Home Environment:  [  ] Home Alone  [  ] Lives with Family  [  ] Home Health Aide    Dwelling:  [  ] Apartment  [  ] Private House  [  ] Adult Home  [  ] Skilled Nursing Facility      [  ] Short Term  [  ] Long Term  [  ] Stairs       Elevator [  ]    FUNCTIONAL STATUS PTA: (Check all that apply)  Ambulation: [   ]Independent    [  ] Dependent     [  ] Non-Ambulatory  Assistive Device: [  ] SA Cane  [  ]  Q Cane  [  ] Walker  [  ]  Wheelchair  ADL : [  ] Independent  [  ]  Dependent       Vital Signs Last 24 Hrs  T(C): 36.2 (23 Oct 2018 07:32), Max: 36.8 (22 Oct 2018 23:10)  T(F): 97.2 (23 Oct 2018 07:32), Max: 98.3 (22 Oct 2018 23:10)  HR: 66 (23 Oct 2018 07:32) (66 - 99)  BP: 129/57 (23 Oct 2018 07:32) (129/57 - 162/68)  BP(mean): --  RR: 18 (23 Oct 2018 07:32) (18 - 19)  SpO2: 97% (23 Oct 2018 04:18) (91% - 97%)      PHYSICAL EXAM: Alert & Oriented X 3  GENERAL: NAD, well-groomed, well-developed  HEAD:  Atraumatic, Normocephalic  EYES: EOMI, PERRLA, conjunctiva and sclera clear  NECK: Supple, No JVD, Normal thyroid  CHEST/LUNG: Clear to percussion bilaterally; No rales, rhonchi, wheezing, or rubs  HEART: Regular rate and rhythm; No murmurs, rubs, or gallops  ABDOMEN: Soft, Nontender, Nondistended; Bowel sounds present  EXTREMITIES:  2+ Peripheral Pulses, No clubbing, cyanosis, or edema    NERVOUS SYSTEM:  Cranial Nerves 2-12 intact [  ] Abnormal  [  ]  ROM: WFL all extremities [  ]  Abnormal [  ]  Motor Strength: WFL all extremities  [  ]  Abnormal [  ]  Sensation: intact to light touch [  ] Abnormal [  ]  Reflexes: Symmetric [  ]  Abnormal [  ]    FUNCTIONAL STATUS:  Bed Mobility: Independent [  ]  Supervision [  ]  Needs Assistance [  ]  N/A [  ]  Transfers: Independent [  ]  Supervision [  ]  Needs Assistance [  ]  N/A [  ]   Ambulation: Independent [  ]  Supervision [  ]  Needs Assistance [  ]  N/A [  ]  ADL: Independent [  ] Requires Assistance [  ] N/A [  ]      LABS:                        7.2    9.43  )-----------( 127      ( 23 Oct 2018 05:35 )             22.2     10-23    138  |  101  |  33<H>  ----------------------------<  93  4.6   |  27  |  1.1    Ca    7.9<L>      23 Oct 2018 05:35        Urinalysis Basic - ( 22 Oct 2018 21:10 )    Color: Red / Appearance: Turbid / S.025 / pH: x  Gluc: x / Ketone: 40  / Bili: Large / Urobili: 1.0 mg/dL   Blood: x / Protein: >=300 mg/dL / Nitrite: Positive   Leuk Esterase: Large / RBC: >50 /HPF / WBC 10-25 /HPF   Sq Epi: x / Non Sq Epi: Occasional /HPF / Bacteria: Moderate /HPF        RADIOLOGY & ADDITIONAL STUDIES:    EXAM:  CT ABDOMEN AND PELVIS [facility] [place]            PROCEDURE DATE:  10/19/2018            INTERPRETATION:  CLINICAL HISTORY/REASON FOR EXAM: Clinical concern for   bladder mass.    TECHNIQUE: Contiguous axial CT images were obtained from the lower chest   to the pubic symphysis following administration of Optiray 320   intravenous contrast. Precontrast, postcontrast, and 12 minute delays   were obtained. Oral contrast was not administered. Reformatted images in   the coronal and sagittal planes were acquired.    COMPARISON: CT abdomen pelvis December 15, 2015 and 2018.      FINDINGS:    LOWER CHEST: There is no change in the right lower lobe mucus plugging   and bibasilar nodular densities. These are likely infectious in nature.    HEPATOBILIARY: Previously seen enhancing nodule along the dependent   portion of the gallbladder wall is no longer visualized. However, this   may be related to differences in technique and right upper quadrant   ultrasound is recommended. There is stable biliary duct dilatation, which   can be better evaluated at time of the ultrasound. There is a stable   subcentimeter hepatic hypodensity, too small to characterize. Distended   gallbladder with layering sludge/cholelithiasis.     SPLEEN: Unchanged.    PANCREAS: Unchanged.    ADRENAL GLANDS: Unchanged.    KIDNEYS: Again noted is mild right-sided hydronephrosis. No urothelial   wall enhancement noted.  Evaluation the collecting system is limited due   to residual contrast excretion from prior exam.Renal cysts and bilateral   subcentimeter hypodensities are too small to characterize. Not well-seen   on prior examination is a complex left lower pole lateral lesion on image   31 of series 5 measuring 1.8 x 1.8 cm. This was not present on abdominal   pelvic CT dated December 15, 2015. Bilateral collecting systems are   incompletely opacified and cannot fully evaluated.    ABDOMINOPELVIC NODES: No lymphadenopathy.    PELVIC ORGANS: Again seen is 3.8 x 1.5 x 1.4 cm mass along the right   superoposterior wall of the urinary bladder. There appears to be   enhancement. However, this is severely limited in evaluation as the   bladder is collapsed about a Rowe catheter. Again seen is diffuse   bladder wall thickening.Previously noted 7 x 4 cm soft tissue mass   posterior to the urinary bladder is felt to likely represent uterus.   There is a large left adnexal lesion measuring 4.0 x 2 5 cm, likely   unrelated. This appears essentially unchanged in size since abdominal   pelvic CT dated December 15, 2015. Rowe catheter within bladder with   iatrogenic air anti-dependently. Dilated gonadal veins noted again.    PERITONEUM/MESENTERY/BOWEL: No bowel obstruction. No ascites or   pneumoperitoneum. Findings unchanged    BONES/SOFT TISSUES:Essentially  unchanged. Again seen is a generator in   the subcutaneous tissues of the right buttock with leads extending into   the spinal canal.    OTHER: Unchanged.      IMPRESSION:    Again seen is a 3.8 x 1.5 x 1.4 cm mass along the right superoposterior   wall of the urinary bladder with diffuse bladder wall thickening. There   is apparent enhancement of this mass, but limited in evaluation as the   bladder is collapsed. Findings are suspicious for malignancy.    No change in mild right hydronephrosis    Previously noted 7 x 4 cm soft tissue mass posterior to the urinary   bladder is felt to likely represent uterus. There is a large left adnexal   lesion measuring 4.0 x 2 5 cm, stable since prior exam. Continued   interval follow-up is recommended    Indeterminate left lower pole renal lesion measuring 1.8 x 1.8 cm, not   well-seen on prior examination due to differences in technique. However,   this is new since December 15, 2015 and a neoplastic process is not   excluded.    Stable biliary duct dilatation with distended gallbladder containing   sludge and/or stones. Further evaluation with right quadrant ultrasound    Spinal stimulator is present; preventing further workup with MRI HPI:  83 y.o. right-handed F with a PMH of COPD (previously has seen Dr. Donis once), HTN, HLD, and Chronic Back Pain with spinal stimulator, CHF (GIDD) brought in for lanie hematuria. Pt reports she noticed some blood in her urine for the past month, however within the last 2 days it has become much worse. Pt denies any trauma, abd pain, dysuria, fever, chills, vaginal bleeding or rectal bleeding.   Pt recently admitted for COPD and CHF exacerbation  On presentation /80 HR 66 T98.3. Hgb 6.4 (baseline 9), K 6.6 w/o EKG changes.  Pt received 2 unit PRBC in ED, Insulin, D50 (17 Oct 2018 23:24)      PAST MEDICAL & SURGICAL HISTORY:  CHF (congestive heart failure)  Breast tumor: removal benign bl breasts  Anal cancer  COPD (chronic obstructive pulmonary disease)  Lung disease: COPD  Hypertension  H/O breast surgery: 39 yrs. ago, bilateral  History of back surgery: 10 yrs. ago stimulator implant ,       Hospital Course:  Receiving IV ampicillin/sulbactam. Scheduled for OR tomorrow 10/24/2018 at the [facility] with Dr. Sanchez.    TODAY'S SUBJECTIVE & REVIEW OF SYMPTOMS:     Constitutional WNL   Cardio WNL   Resp WNL   GI WNL  Heme WNL  Endo WNL  Skin WNL  MSK WNL  Neuro WNL  Cognitive WNL  Psych WNL      MEDICATIONS  (STANDING):  ALBUTerol    90 MICROgram(s) HFA Inhaler 1 Puff(s) Inhalation every 4 hours  ALBUTerol/ipratropium for Nebulization 3 milliLiter(s) Nebulizer every 6 hours  amLODIPine   Tablet 5 milliGRAM(s) Oral daily  ampicillin/sulbactam  IVPB      ampicillin/sulbactam  IVPB 3 Gram(s) IV Intermittent every 6 hours  aspirin  chewable 81 milliGRAM(s) Oral daily  atorvastatin 20 milliGRAM(s) Oral at bedtime  buDESOnide 160 MICROgram(s)/formoterol 4.5 MICROgram(s) Inhaler 2 Puff(s) Inhalation two times a day  buDESOnide 160 MICROgram(s)/formoterol 4.5 MICROgram(s) Inhaler 2 Puff(s) Inhalation two times a day  metoprolol succinate ER 50 milliGRAM(s) Oral daily    MEDICATIONS  (PRN):  bisacodyl Suppository 10 milliGRAM(s) Rectal daily PRN Constipation  hydrocodone-acetaminophen 10/325 10 milliGRAM(s) 10 milliGRAM(s) Oral every 4 hours PRN pain      FAMILY HISTORY:  No pertinent family history in first degree relatives      Allergies    hair dyes (Hives)  sulfa drugs (Headache)    Intolerances        SOCIAL HISTORY:    [  ] EtOH  [ X ] Smoking--quit  [  ] Substance abuse     Home Environment:  [ X ] Home Alone--has apartment in daughter's private house  [  ] Lives with Family  [X] Home Health Aide--only one visit prior to this hospitalization    Dwelling:  [  ] Apartment  [X] Private House--ground-level entry to her apartment  [  ] Adult Home  [  ] Skilled Nursing Facility      [  ] Short Term  [  ] Long Term  [  ] Stairs       Elevator [  ]    FUNCTIONAL STATUS PTA: (Check all that apply)  Ambulation: [  X ]Independent    [  ] Dependent     [  ] Non-Ambulatory  Assistive Device: [  ] SA Cane  [  ]  Q Cane  [ X ] Walker  [  ]  Wheelchair  ADL : [ X ] Independent  [  ]  Dependent       Vital Signs Last 24 Hrs  T(C): 36.2 (23 Oct 2018 07:32), Max: 36.8 (22 Oct 2018 23:10)  T(F): 97.2 (23 Oct 2018 07:32), Max: 98.3 (22 Oct 2018 23:10)  HR: 66 (23 Oct 2018 07:32) (66 - 99)  BP: 129/57 (23 Oct 2018 07:32) (129/57 - 162/68)  BP(mean): --  RR: 18 (23 Oct 2018 07:32) (18 - 19)  SpO2: 97% (23 Oct 2018 04:18) (91% - 97%)      PHYSICAL EXAM: Alert & Oriented X 3  GENERAL: NAD, well-groomed, well-developed  HEAD:  Atraumatic, Normocephalic  EYES: EOMI, PERRLA, conjunctiva and sclera clear  NECK: Supple, No JVD, Normal thyroid  CHEST/LUNG: decreased breath sounds  HEART: Regular rate and rhythm; No murmurs, rubs, or gallops  ABDOMEN: Soft, Nontender, Nondistended; Bowel sounds present  EXTREMITIES:  2+ Peripheral Pulses, No clubbing, cyanosis, or edema    NERVOUS SYSTEM:  Cranial Nerves 2-12 intact [ XX ] Abnormal  [  ]  ROM: WFL all extremities [  ]  Abnormal [  ]  Motor Strength: WFL all extremities  [  ]  Abnormal [ X ]  Sensation: intact to light touch [ X ] Abnormal [  ]  Reflexes: Symmetric [  ]  Abnormal [  ]    FUNCTIONAL STATUS:  Bed Mobility: Independent [  ]  Supervision [ X ]  Needs Assistance [  ]  N/A [  ]  Transfers: Independent [  ]  Supervision [  ]  Needs Assistance [ X ]  N/A [  ]   Ambulation: Independent [  ]  Supervision [  ]  Needs Assistance [ X ]  N/A [  ]  ADL: Independent [ X ] Requires Assistance [  ] N/A [  ]      LABS:                        7.2    9.43  )-----------( 127      ( 23 Oct 2018 05:35 )             22.2     10-23    138  |  101  |  33<H>  ----------------------------<  93  4.6   |  27  |  1.1    Ca    7.9<L>      23 Oct 2018 05:35        Urinalysis Basic - ( 22 Oct 2018 21:10 )    Color: Red / Appearance: Turbid / S.025 / pH: x  Gluc: x / Ketone: 40  / Bili: Large / Urobili: 1.0 mg/dL   Blood: x / Protein: >=300 mg/dL / Nitrite: Positive   Leuk Esterase: Large / RBC: >50 /HPF / WBC 10-25 /HPF   Sq Epi: x / Non Sq Epi: Occasional /HPF / Bacteria: Moderate /HPF        RADIOLOGY & ADDITIONAL STUDIES:    EXAM:  CT ABDOMEN AND PELVIS [facility]            PROCEDURE DATE:  10/19/2018            INTERPRETATION:  CLINICAL HISTORY/REASON FOR EXAM: Clinical concern for   bladder mass.    TECHNIQUE: Contiguous axial CT images were obtained from the lower chest   to the pubic symphysis following administration of Optiray 320   intravenous contrast. Precontrast, postcontrast, and 12 minute delays   were obtained. Oral contrast was not administered. Reformatted images in   the coronal and sagittal planes were acquired.    COMPARISON: CT abdomen pelvis December 15, 2015 and 2018.      FINDINGS:    LOWER CHEST: There is no change in the right lower lobe mucus plugging   and bibasilar nodular densities. These are likely infectious in nature.    HEPATOBILIARY: Previously seen enhancing nodule along the dependent   portion of the gallbladder wall is no longer visualized. However, this   may be related to differences in technique and right upper quadrant   ultrasound is recommended. There is stable biliary duct dilatation, which   can be better evaluated at time of the ultrasound. There is a stable   subcentimeter hepatic hypodensity, too small to characterize. Distended   gallbladder with layering sludge/cholelithiasis.     SPLEEN: Unchanged.    PANCREAS: Unchanged.    ADRENAL GLANDS: Unchanged.    KIDNEYS: Again noted is mild right-sided hydronephrosis. No urothelial   wall enhancement noted.  Evaluation the collecting system is limited due   to residual contrast excretion from prior exam.Renal cysts and bilateral   subcentimeter hypodensities are too small to characterize. Not well-seen   on prior examination is a complex left lower pole lateral lesion on image   31 of series 5 measuring 1.8 x 1.8 cm. This was not present on abdominal   pelvic CT dated December 15, 2015. Bilateral collecting systems are   incompletely opacified and cannot fully evaluated.    ABDOMINOPELVIC NODES: No lymphadenopathy.    PELVIC ORGANS: Again seen is 3.8 x 1.5 x 1.4 cm mass along the right   superoposterior wall of the urinary bladder. There appears to be   enhancement. However, this is severely limited in evaluation as the   bladder is collapsed about a Rowe catheter. Again seen is diffuse   bladder wall thickening.Previously noted 7 x 4 cm soft tissue mass   posterior to the urinary bladder is felt to likely represent uterus.   There is a large left adnexal lesion measuring 4.0 x 2 5 cm, likely   unrelated. This appears essentially unchanged in size since abdominal   pelvic CT dated December 15, 2015. Rowe catheter within bladder with   iatrogenic air anti-dependently. Dilated gonadal veins noted again.    PERITONEUM/MESENTERY/BOWEL: No bowel obstruction. No ascites or   pneumoperitoneum. Findings unchanged    BONES/SOFT TISSUES:Essentially  unchanged. Again seen is a generator in   the subcutaneous tissues of the right buttock with leads extending into   the spinal canal.    OTHER: Unchanged.      IMPRESSION:    Again seen is a 3.8 x 1.5 x 1.4 cm mass along the right superoposterior   wall of the urinary bladder with diffuse bladder wall thickening. There   is apparent enhancement of this mass, but limited in evaluation as the   bladder is collapsed. Findings are suspicious for malignancy.    No change in mild right hydronephrosis    Previously noted 7 x 4 cm soft tissue mass posterior to the urinary   bladder is felt to likely represent uterus. There is a large left adnexal   lesion measuring 4.0 x 2 5 cm, stable since prior exam. Continued   interval follow-up is recommended    Indeterminate left lower pole renal lesion measuring 1.8 x 1.8 cm, not   well-seen on prior examination due to differences in technique. However,   this is new since December 15, 2015 and a neoplastic process is not   excluded.    Stable biliary duct dilatation with distended gallbladder containing   sludge and/or stones. Further evaluation with right quadrant ultrasound    Spinal stimulator is present; preventing further workup with MRI

## 2018-10-23 NOTE — DISCHARGE NOTE ADULT - MEDICATION SUMMARY - MEDICATIONS TO STOP TAKING
I will STOP taking the medications listed below when I get home from the hospital:    spironolactone 25 mg oral tablet  -- 1 tab(s) by mouth 2 times a day

## 2018-10-23 NOTE — DISCHARGE NOTE ADULT - PLAN OF CARE
Prevent exacerbation, control symptoms Please follow up with your urologist at [facility] for inpatient cystoscopy vs transurethral resection of bladder tumor. Please take your medications as prescribed. Prevent symptoms and complications Please take your medications as prescribed and follow up with your urologist and primary outpatient physician. If you develop sudden shortness of breath, chest pain, or dizziness, please seek immediate medical attention. Prevent complications and symptoms Please take your medications as prescribed and follow up with your primary outpatient physician. If you develop sudden shortness of breath, chest pain, or dizziness, please seek immediate medical attention. Prevent symptoms and exacerbations

## 2018-10-23 NOTE — PROGRESS NOTE ADULT - SUBJECTIVE AND OBJECTIVE BOX
83y old Female with acute blood loss anemia 2* gross hematuria found to have posterior bladder mass. Patient seen and examined at bedside. No events overnight. +hematuria, voiding without issue.    VS: T(F): 97.2, Max: 98.3 (10-22- @ 23:10)  HR: 66 (66 - 99)  BP: 129/57 (129/57 - 162/68)  RR: 18  SpO2: 97%  GEN: Awake, alert, NAD  ABD: soft, NT/ND, non palpable bladder, no suprapubic TTP    LABS/IMAGIN.2    9.43  )-----------( 127      ( 23 Oct 2018 05:35 )             22.2     10-    138  |  101  |  33<H>  ----------------------------<  93  4.6   |  27  |  1.1    Ca    7.9<L>      23 Oct 2018 05:35    Urinalysis Basic - ( 22 Oct 2018 21:10 )  Color: Red / Appearance: Turbid / S.025 / pH: x  Gluc: x / Ketone: 40  / Bili: Large / Urobili: 1.0 mg/dL   Blood: x / Protein: >=300 mg/dL / Nitrite: Positive   Leuk Esterase: Large / RBC: >50 /HPF / WBC 10-25 /HPF   Sq Epi: x / Non Sq Epi: Occasional /HPF / Bacteria: Moderate /HPF 83y old Female with acute blood loss anemia 2* gross hematuria found to have posterior bladder mass. Patient seen and examined at bedside. No events overnight. +hematuria, voiding without issue.    VS: T(F): 97.2, Max: 98.3 (10-22- @ 23:10)  HR: 66 (66 - 99)  BP: 129/57 (129/57 - 162/68)  RR: 18  SpO2: 97%  GEN: Awake, alert, NAD  ABD: soft, NT/ND, non palpable bladder, no suprapubic TTP  no lynne she says current voided urine is clear    LABS/IMAGIN.2    9.43  )-----------( 127      ( 23 Oct 2018 05:35 )             22.2     10-    138  |  101  |  33<H>  ----------------------------<  93  4.6   |  27  |  1.1    Ca    7.9<L>      23 Oct 2018 05:35    Urinalysis Basic - ( 22 Oct 2018 21:10 )  Color: Red / Appearance: Turbid / S.025 / pH: x  Gluc: x / Ketone: 40  / Bili: Large / Urobili: 1.0 mg/dL   Blood: x / Protein: >=300 mg/dL / Nitrite: Positive   Leuk Esterase: Large / RBC: >50 /HPF / WBC 10-25 /HPF   Sq Epi: x / Non Sq Epi: Occasional /HPF / Bacteria: Moderate /HPF

## 2018-10-23 NOTE — DISCHARGE NOTE ADULT - CARE PLAN
Principal Discharge DX:	Bladder mass  Goal:	Prevent exacerbation, control symptoms  Assessment and plan of treatment:	Please follow up with your urologist at [facility] for inpatient cystoscopy vs transurethral resection of bladder tumor. Please take your medications as prescribed.  Secondary Diagnosis:	Anemia  Goal:	Prevent symptoms and complications  Assessment and plan of treatment:	Please take your medications as prescribed and follow up with your urologist and primary outpatient physician. If you develop sudden shortness of breath, chest pain, or dizziness, please seek immediate medical attention.  Secondary Diagnosis:	Hypertension  Goal:	Prevent complications and symptoms  Assessment and plan of treatment:	Please take your medications as prescribed and follow up with your primary outpatient physician. If you develop sudden shortness of breath, chest pain, or dizziness, please seek immediate medical attention.  Secondary Diagnosis:	COPD (chronic obstructive pulmonary disease)  Goal:	Prevent symptoms and exacerbations  Assessment and plan of treatment:	Please take your medications as prescribed and follow up with your primary outpatient physician. If you develop sudden shortness of breath, chest pain, or dizziness, please seek immediate medical attention.

## 2018-10-24 ENCOUNTER — RESULT REVIEW (OUTPATIENT)
Age: 83
End: 2018-10-24

## 2018-10-24 LAB
ANION GAP SERPL CALC-SCNC: 10 MMOL/L — SIGNIFICANT CHANGE UP (ref 7–14)
BASOPHILS # BLD AUTO: 0.04 K/UL — SIGNIFICANT CHANGE UP (ref 0–0.2)
BASOPHILS NFR BLD AUTO: 0.5 % — SIGNIFICANT CHANGE UP (ref 0–1)
BUN SERPL-MCNC: 28 MG/DL — HIGH (ref 10–20)
CALCIUM SERPL-MCNC: 8.2 MG/DL — LOW (ref 8.5–10.1)
CHLORIDE SERPL-SCNC: 100 MMOL/L — SIGNIFICANT CHANGE UP (ref 98–110)
CO2 SERPL-SCNC: 29 MMOL/L — SIGNIFICANT CHANGE UP (ref 17–32)
CREAT SERPL-MCNC: 0.8 MG/DL — SIGNIFICANT CHANGE UP (ref 0.7–1.5)
EOSINOPHIL # BLD AUTO: 0.48 K/UL — SIGNIFICANT CHANGE UP (ref 0–0.7)
EOSINOPHIL NFR BLD AUTO: 6.5 % — SIGNIFICANT CHANGE UP (ref 0–8)
GLUCOSE SERPL-MCNC: 93 MG/DL — SIGNIFICANT CHANGE UP (ref 70–99)
HCT VFR BLD CALC: 30.8 % — LOW (ref 37–47)
HGB BLD-MCNC: 10.1 G/DL — LOW (ref 12–16)
IMM GRANULOCYTES NFR BLD AUTO: 0.4 % — HIGH (ref 0.1–0.3)
LYMPHOCYTES # BLD AUTO: 1 K/UL — LOW (ref 1.2–3.4)
LYMPHOCYTES # BLD AUTO: 13.5 % — LOW (ref 20.5–51.1)
MAGNESIUM SERPL-MCNC: 2.1 MG/DL — SIGNIFICANT CHANGE UP (ref 1.8–2.4)
MCHC RBC-ENTMCNC: 29.3 PG — SIGNIFICANT CHANGE UP (ref 27–31)
MCHC RBC-ENTMCNC: 32.8 G/DL — SIGNIFICANT CHANGE UP (ref 32–37)
MCV RBC AUTO: 89.3 FL — SIGNIFICANT CHANGE UP (ref 81–99)
MONOCYTES # BLD AUTO: 0.65 K/UL — HIGH (ref 0.1–0.6)
MONOCYTES NFR BLD AUTO: 8.7 % — SIGNIFICANT CHANGE UP (ref 1.7–9.3)
NEUTROPHILS # BLD AUTO: 5.23 K/UL — SIGNIFICANT CHANGE UP (ref 1.4–6.5)
NEUTROPHILS NFR BLD AUTO: 70.4 % — SIGNIFICANT CHANGE UP (ref 42.2–75.2)
NRBC # BLD: 0 /100 WBCS — SIGNIFICANT CHANGE UP (ref 0–0)
PLATELET # BLD AUTO: 162 K/UL — SIGNIFICANT CHANGE UP (ref 130–400)
POTASSIUM SERPL-MCNC: 4.9 MMOL/L — SIGNIFICANT CHANGE UP (ref 3.5–5)
POTASSIUM SERPL-SCNC: 4.9 MMOL/L — SIGNIFICANT CHANGE UP (ref 3.5–5)
RBC # BLD: 3.45 M/UL — LOW (ref 4.2–5.4)
RBC # FLD: 25 % — HIGH (ref 11.5–14.5)
SODIUM SERPL-SCNC: 139 MMOL/L — SIGNIFICANT CHANGE UP (ref 135–146)
TYPE + AB SCN PNL BLD: SIGNIFICANT CHANGE UP
WBC # BLD: 7.43 K/UL — SIGNIFICANT CHANGE UP (ref 4.8–10.8)
WBC # FLD AUTO: 7.43 K/UL — SIGNIFICANT CHANGE UP (ref 4.8–10.8)

## 2018-10-24 RX ORDER — HYDROMORPHONE HYDROCHLORIDE 2 MG/ML
0.5 INJECTION INTRAMUSCULAR; INTRAVENOUS; SUBCUTANEOUS
Qty: 0 | Refills: 0 | Status: DISCONTINUED | OUTPATIENT
Start: 2018-10-24 | End: 2018-10-25

## 2018-10-24 RX ORDER — SODIUM CHLORIDE 9 MG/ML
1000 INJECTION INTRAMUSCULAR; INTRAVENOUS; SUBCUTANEOUS
Qty: 0 | Refills: 0 | Status: DISCONTINUED | OUTPATIENT
Start: 2018-10-24 | End: 2018-10-25

## 2018-10-24 RX ORDER — ONDANSETRON 8 MG/1
4 TABLET, FILM COATED ORAL ONCE
Qty: 0 | Refills: 0 | Status: DISCONTINUED | OUTPATIENT
Start: 2018-10-24 | End: 2018-10-26

## 2018-10-24 RX ORDER — OXYCODONE AND ACETAMINOPHEN 5; 325 MG/1; MG/1
1 TABLET ORAL ONCE
Qty: 0 | Refills: 0 | Status: DISCONTINUED | OUTPATIENT
Start: 2018-10-24 | End: 2018-10-25

## 2018-10-24 RX ADMIN — AMPICILLIN SODIUM AND SULBACTAM SODIUM 200 GRAM(S): 250; 125 INJECTION, POWDER, FOR SUSPENSION INTRAMUSCULAR; INTRAVENOUS at 06:07

## 2018-10-24 RX ADMIN — AMPICILLIN SODIUM AND SULBACTAM SODIUM 200 GRAM(S): 250; 125 INJECTION, POWDER, FOR SUSPENSION INTRAMUSCULAR; INTRAVENOUS at 14:03

## 2018-10-24 RX ADMIN — AMLODIPINE BESYLATE 5 MILLIGRAM(S): 2.5 TABLET ORAL at 06:07

## 2018-10-24 RX ADMIN — BUDESONIDE AND FORMOTEROL FUMARATE DIHYDRATE 2 PUFF(S): 160; 4.5 AEROSOL RESPIRATORY (INHALATION) at 08:19

## 2018-10-24 RX ADMIN — SODIUM CHLORIDE 50 MILLILITER(S): 9 INJECTION INTRAMUSCULAR; INTRAVENOUS; SUBCUTANEOUS at 18:47

## 2018-10-24 RX ADMIN — Medication 50 MILLIGRAM(S): at 06:07

## 2018-10-24 NOTE — PROGRESS NOTE ADULT - SUBJECTIVE AND OBJECTIVE BOX
KOTA SCHAFRE  83y  Female    Patient is a 83y old  Female who presents with a chief complaint of José Miguel Hematuria (24 Oct 2018 09:33)  transferred to [facility]	   ALLERGIES:  hair dyes (Hives)  sulfa drugs (Headache)      INTERVAL HPI/OVERNIGHT EVENTS:    VITALS:  T(F): 97.6 (10-24-18 @ 09:58), Max: 99.1 (10-23-18 @ 15:46)  HR: 77 (10-24-18 @ 09:58) (75 - 82)  BP: 150/67 (10-24-18 @ 09:58) (135/59 - 163/69)  RR: 18 (10-24-18 @ 09:58) (18 - 18)  SpO2: 97% (10-24-18 @ 01:00) (97% - 97%)    LABS:  10-24    139  |  100  |  28<H>  ----------------------------<  93  4.9   |  29  |  0.8    Ca    8.2<L>      24 Oct 2018 07:03  Mg     2.1     10-24      MICROBIOLOGY:    MEDICATION:  ampicillin/sulbactam  IVPB      ampicillin/sulbactam  IVPB 3 Gram(s) IV Intermittent every 6 hours  bisacodyl Suppository 10 milliGRAM(s) Rectal daily PRN    RADIOLOGY & ADDITIONAL TESTS:

## 2018-10-24 NOTE — PROGRESS NOTE ADULT - SUBJECTIVE AND OBJECTIVE BOX
NEPHROLOGY FOLLOW UP NOTE    transferred to [facility]  s/p prbc transfusion  still with hematuria  c/o voiding related pelvic pain  no change in usual sob    PAST MEDICAL & SURGICAL HISTORY:  CHF (congestive heart failure)  Breast tumor: removal benign bl breasts  Anal cancer  COPD (chronic obstructive pulmonary disease)  Lung disease: COPD  Hypertension  H/O breast surgery: 39 yrs. ago, bilateral  History of back surgery: 10 yrs. ago stimulator implant ,     Allergies:  hair dyes (Hives)  sulfa drugs (Headache)    Home Medications Reviewed    SOCIAL HISTORY:  Denies ETOH,Smoking,   FAMILY HISTORY:  No pertinent family history in first degree relatives        REVIEW OF SYSTEMS:  All other review of systems is negative unless indicated above.    advance care planning and advance care directives reviewed and d/w pt in detail    PHYSICAL EXAM:  NAD  awake and alert  moist mm  no jvd  decreased BS bl  rrr  soft, + LLQ tenderness  no edema    Hospital Medications:   MEDICATIONS  (STANDING):  ALBUTerol/ipratropium for Nebulization 3 milliLiter(s) Nebulizer every 6 hours  amLODIPine   Tablet 5 milliGRAM(s) Oral daily  ampicillin/sulbactam  IVPB      ampicillin/sulbactam  IVPB 3 Gram(s) IV Intermittent every 6 hours  aspirin  chewable 81 milliGRAM(s) Oral daily  atorvastatin 20 milliGRAM(s) Oral at bedtime  buDESOnide 160 MICROgram(s)/formoterol 4.5 MICROgram(s) Inhaler 2 Puff(s) Inhalation two times a day  metoprolol succinate ER 50 milliGRAM(s) Oral daily        VITALS:  T(F): 97.6 (10-24-18 @ 05:45), Max: 99.1 (10-23-18 @ 15:46)  HR: 77 (10-24-18 @ 05:45)  BP: 150/67 (10-24-18 @ 05:45)  RR: 18 (10-24-18 @ 05:45)  SpO2: 97% (10-24-18 @ 01:00)  Wt(kg): --    10-22 @ 07:01  -  10-23 @ 07:00  --------------------------------------------------------  IN: 200 mL / OUT: 0 mL / NET: 200 mL    10-23 @ 07:01  -  10-24 @ 07:00  --------------------------------------------------------  IN: 0 mL / OUT: 250 mL / NET: -250 mL      Height (cm): 157.48 (10-23 @ 23:55)  Weight (kg): 53.4 (10-23 @ 23:55)  BMI (kg/m2): 21.5 (10-23 @ 23:55)  BSA (m2): 1.53 (10-23 @ 23:55)    LABS:  10-24    139  |  100  |  28<H>  ----------------------------<  93  4.9   |  29  |  0.8    Ca    8.2<L>      24 Oct 2018 07:03  Mg     2.1     10-24                            10.1   7.43  )-----------( 162      ( 24 Oct 2018 07:03 )             30.8       Urine Studies:  Urinalysis Basic - ( 22 Oct 2018 21:10 )    Color: Red / Appearance: Turbid / S.025 / pH:   Gluc:  / Ketone: 40  / Bili: Large / Urobili: 1.0 mg/dL   Blood:  / Protein: >=300 mg/dL / Nitrite: Positive   Leuk Esterase: Large / RBC: >50 /HPF / WBC 10-25 /HPF   Sq Epi:  / Non Sq Epi: Occasional /HPF / Bacteria: Moderate /HPF          RADIOLOGY & ADDITIONAL STUDIES:

## 2018-10-24 NOTE — PRE-ANESTHESIA EVALUATION ADULT - NSANTHPMHFT_GEN_ALL_CORE
MED, Cardiology note, and cardiology recommendation seen. Labs, EKG, CXR report seen.  H/O severe COPD, S/P CHF. PT O2 dependent ( 2l/min ), no Pulmonary evaluation seen on the charts.

## 2018-10-24 NOTE — CONSULT NOTE ADULT - ASSESSMENT
IMPRESSION:  Hematuria   copd stable   CHF     PLAN: no need for ICU   tele monitoring for 24 hr 3S     CNS: no sedation     HEENT: oral care     PULMONARY: keep pox > 92 % , Symbicort Q 12 hrs   nebulizer as needed Q 6 hrs     CARDIOVASCULAR: keep SI =Os   continue gentle hydration     GI: GI prophylaxis.  NPO now     RENAL: follow urology bun ,cr   monitor urine output     INFECTIOUS DISEASE: pan  cx     HEMATOLOGICAL:  DVT prophylaxis. follow up h/h     ENDOCRINE:  Follow up FS.  Insulin protocol if needed.            CRITICAL CARE TIME SPENT: ***

## 2018-10-24 NOTE — PRE-ANESTHESIA EVALUATION ADULT - HEIGHT IN INCHES
Continue: prednisolone acetate (prednisolone acetate): drops,suspension: 1% 1 drop as directed into affected eye 03-
General:
KEEP GAS BRACELET IN PLACE UNTIL BUBBLE GONE.  AVOID AIR TRAVEL, SCUBA DIVING, OR TRAVEL AT ALTITUDE UNTIL GAS BUBBLE HAS RESOLVED.
Medications:
POST-OP MONTH 1 EXAM: s/p SB/PPV/C3F8 for RETINAL DETACHMENT REPAIR OS. Doing well today. Retina attached. IOP within acceptable limits. Finish eyedrops in the surgical eye as instructed. Retinal detachment and endophthalmitis precautions reviewed. Instructed to call immediately for worsening vision, eye pain, or eye discharge.
Patient called back.  Informed her of 7/3/17 abdominal x-ray results showing constipation and Dr. Valladares's recommendation for bowel cleanse, with further follow up prn.  Instructed patient to mix 64 ounces of sports drink (not red or purple) and 16 scoops of miralax, then drink this within about 2 hours (8 ounces every 15 minutes).  Do this when you are near the bathroom, as there will likely be multiple urgent bowel movements within a couple of hours.  Patient verbalizes understanding and repeated procedure.  Denies questions at this time.    
REVIEWED PVD/RD PRECAUTIONS WITH PATIENT AND ADVISED TO CALL IF EXPERIENCES NEW FLASHES OF LIGHT, INCREASE IN FLOATERS, OR DECREASE VISION IN EITHER EYE.
UPRIGHT WHILE AWAKE.  RIGHT SIDE DOWN POSITIONING WHILE SLEEPING UNTIL NEXT VISIT
wk1: 4x daily, wk2: 3x daily, wk3: 2x daily, wk4: 1x daily, THEN STOP !
2

## 2018-10-24 NOTE — PROGRESS NOTE ADULT - ASSESSMENT
resolved hyperkalemia  ckd 2-3  gross hematuria  Bladder mass with mild right hydronephrosis  remote history of anal ca s/p chemo / RT  COPD  CHF  chronic back pain with spinal stimulator  enterococcus UTI  anemia - s/p PRBC transfusion    plan:    off aldactone  cysto / TURB today  f/u   cont unasyn  cont norvasc / lopressor / statin  off any anticoag/plt  full code

## 2018-10-24 NOTE — PRE-ANESTHESIA EVALUATION ADULT - NSANTHADDINFOFT_GEN_ALL_CORE
surgeon requesting GA with Muscle relaxant. All R/B/a D/W Pt and family the possibility of post po ventilation, also discussed with her Pulmonologist And Dr Pham, pulmonologist in the ICU both agreed with the plan and F/u the Pt post op. surgeon doesn't want to wait for pulmonary evaluation as pt continued to bleed and requesting GA with Muscle relaxant. All R/B/a D/W Pt and family and the possibility of post op ventilation Pt and family understood and agreed, also discussed with her Pulmonologist, Dr. Arango And Dr Solo, pulmonologist in the ICU both agreed with the plan and F/u  Pt post op.

## 2018-10-24 NOTE — BRIEF OPERATIVE NOTE - PROCEDURE
<<-----Click on this checkbox to enter Procedure TURBT (transurethral resection of bladder tumor)  10/24/2018  with cystoscopy, clot evacution and fulguration  Active  MADELINE

## 2018-10-25 PROBLEM — I50.9 HEART FAILURE, UNSPECIFIED: Chronic | Status: ACTIVE | Noted: 2018-10-17

## 2018-10-25 RX ORDER — AMLODIPINE BESYLATE 2.5 MG/1
5 TABLET ORAL DAILY
Qty: 0 | Refills: 0 | Status: DISCONTINUED | OUTPATIENT
Start: 2018-10-25 | End: 2018-10-26

## 2018-10-25 RX ORDER — BUDESONIDE AND FORMOTEROL FUMARATE DIHYDRATE 160; 4.5 UG/1; UG/1
2 AEROSOL RESPIRATORY (INHALATION)
Qty: 0 | Refills: 0 | Status: DISCONTINUED | OUTPATIENT
Start: 2018-10-25 | End: 2018-10-26

## 2018-10-25 RX ORDER — AMPICILLIN SODIUM AND SULBACTAM SODIUM 250; 125 MG/ML; MG/ML
INJECTION, POWDER, FOR SUSPENSION INTRAMUSCULAR; INTRAVENOUS
Qty: 0 | Refills: 0 | Status: DISCONTINUED | OUTPATIENT
Start: 2018-10-25 | End: 2018-10-25

## 2018-10-25 RX ORDER — AMPICILLIN SODIUM AND SULBACTAM SODIUM 250; 125 MG/ML; MG/ML
3 INJECTION, POWDER, FOR SUSPENSION INTRAMUSCULAR; INTRAVENOUS EVERY 6 HOURS
Qty: 0 | Refills: 0 | Status: DISCONTINUED | OUTPATIENT
Start: 2018-10-25 | End: 2018-10-26

## 2018-10-25 RX ORDER — ASPIRIN/CALCIUM CARB/MAGNESIUM 324 MG
81 TABLET ORAL DAILY
Qty: 0 | Refills: 0 | Status: DISCONTINUED | OUTPATIENT
Start: 2018-10-25 | End: 2018-10-25

## 2018-10-25 RX ORDER — AMPICILLIN SODIUM AND SULBACTAM SODIUM 250; 125 MG/ML; MG/ML
3 INJECTION, POWDER, FOR SUSPENSION INTRAMUSCULAR; INTRAVENOUS ONCE
Qty: 0 | Refills: 0 | Status: DISCONTINUED | OUTPATIENT
Start: 2018-10-25 | End: 2018-10-25

## 2018-10-25 RX ORDER — ATORVASTATIN CALCIUM 80 MG/1
20 TABLET, FILM COATED ORAL AT BEDTIME
Qty: 0 | Refills: 0 | Status: DISCONTINUED | OUTPATIENT
Start: 2018-10-25 | End: 2018-10-26

## 2018-10-25 RX ORDER — AMPICILLIN SODIUM AND SULBACTAM SODIUM 250; 125 MG/ML; MG/ML
3 INJECTION, POWDER, FOR SUSPENSION INTRAMUSCULAR; INTRAVENOUS EVERY 6 HOURS
Qty: 0 | Refills: 0 | Status: DISCONTINUED | OUTPATIENT
Start: 2018-10-25 | End: 2018-10-25

## 2018-10-25 RX ORDER — METOPROLOL TARTRATE 50 MG
50 TABLET ORAL DAILY
Qty: 0 | Refills: 0 | Status: DISCONTINUED | OUTPATIENT
Start: 2018-10-25 | End: 2018-10-26

## 2018-10-25 RX ADMIN — Medication 50 MILLIGRAM(S): at 18:06

## 2018-10-25 RX ADMIN — ATORVASTATIN CALCIUM 20 MILLIGRAM(S): 80 TABLET, FILM COATED ORAL at 21:59

## 2018-10-25 RX ADMIN — BUDESONIDE AND FORMOTEROL FUMARATE DIHYDRATE 2 PUFF(S): 160; 4.5 AEROSOL RESPIRATORY (INHALATION) at 20:09

## 2018-10-25 RX ADMIN — AMPICILLIN SODIUM AND SULBACTAM SODIUM 200 GRAM(S): 250; 125 INJECTION, POWDER, FOR SUSPENSION INTRAMUSCULAR; INTRAVENOUS at 14:28

## 2018-10-25 RX ADMIN — AMPICILLIN SODIUM AND SULBACTAM SODIUM 200 GRAM(S): 250; 125 INJECTION, POWDER, FOR SUSPENSION INTRAMUSCULAR; INTRAVENOUS at 17:59

## 2018-10-25 RX ADMIN — AMLODIPINE BESYLATE 5 MILLIGRAM(S): 2.5 TABLET ORAL at 18:06

## 2018-10-25 NOTE — PROGRESS NOTE ADULT - SUBJECTIVE AND OBJECTIVE BOX
S; Pt c/o back pain, which is chronic, and jas shoulder pain. Denies any significant abdominal pain. Rowe in place, yellow urine in tubing  O; Vital Signs Last 24 Hrs  T(C): 36.7 (25 Oct 2018 06:21), Max: 36.7 (25 Oct 2018 06:21)  T(F): 98 (25 Oct 2018 06:21), Max: 98 (25 Oct 2018 06:21)  HR: 75 (25 Oct 2018 06:21) (65 - 83)  BP: 166/72 (25 Oct 2018 06:21) (150/67 - 190/79)  RR: 16 (25 Oct 2018 06:21) (16 - 20)  SpO2: 98% (24 Oct 2018 19:22) (97% - 99%)    I&O's Detail    24 Oct 2018 07:01  -  25 Oct 2018 07:00  --------------------------------------------------------  IN:    sodium chloride 0.9%.: 50 mL  Total IN: 50 mL    OUT:    Indwelling Catheter - Urethral: 400 mL (yellow urine)  Total OUT: 400 mL    Total NET: -350 mL    EXAM:    abd: soft, NT/ND    Labs: PENDING

## 2018-10-25 NOTE — PHYSICAL THERAPY INITIAL EVALUATION ADULT - BED MOBILITY LIMITATIONS, REHAB EVAL
Patient encountered already out of bed in chair, No apparent distress. Per patient, required assistx1

## 2018-10-25 NOTE — PROGRESS NOTE ADULT - SUBJECTIVE AND OBJECTIVE BOX
NEPHROLOGY FOLLOW UP NOTE    s/p TURBT  hematuria clearing  tolerating PO  lynne still in place    PAST MEDICAL & SURGICAL HISTORY:  CHF (congestive heart failure)  Breast tumor: removal benign bl breasts  Anal cancer  COPD (chronic obstructive pulmonary disease)  Lung disease: COPD  Hypertension  H/O breast surgery: 39 yrs. ago, bilateral  History of back surgery: 10 yrs. ago stimulator implant ,     Allergies:  hair dyes (Hives)  sulfa drugs (Headache)    Home Medications Reviewed    SOCIAL HISTORY:  Denies ETOH,Smoking,   FAMILY HISTORY:  No pertinent family history in first degree relatives        REVIEW OF SYSTEMS:  All other review of systems is negative unless indicated above.      PHYSICAL EXAM:  NAD  awake and alert  moist mm  no jvd  decreased BS bl  rrr  soft, + LLQ tenderness  no edema    Hospital Medications:   MEDICATIONS  (STANDING):  sodium chloride 0.9%. 1000 milliLiter(s) (50 mL/Hr) IV Continuous <Continuous>        VITALS:  T(F): 98 (10-25-18 @ 06:21), Max: 98 (10-25-18 @ 06:21)  HR: 75 (10-25-18 @ 06:21)  BP: 166/72 (10-25-18 @ 06:21)  RR: 16 (10-25-18 @ 06:21)  SpO2: 98% (10-24-18 @ 19:22)  Wt(kg): --    10-23 @ :  -  10-24 @ 07:00  --------------------------------------------------------  IN: 0 mL / OUT: 250 mL / NET: -250 mL    10-24 @ 07:  -  10-25 @ 07:00  --------------------------------------------------------  IN: 50 mL / OUT: 400 mL / NET: -350 mL      Height (cm): 157.48 (10-24 @ 19:34)  Weight (kg): 51.2 (10-24 @ 19:34)  BMI (kg/m2): 20.6 (10-24 @ 19:34)  BSA (m2): 1.5 (10-24 @ 19:34)    LABS:  10-24    139  |  100  |  28<H>  ----------------------------<  93  4.9   |  29  |  0.8    Ca    8.2<L>      24 Oct 2018 07:03  Mg     2.1     10-24                            10.1   7.43  )-----------( 162      ( 24 Oct 2018 07:03 )             30.8       Urine Studies:  Urinalysis Basic - ( 22 Oct 2018 21:10 )    Color: Red / Appearance: Turbid / S.025 / pH:   Gluc:  / Ketone: 40  / Bili: Large / Urobili: 1.0 mg/dL   Blood:  / Protein: >=300 mg/dL / Nitrite: Positive   Leuk Esterase: Large / RBC: >50 /HPF / WBC 10-25 /HPF   Sq Epi:  / Non Sq Epi: Occasional /HPF / Bacteria: Moderate /HPF          RADIOLOGY & ADDITIONAL STUDIES:

## 2018-10-25 NOTE — CONSULT NOTE ADULT - SUBJECTIVE AND OBJECTIVE BOX
HPI:  82 yo F with a PMH of COPD ( previously has seen Dr. Donis once), HTN, HLD, and Chronic Back Pain with spinal stimulator, CHF (GIDD) brought in for lanie hematuria. Pt reports she noticed some blood in her urine for the past month, however within the last 2 days it has become much worse. Pt denies any trauma, abd pain, dysuria, fever, chills, vaginal bleeding or rectal bleeding.   Pt recently admitted for COPD and CHF exacerbation  On presentation /80 HR 66 T98.3. Hgb 6.4 (baseline 9), K 6.6 w/o EKG changes.  Pt received 2 unit PRBC in ED, Insulin, D50     PTN  REFERRED TO ACUTE  REHAB  FOR  EVAL AND  TX   PAST MEDICAL & SURGICAL HISTORY:  CHF (congestive heart failure)  Breast tumor: removal benign bl breasts  Anal cancer  COPD (chronic obstructive pulmonary disease)  Lung disease: COPD  Hypertension  H/O breast surgery: 39 yrs. ago, bilateral  History of back surgery: 10 yrs. ago stimulator implant 2001, 2011      Hospital Course:    TODAY'S SUBJECTIVE & REVIEW OF SYMPTOMS:     Constitutional WNL   Cardio WNL   Resp WNL   GI WNL  Heme WNL  Endo WNL  Skin WNL  MSK WNL  Neuro WNL  Cognitive WNL  Psych WNL      MEDICATIONS  (STANDING):  amLODIPine   Tablet 5 milliGRAM(s) Oral daily  ampicillin/sulbactam  IVPB 3 Gram(s) IV Intermittent every 6 hours  atorvastatin 20 milliGRAM(s) Oral at bedtime  buDESOnide 160 MICROgram(s)/formoterol 4.5 MICROgram(s) Inhaler 2 Puff(s) Inhalation two times a day  metoprolol succinate ER 50 milliGRAM(s) Oral daily    MEDICATIONS  (PRN):  bisacodyl Suppository 10 milliGRAM(s) Rectal daily PRN Constipation  hydrocodone-acetaminophen 10/325 10 milliGRAM(s),hydrocodone-acetaminophen  mg 10 milliGRAM(s) 10 milliGRAM(s) Oral every 4 hours PRN pain  ondansetron Injectable 4 milliGRAM(s) IV Push once PRN Nausea and/or Vomiting      FAMILY HISTORY:  No pertinent family history in first degree relatives      Allergies    hair dyes (Hives)  sulfa drugs (Headache)    Intolerances        SOCIAL HISTORY:    [  ] Etoh  [  ] Smoking  [  ] Substance abuse     Home Environment:  [ xx] Home Alone  [  ] Lives with Family  [  ] Home Health Aid    Dwelling:  [  ] Apartment  [ x ] Private House  [  ] Adult Home  [  ] Skilled Nursing Facility      [  ] Short Term  [  ] Long Term  [ x ] Stairs       Elevator [  ]    FUNCTIONAL STATUS PTA: (Check all that apply)  Ambulation: [ x  ]Independent    [  ] Dependent     [  ] Non-Ambulatory  Assistive Device: [  ] SA Cane  [  ]  Q Cane  [  ] Walker  [  ]  Wheelchair  ADL : [ x ] Independent  [  ]  Dependent       Vital Signs Last 24 Hrs  T(C): 37.1 (25 Oct 2018 14:24), Max: 37.1 (25 Oct 2018 14:24)  T(F): 98.7 (25 Oct 2018 14:24), Max: 98.7 (25 Oct 2018 14:24)  HR: 92 (25 Oct 2018 14:24) (65 - 92)  BP: 135/52 (25 Oct 2018 14:24) (135/52 - 190/79)  BP(mean): --  RR: 16 (25 Oct 2018 14:24) (16 - 20)  SpO2: 98% (24 Oct 2018 19:22) (97% - 99%)      PHYSICAL EXAM: Alert & Oriented X3  GENERAL: NAD, well-groomed, well-developed  HEAD:  Atraumatic, Normocephalic  EYES: EOMI, PERRLA, conjunctiva and sclera clear  NECK: Supple, No JVD, Normal thyroid  CHEST/LUNG: Clear to percussion bilaterally; No rales, rhonchi, wheezing, or rubs  HEART: Regular rate and rhythm; No murmurs, rubs, or gallops  ABDOMEN: Soft, Nontender, Nondistended; Bowel sounds present  EXTREMITIES:  2+ Peripheral Pulses, No clubbing, cyanosis, or edema    NERVOUS SYSTEM:  Cranial Nerves 2-12 intact [ x ] Abnormal  [  ]  ROM: WFL all extremities [  ]  Abnormal [x  ]  Motor Strength: WFL all extremities  [  ]  Abnormal [ x ]  Sensation: intact to light touch [  ] Abnormal [  ]  Reflexes: Symmetric [  ]  Abnormal [x  ]    FUNCTIONAL STATUS:  Bed Mobility: Independent [  ]  Supervision [  ]  Needs Assistance [x  ]  N/A [  ]  Transfers: Independent [  ]  Supervision [  ]  Needs Assistance [ x ]  N/A [  ]   Ambulation: Independent [  ]  Supervision [  ]  Needs Assistance [ x ]  N/A [  ]  ADL: Independent [x  ] Requires Assistance [  ] N/A [  ]  see  PT  IE NOTES    LABS:                        10.1   7.43  )-----------( 162      ( 24 Oct 2018 07:03 )             30.8     10-24    139  |  100  |  28<H>  ----------------------------<  93  4.9   |  29  |  0.8    Ca    8.2<L>      24 Oct 2018 07:03  Mg     2.1     10-24            RADIOLOGY & ADDITIONAL STUDIES:    Assesment:

## 2018-10-25 NOTE — CONSULT NOTE ADULT - CONSULT REQUESTED BY NAME
Anesthesia
DR GALLARDO
ED
Private MD
attending physician
dr horn
medicine
Medicine
Dr. Chong / Dr. Boggs

## 2018-10-25 NOTE — PHYSICAL THERAPY INITIAL EVALUATION ADULT - IMPAIRED TRANSFERS: SIT/STAND, REHAB EVAL
impaired balance/narrow base of support/decreased endurance/impaired postural control/decreased strength

## 2018-10-25 NOTE — PHYSICAL THERAPY INITIAL EVALUATION ADULT - MANUAL MUSCLE TESTING RESULTS, REHAB EVAL
RUE/RLE ms strength grossly graded 3 to 3+/5; LUE/LLE ms strength grossly graded 3- t0 3/5 with atrophy (L) hand

## 2018-10-25 NOTE — PROGRESS NOTE ADULT - ASSESSMENT
resolved hyperkalemia  ckd 2-3  gross hematuria  Bladder mass with mild right hydronephrosis  S/P TURBT  remote history of anal ca s/p chemo / RT  COPD  CHF  chronic back pain with spinal stimulator  enterococcus UTI  anemia - s/p PRBC transfusion    plan:    needs pre-op meds resumed   f/u with ID re unasyn course   off aldactone  dc lynne per   oob and ambulate

## 2018-10-25 NOTE — CONSULT NOTE ADULT - CONSULT REASON
bladder mass protruding to uterus on CT
CC:  deconditioning
COPD
UTI
armaan rivera
hematuria
hyperkalemia
post op
pre op eval

## 2018-10-25 NOTE — CONSULT NOTE ADULT - ASSESSMENT
IMPRESSION: Rehab of 84 y/o  f  rehab  for  gd  sp  fall  debility      PRECAUTIONS: [  ] Cardiac  [  ] Respiratory  [  ] Seizures [  ] Contact Isolation  [  ] Droplet Isolation  [ fall ] Other    Weight Bearing Status:     RECOMMENDATION:    Out of Bed to Chair     DVT/Decubiti Prophylaxis    REHAB PLAN:     [ xx  ] Bedside P/T 3-5 times a week   [   ]   Bedside O/T  2-3 times a week             [   ] No Rehab Therapy Indicated                   [   ]  Speech Therapy   Conditioning/ROM                                    ADL  Bed Mobility                                               Conditioning/ROM  Transfers                                                     Bed Mobility  Sitting /Standing Balance                         Transfers                                        Gait Training                                               Sitting/Standing Balance  Stair Training [   ]Applicable                    Home equipment Eval                                                                        Splinting  [   ] Only      GOALS:   ADL   [ x  ]   Independent                    Transfers  [ x  ] Independent                          Ambulation  [  x] Independent     [  x  ] With device                            [   ]  CG                                                         [   ]  CG                                                                  [   ] CG                            [    ] Min A                                                   [   ] Min A                                                              [   ] Min  A          DISCHARGE PLAN:   [   ]  Good candidate for Intensive Rehabilitation/Hospital based-[facility]A [facility]                                             Will tolerate 3hrs Intensive Rehab Daily                                       [ x   xx]  Short Term Rehab in Skilled Nursing Facility                                       [    ]  Home with Outpatient or VN services                                         [    ]  Possible Candidate for Intensive Hospital based Rehab

## 2018-10-25 NOTE — PHYSICAL THERAPY INITIAL EVALUATION ADULT - GAIT DEVIATIONS NOTED, PT EVAL
stooped posture, dec heel strike/pushoff/decreased step length/decreased alena/decreased weight-shifting ability

## 2018-10-25 NOTE — PROGRESS NOTE ADULT - ASSESSMENT
POD #1 s/p TURBT ; also noted to have ?suspicious mole on suprapubic area near mons    - Case D/W Dr. Sanchez: may D/C lynne today; pt must be OOB/ambulating thereafter for successful TOV  - No further  intervention - she may F/U in office in 2 weeks; she needs to complete antibiotic treatment for cystitis noted upon cystscopy  - Pt also with ?suspicious mole on suprapubic/mons area - she needs outpatient dermatology eval. Pt made aware. POD #1 s/p TURBT ; also noted to have ?suspicious mole on suprapubic area near mons    - Case D/W Dr. Sanchez: may D/C lynne today; pt must be OOB/ambulating thereafter for successful TOV   - Also recommend to hold Aspitin for 1 week if medically feasible. If not, recommend ASA 81 mg daily  - No further  intervention - she may F/U in office in 2 weeks; she needs to complete antibiotic treatment for cystitis noted upon cystscopy  - Pt also with ?suspicious mole on suprapubic/mons area - she needs outpatient dermatology eval. Pt made aware.

## 2018-10-25 NOTE — CONSULT NOTE ADULT - SUBJECTIVE AND OBJECTIVE BOX
Patient is a 83y old  Female who presents with a chief complaint of Lanie Hematuria (24 Oct 2018 19:38)      HPI:  84 yo F with a PMH of COPD followed by Dr Arango, HTN, HLD, and Chronic Back Pain with spinal stimulator, CHF (GIDD) brought in for lanie hematuria. Pt reports she noticed some blood in her urine for the past month, however within the last 2 days it has become much worse. Pt denies any trauma, abd pain, dysuria, fever, chills, vaginal bleeding or rectal bleeding.   Pt recently admitted for COPD and CHF exacerbation  On presentation /80 HR 66 T98.3. Hgb 6.4 (baseline 9), K 6.6 w/o EKG changes.  Pt received 2 unit PRBC in ED, Insulin, D50 (17 Oct 2018 23:24)      PAST MEDICAL & SURGICAL HISTORY:  CHF (congestive heart failure)  Breast tumor: removal benign bl breasts  Anal cancer  COPD (chronic obstructive pulmonary disease)  Lung disease: COPD  Hypertension  H/O breast surgery: 39 yrs. ago, bilateral  History of back surgery: 10 yrs. ago stimulator implant 2001, 2011      Smoking History:  Ex smoker    Review of Systems:  Hematuria lower abdominal discomfort  No dyspnea cough sputum    Allergies:  hair dyes (Hives)  sulfa drugs (Headache)      MEDICATIONS  (STANDING):  sodium chloride 0.9%. 1000 milliLiter(s) (50 mL/Hr) IV Continuous <Continuous>    MEDICATIONS  (PRN):  hydrocodone-acetaminophen 10/325 10 milliGRAM(s),hydrocodone-acetaminophen  mg 10 milliGRAM(s) 10 milliGRAM(s) Oral every 4 hours PRN pain  HYDROmorphone  Injectable 0.5 milliGRAM(s) IV Push every 15 minutes PRN Severe Pain (7 - 10)  ondansetron Injectable 4 milliGRAM(s) IV Push once PRN Nausea and/or Vomiting  oxyCODONE    5 mG/acetaminophen 325 mG 1 Tablet(s) Oral once PRN Moderate Pain (4 - 6)      PHYSICAL EXAM  Vital Signs Last 24 Hrs  T(C): 36.7 (25 Oct 2018 06:21), Max: 36.7 (25 Oct 2018 06:21)  T(F): 98 (25 Oct 2018 06:21), Max: 98 (25 Oct 2018 06:21)  HR: 75 (25 Oct 2018 06:21) (65 - 83)  BP: 166/72 (25 Oct 2018 06:21) (150/67 - 190/79)  BP(mean): --  RR: 16 (25 Oct 2018 06:21) (16 - 20)  SpO2: 98% (24 Oct 2018 19:22) (97% - 99%) on NC    Awake and alert NAD  Neck supple, no LN  S1 and S2 no murmur  Lungs are clear without wheeze, rhonchi or rales  Abdomen is soft and non tender  There is no edema  Neurologically non focal        LABS:                        10.1   7.43  )-----------( 162      ( 24 Oct 2018 07:03 )             30.8                                               10-24    139  |  100  |  28<H>  ----------------------------<  93  4.9   |  29  |  0.8    Ca    8.2<L>      24 Oct 2018 07:03  Mg     2.1     10-24

## 2018-10-25 NOTE — PHYSICAL THERAPY INITIAL EVALUATION ADULT - GENERAL OBSERVATIONS, REHAB EVAL
13:35-14:00 Chart reviewed. Pt encountered sitting in chair,  may be seen by Physical Therapist as confirmed with Nurse, inc spO2 monitoring on room air with ambulation. Patient denied pain at rest and would like to walk, +O2 via NC; +tele; +lynne

## 2018-10-25 NOTE — PROGRESS NOTE ADULT - SUBJECTIVE AND OBJECTIVE BOX
KTOA SCHAFER  83y  Female    Patient is a 83y old  Female who presents with a chief complaint of José Miguel Hematuria (25 Oct 2018 09:23)    ALLERGIES:  hair dyes (Hives)  sulfa drugs (Headache)      INTERVAL HPI/OVERNIGHT EVENTS:    VITALS:  T(F): 98 (10-25-18 @ 06:21), Max: 98 (10-25-18 @ 06:21)  HR: 75 (10-25-18 @ 06:21) (65 - 83)  BP: 166/72 (10-25-18 @ 06:21) (152/67 - 190/79)  RR: 16 (10-25-18 @ 06:21) (16 - 20)  SpO2: 98% (10-24-18 @ 19:22) (97% - 99%)    LABS:  10-24    139  |  100  |  28<H>  ----------------------------<  93  4.9   |  29  |  0.8    Ca    8.2<L>      24 Oct 2018 07:03  Mg     2.1     10-24      MICROBIOLOGY:    MEDICATION:  amLODIPine   Tablet 5 milliGRAM(s) Oral daily  ampicillin/sulbactam  IVPB 3 Gram(s) IV Intermittent every 6 hours  atorvastatin 20 milliGRAM(s) Oral at bedtime  bisacodyl Suppository 10 milliGRAM(s) Rectal daily PRN  buDESOnide 160 MICROgram(s)/formoterol 4.5 MICROgram(s) Inhaler 2 Puff(s) Inhalation two times a day  hydrocodone-acetaminophen 10/325 10 milliGRAM(s),hydrocodone-acetaminophen  mg 10 milliGRAM(s) 10 milliGRAM(s) Oral every 4 hours PRN  metoprolol succinate ER 50 milliGRAM(s) Oral daily  ondansetron Injectable 4 milliGRAM(s) IV Push once PRN    RADIOLOGY & ADDITIONAL TESTS:

## 2018-10-25 NOTE — CONSULT NOTE ADULT - ASSESSMENT
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

## 2018-10-25 NOTE — CONSULT NOTE ADULT - REASON FOR ADMISSION
José Miguel Hematuria

## 2018-10-25 NOTE — CONSULT NOTE ADULT - CONSULT REQUESTED DATE/TIME
17-Oct-2018 21:57
19-Oct-2018 16:21
20-Oct-2018 20:15
22-Oct-2018 17:49
23-Oct-2018 10:24
24-Oct-2018 19:39
25-Oct-2018 07:06
25-Oct-2018 17:34
19-Oct-2018 07:34

## 2018-10-25 NOTE — PROGRESS NOTE ADULT - ASSESSMENT
resolved hyperkalemia  ckd 2-3  gross hematuria  Bladder mass with mild right hydronephrosis  S/P TURBT  remote history of anal ca s/p chemo / RT  COPD  CHF  chronic back pain with spinal stimulator  enterococcus UTI  anemia - s/p PRBC transfusion    plan:    needs pre-op meds resumed   f/u with ID re unasyn course   off aldactone  patient like to go to REHAB d/w SW  dc lynne per   oob and ambulate

## 2018-10-26 VITALS — WEIGHT: 112.88 LBS | HEIGHT: 62 IN

## 2018-10-26 LAB — SURGICAL PATHOLOGY STUDY: SIGNIFICANT CHANGE UP

## 2018-10-26 RX ADMIN — AMPICILLIN SODIUM AND SULBACTAM SODIUM 200 GRAM(S): 250; 125 INJECTION, POWDER, FOR SUSPENSION INTRAMUSCULAR; INTRAVENOUS at 05:37

## 2018-10-26 RX ADMIN — Medication 50 MILLIGRAM(S): at 05:37

## 2018-10-26 RX ADMIN — AMPICILLIN SODIUM AND SULBACTAM SODIUM 200 GRAM(S): 250; 125 INJECTION, POWDER, FOR SUSPENSION INTRAMUSCULAR; INTRAVENOUS at 00:01

## 2018-10-26 RX ADMIN — BUDESONIDE AND FORMOTEROL FUMARATE DIHYDRATE 2 PUFF(S): 160; 4.5 AEROSOL RESPIRATORY (INHALATION) at 07:47

## 2018-10-26 RX ADMIN — AMLODIPINE BESYLATE 5 MILLIGRAM(S): 2.5 TABLET ORAL at 05:37

## 2018-10-26 NOTE — PROGRESS NOTE ADULT - REASON FOR ADMISSION
José Miguel Hematuria

## 2018-10-26 NOTE — DIETITIAN INITIAL EVALUATION ADULT. - ENERGY NEEDS
Calories: 1110-1203kcals/day (MSJ A.F 1.2-1.3)   Protein: 51-61g/day (1-1.2g/kg)   Fluid: 1:1ml/kcal

## 2018-10-26 NOTE — PROGRESS NOTE ADULT - SUBJECTIVE AND OBJECTIVE BOX
NEPHROLOGY FOLLOW UP NOTE    d/w team  no hematuria  K+ fine  tolerating PO  for dc to str    PAST MEDICAL & SURGICAL HISTORY:  CHF (congestive heart failure)  Breast tumor: removal benign bl breasts  Anal cancer  COPD (chronic obstructive pulmonary disease)  Lung disease: COPD  Hypertension  H/O breast surgery: 39 yrs. ago, bilateral  History of back surgery: 10 yrs. ago stimulator implant ,     Allergies:  hair dyes (Hives)  sulfa drugs (Headache)    Home Medications Reviewed    SOCIAL HISTORY:  Denies ETOH,Smoking,   FAMILY HISTORY:  No pertinent family history in first degree relatives        REVIEW OF SYSTEMS:  All other review of systems is negative unless indicated above.      PHYSICAL EXAM:  NAD  awake and alert  moist mm  no jvd  decreased BS bl  rrr  soft, + LLQ tenderness  no edema    Hospital Medications:   MEDICATIONS  (STANDING):  amLODIPine   Tablet 5 milliGRAM(s) Oral daily  ampicillin/sulbactam  IVPB 3 Gram(s) IV Intermittent every 6 hours  atorvastatin 20 milliGRAM(s) Oral at bedtime  buDESOnide 160 MICROgram(s)/formoterol 4.5 MICROgram(s) Inhaler 2 Puff(s) Inhalation two times a day  metoprolol succinate ER 50 milliGRAM(s) Oral daily        VITALS:  T(F): 96.1 (10-26-18 @ 06:00), Max: 97.8 (10-25-18 @ 22:25)  HR: 68 (10-26-18 @ 06:00)  BP: 156/67 (10-26-18 @ 06:00)  RR: 16 (10-26-18 @ 06:00)  SpO2: --  Wt(kg): --    10-24 @ :  -  10-25 @ 07:00  --------------------------------------------------------  IN: 50 mL / OUT: 400 mL / NET: -350 mL    10-25 @ 07:  -  10-26 @ 07:00  --------------------------------------------------------  IN: 730 mL / OUT: 300 mL / NET: 430 mL    10-26 @ 07:01  -  10-26 @ 16:26  --------------------------------------------------------  IN: 310 mL / OUT: 150 mL / NET: 160 mL      Height (cm): 157.48 (10-26 @ 10:51)  Weight (kg): 51.2 (10-26 @ 10:51)  BMI (kg/m2): 20.6 (10-26 @ 10:51)  BSA (m2): 1.5 (10-26 @ 10:51)    LABS:            Urine Studies:  Urinalysis Basic - ( 22 Oct 2018 21:10 )    Color: Red / Appearance: Turbid / S.025 / pH:   Gluc:  / Ketone: 40  / Bili: Large / Urobili: 1.0 mg/dL   Blood:  / Protein: >=300 mg/dL / Nitrite: Positive   Leuk Esterase: Large / RBC: >50 /HPF / WBC 10-25 /HPF   Sq Epi:  / Non Sq Epi: Occasional /HPF / Bacteria: Moderate /HPF          RADIOLOGY & ADDITIONAL STUDIES:

## 2018-10-26 NOTE — PROGRESS NOTE ADULT - SUBJECTIVE AND OBJECTIVE BOX
KOTA SCHAFER  83y  Female    Patient is a 83y old  Female who presents with a chief complaint of José Miguel Hematuria (25 Oct 2018 17:33)    ALLERGIES:  hair dyes (Hives)  sulfa drugs (Headache)      INTERVAL HPI/OVERNIGHT EVENTS:    VITALS:  T(F): 96.1 (10-26-18 @ 06:00), Max: 98.7 (10-25-18 @ 14:24)  HR: 68 (10-26-18 @ 06:00) (68 - 92)  BP: 156/67 (10-26-18 @ 06:00) (135/52 - 156/67)  RR: 16 (10-26-18 @ 06:00) (16 - 16)  SpO2: --    LABS:        MICROBIOLOGY:    MEDICATION:  amLODIPine   Tablet 5 milliGRAM(s) Oral daily  ampicillin/sulbactam  IVPB 3 Gram(s) IV Intermittent every 6 hours  atorvastatin 20 milliGRAM(s) Oral at bedtime  bisacodyl Suppository 10 milliGRAM(s) Rectal daily PRN  buDESOnide 160 MICROgram(s)/formoterol 4.5 MICROgram(s) Inhaler 2 Puff(s) Inhalation two times a day  hydrocodone-acetaminophen 10/325 10 milliGRAM(s),hydrocodone-acetaminophen  mg 10 milliGRAM(s) 10 milliGRAM(s) Oral every 4 hours PRN  metoprolol succinate ER 50 milliGRAM(s) Oral daily  ondansetron Injectable 4 milliGRAM(s) IV Push once PRN    RADIOLOGY & ADDITIONAL TESTS:

## 2018-10-26 NOTE — CHART NOTE - NSCHARTNOTEFT_GEN_A_CORE
Called by attending; Ok to discharge to SNF when bed available.  Discharge paperwork completed.  d/w CM and patient has bed at [facility].  Order placed for discharge

## 2018-10-26 NOTE — PROGRESS NOTE ADULT - PROVIDER SPECIALTY LIST ADULT
Infectious Disease
Internal Medicine
Nephrology
Urology
Internal Medicine

## 2018-10-26 NOTE — PROGRESS NOTE ADULT - ASSESSMENT
resolved hyperkalemia  ckd 2-3  gross hematuria  Bladder mass with mild right hydronephrosis  S/P TURBT  remote history of anal ca s/p chemo / RT  COPD  CHF  chronic back pain with spinal stimulator  enterococcus UTI  anemia - s/p PRBC transfusion    plan:    dc to snf  f/u   f/u path  no aldactone  cont norvasc / lopressor  abx per ID

## 2018-10-26 NOTE — DIETITIAN INITIAL EVALUATION ADULT. - OTHER INFO
Reason for assessment: LOS. PMH: anal CA, breast tumor, CHF, COPD, HTN, HLD. Pt presented to ED w/ blood in urine for about one month. Pt is admitted w/ lanie hematuria. Hyperkalemia (resolved), CKD ll-lll, anemia (s/p PRBC transfusion), and UTI noted. Pt is pending d/c to rehab. Pt denies any recent wt loss. Last BM was about 1-2 days ago. Fecal incontinence noted. Pt reports constipation due to pain killers. NKFA.

## 2018-10-27 ENCOUNTER — OUTPATIENT (OUTPATIENT)
Dept: OUTPATIENT SERVICES | Facility: HOSPITAL | Age: 83
LOS: 1 days | Discharge: HOME | End: 2018-10-27

## 2018-10-27 DIAGNOSIS — Z98.890 OTHER SPECIFIED POSTPROCEDURAL STATES: Chronic | ICD-10-CM

## 2018-10-30 DIAGNOSIS — D64.9 ANEMIA, UNSPECIFIED: ICD-10-CM

## 2018-10-30 DIAGNOSIS — I10 ESSENTIAL (PRIMARY) HYPERTENSION: ICD-10-CM

## 2018-11-13 ENCOUNTER — APPOINTMENT (OUTPATIENT)
Dept: UROLOGY | Facility: CLINIC | Age: 83
End: 2018-11-13
Payer: MEDICARE

## 2018-11-13 VITALS
DIASTOLIC BLOOD PRESSURE: 53 MMHG | WEIGHT: 110 LBS | SYSTOLIC BLOOD PRESSURE: 121 MMHG | HEIGHT: 62 IN | BODY MASS INDEX: 20.24 KG/M2 | HEART RATE: 71 BPM

## 2018-11-13 DIAGNOSIS — Z82.49 FAMILY HISTORY OF ISCHEMIC HEART DISEASE AND OTHER DISEASES OF THE CIRCULATORY SYSTEM: ICD-10-CM

## 2018-11-13 DIAGNOSIS — Z80.42 FAMILY HISTORY OF MALIGNANT NEOPLASM OF PROSTATE: ICD-10-CM

## 2018-11-13 DIAGNOSIS — Z87.891 PERSONAL HISTORY OF NICOTINE DEPENDENCE: ICD-10-CM

## 2018-11-13 DIAGNOSIS — Z85.048 PERSONAL HISTORY OF OTHER MALIGNANT NEOPLASM OF RECTUM, RECTOSIGMOID JUNCTION, AND ANUS: ICD-10-CM

## 2018-11-13 PROCEDURE — 99214 OFFICE O/P EST MOD 30 MIN: CPT

## 2018-11-17 ENCOUNTER — OUTPATIENT (OUTPATIENT)
Dept: OUTPATIENT SERVICES | Facility: HOSPITAL | Age: 83
LOS: 1 days | Discharge: HOME | End: 2018-11-17

## 2018-11-17 DIAGNOSIS — Z98.890 OTHER SPECIFIED POSTPROCEDURAL STATES: Chronic | ICD-10-CM

## 2018-11-17 DIAGNOSIS — D64.9 ANEMIA, UNSPECIFIED: ICD-10-CM

## 2018-11-19 ENCOUNTER — OUTPATIENT (OUTPATIENT)
Dept: OUTPATIENT SERVICES | Facility: HOSPITAL | Age: 83
LOS: 1 days | Discharge: HOME | End: 2018-11-19

## 2018-11-19 DIAGNOSIS — Z98.890 OTHER SPECIFIED POSTPROCEDURAL STATES: Chronic | ICD-10-CM

## 2018-11-19 DIAGNOSIS — D64.9 ANEMIA, UNSPECIFIED: ICD-10-CM

## 2018-11-20 ENCOUNTER — OUTPATIENT (OUTPATIENT)
Dept: OUTPATIENT SERVICES | Facility: HOSPITAL | Age: 83
LOS: 1 days | Discharge: HOME | End: 2018-11-20

## 2018-11-20 DIAGNOSIS — Z98.890 OTHER SPECIFIED POSTPROCEDURAL STATES: Chronic | ICD-10-CM

## 2018-11-20 DIAGNOSIS — R79.9 ABNORMAL FINDING OF BLOOD CHEMISTRY, UNSPECIFIED: ICD-10-CM

## 2018-11-23 ENCOUNTER — OUTPATIENT (OUTPATIENT)
Dept: OUTPATIENT SERVICES | Facility: HOSPITAL | Age: 83
LOS: 1 days | Discharge: HOME | End: 2018-11-23

## 2018-11-23 DIAGNOSIS — Z98.890 OTHER SPECIFIED POSTPROCEDURAL STATES: Chronic | ICD-10-CM

## 2018-11-23 DIAGNOSIS — D64.9 ANEMIA, UNSPECIFIED: ICD-10-CM

## 2018-11-26 ENCOUNTER — OUTPATIENT (OUTPATIENT)
Dept: OUTPATIENT SERVICES | Facility: HOSPITAL | Age: 83
LOS: 1 days | Discharge: HOME | End: 2018-11-26

## 2018-11-26 DIAGNOSIS — D64.9 ANEMIA, UNSPECIFIED: ICD-10-CM

## 2018-11-26 DIAGNOSIS — Z98.890 OTHER SPECIFIED POSTPROCEDURAL STATES: Chronic | ICD-10-CM

## 2018-11-27 ENCOUNTER — OUTPATIENT (OUTPATIENT)
Dept: OUTPATIENT SERVICES | Facility: HOSPITAL | Age: 83
LOS: 1 days | Discharge: HOME | End: 2018-11-27

## 2018-11-27 DIAGNOSIS — R30.0 DYSURIA: ICD-10-CM

## 2018-11-27 DIAGNOSIS — Z98.890 OTHER SPECIFIED POSTPROCEDURAL STATES: Chronic | ICD-10-CM

## 2018-11-27 DIAGNOSIS — R31.9 HEMATURIA, UNSPECIFIED: ICD-10-CM

## 2018-12-03 ENCOUNTER — OUTPATIENT (OUTPATIENT)
Dept: OUTPATIENT SERVICES | Facility: HOSPITAL | Age: 83
LOS: 1 days | Discharge: HOME | End: 2018-12-03

## 2018-12-03 DIAGNOSIS — R79.9 ABNORMAL FINDING OF BLOOD CHEMISTRY, UNSPECIFIED: ICD-10-CM

## 2018-12-03 DIAGNOSIS — Z98.890 OTHER SPECIFIED POSTPROCEDURAL STATES: Chronic | ICD-10-CM

## 2018-12-27 ENCOUNTER — OUTPATIENT (OUTPATIENT)
Dept: OUTPATIENT SERVICES | Facility: HOSPITAL | Age: 83
LOS: 1 days | Discharge: HOME | End: 2018-12-27

## 2018-12-27 DIAGNOSIS — I10 ESSENTIAL (PRIMARY) HYPERTENSION: ICD-10-CM

## 2018-12-27 DIAGNOSIS — Z98.890 OTHER SPECIFIED POSTPROCEDURAL STATES: Chronic | ICD-10-CM

## 2019-01-17 ENCOUNTER — OUTPATIENT (OUTPATIENT)
Dept: OUTPATIENT SERVICES | Facility: HOSPITAL | Age: 84
LOS: 1 days | Discharge: HOME | End: 2019-01-17

## 2019-01-17 VITALS
DIASTOLIC BLOOD PRESSURE: 68 MMHG | HEIGHT: 63 IN | SYSTOLIC BLOOD PRESSURE: 156 MMHG | TEMPERATURE: 98 F | HEART RATE: 73 BPM | WEIGHT: 115.08 LBS | RESPIRATION RATE: 18 BRPM | OXYGEN SATURATION: 94 %

## 2019-01-17 DIAGNOSIS — Z01.818 ENCOUNTER FOR OTHER PREPROCEDURAL EXAMINATION: ICD-10-CM

## 2019-01-17 DIAGNOSIS — Z98.890 OTHER SPECIFIED POSTPROCEDURAL STATES: Chronic | ICD-10-CM

## 2019-01-17 DIAGNOSIS — C67.9 MALIGNANT NEOPLASM OF BLADDER, UNSPECIFIED: ICD-10-CM

## 2019-01-17 LAB
ALBUMIN SERPL ELPH-MCNC: 4.2 G/DL — SIGNIFICANT CHANGE UP (ref 3.5–5.2)
ALP SERPL-CCNC: 64 U/L — SIGNIFICANT CHANGE UP (ref 30–115)
ALT FLD-CCNC: 8 U/L — SIGNIFICANT CHANGE UP (ref 0–41)
ANION GAP SERPL CALC-SCNC: 15 MMOL/L — HIGH (ref 7–14)
APTT BLD: 32.3 SEC — SIGNIFICANT CHANGE UP (ref 27–39.2)
AST SERPL-CCNC: 10 U/L — SIGNIFICANT CHANGE UP (ref 0–41)
BASOPHILS # BLD AUTO: 0.04 K/UL — SIGNIFICANT CHANGE UP (ref 0–0.2)
BASOPHILS NFR BLD AUTO: 0.4 % — SIGNIFICANT CHANGE UP (ref 0–1)
BILIRUB SERPL-MCNC: 0.4 MG/DL — SIGNIFICANT CHANGE UP (ref 0.2–1.2)
BLD GP AB SCN SERPL QL: SIGNIFICANT CHANGE UP
BUN SERPL-MCNC: 19 MG/DL — SIGNIFICANT CHANGE UP (ref 10–20)
CALCIUM SERPL-MCNC: 8.8 MG/DL — SIGNIFICANT CHANGE UP (ref 8.5–10.1)
CHLORIDE SERPL-SCNC: 93 MMOL/L — LOW (ref 98–110)
CO2 SERPL-SCNC: 26 MMOL/L — SIGNIFICANT CHANGE UP (ref 17–32)
CREAT SERPL-MCNC: 0.9 MG/DL — SIGNIFICANT CHANGE UP (ref 0.7–1.5)
EOSINOPHIL # BLD AUTO: 0.77 K/UL — HIGH (ref 0–0.7)
EOSINOPHIL NFR BLD AUTO: 7.2 % — SIGNIFICANT CHANGE UP (ref 0–8)
GLUCOSE SERPL-MCNC: 83 MG/DL — SIGNIFICANT CHANGE UP (ref 70–99)
HCT VFR BLD CALC: 26 % — LOW (ref 37–47)
HGB BLD-MCNC: 8.6 G/DL — LOW (ref 12–16)
IMM GRANULOCYTES NFR BLD AUTO: 0.6 % — HIGH (ref 0.1–0.3)
INR BLD: 0.99 RATIO — SIGNIFICANT CHANGE UP (ref 0.65–1.3)
LYMPHOCYTES # BLD AUTO: 1.26 K/UL — SIGNIFICANT CHANGE UP (ref 1.2–3.4)
LYMPHOCYTES # BLD AUTO: 11.8 % — LOW (ref 20.5–51.1)
MCHC RBC-ENTMCNC: 33.1 G/DL — SIGNIFICANT CHANGE UP (ref 32–37)
MCHC RBC-ENTMCNC: 33.3 PG — HIGH (ref 27–31)
MCV RBC AUTO: 100.8 FL — HIGH (ref 81–99)
MONOCYTES # BLD AUTO: 0.55 K/UL — SIGNIFICANT CHANGE UP (ref 0.1–0.6)
MONOCYTES NFR BLD AUTO: 5.1 % — SIGNIFICANT CHANGE UP (ref 1.7–9.3)
NEUTROPHILS # BLD AUTO: 8.01 K/UL — HIGH (ref 1.4–6.5)
NEUTROPHILS NFR BLD AUTO: 74.9 % — SIGNIFICANT CHANGE UP (ref 42.2–75.2)
NRBC # BLD: 0 /100 WBCS — SIGNIFICANT CHANGE UP (ref 0–0)
PLATELET # BLD AUTO: 155 K/UL — SIGNIFICANT CHANGE UP (ref 130–400)
POTASSIUM SERPL-MCNC: 5.2 MMOL/L — HIGH (ref 3.5–5)
POTASSIUM SERPL-SCNC: 5.2 MMOL/L — HIGH (ref 3.5–5)
PROT SERPL-MCNC: 6.9 G/DL — SIGNIFICANT CHANGE UP (ref 6–8)
PROTHROM AB SERPL-ACNC: 11.4 SEC — SIGNIFICANT CHANGE UP (ref 9.95–12.87)
RBC # BLD: 2.58 M/UL — LOW (ref 4.2–5.4)
RBC # FLD: 15.6 % — HIGH (ref 11.5–14.5)
SODIUM SERPL-SCNC: 134 MMOL/L — LOW (ref 135–146)
TYPE + AB SCN PNL BLD: SIGNIFICANT CHANGE UP
WBC # BLD: 10.69 K/UL — SIGNIFICANT CHANGE UP (ref 4.8–10.8)
WBC # FLD AUTO: 10.69 K/UL — SIGNIFICANT CHANGE UP (ref 4.8–10.8)

## 2019-01-17 NOTE — H&P PST ADULT - HISTORY OF PRESENT ILLNESS
(On 10/23/2018 82 yo F with a PMH of COPD , HTN, HLD,and Chronic Back Pain with spinal stimulator, CHF (GIDD) brought in for lanie  hematuria. Pt reports she noticed some blood in her urine for the past month,however within the last 2 days it has become much worse. Pt denies any trauma,  abd pain, dysuria, fever, chills, vaginal bleeding or rectal bleeding.  Pt recently admitted for COPD and CHF exacerbation  On presentation /80 HR 66 T98.3. Hgb 6.4 (baseline 9), K 6.6 w/o EKG  changes.  Pt received 2 unit PRBC in ED, Insulin, D50)  On 10/24/2018 TURBT (transurethral resection of bladder tumor)  with cystoscopy,  clot evacuation and fulguration Active Dr. MONAHAN.   1/17/2019 Patient presents to [facility] via rolling walker and Oxygen 2 L via NC and  accompanied by daughters. Patient denies any cp, palpitations, fever, cough or dysuria. Patient c/o mild SOB and difficulty in urination. No FARIDA. Ex tolerance very limited.

## 2019-01-17 NOTE — H&P PST ADULT - PMH
Anal cancer    Bladder cancer  2018  Breast tumor  removal benign bl breasts  CHF (congestive heart failure)    COPD (chronic obstructive pulmonary disease)    Hypertension    Lung disease  COPD Anal cancer  Chemo and radiation 1992  Anemia  4 PRBC 11/2018  Bladder cancer  2018  Breast tumor  removal benign bl breasts  CHF (congestive heart failure)    COPD (chronic obstructive pulmonary disease)    Hypertension    Lung disease  COPD  Oxygen dependent  O2 2L Via NC

## 2019-01-17 NOTE — H&P PST ADULT - REASON FOR ADMISSION
85 yo F presents to [facility] for cystoscopy, resection of bladder tumor under LSB on 1/30/2018 [facility] Dr. Story.

## 2019-01-17 NOTE — H&P PST ADULT - PSH
H/O breast surgery  39 yrs. ago, bilateral  History of back surgery  10 yrs. ago stimulator implant 2001, 2011 H/O breast surgery  39 yrs. ago, bilateral  History of back surgery  10 yrs. ago stimulator implant 2001, 2011( cervical and Lumbar)  History of cystoscopy  10/2018

## 2019-01-17 NOTE — H&P PST ADULT - NSANTHOSAYNRD_GEN_A_CORE
No. FARIDA screening performed.  STOP BANG Legend: 0-2 = LOW Risk; 3-4 = INTERMEDIATE Risk; 5-8 = HIGH Risk

## 2019-01-18 PROBLEM — C21.0 MALIGNANT NEOPLASM OF ANUS, UNSPECIFIED: Chronic | Status: ACTIVE | Noted: 2018-09-19

## 2019-01-18 LAB
APPEARANCE UR: ABNORMAL
BACTERIA # UR AUTO: ABNORMAL /HPF
BILIRUB UR-MCNC: NEGATIVE — SIGNIFICANT CHANGE UP
COLOR SPEC: YELLOW — SIGNIFICANT CHANGE UP
DIFF PNL FLD: ABNORMAL
GLUCOSE UR QL: NEGATIVE MG/DL — SIGNIFICANT CHANGE UP
KETONES UR-MCNC: NEGATIVE — SIGNIFICANT CHANGE UP
LEUKOCYTE ESTERASE UR-ACNC: ABNORMAL
NITRITE UR-MCNC: NEGATIVE — SIGNIFICANT CHANGE UP
PH UR: 7 — SIGNIFICANT CHANGE UP (ref 5–8)
PROT UR-MCNC: ABNORMAL MG/DL
RBC CASTS # UR COMP ASSIST: ABNORMAL /HPF
SP GR SPEC: 1.01 — SIGNIFICANT CHANGE UP (ref 1.01–1.03)
UROBILINOGEN FLD QL: 0.2 MG/DL — SIGNIFICANT CHANGE UP (ref 0.2–0.2)
WBC UR QL: >50 /HPF

## 2019-01-22 RX ORDER — CIPROFLOXACIN HYDROCHLORIDE 500 MG/1
500 TABLET, FILM COATED ORAL
Qty: 10 | Refills: 0 | Status: ACTIVE | COMMUNITY
Start: 2019-01-22 | End: 1900-01-01

## 2019-01-26 ENCOUNTER — OUTPATIENT (OUTPATIENT)
Dept: OUTPATIENT SERVICES | Facility: HOSPITAL | Age: 84
LOS: 1 days | Discharge: HOME | End: 2019-01-26

## 2019-01-26 DIAGNOSIS — Z98.890 OTHER SPECIFIED POSTPROCEDURAL STATES: Chronic | ICD-10-CM

## 2019-01-26 DIAGNOSIS — N39.0 URINARY TRACT INFECTION, SITE NOT SPECIFIED: ICD-10-CM

## 2019-01-26 PROBLEM — D64.9 ANEMIA, UNSPECIFIED: Chronic | Status: ACTIVE | Noted: 2019-01-17

## 2019-01-26 PROBLEM — C67.9 MALIGNANT NEOPLASM OF BLADDER, UNSPECIFIED: Chronic | Status: ACTIVE | Noted: 2019-01-17

## 2019-01-26 PROBLEM — Z99.81 DEPENDENCE ON SUPPLEMENTAL OXYGEN: Chronic | Status: ACTIVE | Noted: 2019-01-17

## 2019-01-28 RX ORDER — AMPICILLIN 500 MG/1
500 CAPSULE ORAL 3 TIMES DAILY
Qty: 15 | Refills: 0 | Status: ACTIVE | COMMUNITY
Start: 2019-01-28 | End: 1900-01-01

## 2019-01-29 LAB — BACTERIA UR CULT: ABNORMAL

## 2019-01-29 NOTE — ASU PATIENT PROFILE, ADULT - PMH
Anal cancer  Chemo and radiation 1992  Anemia  4 PRBC 11/2018  Bladder cancer  2018  Breast tumor  removal benign bl breasts  CHF (congestive heart failure)    COPD (chronic obstructive pulmonary disease)    Hypertension    Lung disease  COPD  Oxygen dependent  O2 2L Via NC

## 2019-01-29 NOTE — ASU PATIENT PROFILE, ADULT - PSH
H/O breast surgery  39 yrs. ago, bilateral  History of back surgery  10 yrs. ago stimulator implant 2001, 2011( cervical and Lumbar)  History of cystoscopy  10/2018

## 2019-01-30 ENCOUNTER — OUTPATIENT (OUTPATIENT)
Dept: OUTPATIENT SERVICES | Facility: HOSPITAL | Age: 84
LOS: 1 days | Discharge: HOME | End: 2019-01-30

## 2019-01-30 ENCOUNTER — RESULT REVIEW (OUTPATIENT)
Age: 84
End: 2019-01-30

## 2019-01-30 ENCOUNTER — APPOINTMENT (OUTPATIENT)
Dept: UROLOGY | Facility: HOSPITAL | Age: 84
End: 2019-01-30
Payer: MEDICARE

## 2019-01-30 ENCOUNTER — RX RENEWAL (OUTPATIENT)
Age: 84
End: 2019-01-30

## 2019-01-30 VITALS — RESPIRATION RATE: 17 BRPM | DIASTOLIC BLOOD PRESSURE: 76 MMHG | HEART RATE: 64 BPM | SYSTOLIC BLOOD PRESSURE: 145 MMHG

## 2019-01-30 VITALS
OXYGEN SATURATION: 93 % | SYSTOLIC BLOOD PRESSURE: 209 MMHG | HEART RATE: 76 BPM | TEMPERATURE: 97 F | RESPIRATION RATE: 17 BRPM | WEIGHT: 115.08 LBS | DIASTOLIC BLOOD PRESSURE: 79 MMHG | HEIGHT: 63 IN

## 2019-01-30 DIAGNOSIS — Z98.890 OTHER SPECIFIED POSTPROCEDURAL STATES: Chronic | ICD-10-CM

## 2019-01-30 PROCEDURE — 52240 CYSTOSCOPY AND TREATMENT: CPT

## 2019-01-30 RX ORDER — OXYBUTYNIN CHLORIDE 5 MG
5 TABLET ORAL ONCE
Qty: 0 | Refills: 0 | Status: COMPLETED | OUTPATIENT
Start: 2019-01-30 | End: 2019-01-30

## 2019-01-30 RX ORDER — SODIUM CHLORIDE 9 MG/ML
1000 INJECTION, SOLUTION INTRAVENOUS
Qty: 0 | Refills: 0 | Status: DISCONTINUED | OUTPATIENT
Start: 2019-01-30 | End: 2019-01-30

## 2019-01-30 RX ORDER — PHENAZOPYRIDINE HCL 100 MG
200 TABLET ORAL ONCE
Qty: 0 | Refills: 0 | Status: COMPLETED | OUTPATIENT
Start: 2019-01-30 | End: 2019-01-30

## 2019-01-30 RX ORDER — OXYBUTYNIN CHLORIDE 5 MG/1
5 TABLET, EXTENDED RELEASE ORAL
Qty: 90 | Refills: 0 | Status: ACTIVE | COMMUNITY
Start: 2019-01-30 | End: 1900-01-01

## 2019-01-30 RX ORDER — HYDROMORPHONE HYDROCHLORIDE 2 MG/ML
0.5 INJECTION INTRAMUSCULAR; INTRAVENOUS; SUBCUTANEOUS
Qty: 0 | Refills: 0 | Status: DISCONTINUED | OUTPATIENT
Start: 2019-01-30 | End: 2019-01-30

## 2019-01-30 RX ORDER — ONDANSETRON 8 MG/1
4 TABLET, FILM COATED ORAL ONCE
Qty: 0 | Refills: 0 | Status: DISCONTINUED | OUTPATIENT
Start: 2019-01-30 | End: 2019-01-30

## 2019-01-30 RX ADMIN — Medication 200 MILLIGRAM(S): at 08:57

## 2019-01-30 RX ADMIN — HYDROMORPHONE HYDROCHLORIDE 0.5 MILLIGRAM(S): 2 INJECTION INTRAMUSCULAR; INTRAVENOUS; SUBCUTANEOUS at 08:45

## 2019-01-30 RX ADMIN — Medication 5 MILLIGRAM(S): at 09:03

## 2019-01-30 NOTE — ASU DISCHARGE PLAN (ADULT/PEDIATRIC). - SPECIAL INSTRUCTIONS
You have a catheter which will stay in until your next appointment with Dr Sanchez. Please complete antibiotic course. Save one antibiotic tablet for the morning of catheter removal. You may take tylenol for pain as needed.  Blood In urine is expected. If the catheter stops draining or if you develop fevers or chills >100.4 not improving w tylenol you should visit emergency room and alert Dr Sanchez.  Dr Sanchez's office will call you for a follow up appointment for Monday Feb 4th for catheter removal

## 2019-01-30 NOTE — BRIEF OPERATIVE NOTE - OPERATION/FINDINGS
prior resection site at right posterior wall with necrotic debris and small nodule vs edema, no obvious papillary tumor regrowth. Posterior wall prior biopsy site also resected, there was scar and necrotic debris there as well

## 2019-01-30 NOTE — BRIEF OPERATIVE NOTE - PROCEDURE
<<-----Click on this checkbox to enter Procedure TURBT, using bipolar cautery  01/30/2019    Active  MADELINE

## 2019-02-04 ENCOUNTER — APPOINTMENT (OUTPATIENT)
Dept: UROLOGY | Facility: CLINIC | Age: 84
End: 2019-02-04
Payer: MEDICARE

## 2019-02-04 VITALS
BODY MASS INDEX: 20.24 KG/M2 | HEIGHT: 62 IN | WEIGHT: 110 LBS | DIASTOLIC BLOOD PRESSURE: 60 MMHG | HEART RATE: 72 BPM | SYSTOLIC BLOOD PRESSURE: 167 MMHG

## 2019-02-04 DIAGNOSIS — N28.89 OTHER SPECIFIED DISORDERS OF KIDNEY AND URETER: ICD-10-CM

## 2019-02-04 DIAGNOSIS — C67.9 MALIGNANT NEOPLASM OF BLADDER, UNSPECIFIED: ICD-10-CM

## 2019-02-04 PROCEDURE — 99214 OFFICE O/P EST MOD 30 MIN: CPT

## 2019-02-04 NOTE — ASSESSMENT
[FreeTextEntry1] : KOTA SCHAFER is a 83 year old female who presented With gross hematuria and severe anemia requiring cystoscopy with clot evacuation and fulguration on October 24, 2018 found to have a bladder tumor status post a resection with pathology demonstrating high-grade T1 urothelial carcinoma with no muscle in the specimen and restaging TURBT 1/30/19, findings prior resection site at right posterior wall with necrotic debris and small nodule vs edema, no obvious papillary tumor regrowth. Posterior wall prior biopsy site also resected, there was scar and necrotic debris there as well\par PATHOLOGY pending, however discussed case w Dr Rae and this appears to be a T1 w no muscle invasion, possible nested variant.\par \par If pathology is infact T1, especially w variant histology I discussed that cystectomy is in fact an option however she is a very poor surgical candidate (home O2, etc.). \par We will proceed with intravesical therapy with induction BCG and if she responds we can pursue maintenance BCG. We will reimage the renal mass next visit but for now we will continue active surveillance. \par \par The patient understands the risk of BCG sepsis, fever, urinary frequency, urgency and LUTS.\par The patient will come directly to the ER if she develops chills or fever >101.\par \par Rowe dced today, TOV. ER if f/c explained.\par \par

## 2019-02-04 NOTE — LETTER BODY
[Dear  ___] : Dear  [unfilled], [Consult Letter:] : I had the pleasure of evaluating your patient, [unfilled]. [Please see my note below.] : Please see my note below. [Consult Closing:] : Thank you very much for allowing me to participate in the care of this patient.  If you have any questions, please do not hesitate to contact me. [Sincerely,] : Sincerely, [FreeTextEntry2] : José Miguel Gerber MD [FreeTextEntry3] : Nate Sanchez MD\par [facility] Urologic [facility]imally Invasive Surgery\par [facility]\par Division of Urology\par

## 2019-02-04 NOTE — PHYSICAL EXAM

## 2019-02-04 NOTE — HISTORY OF PRESENT ILLNESS
[FreeTextEntry1] : KOTA SCHAFER is a 83 year old female who presented With gross hematuria and severe anemia requiring cystoscopy with clot evacuation and fulguration on October 24, 2018 found to have a bladder tumor status post a resection with pathology demonstrating high-grade T1 urothelial carcinoma with no muscle in the specimen and restaging TURBT 1/30/19, findings prior resection site at right posterior wall with necrotic debris and small nodule vs edema, no obvious papillary tumor regrowth. Posterior wall prior biopsy site also resected, there was scar and necrotic debris there as well\par Doing well denies f/c. \par PATHOLOGY pending, however discussed case w Dr Rae and this appears to be a T1 w no muscle invasion.\par On CT urogram she was also noted to have a questionable new 1.8 cm left renal lesion.\par Images visualized and reviewed and the 1.8 cm left renal lesion.\par \par \par PATH-\par Final Diagnosis 1. Right lateral wall, bladder tumor: -Papillary urothelial carcinoma, high grade, focally invasive into subepithelial connective tissue, detached fragments, admixed with abundant blood clot.  -No superficial or deep smooth muscle is identified in this entirely submitted specimen.  2. Bladder dome erythema: - Partially  denuded fragment of urothelial mucosa with edema, mild chronic inflammation, vascular congestion and focal fresh hemorrhage. - No evidence of a neoplastic process is identified.\par \par Images:\par CTU\par Again seen is a 3.8 x 1.5 x 1.4 cm mass along the right superoposterior  wall of the urinary bladder with diffuse bladder wall thickening. There  is apparent enhancement of this mass, but limited in evaluation as the  bladder is collapsed. Findings are suspicious for malignancy.  No change in mild right hydronephrosis  Previously noted 7 x 4 cm soft tissue mass posterior to the urinary  bladder is felt to likely represent uterus. There is a large left adnexal  lesion measuring 4.0 x 2 5 cm, stable since prior exam. Continued  interval follow-up is recommended  Indeterminate left lower pole renal lesion measuring 1.8 x 1.8 cm, not  well-seen on prior examination due to differences in technique. However,  this is new since December 15, 2015 and a neoplastic process is not  excluded. \par Labs:\par

## 2019-02-05 ENCOUNTER — INPATIENT (INPATIENT)
Facility: HOSPITAL | Age: 84
LOS: 35 days | End: 2019-03-13
Attending: INTERNAL MEDICINE | Admitting: INTERNAL MEDICINE
Payer: MEDICARE

## 2019-02-05 VITALS
DIASTOLIC BLOOD PRESSURE: 79 MMHG | HEIGHT: 62 IN | WEIGHT: 104.94 LBS | TEMPERATURE: 98 F | RESPIRATION RATE: 20 BRPM | OXYGEN SATURATION: 96 % | SYSTOLIC BLOOD PRESSURE: 181 MMHG | HEART RATE: 90 BPM

## 2019-02-05 DIAGNOSIS — N39.0 URINARY TRACT INFECTION, SITE NOT SPECIFIED: ICD-10-CM

## 2019-02-05 DIAGNOSIS — R65.21 SEVERE SEPSIS WITH SEPTIC SHOCK: ICD-10-CM

## 2019-02-05 DIAGNOSIS — E87.5 HYPERKALEMIA: ICD-10-CM

## 2019-02-05 DIAGNOSIS — E87.2 ACIDOSIS: ICD-10-CM

## 2019-02-05 DIAGNOSIS — J44.9 CHRONIC OBSTRUCTIVE PULMONARY DISEASE, UNSPECIFIED: ICD-10-CM

## 2019-02-05 DIAGNOSIS — N13.6 PYONEPHROSIS: ICD-10-CM

## 2019-02-05 DIAGNOSIS — J96.01 ACUTE RESPIRATORY FAILURE WITH HYPOXIA: ICD-10-CM

## 2019-02-05 DIAGNOSIS — I50.9 HEART FAILURE, UNSPECIFIED: ICD-10-CM

## 2019-02-05 DIAGNOSIS — A41.52 SEPSIS DUE TO PSEUDOMONAS: ICD-10-CM

## 2019-02-05 DIAGNOSIS — K51.00 ULCERATIVE (CHRONIC) PANCOLITIS WITHOUT COMPLICATIONS: ICD-10-CM

## 2019-02-05 DIAGNOSIS — J98.11 ATELECTASIS: ICD-10-CM

## 2019-02-05 DIAGNOSIS — E87.1 HYPO-OSMOLALITY AND HYPONATREMIA: ICD-10-CM

## 2019-02-05 DIAGNOSIS — N17.9 ACUTE KIDNEY FAILURE, UNSPECIFIED: ICD-10-CM

## 2019-02-05 DIAGNOSIS — Z88.2 ALLERGY STATUS TO SULFONAMIDES: ICD-10-CM

## 2019-02-05 DIAGNOSIS — E43 UNSPECIFIED SEVERE PROTEIN-CALORIE MALNUTRITION: ICD-10-CM

## 2019-02-05 DIAGNOSIS — R33.9 RETENTION OF URINE, UNSPECIFIED: ICD-10-CM

## 2019-02-05 DIAGNOSIS — A09 INFECTIOUS GASTROENTERITIS AND COLITIS, UNSPECIFIED: ICD-10-CM

## 2019-02-05 DIAGNOSIS — Z98.890 OTHER SPECIFIED POSTPROCEDURAL STATES: Chronic | ICD-10-CM

## 2019-02-05 DIAGNOSIS — D64.9 ANEMIA, UNSPECIFIED: ICD-10-CM

## 2019-02-05 DIAGNOSIS — Z51.5 ENCOUNTER FOR PALLIATIVE CARE: ICD-10-CM

## 2019-02-05 DIAGNOSIS — T83.511A INFECTION AND INFLAMMATORY REACTION DUE TO INDWELLING URETHRAL CATHETER, INITIAL ENCOUNTER: ICD-10-CM

## 2019-02-05 DIAGNOSIS — Z99.81 DEPENDENCE ON SUPPLEMENTAL OXYGEN: ICD-10-CM

## 2019-02-05 DIAGNOSIS — C67.9 MALIGNANT NEOPLASM OF BLADDER, UNSPECIFIED: ICD-10-CM

## 2019-02-05 DIAGNOSIS — J18.9 PNEUMONIA, UNSPECIFIED ORGANISM: ICD-10-CM

## 2019-02-05 DIAGNOSIS — D46.9 MYELODYSPLASTIC SYNDROME, UNSPECIFIED: ICD-10-CM

## 2019-02-05 DIAGNOSIS — A04.72 ENTEROCOLITIS DUE TO CLOSTRIDIUM DIFFICILE, NOT SPECIFIED AS RECURRENT: ICD-10-CM

## 2019-02-05 DIAGNOSIS — Z88.8 ALLERGY STATUS TO OTHER DRUGS, MEDICAMENTS AND BIOLOGICAL SUBSTANCES: ICD-10-CM

## 2019-02-05 DIAGNOSIS — N32.89 OTHER SPECIFIED DISORDERS OF BLADDER: ICD-10-CM

## 2019-02-05 DIAGNOSIS — I11.0 HYPERTENSIVE HEART DISEASE WITH HEART FAILURE: ICD-10-CM

## 2019-02-05 DIAGNOSIS — R18.8 OTHER ASCITES: ICD-10-CM

## 2019-02-05 DIAGNOSIS — T17.990A OTHER FOREIGN OBJECT IN RESPIRATORY TRACT, PART UNSPECIFIED IN CAUSING ASPHYXIATION, INITIAL ENCOUNTER: ICD-10-CM

## 2019-02-05 DIAGNOSIS — E87.8 OTHER DISORDERS OF ELECTROLYTE AND FLUID BALANCE, NOT ELSEWHERE CLASSIFIED: ICD-10-CM

## 2019-02-05 LAB
ALBUMIN SERPL ELPH-MCNC: 4.4 G/DL — SIGNIFICANT CHANGE UP (ref 3.5–5.2)
ALP SERPL-CCNC: 62 U/L — SIGNIFICANT CHANGE UP (ref 30–115)
ALT FLD-CCNC: 9 U/L — SIGNIFICANT CHANGE UP (ref 0–41)
ANION GAP SERPL CALC-SCNC: 10 MMOL/L — SIGNIFICANT CHANGE UP (ref 7–14)
ANION GAP SERPL CALC-SCNC: 12 MMOL/L — SIGNIFICANT CHANGE UP (ref 7–14)
ANION GAP SERPL CALC-SCNC: 13 MMOL/L — SIGNIFICANT CHANGE UP (ref 7–14)
APPEARANCE UR: ABNORMAL
AST SERPL-CCNC: 17 U/L — SIGNIFICANT CHANGE UP (ref 0–41)
BACTERIA # UR AUTO: ABNORMAL
BASE EXCESS BLDV CALC-SCNC: -0.2 MMOL/L — SIGNIFICANT CHANGE UP (ref -2–2)
BASOPHILS # BLD AUTO: 0.04 K/UL — SIGNIFICANT CHANGE UP (ref 0–0.2)
BASOPHILS NFR BLD AUTO: 0.2 % — SIGNIFICANT CHANGE UP (ref 0–1)
BILIRUB SERPL-MCNC: 0.7 MG/DL — SIGNIFICANT CHANGE UP (ref 0.2–1.2)
BILIRUB UR-MCNC: NEGATIVE — SIGNIFICANT CHANGE UP
BUN SERPL-MCNC: 25 MG/DL — HIGH (ref 10–20)
BUN SERPL-MCNC: 33 MG/DL — HIGH (ref 10–20)
BUN SERPL-MCNC: 37 MG/DL — HIGH (ref 10–20)
CA-I SERPL-SCNC: 1.17 MMOL/L — SIGNIFICANT CHANGE UP (ref 1.12–1.3)
CALCIUM SERPL-MCNC: 8.3 MG/DL — LOW (ref 8.5–10.1)
CALCIUM SERPL-MCNC: 8.7 MG/DL — SIGNIFICANT CHANGE UP (ref 8.5–10.1)
CALCIUM SERPL-MCNC: 8.9 MG/DL — SIGNIFICANT CHANGE UP (ref 8.5–10.1)
CHLORIDE SERPL-SCNC: 89 MMOL/L — LOW (ref 98–110)
CHLORIDE SERPL-SCNC: 92 MMOL/L — LOW (ref 98–110)
CHLORIDE SERPL-SCNC: 93 MMOL/L — LOW (ref 98–110)
CO2 SERPL-SCNC: 25 MMOL/L — SIGNIFICANT CHANGE UP (ref 17–32)
CO2 SERPL-SCNC: 25 MMOL/L — SIGNIFICANT CHANGE UP (ref 17–32)
CO2 SERPL-SCNC: 26 MMOL/L — SIGNIFICANT CHANGE UP (ref 17–32)
COLOR SPEC: ABNORMAL
CREAT SERPL-MCNC: 0.9 MG/DL — SIGNIFICANT CHANGE UP (ref 0.7–1.5)
CREAT SERPL-MCNC: 1.2 MG/DL — SIGNIFICANT CHANGE UP (ref 0.7–1.5)
CREAT SERPL-MCNC: 1.6 MG/DL — HIGH (ref 0.7–1.5)
DIFF PNL FLD: ABNORMAL
EOSINOPHIL # BLD AUTO: 0 K/UL — SIGNIFICANT CHANGE UP (ref 0–0.7)
EOSINOPHIL NFR BLD AUTO: 0 % — SIGNIFICANT CHANGE UP (ref 0–8)
EPI CELLS # UR: ABNORMAL /HPF
GAS PNL BLDV: 125 MMOL/L — LOW (ref 136–145)
GAS PNL BLDV: SIGNIFICANT CHANGE UP
GLUCOSE BLDC GLUCOMTR-MCNC: 129 MG/DL — HIGH (ref 70–99)
GLUCOSE BLDC GLUCOMTR-MCNC: 183 MG/DL — HIGH (ref 70–99)
GLUCOSE SERPL-MCNC: 127 MG/DL — HIGH (ref 70–99)
GLUCOSE SERPL-MCNC: 131 MG/DL — HIGH (ref 70–99)
GLUCOSE SERPL-MCNC: 163 MG/DL — HIGH (ref 70–99)
GLUCOSE UR QL: 100 MG/DL
HCO3 BLDV-SCNC: 27 MMOL/L — SIGNIFICANT CHANGE UP (ref 22–29)
HCT VFR BLD CALC: 36.2 % — LOW (ref 37–47)
HCT VFR BLDA CALC: 48.9 % — HIGH (ref 34–44)
HGB BLD CALC-MCNC: 16 G/DL — SIGNIFICANT CHANGE UP (ref 14–18)
HGB BLD-MCNC: 11.6 G/DL — LOW (ref 12–16)
HOROWITZ INDEX BLDV+IHG-RTO: 21 — SIGNIFICANT CHANGE UP
IMM GRANULOCYTES NFR BLD AUTO: 0.7 % — HIGH (ref 0.1–0.3)
KETONES UR-MCNC: ABNORMAL
LACTATE BLDV-MCNC: 0.6 MMOL/L — SIGNIFICANT CHANGE UP (ref 0.5–1.6)
LEUKOCYTE ESTERASE UR-ACNC: NEGATIVE — SIGNIFICANT CHANGE UP
LYMPHOCYTES # BLD AUTO: 0.6 K/UL — LOW (ref 1.2–3.4)
LYMPHOCYTES # BLD AUTO: 3.6 % — LOW (ref 20.5–51.1)
MAGNESIUM SERPL-MCNC: 2 MG/DL — SIGNIFICANT CHANGE UP (ref 1.8–2.4)
MCHC RBC-ENTMCNC: 32 G/DL — SIGNIFICANT CHANGE UP (ref 32–37)
MCHC RBC-ENTMCNC: 32.9 PG — HIGH (ref 27–31)
MCV RBC AUTO: 102.5 FL — HIGH (ref 81–99)
MONOCYTES # BLD AUTO: 0.55 K/UL — SIGNIFICANT CHANGE UP (ref 0.1–0.6)
MONOCYTES NFR BLD AUTO: 3.3 % — SIGNIFICANT CHANGE UP (ref 1.7–9.3)
NEUTROPHILS # BLD AUTO: 15.36 K/UL — HIGH (ref 1.4–6.5)
NEUTROPHILS NFR BLD AUTO: 92.2 % — HIGH (ref 42.2–75.2)
NITRITE UR-MCNC: POSITIVE
NRBC # BLD: 0 /100 WBCS — SIGNIFICANT CHANGE UP (ref 0–0)
PCO2 BLDV: 55 MMHG — HIGH (ref 41–51)
PH BLDV: 7.3 — SIGNIFICANT CHANGE UP (ref 7.26–7.43)
PH UR: 5.5 — SIGNIFICANT CHANGE UP (ref 5–8)
PHOSPHATE SERPL-MCNC: 4.2 MG/DL — SIGNIFICANT CHANGE UP (ref 2.1–4.9)
PLATELET # BLD AUTO: 249 K/UL — SIGNIFICANT CHANGE UP (ref 130–400)
PO2 BLDV: 68 MMHG — HIGH (ref 20–40)
POTASSIUM BLDV-SCNC: 6.5 MMOL/L — HIGH (ref 3.3–5.6)
POTASSIUM SERPL-MCNC: 5.1 MMOL/L — HIGH (ref 3.5–5)
POTASSIUM SERPL-MCNC: 5.8 MMOL/L — HIGH (ref 3.5–5)
POTASSIUM SERPL-MCNC: 7.5 MMOL/L — CRITICAL HIGH (ref 3.5–5)
POTASSIUM SERPL-SCNC: 5.1 MMOL/L — HIGH (ref 3.5–5)
POTASSIUM SERPL-SCNC: 5.8 MMOL/L — HIGH (ref 3.5–5)
POTASSIUM SERPL-SCNC: 7.5 MMOL/L — CRITICAL HIGH (ref 3.5–5)
PROT SERPL-MCNC: 7.3 G/DL — SIGNIFICANT CHANGE UP (ref 6–8)
PROT UR-MCNC: >=300 MG/DL
RBC # BLD: 3.53 M/UL — LOW (ref 4.2–5.4)
RBC # FLD: 16.3 % — HIGH (ref 11.5–14.5)
RBC CASTS # UR COMP ASSIST: >50 /HPF
SAO2 % BLDV: 90 % — SIGNIFICANT CHANGE UP
SODIUM SERPL-SCNC: 126 MMOL/L — LOW (ref 135–146)
SODIUM SERPL-SCNC: 129 MMOL/L — LOW (ref 135–146)
SODIUM SERPL-SCNC: 130 MMOL/L — LOW (ref 135–146)
SP GR SPEC: 1.02 — SIGNIFICANT CHANGE UP (ref 1.01–1.03)
SURGICAL PATHOLOGY STUDY: SIGNIFICANT CHANGE UP
UROBILINOGEN FLD QL: 1 MG/DL (ref 0.2–0.2)
WBC # BLD: 16.66 K/UL — HIGH (ref 4.8–10.8)
WBC # FLD AUTO: 16.66 K/UL — HIGH (ref 4.8–10.8)
WBC UR QL: ABNORMAL /HPF

## 2019-02-05 PROCEDURE — 99284 EMERGENCY DEPT VISIT MOD MDM: CPT

## 2019-02-05 RX ORDER — CALCIUM GLUCONATE 100 MG/ML
2 VIAL (ML) INTRAVENOUS ONCE
Qty: 0 | Refills: 0 | Status: COMPLETED | OUTPATIENT
Start: 2019-02-05 | End: 2019-02-05

## 2019-02-05 RX ORDER — AMLODIPINE BESYLATE 2.5 MG/1
5 TABLET ORAL DAILY
Qty: 0 | Refills: 0 | Status: DISCONTINUED | OUTPATIENT
Start: 2019-02-05 | End: 2019-02-09

## 2019-02-05 RX ORDER — METOPROLOL TARTRATE 50 MG
50 TABLET ORAL DAILY
Qty: 0 | Refills: 0 | Status: DISCONTINUED | OUTPATIENT
Start: 2019-02-05 | End: 2019-03-09

## 2019-02-05 RX ORDER — HEPARIN SODIUM 5000 [USP'U]/ML
5000 INJECTION INTRAVENOUS; SUBCUTANEOUS EVERY 8 HOURS
Qty: 0 | Refills: 0 | Status: DISCONTINUED | OUTPATIENT
Start: 2019-02-05 | End: 2019-02-23

## 2019-02-05 RX ORDER — IPRATROPIUM/ALBUTEROL SULFATE 18-103MCG
3 AEROSOL WITH ADAPTER (GRAM) INHALATION EVERY 6 HOURS
Qty: 0 | Refills: 0 | Status: DISCONTINUED | OUTPATIENT
Start: 2019-02-05 | End: 2019-02-25

## 2019-02-05 RX ORDER — INSULIN HUMAN 100 [IU]/ML
10 INJECTION, SOLUTION SUBCUTANEOUS ONCE
Qty: 0 | Refills: 0 | Status: COMPLETED | OUTPATIENT
Start: 2019-02-05 | End: 2019-02-05

## 2019-02-05 RX ORDER — MORPHINE SULFATE 50 MG/1
2 CAPSULE, EXTENDED RELEASE ORAL EVERY 4 HOURS
Qty: 0 | Refills: 0 | Status: DISCONTINUED | OUTPATIENT
Start: 2019-02-05 | End: 2019-02-05

## 2019-02-05 RX ORDER — ONDANSETRON 8 MG/1
4 TABLET, FILM COATED ORAL ONCE
Qty: 0 | Refills: 0 | Status: COMPLETED | OUTPATIENT
Start: 2019-02-05 | End: 2019-02-05

## 2019-02-05 RX ORDER — HYDRALAZINE HCL 50 MG
10 TABLET ORAL ONCE
Qty: 0 | Refills: 0 | Status: COMPLETED | OUTPATIENT
Start: 2019-02-05 | End: 2019-02-05

## 2019-02-05 RX ORDER — OXYCODONE HYDROCHLORIDE 5 MG/1
5 TABLET ORAL EVERY 4 HOURS
Qty: 0 | Refills: 0 | Status: DISCONTINUED | OUTPATIENT
Start: 2019-02-05 | End: 2019-02-05

## 2019-02-05 RX ORDER — ALBUTEROL 90 UG/1
2 AEROSOL, METERED ORAL
Qty: 0 | Refills: 0 | COMMUNITY

## 2019-02-05 RX ORDER — OXYBUTYNIN CHLORIDE 5 MG
10 TABLET ORAL ONCE
Qty: 0 | Refills: 0 | Status: COMPLETED | OUTPATIENT
Start: 2019-02-05 | End: 2019-02-05

## 2019-02-05 RX ORDER — ACETAMINOPHEN 500 MG
650 TABLET ORAL EVERY 6 HOURS
Qty: 0 | Refills: 0 | Status: DISCONTINUED | OUTPATIENT
Start: 2019-02-05 | End: 2019-02-05

## 2019-02-05 RX ORDER — ATORVASTATIN CALCIUM 80 MG/1
20 TABLET, FILM COATED ORAL AT BEDTIME
Qty: 0 | Refills: 0 | Status: DISCONTINUED | OUTPATIENT
Start: 2019-02-05 | End: 2019-03-10

## 2019-02-05 RX ORDER — DEXTROSE 50 % IN WATER 50 %
50 SYRINGE (ML) INTRAVENOUS ONCE
Qty: 0 | Refills: 0 | Status: COMPLETED | OUTPATIENT
Start: 2019-02-05 | End: 2019-02-05

## 2019-02-05 RX ORDER — SODIUM CHLORIDE 9 MG/ML
1000 INJECTION INTRAMUSCULAR; INTRAVENOUS; SUBCUTANEOUS ONCE
Qty: 0 | Refills: 0 | Status: COMPLETED | OUTPATIENT
Start: 2019-02-05 | End: 2019-02-05

## 2019-02-05 RX ORDER — ASPIRIN/CALCIUM CARB/MAGNESIUM 324 MG
81 TABLET ORAL DAILY
Qty: 0 | Refills: 0 | Status: DISCONTINUED | OUTPATIENT
Start: 2019-02-05 | End: 2019-03-13

## 2019-02-05 RX ORDER — HYDROXYZINE HCL 10 MG
25 TABLET ORAL ONCE
Qty: 0 | Refills: 0 | Status: COMPLETED | OUTPATIENT
Start: 2019-02-05 | End: 2019-02-05

## 2019-02-05 RX ORDER — METOPROLOL TARTRATE 50 MG
25 TABLET ORAL ONCE
Qty: 0 | Refills: 0 | Status: COMPLETED | OUTPATIENT
Start: 2019-02-05 | End: 2019-02-05

## 2019-02-05 RX ORDER — ALBUTEROL 90 UG/1
2.5 AEROSOL, METERED ORAL
Qty: 0 | Refills: 0 | Status: COMPLETED | OUTPATIENT
Start: 2019-02-05 | End: 2019-02-05

## 2019-02-05 RX ORDER — SODIUM POLYSTYRENE SULFONATE 4.1 MEQ/G
15 POWDER, FOR SUSPENSION ORAL ONCE
Qty: 0 | Refills: 0 | Status: COMPLETED | OUTPATIENT
Start: 2019-02-05 | End: 2019-02-05

## 2019-02-05 RX ORDER — CEFEPIME 1 G/1
1000 INJECTION, POWDER, FOR SOLUTION INTRAMUSCULAR; INTRAVENOUS DAILY
Qty: 0 | Refills: 0 | Status: DISCONTINUED | OUTPATIENT
Start: 2019-02-05 | End: 2019-02-06

## 2019-02-05 RX ORDER — CEFEPIME 1 G/1
2000 INJECTION, POWDER, FOR SOLUTION INTRAMUSCULAR; INTRAVENOUS ONCE
Qty: 0 | Refills: 0 | Status: COMPLETED | OUTPATIENT
Start: 2019-02-05 | End: 2019-02-05

## 2019-02-05 RX ORDER — SPIRONOLACTONE 25 MG/1
1 TABLET, FILM COATED ORAL
Qty: 0 | Refills: 0 | COMMUNITY

## 2019-02-05 RX ORDER — PANTOPRAZOLE SODIUM 20 MG/1
40 TABLET, DELAYED RELEASE ORAL
Qty: 0 | Refills: 0 | Status: DISCONTINUED | OUTPATIENT
Start: 2019-02-05 | End: 2019-02-14

## 2019-02-05 RX ORDER — BUDESONIDE AND FORMOTEROL FUMARATE DIHYDRATE 160; 4.5 UG/1; UG/1
2 AEROSOL RESPIRATORY (INHALATION)
Qty: 0 | Refills: 0 | Status: DISCONTINUED | OUTPATIENT
Start: 2019-02-05 | End: 2019-03-09

## 2019-02-05 RX ORDER — CHLORHEXIDINE GLUCONATE 213 G/1000ML
1 SOLUTION TOPICAL
Qty: 0 | Refills: 0 | Status: DISCONTINUED | OUTPATIENT
Start: 2019-02-05 | End: 2019-03-13

## 2019-02-05 RX ORDER — MORPHINE SULFATE 50 MG/1
4 CAPSULE, EXTENDED RELEASE ORAL ONCE
Qty: 0 | Refills: 0 | Status: DISCONTINUED | OUTPATIENT
Start: 2019-02-05 | End: 2019-02-05

## 2019-02-05 RX ORDER — ERYTHROPOIETIN 10000 [IU]/ML
0 INJECTION, SOLUTION INTRAVENOUS; SUBCUTANEOUS
Qty: 0 | Refills: 0 | COMMUNITY

## 2019-02-05 RX ORDER — SODIUM CHLORIDE 9 MG/ML
1000 INJECTION INTRAMUSCULAR; INTRAVENOUS; SUBCUTANEOUS
Qty: 0 | Refills: 0 | Status: DISCONTINUED | OUTPATIENT
Start: 2019-02-05 | End: 2019-02-08

## 2019-02-05 RX ADMIN — Medication 81 MILLIGRAM(S): at 11:35

## 2019-02-05 RX ADMIN — Medication 50 MILLILITER(S): at 15:14

## 2019-02-05 RX ADMIN — CEFEPIME 100 MILLIGRAM(S): 1 INJECTION, POWDER, FOR SOLUTION INTRAMUSCULAR; INTRAVENOUS at 11:34

## 2019-02-05 RX ADMIN — Medication 200 GRAM(S): at 06:35

## 2019-02-05 RX ADMIN — Medication 3 MILLILITER(S): at 20:39

## 2019-02-05 RX ADMIN — HEPARIN SODIUM 5000 UNIT(S): 5000 INJECTION INTRAVENOUS; SUBCUTANEOUS at 21:44

## 2019-02-05 RX ADMIN — Medication 25 MILLIGRAM(S): at 23:38

## 2019-02-05 RX ADMIN — Medication 10 MILLIGRAM(S): at 06:40

## 2019-02-05 RX ADMIN — HEPARIN SODIUM 5000 UNIT(S): 5000 INJECTION INTRAVENOUS; SUBCUTANEOUS at 14:11

## 2019-02-05 RX ADMIN — ALBUTEROL 2.5 MILLIGRAM(S): 90 AEROSOL, METERED ORAL at 06:00

## 2019-02-05 RX ADMIN — ALBUTEROL 2.5 MILLIGRAM(S): 90 AEROSOL, METERED ORAL at 06:15

## 2019-02-05 RX ADMIN — Medication 2 GRAM(S): at 06:56

## 2019-02-05 RX ADMIN — Medication 50 MILLILITER(S): at 06:29

## 2019-02-05 RX ADMIN — MORPHINE SULFATE 4 MILLIGRAM(S): 50 CAPSULE, EXTENDED RELEASE ORAL at 04:18

## 2019-02-05 RX ADMIN — CEFEPIME 2000 MILLIGRAM(S): 1 INJECTION, POWDER, FOR SOLUTION INTRAMUSCULAR; INTRAVENOUS at 06:49

## 2019-02-05 RX ADMIN — SODIUM POLYSTYRENE SULFONATE 15 GRAM(S): 4.1 POWDER, FOR SUSPENSION ORAL at 06:56

## 2019-02-05 RX ADMIN — INSULIN HUMAN 10 UNIT(S): 100 INJECTION, SOLUTION SUBCUTANEOUS at 06:30

## 2019-02-05 RX ADMIN — SODIUM CHLORIDE 2000 MILLILITER(S): 9 INJECTION INTRAMUSCULAR; INTRAVENOUS; SUBCUTANEOUS at 06:30

## 2019-02-05 RX ADMIN — SODIUM CHLORIDE 50 MILLILITER(S): 9 INJECTION INTRAMUSCULAR; INTRAVENOUS; SUBCUTANEOUS at 11:36

## 2019-02-05 RX ADMIN — SODIUM POLYSTYRENE SULFONATE 15 GRAM(S): 4.1 POWDER, FOR SUSPENSION ORAL at 15:15

## 2019-02-05 RX ADMIN — SODIUM CHLORIDE 50 MILLILITER(S): 9 INJECTION INTRAMUSCULAR; INTRAVENOUS; SUBCUTANEOUS at 08:47

## 2019-02-05 RX ADMIN — ATORVASTATIN CALCIUM 20 MILLIGRAM(S): 80 TABLET, FILM COATED ORAL at 21:44

## 2019-02-05 RX ADMIN — Medication 5 MILLIGRAM(S): at 18:11

## 2019-02-05 RX ADMIN — MORPHINE SULFATE 4 MILLIGRAM(S): 50 CAPSULE, EXTENDED RELEASE ORAL at 06:30

## 2019-02-05 RX ADMIN — ALBUTEROL 2.5 MILLIGRAM(S): 90 AEROSOL, METERED ORAL at 06:29

## 2019-02-05 RX ADMIN — Medication 10 MILLIGRAM(S): at 11:24

## 2019-02-05 RX ADMIN — ONDANSETRON 4 MILLIGRAM(S): 8 TABLET, FILM COATED ORAL at 04:18

## 2019-02-05 RX ADMIN — INSULIN HUMAN 10 UNIT(S): 100 INJECTION, SOLUTION SUBCUTANEOUS at 15:14

## 2019-02-05 RX ADMIN — CEFEPIME 100 MILLIGRAM(S): 1 INJECTION, POWDER, FOR SOLUTION INTRAMUSCULAR; INTRAVENOUS at 06:18

## 2019-02-05 NOTE — ED PROVIDER NOTE - ATTENDING CONTRIBUTION TO CARE
85 yo F with history of HTn, CHF, COPD on home O2 2L, bladder ca sp TUBRT with indwelling lynne which was removed yesterday here for inability to appropriately urinate, suprapubic pain. Also reports associated nausea, no vomiting, CP or new dyspnea. Last normal urination was just after lynne was removed, has had dribbling of orange urine but is on pyridium.    VS normal, on exam looks uncomfortable, has suprapubic fullness, mild tenderness.    Likely has urinary retention, will place lynne, check labs, UA and reassess.

## 2019-02-05 NOTE — H&P ADULT - PROBLEM SELECTOR PLAN 4
..  - baseline with normal renal function  - recheck BMP at 1100  - gentle IV hydration with 0.9% NS at 50/hr.  D/C if s/s of volume-overload  - renal c/s (Dr. Vines)  - check retroperitoneal u/s and urine lytes  - check Mg/Phos

## 2019-02-05 NOTE — H&P ADULT - NSHPLABSRESULTS_GEN_ALL_CORE
11.6   16.66 )-----------( 249      ( 2019 04:05 )             36.2       02-05    126<L>  |  89<L>  |  37<H>  ----------------------------<  163<H>  7.5<HH>   |  25  |  1.6<H>    Ca    8.7      2019 04:05    TPro  7.3  /  Alb  4.4  /  TBili  0.7  /  DBili  x   /  AST  17  /  ALT  9   /  AlkPhos  62  02-05                  Urinalysis Basic - ( 2019 04:25 )    Color: Orange / Appearance: Slightly Cloudy / S.025 / pH: x  Gluc: x / Ketone: Trace  / Bili: Negative / Urobili: 1.0 mg/dL   Blood: x / Protein: >=300 mg/dL / Nitrite: Positive   Leuk Esterase: Negative / RBC: >50 /HPF / WBC 10-25 /HPF   Sq Epi: x / Non Sq Epi: Few /HPF / Bacteria: Moderate    Culture Results:   10,000 - 49,000 CFU/mL Enterococcus faecalis  Normal Urogenital katie present ( @ 13:38)

## 2019-02-05 NOTE — CONSULT NOTE ADULT - SUBJECTIVE AND OBJECTIVE BOX
Patient presents to ED with family this am.  Dtr reports that patient s/p cysto with bladder scrapping for Bladder Ca on 1/30/19.  Patient tolerated procedure well and was discharged home with Lynne.  Patient did very well since then and went to follow up at office ystd.  At that point lynne was removed and patient went home.  Dtr reports that patient urinated x 2 since removal however last evening began only dribbling and began having severe bladder spasms, unrelieved by oxybutynin.  Family brought patient to ED 2/2 to this.  Patient reports she is feeling well.  No CP.  SOB at baseline.  Still with some minor abdominal cramping, suprapubic area.  No fever.  Last BM ystd AM and regular.      PAST MEDICAL & SURGICAL HISTORY:  Anemia: 4 PRBC 11/2018  Oxygen dependent: O2 2L Via NC  Bladder cancer: 2018  CHF (congestive heart failure)  Breast tumor: removal benign bl breasts  Anal cancer: Chemo and radiation 1992  COPD (chronic obstructive pulmonary disease)  Lung disease: COPD  Hypertension  History of cystoscopy: 10/2018  H/O breast surgery: 39 yrs. ago, bilateral  History of back surgery: 10 yrs. ago stimulator implant 2001, 2011( cervical and Lumbar)    MEDICATIONS  (STANDING):  ALBUTerol/ipratropium for Nebulization 3 milliLiter(s) Nebulizer every 6 hours  amLODIPine   Tablet 5 milliGRAM(s) Oral daily  aspirin  chewable 81 milliGRAM(s) Oral daily  atorvastatin 20 milliGRAM(s) Oral at bedtime  buDESOnide 160 MICROgram(s)/formoterol 4.5 MICROgram(s) Inhaler 2 Puff(s) Inhalation two times a day  busPIRone 5 milliGRAM(s) Oral two times a day  cefepime   IVPB 1000 milliGRAM(s) IV Intermittent daily  chlorhexidine 4% Liquid 1 Application(s) Topical <User Schedule>  heparin  Injectable 5000 Unit(s) SubCutaneous every 8 hours  metoprolol succinate ER 50 milliGRAM(s) Oral daily  pantoprazole    Tablet 40 milliGRAM(s) Oral before breakfast  sodium chloride 0.9%. 1000 milliLiter(s) (50 mL/Hr) IV Continuous <Continuous>    MEDICATIONS  (PRN):  acetaminophen   Tablet .. 650 milliGRAM(s) Oral every 6 hours PRN Temp greater or equal to 38C (100.4F), Mild Pain (1 - 3)        Vital Signs (24 Hrs):  T(C): 36.6 (02-05-19 @ 07:30), Max: 36.8 (02-05-19 @ 03:04)  HR: 88 (02-05-19 @ 07:30) (88 - 114)  BP: 171/60 (02-05-19 @ 07:30) (171/60 - 221/88)  RR: 20 (02-05-19 @ 07:30) (20 - 24)  SpO2: 93% (02-05-19 @ 07:30) (90% - 96%)  Daily Height in cm: 157.48 (05 Feb 2019 03:04)        I&O's Summary    04 Feb 2019 07:01  -  05 Feb 2019 07:00  --------------------------------------------------------  IN: 0 mL / OUT: 200 mL / NET: -200 mL    05 Feb 2019 07:01  -  05 Feb 2019 14:43  --------------------------------------------------------  IN: 0 mL / OUT: 525 mL / NET: -525 mL Patient presents to ED with family this am.  Dtr reports that patient s/p cysto with bladder scrapping for Bladder Ca on 19.  Patient tolerated procedure well and was discharged home with Lynne.  Patient did very well since then and went to follow up at office ystd.  At that point lynne was removed and patient went home.  Dtr reports that patient urinated x 2 since removal however last evening began only dribbling and began having severe bladder spasms, unrelieved by oxybutynin.  Family brought patient to ED  to this.  Patient reports she is feeling well.  No CP.  SOB at baseline.  Still with some minor abdominal cramping, suprapubic area.  No fever.  Last BM ystd AM and regular.      PAST MEDICAL & SURGICAL HISTORY:  Anemia: 4 PRBC 2018  Oxygen dependent: O2 2L Via NC  Bladder cancer:   CHF (congestive heart failure)  Breast tumor: removal benign bl breasts  Anal cancer: Chemo and radiation   COPD (chronic obstructive pulmonary disease)  Lung disease: COPD  Hypertension  History of cystoscopy: 10/2018  H/O breast surgery: 39 yrs. ago, bilateral  History of back surgery: 10 yrs. ago stimulator implant , ( cervical and Lumbar)    MEDICATIONS  (STANDING):  ALBUTerol/ipratropium for Nebulization 3 milliLiter(s) Nebulizer every 6 hours  amLODIPine   Tablet 5 milliGRAM(s) Oral daily  aspirin  chewable 81 milliGRAM(s) Oral daily  atorvastatin 20 milliGRAM(s) Oral at bedtime  buDESOnide 160 MICROgram(s)/formoterol 4.5 MICROgram(s) Inhaler 2 Puff(s) Inhalation two times a day  busPIRone 5 milliGRAM(s) Oral two times a day  cefepime   IVPB 1000 milliGRAM(s) IV Intermittent daily  chlorhexidine 4% Liquid 1 Application(s) Topical <User Schedule>  heparin  Injectable 5000 Unit(s) SubCutaneous every 8 hours  metoprolol succinate ER 50 milliGRAM(s) Oral daily  pantoprazole    Tablet 40 milliGRAM(s) Oral before breakfast  sodium chloride 0.9%. 1000 milliLiter(s) (50 mL/Hr) IV Continuous <Continuous>    MEDICATIONS  (PRN):  acetaminophen   Tablet .. 650 milliGRAM(s) Oral every 6 hours PRN Temp greater or equal to 38C (100.4F), Mild Pain (1 - 3)        Vital Signs (24 Hrs):  T(C): 36.6 (19 @ 07:30), Max: 36.8 (19 @ 03:04)  HR: 88 (19 @ 07:30) (88 - 114)  BP: 171/60 (19 @ 07:30) (171/60 - 221/88)  RR: 20 (19 @ 07:30) (20 - 24)  SpO2: 93% (19 @ 07:30) (90% - 96%)  Daily Height in cm: 157.48 (2019 03:04)        I&O's Summary    2019 07:01  -  2019 07:00  --------------------------------------------------------  IN: 0 mL / OUT: 200 mL / NET: -200 mL    2019 07:01  -  2019 14:43  --------------------------------------------------------  IN: 0 mL / OUT: 525 mL / NET: -525 mL    PHYSICAL EXAM:  GENERAL: NAD, well-developed  SKIN: No rashes or lesions  HEAD:  Atraumatic, Normocephalic  EYES: EOMI, PERRLA, conjunctiva and sclera clear  NECK: Supple, No JVD  CHEST/LUNG: Clear to auscultation bilaterally; No wheeze  HEART: Regular rate and rhythm; No murmurs, rubs, or gallops  ABDOMEN: Soft, Nontender, Nondistended; Bowel sounds present  EXTREMITIES:  No clubbing, cyanosis, or edema  CNS: AAOx3    Labs/Imaginin.6   16.66 )-----------( 249      ( 2019 04:05 )             36.2       02-05    130<L>  |  92<L>  |  33<H>  ----------------------------<  131<H>  5.8<H>   |  25  |  1.2    Ca    8.9      2019 13:16  Phos  4.2     -  Mg     2.0     -    TPro  7.3  /  Alb  4.4  /  TBili  0.7  /  DBili  x   /  AST  17  /  ALT  9   /  AlkPhos  62            Magnesium, Serum: 2.0 mg/dL (19 @ 13:16)  Phosphorus Level, Serum: 4.2 mg/dL (19 @ 13:16)          Urinalysis Basic - ( 2019 04:25 )    Color: Orange / Appearance: Slightly Cloudy / S.025 / pH: x  Gluc: x / Ketone: Trace  / Bili: Negative / Urobili: 1.0 mg/dL   Blood: x / Protein: >=300 mg/dL / Nitrite: Positive   Leuk Esterase: Negative / RBC: >50 /HPF / WBC 10-25 /HPF   Sq Epi: x / Non Sq Epi: Few /HPF / Bacteria: Moderate      Culture Results:   10,000 - 49,000 CFU/mL Enterococcus faecalis  Normal Urogenital katie present ( @ 13:38)    CXR  Impression:      No radiographic evidence of acute cardiopulmonary disease. Patient presents to ED with family this am.  Dtr reports that patient s/p cysto with bladder scrapping for Bladder Ca on 19.  Patient tolerated procedure well and was discharged home with Lynne.  Patient did very well since then and went to follow up at office ystd.  At that point lynne was removed and patient went home.  Dtr reports that patient urinated x 2 since removal however last evening began only dribbling and began having severe bladder spasms, unrelieved by oxybutynin.  Family brought patient to ED  to this.  Patient reports she is feeling well.  No CP.  SOB at baseline.  Still with some minor abdominal cramping, suprapubic area.  No fever.  Last BM ystd AM and regular.      PAST MEDICAL & SURGICAL HISTORY:  Anemia: 4 PRBC 2018  Oxygen dependent: O2 2L Via NC  Bladder cancer:   CHF (congestive heart failure)  Breast tumor: removal benign bl breasts  Anal cancer: Chemo and radiation   COPD (chronic obstructive pulmonary disease)  Lung disease: COPD  Hypertension  History of cystoscopy: 10/2018  H/O breast surgery: 39 yrs. ago, bilateral  History of back surgery: 10 yrs. ago stimulator implant , ( cervical and Lumbar)    MEDICATIONS  (STANDING):  ALBUTerol/ipratropium for Nebulization 3 milliLiter(s) Nebulizer every 6 hours  amLODIPine   Tablet 5 milliGRAM(s) Oral daily  aspirin  chewable 81 milliGRAM(s) Oral daily  atorvastatin 20 milliGRAM(s) Oral at bedtime  buDESOnide 160 MICROgram(s)/formoterol 4.5 MICROgram(s) Inhaler 2 Puff(s) Inhalation two times a day  busPIRone 5 milliGRAM(s) Oral two times a day  cefepime   IVPB 1000 milliGRAM(s) IV Intermittent daily  chlorhexidine 4% Liquid 1 Application(s) Topical <User Schedule>  heparin  Injectable 5000 Unit(s) SubCutaneous every 8 hours  metoprolol succinate ER 50 milliGRAM(s) Oral daily  pantoprazole    Tablet 40 milliGRAM(s) Oral before breakfast  sodium chloride 0.9%. 1000 milliLiter(s) (50 mL/Hr) IV Continuous <Continuous>    MEDICATIONS  (PRN):  acetaminophen   Tablet .. 650 milliGRAM(s) Oral every 6 hours PRN Temp greater or equal to 38C (100.4F), Mild Pain (1 - 3)        Vital Signs (24 Hrs):  T(C): 36.6 (19 @ 07:30), Max: 36.8 (19 @ 03:04)  HR: 88 (19 @ 07:30) (88 - 114)  BP: 171/60 (19 @ 07:30) (171/60 - 221/88)  RR: 20 (19 @ 07:30) (20 - 24)  SpO2: 93% (19 @ 07:30) (90% - 96%)  Daily Height in cm: 157.48 (2019 03:04)        I&O's Summary    2019 07:01  -  2019 07:00  --------------------------------------------------------  IN: 0 mL / OUT: 200 mL / NET: -200 mL    2019 07:01  -  2019 14:43  --------------------------------------------------------  IN: 0 mL / OUT: 525 mL / NET: -525 mL    PHYSICAL EXAM:  GENERAL: NAD, well-developed  SKIN: No rashes or lesions  HEAD:  Atraumatic, Normocephalic  EYES: EOMI, PERRLA, conjunctiva and sclera clear  NECK: Supple, No JVD  CHEST/LUNG: Clear to auscultation bilaterally; No wheeze  HEART: Regular rate and rhythm; No murmurs, rubs, or gallops  ABDOMEN: Soft, Nontender, Nondistended; Bowel sounds present  EXTREMITIES:  No clubbing, cyanosis, or edema  CNS: AAOx3    ROS:  abdominal discomfort  constipation  no resp problems  discomfort to urethra due to lynne catheter  back pain  otherwise normal    Labs/Imaginin.6   16.66 )-----------( 249      ( 2019 04:05 )             36.2       02-05    130<L>  |  92<L>  |  33<H>  ----------------------------<  131<H>  5.8<H>   |  25  |  1.2    Ca    8.9      2019 13:16  Phos  4.2     -  Mg     2.0     -    TPro  7.3  /  Alb  4.4  /  TBili  0.7  /  DBili  x   /  AST  17  /  ALT  9   /  AlkPhos  62  -          Magnesium, Serum: 2.0 mg/dL (19 @ 13:16)  Phosphorus Level, Serum: 4.2 mg/dL (19 @ 13:16)          Urinalysis Basic - ( 2019 04:25 )    Color: Orange / Appearance: Slightly Cloudy / S.025 / pH: x  Gluc: x / Ketone: Trace  / Bili: Negative / Urobili: 1.0 mg/dL   Blood: x / Protein: >=300 mg/dL / Nitrite: Positive   Leuk Esterase: Negative / RBC: >50 /HPF / WBC 10-25 /HPF   Sq Epi: x / Non Sq Epi: Few /HPF / Bacteria: Moderate      Culture Results:   10,000 - 49,000 CFU/mL Enterococcus faecalis  Normal Urogenital katie present ( @ 13:38)    CXR  Impression:      No radiographic evidence of acute cardiopulmonary disease.

## 2019-02-05 NOTE — CONSULT NOTE ADULT - ASSESSMENT
1. GEETA  2. Hyperkalemia  3. Urinary retention  4. Hyponatremia    Plan:  Same IVF  Repeat BMP in PM and AM  Give Kayexalate for potassium >5.5  Continue Rowe  Monitor I/O  Hold spironolactone

## 2019-02-05 NOTE — H&P ADULT - PROBLEM SELECTOR PLAN 1
..  - admit to medicine  - s/p 2gm Calcium Gluconate, D50%/Insulin, Bronchodilator treatment, 15g Kayexalate  - recheck K+ at 1100 and treat accordingly   - Hold aldactone, not on ACE/ARB regularly   - Nephrology c/s (Dr. Vines)  - DVT/GI PPX (Heparin/Protonix)  - CHG 4% QD and PRN

## 2019-02-05 NOTE — H&P ADULT - PROBLEM SELECTOR PLAN 2
..  - s/p Lynne insertion in ED, unknown residual  - had approx 500 cc urine in bag this am  - maintain lynne with ToV when symptoms improve  - consider urology c/s if fails

## 2019-02-05 NOTE — ED ADULT NURSE REASSESSMENT NOTE - NS ED NURSE REASSESS COMMENT FT1
Pt assessed at start of shift. Pt with no complaints of pain at this time. Blood pressure elevated at 221/88 at 0655. Pt with order for Hydralazine 10 mg IV as ordered. Repeat blood pressure 169/74. Pt has been admitted to telemetry. Pt will hold in the ER until bed is available. Family at bedside. Will continue to monitor.

## 2019-02-05 NOTE — ED PROVIDER NOTE - PHYSICAL EXAMINATION
VITALS:  I have reviewed the initial vital signs.  GENERAL: Well-developed, well-nourished, in mild distress d/t pain. Nontoxic appearing.   HEENT: Sclera clear. Mucous membranes moist.  NECK: supple w FROM.  CARDIO: RRR, nl S1 and S2. No murmurs, rubs, or gallops. No edema. 2+ radial and PT pulses bilaterally.  PULM: Normal effort. CTA b/l without wheezes, rales, or rhonchi.  MSK: No midline thoracic or lumbar spinous tenderness, step offs, or deformity.  GI: Normal bowel sounds. Abdomen soft with mild distention. +suprapubic ttp. otherwise nontender throughout without rebound or guarding.  : No CVA tenderness b/l.  SKIN: Warm, dry. No pallor or rashes. Capillary refill <2 seconds.  NEURO: A&Ox3. Speech clear. Moving all four extremities equally throughout.

## 2019-02-05 NOTE — H&P ADULT - PROBLEM SELECTOR PLAN 6
..  - at baseline  - Hold aldactone 2/2 GEETA/Hyperkalemia  - if s/s of volumeoverload consider lasix  - c/w BB/ASA/Statin

## 2019-02-05 NOTE — ED PROVIDER NOTE - CARE PLAN
Principal Discharge DX:	Hyperkalemia  Secondary Diagnosis:	UTI (urinary tract infection)  Secondary Diagnosis:	Urinary retention  Secondary Diagnosis:	Leukocytosis  Secondary Diagnosis:	GEETA (acute kidney injury)

## 2019-02-05 NOTE — CONSULT NOTE ADULT - ASSESSMENT
84F with above PMHX presents to ED with complaints of bladder spasms, found to have UR s/p Rowe cath placement in ED with associated GEETA and Hyperkalemia    - admit to medicine  - Nephrology c/s for hyperkalemia/GEETA  - Maintain Rowe for now.  ToV with bladder scan prior to discharge  - no acute Urologic intervention at this time  - d/w Dr. Sanchez by phone.  Dr. Last to see on floor.

## 2019-02-05 NOTE — CONSULT NOTE ADULT - SUBJECTIVE AND OBJECTIVE BOX
[place] [facility] INITIAL CONSULT NOTE/COVERING DR PARSON  --------------------------------------------------------------------------------  HPI:  84 year old female w hx of HTN, CHF, COPD on 2 LPM NC daily, bladder cancer presents to the ED with urinary retention, crampy/spasm-like lower abdominal pain and mild distention. Patient had bladder surgery w Dr. Sanchez on 1/30/19 and was d/c home with a lynne catheter in place. The catheter was removed yesterday. Patient had two episodes of normal urination after removal, but has since been experiencing retention and dribbling.     Creatinine on admission was 1.6 and potassium 7.4. Lynne placed. She was given Kayexalate, D50, and insulin. Potassium improved to 5.8 and cocktail was repeated.      PAST HISTORY  --------------------------------------------------------------------------------  PAST MEDICAL & SURGICAL HISTORY:  Anemia: 4 PRBC 11/2018  Oxygen dependent: O2 2L Via NC  Bladder cancer: 2018  CHF (congestive heart failure)  Breast tumor: removal benign bl breasts  Anal cancer: Chemo and radiation 1992  COPD (chronic obstructive pulmonary disease)  Lung disease: COPD  Hypertension  History of cystoscopy: 10/2018  H/O breast surgery: 39 yrs. ago, bilateral  History of back surgery: 10 yrs. ago stimulator implant 2001, 2011( cervical and Lumbar)    FAMILY HISTORY:  No pertinent family history in first degree relatives    SOCIAL HISTORY:  No current ETOH, tobacco, or illicit drug use    ALLERGIES & MEDICATIONS  --------------------------------------------------------------------------------  Allergies    hair dyes (Hives)  sulfa drugs (Headache)  Zoloft (Headache)    Standing Inpatient Medications  ALBUTerol/ipratropium for Nebulization 3 milliLiter(s) Nebulizer every 6 hours  amLODIPine   Tablet 5 milliGRAM(s) Oral daily  aspirin  chewable 81 milliGRAM(s) Oral daily  atorvastatin 20 milliGRAM(s) Oral at bedtime  buDESOnide 160 MICROgram(s)/formoterol 4.5 MICROgram(s) Inhaler 2 Puff(s) Inhalation two times a day  busPIRone 5 milliGRAM(s) Oral two times a day  cefepime   IVPB 1000 milliGRAM(s) IV Intermittent daily  chlorhexidine 4% Liquid 1 Application(s) Topical <User Schedule>  heparin  Injectable 5000 Unit(s) SubCutaneous every 8 hours  metoprolol succinate ER 50 milliGRAM(s) Oral daily  pantoprazole Tablet 40 milliGRAM(s) Oral before breakfast  sodium chloride 0.9%. 1000 milliLiter(s) IV Continuous <Continuous>    PRN Inpatient Medications  acetaminophen   Tablet 650 milliGRAM(s) Oral every 6 hours PRN    REVIEW OF SYSTEMS  --------------------------------------------------------------------------------  Gen: No fevers/chills  Skin: No rashes  Head/Eyes/Ears/Mouth: No headache; No sinus pain/discomfort, sore throat  Respiratory: No dyspnea, cough, wheezing, hemoptysis  CV: No chest pain, PND, orthopnea  : increased frequency, nocturia  All other systems were reviewed and are negative, except as noted.    VITALS/PHYSICAL EXAM  --------------------------------------------------------------------------------  T(C): 36.5 (02-05-19 @ 18:53), Max: 36.8 (02-05-19 @ 03:04)  HR: 81 (02-05-19 @ 18:53) (80 - 114)  BP: 186/93 (02-05-19 @ 18:53) (167/67 - 221/88)  RR: 20 (02-05-19 @ 18:53) (20 - 24)  SpO2: 97% (02-05-19 @ 18:53) (90% - 97%)  Height (cm): 157.48 (02-05-19 @ 03:21)  Weight (kg): 47.6 (02-05-19 @ 03:21)  BMI (kg/m2): 19.2 (02-05-19 @ 03:21)  BSA (m2): 1.45 (02-05-19 @ 03:21)    02-04-19 @ 07:01  -  02-05-19 @ 07:00  --------------------------------------------------------  IN: 0 mL / OUT: 200 mL / NET: -200 mL    02-05-19 @ 07:01  -  02-05-19 @ 19:10  --------------------------------------------------------  IN: 0 mL / OUT: 1175 mL / NET: -1175 mL    Physical Exam:  	Gen: NAD  	HEENT: Supple neck, clear oropharynx  	Pulm: CTA B/L  	CV: RRR, S1S2  	Back: No CVA tenderness; no sacral edema  	Abd: +BS, soft, nontender/nondistended  	: suprapubic tenderness  	LE: Warm, no edema  	Neuro: AAO x3    LABS/STUDIES  --------------------------------------------------------------------------------              11.6   16.66 >-----------<  249      [02-05-19 @ 04:05]              36.2     130  |  92  |  33  ----------------------------<  131      [02-05-19 @ 13:16]  5.8   |  25  |  1.2        Ca     8.9     [02-05-19 @ 13:16]      Mg     2.0     [02-05-19 @ 13:16]      Phos  4.2     [02-05-19 @ 13:16]    TPro  7.3  /  Alb  4.4  /  TBili  0.7  /  DBili  x   /  AST  17  /  ALT  9   /  AlkPhos  62  [02-05-19 @ 04:05]    Creatinine Trend:  SCr 1.2 [02-05 @ 13:16]  SCr 1.6 [02-05 @ 04:05]  SCr 0.9 [01-17 @ 18:00]    Urinalysis - [02-05-19 @ 04:25]      Color Orange / Appearance Slightly Cloudy / SG 1.025 / pH 5.5      Gluc 100 / Ketone Trace  / Bili Negative / Urobili 1.0       Blood Large / Protein >=300 / Leuk Est Negative / Nitrite Positive      RBC >50 / WBC 10-25 / Hyaline  / Gran  / Sq Epi  / Non Sq Epi Few / Bacteria Moderate

## 2019-02-05 NOTE — ED PROVIDER NOTE - MEDICAL DECISION MAKING DETAILS
Patient presented for post operative urinary retention -- incidentally found to be hyperkalemic with GEETA and EKG changes, was seen by ICU who declined admission at this time. However the patient will require tele admission for monitoring of hyperK. In addition has post lynne, nitrite +UTI requiring IV abx. Furthermore requires IVF for likely post obstructive GEETA

## 2019-02-05 NOTE — H&P ADULT - HISTORY OF PRESENT ILLNESS
From ED:  84 year old female w hx of HTN, CHF, COPD on 2 LPM NC daily, bladder cancer presents to the ED with urinary retention, crampy/spasm-like lower abdominal pain and mild distention. Patient had bladder surgery w Dr. Sanchez on 1/30/19 and was d/c home with a lynne catheter in place. The catheter was removed yesterday. Patient had two episodes of normal urination after removal, but has since been experiencing retention and dribbling at most. Denies fevers, chills, cough, chest pain, shortness of breath, flank pain, myalgias, incontinence, rashes, or recent illness.  ________________________________________________________________________________________________________   Patient presents to ED with family this am.  Dtr reports that patient s/p cysto with bladder scrapping for Bladder Ca on 1/30/19.  Patient tolerated procedure well and was discharged home with Lynne.  Patient did very well since then and went to follow up at office ystd.  At that point lynne was removed and patient went home.  Dtr reports that patient urinated x 2 since removal however last evening began only dribbling and began having severe bladder spasms, unrelieved by oxybutynin.  Family brought patient to ED 2/2 to this.  Patient reports she is feeling well.  No CP.  SOB at baseline.  Still with some minor abdominal cramping, suprapubic area.  No fever.  Last BM ystd AM and regular.

## 2019-02-05 NOTE — ED PROVIDER NOTE - OBJECTIVE STATEMENT
84 year old female w hx of HTN, CHF, COPD on 2 LPM NC daily, bladder cancer presents to the ED with urinary retention, crampy/spasm-like lower abdominal pain and mild distention. Patient had bladder surgery w Dr. Sanchez on 1/30/19 and was d/c home with a lynne catheter in place. The catheter was removed yesterday. Patient had two episodes of normal urination after removal, but has since been experiencing retention and dribbling at most. Denies fevers, chills, cough, chest pain, shortness of breath, flank pain, myalgias, incontinence, rashes, or recent illness.

## 2019-02-05 NOTE — CHART NOTE - NSCHARTNOTEFT_GEN_A_CORE
Labs reviewed.  K+ decreased to 5.8.  Will give another amp D50% with 10u regular insulin and 15g Kayexalate PO x 1 now.  Will recheck labs again at 1900

## 2019-02-05 NOTE — PATIENT PROFILE ADULT - NSPROMEDSDISPOSITION_GEN_A_NUR
sent to pharmacy Patient had own bottle of [place] in 3S accudose that was removed on 3/9 when patient transferred to CCU- bottle of medication given to daughter Anali/sent home w/ family/friend/sent to pharmacy

## 2019-02-05 NOTE — H&P ADULT - NSHPPHYSICALEXAM_GEN_ALL_CORE
PHYSICAL EXAM:  GENERAL: NAD, well-developed  SKIN: No rashes or lesions  HEAD:  Atraumatic, Normocephalic  EYES: EOMI, PERRLA, conjunctiva and sclera clear  NECK: Supple, No JVD  CHEST/LUNG: Clear to auscultation bilaterally; No wheeze  HEART: Regular rate and rhythm; No murmurs, rubs, or gallops  ABDOMEN: Soft, Bowel sounds present; Nondistended; mild TTP RLQ/LLQ; no rebound/guarding  EXTREMITIES:  No clubbing, cyanosis, or edema  CNS: AAOx3

## 2019-02-05 NOTE — H&P ADULT - NSHPSOCIALHISTORY_GEN_ALL_CORE
Tobacco use: quit x 3 years.  Smoked 1ppd x at least 60 years  EtOH use: Denies   Illicit drug use: Denies   Marital Status: Patient is  and lives with her family

## 2019-02-05 NOTE — ED PROVIDER NOTE - PROGRESS NOTE DETAILS
patient seen and evaluated by me. cbc, cmp, ua/ucx, lynne catheter, 4 mg IV morphine ordered. will reassess Initial K+ result at 7.5, repeat K via VBG ordered. call from respiratory regarding potassium of 6.5. repeat ekg ordered. 2 g IV calcium gluconate, Kayexalate 15 g, 1 L NS bolus, 3 albuterol nebs, 50 mL dextrose 50%, 10 units regular insulin ordered. discussed with intensivist Dr. Marquez, will come destiney pt. Dr. Pinon at bedside, evaluating patient, requests 10mg IVP of hydralazine for elevated BP Pt not accepted to ICU or CCU tele -- per Dr. Pinon to be admitted to low risk tele. patient is being treated for hyperkalemia, will need repeat K when admitted. I was directly involved in the management of this case. Discussed case with PA fellow. paged dr. gutierrez's service.

## 2019-02-05 NOTE — H&P ADULT - ASSESSMENT
84F with above PMHX brought to ED 2/2 UR and bladder spasms found to have GEETA with hyperkalemia and EKG changes and concomitant UTI, admitted to telemetry for further monitoring.

## 2019-02-05 NOTE — ED PROVIDER NOTE - NS ED ROS FT
CONSTITUTIONAL: (-) fevers, (-) chills, (-) fatigue  EYES: (-) eye redness, (-) eye discharge, (-) tearing  ENT: (-) congestion, (-) rhinorrhea, (-) sore throat  NECK: (-) neck pain, (-) lymphadenopathy  CARDIO: (-) chest pain, (-) palpitations, (-) edema  PULM: (-) cough, (-) sputum, (-) shortness of breath, (-) wheezing, (-) hemoptysis  GI: see HPI, (-) nausea, (-) vomiting, (-) diarrhea, (-) constipation, (-) melena, (-) hematochezia  : see HPI, (-) dysuria, (-) hematuria, (-) frequency, (-) urgency, (-) incontinence  MSK: (-) back pain, (-) myalgias  SKIN: (-) rashes, (-) pallor, (-) ecchymosis  NEURO: (-) headache, (-) dizziness, (-) lightheadedness, (-) numbness, (-) weakness

## 2019-02-05 NOTE — H&P ADULT - PROBLEM SELECTOR PLAN 5
..  - at baseline on admission  - c/w Symbicort at home dose  - Duoneb q4h PRN  - supplemental o2 PRN

## 2019-02-05 NOTE — H&P ADULT - PROBLEM SELECTOR PLAN 3
w/leukocytosis s/p Cefepime in the ED  - c/w Cefepime 1gm q24h (renally dosed)  - Infectious Disease c/s  - FU UCx  - check BCx  - recheck CBC in AM

## 2019-02-06 LAB
ALBUMIN SERPL ELPH-MCNC: 3.5 G/DL — SIGNIFICANT CHANGE UP (ref 3.5–5.2)
ALP SERPL-CCNC: 49 U/L — SIGNIFICANT CHANGE UP (ref 30–115)
ALT FLD-CCNC: 8 U/L — SIGNIFICANT CHANGE UP (ref 0–41)
ANION GAP SERPL CALC-SCNC: 6 MMOL/L — LOW (ref 7–14)
AST SERPL-CCNC: 14 U/L — SIGNIFICANT CHANGE UP (ref 0–41)
BASOPHILS # BLD AUTO: 0.03 K/UL — SIGNIFICANT CHANGE UP (ref 0–0.2)
BASOPHILS NFR BLD AUTO: 0.3 % — SIGNIFICANT CHANGE UP (ref 0–1)
BILIRUB SERPL-MCNC: 0.6 MG/DL — SIGNIFICANT CHANGE UP (ref 0.2–1.2)
BUN SERPL-MCNC: 19 MG/DL — SIGNIFICANT CHANGE UP (ref 10–20)
CALCIUM SERPL-MCNC: 8.1 MG/DL — LOW (ref 8.5–10.1)
CHLORIDE SERPL-SCNC: 96 MMOL/L — LOW (ref 98–110)
CO2 SERPL-SCNC: 29 MMOL/L — SIGNIFICANT CHANGE UP (ref 17–32)
CREAT SERPL-MCNC: 0.8 MG/DL — SIGNIFICANT CHANGE UP (ref 0.7–1.5)
CULTURE RESULTS: NO GROWTH — SIGNIFICANT CHANGE UP
EOSINOPHIL # BLD AUTO: 0.09 K/UL — SIGNIFICANT CHANGE UP (ref 0–0.7)
EOSINOPHIL NFR BLD AUTO: 1 % — SIGNIFICANT CHANGE UP (ref 0–8)
GLUCOSE SERPL-MCNC: 104 MG/DL — HIGH (ref 70–99)
HCT VFR BLD CALC: 28.5 % — LOW (ref 37–47)
HGB BLD-MCNC: 9.4 G/DL — LOW (ref 12–16)
IMM GRANULOCYTES NFR BLD AUTO: 0.7 % — HIGH (ref 0.1–0.3)
LYMPHOCYTES # BLD AUTO: 0.8 K/UL — LOW (ref 1.2–3.4)
LYMPHOCYTES # BLD AUTO: 8.7 % — LOW (ref 20.5–51.1)
MCHC RBC-ENTMCNC: 33 G/DL — SIGNIFICANT CHANGE UP (ref 32–37)
MCHC RBC-ENTMCNC: 33.5 PG — HIGH (ref 27–31)
MCV RBC AUTO: 101.4 FL — HIGH (ref 81–99)
MONOCYTES # BLD AUTO: 0.79 K/UL — HIGH (ref 0.1–0.6)
MONOCYTES NFR BLD AUTO: 8.6 % — SIGNIFICANT CHANGE UP (ref 1.7–9.3)
NEUTROPHILS # BLD AUTO: 7.38 K/UL — HIGH (ref 1.4–6.5)
NEUTROPHILS NFR BLD AUTO: 80.7 % — HIGH (ref 42.2–75.2)
NRBC # BLD: 0 /100 WBCS — SIGNIFICANT CHANGE UP (ref 0–0)
OSMOLALITY UR: 333 MOS/KG — SIGNIFICANT CHANGE UP (ref 50–1400)
PLATELET # BLD AUTO: 160 K/UL — SIGNIFICANT CHANGE UP (ref 130–400)
POTASSIUM SERPL-MCNC: 5.3 MMOL/L — HIGH (ref 3.5–5)
POTASSIUM SERPL-SCNC: 5.3 MMOL/L — HIGH (ref 3.5–5)
POTASSIUM UR-SCNC: 21 MMOL/L — SIGNIFICANT CHANGE UP
PROT SERPL-MCNC: 5.4 G/DL — LOW (ref 6–8)
RBC # BLD: 2.81 M/UL — LOW (ref 4.2–5.4)
RBC # FLD: 16 % — HIGH (ref 11.5–14.5)
SODIUM SERPL-SCNC: 131 MMOL/L — LOW (ref 135–146)
SODIUM UR-SCNC: 66 MMOL/L — SIGNIFICANT CHANGE UP
SPECIMEN SOURCE: SIGNIFICANT CHANGE UP
WBC # BLD: 9.15 K/UL — SIGNIFICANT CHANGE UP (ref 4.8–10.8)
WBC # FLD AUTO: 9.15 K/UL — SIGNIFICANT CHANGE UP (ref 4.8–10.8)

## 2019-02-06 PROCEDURE — 99232 SBSQ HOSP IP/OBS MODERATE 35: CPT

## 2019-02-06 RX ORDER — AMPICILLIN TRIHYDRATE 250 MG
CAPSULE ORAL
Qty: 0 | Refills: 0 | Status: DISCONTINUED | OUTPATIENT
Start: 2019-02-07 | End: 2019-02-07

## 2019-02-06 RX ORDER — AMPICILLIN TRIHYDRATE 250 MG
1 CAPSULE ORAL EVERY 6 HOURS
Qty: 0 | Refills: 0 | Status: DISCONTINUED | OUTPATIENT
Start: 2019-02-07 | End: 2019-02-07

## 2019-02-06 RX ORDER — AMPICILLIN TRIHYDRATE 250 MG
1 CAPSULE ORAL ONCE
Qty: 0 | Refills: 0 | Status: COMPLETED | OUTPATIENT
Start: 2019-02-06 | End: 2019-02-07

## 2019-02-06 RX ORDER — DOCUSATE SODIUM 100 MG
100 CAPSULE ORAL
Qty: 0 | Refills: 0 | Status: DISCONTINUED | OUTPATIENT
Start: 2019-02-06 | End: 2019-02-07

## 2019-02-06 RX ORDER — TRAZODONE HCL 50 MG
50 TABLET ORAL ONCE
Qty: 0 | Refills: 0 | Status: COMPLETED | OUTPATIENT
Start: 2019-02-06 | End: 2019-02-06

## 2019-02-06 RX ADMIN — Medication 5 MILLIGRAM(S): at 06:14

## 2019-02-06 RX ADMIN — Medication 100 MILLIGRAM(S): at 06:14

## 2019-02-06 RX ADMIN — Medication 100 MILLIGRAM(S): at 17:49

## 2019-02-06 RX ADMIN — Medication 81 MILLIGRAM(S): at 13:44

## 2019-02-06 RX ADMIN — Medication 50 MILLIGRAM(S): at 06:13

## 2019-02-06 RX ADMIN — HEPARIN SODIUM 5000 UNIT(S): 5000 INJECTION INTRAVENOUS; SUBCUTANEOUS at 13:44

## 2019-02-06 RX ADMIN — HEPARIN SODIUM 5000 UNIT(S): 5000 INJECTION INTRAVENOUS; SUBCUTANEOUS at 06:13

## 2019-02-06 RX ADMIN — ATORVASTATIN CALCIUM 20 MILLIGRAM(S): 80 TABLET, FILM COATED ORAL at 21:16

## 2019-02-06 RX ADMIN — PANTOPRAZOLE SODIUM 40 MILLIGRAM(S): 20 TABLET, DELAYED RELEASE ORAL at 06:13

## 2019-02-06 RX ADMIN — AMLODIPINE BESYLATE 5 MILLIGRAM(S): 2.5 TABLET ORAL at 06:13

## 2019-02-06 RX ADMIN — Medication 5 MILLIGRAM(S): at 17:48

## 2019-02-06 RX ADMIN — BUDESONIDE AND FORMOTEROL FUMARATE DIHYDRATE 2 PUFF(S): 160; 4.5 AEROSOL RESPIRATORY (INHALATION) at 08:36

## 2019-02-06 RX ADMIN — HEPARIN SODIUM 5000 UNIT(S): 5000 INJECTION INTRAVENOUS; SUBCUTANEOUS at 21:16

## 2019-02-06 RX ADMIN — Medication 50 MILLIGRAM(S): at 02:44

## 2019-02-06 RX ADMIN — BUDESONIDE AND FORMOTEROL FUMARATE DIHYDRATE 2 PUFF(S): 160; 4.5 AEROSOL RESPIRATORY (INHALATION) at 19:59

## 2019-02-06 RX ADMIN — CEFEPIME 100 MILLIGRAM(S): 1 INJECTION, POWDER, FOR SOLUTION INTRAMUSCULAR; INTRAVENOUS at 12:34

## 2019-02-06 NOTE — PROGRESS NOTE ADULT - SUBJECTIVE AND OBJECTIVE BOX
[place] NEPHROLOGY FOLLOW UP NOTE  --------------------------------------------------------------------------------  24 hour events/subjective: Patient examined. Appears comfortable.    PAST HISTORY  --------------------------------------------------------------------------------  No significant changes to PMH, PSH, FHx, SHx, unless otherwise noted    ALLERGIES & MEDICATIONS  --------------------------------------------------------------------------------  Allergies    hair dyes (Hives)  sulfa drugs (Headache)  Zoloft (Headache)    Standing Inpatient Medications  ALBUTerol/ipratropium for Nebulization 3 milliLiter(s) Nebulizer every 6 hours  amLODIPine   Tablet 5 milliGRAM(s) Oral daily  aspirin  chewable 81 milliGRAM(s) Oral daily  atorvastatin 20 milliGRAM(s) Oral at bedtime  buDESOnide 160 MICROgram(s)/formoterol 4.5 MICROgram(s) Inhaler 2 Puff(s) Inhalation two times a day  busPIRone 5 milliGRAM(s) Oral two times a day  cefepime   IVPB 1000 milliGRAM(s) IV Intermittent daily  chlorhexidine 4% Liquid 1 Application(s) Topical <User Schedule>  docusate sodium 100 milliGRAM(s) Oral two times a day  heparin  Injectable 5000 Unit(s) SubCutaneous every 8 hours  metoprolol succinate ER 50 milliGRAM(s) Oral daily  pantoprazole    Tablet 40 milliGRAM(s) Oral before breakfast  sodium chloride 0.9%. 1000 milliLiter(s) IV Continuous <Continuous>    PRN Inpatient Medications  bisacodyl Suppository 10 milliGRAM(s) Rectal every 24 hours PRN  HYDROcodone 10 mG/acetaminophen 325 mG 1 Tablet(s) Oral every 4 hours PRN    VITALS/PHYSICAL EXAM  --------------------------------------------------------------------------------  T(C): 36.2 (02-06-19 @ 06:03), Max: 36.9 (02-05-19 @ 20:00)  HR: 67 (02-06-19 @ 06:50) (67 - 95)  BP: 156/70 (02-06-19 @ 07:53) (156/70 - 211/83)  RR: 16 (02-06-19 @ 07:53) (16 - 20)  SpO2: 98% (02-06-19 @ 07:53) (97% - 98%)  Height (cm): 157.48 (02-05-19 @ 20:00)  Weight (kg): 52.8 (02-05-19 @ 20:00)  BMI (kg/m2): 21.3 (02-05-19 @ 20:00)  BSA (m2): 1.52 (02-05-19 @ 20:00)    02-05-19 @ 07:01  -  02-06-19 @ 07:00  --------------------------------------------------------  IN: 0 mL / OUT: 3675 mL / NET: -3675 mL    Physical Exam:  	Gen: NAD  	Pulm: CTA B/L  	CV: RRR, S1S2  	Abd: +BS, soft, nontender/nondistended  	: No suprapubic tenderness  	LE: Warm, no edema    LABS/STUDIES  --------------------------------------------------------------------------------              9.4    9.15  >-----------<  160      [02-06-19 @ 07:12]              28.5     131  |  96  |  19  ----------------------------<  104      [02-06-19 @ 07:12]  5.3   |  29  |  0.8        Ca     8.1     [02-06-19 @ 07:12]      Mg     2.0     [02-05-19 @ 13:16]      Phos  4.2     [02-05-19 @ 13:16]    TPro  5.4  /  Alb  3.5  /  TBili  0.6  /  DBili  x   /  AST  14  /  ALT  8   /  AlkPhos  49  [02-06-19 @ 07:12]    Creatinine Trend:  SCr 0.8 [02-06 @ 07:12]  SCr 0.9 [02-05 @ 21:39]  SCr 1.2 [02-05 @ 13:16]  SCr 1.6 [02-05 @ 04:05]  SCr 0.9 [01-17 @ 18:00]    Urinalysis - [02-05-19 @ 04:25]      Color Orange / Appearance Slightly Cloudy / SG 1.025 / pH 5.5      Gluc 100 / Ketone Trace  / Bili Negative / Urobili 1.0       Blood Large / Protein >=300 / Leuk Est Negative / Nitrite Positive      RBC >50 / WBC 10-25 / Hyaline  / Gran  / Sq Epi  / Non Sq Epi Few / Bacteria Moderate

## 2019-02-06 NOTE — CONSULT NOTE ADULT - SUBJECTIVE AND OBJECTIVE BOX
Patient is a 84y old  Female who presents with a chief complaint of Urinary Retention/Hyperkalemia with EKG changes (2019 16:30)      INTERVAL HPI/OVERNIGHT EVENTS:  T(C): 37.4 (19 @ 14:39), Max: 37.4 (19 @ 14:39)  HR: 65 (19 @ 14:39) (65 - 95)  BP: 158/67 (19 @ 14:39) (156/70 - 211/83)  RR: 16 (19 @ 14:39) (16 - 16)  SpO2: 98% (19 @ 07:53) (98% - 98%)  Wt(kg): --  I&O's Summary    2019 07:01  -  2019 07:00  --------------------------------------------------------  IN: 0 mL / OUT: 3675 mL / NET: -3675 mL    2019 07:01  -  2019 20:12  --------------------------------------------------------  IN: 0 mL / OUT: 350 mL / NET: -350 mL        PAST MEDICAL & SURGICAL HISTORY:  Anemia: 4 PRBC 2018  Oxygen dependent: O2 2L Via NC  Bladder cancer: 2018  CHF (congestive heart failure)  Breast tumor: removal benign bl breasts  Anal cancer: Chemo and radiation   COPD (chronic obstructive pulmonary disease)  Lung disease: COPD  Hypertension  History of cystoscopy: 10/2018  H/O breast surgery: 39 yrs. ago, bilateral  History of back surgery: 10 yrs. ago stimulator implant , ( cervical and Lumbar)      SOCIAL HISTORY  Alcohol:  Tobacco:  Illicit substance use:      FAMILY HISTORY:      LABS:                        9.4    9.15  )-----------( 160      ( 2019 07:12 )             28.5     02-06    131<L>  |  96<L>  |  19  ----------------------------<  104<H>  5.3<H>   |  29  |  0.8    Ca    8.1<L>      2019 07:12  Phos  4.2     02-05  Mg     2.0     02-05    TPro  5.4<L>  /  Alb  3.5  /  TBili  0.6  /  DBili  x   /  AST  14  /  ALT  8   /  AlkPhos  49  02-06      Urinalysis Basic - ( 2019 04:25 )    Color: Orange / Appearance: Slightly Cloudy / S.025 / pH: x  Gluc: x / Ketone: Trace  / Bili: Negative / Urobili: 1.0 mg/dL   Blood: x / Protein: >=300 mg/dL / Nitrite: Positive   Leuk Esterase: Negative / RBC: >50 /HPF / WBC 10-25 /HPF   Sq Epi: x / Non Sq Epi: Few /HPF / Bacteria: Moderate      CAPILLARY BLOOD GLUCOSE            Urinalysis Basic - ( 2019 04:25 )    Color: Orange / Appearance: Slightly Cloudy / S.025 / pH: x  Gluc: x / Ketone: Trace  / Bili: Negative / Urobili: 1.0 mg/dL   Blood: x / Protein: >=300 mg/dL / Nitrite: Positive   Leuk Esterase: Negative / RBC: >50 /HPF / WBC 10-25 /HPF   Sq Epi: x / Non Sq Epi: Few /HPF / Bacteria: Moderate        MEDICATIONS  (STANDING):  ALBUTerol/ipratropium for Nebulization 3 milliLiter(s) Nebulizer every 6 hours  amLODIPine   Tablet 5 milliGRAM(s) Oral daily  aspirin  chewable 81 milliGRAM(s) Oral daily  atorvastatin 20 milliGRAM(s) Oral at bedtime  buDESOnide 160 MICROgram(s)/formoterol 4.5 MICROgram(s) Inhaler 2 Puff(s) Inhalation two times a day  busPIRone 5 milliGRAM(s) Oral two times a day  cefepime   IVPB 1000 milliGRAM(s) IV Intermittent daily  chlorhexidine 4% Liquid 1 Application(s) Topical <User Schedule>  docusate sodium 100 milliGRAM(s) Oral two times a day  heparin  Injectable 5000 Unit(s) SubCutaneous every 8 hours  metoprolol succinate ER 50 milliGRAM(s) Oral daily  pantoprazole    Tablet 40 milliGRAM(s) Oral before breakfast  sodium chloride 0.9%. 1000 milliLiter(s) (50 mL/Hr) IV Continuous <Continuous>    MEDICATIONS  (PRN):  bisacodyl Suppository 10 milliGRAM(s) Rectal every 24 hours PRN Constipation  HYDROcodone 10 mG/acetaminophen 325 mG 1 Tablet(s) Oral every 4 hours PRN Moderate Pain (4 - 6)      REVIEW OF SYSTEMS:  CONSTITUTIONAL: No fever, weight loss, or fatigue  EYES: No eye pain, visual disturbances, or discharge  ENMT:  No difficulty hearing, tinnitus, vertigo; No sinus or throat pain  NECK: No pain or stiffness  RESPIRATORY: No cough, wheezing, chills or hemoptysis; No shortness of breath  CARDIOVASCULAR: No chest pain, palpitations, dizziness, or leg swelling  GASTROINTESTINAL: No abdominal or epigastric pain. No nausea, vomiting, or hematemesis; No diarrhea or constipation. No melena or hematochezia.  GENITOURINARY: No dysuria, frequency, hematuria, or incontinence  NEUROLOGICAL: No headaches, memory loss, loss of strength, numbness, or tremors  SKIN: No itching, burning, rashes, or lesions   LYMPH NODES: No enlarged glands  ENDOCRINE: No heat or cold intolerance; No hair loss  MUSCULOSKELETAL: No joint pain or swelling; No muscle, back, or extremity pain  PSYCHIATRIC: No depression, anxiety, mood swings, or difficulty sleeping  HEME/LYMPH: No easy bruising, or bleeding gums  ALLERY AND IMMUNOLOGIC: No hives or eczema    RADIOLOGY & ADDITIONAL TESTS:    Imaging Personally Reviewed:  [ ] YES  [ ] NO    Consultant(s) Notes Reviewed:  [ ] YES  [ ] NO    PHYSICAL EXAM:  GENERAL: NAD, well-groomed, well-developed  HEAD:  Atraumatic, Normocephalic  EYES: EOMI, PERRLA, conjunctiva and sclera clear  ENMT: No tonsillar erythema, exudates, or enlargement; Moist mucous membranes, Good dentition, No lesions  NECK: Supple, No JVD, Normal thyroid  NERVOUS SYSTEM:  Alert & Oriented X3, Good concentration; Motor Strength 5/5 B/L upper and lower extremities; DTRs 2+ intact and symmetric  CHEST/LUNG: Clear to percussion bilaterally; No rales, rhonchi, wheezing, or rubs  HEART: Regular rate and rhythm; No murmurs, rubs, or gallops  ABDOMEN: Soft, Nontender, Nondistended; Bowel sounds present  EXTREMITIES:  2+ Peripheral Pulses, No clubbing, cyanosis, or edema  LYMPH: No lymphadenopathy noted  SKIN: No rashes or lesions    Care Discussed with Consultants/Other Providers [ ] YES  [ ] NO Patient is a 84y old  Female who presents with a chief complaint of Urinary Retention/Hyperkalemia with EKG changes (2019 16:30)      REVIEW OF SYSTEMS: Total of twelve systems have been reviewed with patient and found to be negative unless mentioned in HPI        PAST MEDICAL & SURGICAL HISTORY:  Anemia: 4 PRBC 2018  Oxygen dependent: O2 2L Via NC  Bladder cancer: 2018  CHF (congestive heart failure)  Breast tumor: removal benign bl breasts  Anal cancer: Chemo and radiation   COPD (chronic obstructive pulmonary disease)  Lung disease: COPD  Hypertension  History of cystoscopy: 10/2018  H/O breast surgery: 39 yrs. ago, bilateral  History of back surgery: 10 yrs. ago stimulator implant , ( cervical and Lumbar)        SOCIAL HISTORY  Alcohol: Does not drink  Tobacco: Orta snot smoke  Illicit substance use:  None        FAMILY HISTORY: Non contributory to the present illness        ALLERGIES: SULFA drugs         T(C): 37.4 (19 @ 14:39), Max: 37.4 (19 @ 14:39)  HR: 65 (19 @ 14:39) (65 - 95)  BP: 158/67 (19 @ 14:39) (156/70 - 211/83)  RR: 16 (19 @ 14:39) (16 - 16)  SpO2: 98% (19 @ 07:53) (98% - 98%)  Wt(kg): --  I&O's Summary        PHYSICAL EXAM:  GENERAL: Not in distress  CHEST/LUNG: Air  entry bilaterally  HEART: s1 and s2 present  ABDOMEN: Nontender, and  Nondistended  : Rowe catheter in placed  EXTREMITIES:  No pedal  edema  CNS: Awake and alert        LABS:                        9.4    9.15  )-----------( 160      ( 2019 07:12 )             28.5         02-06    131<L>  |  96<L>  |  19  ----------------------------<  104<H>  5.3<H>   |  29  |  0.8    Ca    8.1<L>      2019 07:12  Phos  4.2     02-05  Mg     2.0     02-05    TPro  5.4<L>  /  Alb  3.5  /  TBili  0.6  /  DBili  x   /  AST  14  /  ALT  8   /  AlkPhos  49  -      Urinalysis Basic - ( 2019 04:25 )    Color: Orange / Appearance: Slightly Cloudy / S.025 / pH: x  Gluc: x / Ketone: Trace  / Bili: Negative / Urobili: 1.0 mg/dL   Blood: x / Protein: >=300 mg/dL / Nitrite: Positive   Leuk Esterase: Negative / RBC: >50 /HPF / WBC 10-25 /HPF   Sq Epi: x / Non Sq Epi: Few /HPF / Bacteria: Moderate          MEDICATIONS  (STANDING):  ALBUTerol/ipratropium for Nebulization 3 milliLiter(s) Nebulizer every 6 hours  amLODIPine   Tablet 5 milliGRAM(s) Oral daily  aspirin  chewable 81 milliGRAM(s) Oral daily  atorvastatin 20 milliGRAM(s) Oral at bedtime  buDESOnide 160 MICROgram(s)/formoterol 4.5 MICROgram(s) Inhaler 2 Puff(s) Inhalation two times a day  busPIRone 5 milliGRAM(s) Oral two times a day  cefepime   IVPB 1000 milliGRAM(s) IV Intermittent daily  chlorhexidine 4% Liquid 1 Application(s) Topical <User Schedule>  docusate sodium 100 milliGRAM(s) Oral two times a day  heparin  Injectable 5000 Unit(s) SubCutaneous every 8 hours  metoprolol succinate ER 50 milliGRAM(s) Oral daily  pantoprazole    Tablet 40 milliGRAM(s) Oral before breakfast  sodium chloride 0.9%. 1000 milliLiter(s) (50 mL/Hr) IV Continuous <Continuous>    MEDICATIONS  (PRN):  bisacodyl Suppository 10 milliGRAM(s) Rectal every 24 hours PRN Constipation  HYDROcodone 10 mG/acetaminophen 325 mG 1 Tablet(s) Oral every 4 hours PRN Moderate Pain (4 - 6)          RADIOLOGY & ADDITIONAL TESTS:    < from: US Retroperitoneal B-Scan Limited (19 @ 09:40) >  Moderate right-sided hydronephrosis, unchanged.      < end of copied text >      < from: Xray Chest 1 View- PORTABLE-Urgent (19 @ 07:00) >    No radiographic evidence of acute cardiopulmonary disease.      < end of copied text >        MICROBIOLOGY DATA:    Culture - Blood (02.05.19 @ 13:16)    Specimen Source: .Blood Blood    Culture Results:   No growth to date.        Culture - Urine (19 @ 04:25)    Specimen Source: .Urine Clean Catch (Midstream)    Culture Results:   No growth      Culture - Urine (19 @ 13:38)    -  Nitrofurantoin: S <=32 Should not be used to treat pyelonephritis.    -  Tetra/Doxy: R >8    -  Vancomycin: S 1    -  Ampicillin: S <=2 Predicts results to ampicillin/sulbactam, amoxacillin-clavulanate and  piperacillin-tazobactam.    -  Ciprofloxacin: S <=1    -  Levofloxacin: S 1    Specimen Source: .Urine Clean Catch (Midstream)    Culture Results:   10,000 - 49,000 CFU/mL Enterococcus faecalis  Normal Urogenital katie present    Organism Identification: Enterococcus faecalis    Organism: Enterococcus faecalis    Method Type: ANUM

## 2019-02-06 NOTE — PROGRESS NOTE ADULT - ATTENDING COMMENTS
Pt seen and examined. hyperkalemia, GEETA appear to be multifactorial 2/2 retention vs.  medical.   Recommend constipation mgmt to ensure daily BMs to maximize chance of voiding.  Please obtain xray cystogram to ensure bladder is well-healed sp restaging TURBT.

## 2019-02-06 NOTE — CONSULT NOTE ADULT - ASSESSMENT
# Complicated UTI  # Moderate Right sided hydronephrosis    would recommend:  1. Change Cefepime to Ampicillin based on the previous culture to cover Enterococcus  2. Management of Right hydronephrosis as per Urology  3, May change to oral Augmentin on discharge    d/w patient     will follow the patient with you and make further recommendation based on the clinical course and Lab results  Thank you for the opportunity to participate in Ms. SCHAFER's care

## 2019-02-06 NOTE — PROGRESS NOTE ADULT - ASSESSMENT
84F with above PMHX presents to ED with complaints of bladder spasms, found to have UR s/p Rowe cath placement in ED with associated GEETA and Hyperkalemia    - c/w medical management  - Nephrology follow up  - Maintain Rowe for now.  ToV with bladder scan prior to discharge  - Treat Constipation to facilitate voiding  - no acute Urologic intervention at this time  - outpatient follow up on discharge  - d/w Dr. Sanchez   - recall PRN

## 2019-02-06 NOTE — PROGRESS NOTE ADULT - SUBJECTIVE AND OBJECTIVE BOX
Patient seen and examined at bedside.  Doing well.  No complaints.  Rowe cath still in place.  No BM x 2-3 days    PAST MEDICAL & SURGICAL HISTORY:  Anemia: 4 PRBC 2018  Oxygen dependent: O2 2L Via NC  Bladder cancer: 2018  CHF (congestive heart failure)  Breast tumor: removal benign bl breasts  Anal cancer: Chemo and radiation   COPD (chronic obstructive pulmonary disease)  Lung disease: COPD  Hypertension  History of cystoscopy: 10/2018  H/O breast surgery: 39 yrs. ago, bilateral  History of back surgery: 10 yrs. ago stimulator implant , ( cervical and Lumbar)    Vital Signs (24 Hrs):  T(C): 37.4 (19 @ 14:39), Max: 37.4 (19 @ 14:39)  HR: 65 (19 @ 14:39) (65 - 95)  BP: 158/67 (19 @ 14:39) (156/70 - 211/83)  RR: 16 (19 @ 14:39) (16 - 20)  SpO2: 98% (19 @ 07:53) (97% - 98%)  Wt(kg): --  Daily Height in cm: 157.48 (2019 20:00)    Daily Weight in k (2019 06:20)    I&O's Summary    2019 07:01  -  2019 07:00  --------------------------------------------------------  IN: 0 mL / OUT: 3675 mL / NET: -3675 mL    2019 07:01  -  2019 16:31  --------------------------------------------------------  IN: 0 mL / OUT: 350 mL / NET: -350 mL        PHYSICAL EXAM:  GENERAL: NAD, well-developed  SKIN: No rashes or lesions  HEAD:  Atraumatic, Normocephalic  EYES: EOMI, PERRLA, conjunctiva and sclera clear  NECK: Supple, No JVD  CHEST/LUNG: Clear to auscultation bilaterally; No wheeze  HEART: Regular rate and rhythm; No murmurs, rubs, or gallops  ABDOMEN: Soft, Nontender, Nondistended; Bowel sounds present  EXTREMITIES:  No clubbing, cyanosis, or edema  CNS: AAOx3      Labs/Imaginin.4    9.15  )-----------( 160      ( 2019 07:12 )             28.5       02-06    131<L>  |  96<L>  |  19  ----------------------------<  104<H>  5.3<H>   |  29  |  0.8    Ca    8.1<L>      2019 07:12  Phos  4.2     02-05  Mg     2.0     02-05    TPro  5.4<L>  /  Alb  3.5  /  TBili  0.6  /  DBili  x   /  AST  14  /  ALT  8   /  AlkPhos  49  02-06                  Urinalysis Basic - ( 2019 04:25 )    Color: Orange / Appearance: Slightly Cloudy / S.025 / pH: x  Gluc: x / Ketone: Trace  / Bili: Negative / Urobili: 1.0 mg/dL   Blood: x / Protein: >=300 mg/dL / Nitrite: Positive   Leuk Esterase: Negative / RBC: >50 /HPF / WBC 10-25 /HPF   Sq Epi: x / Non Sq Epi: Few /HPF / Bacteria: Moderate            Lactate Trend                  Culture Results:   No growth ( @ 04:25)  Culture Results:   10,000 - 49,000 CFU/mL Enterococcus faecalis  Normal Urogenital katie present ( @ 13:38)    CXR  Impression:      No radiographic evidence of acute cardiopulmonary disease.

## 2019-02-06 NOTE — PROGRESS NOTE ADULT - ASSESSMENT
1. GEETA  2. Hyperkalemia  3. Urinary retention  4. Hyponatremia    Plan:  Same IVF  Daily BMP  Give Kayexalate for potassium >5.5  Continue Rowe  Monitor I/O  Holding spironolactone

## 2019-02-07 DIAGNOSIS — T83.511D INFECTION AND INFLAMMATORY REACTION DUE TO INDWELLING URETHRAL CATHETER, SUBSEQUENT ENCOUNTER: ICD-10-CM

## 2019-02-07 DIAGNOSIS — Z88.2 ALLERGY STATUS TO SULFONAMIDES: ICD-10-CM

## 2019-02-07 DIAGNOSIS — I10 ESSENTIAL (PRIMARY) HYPERTENSION: ICD-10-CM

## 2019-02-07 DIAGNOSIS — J44.9 CHRONIC OBSTRUCTIVE PULMONARY DISEASE, UNSPECIFIED: ICD-10-CM

## 2019-02-07 DIAGNOSIS — C67.9 MALIGNANT NEOPLASM OF BLADDER, UNSPECIFIED: ICD-10-CM

## 2019-02-07 DIAGNOSIS — Z88.8 ALLERGY STATUS TO OTHER DRUGS, MEDICAMENTS AND BIOLOGICAL SUBSTANCES: ICD-10-CM

## 2019-02-07 LAB
ANION GAP SERPL CALC-SCNC: 6 MMOL/L — LOW (ref 7–14)
BUN SERPL-MCNC: 17 MG/DL — SIGNIFICANT CHANGE UP (ref 10–20)
CALCIUM SERPL-MCNC: 7.7 MG/DL — LOW (ref 8.5–10.1)
CHLORIDE SERPL-SCNC: 100 MMOL/L — SIGNIFICANT CHANGE UP (ref 98–110)
CO2 SERPL-SCNC: 30 MMOL/L — SIGNIFICANT CHANGE UP (ref 17–32)
CREAT SERPL-MCNC: 1.1 MG/DL — SIGNIFICANT CHANGE UP (ref 0.7–1.5)
GLUCOSE SERPL-MCNC: 104 MG/DL — HIGH (ref 70–99)
POTASSIUM SERPL-MCNC: 4.5 MMOL/L — SIGNIFICANT CHANGE UP (ref 3.5–5)
POTASSIUM SERPL-SCNC: 4.5 MMOL/L — SIGNIFICANT CHANGE UP (ref 3.5–5)
SODIUM SERPL-SCNC: 136 MMOL/L — SIGNIFICANT CHANGE UP (ref 135–146)

## 2019-02-07 PROCEDURE — 99232 SBSQ HOSP IP/OBS MODERATE 35: CPT

## 2019-02-07 RX ORDER — SENNA PLUS 8.6 MG/1
2 TABLET ORAL DAILY
Qty: 0 | Refills: 0 | Status: DISCONTINUED | OUTPATIENT
Start: 2019-02-07 | End: 2019-03-13

## 2019-02-07 RX ORDER — DOCUSATE SODIUM 100 MG
200 CAPSULE ORAL AT BEDTIME
Qty: 0 | Refills: 0 | Status: DISCONTINUED | OUTPATIENT
Start: 2019-02-07 | End: 2019-02-19

## 2019-02-07 RX ORDER — AMOXICILLIN 250 MG/5ML
500 SUSPENSION, RECONSTITUTED, ORAL (ML) ORAL EVERY 8 HOURS
Qty: 0 | Refills: 0 | Status: DISCONTINUED | OUTPATIENT
Start: 2019-02-07 | End: 2019-02-12

## 2019-02-07 RX ADMIN — Medication 3 MILLILITER(S): at 19:46

## 2019-02-07 RX ADMIN — HEPARIN SODIUM 5000 UNIT(S): 5000 INJECTION INTRAVENOUS; SUBCUTANEOUS at 06:06

## 2019-02-07 RX ADMIN — SODIUM CHLORIDE 50 MILLILITER(S): 9 INJECTION INTRAMUSCULAR; INTRAVENOUS; SUBCUTANEOUS at 00:18

## 2019-02-07 RX ADMIN — PANTOPRAZOLE SODIUM 40 MILLIGRAM(S): 20 TABLET, DELAYED RELEASE ORAL at 06:06

## 2019-02-07 RX ADMIN — Medication 500 MILLIGRAM(S): at 15:29

## 2019-02-07 RX ADMIN — Medication 500 MILLIGRAM(S): at 22:00

## 2019-02-07 RX ADMIN — HEPARIN SODIUM 5000 UNIT(S): 5000 INJECTION INTRAVENOUS; SUBCUTANEOUS at 22:17

## 2019-02-07 RX ADMIN — Medication 100 MILLIGRAM(S): at 17:39

## 2019-02-07 RX ADMIN — Medication 5 MILLIGRAM(S): at 17:39

## 2019-02-07 RX ADMIN — ATORVASTATIN CALCIUM 20 MILLIGRAM(S): 80 TABLET, FILM COATED ORAL at 22:00

## 2019-02-07 RX ADMIN — Medication 5 MILLIGRAM(S): at 06:06

## 2019-02-07 RX ADMIN — BUDESONIDE AND FORMOTEROL FUMARATE DIHYDRATE 2 PUFF(S): 160; 4.5 AEROSOL RESPIRATORY (INHALATION) at 08:04

## 2019-02-07 RX ADMIN — Medication 108 GRAM(S): at 00:18

## 2019-02-07 RX ADMIN — Medication 50 MILLIGRAM(S): at 06:06

## 2019-02-07 RX ADMIN — Medication 108 GRAM(S): at 06:05

## 2019-02-07 RX ADMIN — BUDESONIDE AND FORMOTEROL FUMARATE DIHYDRATE 2 PUFF(S): 160; 4.5 AEROSOL RESPIRATORY (INHALATION) at 20:14

## 2019-02-07 RX ADMIN — Medication 200 MILLIGRAM(S): at 22:00

## 2019-02-07 RX ADMIN — Medication 81 MILLIGRAM(S): at 11:26

## 2019-02-07 RX ADMIN — Medication 100 MILLIGRAM(S): at 06:06

## 2019-02-07 RX ADMIN — Medication 10 MILLIGRAM(S): at 02:11

## 2019-02-07 RX ADMIN — AMLODIPINE BESYLATE 5 MILLIGRAM(S): 2.5 TABLET ORAL at 06:06

## 2019-02-07 RX ADMIN — HEPARIN SODIUM 5000 UNIT(S): 5000 INJECTION INTRAVENOUS; SUBCUTANEOUS at 15:30

## 2019-02-07 NOTE — PROGRESS NOTE ADULT - SUBJECTIVE AND OBJECTIVE BOX
INTERVAL HPI/OVERNIGHT EVENTS:  Pt comfortable in bed, tolerates diet, awaiting cystogram    MEDICATIONS  (STANDING):  ALBUTerol/ipratropium for Nebulization 3 milliLiter(s) Nebulizer every 6 hours  amLODIPine   Tablet 5 milliGRAM(s) Oral daily  amoxicillin 500 milliGRAM(s) Oral every 8 hours  aspirin  chewable 81 milliGRAM(s) Oral daily  atorvastatin 20 milliGRAM(s) Oral at bedtime  buDESOnide 160 MICROgram(s)/formoterol 4.5 MICROgram(s) Inhaler 2 Puff(s) Inhalation two times a day  busPIRone 5 milliGRAM(s) Oral two times a day  chlorhexidine 4% Liquid 1 Application(s) Topical <User Schedule>  docusate sodium 100 milliGRAM(s) Oral two times a day  heparin  Injectable 5000 Unit(s) SubCutaneous every 8 hours  metoprolol succinate ER 50 milliGRAM(s) Oral daily  pantoprazole    Tablet 40 milliGRAM(s) Oral before breakfast  sodium chloride 0.9%. 1000 milliLiter(s) (50 mL/Hr) IV Continuous <Continuous>    MEDICATIONS  (PRN):  bisacodyl Suppository 10 milliGRAM(s) Rectal every 24 hours PRN Constipation  HYDROcodone 10 mG/acetaminophen 325 mG 1 Tablet(s) Oral every 4 hours PRN Moderate Pain (4 - 6)      Allergies    hair dyes (Hives)  sulfa drugs (Headache)  Zoloft (Headache)    Intolerances        Vital Signs Last 24 Hrs  T(C): 36 (07 Feb 2019 05:00), Max: 37.4 (06 Feb 2019 14:39)  T(F): 96.8 (07 Feb 2019 05:00), Max: 99.3 (06 Feb 2019 14:39)  HR: 70 (07 Feb 2019 12:26) (65 - 82)  BP: 150/- (07 Feb 2019 12:26) (144/63 - 182/79)  BP(mean): --  RR: 16 (07 Feb 2019 11:25) (16 - 18)  SpO2: 99% (07 Feb 2019 11:25) (99% - 99%)     ON PE:  General: alert and awake, nad    :lynne patent, clear yellow urine    LABS:                        9.4    9.15  )-----------( 160      ( 06 Feb 2019 07:12 )             28.5     02-06    131<L>  |  96<L>  |  19  ----------------------------<  104<H>  5.3<H>   |  29  |  0.8    Ca    8.1<L>      06 Feb 2019 07:12  Phos  4.2     02-05  Mg     2.0     02-05    TPro  5.4<L>  /  Alb  3.5  /  TBili  0.6  /  DBili  x   /  AST  14  /  ALT  8   /  AlkPhos  49  02-06          RADIOLOGY & ADDITIONAL TESTS:      Impression: stable    Plan:awaiting cystogram

## 2019-02-07 NOTE — CHART NOTE - NSCHARTNOTEFT_GEN_A_CORE
Xray Cystogram Min 3 Views (02.07.19 @ 15:02) >    Findings/  impression:  film-Lumbar spine neurostimulator device. Status post   L4-5 fusion and laminectomy. Lumbar spine and bilateral hip degenerative   changes.. Abdominal, pelvic and femoral vascular calcification..     Under fluoroscopic control using drip infusion method water-soluble   contrast material is introduced through a Lynne catheter into the urinary   bladder. Urinary bladder is filled with no leak. Grade 5 right   vesicoureteral reflux.. Grade 3 left ureterovesical reflux.  -------------------------------------------------------------------------------------    Above D/W Dr. Sanchez:  may D/C lynne and if passes TOV, may D/C home and F/U in office

## 2019-02-07 NOTE — PROGRESS NOTE ADULT - SUBJECTIVE AND OBJECTIVE BOX
SUBJECTIVE:    Patient is a 84y old Female who presents with a chief complaint of Urinary Retention/Hperkalemia with EKG changes (07 Feb 2019 06:42)    Currently admitted to medicine with the primary diagnosis of Hyperkalemia     Today is hospital day 2d. This morning she is resting comfortably in bed and reports no new issues or overnight events.     PAST MEDICAL & SURGICAL HISTORY  Anemia: 4 PRBC 11/2018  Oxygen dependent: O2 2L Via NC  Bladder cancer: 2018  CHF (congestive heart failure)  Breast tumor: removal benign bl breasts  Anal cancer: Chemo and radiation 1992  COPD (chronic obstructive pulmonary disease)  Lung disease: COPD  Hypertension  History of cystoscopy: 10/2018  H/O breast surgery: 39 yrs. ago, bilateral  History of back surgery: 10 yrs. ago stimulator implant 2001, 2011( cervical and Lumbar)    SOCIAL HISTORY:  Negative for smoking/alcohol/drug use.     ALLERGIES:  hair dyes (Hives)  sulfa drugs (Headache)  Zoloft (Headache)    MEDICATIONS:  STANDING MEDICATIONS  ALBUTerol/ipratropium for Nebulization 3 milliLiter(s) Nebulizer every 6 hours  amLODIPine   Tablet 5 milliGRAM(s) Oral daily  amoxicillin 500 milliGRAM(s) Oral every 8 hours  aspirin  chewable 81 milliGRAM(s) Oral daily  atorvastatin 20 milliGRAM(s) Oral at bedtime  buDESOnide 160 MICROgram(s)/formoterol 4.5 MICROgram(s) Inhaler 2 Puff(s) Inhalation two times a day  busPIRone 5 milliGRAM(s) Oral two times a day  chlorhexidine 4% Liquid 1 Application(s) Topical <User Schedule>  docusate sodium 100 milliGRAM(s) Oral two times a day  heparin  Injectable 5000 Unit(s) SubCutaneous every 8 hours  metoprolol succinate ER 50 milliGRAM(s) Oral daily  pantoprazole    Tablet 40 milliGRAM(s) Oral before breakfast  sodium chloride 0.9%. 1000 milliLiter(s) IV Continuous <Continuous>    PRN MEDICATIONS  bisacodyl Suppository 10 milliGRAM(s) Rectal every 24 hours PRN  HYDROcodone 10 mG/acetaminophen 325 mG 1 Tablet(s) Oral every 4 hours PRN    VITALS:   T(F): 96.8  HR: 82  BP: 182/79  RR: 18  SpO2: --    LABS:                        9.4    9.15  )-----------( 160      ( 06 Feb 2019 07:12 )             28.5     02-06    131<L>  |  96<L>  |  19  ----------------------------<  104<H>  5.3<H>   |  29  |  0.8    Ca    8.1<L>      06 Feb 2019 07:12  Phos  4.2     02-05  Mg     2.0     02-05    TPro  5.4<L>  /  Alb  3.5  /  TBili  0.6  /  DBili  x   /  AST  14  /  ALT  8   /  AlkPhos  49  02-06              Culture - Blood (collected 05 Feb 2019 13:16)  Source: .Blood Blood  Preliminary Report (06 Feb 2019 23:01):    No growth to date.    Culture - Urine (collected 05 Feb 2019 04:25)  Source: .Urine Clean Catch (Midstream)  Final Report (06 Feb 2019 15:41):    No growth          RADIOLOGY:    PHYSICAL EXAM:  GEN: No acute distress  LUNGS: Clear to auscultation bilaterally   HEART: Regular  ABD: Soft, non-tender, non-distended.  EXT: NC/NC/NE/2+PP/SARAVIA/Skin Intact.   NEURO: AAOX3    Intravenous access:   NG tube:   Rowe Catheter:

## 2019-02-07 NOTE — PROGRESS NOTE ADULT - SUBJECTIVE AND OBJECTIVE BOX
infectious diseases progress note:  KOTA SCHAFER is a 84yFemale patient    HYPERKALEMIA UTI URINARY RETENTION LEUKOCYTOSIS GEETA    CHF (congestive heart failure)  COPD (chronic obstructive pulmonary disease)  GEETA (acute kidney injury)  UTI (urinary tract infection)  Urinary retention  Hyperkalemia      ROS:  C/o pain at lynne site   Allergies    hair dyes (Hives)  sulfa drugs (Headache)  Zoloft (Headache)    Intolerances        ANTIBIOTICS/RELEVANT:  antimicrobials  amoxicillin 500 milliGRAM(s) Oral every 8 hours    immunologic:    OTHER:  ALBUTerol/ipratropium for Nebulization 3 milliLiter(s) Nebulizer every 6 hours  amLODIPine   Tablet 5 milliGRAM(s) Oral daily  aspirin  chewable 81 milliGRAM(s) Oral daily  atorvastatin 20 milliGRAM(s) Oral at bedtime  bisacodyl Suppository 10 milliGRAM(s) Rectal every 24 hours PRN  buDESOnide 160 MICROgram(s)/formoterol 4.5 MICROgram(s) Inhaler 2 Puff(s) Inhalation two times a day  busPIRone 5 milliGRAM(s) Oral two times a day  chlorhexidine 4% Liquid 1 Application(s) Topical <User Schedule>  docusate sodium 100 milliGRAM(s) Oral two times a day  heparin  Injectable 5000 Unit(s) SubCutaneous every 8 hours  HYDROcodone 10 mG/acetaminophen 325 mG 1 Tablet(s) Oral every 4 hours PRN  metoprolol succinate ER 50 milliGRAM(s) Oral daily  pantoprazole    Tablet 40 milliGRAM(s) Oral before breakfast  sodium chloride 0.9%. 1000 milliLiter(s) IV Continuous <Continuous>      Objective:  T(F): 96.8 (02-07-19 @ 05:00), Max: 99.3 (02-06-19 @ 14:39)  HR: 82 (02-07-19 @ 05:00) (65 - 82)  BP: 182/79 (02-07-19 @ 05:00) (144/63 - 182/79)  RR: 18 (02-07-19 @ 05:00) (16 - 18)  SpO2: 98% (02-06-19 @ 07:53) (98% - 98%)    PHYSICAL EXAM:  Constitutional:Well-developed, well nourished	  Neck:no JVD, no lymphadenopathy, supple  Respiratory: CTA dalia  Cardiovascular: S1S2 RRR  Gastrointestinal:soft, (+) BS, no HSM. lynne in situ   Extremities:no phlebitis         LABS:                        9.4    9.15  )-----------( 160      ( 06 Feb 2019 07:12 )             28.5     02-06    131<L>  |  96<L>  |  19  ----------------------------<  104<H>  5.3<H>   |  29  |  0.8    Ca    8.1<L>      06 Feb 2019 07:12  Phos  4.2     02-05  Mg     2.0     02-05    TPro  5.4<L>  /  Alb  3.5  /  TBili  0.6  /  DBili  x   /  AST  14  /  ALT  8   /  AlkPhos  49  02-06            MICROBIOLOGY:    Culture - Blood (collected 02-05-19 @ 13:16)  Source: .Blood Blood  Preliminary Report (02-06-19 @ 23:01):    No growth to date.    Culture - Urine (collected 02-05-19 @ 04:25)  Source: .Urine Clean Catch (Midstream)  Final Report (02-06-19 @ 15:41):    No growth        Culture - Blood (collected 05 Feb 2019 13:16)  Source: .Blood Blood  Preliminary Report (06 Feb 2019 23:01):    No growth to date.    Culture - Urine (collected 05 Feb 2019 04:25)  Source: .Urine Clean Catch (Midstream)  Final Report (06 Feb 2019 15:41):    No growth      Culture Results:   No growth to date. (02-05 @ 13:16)  Culture Results:   No growth (02-05 @ 04:25)        RADIOLOGY & ADDITIONAL STUDIES:

## 2019-02-08 RX ORDER — DULOXETINE HYDROCHLORIDE 30 MG/1
20 CAPSULE, DELAYED RELEASE ORAL DAILY
Qty: 0 | Refills: 0 | Status: DISCONTINUED | OUTPATIENT
Start: 2019-02-08 | End: 2019-03-04

## 2019-02-08 RX ORDER — LANOLIN ALCOHOL/MO/W.PET/CERES
6 CREAM (GRAM) TOPICAL ONCE
Qty: 0 | Refills: 0 | Status: COMPLETED | OUTPATIENT
Start: 2019-02-08 | End: 2019-02-08

## 2019-02-08 RX ADMIN — Medication 500 MILLIGRAM(S): at 05:45

## 2019-02-08 RX ADMIN — BUDESONIDE AND FORMOTEROL FUMARATE DIHYDRATE 2 PUFF(S): 160; 4.5 AEROSOL RESPIRATORY (INHALATION) at 22:29

## 2019-02-08 RX ADMIN — DULOXETINE HYDROCHLORIDE 20 MILLIGRAM(S): 30 CAPSULE, DELAYED RELEASE ORAL at 17:13

## 2019-02-08 RX ADMIN — Medication 500 MILLIGRAM(S): at 22:29

## 2019-02-08 RX ADMIN — BUDESONIDE AND FORMOTEROL FUMARATE DIHYDRATE 2 PUFF(S): 160; 4.5 AEROSOL RESPIRATORY (INHALATION) at 10:10

## 2019-02-08 RX ADMIN — Medication 5 MILLIGRAM(S): at 22:29

## 2019-02-08 RX ADMIN — AMLODIPINE BESYLATE 5 MILLIGRAM(S): 2.5 TABLET ORAL at 05:45

## 2019-02-08 RX ADMIN — Medication 50 MILLIGRAM(S): at 05:46

## 2019-02-08 RX ADMIN — Medication 200 MILLIGRAM(S): at 22:29

## 2019-02-08 RX ADMIN — Medication 5 MILLIGRAM(S): at 05:46

## 2019-02-08 RX ADMIN — ATORVASTATIN CALCIUM 20 MILLIGRAM(S): 80 TABLET, FILM COATED ORAL at 22:29

## 2019-02-08 RX ADMIN — Medication 6 MILLIGRAM(S): at 22:58

## 2019-02-08 RX ADMIN — Medication 500 MILLIGRAM(S): at 14:28

## 2019-02-08 RX ADMIN — HEPARIN SODIUM 5000 UNIT(S): 5000 INJECTION INTRAVENOUS; SUBCUTANEOUS at 22:58

## 2019-02-08 RX ADMIN — SENNA PLUS 2 TABLET(S): 8.6 TABLET ORAL at 12:06

## 2019-02-08 RX ADMIN — Medication 5 MILLIGRAM(S): at 14:28

## 2019-02-08 RX ADMIN — PANTOPRAZOLE SODIUM 40 MILLIGRAM(S): 20 TABLET, DELAYED RELEASE ORAL at 05:46

## 2019-02-08 RX ADMIN — Medication 81 MILLIGRAM(S): at 12:05

## 2019-02-08 NOTE — PROGRESS NOTE ADULT - SUBJECTIVE AND OBJECTIVE BOX
SUBJECTIVE:    Patient is a 84y old Female who presents with a chief complaint of Urinary Retention/Hperkalemia with EKG changes (07 Feb 2019 12:45)    Currently admitted to medicine with the primary diagnosis of Hyperkalemia     Today is hospital day 3d. c/o isidoro[rashid     PAST MEDICAL & SURGICAL HISTORY  Anemia: 4 PRBC 11/2018  Oxygen dependent: O2 2L Via [place]  Bladder cancer: 2018  CHF (congestive heart failure)  Breast tumor: removal benign bl breasts  Anal cancer: Chemo and radiation 1992  COPD (chronic obstructive pulmonary disease)  Lung disease: COPD  Hypertension  History of cystoscopy: 10/2018  H/O breast surgery: 39 yrs. ago, bilateral  History of back surgery: 10 yrs. ago stimulator implant 2001, 2011( cervical and Lumbar)    SOCIAL HISTORY:  Negative for smoking/alcohol/drug use.     ALLERGIES:  hair dyes (Hives)  sulfa drugs (Headache)  Zoloft (Headache)    MEDICATIONS:  STANDING MEDICATIONS  ALBUTerol/ipratropium for Nebulization 3 milliLiter(s) Nebulizer every 6 hours  amLODIPine   Tablet 5 milliGRAM(s) Oral daily  amoxicillin 500 milliGRAM(s) Oral every 8 hours  aspirin  chewable 81 milliGRAM(s) Oral daily  atorvastatin 20 milliGRAM(s) Oral at bedtime  buDESOnide 160 MICROgram(s)/formoterol 4.5 MICROgram(s) Inhaler 2 Puff(s) Inhalation two times a day  busPIRone 5 milliGRAM(s) Oral two times a day  chlorhexidine 4% Liquid 1 Application(s) Topical <User Schedule>  docusate sodium 200 milliGRAM(s) Oral at bedtime  heparin  Injectable 5000 Unit(s) SubCutaneous every 8 hours  metoprolol succinate ER 50 milliGRAM(s) Oral daily  pantoprazole    Tablet 40 milliGRAM(s) Oral before breakfast  senna 2 Tablet(s) Oral daily  sodium chloride 0.9%. 1000 milliLiter(s) IV Continuous <Continuous>    PRN MEDICATIONS  bisacodyl Suppository 10 milliGRAM(s) Rectal every 24 hours PRN  HYDROcodone 10 mG/acetaminophen 325 mG 1 Tablet(s) Oral every 4 hours PRN    VITALS:   T(F): 97.2  HR: 77  BP: 171/77  RR: 18  SpO2: 95%    LABS:    02-07    136  |  100  |  17  ----------------------------<  104<H>  4.5   |  30  |  1.1    Ca    7.7<L>      07 Feb 2019 15:50                Culture - Blood (collected 05 Feb 2019 13:16)  Source: .Blood Blood  Preliminary Report (06 Feb 2019 23:01):    No growth to date.          RADIOLOGY:    PHYSICAL EXAM:  GEN: No acute distress  LUNGS: Clear to auscultation bilaterally   HEART: Regular  ABD: Soft, non-tender, non-distended.  EXT: NC/NC/NE/2+PP/SARAVIA/Skin Intact.   NEURO: AAOX3    Intravenous access:   NG tube:   Rowe Catheter:

## 2019-02-08 NOTE — PROGRESS NOTE ADULT - SUBJECTIVE AND OBJECTIVE BOX
NEPHROLOGY FOLLOW UP NOTE    pt seen and examined  spoke with daughter in detail  + void  lynne out  K+ normalized  pt c/o pelvic discomfort    PAST MEDICAL & SURGICAL HISTORY:  CHF (congestive heart failure)  Breast tumor: removal benign bl breasts  Anal cancer  COPD (chronic obstructive pulmonary disease)  Lung disease: COPD  Hypertension  H/O breast surgery: 39 yrs. ago, bilateral  History of back surgery: 10 yrs. ago stimulator implant ,     Allergies:  hair dyes (Hives)  sulfa drugs (Headache)    Home Medications Reviewed    SOCIAL HISTORY:  Denies ETOH,Smoking,   FAMILY HISTORY:  No pertinent family history in first degree relatives        REVIEW OF SYSTEMS:  All other review of systems is negative unless indicated above.    advance care planning and advance care directives reviewed and discussed in detail      PHYSICAL EXAM:  NAD  awake and alert  moist mm  no jvd  decreased BS bl  rrr  soft, + LLQ tenderness  no edema    Hospital Medications:   MEDICATIONS  (STANDING):  ALBUTerol/ipratropium for Nebulization 3 milliLiter(s) Nebulizer every 6 hours  amLODIPine   Tablet 5 milliGRAM(s) Oral daily  amoxicillin 500 milliGRAM(s) Oral every 8 hours  aspirin  chewable 81 milliGRAM(s) Oral daily  atorvastatin 20 milliGRAM(s) Oral at bedtime  buDESOnide 160 MICROgram(s)/formoterol 4.5 MICROgram(s) Inhaler 2 Puff(s) Inhalation two times a day  busPIRone 5 milliGRAM(s) Oral three times a day  chlorhexidine 4% Liquid 1 Application(s) Topical <User Schedule>  docusate sodium 200 milliGRAM(s) Oral at bedtime  DULoxetine 20 milliGRAM(s) Oral daily  heparin  Injectable 5000 Unit(s) SubCutaneous every 8 hours  metoprolol succinate ER 50 milliGRAM(s) Oral daily  pantoprazole    Tablet 40 milliGRAM(s) Oral before breakfast  senna 2 Tablet(s) Oral daily  sodium chloride 0.9%. 1000 milliLiter(s) (50 mL/Hr) IV Continuous <Continuous>        VITALS:  T(F): 96.9 (19 @ 13:45), Max: 97.2 (19 @ 21:09)  HR: 80 (19 @ 13:45)  BP: 173/81 (19 @ 13:45)  RR: 18 (19 @ 13:45)  SpO2: 95% (19 @ 17:00)  Wt(kg): --     07:01  -   @ 07:00  --------------------------------------------------------  IN: 0 mL / OUT: 1650 mL / NET: -1650 mL     07:01  -   07:00  --------------------------------------------------------  IN: 0 mL / OUT: 500 mL / NET: -500 mL      Height (cm): 157.48 ( @ 09:06)  Weight (kg): 52.8 ( @ 09:06)  BMI (kg/m2): 21.3 ( @ 09:)  BSA (m2): 1.52 ( @ 09:06)    LABS:      136  |  100  |  17  ----------------------------<  104<H>  4.5   |  30  |  1.1    Ca    7.7<L>      2019 15:50          Urine Studies:  Urinalysis Basic - ( 2019 04:25 )    Color: Orange / Appearance: Slightly Cloudy / S.025 / pH:   Gluc:  / Ketone: Trace  / Bili: Negative / Urobili: 1.0 mg/dL   Blood:  / Protein: >=300 mg/dL / Nitrite: Positive   Leuk Esterase: Negative / RBC: >50 /HPF / WBC 10-25 /HPF   Sq Epi:  / Non Sq Epi: Few /HPF / Bacteria: Moderate      Sodium, Random Urine: 66.0 mmoL/L ( @ 13:02)  Potassium, Random Urine: 21 mmol/L ( @ 13:02)  Osmolality, Random Urine: 333 mos/kg ( @ 13:01)      RADIOLOGY & ADDITIONAL STUDIES:

## 2019-02-08 NOTE — CONSULT NOTE ADULT - SUBJECTIVE AND OBJECTIVE BOX
Reviewed and referred to chart notes. daughter at bedside.    pt reports t be feeling increasingly anxious, restless and sense of fear. associated with exacerbation of preexisting copd. does not want to be alone, unable to sleep and requesting lights to be on all the time. denies feeling depressed. no loss of interest. sleep long latency and broken. no panic level of intensity. no s/h ideations. no memory issues.    prior hx of anxiety and is currently on buspar.    alert ox3 joel anxious, no psychosis no cognitive deficit. no threat to self or others. i/j fair.

## 2019-02-08 NOTE — PROGRESS NOTE ADULT - ASSESSMENT
GEETA   - resolved  hyperkalemia   - now wnl  bladder ca  s/p TURBT  right hydro  UTI  COPD  CHF  anemia  chronic back pain with spinal stimulator    Plan:  no further aldactone  low K+ diet  dc ivf  voiding trials per   abx per ID  f/u   full code  dc planning  oupt renal f/u - card given to daughter

## 2019-02-09 LAB
ALBUMIN SERPL ELPH-MCNC: 3.6 G/DL — SIGNIFICANT CHANGE UP (ref 3.5–5.2)
ALP SERPL-CCNC: 68 U/L — SIGNIFICANT CHANGE UP (ref 30–115)
ALT FLD-CCNC: 16 U/L — SIGNIFICANT CHANGE UP (ref 0–41)
ANION GAP SERPL CALC-SCNC: 11 MMOL/L — SIGNIFICANT CHANGE UP (ref 7–14)
AST SERPL-CCNC: 13 U/L — SIGNIFICANT CHANGE UP (ref 0–41)
BILIRUB SERPL-MCNC: 0.5 MG/DL — SIGNIFICANT CHANGE UP (ref 0.2–1.2)
BUN SERPL-MCNC: 19 MG/DL — SIGNIFICANT CHANGE UP (ref 10–20)
CALCIUM SERPL-MCNC: 8.2 MG/DL — LOW (ref 8.5–10.1)
CHLORIDE SERPL-SCNC: 95 MMOL/L — LOW (ref 98–110)
CO2 SERPL-SCNC: 26 MMOL/L — SIGNIFICANT CHANGE UP (ref 17–32)
CREAT SERPL-MCNC: 0.7 MG/DL — SIGNIFICANT CHANGE UP (ref 0.7–1.5)
GLUCOSE SERPL-MCNC: 118 MG/DL — HIGH (ref 70–99)
HCT VFR BLD CALC: 31.7 % — LOW (ref 37–47)
HGB BLD-MCNC: 10.1 G/DL — LOW (ref 12–16)
MCHC RBC-ENTMCNC: 31.9 G/DL — LOW (ref 32–37)
MCHC RBC-ENTMCNC: 32.7 PG — HIGH (ref 27–31)
MCV RBC AUTO: 102.6 FL — HIGH (ref 81–99)
NEUTS BAND # BLD: 11 % — HIGH (ref 0–6)
NRBC # BLD: 0 /100 WBCS — SIGNIFICANT CHANGE UP (ref 0–0)
NRBC # BLD: 0 /100 — SIGNIFICANT CHANGE UP (ref 0–0)
PLAT MORPH BLD: NORMAL — SIGNIFICANT CHANGE UP
PLATELET # BLD AUTO: 188 K/UL — SIGNIFICANT CHANGE UP (ref 130–400)
POTASSIUM SERPL-MCNC: 4.4 MMOL/L — SIGNIFICANT CHANGE UP (ref 3.5–5)
POTASSIUM SERPL-SCNC: 4.4 MMOL/L — SIGNIFICANT CHANGE UP (ref 3.5–5)
PROT SERPL-MCNC: 5.6 G/DL — LOW (ref 6–8)
RBC # BLD: 3.09 M/UL — LOW (ref 4.2–5.4)
RBC # FLD: 15.6 % — HIGH (ref 11.5–14.5)
RBC BLD AUTO: ABNORMAL
SODIUM SERPL-SCNC: 132 MMOL/L — LOW (ref 135–146)
WBC # BLD: 26.14 K/UL — HIGH (ref 4.8–10.8)
WBC # FLD AUTO: 26.14 K/UL — HIGH (ref 4.8–10.8)

## 2019-02-09 RX ORDER — AMLODIPINE BESYLATE 2.5 MG/1
10 TABLET ORAL DAILY
Qty: 0 | Refills: 0 | Status: DISCONTINUED | OUTPATIENT
Start: 2019-02-10 | End: 2019-03-11

## 2019-02-09 RX ORDER — ONDANSETRON 8 MG/1
4 TABLET, FILM COATED ORAL EVERY 8 HOURS
Qty: 0 | Refills: 0 | Status: DISCONTINUED | OUTPATIENT
Start: 2019-02-09 | End: 2019-03-13

## 2019-02-09 RX ORDER — AMLODIPINE BESYLATE 2.5 MG/1
5 TABLET ORAL ONCE
Qty: 0 | Refills: 0 | Status: COMPLETED | OUTPATIENT
Start: 2019-02-09 | End: 2019-02-09

## 2019-02-09 RX ADMIN — CHLORHEXIDINE GLUCONATE 1 APPLICATION(S): 213 SOLUTION TOPICAL at 08:30

## 2019-02-09 RX ADMIN — BUDESONIDE AND FORMOTEROL FUMARATE DIHYDRATE 2 PUFF(S): 160; 4.5 AEROSOL RESPIRATORY (INHALATION) at 22:06

## 2019-02-09 RX ADMIN — HEPARIN SODIUM 5000 UNIT(S): 5000 INJECTION INTRAVENOUS; SUBCUTANEOUS at 15:49

## 2019-02-09 RX ADMIN — Medication 81 MILLIGRAM(S): at 11:43

## 2019-02-09 RX ADMIN — Medication 3 MILLILITER(S): at 19:33

## 2019-02-09 RX ADMIN — Medication 500 MILLIGRAM(S): at 06:01

## 2019-02-09 RX ADMIN — Medication 3 MILLILITER(S): at 08:04

## 2019-02-09 RX ADMIN — ONDANSETRON 4 MILLIGRAM(S): 8 TABLET, FILM COATED ORAL at 13:45

## 2019-02-09 RX ADMIN — Medication 500 MILLIGRAM(S): at 22:04

## 2019-02-09 RX ADMIN — AMLODIPINE BESYLATE 5 MILLIGRAM(S): 2.5 TABLET ORAL at 17:15

## 2019-02-09 RX ADMIN — HEPARIN SODIUM 5000 UNIT(S): 5000 INJECTION INTRAVENOUS; SUBCUTANEOUS at 06:01

## 2019-02-09 RX ADMIN — BUDESONIDE AND FORMOTEROL FUMARATE DIHYDRATE 2 PUFF(S): 160; 4.5 AEROSOL RESPIRATORY (INHALATION) at 08:28

## 2019-02-09 RX ADMIN — ONDANSETRON 4 MILLIGRAM(S): 8 TABLET, FILM COATED ORAL at 01:04

## 2019-02-09 RX ADMIN — Medication 50 MILLIGRAM(S): at 06:01

## 2019-02-09 RX ADMIN — Medication 500 MILLIGRAM(S): at 15:50

## 2019-02-09 RX ADMIN — Medication 5 MILLIGRAM(S): at 15:49

## 2019-02-09 RX ADMIN — PANTOPRAZOLE SODIUM 40 MILLIGRAM(S): 20 TABLET, DELAYED RELEASE ORAL at 06:01

## 2019-02-09 RX ADMIN — Medication 5 MILLIGRAM(S): at 22:04

## 2019-02-09 RX ADMIN — DULOXETINE HYDROCHLORIDE 20 MILLIGRAM(S): 30 CAPSULE, DELAYED RELEASE ORAL at 11:43

## 2019-02-09 RX ADMIN — SENNA PLUS 2 TABLET(S): 8.6 TABLET ORAL at 11:43

## 2019-02-09 RX ADMIN — Medication 200 MILLIGRAM(S): at 22:04

## 2019-02-09 RX ADMIN — Medication 5 MILLIGRAM(S): at 06:01

## 2019-02-09 RX ADMIN — AMLODIPINE BESYLATE 5 MILLIGRAM(S): 2.5 TABLET ORAL at 06:01

## 2019-02-09 RX ADMIN — Medication 3 MILLILITER(S): at 15:11

## 2019-02-09 RX ADMIN — ATORVASTATIN CALCIUM 20 MILLIGRAM(S): 80 TABLET, FILM COATED ORAL at 22:04

## 2019-02-09 RX ADMIN — HEPARIN SODIUM 5000 UNIT(S): 5000 INJECTION INTRAVENOUS; SUBCUTANEOUS at 22:05

## 2019-02-09 NOTE — CHART NOTE - NSCHARTNOTEFT_GEN_A_CORE
Called by RN regarding pt w persistent uncontrolled HTN. SBP in upper 180's, heart rate is controlled. Pt currently on Norvasc 5mg and Metoprolol 50mg . Will give a 1x dose of Norvasc 5mg PO x 1 now, increase to 10 mg daily and continue current dose of Metoprolol.

## 2019-02-09 NOTE — PROGRESS NOTE ADULT - ASSESSMENT
#uncontrolled hypertension    #hyperkalemia    #UTI      plan------    will increase amlodipine 10mg daily

## 2019-02-09 NOTE — PROGRESS NOTE ADULT - SUBJECTIVE AND OBJECTIVE BOX
SUBJECTIVE:    Patient is a 84y old Female who presents with a chief complaint of Urinary Retention/Hperkalemia with EKG changes (08 Feb 2019 15:39)    Currently admitted to medicine with the primary diagnosis of Hyperkalemia     Today is hospital day 4d. This morning she is resting comfortably in bed and reports no new issues or overnight events.     PAST MEDICAL & SURGICAL HISTORY  Anemia: 4 PRBC 11/2018  Oxygen dependent: O2 2L Via NC  Bladder cancer: 2018  CHF (congestive heart failure)  Breast tumor: removal benign bl breasts  Anal cancer: Chemo and radiation 1992  COPD (chronic obstructive pulmonary disease)  Lung disease: COPD  Hypertension  History of cystoscopy: 10/2018  H/O breast surgery: 39 yrs. ago, bilateral  History of back surgery: 10 yrs. ago stimulator implant 2001, 2011( cervical and Lumbar)    SOCIAL HISTORY:  Negative for smoking/alcohol/drug use.     ALLERGIES:  hair dyes (Hives)  sulfa drugs (Headache)  Zoloft (Headache)    MEDICATIONS:  STANDING MEDICATIONS  ALBUTerol/ipratropium for Nebulization 3 milliLiter(s) Nebulizer every 6 hours  amLODIPine   Tablet 5 milliGRAM(s) Oral once  amoxicillin 500 milliGRAM(s) Oral every 8 hours  aspirin  chewable 81 milliGRAM(s) Oral daily  atorvastatin 20 milliGRAM(s) Oral at bedtime  buDESOnide 160 MICROgram(s)/formoterol 4.5 MICROgram(s) Inhaler 2 Puff(s) Inhalation two times a day  busPIRone 5 milliGRAM(s) Oral three times a day  chlorhexidine 4% Liquid 1 Application(s) Topical <User Schedule>  docusate sodium 200 milliGRAM(s) Oral at bedtime  DULoxetine 20 milliGRAM(s) Oral daily  heparin  Injectable 5000 Unit(s) SubCutaneous every 8 hours  metoprolol succinate ER 50 milliGRAM(s) Oral daily  pantoprazole    Tablet 40 milliGRAM(s) Oral before breakfast  senna 2 Tablet(s) Oral daily    PRN MEDICATIONS  bisacodyl Suppository 10 milliGRAM(s) Rectal every 24 hours PRN  HYDROcodone 10 mG/acetaminophen 325 mG 1 Tablet(s) Oral every 4 hours PRN  ondansetron Injectable 4 milliGRAM(s) IV Push every 8 hours PRN    VITALS:   T(F): 97.6  HR: 77  BP: 189/80  RR: 16  SpO2: 98%    LABS:                        RADIOLOGY:    PHYSICAL EXAM:  GEN: No acute distress  LUNGS: Clear to auscultation bilaterally   HEART: Regular  ABD: Soft, non-tender, non-distended.  EXT: NC/NC/NE/2+PP/SARAVIA/Skin Intact.   NEURO: AAOX3    Intravenous access:   NG tube:   Rowe Catheter:

## 2019-02-10 DIAGNOSIS — D72.829 ELEVATED WHITE BLOOD CELL COUNT, UNSPECIFIED: ICD-10-CM

## 2019-02-10 DIAGNOSIS — N13.70 VESICOURETERAL-REFLUX, UNSPECIFIED: ICD-10-CM

## 2019-02-10 DIAGNOSIS — N39.0 URINARY TRACT INFECTION, SITE NOT SPECIFIED: ICD-10-CM

## 2019-02-10 LAB
ANION GAP SERPL CALC-SCNC: 8 MMOL/L — SIGNIFICANT CHANGE UP (ref 7–14)
APPEARANCE UR: CLEAR — SIGNIFICANT CHANGE UP
BILIRUB UR-MCNC: ABNORMAL
BUN SERPL-MCNC: 24 MG/DL — HIGH (ref 10–20)
CALCIUM SERPL-MCNC: 8.3 MG/DL — LOW (ref 8.5–10.1)
CHLORIDE SERPL-SCNC: 98 MMOL/L — SIGNIFICANT CHANGE UP (ref 98–110)
CO2 SERPL-SCNC: 26 MMOL/L — SIGNIFICANT CHANGE UP (ref 17–32)
COLOR SPEC: YELLOW — SIGNIFICANT CHANGE UP
CREAT SERPL-MCNC: 0.8 MG/DL — SIGNIFICANT CHANGE UP (ref 0.7–1.5)
CULTURE RESULTS: SIGNIFICANT CHANGE UP
DIFF PNL FLD: ABNORMAL
GLUCOSE SERPL-MCNC: 131 MG/DL — HIGH (ref 70–99)
GLUCOSE UR QL: NEGATIVE MG/DL — SIGNIFICANT CHANGE UP
HCT VFR BLD CALC: 30.4 % — LOW (ref 37–47)
HGB BLD-MCNC: 10 G/DL — LOW (ref 12–16)
KETONES UR-MCNC: 15
LEUKOCYTE ESTERASE UR-ACNC: NEGATIVE — SIGNIFICANT CHANGE UP
MCHC RBC-ENTMCNC: 32.9 G/DL — SIGNIFICANT CHANGE UP (ref 32–37)
MCHC RBC-ENTMCNC: 33.1 PG — HIGH (ref 27–31)
MCV RBC AUTO: 100.7 FL — HIGH (ref 81–99)
NITRITE UR-MCNC: NEGATIVE — SIGNIFICANT CHANGE UP
NRBC # BLD: 0 /100 WBCS — SIGNIFICANT CHANGE UP (ref 0–0)
PH UR: 6 — SIGNIFICANT CHANGE UP (ref 5–8)
PLATELET # BLD AUTO: 164 K/UL — SIGNIFICANT CHANGE UP (ref 130–400)
POTASSIUM SERPL-MCNC: 4.9 MMOL/L — SIGNIFICANT CHANGE UP (ref 3.5–5)
POTASSIUM SERPL-SCNC: 4.9 MMOL/L — SIGNIFICANT CHANGE UP (ref 3.5–5)
PROT UR-MCNC: >=300 MG/DL
RBC # BLD: 3.02 M/UL — LOW (ref 4.2–5.4)
RBC # FLD: 15.4 % — HIGH (ref 11.5–14.5)
SODIUM SERPL-SCNC: 132 MMOL/L — LOW (ref 135–146)
SP GR SPEC: >=1.03 (ref 1.01–1.03)
SPECIMEN SOURCE: SIGNIFICANT CHANGE UP
UROBILINOGEN FLD QL: 0.2 MG/DL — SIGNIFICANT CHANGE UP (ref 0.2–0.2)
WBC # BLD: 32.94 K/UL — HIGH (ref 4.8–10.8)
WBC # FLD AUTO: 32.94 K/UL — HIGH (ref 4.8–10.8)

## 2019-02-10 RX ORDER — HYDROMORPHONE HYDROCHLORIDE 2 MG/ML
0.5 INJECTION INTRAMUSCULAR; INTRAVENOUS; SUBCUTANEOUS ONCE
Qty: 0 | Refills: 0 | Status: DISCONTINUED | OUTPATIENT
Start: 2019-02-10 | End: 2019-02-10

## 2019-02-10 RX ADMIN — Medication 3 MILLILITER(S): at 08:03

## 2019-02-10 RX ADMIN — Medication 500 MILLIGRAM(S): at 21:15

## 2019-02-10 RX ADMIN — Medication 5 MILLIGRAM(S): at 21:16

## 2019-02-10 RX ADMIN — Medication 5 MILLIGRAM(S): at 05:56

## 2019-02-10 RX ADMIN — AMLODIPINE BESYLATE 10 MILLIGRAM(S): 2.5 TABLET ORAL at 05:56

## 2019-02-10 RX ADMIN — HYDROMORPHONE HYDROCHLORIDE 0.5 MILLIGRAM(S): 2 INJECTION INTRAMUSCULAR; INTRAVENOUS; SUBCUTANEOUS at 02:10

## 2019-02-10 RX ADMIN — HEPARIN SODIUM 5000 UNIT(S): 5000 INJECTION INTRAVENOUS; SUBCUTANEOUS at 05:58

## 2019-02-10 RX ADMIN — HEPARIN SODIUM 5000 UNIT(S): 5000 INJECTION INTRAVENOUS; SUBCUTANEOUS at 14:16

## 2019-02-10 RX ADMIN — BUDESONIDE AND FORMOTEROL FUMARATE DIHYDRATE 2 PUFF(S): 160; 4.5 AEROSOL RESPIRATORY (INHALATION) at 08:17

## 2019-02-10 RX ADMIN — HYDROMORPHONE HYDROCHLORIDE 0.5 MILLIGRAM(S): 2 INJECTION INTRAMUSCULAR; INTRAVENOUS; SUBCUTANEOUS at 01:43

## 2019-02-10 RX ADMIN — Medication 81 MILLIGRAM(S): at 12:28

## 2019-02-10 RX ADMIN — SENNA PLUS 2 TABLET(S): 8.6 TABLET ORAL at 12:28

## 2019-02-10 RX ADMIN — ATORVASTATIN CALCIUM 20 MILLIGRAM(S): 80 TABLET, FILM COATED ORAL at 21:15

## 2019-02-10 RX ADMIN — Medication 500 MILLIGRAM(S): at 05:56

## 2019-02-10 RX ADMIN — Medication 500 MILLIGRAM(S): at 14:16

## 2019-02-10 RX ADMIN — CHLORHEXIDINE GLUCONATE 1 APPLICATION(S): 213 SOLUTION TOPICAL at 05:57

## 2019-02-10 RX ADMIN — PANTOPRAZOLE SODIUM 40 MILLIGRAM(S): 20 TABLET, DELAYED RELEASE ORAL at 05:57

## 2019-02-10 RX ADMIN — Medication 50 MILLIGRAM(S): at 05:57

## 2019-02-10 RX ADMIN — BUDESONIDE AND FORMOTEROL FUMARATE DIHYDRATE 2 PUFF(S): 160; 4.5 AEROSOL RESPIRATORY (INHALATION) at 21:18

## 2019-02-10 RX ADMIN — Medication 200 MILLIGRAM(S): at 21:17

## 2019-02-10 RX ADMIN — HEPARIN SODIUM 5000 UNIT(S): 5000 INJECTION INTRAVENOUS; SUBCUTANEOUS at 21:16

## 2019-02-10 RX ADMIN — Medication 5 MILLIGRAM(S): at 14:16

## 2019-02-10 RX ADMIN — DULOXETINE HYDROCHLORIDE 20 MILLIGRAM(S): 30 CAPSULE, DELAYED RELEASE ORAL at 12:28

## 2019-02-10 NOTE — PROGRESS NOTE ADULT - SUBJECTIVE AND OBJECTIVE BOX
SUBJECTIVE:    Patient is a 84y old Female who presents with a chief complaint of Urinary Retention/Hperkalemia with EKG changes (09 Feb 2019 16:19)    Currently admitted to medicine with the primary diagnosis of Hyperkalemia     Today is hospital day 5d. This morning she is resting comfortably in bed and reports no new issues or overnight events.     PAST MEDICAL & SURGICAL HISTORY  Anemia: 4 PRBC 11/2018  Oxygen dependent: O2 2L Via NC  Bladder cancer: 2018  CHF (congestive heart failure)  Breast tumor: removal benign bl breasts  Anal cancer: Chemo and radiation 1992  COPD (chronic obstructive pulmonary disease)  Lung disease: COPD  Hypertension  History of cystoscopy: 10/2018  H/O breast surgery: 39 yrs. ago, bilateral  History of back surgery: 10 yrs. ago stimulator implant 2001, 2011( cervical and Lumbar)    SOCIAL HISTORY:  Negative for smoking/alcohol/drug use.     ALLERGIES:  hair dyes (Hives)  sulfa drugs (Headache)  Zoloft (Headache)    MEDICATIONS:  STANDING MEDICATIONS  ALBUTerol/ipratropium for Nebulization 3 milliLiter(s) Nebulizer every 6 hours  amLODIPine   Tablet 10 milliGRAM(s) Oral daily  amoxicillin 500 milliGRAM(s) Oral every 8 hours  aspirin  chewable 81 milliGRAM(s) Oral daily  atorvastatin 20 milliGRAM(s) Oral at bedtime  buDESOnide 160 MICROgram(s)/formoterol 4.5 MICROgram(s) Inhaler 2 Puff(s) Inhalation two times a day  busPIRone 5 milliGRAM(s) Oral three times a day  chlorhexidine 4% Liquid 1 Application(s) Topical <User Schedule>  docusate sodium 200 milliGRAM(s) Oral at bedtime  DULoxetine 20 milliGRAM(s) Oral daily  heparin  Injectable 5000 Unit(s) SubCutaneous every 8 hours  metoprolol succinate ER 50 milliGRAM(s) Oral daily  pantoprazole    Tablet 40 milliGRAM(s) Oral before breakfast  senna 2 Tablet(s) Oral daily    PRN MEDICATIONS  bisacodyl Suppository 10 milliGRAM(s) Rectal every 24 hours PRN  HYDROcodone 10 mG/acetaminophen 325 mG 1 Tablet(s) Oral every 4 hours PRN  ondansetron Injectable 4 milliGRAM(s) IV Push every 8 hours PRN    VITALS:   T(F): 97.1  HR: 107  BP: 185/74  RR: 16  SpO2: 98%    LABS:                        10.1   26.14 )-----------( 188      ( 09 Feb 2019 18:50 )             31.7     02-09    132<L>  |  95<L>  |  19  ----------------------------<  118<H>  4.4   |  26  |  0.7    Ca    8.2<L>      09 Feb 2019 18:50    TPro  5.6<L>  /  Alb  3.6  /  TBili  0.5  /  DBili  x   /  AST  13  /  ALT  16  /  AlkPhos  68  02-09                  RADIOLOGY:    PHYSICAL EXAM:  GEN: No acute distress  LUNGS: Clear to auscultation bilaterally   HEART: Regular  ABD: Soft, non-tender, non-distended.  EXT: NC/NC/NE/2+PP/SARAVIA/Skin Intact.   NEURO: AAOX3    Intravenous access:   NG tube:   Rowe Catheter:

## 2019-02-10 NOTE — PROGRESS NOTE ADULT - ATTENDING COMMENTS
I discussed her care with the physician’s assistant in-house itself. There is no need for acute attending intervention but one of our group will be in the hospital tomorrow will be asked to go by and make sure the family has no further questions.

## 2019-02-10 NOTE — PROGRESS NOTE ADULT - SUBJECTIVE AND OBJECTIVE BOX
HPI:    PAST MEDICAL & SURGICAL HISTORY:  Anemia: 4 PRBC 11/2018  Oxygen dependent: O2 2L Via [place]  Bladder cancer: 2018  CHF (congestive heart failure)  Breast tumor: removal benign bl breasts  Anal cancer: Chemo and radiation 1992  COPD (chronic obstructive pulmonary disease)  Lung disease: COPD  Hypertension  History of cystoscopy: 10/2018  H/O breast surgery: 39 yrs. ago, bilateral  History of back surgery: 10 yrs. ago stimulator implant 2001, 2011( cervical and Lumbar)      Allergies    hair dyes (Hives)  sulfa drugs (Headache)  Zoloft (Headache)        MEDICATIONS  (STANDING):  ALBUTerol/ipratropium for Nebulization 3 milliLiter(s) Nebulizer every 6 hours  amLODIPine   Tablet 10 milliGRAM(s) Oral daily  amoxicillin 500 milliGRAM(s) Oral every 8 hours  aspirin  chewable 81 milliGRAM(s) Oral daily  atorvastatin 20 milliGRAM(s) Oral at bedtime  buDESOnide 160 MICROgram(s)/formoterol 4.5 MICROgram(s) Inhaler 2 Puff(s) Inhalation two times a day  busPIRone 5 milliGRAM(s) Oral three times a day  chlorhexidine 4% Liquid 1 Application(s) Topical <User Schedule>  docusate sodium 200 milliGRAM(s) Oral at bedtime  DULoxetine 20 milliGRAM(s) Oral daily  heparin  Injectable 5000 Unit(s) SubCutaneous every 8 hours  metoprolol succinate ER 50 milliGRAM(s) Oral daily  pantoprazole    Tablet 40 milliGRAM(s) Oral before breakfast  senna 2 Tablet(s) Oral daily    MEDICATIONS  (PRN):  bisacodyl Suppository 10 milliGRAM(s) Rectal every 24 hours PRN Constipation  HYDROcodone 10 mG/acetaminophen 325 mG 1 Tablet(s) Oral every 4 hours PRN Moderate Pain (4 - 6)  ondansetron Injectable 4 milliGRAM(s) IV Push every 8 hours PRN Nausea and/or Vomiting      General:	no fever, weight loss,  chills  Skin: no rash, ulcers  Ophthalmologic: no visual changes  ENMT:	no sore throat  Respiratory and Thorax: no cough, wheeze,  sob  Cardiovascular:	no chest pain, palpitations, dizziness  Gastrointestinal:	no nausea, vomiting, diarrhea, abd pain  Genitourinary:	no dysuria, hematuria  Musculoskeletal:	no joint pains  Neurological:	 no speech disturbance, focal weakness, numbness  Psychiatric:	no depression, anxiety, psychosis  Hematology/Lymphatics:	no anemia  Endocrine:	no polyuria, polydipsia        Vital Signs Last 24 Hrs  T(F): 97.1 (10 Feb 2019 06:40), Max: 98.4 (09 Feb 2019 22:12)  HR: 107 (10 Feb 2019 06:40) (77 - 107)  BP: 185/74 (10 Feb 2019 06:40) (183/76 - 189/80)  RR: 16 (10 Feb 2019 06:40) (16 - 16)  SpO2: 97% (10 Feb 2019 08:50) (97% - 97%)    PHYSICAL EXAM:      Constitutional: A&Ox4  Respiratory: cta b/l  Cardiovascular: s1 s2 rrr  Gastrointestinal: soft nt  nd + bs no rebound or guarding  Genitourinary: no cva tenderness  Extremities: normal rom, no edema, calf tenderness  Neurological:no focal deficits  Skin: no rash                            10.1   26.14 )-----------( 188      ( 09 Feb 2019 18:50 )             31.7       02-09    132<L>  |  95<L>  |  19  ----------------------------<  118<H>  4.4   |  26  |  0.7    Ca    8.2<L>      09 Feb 2019 18:50    TPro  5.6<L>  /  Alb  3.6  /  TBili  0.5  /  DBili  x   /  AST  13  /  ALT  16  /  AlkPhos  68  02-09    < from: Xray Cystogram Min 3 Views (02.07.19 @ 15:02) >  Findings/  impression:  film-Lumbar spine neurostimulator device. Status post   L4-5 fusion and laminectomy. Lumbar spine and bilateral hip degenerative   changes.. Abdominal, pelvic and femoral vascular calcification..     Under fluoroscopic control using drip infusion method water-soluble   contrast material is introduced through a Rowe catheter into the urinary   bladder. Urinary bladder is filled with no leak. Grade 5 right   vesicoureteral reflux.. Grade 3 left ureterovesical reflux.      < from: US Retroperitoneal B-Scan Limited (02.05.19 @ 09:40) >  IMPRESSION:  Moderate right-sided hydronephrosis, unchanged.    Left mid pole 2.9 cm septated cyst. HPI:  Patient c/o abd pressure, bladder feels full, c/o buttock and leg pains as well.    PAST MEDICAL & SURGICAL HISTORY:  Anemia: 4 PRBC 11/2018  Oxygen dependent: O2 2L Via [place]  Bladder cancer: 2018  CHF (congestive heart failure)  Breast tumor: removal benign bl breasts  Anal cancer: Chemo and radiation 1992  COPD (chronic obstructive pulmonary disease)  Lung disease: COPD  Hypertension  History of cystoscopy: 10/2018  H/O breast surgery: 39 yrs. ago, bilateral  History of back surgery: 10 yrs. ago stimulator implant 2001, 2011( cervical and Lumbar)      Allergies    hair dyes (Hives)  sulfa drugs (Headache)  Zoloft (Headache)        MEDICATIONS  (STANDING):  ALBUTerol/ipratropium for Nebulization 3 milliLiter(s) Nebulizer every 6 hours  amLODIPine   Tablet 10 milliGRAM(s) Oral daily  amoxicillin 500 milliGRAM(s) Oral every 8 hours  aspirin  chewable 81 milliGRAM(s) Oral daily  atorvastatin 20 milliGRAM(s) Oral at bedtime  buDESOnide 160 MICROgram(s)/formoterol 4.5 MICROgram(s) Inhaler 2 Puff(s) Inhalation two times a day  busPIRone 5 milliGRAM(s) Oral three times a day  chlorhexidine 4% Liquid 1 Application(s) Topical <User Schedule>  docusate sodium 200 milliGRAM(s) Oral at bedtime  DULoxetine 20 milliGRAM(s) Oral daily  heparin  Injectable 5000 Unit(s) SubCutaneous every 8 hours  metoprolol succinate ER 50 milliGRAM(s) Oral daily  pantoprazole    Tablet 40 milliGRAM(s) Oral before breakfast  senna 2 Tablet(s) Oral daily    MEDICATIONS  (PRN):  bisacodyl Suppository 10 milliGRAM(s) Rectal every 24 hours PRN Constipation  HYDROcodone 10 mG/acetaminophen 325 mG 1 Tablet(s) Oral every 4 hours PRN Moderate Pain (4 - 6)  ondansetron Injectable 4 milliGRAM(s) IV Push every 8 hours PRN Nausea and/or Vomiting      General:	no fever, weight loss,  chills  Respiratory and Thorax: + cough, wheeze,  sob    Gastrointestinal:	no nausea, vomiting, diarrhea, +abd pain and constipation  Genitourinary:	no dysuria, hematuria  +incontinenec  Musculoskeletal: as above  Neurological:	 no speech disturbance, focal weakness, numbness        Vital Signs Last 24 Hrs  T(F): 97.1 (10 Feb 2019 06:40), Max: 98.4 (09 Feb 2019 22:12)  HR: 107 (10 Feb 2019 06:40) (77 - 107)  BP: 185/74 (10 Feb 2019 06:40) (183/76 - 189/80)  RR: 16 (10 Feb 2019 06:40) (16 - 16)  SpO2: 97% (10 Feb 2019 08:50) (97% - 97%)    PHYSICAL EXAM:      Constitutional: A&Ox4  Respiratory: decreased breath sounds over b/l bases L>R  Cardiovascular: s1 s2 rrr  Gastrointestinal: soft nt  + bs no rebound or guarding, bladder feels distended  Genitourinary: no cva tenderness  Extremities: normal rom, no edema, calf tenderness                              10.1   26.14 )-----------( 188      ( 09 Feb 2019 18:50 )             31.7       02-09    132<L>  |  95<L>  |  19  ----------------------------<  118<H>  4.4   |  26  |  0.7    Ca    8.2<L>      09 Feb 2019 18:50    TPro  5.6<L>  /  Alb  3.6  /  TBili  0.5  /  DBili  x   /  AST  13  /  ALT  16  /  AlkPhos  68  02-09    < from: Xray Cystogram Min 3 Views (02.07.19 @ 15:02) >  Findings/  impression:  film-Lumbar spine neurostimulator device. Status post   L4-5 fusion and laminectomy. Lumbar spine and bilateral hip degenerative   changes.. Abdominal, pelvic and femoral vascular calcification..     Under fluoroscopic control using drip infusion method water-soluble   contrast material is introduced through a Rowe catheter into the urinary   bladder. Urinary bladder is filled with no leak. Grade 5 right   vesicoureteral reflux.. Grade 3 left ureterovesical reflux.      < from: US Retroperitoneal B-Scan Limited (02.05.19 @ 09:40) >  IMPRESSION:  Moderate right-sided hydronephrosis, unchanged.    Left mid pole 2.9 cm septated cyst.    Culture - Urine (01.18.19 @ 13:38)    -  Nitrofurantoin: S <=32 Should not be used to treat pyelonephritis.    -  Ampicillin: S <=2 Predicts results to ampicillin/sulbactam, amoxacillin-clavulanate and  piperacillin-tazobactam.    -  Ciprofloxacin: S <=1    -  Levofloxacin: S 1    -  Tetra/Doxy: R >8    -  Vancomycin: S 1    Specimen Source: .Urine Clean Catch (Midstream)    Culture Results:   10,000 - 49,000 CFU/mL Enterococcus faecalis  Normal Urogenital katie present    Organism Identification: Enterococcus faecalis    Organism: Enterococcus faecalis    Method Type: ANUM HPI:  Patient c/o abd pressure, bladder feels full, c/o buttock and leg pains as well.    PAST MEDICAL & SURGICAL HISTORY:  Anemia: 4 PRBC 11/2018  Oxygen dependent: O2 2L Via [place]  Bladder cancer: 2018  CHF (congestive heart failure)  Breast tumor: removal benign bl breasts  Anal cancer: Chemo and radiation 1992  COPD (chronic obstructive pulmonary disease)  Lung disease: COPD  Hypertension  History of cystoscopy: 10/2018  H/O breast surgery: 39 yrs. ago, bilateral  History of back surgery: 10 yrs. ago stimulator implant 2001, 2011( cervical and Lumbar)      Allergies    hair dyes (Hives)  sulfa drugs (Headache)  Zoloft (Headache)        MEDICATIONS  (STANDING):  ALBUTerol/ipratropium for Nebulization 3 milliLiter(s) Nebulizer every 6 hours  amLODIPine   Tablet 10 milliGRAM(s) Oral daily  amoxicillin 500 milliGRAM(s) Oral every 8 hours  aspirin  chewable 81 milliGRAM(s) Oral daily  atorvastatin 20 milliGRAM(s) Oral at bedtime  buDESOnide 160 MICROgram(s)/formoterol 4.5 MICROgram(s) Inhaler 2 Puff(s) Inhalation two times a day  busPIRone 5 milliGRAM(s) Oral three times a day  chlorhexidine 4% Liquid 1 Application(s) Topical <User Schedule>  docusate sodium 200 milliGRAM(s) Oral at bedtime  DULoxetine 20 milliGRAM(s) Oral daily  heparin  Injectable 5000 Unit(s) SubCutaneous every 8 hours  metoprolol succinate ER 50 milliGRAM(s) Oral daily  pantoprazole    Tablet 40 milliGRAM(s) Oral before breakfast  senna 2 Tablet(s) Oral daily    MEDICATIONS  (PRN):  bisacodyl Suppository 10 milliGRAM(s) Rectal every 24 hours PRN Constipation  HYDROcodone 10 mG/acetaminophen 325 mG 1 Tablet(s) Oral every 4 hours PRN Moderate Pain (4 - 6)  ondansetron Injectable 4 milliGRAM(s) IV Push every 8 hours PRN Nausea and/or Vomiting      General:	no fever, weight loss,  chills  Respiratory and Thorax: + cough, no wheeze,  sob  Gastrointestinal:	no nausea, vomiting, diarrhea, +abd pain and constipation  Genitourinary:	no dysuria, hematuria  incontinence  Musculoskeletal: as above  Neurological:	 no speech disturbance, focal weakness, numbness        Vital Signs Last 24 Hrs  T(F): 97.1 (10 Feb 2019 06:40), Max: 98.4 (09 Feb 2019 22:12)  HR: 107 (10 Feb 2019 06:40) (77 - 107)  BP: 185/74 (10 Feb 2019 06:40) (183/76 - 189/80)  RR: 16 (10 Feb 2019 06:40) (16 - 16)  SpO2: 97% (10 Feb 2019 08:50) (97% - 97%)    PHYSICAL EXAM:      Constitutional: A&Ox4  Respiratory: decreased breath sounds over b/l bases L>R  Cardiovascular: s1 s2 rrr  Gastrointestinal: soft nt  + bs no rebound or guarding, bladder feels distended  Genitourinary: no cva tenderness  Extremities: normal rom, no edema, calf tenderness                              10.0   32.94 )-----------( 164      ( 10 Feb 2019 15:05 )             30.4   02-10    132<L>  |  98  |  24<H>  ----------------------------<  131<H>  4.9   |  26  |  0.8    Ca    8.3<L>      10 Feb 2019 15:05    TPro  5.6<L>  /  Alb  3.6  /  TBili  0.5  /  DBili  x   /  AST  13  /  ALT  16  /  AlkPhos  68  02-09      < from: Xray Cystogram Min 3 Views (02.07.19 @ 15:02) >      Findings/  impression:  film-Lumbar spine neurostimulator device. Status post   L4-5 fusion and laminectomy. Lumbar spine and bilateral hip degenerative   changes.. Abdominal, pelvic and femoral vascular calcification..     Under fluoroscopic control using drip infusion method water-soluble   contrast material is introduced through a Rowe catheter into the urinary   bladder. Urinary bladder is filled with no leak. Grade 5 right   vesicoureteral reflux.. Grade 3 left ureterovesical reflux.      < from: US Retroperitoneal B-Scan Limited (02.05.19 @ 09:40) >  IMPRESSION:  Moderate right-sided hydronephrosis, unchanged.    Left mid pole 2.9 cm septated cyst.    Culture - Urine (01.18.19 @ 13:38)    -  Nitrofurantoin: S <=32 Should not be used to treat pyelonephritis.    -  Ampicillin: S <=2 Predicts results to ampicillin/sulbactam, amoxacillin-clavulanate and  piperacillin-tazobactam.    -  Ciprofloxacin: S <=1    -  Levofloxacin: S 1    -  Tetra/Doxy: R >8    -  Vancomycin: S 1    Specimen Source: .Urine Clean Catch (Midstream)    Culture Results:   10,000 - 49,000 CFU/mL Enterococcus faecalis  Normal Urogenital katie present    Organism Identification: Enterococcus faecalis    Organism: Enterococcus faecalis    Method Type: ANUM      < from: US Kidney and Bladder (02.10.19 @ 15:38) >    IMPRESSION:    Stable exam. Moderate right hydronephrosis.    Left 3.3 cm septated cyst, stable allowing for differences in technique.

## 2019-02-10 NOTE — PROGRESS NOTE ADULT - ASSESSMENT
bladder ca  s/p TURBT  right hydro  UTI  COPD  CHF  anemia  chronic back pain with spinal stimulator    HIGH WBC    plan-------------------------    continue current meds    infectious dis consult

## 2019-02-10 NOTE — PROGRESS NOTE ADULT - ASSESSMENT
85yo F with urine retention, VUR, and urinary incontinence. Pt being treated for e faecalis UTI on po amoxicillin and found to have wbc 26 yesterday. Rowe is out and concern for acute urine retention recurrence. 85yo F with urine retention, VUR, and urinary incontinence. Pt being treated for e faecalis UTI on po amoxicillin and found to have wbc 26 yesterday, increased to 32 today. Rowe is out and no evidence of retention on sono today. Will repeat urines and check cxr. ID consult pending. 85yo F with urine retention, VUR, and urinary incontinence. Pt being treated for e faecalis UTI on po amoxicillin and found to have wbc 26 yesterday, increased to 32 today. Rowe is out and no evidence of retention on sono today. Will repeat urines and check cxr. ID consult pending.    I discussed her care with the physician’s assistant in-house itself. There is no need for acute attending intervention but one of our group will be in the hospital tomorrow will be asked to go by and make sure the family has no further questions.

## 2019-02-11 LAB
ALBUMIN SERPL ELPH-MCNC: 3.3 G/DL — LOW (ref 3.5–5.2)
ALP SERPL-CCNC: 97 U/L — SIGNIFICANT CHANGE UP (ref 30–115)
ALT FLD-CCNC: 16 U/L — SIGNIFICANT CHANGE UP (ref 0–41)
ANION GAP SERPL CALC-SCNC: 9 MMOL/L — SIGNIFICANT CHANGE UP (ref 7–14)
AST SERPL-CCNC: 14 U/L — SIGNIFICANT CHANGE UP (ref 0–41)
BASOPHILS # BLD AUTO: 0.07 K/UL — SIGNIFICANT CHANGE UP (ref 0–0.2)
BASOPHILS NFR BLD AUTO: 0.2 % — SIGNIFICANT CHANGE UP (ref 0–1)
BILIRUB DIRECT SERPL-MCNC: 0.3 MG/DL — HIGH (ref 0–0.2)
BILIRUB INDIRECT FLD-MCNC: 0.3 MG/DL — SIGNIFICANT CHANGE UP (ref 0.2–1.2)
BILIRUB SERPL-MCNC: 0.6 MG/DL — SIGNIFICANT CHANGE UP (ref 0.2–1.2)
BUN SERPL-MCNC: 28 MG/DL — HIGH (ref 10–20)
CALCIUM SERPL-MCNC: 8.5 MG/DL — SIGNIFICANT CHANGE UP (ref 8.5–10.1)
CHLORIDE SERPL-SCNC: 92 MMOL/L — LOW (ref 98–110)
CO2 SERPL-SCNC: 28 MMOL/L — SIGNIFICANT CHANGE UP (ref 17–32)
CREAT SERPL-MCNC: 0.8 MG/DL — SIGNIFICANT CHANGE UP (ref 0.7–1.5)
EOSINOPHIL # BLD AUTO: 0 K/UL — SIGNIFICANT CHANGE UP (ref 0–0.7)
EOSINOPHIL NFR BLD AUTO: 0 % — SIGNIFICANT CHANGE UP (ref 0–8)
GLUCOSE SERPL-MCNC: 124 MG/DL — HIGH (ref 70–99)
HCT VFR BLD CALC: 29.6 % — LOW (ref 37–47)
HGB BLD-MCNC: 9.8 G/DL — LOW (ref 12–16)
IMM GRANULOCYTES NFR BLD AUTO: 4.9 % — HIGH (ref 0.1–0.3)
LYMPHOCYTES # BLD AUTO: 0.48 K/UL — LOW (ref 1.2–3.4)
LYMPHOCYTES # BLD AUTO: 1.4 % — LOW (ref 20.5–51.1)
MAGNESIUM SERPL-MCNC: 1.7 MG/DL — LOW (ref 1.8–2.4)
MCHC RBC-ENTMCNC: 33.1 G/DL — SIGNIFICANT CHANGE UP (ref 32–37)
MCHC RBC-ENTMCNC: 33.4 PG — HIGH (ref 27–31)
MCV RBC AUTO: 101 FL — HIGH (ref 81–99)
MONOCYTES # BLD AUTO: 1.81 K/UL — HIGH (ref 0.1–0.6)
MONOCYTES NFR BLD AUTO: 5.3 % — SIGNIFICANT CHANGE UP (ref 1.7–9.3)
NEUTROPHILS # BLD AUTO: 30.17 K/UL — HIGH (ref 1.4–6.5)
NEUTROPHILS NFR BLD AUTO: 88.2 % — HIGH (ref 42.2–75.2)
PLATELET # BLD AUTO: 188 K/UL — SIGNIFICANT CHANGE UP (ref 130–400)
POTASSIUM SERPL-MCNC: 4.8 MMOL/L — SIGNIFICANT CHANGE UP (ref 3.5–5)
POTASSIUM SERPL-SCNC: 4.8 MMOL/L — SIGNIFICANT CHANGE UP (ref 3.5–5)
PROT SERPL-MCNC: 5.4 G/DL — LOW (ref 6–8)
RBC # BLD: 2.93 M/UL — LOW (ref 4.2–5.4)
RBC # FLD: 15.1 % — HIGH (ref 11.5–14.5)
SODIUM SERPL-SCNC: 129 MMOL/L — LOW (ref 135–146)
WBC # BLD: 34.22 K/UL — HIGH (ref 4.8–10.8)
WBC # FLD AUTO: 34.22 K/UL — HIGH (ref 4.8–10.8)

## 2019-02-11 PROCEDURE — 99233 SBSQ HOSP IP/OBS HIGH 50: CPT

## 2019-02-11 RX ORDER — AZTREONAM 2 G
500 VIAL (EA) INJECTION ONCE
Qty: 0 | Refills: 0 | Status: COMPLETED | OUTPATIENT
Start: 2019-02-11 | End: 2019-02-11

## 2019-02-11 RX ORDER — AZTREONAM 2 G
VIAL (EA) INJECTION
Qty: 0 | Refills: 0 | Status: DISCONTINUED | OUTPATIENT
Start: 2019-02-11 | End: 2019-02-12

## 2019-02-11 RX ORDER — AZTREONAM 2 G
500 VIAL (EA) INJECTION EVERY 8 HOURS
Qty: 0 | Refills: 0 | Status: DISCONTINUED | OUTPATIENT
Start: 2019-02-11 | End: 2019-02-12

## 2019-02-11 RX ORDER — IOHEXOL 300 MG/ML
30 INJECTION, SOLUTION INTRAVENOUS ONCE
Qty: 0 | Refills: 0 | Status: COMPLETED | OUTPATIENT
Start: 2019-02-11 | End: 2019-02-11

## 2019-02-11 RX ADMIN — DULOXETINE HYDROCHLORIDE 20 MILLIGRAM(S): 30 CAPSULE, DELAYED RELEASE ORAL at 11:40

## 2019-02-11 RX ADMIN — SENNA PLUS 2 TABLET(S): 8.6 TABLET ORAL at 11:40

## 2019-02-11 RX ADMIN — Medication 81 MILLIGRAM(S): at 11:40

## 2019-02-11 RX ADMIN — Medication 5 MILLIGRAM(S): at 04:49

## 2019-02-11 RX ADMIN — Medication 50 MILLIGRAM(S): at 04:49

## 2019-02-11 RX ADMIN — HEPARIN SODIUM 5000 UNIT(S): 5000 INJECTION INTRAVENOUS; SUBCUTANEOUS at 13:53

## 2019-02-11 RX ADMIN — CHLORHEXIDINE GLUCONATE 1 APPLICATION(S): 213 SOLUTION TOPICAL at 09:16

## 2019-02-11 RX ADMIN — Medication 3 MILLILITER(S): at 08:27

## 2019-02-11 RX ADMIN — HEPARIN SODIUM 5000 UNIT(S): 5000 INJECTION INTRAVENOUS; SUBCUTANEOUS at 04:50

## 2019-02-11 RX ADMIN — Medication 50 MILLIGRAM(S): at 13:50

## 2019-02-11 RX ADMIN — BUDESONIDE AND FORMOTEROL FUMARATE DIHYDRATE 2 PUFF(S): 160; 4.5 AEROSOL RESPIRATORY (INHALATION) at 08:28

## 2019-02-11 RX ADMIN — IOHEXOL 30 MILLILITER(S): 300 INJECTION, SOLUTION INTRAVENOUS at 20:10

## 2019-02-11 RX ADMIN — Medication 500 MILLIGRAM(S): at 13:51

## 2019-02-11 RX ADMIN — AMLODIPINE BESYLATE 10 MILLIGRAM(S): 2.5 TABLET ORAL at 04:50

## 2019-02-11 RX ADMIN — PANTOPRAZOLE SODIUM 40 MILLIGRAM(S): 20 TABLET, DELAYED RELEASE ORAL at 04:49

## 2019-02-11 RX ADMIN — Medication 500 MILLIGRAM(S): at 04:49

## 2019-02-11 NOTE — PROGRESS NOTE ADULT - SUBJECTIVE AND OBJECTIVE BOX
SUBJECTIVE:    Patient is a 84y old Female who presents with a chief complaint of Urinary Retention/Hperkalemia with EKG changes (11 Feb 2019 08:33)    Currently admitted to medicine with the primary diagnosis of Hyperkalemia     Today is hospital day 6d. This morning she is resting comfortably in bed and reports no new issues or overnight events.     PAST MEDICAL & SURGICAL HISTORY  Anemia: 4 PRBC 11/2018  Oxygen dependent: O2 2L Via [place]  Bladder cancer: 2018  CHF (congestive heart failure)  Breast tumor: removal benign bl breasts  Anal cancer: Chemo and radiation 1992  COPD (chronic obstructive pulmonary disease)  Lung disease: COPD  Hypertension  History of cystoscopy: 10/2018  H/O breast surgery: 39 yrs. ago, bilateral  History of back surgery: 10 yrs. ago stimulator implant 2001, 2011( cervical and Lumbar)    SOCIAL HISTORY:  Negative for smoking/alcohol/drug use.     ALLERGIES:  hair dyes (Hives)  sulfa drugs (Headache)  Zoloft (Headache)    MEDICATIONS:  STANDING MEDICATIONS  ALBUTerol/ipratropium for Nebulization 3 milliLiter(s) Nebulizer every 6 hours  amLODIPine   Tablet 10 milliGRAM(s) Oral daily  amoxicillin 500 milliGRAM(s) Oral every 8 hours  aspirin  chewable 81 milliGRAM(s) Oral daily  atorvastatin 20 milliGRAM(s) Oral at bedtime  aztreonam  IVPB      buDESOnide 160 MICROgram(s)/formoterol 4.5 MICROgram(s) Inhaler 2 Puff(s) Inhalation two times a day  busPIRone 5 milliGRAM(s) Oral three times a day  chlorhexidine 4% Liquid 1 Application(s) Topical <User Schedule>  docusate sodium 200 milliGRAM(s) Oral at bedtime  DULoxetine 20 milliGRAM(s) Oral daily  heparin  Injectable 5000 Unit(s) SubCutaneous every 8 hours  levoFLOXacin IVPB      metoprolol succinate ER 50 milliGRAM(s) Oral daily  pantoprazole    Tablet 40 milliGRAM(s) Oral before breakfast  senna 2 Tablet(s) Oral daily    PRN MEDICATIONS  bisacodyl Suppository 10 milliGRAM(s) Rectal every 24 hours PRN  cloNIDine 0.1 milliGRAM(s) Oral every 8 hours PRN  HYDROcodone 10 mG/acetaminophen 325 mG 1 Tablet(s) Oral every 4 hours PRN  ondansetron Injectable 4 milliGRAM(s) IV Push every 8 hours PRN    VITALS:   T(F): 97.4  HR: 97  BP: 189/83  RR: 16  SpO2: 92%    LABS:                        9.8    34.22 )-----------( 188      ( 11 Feb 2019 06:49 )             29.6     02-11    129<L>  |  92<L>  |  28<H>  ----------------------------<  124<H>  4.8   |  28  |  0.8    Ca    8.5      11 Feb 2019 06:49  Mg     1.7     02-11    TPro  5.4<L>  /  Alb  3.3<L>  /  TBili  0.6  /  DBili  0.3<H>  /  AST  14  /  ALT  16  /  AlkPhos  97  02-11      Urinalysis Basic - ( 10 Feb 2019 19:08 )    Color: Yellow / Appearance: Clear / SG: >=1.030 / pH: x  Gluc: x / Ketone: 15  / Bili: Small / Urobili: 0.2 mg/dL   Blood: x / Protein: >=300 mg/dL / Nitrite: Negative   Leuk Esterase: Negative / RBC: 26-50 /HPF / WBC 3-5 /HPF   Sq Epi: x / Non Sq Epi: Few /HPF / Bacteria: Moderate                RADIOLOGY:    PHYSICAL EXAM:  GEN: No acute distress  LUNGS: Clear to auscultation bilaterally   HEART: Regular  ABD: Soft, non-tender, non-distended.  EXT: NC/NC/NE/2+PP/SARAVIA/Skin Intact.   NEURO: AAOX3    Intravenous access:   NG tube:   Rowe Catheter:

## 2019-02-11 NOTE — PROGRESS NOTE ADULT - ASSESSMENT
Abnormal  chest Xray  increase wbc    pneumonia/pl effusion      will start Levaquin and Azectum  await ID

## 2019-02-11 NOTE — PROGRESS NOTE ADULT - ASSESSMENT
GEETA   - resolved  hyperkalemia   - now wnl  hyponatremia  worsening leukocytosis with bandemia  HTN - uncontrolled  bladder ca  s/p TURBT  right hydro - no change  UTI - enterococci  COPD  CHF  anemia  chronic back pain with spinal stimulator    Plan:  cont higher dose norvasc and lopressor  clonidine 0.1mg po prn for sbp > 180  off aldactone - do not restart  encourage PO salt and water intake   ID consult  check urine and blood cultures  f/u urology - does pt need CT abd / pelvis?  on po amoxicillin  f/u labs

## 2019-02-11 NOTE — PROGRESS NOTE ADULT - SUBJECTIVE AND OBJECTIVE BOX
Patient is seen and examined at the bed side, is afebrile.      REVIEW OF SYSTEMS: All other review systems are negative        ALLERGIES: SULFA drugs         Vital Signs Last 24 Hrs  T(C): 35.2 (2019 21:22), Max: 36.6 (2019 14:13)  T(F): 95.4 (2019 21:22), Max: 97.9 (2019 14:13)  HR: 99 (2019 21:22) (90 - 101)  BP: 954/- (2019 21:22) (180/74 - 954/-)  BP(mean): --  RR: 16 (2019 21:22) (16 - 16)  SpO2: 92% (2019 09:44) (92% - 92%)      PHYSICAL EXAM:  GENERAL: Not in distress  CHEST/LUNG: Air  entry bilaterally  HEART: s1 and s2 present  ABDOMEN: Nontender, and  Nondistended  : Rowe catheter in placed  EXTREMITIES:  No pedal  edema  CNS: Awake and alert        LABS:                        9.8    34.22 )-----------( 188      ( 2019 06:49 )             29.6                           9.4    9.15  )-----------( 160      ( 2019 07:12 )             28.5       02-11    129<L>  |  92<L>  |  28<H>  ----------------------------<  124<H>  4.8   |  28  |  0.8    Ca    8.5      2019 06:49  Mg     1.7     02-11    TPro  5.4<L>  /  Alb  3.3<L>  /  TBili  0.6  /  DBili  0.3<H>  /  AST  14  /  ALT  16  /  AlkPhos  97  02-11    02-06    131<L>  |  96<L>  |  19  ----------------------------<  104<H>  5.3<H>   |  29  |  0.8    Ca    8.1<L>      2019 07:12  Phos  4.2     02-05  Mg     2.0     02-05    TPro  5.4<L>  /  Alb  3.5  /  TBili  0.6  /  DBili  x   /  AST  14  /  ALT  8   /  AlkPhos  49  02-      Urinalysis Basic - ( 2019 04:25 )    Color: Orange / Appearance: Slightly Cloudy / S.025 / pH: x  Gluc: x / Ketone: Trace  / Bili: Negative / Urobili: 1.0 mg/dL   Blood: x / Protein: >=300 mg/dL / Nitrite: Positive   Leuk Esterase: Negative / RBC: >50 /HPF / WBC 10-25 /HPF   Sq Epi: x / Non Sq Epi: Few /HPF / Bacteria: Moderate        MEDICATIONS  (STANDING):  ALBUTerol/ipratropium for Nebulization 3 milliLiter(s) Nebulizer every 6 hours  amLODIPine   Tablet 10 milliGRAM(s) Oral daily  amoxicillin 500 milliGRAM(s) Oral every 8 hours  aspirin  chewable 81 milliGRAM(s) Oral daily  atorvastatin 20 milliGRAM(s) Oral at bedtime  aztreonam  IVPB      aztreonam  IVPB 500 milliGRAM(s) IV Intermittent every 8 hours  buDESOnide 160 MICROgram(s)/formoterol 4.5 MICROgram(s) Inhaler 2 Puff(s) Inhalation two times a day  busPIRone 5 milliGRAM(s) Oral three times a day  chlorhexidine 4% Liquid 1 Application(s) Topical <User Schedule>  docusate sodium 200 milliGRAM(s) Oral at bedtime  DULoxetine 20 milliGRAM(s) Oral daily  heparin  Injectable 5000 Unit(s) SubCutaneous every 8 hours  iohexol 300 mG (iodine)/mL Oral Solution 30 milliLiter(s) Oral once  levoFLOXacin IVPB      metoprolol succinate ER 50 milliGRAM(s) Oral daily  pantoprazole    Tablet 40 milliGRAM(s) Oral before breakfast  senna 2 Tablet(s) Oral daily    MEDICATIONS  (PRN):  bisacodyl Suppository 10 milliGRAM(s) Rectal every 24 hours PRN Constipation  cloNIDine 0.1 milliGRAM(s) Oral every 8 hours PRN for sbp > 180  HYDROcodone 10 mG/acetaminophen 325 mG 1 Tablet(s) Oral every 4 hours PRN Moderate Pain (4 - 6)  ondansetron Injectable 4 milliGRAM(s) IV Push every 8 hours PRN Nausea and/or Vomiting        RADIOLOGY & ADDITIONAL TESTS:  < from: Xray Chest 1 View- PORTABLE-Routine (02.10.19 @ 17:59) >    Layering density in the right lower lung with a blunted costophrenic   angle, which may represent a pleural effusion. Cannot exclude right lower   lobe underlying consolidation. Recommend follow-up.     < end of copied text >      < from: US Retroperitoneal B-Scan Limited (19 @ 09:40) >  Moderate right-sided hydronephrosis, unchanged.      < end of copied text >      < from: Xray Chest 1 View- PORTABLE-Urgent (19 @ 07:00) >    No radiographic evidence of acute cardiopulmonary disease.      < end of copied text >        MICROBIOLOGY DATA:    Urine Microscopic-Add On (NC) (02.10.19 @ 19:08)    Bacteria: Moderate    Epithelial Cells: Few /HPF    Red Blood Cell - Urine: 26-50 /HPF    White Blood Cell - Urine: 3-5 /HPF        Culture - Blood (19 @ 13:16)    Specimen Source: .Blood Blood    Culture Results:   No growth to date.        Culture - Urine (19 @ 04:25)    Specimen Source: .Urine Clean Catch (Midstream)    Culture Results:   No growth      Culture - Urine (19 @ 13:38)    -  Nitrofurantoin: S <=32 Should not be used to treat pyelonephritis.    -  Tetra/Doxy: R >8    -  Vancomycin: S 1    -  Ampicillin: S <=2 Predicts results to ampicillin/sulbactam, amoxacillin-clavulanate and  piperacillin-tazobactam.    -  Ciprofloxacin: S <=1    -  Levofloxacin: S 1    Specimen Source: .Urine Clean Catch (Midstream)    Culture Results:   10,000 - 49,000 CFU/mL Enterococcus faecalis  Normal Urogenital katie present    Organism Identification: Enterococcus faecalis    Organism: Enterococcus faecalis    Method Type: ANUM Patient is seen and examined at the bed side, is afebrile. She has no bowel movements in last 4 days. The Leukocytosis is worsening, most likely due to Stool impaction. may resulted  Stercoral colitis.      REVIEW OF SYSTEMS: All other review systems are negative        ALLERGIES: SULFA drugs         Vital Signs Last 24 Hrs  T(C): 35.2 (2019 21:22), Max: 36.6 (2019 14:13)  T(F): 95.4 (2019 21:22), Max: 97.9 (2019 14:13)  HR: 99 (2019 21:22) (90 - 101)  BP: 954/- (:) (180/74 - 954/-)  BP(mean): --  RR: 16 (2019 21:22) (16 - 16)  SpO2: 92% (2019 09:44) (92% - 92%)        PHYSICAL EXAM:  GENERAL: Not in distress  CHEST/LUNG: Air  entry bilaterally  HEART: s1 and s2 present  ABDOMEN: mild distended  : Rowe catheter in placed  EXTREMITIES:  No pedal  edema  CNS: Awake and alert        LABS:                        9.8    34.22 )-----------( 188      ( 2019 06:49 )             29.6                           9.4    9.15  )-----------( 160      ( 2019 07:12 )             28.5           02-11    129<L>  |  92<L>  |  28<H>  ----------------------------<  124<H>  4.8   |  28  |  0.8    Ca    8.5      2019 06:49  Mg     1.7     -11    TPro  5.4<L>  /  Alb  3.3<L>  /  TBili  0.6  /  DBili  0.3<H>  /  AST  14  /  ALT  16  /  AlkPhos  97  02-11    02-06    131<L>  |  96<L>  |  19  ----------------------------<  104<H>  5.3<H>   |  29  |  0.8    Ca    8.1<L>      2019 07:12  Phos  4.2     02-05  Mg     2.0     02-05    TPro  5.4<L>  /  Alb  3.5  /  TBili  0.6  /  DBili  x   /  AST  14  /  ALT  8   /  AlkPhos  49  02-06      Urinalysis Basic - ( 2019 04:25 )    Color: Orange / Appearance: Slightly Cloudy / S.025 / pH: x  Gluc: x / Ketone: Trace  / Bili: Negative / Urobili: 1.0 mg/dL   Blood: x / Protein: >=300 mg/dL / Nitrite: Positive   Leuk Esterase: Negative / RBC: >50 /HPF / WBC 10-25 /HPF   Sq Epi: x / Non Sq Epi: Few /HPF / Bacteria: Moderate        MEDICATIONS  (STANDING):  ALBUTerol/ipratropium for Nebulization 3 milliLiter(s) Nebulizer every 6 hours  amLODIPine   Tablet 10 milliGRAM(s) Oral daily  amoxicillin 500 milliGRAM(s) Oral every 8 hours  aspirin  chewable 81 milliGRAM(s) Oral daily  atorvastatin 20 milliGRAM(s) Oral at bedtime  aztreonam  IVPB      aztreonam  IVPB 500 milliGRAM(s) IV Intermittent every 8 hours  buDESOnide 160 MICROgram(s)/formoterol 4.5 MICROgram(s) Inhaler 2 Puff(s) Inhalation two times a day  busPIRone 5 milliGRAM(s) Oral three times a day  chlorhexidine 4% Liquid 1 Application(s) Topical <User Schedule>  docusate sodium 200 milliGRAM(s) Oral at bedtime  DULoxetine 20 milliGRAM(s) Oral daily  heparin  Injectable 5000 Unit(s) SubCutaneous every 8 hours  iohexol 300 mG (iodine)/mL Oral Solution 30 milliLiter(s) Oral once  levoFLOXacin IVPB      metoprolol succinate ER 50 milliGRAM(s) Oral daily  pantoprazole    Tablet 40 milliGRAM(s) Oral before breakfast  senna 2 Tablet(s) Oral daily    MEDICATIONS  (PRN):  bisacodyl Suppository 10 milliGRAM(s) Rectal every 24 hours PRN Constipation  cloNIDine 0.1 milliGRAM(s) Oral every 8 hours PRN for sbp > 180  HYDROcodone 10 mG/acetaminophen 325 mG 1 Tablet(s) Oral every 4 hours PRN Moderate Pain (4 - 6)  ondansetron Injectable 4 milliGRAM(s) IV Push every 8 hours PRN Nausea and/or Vomiting        RADIOLOGY & ADDITIONAL TESTS:  < from: Xray Chest 1 View- PORTABLE-Routine (02.10.19 @ 17:59) >    Layering density in the right lower lung with a blunted costophrenic   angle, which may represent a pleural effusion. Cannot exclude right lower   lobe underlying consolidation. Recommend follow-up.     < end of copied text >      < from: US Retroperitoneal B-Scan Limited (19 @ 09:40) >  Moderate right-sided hydronephrosis, unchanged.      < end of copied text >      < from: Xray Chest 1 View- PORTABLE-Urgent (19 @ 07:00) >    No radiographic evidence of acute cardiopulmonary disease.      < end of copied text >        MICROBIOLOGY DATA:    Urine Microscopic-Add On (NC) (02.10.19 @ 19:08)    Bacteria: Moderate    Epithelial Cells: Few /HPF    Red Blood Cell - Urine: 26-50 /HPF    White Blood Cell - Urine: 3-5 /HPF        Culture - Blood (19 @ 13:16)    Specimen Source: .Blood Blood    Culture Results:   No growth to date.        Culture - Urine (19 @ 04:25)    Specimen Source: .Urine Clean Catch (Midstream)    Culture Results:   No growth      Culture - Urine (19 @ 13:38)    -  Nitrofurantoin: S <=32 Should not be used to treat pyelonephritis.    -  Tetra/Doxy: R >8    -  Vancomycin: S 1    -  Ampicillin: S <=2 Predicts results to ampicillin/sulbactam, amoxacillin-clavulanate and  piperacillin-tazobactam.    -  Ciprofloxacin: S <=1    -  Levofloxacin: S 1    Specimen Source: .Urine Clean Catch (Midstream)    Culture Results:   10,000 - 49,000 CFU/mL Enterococcus faecalis  Normal Urogenital katie present    Organism Identification: Enterococcus faecalis    Organism: Enterococcus faecalis    Method Type: ANUM

## 2019-02-11 NOTE — PROGRESS NOTE ADULT - ASSESSMENT
# Complicated UTI  # Moderate Right sided hydronephrosis    would recommend:  1. Change Cefepime to Ampicillin based on the previous culture to cover Enterococcus  2. Management of Right hydronephrosis as per Urology  3, May change to oral Augmentin on discharge    d/w patient     will follow the patient with you # Complicated UTI  # Moderate Right sided hydronephrosis    would recommend:    1. Follow up CT abd/pelvis   2. Aggressive bowel regimen including ENEMA  3. Disocntinue Aztreonam and Levaquin and change to Cefepime  4. Monitor WBC count    d/w patient, Family at the bed side and nursing staff    will follow the patient with you

## 2019-02-11 NOTE — PROGRESS NOTE ADULT - SUBJECTIVE AND OBJECTIVE BOX
NEPHROLOGY FOLLOW UP NOTE    pt seen and examined  c/o left pelvic discomfort and spasms  no diarrhea  c/o chronic dyspnea  no fever    PAST MEDICAL & SURGICAL HISTORY:  CHF (congestive heart failure)  Breast tumor: removal benign bl breasts  Anal cancer  COPD (chronic obstructive pulmonary disease)  Lung disease: COPD  Hypertension  H/O breast surgery: 39 yrs. ago, bilateral  History of back surgery: 10 yrs. ago stimulator implant 2001, 2011    Allergies:  hair dyes (Hives)  sulfa drugs (Headache)    Home Medications Reviewed    SOCIAL HISTORY:  Denies ETOH,Smoking,   FAMILY HISTORY:  No pertinent family history in first degree relatives        REVIEW OF SYSTEMS:  All other review of systems is negative unless indicated above.        PHYSICAL EXAM:  NAD  awake and alert  moist mm  no jvd  decreased BS bl  rrr  soft, + LLQ tenderness  no edema    Hospital Medications:   MEDICATIONS  (STANDING):  ALBUTerol/ipratropium for Nebulization 3 milliLiter(s) Nebulizer every 6 hours  amLODIPine   Tablet 10 milliGRAM(s) Oral daily  amoxicillin 500 milliGRAM(s) Oral every 8 hours  aspirin  chewable 81 milliGRAM(s) Oral daily  atorvastatin 20 milliGRAM(s) Oral at bedtime  buDESOnide 160 MICROgram(s)/formoterol 4.5 MICROgram(s) Inhaler 2 Puff(s) Inhalation two times a day  busPIRone 5 milliGRAM(s) Oral three times a day  chlorhexidine 4% Liquid 1 Application(s) Topical <User Schedule>  docusate sodium 200 milliGRAM(s) Oral at bedtime  DULoxetine 20 milliGRAM(s) Oral daily  heparin  Injectable 5000 Unit(s) SubCutaneous every 8 hours  metoprolol succinate ER 50 milliGRAM(s) Oral daily  pantoprazole    Tablet 40 milliGRAM(s) Oral before breakfast  senna 2 Tablet(s) Oral daily        VITALS:  T(F): 97.4 (02-11-19 @ 05:32), Max: 98.2 (02-10-19 @ 14:56)  HR: 97 (02-11-19 @ 05:32)  BP: 189/83 (02-11-19 @ 05:32)  RR: 16 (02-11-19 @ 05:32)  SpO2: 97% (02-10-19 @ 08:50)  Wt(kg): --    Height (cm): 157.48 (02-10 @ 08:50)    LABS:  02-11    129<L>  |  92<L>  |  28<H>  ----------------------------<  124<H>  4.8   |  28  |  0.8    Ca    8.5      11 Feb 2019 06:49  Mg     1.7     02-11    TPro  5.4<L>  /  Alb  3.3<L>  /  TBili  0.6  /  DBili  0.3<H>  /  AST  14  /  ALT  16  /  AlkPhos  97  02-11                          9.8    34.22 )-----------( 188      ( 11 Feb 2019 06:49 )             29.6       Urine Studies:  Urinalysis Basic - ( 10 Feb 2019 19:08 )    Color: Yellow / Appearance: Clear / SG: >=1.030 / pH:   Gluc:  / Ketone: 15  / Bili: Small / Urobili: 0.2 mg/dL   Blood:  / Protein: >=300 mg/dL / Nitrite: Negative   Leuk Esterase: Negative / RBC: 26-50 /HPF / WBC 3-5 /HPF   Sq Epi:  / Non Sq Epi: Few /HPF / Bacteria: Moderate      Sodium, Random Urine: 66.0 mmoL/L (02-06 @ 13:02)  Potassium, Random Urine: 21 mmol/L (02-06 @ 13:02)  Osmolality, Random Urine: 333 mos/kg (02-06 @ 13:01)      RADIOLOGY & ADDITIONAL STUDIES:

## 2019-02-11 NOTE — PROGRESS NOTE ADULT - SUBJECTIVE AND OBJECTIVE BOX
INTERVAL HPI/OVERNIGHT EVENTS: Not tolerating diet, no bm.  WBC 38848.  being treated for pneumonia.  says she is urinating well    MEDICATIONS  (STANDING):  ALBUTerol/ipratropium for Nebulization 3 milliLiter(s) Nebulizer every 6 hours  amLODIPine   Tablet 10 milliGRAM(s) Oral daily  amoxicillin 500 milliGRAM(s) Oral every 8 hours  aspirin  chewable 81 milliGRAM(s) Oral daily  atorvastatin 20 milliGRAM(s) Oral at bedtime  aztreonam  IVPB      aztreonam  IVPB 500 milliGRAM(s) IV Intermittent every 8 hours  buDESOnide 160 MICROgram(s)/formoterol 4.5 MICROgram(s) Inhaler 2 Puff(s) Inhalation two times a day  busPIRone 5 milliGRAM(s) Oral three times a day  chlorhexidine 4% Liquid 1 Application(s) Topical <User Schedule>  docusate sodium 200 milliGRAM(s) Oral at bedtime  DULoxetine 20 milliGRAM(s) Oral daily  heparin  Injectable 5000 Unit(s) SubCutaneous every 8 hours  levoFLOXacin IVPB      metoprolol succinate ER 50 milliGRAM(s) Oral daily  pantoprazole    Tablet 40 milliGRAM(s) Oral before breakfast  senna 2 Tablet(s) Oral daily    MEDICATIONS  (PRN):  bisacodyl Suppository 10 milliGRAM(s) Rectal every 24 hours PRN Constipation  cloNIDine 0.1 milliGRAM(s) Oral every 8 hours PRN for sbp > 180  HYDROcodone 10 mG/acetaminophen 325 mG 1 Tablet(s) Oral every 4 hours PRN Moderate Pain (4 - 6)  ondansetron Injectable 4 milliGRAM(s) IV Push every 8 hours PRN Nausea and/or Vomiting      Allergies    hair dyes (Hives)  sulfa drugs (Headache)  Zoloft (Headache)    Intolerances        Vital Signs Last 24 Hrs  T(C): 36.6 (11 Feb 2019 14:13), Max: 36.6 (11 Feb 2019 14:13)  T(F): 97.9 (11 Feb 2019 14:13), Max: 97.9 (11 Feb 2019 14:13)  HR: 101 (11 Feb 2019 14:13) (90 - 101)  BP: 180/74 (11 Feb 2019 14:13) (162/66 - 189/83)  BP(mean): --  RR: 16 (11 Feb 2019 14:13) (16 - 16)  SpO2: 92% (11 Feb 2019 09:44) (92% - 92%)     ON PE:  General: alert and awake  Abdomen:left lower abd tenderness and guarding  :    LABS:                        9.8    34.22 )-----------( 188      ( 11 Feb 2019 06:49 )             29.6     02-11    129<L>  |  92<L>  |  28<H>  ----------------------------<  124<H>  4.8   |  28  |  0.8    Ca    8.5      11 Feb 2019 06:49  Mg     1.7     02-11    TPro  5.4<L>  /  Alb  3.3<L>  /  TBili  0.6  /  DBili  0.3<H>  /  AST  14  /  ALT  16  /  AlkPhos  97  02-11      Urinalysis Basic - ( 10 Feb 2019 19:08 )    Color: Yellow / Appearance: Clear / SG: >=1.030 / pH: x  Gluc: x / Ketone: 15  / Bili: Small / Urobili: 0.2 mg/dL   Blood: x / Protein: >=300 mg/dL / Nitrite: Negative   Leuk Esterase: Negative / RBC: 26-50 /HPF / WBC 3-5 /HPF   Sq Epi: x / Non Sq Epi: Few /HPF / Bacteria: Moderate        RADIOLOGY & ADDITIONAL TESTS:      Impression:sonogram showed persistant unchanged right hydronephrosis, no pain on this side.  Left lower abd tenderness, high wbc.  being treated for pneumonia    Plan: recommend ct abd/ pelvis

## 2019-02-12 LAB
ALBUMIN SERPL ELPH-MCNC: 3 G/DL — LOW (ref 3.5–5.2)
ALP SERPL-CCNC: 111 U/L — SIGNIFICANT CHANGE UP (ref 30–115)
ALT FLD-CCNC: 15 U/L — SIGNIFICANT CHANGE UP (ref 0–41)
ANION GAP SERPL CALC-SCNC: 10 MMOL/L — SIGNIFICANT CHANGE UP (ref 7–14)
AST SERPL-CCNC: 13 U/L — SIGNIFICANT CHANGE UP (ref 0–41)
BILIRUB SERPL-MCNC: 0.5 MG/DL — SIGNIFICANT CHANGE UP (ref 0.2–1.2)
BUN SERPL-MCNC: 34 MG/DL — HIGH (ref 10–20)
CALCIUM SERPL-MCNC: 8.2 MG/DL — LOW (ref 8.5–10.1)
CHLORIDE SERPL-SCNC: 91 MMOL/L — LOW (ref 98–110)
CO2 SERPL-SCNC: 28 MMOL/L — SIGNIFICANT CHANGE UP (ref 17–32)
CREAT SERPL-MCNC: 0.9 MG/DL — SIGNIFICANT CHANGE UP (ref 0.7–1.5)
GLUCOSE SERPL-MCNC: 121 MG/DL — HIGH (ref 70–99)
HCT VFR BLD CALC: 31.3 % — LOW (ref 37–47)
HGB BLD-MCNC: 10.3 G/DL — LOW (ref 12–16)
MAGNESIUM SERPL-MCNC: 1.7 MG/DL — LOW (ref 1.8–2.4)
MCHC RBC-ENTMCNC: 32.8 PG — HIGH (ref 27–31)
MCHC RBC-ENTMCNC: 32.9 G/DL — SIGNIFICANT CHANGE UP (ref 32–37)
MCV RBC AUTO: 99.7 FL — HIGH (ref 81–99)
NRBC # BLD: 0 /100 WBCS — SIGNIFICANT CHANGE UP (ref 0–0)
PLATELET # BLD AUTO: 213 K/UL — SIGNIFICANT CHANGE UP (ref 130–400)
POTASSIUM SERPL-MCNC: 4.7 MMOL/L — SIGNIFICANT CHANGE UP (ref 3.5–5)
POTASSIUM SERPL-SCNC: 4.7 MMOL/L — SIGNIFICANT CHANGE UP (ref 3.5–5)
PROT SERPL-MCNC: 5.1 G/DL — LOW (ref 6–8)
RBC # BLD: 3.14 M/UL — LOW (ref 4.2–5.4)
RBC # FLD: 14.8 % — HIGH (ref 11.5–14.5)
SODIUM SERPL-SCNC: 129 MMOL/L — LOW (ref 135–146)
WBC # BLD: 38.19 K/UL — HIGH (ref 4.8–10.8)
WBC # FLD AUTO: 38.19 K/UL — HIGH (ref 4.8–10.8)

## 2019-02-12 RX ORDER — HYDROCORTISONE 1 %
1 OINTMENT (GRAM) TOPICAL DAILY
Qty: 0 | Refills: 0 | Status: DISCONTINUED | OUTPATIENT
Start: 2019-02-12 | End: 2019-03-13

## 2019-02-12 RX ORDER — MEROPENEM 1 G/30ML
1000 INJECTION INTRAVENOUS EVERY 8 HOURS
Qty: 0 | Refills: 0 | Status: DISCONTINUED | OUTPATIENT
Start: 2019-02-12 | End: 2019-02-14

## 2019-02-12 RX ORDER — SIMETHICONE 80 MG/1
80 TABLET, CHEWABLE ORAL EVERY 8 HOURS
Qty: 0 | Refills: 0 | Status: DISCONTINUED | OUTPATIENT
Start: 2019-02-12 | End: 2019-03-13

## 2019-02-12 RX ORDER — MORPHINE SULFATE 50 MG/1
4 CAPSULE, EXTENDED RELEASE ORAL ONCE
Qty: 0 | Refills: 0 | Status: DISCONTINUED | OUTPATIENT
Start: 2019-02-12 | End: 2019-02-12

## 2019-02-12 RX ORDER — AMPICILLIN TRIHYDRATE 250 MG
1 CAPSULE ORAL EVERY 6 HOURS
Qty: 0 | Refills: 0 | Status: DISCONTINUED | OUTPATIENT
Start: 2019-02-12 | End: 2019-02-13

## 2019-02-12 RX ADMIN — ATORVASTATIN CALCIUM 20 MILLIGRAM(S): 80 TABLET, FILM COATED ORAL at 01:07

## 2019-02-12 RX ADMIN — DULOXETINE HYDROCHLORIDE 20 MILLIGRAM(S): 30 CAPSULE, DELAYED RELEASE ORAL at 11:39

## 2019-02-12 RX ADMIN — MEROPENEM 100 MILLIGRAM(S): 1 INJECTION INTRAVENOUS at 14:54

## 2019-02-12 RX ADMIN — HEPARIN SODIUM 5000 UNIT(S): 5000 INJECTION INTRAVENOUS; SUBCUTANEOUS at 21:19

## 2019-02-12 RX ADMIN — HEPARIN SODIUM 5000 UNIT(S): 5000 INJECTION INTRAVENOUS; SUBCUTANEOUS at 14:55

## 2019-02-12 RX ADMIN — Medication 81 MILLIGRAM(S): at 11:39

## 2019-02-12 RX ADMIN — Medication 5 MILLIGRAM(S): at 01:07

## 2019-02-12 RX ADMIN — HEPARIN SODIUM 5000 UNIT(S): 5000 INJECTION INTRAVENOUS; SUBCUTANEOUS at 07:39

## 2019-02-12 RX ADMIN — BUDESONIDE AND FORMOTEROL FUMARATE DIHYDRATE 2 PUFF(S): 160; 4.5 AEROSOL RESPIRATORY (INHALATION) at 07:33

## 2019-02-12 RX ADMIN — Medication 108 GRAM(S): at 17:21

## 2019-02-12 RX ADMIN — Medication 500 MILLIGRAM(S): at 01:06

## 2019-02-12 RX ADMIN — Medication 200 MILLIGRAM(S): at 01:08

## 2019-02-12 RX ADMIN — AMLODIPINE BESYLATE 10 MILLIGRAM(S): 2.5 TABLET ORAL at 07:35

## 2019-02-12 RX ADMIN — Medication 50 MILLIGRAM(S): at 07:39

## 2019-02-12 RX ADMIN — MEROPENEM 100 MILLIGRAM(S): 1 INJECTION INTRAVENOUS at 21:18

## 2019-02-12 RX ADMIN — Medication 5 MILLIGRAM(S): at 21:19

## 2019-02-12 RX ADMIN — Medication 5 MILLIGRAM(S): at 14:55

## 2019-02-12 RX ADMIN — Medication 5 MILLIGRAM(S): at 07:34

## 2019-02-12 RX ADMIN — MORPHINE SULFATE 4 MILLIGRAM(S): 50 CAPSULE, EXTENDED RELEASE ORAL at 03:45

## 2019-02-12 RX ADMIN — Medication 50 MILLIGRAM(S): at 01:10

## 2019-02-12 RX ADMIN — PANTOPRAZOLE SODIUM 40 MILLIGRAM(S): 20 TABLET, DELAYED RELEASE ORAL at 07:46

## 2019-02-12 RX ADMIN — CHLORHEXIDINE GLUCONATE 1 APPLICATION(S): 213 SOLUTION TOPICAL at 07:37

## 2019-02-12 RX ADMIN — Medication 3 MILLILITER(S): at 08:00

## 2019-02-12 RX ADMIN — ATORVASTATIN CALCIUM 20 MILLIGRAM(S): 80 TABLET, FILM COATED ORAL at 21:19

## 2019-02-12 RX ADMIN — Medication 108 GRAM(S): at 11:39

## 2019-02-12 RX ADMIN — HEPARIN SODIUM 5000 UNIT(S): 5000 INJECTION INTRAVENOUS; SUBCUTANEOUS at 01:10

## 2019-02-12 RX ADMIN — Medication 3 MILLILITER(S): at 14:46

## 2019-02-12 NOTE — PROGRESS NOTE ADULT - SUBJECTIVE AND OBJECTIVE BOX
SUBJECTIVE:    Patient is a 84y old Female who presents with a chief complaint of Urinary Retention/Hperkalemia with EKG changes (12 Feb 2019 06:29)    Currently admitted to medicine with the primary diagnosis of Hyperkalemia     Today is hospital day 7d. This morning she is resting comfortably in bed and reports no new issues or overnight events.     PAST MEDICAL & SURGICAL HISTORY  Anemia: 4 PRBC 11/2018  Oxygen dependent: O2 2L Via NC  Bladder cancer: 2018  CHF (congestive heart failure)  Breast tumor: removal benign bl breasts  Anal cancer: Chemo and radiation 1992  COPD (chronic obstructive pulmonary disease)  Lung disease: COPD  Hypertension  History of cystoscopy: 10/2018  H/O breast surgery: 39 yrs. ago, bilateral  History of back surgery: 10 yrs. ago stimulator implant 2001, 2011( cervical and Lumbar)    SOCIAL HISTORY:  Negative for smoking/alcohol/drug use.     ALLERGIES:  hair dyes (Hives)  sulfa drugs (Headache)  Zoloft (Headache)    MEDICATIONS:  STANDING MEDICATIONS  ALBUTerol/ipratropium for Nebulization 3 milliLiter(s) Nebulizer every 6 hours  amLODIPine   Tablet 10 milliGRAM(s) Oral daily  ampicillin  IVPB 1 Gram(s) IV Intermittent every 6 hours  aspirin  chewable 81 milliGRAM(s) Oral daily  atorvastatin 20 milliGRAM(s) Oral at bedtime  buDESOnide 160 MICROgram(s)/formoterol 4.5 MICROgram(s) Inhaler 2 Puff(s) Inhalation two times a day  busPIRone 5 milliGRAM(s) Oral three times a day  chlorhexidine 4% Liquid 1 Application(s) Topical <User Schedule>  docusate sodium 200 milliGRAM(s) Oral at bedtime  DULoxetine 20 milliGRAM(s) Oral daily  heparin  Injectable 5000 Unit(s) SubCutaneous every 8 hours  meropenem  IVPB 1000 milliGRAM(s) IV Intermittent every 8 hours  metoprolol succinate ER 50 milliGRAM(s) Oral daily  pantoprazole    Tablet 40 milliGRAM(s) Oral before breakfast  senna 2 Tablet(s) Oral daily    PRN MEDICATIONS  bisacodyl Suppository 10 milliGRAM(s) Rectal every 24 hours PRN  cloNIDine 0.1 milliGRAM(s) Oral every 8 hours PRN  HYDROcodone 10 mG/acetaminophen 325 mG 1 Tablet(s) Oral every 4 hours PRN  ondansetron Injectable 4 milliGRAM(s) IV Push every 8 hours PRN    VITALS:   T(F): 97.6  HR: 102  BP: 147/67  RR: 16  SpO2: --    LABS:                        10.3   38.19 )-----------( 213      ( 12 Feb 2019 08:01 )             31.3     02-12    129<L>  |  91<L>  |  34<H>  ----------------------------<  121<H>  4.7   |  28  |  0.9    Ca    8.2<L>      12 Feb 2019 08:01  Mg     1.7     02-12    TPro  5.1<L>  /  Alb  3.0<L>  /  TBili  0.5  /  DBili  x   /  AST  13  /  ALT  15  /  AlkPhos  111  02-12      Urinalysis Basic - ( 10 Feb 2019 19:08 )    Color: Yellow / Appearance: Clear / SG: >=1.030 / pH: x  Gluc: x / Ketone: 15  / Bili: Small / Urobili: 0.2 mg/dL   Blood: x / Protein: >=300 mg/dL / Nitrite: Negative   Leuk Esterase: Negative / RBC: 26-50 /HPF / WBC 3-5 /HPF   Sq Epi: x / Non Sq Epi: Few /HPF / Bacteria: Moderate            Culture - Urine (collected 10 Feb 2019 19:08)  Source: .Urine Catheterized  Final Report (11 Feb 2019 18:43):    No growth          RADIOLOGY:    PHYSICAL EXAM:  GEN: No acute distress  LUNGS: Clear to auscultation bilaterally   HEART: Regular  ABD: Soft, non-tender, non-distended.  EXT: NC/NC/NE/2+PP/SARAVIA/Skin Intact.   NEURO: AAOX3    Intravenous access:   NG tube:   Rowe Catheter:

## 2019-02-12 NOTE — CONSULT NOTE ADULT - ASSESSMENT
84yFemale pmh HTN, CHF, COPD on home O2, bladder cancer presents to the ED with Urinary retention. Patient consulted to GI for colitis on CT and increasing WBC. Patient denies nausea, vomiting, diarrhea, constipation, hematemesis melena, blood in stool, abdominal pain 84yFemale pmh HTN, CHF, COPD on home O2, bladder cancer presents to the ED with Urinary retention. Patient consulted to GI for colitis on CT and increasing WBC. Patient denies nausea, vomiting, diarrhea, constipation, hematemesis melena, blood in stool, abdominal pain    Problem 1-New diffuse colonic wall thickening involving the cecum, right colon and transverse colon at the hepatic flexure. There is additional involvement of the terminal ileum. Findings are consistent with colitis and enteritis of unclear etiology, likely infectious or inflammatory  Rec  -Finds on CT explained by anasarca/ascites/ hypokalemia  -No GI workup at this time  -Regular Diet   -Anusol Suppositories   -Follow-up GI office as an outpatient [phone]     Problem 2-Resolution of some pulmonary nodules at the left lung base with new pulmonary nodules measuring up to 12 mm.   Rec  Follow-up noncontrast chest CT is recommended in 3 months to demonstrate resolution 84yFemale pmh HTN, CHF, COPD on home O2, bladder cancer presents to the ED with Urinary retention. Patient consulted to GI for colitis on CT and increasing WBC. Patient denies nausea, vomiting, diarrhea, constipation, hematemesis melena, blood in stool, abdominal pain    Problem 1-New diffuse colonic wall thickening involving the cecum, right colon and transverse colon at the hepatic flexure. There is additional involvement of the terminal ileum.  There is no evidence of clinical enterocolitis to implement any infections or leukocytosis.  Findings on imaging could be plausibly explained by anasarca/ascites and hypoalbuminemia.  -No GI workup is advisable at this point.  - may resume Regular Diet   -Anusol Suppositories for anal irritation      Problem 2-Resolution of some pulmonary nodules at the left lung base with new pulmonary nodules measuring up to 12 mm.   Rec  Follow-up noncontrast chest CT is recommended in 3 months to demonstrate resolution  Outpt f/up w pulmonary medicine    call back PRN

## 2019-02-12 NOTE — PROVIDER CONTACT NOTE (OTHER) - SITUATION
Pt repeatedly crying out in pain.  Positioned patient with no relief.  PA notified.  Will give one time dose of morphine.

## 2019-02-12 NOTE — CONSULT NOTE ADULT - SUBJECTIVE AND OBJECTIVE BOX
Chief complaint/Reason for consult: Colitis and Increased WBC    HPI:  From ED:  84 year old female w hx of HTN, CHF, COPD on 2 LPM NC daily, bladder cancer presents to the ED with urinary retention, crampy/spasm-like lower abdominal pain and mild distention. Patient had bladder surgery w Dr. Sanchez on 1/30/19 and was d/c home with a lynne catheter in place. The catheter was removed yesterday. Patient had two episodes of normal urination after removal, but has since been experiencing retention and dribbling at most. Denies fevers, chills, cough, chest pain, shortness of breath, flank pain, myalgias, incontinence, rashes, or recent illness. Patient presents to ED with family this am.  Dtr reports that patient s/p cysto with bladder scrapping for Bladder Ca on 1/30/19.  Patient tolerated procedure well and was discharged home with Lynne.  Patient did very well since then and went to follow up at office ystd.  At that point lynne was removed and patient went home.  Dtr reports that patient urinated x 2 since removal however last evening began only dribbling and began having severe bladder spasms, unrelieved by oxybutynin.  Family brought patient to ED 2/2 to this.  Patient reports she is feeling well.  No CP.  SOB at baseline.  Still with some minor abdominal cramping, suprapubic area.  No fever.  Last BM ystd AM and regular. (05 Feb 2019 07:48)    84yFemale pmh HTN, CHF, COPD on home O2, bladder cancer presents to the ED with Urinary retention. Patient consulted to GI for colitis on CT and increasing WBC. Patient denies nausea, vomiting, diarrhea, constipation, hematemesis melena, blood in stool, abdominal pain. Patient had a colonoscopy about 25 years ago that revealed Anal Cancer and patient was treated with chemo and radiation.    PMHX/PSHX:  PAST MEDICAL & SURGICAL HISTORY:  Anemia: 4 PRBC 11/2018  Oxygen dependent: O2 2L Via NC  Bladder cancer: 2018  CHF (congestive heart failure)  Breast tumor: removal benign bl breasts  Anal cancer: Chemo and radiation 1992  COPD (chronic obstructive pulmonary disease)  Lung disease: COPD  Hypertension  History of cystoscopy: 10/2018  H/O breast surgery: 39 yrs. ago, bilateral  History of back surgery: 10 yrs. ago stimulator implant 2001, 2011( cervical and Lumbar)        Family history:  FAMILY HISTORY:  No pertinent family history in first degree relatives    No GI cancers in first or second degree relatives    Social History: +pack and a half a day for 50 years patient quit 5 years ago smoking. No alcohol. No illegal drug use.    Allergies:  hair dyes  sulfa drugs  Zoloft      MEDICATIONS:Home Medications:  amLODIPine 5 mg oral tablet: 1 tab(s) orally once a day (05 Feb 2019 07:54)  aspirin 81 mg oral tablet, chewable: 1 tab(s) orally once a day (05 Feb 2019 07:54)  atorvastatin 20 mg oral tablet: 1 tab(s) orally once a day (at bedtime) (05 Feb 2019 07:54)  BuSpar 5 mg oral tablet: 1 tab(s) orally 2 times a day (05 Feb 2019 07:54)  Calcium 500+D oral tablet, chewable: 1 tab(s) orally 2 times a day (05 Feb 2019 07:54)  Centrum Adults oral tablet: 1 tab(s) orally once a day (05 Feb 2019 07:54)  ipratropium-albuterol 0.5 mg-2.5 mg/3 mLinhalation solution: 3 milliliter(s) inhaled every 6 hours (05 Feb 2019 07:54)  metoprolol succinate 50 mg oral tablet, extended release: 1 tab(s) orally once a day (05 Feb 2019 07:54)  ProAir HFA 90 mcg/inh inhalation aerosol: 2 puff(s) inhaled 4 times a day (05 Feb 2019 07:54)  Procrit: injectable once a week (05 Feb 2019 07:54)  spironolactone 25 mg oral tablet: 1 tab(s) orally once a day (05 Feb 2019 07:54)  Vicodin HP 10 mg-300 mg oral tablet: 1 tab(s) orally every 6 hours (05 Feb 2019 07:54)    MEDICATIONS  (STANDING):  ALBUTerol/ipratropium for Nebulization 3 milliLiter(s) Nebulizer every 6 hours  amLODIPine   Tablet 10 milliGRAM(s) Oral daily  ampicillin  IVPB 1 Gram(s) IV Intermittent every 6 hours  aspirin  chewable 81 milliGRAM(s) Oral daily  atorvastatin 20 milliGRAM(s) Oral at bedtime  buDESOnide 160 MICROgram(s)/formoterol 4.5 MICROgram(s) Inhaler 2 Puff(s) Inhalation two times a day  busPIRone 5 milliGRAM(s) Oral three times a day  chlorhexidine 4% Liquid 1 Application(s) Topical <User Schedule>  docusate sodium 200 milliGRAM(s) Oral at bedtime  DULoxetine 20 milliGRAM(s) Oral daily  heparin  Injectable 5000 Unit(s) SubCutaneous every 8 hours  meropenem  IVPB 1000 milliGRAM(s) IV Intermittent every 8 hours  metoprolol succinate ER 50 milliGRAM(s) Oral daily  pantoprazole    Tablet 40 milliGRAM(s) Oral before breakfast  senna 2 Tablet(s) Oral daily    MEDICATIONS  (PRN):  bisacodyl Suppository 10 milliGRAM(s) Rectal every 24 hours PRN Constipation  cloNIDine 0.1 milliGRAM(s) Oral every 8 hours PRN for sbp > 180  HYDROcodone 10 mG/acetaminophen 325 mG 1 Tablet(s) Oral every 4 hours PRN Moderate Pain (4 - 6)  ondansetron Injectable 4 milliGRAM(s) IV Push every 8 hours PRN Nausea and/or Vomiting      REVIEW OF SYSTEMS  General:  No fevers, or chills.  Eyes:  No reported pain or visual changes  ENT:  No sore throat or runny nose.  NECK: No stiffness or lymphadenopathy  CV:  No chest pain or palpitations.  Resp:  No shortness of breath, cough, wheezing or hemoptysis  GI:  No abdominal pain, nausea, vomiting, dysphagia, diarrhea or constipation. No rectal bleeding, melena, or hematemesis.  Muscle:  No aches or weakness  Neuro:  No tingling, numbness       VITALS:   T(F): 97.6 (02-12-19 @ 06:22), Max: 97.6 (02-12-19 @ 06:22)  HR: 102 (02-12-19 @ 06:22) (95 - 102)  BP: 147/67 (02-12-19 @ 06:22) (147/67 - 954/-)  RR: 16 (02-12-19 @ 06:22) (16 - 16)  SpO2: --    PHYSICAL EXAM:  GENERAL: AAOx3, no acute distress.  HEAD:  Atraumatic, Normocephalic  EYES: conjunctiva and sclera clear  NECK: Supple, No thyromegaly   CHEST/LUNG: Clear to auscultation bilaterally; No wheeze, rhonchi, or rales  HEART: Regular rate and rhythm; normal S1, S2, No murmurs.  ABDOMEN: Soft, nontender, nondistended; Bowel sounds present, no abdominal bruit, masses, or hepatosplenomegaly  NEUROLOGY: No asterixis or tremor  SKIN: Intact, no jaundice          LABS:  02-12    129<L>  |  91<L>  |  34<H>  ----------------------------<  121<H>  4.7   |  28  |  0.9    Ca    8.2<L>      12 Feb 2019 08:01  Mg     1.7     02-12    TPro  5.1<L>  /  Alb  3.0<L>  /  TBili  0.5  /  DBili  x   /  AST  13  /  ALT  15  /  AlkPhos  111  02-12                          10.3   38.19 )-----------( 213      ( 12 Feb 2019 08:01 )             31.3     LIVER FUNCTIONS - ( 12 Feb 2019 08:01 )  Alb: 3.0 g/dL / Pro: 5.1 g/dL / ALK PHOS: 111 U/L / ALT: 15 U/L / AST: 13 U/L / GGT: x               IMAGING:  < from: CT Abdomen and Pelvis w/ Oral Cont (02.11.19 @ 23:46) >  EXAM:  CT ABDOMEN AND PELVIS OC            PROCEDURE DATE:  02/11/2019            INTERPRETATION:  CLINICAL STATEMENT: Abdominal pain      TECHNIQUE: Contiguous axial CT images were obtained from the lower chest   to the pubic symphysis without intravenous contrast.  Oral contrast was   given.  Reformatted images in the coronal and sagittal planes were   acquired.    COMPARISON CT: October 19, 2018 and renal ultrasound dated January 10,   2019. Study is compared to abdominal pelvic CT dated October 3, 2015.    Study is limited by patient motion      FINDINGS:    LOWER CHEST: There is a new small to moderate right pleural effusion and   trace left pleural effusion with associated atelectasis. There is a new   left lower lobe pulmonary nodulemeasuring 8 mm on image 17 of series 4.   Many other left lower lobe pulmonary nodules have resolved since prior   examination. There is an additional new lobulated nodule in left lower   lobe on image 32 series 4 measuring 12 mm.    HEPATOBILIARY: Gallbladder is distended with small stones and/or sludge.    SPLEEN: Unremarkable.    PANCREAS: Unremarkable.    ADRENAL GLANDS: The adrenal glands are thickened.    KIDNEYS: There is moderate right hydroureteronephrosis to the level of   the bladder without a definitive obstructing cause. There is no left   hydronephrosis. There is a left upper pole renal cyst. Previously   described left lower pole renal cyst is limited by motion.    ABDOMINOPELVIC NODES: Unremarkable. There is new mild abdominalpelvic   ascites.    PELVIC ORGANS: There is no bladder calculus. The uterus and adnexa are   incompletely evaluated on CT. There appears to be a complex left adnexal   cyst on image 67 of series 2 measuring 3.2 x 2.9 cm, posterior to the   uterus. When taking into account differences in technique, this appears   essentially unchanged.    PERITONEUM/MESENTERY/BOWEL: There is new significant wall thickening of   the right colon and cecum consistent with colitis. This involves the   transverse colon at the hepatic flexure as well as the terminal ileum.   The left colon is stool-filled. There is no free air or obstruction.   There is diverticulosis.    BONES/SOFT TISSUES: Degenerative change and status post lumbar spine   surgery with hardware present. The bones are osteopenic. A spinal   stimulator is present with the generator in the subcutaneous tissues of   the right buttock. There are subcutaneous changes, likely related to   injection phenomena.    OTHER: Atherosclerosis.      IMPRESSION:     New diffuse colonic wall thickening involving the cecum, right colon and   transverse colon at the hepatic flexure. There is additional involvement   of the terminal ileum. Findings are consistent with colitis and enteritis   of unclear etiology, likely infectious or inflammatory.    Moderate right hydroureteronephrosis to the level of the bladder without   a definitive obstructing cause, as seen on recent ultrasound.     New small amount of abdominopelvic ascites.    New small to moderateright pleural effusion and trace left pleural   effusion.    Resolution of some pulmonary nodules at the left lung base with new   pulmonary nodules measuring up to 12 mm. Follow-up noncontrast chest CT   is recommended in 3 months to demonstrate resolution.                      DEXTER VELIZ M.D., ATTENDING RADIOLOGIST  This document has been electronically signed. Feb 12 2019  8:22AM        < end of copied text > Chief complaint/Reason for consult: Colitis and Increased WBC    HPI:  From ED:  84 year old female w hx of HTN, CHF, COPD on 2 LPM NC daily, bladder cancer presents to the ED with urinary retention, crampy/spasm-like lower abdominal pain and mild distention. Patient had bladder surgery w Dr. Sanchez on 1/30/19 and was d/c home with a lynne catheter in place. The catheter was removed yesterday. Patient had two episodes of normal urination after removal, but has since been experiencing retention and dribbling at most. Denies fevers, chills, cough, chest pain, shortness of breath, flank pain, myalgias, incontinence, rashes, or recent illness. Patient presents to ED with family this am.  Dtr reports that patient s/p cysto with bladder scrapping for Bladder Ca on 1/30/19.  Patient tolerated procedure well and was discharged home with Lynne.  Patient did very well since then and went to follow up at office ystd.  At that point lynne was removed and patient went home.  Dtr reports that patient urinated x 2 since removal however last evening began only dribbling and began having severe bladder spasms, unrelieved by oxybutynin.  Family brought patient to ED 2/2 to this.  Patient reports she is feeling well.  No CP.  SOB at baseline.  Still with some minor abdominal cramping, suprapubic area.  No fever.  Last BM ystd AM and regular. (05 Feb 2019 07:48)    84yFemale pmh HTN, CHF, COPD on home O2, bladder cancer presents to the ED with Urinary retention. Patient consulted to GI for colitis on CT and increasing WBC. Patient denies nausea, vomiting, diarrhea, constipation, hematemesis melena, blood in stool, abdominal pain. Patient had a colonoscopy about 25 years ago that revealed Anal Cancer and patient was treated with chemo and radiation.    PMHX/PSHX:  PAST MEDICAL & SURGICAL HISTORY:  Anemia: 4 PRBC 11/2018  Oxygen dependent: O2 2L Via NC  Bladder cancer: 2018  CHF (congestive heart failure)  Breast tumor: removal benign bl breasts  Anal cancer: Chemo and radiation 1992  COPD (chronic obstructive pulmonary disease)  Lung disease: COPD  Hypertension  History of cystoscopy: 10/2018  H/O breast surgery: 39 yrs. ago, bilateral  History of back surgery: 10 yrs. ago stimulator implant 2001, 2011( cervical and Lumbar)        Family history:  FAMILY HISTORY:  No pertinent family history in first degree relatives    No GI cancers in first or second degree relatives    Social History: +pack and a half a day for 50 years patient quit 5 years ago smoking. No alcohol. No illegal drug use.    Allergies:  hair dyes  sulfa drugs  Zoloft      MEDICATIONS:Home Medications:  amLODIPine 5 mg oral tablet: 1 tab(s) orally once a day (05 Feb 2019 07:54)  aspirin 81 mg oral tablet, chewable: 1 tab(s) orally once a day (05 Feb 2019 07:54)  atorvastatin 20 mg oral tablet: 1 tab(s) orally once a day (at bedtime) (05 Feb 2019 07:54)  BuSpar 5 mg oral tablet: 1 tab(s) orally 2 times a day (05 Feb 2019 07:54)  Calcium 500+D oral tablet, chewable: 1 tab(s) orally 2 times a day (05 Feb 2019 07:54)  Centrum Adults oral tablet: 1 tab(s) orally once a day (05 Feb 2019 07:54)  ipratropium-albuterol 0.5 mg-2.5 mg/3 mLinhalation solution: 3 milliliter(s) inhaled every 6 hours (05 Feb 2019 07:54)  metoprolol succinate 50 mg oral tablet, extended release: 1 tab(s) orally once a day (05 Feb 2019 07:54)  ProAir HFA 90 mcg/inh inhalation aerosol: 2 puff(s) inhaled 4 times a day (05 Feb 2019 07:54)  Procrit: injectable once a week (05 Feb 2019 07:54)  spironolactone 25 mg oral tablet: 1 tab(s) orally once a day (05 Feb 2019 07:54)  Vicodin HP 10 mg-300 mg oral tablet: 1 tab(s) orally every 6 hours (05 Feb 2019 07:54)    MEDICATIONS  (STANDING):  ALBUTerol/ipratropium for Nebulization 3 milliLiter(s) Nebulizer every 6 hours  amLODIPine   Tablet 10 milliGRAM(s) Oral daily  ampicillin  IVPB 1 Gram(s) IV Intermittent every 6 hours  aspirin  chewable 81 milliGRAM(s) Oral daily  atorvastatin 20 milliGRAM(s) Oral at bedtime  buDESOnide 160 MICROgram(s)/formoterol 4.5 MICROgram(s) Inhaler 2 Puff(s) Inhalation two times a day  busPIRone 5 milliGRAM(s) Oral three times a day  chlorhexidine 4% Liquid 1 Application(s) Topical <User Schedule>  docusate sodium 200 milliGRAM(s) Oral at bedtime  DULoxetine 20 milliGRAM(s) Oral daily  heparin  Injectable 5000 Unit(s) SubCutaneous every 8 hours  meropenem  IVPB 1000 milliGRAM(s) IV Intermittent every 8 hours  metoprolol succinate ER 50 milliGRAM(s) Oral daily  pantoprazole    Tablet 40 milliGRAM(s) Oral before breakfast  senna 2 Tablet(s) Oral daily    MEDICATIONS  (PRN):  bisacodyl Suppository 10 milliGRAM(s) Rectal every 24 hours PRN Constipation  cloNIDine 0.1 milliGRAM(s) Oral every 8 hours PRN for sbp > 180  HYDROcodone 10 mG/acetaminophen 325 mG 1 Tablet(s) Oral every 4 hours PRN Moderate Pain (4 - 6)  ondansetron Injectable 4 milliGRAM(s) IV Push every 8 hours PRN Nausea and/or Vomiting      REVIEW OF SYSTEMS  General:  No fevers, or chills.  Eyes:  No reported pain or visual changes  ENT:  No sore throat or runny nose.  NECK: No stiffness or lymphadenopathy  CV:  No chest pain or palpitations.  Resp:  No shortness of breath, cough, wheezing or hemoptysis  GI:  No abdominal pain, nausea, vomiting, dysphagia, diarrhea or constipation. No rectal bleeding, melena, or hematemesis.  Neuro:  No tingling, numbness       VITALS:   T(F): 97.6 (02-12-19 @ 06:22), Max: 97.6 (02-12-19 @ 06:22)  HR: 102 (02-12-19 @ 06:22) (95 - 102)  BP: 147/67 (02-12-19 @ 06:22) (147/67 - 954/-)  RR: 16 (02-12-19 @ 06:22) (16 - 16)  SpO2: --    PHYSICAL EXAM:  GENERAL: AAOx3, no acute distress.  HEAD:  Atraumatic, Normocephalic  EYES: conjunctiva and sclera clear  NECK: Supple, No thyromegaly   CHEST/LUNG: Clear to auscultation bilaterally; No wheeze, rhonchi, or rales  HEART: Regular rate and rhythm; normal S1, S2, No murmurs.  ABDOMEN: Soft, nontender, nondistended; Bowel sounds present, no abdominal bruit, masses, or hepatosplenomegaly  NEUROLOGY: No asterixis or tremor  SKIN: Intact, no jaundice          LABS:  02-12    129<L>  |  91<L>  |  34<H>  ----------------------------<  121<H>  4.7   |  28  |  0.9    Ca    8.2<L>      12 Feb 2019 08:01  Mg     1.7     02-12    TPro  5.1<L>  /  Alb  3.0<L>  /  TBili  0.5  /  DBili  x   /  AST  13  /  ALT  15  /  AlkPhos  111  02-12                          10.3   38.19 )-----------( 213      ( 12 Feb 2019 08:01 )             31.3     LIVER FUNCTIONS - ( 12 Feb 2019 08:01 )  Alb: 3.0 g/dL / Pro: 5.1 g/dL / ALK PHOS: 111 U/L / ALT: 15 U/L / AST: 13 U/L / GGT: x               IMAGING:  < from: CT Abdomen and Pelvis w/ Oral Cont (02.11.19 @ 23:46) >  EXAM:  CT ABDOMEN AND PELVIS OC            PROCEDURE DATE:  02/11/2019            INTERPRETATION:  CLINICAL STATEMENT: Abdominal pain      TECHNIQUE: Contiguous axial CT images were obtained from the lower chest   to the pubic symphysis without intravenous contrast.  Oral contrast was   given.  Reformatted images in the coronal and sagittal planes were   acquired.    COMPARISON CT: October 19, 2018 and renal ultrasound dated January 10,   2019. Study is compared to abdominal pelvic CT dated October 3, 2015.    Study is limited by patient motion      FINDINGS:    LOWER CHEST: There is a new small to moderate right pleural effusion and   trace left pleural effusion with associated atelectasis. There is a new   left lower lobe pulmonary nodulemeasuring 8 mm on image 17 of series 4.   Many other left lower lobe pulmonary nodules have resolved since prior   examination. There is an additional new lobulated nodule in left lower   lobe on image 32 series 4 measuring 12 mm.    HEPATOBILIARY: Gallbladder is distended with small stones and/or sludge.    SPLEEN: Unremarkable.    PANCREAS: Unremarkable.    ADRENAL GLANDS: The adrenal glands are thickened.    KIDNEYS: There is moderate right hydroureteronephrosis to the level of   the bladder without a definitive obstructing cause. There is no left   hydronephrosis. There is a left upper pole renal cyst. Previously   described left lower pole renal cyst is limited by motion.    ABDOMINOPELVIC NODES: Unremarkable. There is new mild abdominalpelvic   ascites.    PELVIC ORGANS: There is no bladder calculus. The uterus and adnexa are   incompletely evaluated on CT. There appears to be a complex left adnexal   cyst on image 67 of series 2 measuring 3.2 x 2.9 cm, posterior to the   uterus. When taking into account differences in technique, this appears   essentially unchanged.    PERITONEUM/MESENTERY/BOWEL: There is new significant wall thickening of   the right colon and cecum consistent with colitis. This involves the   transverse colon at the hepatic flexure as well as the terminal ileum.   The left colon is stool-filled. There is no free air or obstruction.   There is diverticulosis.    BONES/SOFT TISSUES: Degenerative change and status post lumbar spine   surgery with hardware present. The bones are osteopenic. A spinal   stimulator is present with the generator in the subcutaneous tissues of   the right buttock. There are subcutaneous changes, likely related to   injection phenomena.    OTHER: Atherosclerosis.      IMPRESSION:     New diffuse colonic wall thickening involving the cecum, right colon and   transverse colon at the hepatic flexure. There is additional involvement   of the terminal ileum. Findings are consistent with colitis and enteritis   of unclear etiology, likely infectious or inflammatory.    Moderate right hydroureteronephrosis to the level of the bladder without   a definitive obstructing cause, as seen on recent ultrasound.     New small amount of abdominopelvic ascites.    New small to moderateright pleural effusion and trace left pleural   effusion.    Resolution of some pulmonary nodules at the left lung base with new   pulmonary nodules measuring up to 12 mm. Follow-up noncontrast chest CT   is recommended in 3 months to demonstrate resolution.                      DEXTER VELIZ M.D., ATTENDING RADIOLOGIST  This document has been electronically signed. Feb 12 2019  8:22AM        < end of copied text > Chief complaint/Reason for consult: Colitis and Increased WBC    HPI:  From ED:  84 year old female w hx of HTN, CHF, COPD on 2 LPM NC daily, bladder cancer presents to the ED with urinary retention, crampy/spasm-like lower abdominal pain and mild distention. Patient had bladder surgery w Dr. Sanchez on 1/30/19 and was d/c home with a lynne catheter in place. The catheter was removed yesterday. Patient had two episodes of normal urination after removal, but has since been experiencing retention and dribbling at most. Denies fevers, chills, cough, chest pain, shortness of breath, flank pain, myalgias, incontinence, rashes, or recent illness. Patient presents to ED with family this am.  Dtr reports that patient s/p cysto with bladder scrapping for Bladder Ca on 1/30/19.  Patient tolerated procedure well and was discharged home with Lynne.  Patient did very well since then and went to follow up at office ystd.  At that point lynne was removed and patient went home.  Dtr reports that patient urinated x 2 since removal however last evening began only dribbling and began having severe bladder spasms, unrelieved by oxybutynin.  Family brought patient to ED 2/2 to this.  Patient reports she is feeling well.  No CP.  SOB at baseline.  Still with some minor abdominal cramping, suprapubic area.  No fever.  Last BM ystd AM and regular. (05 Feb 2019 07:48)    84yFemale pmh HTN, CHF, COPD on home O2, bladder cancer presents to the ED with Urinary retention. Patient consulted to GI for colitis on CT and increasing WBC. Patient denies nausea, vomiting, diarrhea, constipation, hematemesis melena, blood in stool, abdominal pain. Patient had a colonoscopy about 25 years ago that revealed Anal Cancer and patient was treated with chemo and radiation.    PMHX/PSHX:  PAST MEDICAL & SURGICAL HISTORY:  Anemia: 4 PRBC 11/2018  Oxygen dependent: O2 2L Via NC  Bladder cancer: 2018  CHF (congestive heart failure)  Breast tumor: removal benign bl breasts  Anal cancer: Chemo and radiation 1992  COPD (chronic obstructive pulmonary disease)  Lung disease: COPD  Hypertension  History of cystoscopy: 10/2018  H/O breast surgery: 39 yrs. ago, bilateral  History of back surgery: 10 yrs. ago stimulator implant 2001, 2011( cervical and Lumbar)        Family history:  FAMILY HISTORY:  No pertinent family history in first degree relatives    No GI cancers in first or second degree relatives    Social History: +pack and a half a day for 50 years patient quit 5 years ago smoking. No alcohol. No illegal drug use.    Allergies:  hair dyes  sulfa drugs  Zoloft      MEDICATIONS:Home Medications:  amLODIPine 5 mg oral tablet: 1 tab(s) orally once a day (05 Feb 2019 07:54)  aspirin 81 mg oral tablet, chewable: 1 tab(s) orally once a day (05 Feb 2019 07:54)  atorvastatin 20 mg oral tablet: 1 tab(s) orally once a day (at bedtime) (05 Feb 2019 07:54)  BuSpar 5 mg oral tablet: 1 tab(s) orally 2 times a day (05 Feb 2019 07:54)  Calcium 500+D oral tablet, chewable: 1 tab(s) orally 2 times a day (05 Feb 2019 07:54)  Centrum Adults oral tablet: 1 tab(s) orally once a day (05 Feb 2019 07:54)  ipratropium-albuterol 0.5 mg-2.5 mg/3 mLinhalation solution: 3 milliliter(s) inhaled every 6 hours (05 Feb 2019 07:54)  metoprolol succinate 50 mg oral tablet, extended release: 1 tab(s) orally once a day (05 Feb 2019 07:54)  ProAir HFA 90 mcg/inh inhalation aerosol: 2 puff(s) inhaled 4 times a day (05 Feb 2019 07:54)  Procrit: injectable once a week (05 Feb 2019 07:54)  spironolactone 25 mg oral tablet: 1 tab(s) orally once a day (05 Feb 2019 07:54)  Vicodin HP 10 mg-300 mg oral tablet: 1 tab(s) orally every 6 hours (05 Feb 2019 07:54)    MEDICATIONS  (STANDING):  ALBUTerol/ipratropium for Nebulization 3 milliLiter(s) Nebulizer every 6 hours  amLODIPine   Tablet 10 milliGRAM(s) Oral daily  ampicillin  IVPB 1 Gram(s) IV Intermittent every 6 hours  aspirin  chewable 81 milliGRAM(s) Oral daily  atorvastatin 20 milliGRAM(s) Oral at bedtime  buDESOnide 160 MICROgram(s)/formoterol 4.5 MICROgram(s) Inhaler 2 Puff(s) Inhalation two times a day  busPIRone 5 milliGRAM(s) Oral three times a day  chlorhexidine 4% Liquid 1 Application(s) Topical <User Schedule>  docusate sodium 200 milliGRAM(s) Oral at bedtime  DULoxetine 20 milliGRAM(s) Oral daily  heparin  Injectable 5000 Unit(s) SubCutaneous every 8 hours  meropenem  IVPB 1000 milliGRAM(s) IV Intermittent every 8 hours  metoprolol succinate ER 50 milliGRAM(s) Oral daily  pantoprazole    Tablet 40 milliGRAM(s) Oral before breakfast  senna 2 Tablet(s) Oral daily    MEDICATIONS  (PRN):  bisacodyl Suppository 10 milliGRAM(s) Rectal every 24 hours PRN Constipation  cloNIDine 0.1 milliGRAM(s) Oral every 8 hours PRN for sbp > 180  HYDROcodone 10 mG/acetaminophen 325 mG 1 Tablet(s) Oral every 4 hours PRN Moderate Pain (4 - 6)  ondansetron Injectable 4 milliGRAM(s) IV Push every 8 hours PRN Nausea and/or Vomiting      REVIEW OF SYSTEMS  General:  No fevers, or chills.  Eyes:  No reported pain or visual changes  ENT:  No sore throat or runny nose.  NECK: No stiffness or lymphadenopathy  CV:  No chest pain or palpitations.  Resp:  No shortness of breath, cough, wheezing or hemoptysis  GI:  No abdominal pain, nausea, vomiting, dysphagia, diarrhea or constipation. No rectal bleeding, melena, or hematemesis.  Neuro:  No tingling, numbness       VITALS:   T(F): 97.6 (02-12-19 @ 06:22), Max: 97.6 (02-12-19 @ 06:22)  HR: 102 (02-12-19 @ 06:22) (95 - 102)  BP: 147/67 (02-12-19 @ 06:22) (147/67 - 954/-)  RR: 16 (02-12-19 @ 06:22) (16 - 16)  SpO2: --    PHYSICAL EXAM:  GENERAL: AAOx3, no acute distress.  HEAD:  Atraumatic, Normocephalic  EYES: conjunctiva and sclera clear  NECK: Supple, No thyromegaly   CHEST/LUNG: Clear to auscultation bilaterally;   HEART: Regular rate and rhythm; normal S1, S2  ABDOMEN: Soft, nontender, nondistended; Bowel sounds present, no abdominal bruit, masses, or hepatosplenomegaly  NEUROLOGY: No asterixis or tremor  SKIN: Intact, no jaundice          LABS:  02-12    129<L>  |  91<L>  |  34<H>  ----------------------------<  121<H>  4.7   |  28  |  0.9    Ca    8.2<L>      12 Feb 2019 08:01  Mg     1.7     02-12    TPro  5.1<L>  /  Alb  3.0<L>  /  TBili  0.5  /  DBili  x   /  AST  13  /  ALT  15  /  AlkPhos  111  02-12                          10.3   38.19 )-----------( 213      ( 12 Feb 2019 08:01 )             31.3     LIVER FUNCTIONS - ( 12 Feb 2019 08:01 )  Alb: 3.0 g/dL / Pro: 5.1 g/dL / ALK PHOS: 111 U/L / ALT: 15 U/L / AST: 13 U/L / GGT: x               IMAGING:  < from: CT Abdomen and Pelvis w/ Oral Cont (02.11.19 @ 23:46) >  EXAM:  CT ABDOMEN AND PELVIS OC            PROCEDURE DATE:  02/11/2019            INTERPRETATION:  CLINICAL STATEMENT: Abdominal pain      TECHNIQUE: Contiguous axial CT images were obtained from the lower chest   to the pubic symphysis without intravenous contrast.  Oral contrast was   given.  Reformatted images in the coronal and sagittal planes were   acquired.    COMPARISON CT: October 19, 2018 and renal ultrasound dated January 10,   2019. Study is compared to abdominal pelvic CT dated October 3, 2015.    Study is limited by patient motion      FINDINGS:    LOWER CHEST: There is a new small to moderate right pleural effusion and   trace left pleural effusion with associated atelectasis. There is a new   left lower lobe pulmonary nodulemeasuring 8 mm on image 17 of series 4.   Many other left lower lobe pulmonary nodules have resolved since prior   examination. There is an additional new lobulated nodule in left lower   lobe on image 32 series 4 measuring 12 mm.    HEPATOBILIARY: Gallbladder is distended with small stones and/or sludge.    SPLEEN: Unremarkable.    PANCREAS: Unremarkable.    ADRENAL GLANDS: The adrenal glands are thickened.    KIDNEYS: There is moderate right hydroureteronephrosis to the level of   the bladder without a definitive obstructing cause. There is no left   hydronephrosis. There is a left upper pole renal cyst. Previously   described left lower pole renal cyst is limited by motion.    ABDOMINOPELVIC NODES: Unremarkable. There is new mild abdominalpelvic   ascites.    PELVIC ORGANS: There is no bladder calculus. The uterus and adnexa are   incompletely evaluated on CT. There appears to be a complex left adnexal   cyst on image 67 of series 2 measuring 3.2 x 2.9 cm, posterior to the   uterus. When taking into account differences in technique, this appears   essentially unchanged.    PERITONEUM/MESENTERY/BOWEL: There is new significant wall thickening of   the right colon and cecum consistent with colitis. This involves the   transverse colon at the hepatic flexure as well as the terminal ileum.   The left colon is stool-filled. There is no free air or obstruction.   There is diverticulosis.    BONES/SOFT TISSUES: Degenerative change and status post lumbar spine   surgery with hardware present. The bones are osteopenic. A spinal   stimulator is present with the generator in the subcutaneous tissues of   the right buttock. There are subcutaneous changes, likely related to   injection phenomena.    OTHER: Atherosclerosis.      IMPRESSION:     New diffuse colonic wall thickening involving the cecum, right colon and   transverse colon at the hepatic flexure. There is additional involvement   of the terminal ileum. Findings are consistent with colitis and enteritis   of unclear etiology, likely infectious or inflammatory.    Moderate right hydroureteronephrosis to the level of the bladder without   a definitive obstructing cause, as seen on recent ultrasound.     New small amount of abdominopelvic ascites.    New small to moderateright pleural effusion and trace left pleural   effusion.    Resolution of some pulmonary nodules at the left lung base with new   pulmonary nodules measuring up to 12 mm. Follow-up noncontrast chest CT   is recommended in 3 months to demonstrate resolution.                      DEXTER VELIZ M.D., ATTENDING RADIOLOGIST  This document has been electronically signed. Feb 12 2019  8:22AM        < end of copied text >

## 2019-02-12 NOTE — CONSULT NOTE ADULT - SUBJECTIVE AND OBJECTIVE BOX
Patient is a 84y old  Female who presents with a chief complaint of Urinary Retention/Hperkalemia with EKG changes (12 Feb 2019 06:09)      HPI:  From ED:  84 year old female w hx of HTN, CHF, COPD on 2 LPM NC daily, bladder cancer presents to the ED with urinary retention, crampy/spasm-like lower abdominal pain and mild distention. Patient had bladder surgery w Dr. Sanchez on 1/30/19 and was d/c home with a lynne catheter in place. The catheter was removed yesterday. Patient had two episodes of normal urination after removal, but has since been experiencing retention and dribbling at most. Denies fevers, chills, cough, chest pain, shortness of breath, flank pain, myalgias, incontinence, rashes, or recent illness.  ________________________________________________________________________________________________________   Patient presents to ED with family this am.  Dtr reports that patient s/p cysto with bladder scrapping for Bladder Ca on 1/30/19.  Patient tolerated procedure well and was discharged home with Lynne.  Patient did very well since then and went to follow up at office ystd.  At that point lynne was removed and patient went home.  Dtr reports that patient urinated x 2 since removal however last evening began only dribbling and began having severe bladder spasms, unrelieved by oxybutynin.  Family brought patient to ED 2/2 to this.  Patient reports she is feeling well.  No CP.  SOB at baseline.  Still with some minor abdominal cramping, suprapubic area.  No fever.  Last BM ystd AM and regular. (05 Feb 2019 07:48)      PAST MEDICAL & SURGICAL HISTORY:  Anemia: 4 PRBC 11/2018  Oxygen dependent: O2 2L Via NC  Bladder cancer: 2018  CHF (congestive heart failure)  Breast tumor: removal benign bl breasts  Anal cancer: Chemo and radiation 1992  COPD (chronic obstructive pulmonary disease)  Lung disease: COPD  Hypertension  History of cystoscopy: 10/2018  H/O breast surgery: 39 yrs. ago, bilateral  History of back surgery: 10 yrs. ago stimulator implant 2001, 2011( cervical and Lumbar)      Smoking History:  Ex smoker for 3-4 years    Review of Systems:  Left sided abdominal pain  Mild cough and congestion  No diarrhea or vomiting      Allergies:  hair dyes (Hives)  sulfa drugs (Headache)  Zoloft (Headache)    Intolerances        MEDICATIONS  (STANDING):  ALBUTerol/ipratropium for Nebulization 3 milliLiter(s) Nebulizer every 6 hours  amLODIPine   Tablet 10 milliGRAM(s) Oral daily  ampicillin  IVPB 1 Gram(s) IV Intermittent every 6 hours  aspirin  chewable 81 milliGRAM(s) Oral daily  atorvastatin 20 milliGRAM(s) Oral at bedtime  buDESOnide 160 MICROgram(s)/formoterol 4.5 MICROgram(s) Inhaler 2 Puff(s) Inhalation two times a day  busPIRone 5 milliGRAM(s) Oral three times a day  chlorhexidine 4% Liquid 1 Application(s) Topical <User Schedule>  docusate sodium 200 milliGRAM(s) Oral at bedtime  DULoxetine 20 milliGRAM(s) Oral daily  heparin  Injectable 5000 Unit(s) SubCutaneous every 8 hours  meropenem  IVPB 1000 milliGRAM(s) IV Intermittent every 8 hours  metoprolol succinate ER 50 milliGRAM(s) Oral daily  pantoprazole    Tablet 40 milliGRAM(s) Oral before breakfast  senna 2 Tablet(s) Oral daily    MEDICATIONS  (PRN):  bisacodyl Suppository 10 milliGRAM(s) Rectal every 24 hours PRN Constipation  cloNIDine 0.1 milliGRAM(s) Oral every 8 hours PRN for sbp > 180  HYDROcodone 10 mG/acetaminophen 325 mG 1 Tablet(s) Oral every 4 hours PRN Moderate Pain (4 - 6)  ondansetron Injectable 4 milliGRAM(s) IV Push every 8 hours PRN Nausea and/or Vomiting      PHYSICAL EXAM  Vital Signs Last 24 Hrs  T(C): 36.4 (12 Feb 2019 06:22), Max: 36.6 (11 Feb 2019 14:13)  T(F): 97.6 (12 Feb 2019 06:22), Max: 97.9 (11 Feb 2019 14:13)  HR: 102 (12 Feb 2019 06:22) (95 - 102)  BP: 147/67 (12 Feb 2019 06:22) (147/67 - 954/-)  RR: 16 (12 Feb 2019 06:22) (16 - 16)  SpO2: 92% (11 Feb 2019 09:44) (92% - 92%) on NC O2 at 2 lpm    PHYSICAL EXAM:  Awake and alert NAD  Neck supple, no LN  S1 and S2 no murmur  Lungs are clear without wheeze, rhonchi or rales  Abdomen tender on palpation left sided  There is no edema  Neurologically non focal                        LABS:                        9.8    34.22 )-----------( 188      ( 11 Feb 2019 06:49 )             29.6                                               02-11    129<L>  |  92<L>  |  28<H>  ----------------------------<  124<H>  4.8   |  28  |  0.8    Ca    8.5      11 Feb 2019 06:49  Mg     1.7     02-11    TPro  5.4<L>  /  Alb  3.3<L>  /  TBili  0.6  /  DBili  0.3<H>  /  AST  14  /  ALT  16  /  AlkPhos  97  02-11         Urinalysis Basic - ( 10 Feb 2019 19:08 )  Color: Yellow / Appearance: Clear / SG: >=1.030 / pH: x  Gluc: x / Ketone: 15  / Bili: Small / Urobili: 0.2 mg/dL   Blood: x / Protein: >=300 mg/dL / Nitrite: Negative   Leuk Esterase: Negative / RBC: 26-50 /HPF / WBC 3-5 /HPF   Sq Epi: x / Non Sq Epi: Few /HPF / Bacteria: Moderate      Culture - Urine (collected 10 Feb 2019 19:08)  Source: .Urine Catheterized  Final Report (11 Feb 2019 18:43):  No growth                                                      RADIOGRAPHS:  < from: Xray Chest 1 View- PORTABLE-Routine (02.10.19 @ 17:59) >  Layering density in the right lower lung with a blunted costophrenic   angle, which may represent a pleural effusion. Cannot exclude right lower   lobe underlying consolidation. Recommend follow-up.     CT Abdomen 2/11/19:  Unofficial - no final report yet  Reviewed by me  Small right > left effusions  No lung consolidation

## 2019-02-12 NOTE — PROGRESS NOTE ADULT - PROBLEM SELECTOR PLAN 2
Doubt sec to pneumonia   Resp status unchanged  Follow CT abdomen results - ? intrabdal issues    Expand abx  Guarded prognosis

## 2019-02-12 NOTE — PROGRESS NOTE ADULT - ASSESSMENT
CT ABDOMEN pelvis    colitis  enteritis      increase wbc    will get GI consult  Hematology consult  iv Meropenem    pulmonary and infectious dis inputs appreciated    no pneumonia as per pulmonary CT ABDOMEN pelvis    colitis  enteritis      increase wbc    will get GI consult  Hematology consult  patient w history of MDS  iv Meropenem    pulmonary and infectious dis inputs appreciated    no pneumonia as per pulmonary

## 2019-02-12 NOTE — CONSULT NOTE ADULT - ASSESSMENT
IMPRESSION:  85 yo female ex smoker with a history of COPD on home O2  Admitted for electrolyte imbalance, GEETA,. UTI with elevated WBC  reporting left sided abdominal pains  Small effusions on CT right > left  No consolidation seen  I suspect cause of leukocytosis is intraabdominal process      PLAN:  Continue O2 to maintain saturation > 90% and current bronchodilators  Antibiotics as per ID recommendations  Follow labs, fluid management as per renal  Follow official abdominal CT results  GI and DVT prophylaxis

## 2019-02-12 NOTE — PROGRESS NOTE ADULT - SUBJECTIVE AND OBJECTIVE BOX
infectious diseases progress note:  KOTA SCHAFER is a 84yFemale patient    HYPERKALEMIA UTI URINARY RETENTION LEUKOCYTOSIS GEETA    Leukocytosis, unspecified type  VUR (vesicoureteric reflux)  Urinary tract infection without hematuria, site unspecified  Urinary tract infection associated with indwelling urethral catheter, subsequent encounter  CHF (congestive heart failure)  COPD (chronic obstructive pulmonary disease)  GEETA (acute kidney injury)  UTI (urinary tract infection)  Urinary retention  Hyperkalemia      ROS:  not contributory   Allergies    hair dyes (Hives)  sulfa drugs (Headache)  Zoloft (Headache)    Intolerances        ANTIBIOTICS/RELEVANT:  antimicrobials  amoxicillin 500 milliGRAM(s) Oral every 8 hours  aztreonam  IVPB 500 milliGRAM(s) IV Intermittent every 8 hours  aztreonam  IVPB      levoFLOXacin IVPB 750 milliGRAM(s) IV Intermittent every 24 hours  levoFLOXacin IVPB        immunologic:    OTHER:  ALBUTerol/ipratropium for Nebulization 3 milliLiter(s) Nebulizer every 6 hours  amLODIPine   Tablet 10 milliGRAM(s) Oral daily  aspirin  chewable 81 milliGRAM(s) Oral daily  atorvastatin 20 milliGRAM(s) Oral at bedtime  bisacodyl Suppository 10 milliGRAM(s) Rectal every 24 hours PRN  buDESOnide 160 MICROgram(s)/formoterol 4.5 MICROgram(s) Inhaler 2 Puff(s) Inhalation two times a day  busPIRone 5 milliGRAM(s) Oral three times a day  chlorhexidine 4% Liquid 1 Application(s) Topical <User Schedule>  cloNIDine 0.1 milliGRAM(s) Oral every 8 hours PRN  docusate sodium 200 milliGRAM(s) Oral at bedtime  DULoxetine 20 milliGRAM(s) Oral daily  heparin  Injectable 5000 Unit(s) SubCutaneous every 8 hours  HYDROcodone 10 mG/acetaminophen 325 mG 1 Tablet(s) Oral every 4 hours PRN  metoprolol succinate ER 50 milliGRAM(s) Oral daily  ondansetron Injectable 4 milliGRAM(s) IV Push every 8 hours PRN  pantoprazole    Tablet 40 milliGRAM(s) Oral before breakfast  senna 2 Tablet(s) Oral daily      Objective:  T(F): 95.4 (02-11-19 @ 21:22), Max: 97.9 (02-11-19 @ 14:13)  HR: 95 (02-12-19 @ 03:47) (95 - 101)  BP: 147/68 (02-12-19 @ 03:47) (147/68 - 954/-)  RR: 16 (02-11-19 @ 21:22) (16 - 16)  SpO2: 92% (02-11-19 @ 09:44) (92% - 92%)    PHYSICAL EXAM:  Constitutional: comfortable. Not in distress	  Neck:no JVD, no lymphadenopathy, supple  Respiratory: few rhonchi dalia  Cardiovascular: S1S2 RRR, no murmurs  Gastrointestinal:soft,  tender + reduced BS   Extremities: ecchymoses        LABS:                        9.8    34.22 )-----------( 188      ( 11 Feb 2019 06:49 )             29.6     02-11    129<L>  |  92<L>  |  28<H>  ----------------------------<  124<H>  4.8   |  28  |  0.8    Ca    8.5      11 Feb 2019 06:49  Mg     1.7     02-11    TPro  5.4<L>  /  Alb  3.3<L>  /  TBili  0.6  /  DBili  0.3<H>  /  AST  14  /  ALT  16  /  AlkPhos  97  02-11      Urinalysis Basic - ( 10 Feb 2019 19:08 )    Color: Yellow / Appearance: Clear / SG: >=1.030 / pH: x  Gluc: x / Ketone: 15  / Bili: Small / Urobili: 0.2 mg/dL   Blood: x / Protein: >=300 mg/dL / Nitrite: Negative   Leuk Esterase: Negative / RBC: 26-50 /HPF / WBC 3-5 /HPF   Sq Epi: x / Non Sq Epi: Few /HPF / Bacteria: Moderate          MICROBIOLOGY:    Culture - Urine (collected 02-10-19 @ 19:08)  Source: .Urine Catheterized  Final Report (02-11-19 @ 18:43):    No growth    Culture - Blood (collected 02-05-19 @ 13:16)  Source: .Blood Blood  Final Report (02-10-19 @ 23:01):    No growth at 5 days.        Culture - Urine (collected 10 Feb 2019 19:08)  Source: .Urine Catheterized  Final Report (11 Feb 2019 18:43):    No growth      Culture Results:   No growth (02-10 @ 19:08)  Culture Results:   No growth at 5 days. (02-05 @ 13:16)        RADIOLOGY & ADDITIONAL STUDIES:

## 2019-02-13 LAB
ANION GAP SERPL CALC-SCNC: 11 MMOL/L — SIGNIFICANT CHANGE UP (ref 7–14)
BUN SERPL-MCNC: 46 MG/DL — HIGH (ref 10–20)
C DIFF BY PCR RESULT: POSITIVE
C DIFF TOX GENS STL QL NAA+PROBE: SIGNIFICANT CHANGE UP
CALCIUM SERPL-MCNC: 8 MG/DL — LOW (ref 8.5–10.1)
CHLORIDE SERPL-SCNC: 89 MMOL/L — LOW (ref 98–110)
CO2 SERPL-SCNC: 25 MMOL/L — SIGNIFICANT CHANGE UP (ref 17–32)
CREAT SERPL-MCNC: 1 MG/DL — SIGNIFICANT CHANGE UP (ref 0.7–1.5)
GLUCOSE SERPL-MCNC: 97 MG/DL — SIGNIFICANT CHANGE UP (ref 70–99)
HCT VFR BLD CALC: 29.4 % — LOW (ref 37–47)
HGB BLD-MCNC: 9.7 G/DL — LOW (ref 12–16)
MCHC RBC-ENTMCNC: 32.9 PG — HIGH (ref 27–31)
MCHC RBC-ENTMCNC: 33 G/DL — SIGNIFICANT CHANGE UP (ref 32–37)
MCV RBC AUTO: 99.7 FL — HIGH (ref 81–99)
NRBC # BLD: 0 /100 WBCS — SIGNIFICANT CHANGE UP (ref 0–0)
PLATELET # BLD AUTO: 219 K/UL — SIGNIFICANT CHANGE UP (ref 130–400)
POTASSIUM SERPL-MCNC: 4.9 MMOL/L — SIGNIFICANT CHANGE UP (ref 3.5–5)
POTASSIUM SERPL-SCNC: 4.9 MMOL/L — SIGNIFICANT CHANGE UP (ref 3.5–5)
RBC # BLD: 2.95 M/UL — LOW (ref 4.2–5.4)
RBC # FLD: 14.8 % — HIGH (ref 11.5–14.5)
SODIUM SERPL-SCNC: 125 MMOL/L — LOW (ref 135–146)
WBC # BLD: 38.51 K/UL — HIGH (ref 4.8–10.8)
WBC # FLD AUTO: 38.51 K/UL — HIGH (ref 4.8–10.8)

## 2019-02-13 RX ORDER — METRONIDAZOLE 500 MG
TABLET ORAL
Qty: 0 | Refills: 0 | Status: DISCONTINUED | OUTPATIENT
Start: 2019-02-13 | End: 2019-02-14

## 2019-02-13 RX ORDER — METRONIDAZOLE 500 MG
500 TABLET ORAL EVERY 8 HOURS
Qty: 0 | Refills: 0 | Status: DISCONTINUED | OUTPATIENT
Start: 2019-02-14 | End: 2019-02-14

## 2019-02-13 RX ORDER — VANCOMYCIN HCL 1 G
125 VIAL (EA) INTRAVENOUS EVERY 6 HOURS
Qty: 0 | Refills: 0 | Status: DISCONTINUED | OUTPATIENT
Start: 2019-02-13 | End: 2019-02-14

## 2019-02-13 RX ORDER — METRONIDAZOLE 500 MG
500 TABLET ORAL ONCE
Qty: 0 | Refills: 0 | Status: COMPLETED | OUTPATIENT
Start: 2019-02-13 | End: 2019-02-13

## 2019-02-13 RX ADMIN — Medication 1 APPLICATION(S): at 16:15

## 2019-02-13 RX ADMIN — AMLODIPINE BESYLATE 10 MILLIGRAM(S): 2.5 TABLET ORAL at 06:23

## 2019-02-13 RX ADMIN — SIMETHICONE 80 MILLIGRAM(S): 80 TABLET, CHEWABLE ORAL at 00:04

## 2019-02-13 RX ADMIN — Medication 125 MILLIGRAM(S): at 19:42

## 2019-02-13 RX ADMIN — Medication 81 MILLIGRAM(S): at 11:56

## 2019-02-13 RX ADMIN — Medication 108 GRAM(S): at 00:04

## 2019-02-13 RX ADMIN — Medication 5 MILLIGRAM(S): at 16:14

## 2019-02-13 RX ADMIN — Medication 100 MILLIGRAM(S): at 23:32

## 2019-02-13 RX ADMIN — HEPARIN SODIUM 5000 UNIT(S): 5000 INJECTION INTRAVENOUS; SUBCUTANEOUS at 22:10

## 2019-02-13 RX ADMIN — Medication 5 MILLIGRAM(S): at 06:23

## 2019-02-13 RX ADMIN — HEPARIN SODIUM 5000 UNIT(S): 5000 INJECTION INTRAVENOUS; SUBCUTANEOUS at 16:14

## 2019-02-13 RX ADMIN — SIMETHICONE 80 MILLIGRAM(S): 80 TABLET, CHEWABLE ORAL at 10:08

## 2019-02-13 RX ADMIN — BUDESONIDE AND FORMOTEROL FUMARATE DIHYDRATE 2 PUFF(S): 160; 4.5 AEROSOL RESPIRATORY (INHALATION) at 20:30

## 2019-02-13 RX ADMIN — SENNA PLUS 2 TABLET(S): 8.6 TABLET ORAL at 11:56

## 2019-02-13 RX ADMIN — MEROPENEM 100 MILLIGRAM(S): 1 INJECTION INTRAVENOUS at 07:00

## 2019-02-13 RX ADMIN — MEROPENEM 100 MILLIGRAM(S): 1 INJECTION INTRAVENOUS at 22:10

## 2019-02-13 RX ADMIN — Medication 108 GRAM(S): at 06:23

## 2019-02-13 RX ADMIN — Medication 3 MILLILITER(S): at 07:37

## 2019-02-13 RX ADMIN — PANTOPRAZOLE SODIUM 40 MILLIGRAM(S): 20 TABLET, DELAYED RELEASE ORAL at 06:22

## 2019-02-13 RX ADMIN — Medication 50 MILLIGRAM(S): at 06:22

## 2019-02-13 RX ADMIN — Medication 5 MILLIGRAM(S): at 22:10

## 2019-02-13 RX ADMIN — Medication 108 GRAM(S): at 11:55

## 2019-02-13 RX ADMIN — HEPARIN SODIUM 5000 UNIT(S): 5000 INJECTION INTRAVENOUS; SUBCUTANEOUS at 06:23

## 2019-02-13 RX ADMIN — ATORVASTATIN CALCIUM 20 MILLIGRAM(S): 80 TABLET, FILM COATED ORAL at 22:10

## 2019-02-13 RX ADMIN — Medication 125 MILLIGRAM(S): at 23:32

## 2019-02-13 RX ADMIN — DULOXETINE HYDROCHLORIDE 20 MILLIGRAM(S): 30 CAPSULE, DELAYED RELEASE ORAL at 11:56

## 2019-02-13 RX ADMIN — Medication 3 MILLILITER(S): at 19:31

## 2019-02-13 RX ADMIN — BUDESONIDE AND FORMOTEROL FUMARATE DIHYDRATE 2 PUFF(S): 160; 4.5 AEROSOL RESPIRATORY (INHALATION) at 07:37

## 2019-02-13 RX ADMIN — Medication 108 GRAM(S): at 18:23

## 2019-02-13 RX ADMIN — MEROPENEM 100 MILLIGRAM(S): 1 INJECTION INTRAVENOUS at 16:13

## 2019-02-13 NOTE — DIETITIAN INITIAL EVALUATION ADULT. - ENERGY NEEDS
Calories: 1126-1219kcals/day (MSJ A.f 1.2-1.3)   Protein: 53-64g/day (1-1.2g/kg) Monitor renal function  Fluid: 1:1ml/kcal or fluid as per LIP

## 2019-02-13 NOTE — PROGRESS NOTE ADULT - ASSESSMENT
IMPRESSION:  85 yo female ex smoker with a history of COPD on home O2  Admitted for electrolyte imbalance, GEETA,. UTI with elevated WBC  Complaining of abdominal pains and diarrhea  CT shows colitis and right hydroureteronephrosis  R/O C. diff  Small effusions on CT right > left  Lung nodules, some resolved some new      PLAN:  Continue O2 to maintain saturation > 90% and current bronchodilators  Antibiotics as per ID recommendations  meropenem   Check stool for C diff  Follow labs, fluid management as per renal  Outpatient follow up of lung nodules, repeat CT chest in 3 months  GI and DVT prophylaxis

## 2019-02-13 NOTE — PROGRESS NOTE ADULT - SUBJECTIVE AND OBJECTIVE BOX
S; Pt still with elevated WBC, some LLQ pain and diarrhea yesterday (foul smelling).No fever.  Denies any difficulty urinating, hematuria.  Recent CT A/P is negative for  pathology  O; Vital Signs Last 24 Hrs  T(C): 36.5 (13 Feb 2019 13:46), Max: 36.5 (13 Feb 2019 13:46)  T(F): 97.7 (13 Feb 2019 13:46), Max: 97.7 (13 Feb 2019 13:46)  HR: 79 (13 Feb 2019 13:46) (79 - 99)  BP: 135/62 (13 Feb 2019 13:46) (131/57 - 150/66)  BP(mean): --  RR: 16 (13 Feb 2019 13:46) (16 - 16)  SpO2: 96% (13 Feb 2019 06:26) (96% - 97%)    MEDICATIONS  (STANDING):  ALBUTerol/ipratropium for Nebulization 3 milliLiter(s) Nebulizer every 6 hours  amLODIPine   Tablet 10 milliGRAM(s) Oral daily  ampicillin  IVPB 1 Gram(s) IV Intermittent every 6 hours  aspirin  chewable 81 milliGRAM(s) Oral daily  atorvastatin 20 milliGRAM(s) Oral at bedtime  buDESOnide 160 MICROgram(s)/formoterol 4.5 MICROgram(s) Inhaler 2 Puff(s) Inhalation two times a day  busPIRone 5 milliGRAM(s) Oral three times a day  chlorhexidine 4% Liquid 1 Application(s) Topical <User Schedule>  docusate sodium 200 milliGRAM(s) Oral at bedtime  DULoxetine 20 milliGRAM(s) Oral daily  heparin  Injectable 5000 Unit(s) SubCutaneous every 8 hours  hydrocortisone 2.5% Rectal Cream 1 Application(s) Rectal daily  meropenem  IVPB 1000 milliGRAM(s) IV Intermittent every 8 hours  metoprolol succinate ER 50 milliGRAM(s) Oral daily  pantoprazole    Tablet 40 milliGRAM(s) Oral before breakfast  senna 2 Tablet(s) Oral daily    MEDICATIONS  (PRN):  bisacodyl Suppository 10 milliGRAM(s) Rectal every 24 hours PRN Constipation  cloNIDine 0.1 milliGRAM(s) Oral every 8 hours PRN for sbp > 180  HYDROcodone 10 mG/acetaminophen 325 mG 1 Tablet(s) Oral every 4 hours PRN Moderate Pain (4 - 6)  ondansetron Injectable 4 milliGRAM(s) IV Push every 8 hours PRN Nausea and/or Vomiting  simethicone 80 milliGRAM(s) Chew every 8 hours PRN Gas      EXAM:  abd soft, Nt/ND    RECENT CULTURES:  02-11 @ 12:09 .Blood Blood     No growth to date.   02-10 @ 19:08 .Urine Catheterized     No growth   02-05 @ 13:16 .Blood Blood     No growth at 5 days.   02-05 @ 04:25 .Urine Clean Catch (Midstream)     No growth                           9.7    38.51 )-----------( 219      ( 13 Feb 2019 14:50 )             29.4     02-12    129<L>  |  91<L>  |  34<H>  ----------------------------<  121<H>  4.7   |  28  |  0.9    Ca    8.2<L>      12 Feb 2019 08:01  Mg     1.7     02-12    TPro  5.1<L>  /  Alb  3.0<L>  /  TBili  0.5  /  DBili  x   /  AST  13  /  ALT  15  /  AlkPhos  111  02-12      RADIOLOGY:  < from: CT Abdomen and Pelvis w/ Oral Cont (02.11.19 @ 23:46) >  New diffuse colonic wall thickening involving the cecum, right colon and   transverse colon at the hepatic flexure. There is additional involvement   of the terminal ileum. Findings are consistent with colitis and enteritis   of unclear etiology, likely infectious or inflammatory.    Moderate right hydroureteronephrosis to the level of the bladder without   a definitive obstructing cause, as seen on recent ultrasound.     New small amount of abdominopelvic ascites.    New small to moderateright pleural effusion and trace left pleural   effusion.    Resolution of some pulmonary nodules at the left lung base with new   pulmonary nodules measuring up to 12 mm. Follow-up noncontrast chest CT   is recommended in 3 months to demonstrate resolution.      < end of copied text >

## 2019-02-13 NOTE — PROGRESS NOTE ADULT - SUBJECTIVE AND OBJECTIVE BOX
Interval history and ROS:    No respiratory complaints  Still has abdominal pain  Now has diarrhea    MEDICATIONS:  ALBUTerol/ipratropium for Nebulization 3 milliLiter(s) Nebulizer every 6 hours  amLODIPine   Tablet 10 milliGRAM(s) Oral daily  ampicillin  IVPB 1 Gram(s) IV Intermittent every 6 hours  aspirin  chewable 81 milliGRAM(s) Oral daily  atorvastatin 20 milliGRAM(s) Oral at bedtime  bisacodyl Suppository 10 milliGRAM(s) Rectal every 24 hours PRN  buDESOnide 160 MICROgram(s)/formoterol 4.5 MICROgram(s) Inhaler 2 Puff(s) Inhalation two times a day  busPIRone 5 milliGRAM(s) Oral three times a day  chlorhexidine 4% Liquid 1 Application(s) Topical <User Schedule>  cloNIDine 0.1 milliGRAM(s) Oral every 8 hours PRN  docusate sodium 200 milliGRAM(s) Oral at bedtime  DULoxetine 20 milliGRAM(s) Oral daily  heparin  Injectable 5000 Unit(s) SubCutaneous every 8 hours  HYDROcodone 10 mG/acetaminophen 325 mG 1 Tablet(s) Oral every 4 hours PRN  hydrocortisone 2.5% Rectal Cream 1 Application(s) Rectal daily  meropenem  IVPB 1000 milliGRAM(s) IV Intermittent every 8 hours  metoprolol succinate ER 50 milliGRAM(s) Oral daily  ondansetron Injectable 4 milliGRAM(s) IV Push every 8 hours PRN  pantoprazole    Tablet 40 milliGRAM(s) Oral before breakfast  senna 2 Tablet(s) Oral daily  simethicone 80 milliGRAM(s) Chew every 8 hours PRN      VITAL SIGNS:  Vital Signs Last 24 Hrs  T(C): 35.9 (13 Feb 2019 06:26), Max: 36.4 (12 Feb 2019 13:33)  T(F): 96.6 (13 Feb 2019 06:26), Max: 97.5 (12 Feb 2019 13:33)  HR: 99 (13 Feb 2019 06:26) (85 - 99)  BP: 131/57 (13 Feb 2019 06:26) (131/57 - 150/66)  RR: 16 (13 Feb 2019 06:26) (16 - 16)  SpO2: 96% (13 Feb 2019 06:26) (96% - 97%) on 2 lpm NC      PHYSICAL EXAM:  Awake and alert NAD  Neck supple, no LN  S1 and S2 no murmur  Lungs are clear without wheeze, rhonchi or rales  Abdomen tenderness to palpation  There is no edema  Neurologically non focal          LABS:                        10.3   38.19 )-----------( 213      ( 12 Feb 2019 08:01 )             31.3     02-12    129<L>  |  91<L>  |  34<H>  ----------------------------<  121<H>  4.7   |  28  |  0.9    Ca    8.2<L>      12 Feb 2019 08:01  Mg     1.7     02-12    TPro  5.1<L>  /  Alb  3.0<L>  /  TBili  0.5  /  DBili  x   /  AST  13  /  ALT  15  /  AlkPhos  111  02-12      RADIOLOGY & ADDITIONAL TESTS:   < from: CT Abdomen and Pelvis w/ Oral Cont (02.11.19 @ 23:46) >  New diffuse colonic wall thickening involving the cecum, right colon and transverse colon at the hepatic flexure.   There is additional involvement of the terminal ileum.   Findings are consistent with colitis and enteritis of unclear etiology, likely infectious or inflammatory.    Moderate right hydroureteronephrosis to the level of the bladder without a definitive obstructing cause, as seen on recent ultrasound.     New small amount of abdominopelvic ascites.    New small to moderate right pleural effusion and trace left pleural effusion.    Resolution of some pulmonary nodules at the left lung base with new pulmonary nodules measuring up to 12 mm.   Follow-up noncontrast chest CT is recommended in 3 months to demonstrate resolution.

## 2019-02-13 NOTE — PROGRESS NOTE ADULT - ASSESSMENT
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 UTI with elevated WBC,s/p abx.  seen by urology,no other intervention by urology  Complaining of abdominal pains and diarrhea sec to  C. diff colitis  CT shows colitis and right hydroureteronephrosis  Small effusions on CT right > left with lung nodules.      PLAN:  on po vanco for c diff.  f/u with ID.  contact isolation.  start on procrit 40,000 unit qweekly if hb <10 ,one dose tomorrow.   monitor counts.  f/u with urology for recent TURBT path result  agree with Outpatient follow up of lung nodules, repeat CT chest in 3 months  cont other supportive care.  plan of care was explained to family at length.  will f/u

## 2019-02-13 NOTE — PROGRESS NOTE ADULT - SUBJECTIVE AND OBJECTIVE BOX
SUBJECTIVE:    Patient is a 84y old Female who presents with a chief complaint of Urinary Retention/Hperkalemia with EKG changes (13 Feb 2019 06:30)    Currently admitted to medicine with the primary diagnosis of Hyperkalemia     Today is hospital day 8d. This morning she is resting comfortably in bed and reports no new issues or overnight events.     PAST MEDICAL & SURGICAL HISTORY  Anemia: 4 PRBC 11/2018  Oxygen dependent: O2 2L Via NC  Bladder cancer: 2018  CHF (congestive heart failure)  Breast tumor: removal benign bl breasts  Anal cancer: Chemo and radiation 1992  COPD (chronic obstructive pulmonary disease)  Lung disease: COPD  Hypertension  History of cystoscopy: 10/2018  H/O breast surgery: 39 yrs. ago, bilateral  History of back surgery: 10 yrs. ago stimulator implant 2001, 2011( cervical and Lumbar)    SOCIAL HISTORY:  Negative for smoking/alcohol/drug use.     ALLERGIES:  hair dyes (Hives)  sulfa drugs (Headache)  Zoloft (Headache)    MEDICATIONS:  STANDING MEDICATIONS  ALBUTerol/ipratropium for Nebulization 3 milliLiter(s) Nebulizer every 6 hours  amLODIPine   Tablet 10 milliGRAM(s) Oral daily  ampicillin  IVPB 1 Gram(s) IV Intermittent every 6 hours  aspirin  chewable 81 milliGRAM(s) Oral daily  atorvastatin 20 milliGRAM(s) Oral at bedtime  buDESOnide 160 MICROgram(s)/formoterol 4.5 MICROgram(s) Inhaler 2 Puff(s) Inhalation two times a day  busPIRone 5 milliGRAM(s) Oral three times a day  chlorhexidine 4% Liquid 1 Application(s) Topical <User Schedule>  docusate sodium 200 milliGRAM(s) Oral at bedtime  DULoxetine 20 milliGRAM(s) Oral daily  heparin  Injectable 5000 Unit(s) SubCutaneous every 8 hours  hydrocortisone 2.5% Rectal Cream 1 Application(s) Rectal daily  meropenem  IVPB 1000 milliGRAM(s) IV Intermittent every 8 hours  metoprolol succinate ER 50 milliGRAM(s) Oral daily  pantoprazole    Tablet 40 milliGRAM(s) Oral before breakfast  senna 2 Tablet(s) Oral daily    PRN MEDICATIONS  bisacodyl Suppository 10 milliGRAM(s) Rectal every 24 hours PRN  cloNIDine 0.1 milliGRAM(s) Oral every 8 hours PRN  HYDROcodone 10 mG/acetaminophen 325 mG 1 Tablet(s) Oral every 4 hours PRN  ondansetron Injectable 4 milliGRAM(s) IV Push every 8 hours PRN  simethicone 80 milliGRAM(s) Chew every 8 hours PRN    VITALS:   T(F): 96.6  HR: 99  BP: 131/57  RR: 16  SpO2: 96%    LABS:                        10.3   38.19 )-----------( 213      ( 12 Feb 2019 08:01 )             31.3     02-12    129<L>  |  91<L>  |  34<H>  ----------------------------<  121<H>  4.7   |  28  |  0.9    Ca    8.2<L>      12 Feb 2019 08:01  Mg     1.7     02-12    TPro  5.1<L>  /  Alb  3.0<L>  /  TBili  0.5  /  DBili  x   /  AST  13  /  ALT  15  /  AlkPhos  111  02-12              Culture - Blood (collected 11 Feb 2019 12:09)  Source: .Blood Blood  Preliminary Report (12 Feb 2019 23:01):    No growth to date.    Culture - Urine (collected 10 Feb 2019 19:08)  Source: .Urine Catheterized  Final Report (11 Feb 2019 18:43):    No growth          RADIOLOGY:    PHYSICAL EXAM:  GEN: No acute distress  LUNGS: Clear to auscultation bilaterally   HEART: Regular  ABD: diffuse tenderness   EXT: NC/NC/NE/2+PP/SARAVIA/Skin Intact.   NEURO: AAOX3    Intravenous access:   NG tube:   Rowe Catheter:

## 2019-02-13 NOTE — PROGRESS NOTE ADULT - ASSESSMENT
# Complicated UTI  # Moderate Right sided hydronephrosis  # C.difficile colitis    would recommend:    1. Add IV Flagyl and c/w oral vancomycin  2. Continue Meropenem  3. Monitor WBC count    d/w patient, Family at the bed side and nursing staff    will follow the patient with you # Complicated UTI  # Moderate Right sided hydronephrosis  # C.difficile colitis  # Low grade  MDS - on procrit    would recommend:    1. Add IV Flagyl and c/w oral vancomycin  2. Continue Meropenem for a short course in the setting of C.difficile colitis and discontinue Ampicillin  3. Monitor WBC count , is worsening  4. Pain management as needed    d/w patient, and nursing staff    will follow the patient with you

## 2019-02-13 NOTE — DIETITIAN INITIAL EVALUATION ADULT. - OTHER INFO
Reason for assessment: LOS. PMH: HTN, COPD on home o2, bladder CA. Pt presented for urinary retention, cramp/spasm lower abdominal pain w/ mild distention. S/p bladder surgery on 1/30. Pt is admitted for urinary retention/hyperkalemia w/ EKG changes. Electrolyte imbalance, GEETA, UTI w/ elevated WBC, colitis, r/o c.diff noted. Daughters deny wt loss. Last BM was yesterday 2/12. Abdomen noted as diffuse tenderness. NKFA.

## 2019-02-13 NOTE — DIETITIAN INITIAL EVALUATION ADULT. - NS AS NUTRI INTERV MEALS SNACK
continue on a low potassium diet. discontinue low sodium. continue on a low potassium diet. Discontinue low sodium.

## 2019-02-13 NOTE — PROGRESS NOTE ADULT - SUBJECTIVE AND OBJECTIVE BOX
Patient is a 84y old  Female who presents with a chief complaint of Urinary Retention/Hperkalemia with EKG changes (13 Feb 2019 15:54)      HPI:  From ED:  84 year old female w hx of HTN, CHF, COPD on 2 LPM NC daily, bladder cancer presents to the ED with urinary retention, crampy/spasm-like lower abdominal pain and mild distention. Patient had bladder surgery w Dr. Sanchez on 1/30/19 and was d/c home with a lynne catheter in place. The catheter was removed yesterday. Patient had two episodes of normal urination after removal, but has since been experiencing retention and dribbling at most. Denies fevers, chills, cough, chest pain, shortness of breath, flank pain, myalgias, incontinence, rashes, or recent illness.  ________________________________________________________________________________________________________   Patient presents to ED with family this am.  Dtr reports that patient s/p cysto with bladder scrapping for Bladder Ca on 1/30/19.  Patient tolerated procedure well and was discharged home with Lynne.  Patient did very well since then and went to follow up at office ystd.  At that point lynne was removed and patient went home.  Dtr reports that patient urinated x 2 since removal however last evening began only dribbling and began having severe bladder spasms, unrelieved by oxybutynin.  Family brought patient to ED 2/2 to this.  Patient reports she is feeling well.  No CP.  SOB at baseline.  Still with some minor abdominal cramping, suprapubic area.  No fever.  Last BM ystd AM and regular. (05 Feb 2019 07:48)       ROS:  Negative except for:abdo pain    PAST MEDICAL & SURGICAL HISTORY:  Anemia: 4 PRBC 11/2018  Oxygen dependent: O2 2L Via NC  Bladder cancer: 2018  CHF (congestive heart failure)  Breast tumor: removal benign bl breasts  Anal cancer: Chemo and radiation 1992  COPD (chronic obstructive pulmonary disease)  Lung disease: COPD  Hypertension  History of cystoscopy: 10/2018  H/O breast surgery: 39 yrs. ago, bilateral  History of back surgery: 10 yrs. ago stimulator implant 2001, 2011( cervical and Lumbar)      SOCIAL HISTORY:    FAMILY HISTORY:  No pertinent family history in first degree relatives      MEDICATIONS  (STANDING):  ALBUTerol/ipratropium for Nebulization 3 milliLiter(s) Nebulizer every 6 hours  amLODIPine   Tablet 10 milliGRAM(s) Oral daily  ampicillin  IVPB 1 Gram(s) IV Intermittent every 6 hours  aspirin  chewable 81 milliGRAM(s) Oral daily  atorvastatin 20 milliGRAM(s) Oral at bedtime  buDESOnide 160 MICROgram(s)/formoterol 4.5 MICROgram(s) Inhaler 2 Puff(s) Inhalation two times a day  busPIRone 5 milliGRAM(s) Oral three times a day  chlorhexidine 4% Liquid 1 Application(s) Topical <User Schedule>  docusate sodium 200 milliGRAM(s) Oral at bedtime  DULoxetine 20 milliGRAM(s) Oral daily  heparin  Injectable 5000 Unit(s) SubCutaneous every 8 hours  hydrocortisone 2.5% Rectal Cream 1 Application(s) Rectal daily  meropenem  IVPB 1000 milliGRAM(s) IV Intermittent every 8 hours  metoprolol succinate ER 50 milliGRAM(s) Oral daily  pantoprazole    Tablet 40 milliGRAM(s) Oral before breakfast  senna 2 Tablet(s) Oral daily  vancomycin    Solution 125 milliGRAM(s) Oral every 6 hours    MEDICATIONS  (PRN):  bisacodyl Suppository 10 milliGRAM(s) Rectal every 24 hours PRN Constipation  cloNIDine 0.1 milliGRAM(s) Oral every 8 hours PRN for sbp > 180  HYDROcodone 10 mG/acetaminophen 325 mG 1 Tablet(s) Oral every 4 hours PRN Moderate Pain (4 - 6)  ondansetron Injectable 4 milliGRAM(s) IV Push every 8 hours PRN Nausea and/or Vomiting  simethicone 80 milliGRAM(s) Chew every 8 hours PRN Gas      Allergies    hair dyes (Hives)  sulfa drugs (Headache)  Zoloft (Headache)    Intolerances        Vital Signs Last 24 Hrs  T(C): 36.5 (13 Feb 2019 13:46), Max: 36.5 (13 Feb 2019 13:46)  T(F): 97.7 (13 Feb 2019 13:46), Max: 97.7 (13 Feb 2019 13:46)  HR: 79 (13 Feb 2019 13:46) (79 - 99)  BP: 135/62 (13 Feb 2019 13:46) (131/57 - 150/66)  BP(mean): --  RR: 16 (13 Feb 2019 13:46) (16 - 16)  SpO2: 96% (13 Feb 2019 06:26) (96% - 97%)    PHYSICAL EXAM  General: adult in NAD  HEENT: clear oropharynx, anicteric sclera, pink conjunctiva  Neck: supple  CV: normal S1/S2 with no murmur rubs or gallops  Lungs: positive air movement b/l ant lungs,clear to auscultation, no wheezes, no rales  Abdomen: soft non-tender non-distended, no hepatosplenomegaly  Ext: no clubbing cyanosis or edema  Skin: no rashes and no petechiae  Neuro: alert and oriented X 4, no focal deficits      LABS:                          9.7    38.51 )-----------( 219      ( 13 Feb 2019 14:50 )             29.4         Mean Cell Volume : 99.7 fL  Mean Cell Hemoglobin : 32.9 pg  Mean Cell Hemoglobin Concentration : 33.0 g/dL  Auto Neutrophil # : x  Auto Lymphocyte # : x  Auto Monocyte # : x  Auto Eosinophil # : x  Auto Basophil # : x  Auto Neutrophil % : x  Auto Lymphocyte % : x  Auto Monocyte % : x  Auto Eosinophil % : x  Auto Basophil % : x      Serial CBC's  02-13 @ 14:50  Hct-29.4 / Hgb-9.7 / Plat-219 / RBC-2.95 / WBC-38.51  Serial CBC's  02-12 @ 08:01  Hct-31.3 / Hgb-10.3 / Plat-213 / RBC-3.14 / WBC-38.19  Serial CBC's  02-11 @ 06:49  Hct-29.6 / Hgb-9.8 / Plat-188 / RBC-2.93 / WBC-34.22  Serial CBC's  02-10 @ 15:05  Hct-30.4 / Hgb-10.0 / Plat-164 / RBC-3.02 / WBC-32.94      02-13    125<L>  |  89<L>  |  46<H>  ----------------------------<  97  4.9   |  25  |  1.0    Ca    8.0<L>      13 Feb 2019 14:50  Mg     1.7     02-12    TPro  5.1<L>  /  Alb  3.0<L>  /  TBili  0.5  /  DBili  x   /  AST  13  /  ALT  15  /  AlkPhos  111  02-12                      BLOOD SMEAR INTERPRETATION:       RADIOLOGY & ADDITIONAL STUDIES:

## 2019-02-13 NOTE — PROGRESS NOTE ADULT - SUBJECTIVE AND OBJECTIVE BOX
Patient is seen and examined at the bed side, is afebrile. She has no bowel movements in last 4 days. The Leukocytosis is worsening, most likely due to Stool impaction. may resulted  Stercoral colitis.      REVIEW OF SYSTEMS: All other review systems are negative        ALLERGIES: SULFA drugs         Vital Signs Last 24 Hrs  T(C): 36.1 (2019 21:14), Max: 36.5 (2019 13:46)  T(F): 97 (2019 21:14), Max: 97.7 (2019 13:46)  HR: 91 (2019 21:14) (79 - 99)  BP: 148/60 (2019 21:14) (131/57 - 148/60)  BP(mean): --  RR: 16 (2019 13:46) (16 - 16)  SpO2: 96% (2019 06:26) (96% - 96%)        PHYSICAL EXAM:  GENERAL: Not in distress  CHEST/LUNG: Air  entry bilaterally  HEART: s1 and s2 present  ABDOMEN: mild distended  : Rowe catheter in placed  EXTREMITIES:  No pedal  edema  CNS: Awake and alert        LABS:                        9.7    38.51 )-----------( 219      ( 2019 14:50 )             29.4                           9.8    34.22 )-----------( 188      ( 2019 06:49 )             29.6                           9.4    9.15  )-----------( 160      ( 2019 07:12 )             28.5             125<L>  |  89<L>  |  46<H>  ----------------------------<  97  4.9   |  25  |  1.0    Ca    8.0<L>      2019 14:50  Mg     1.7         TPro  5.1<L>  /  Alb  3.0<L>  /  TBili  0.5  /  DBili  x   /  AST  13  /  ALT  15  /  AlkPhos  111  02-12      02-11    129<L>  |  92<L>  |  28<H>  ----------------------------<  124<H>  4.8   |  28  |  0.8    Ca    8.5      2019 06:49  Mg     1.7     02-11    TPro  5.4<L>  /  Alb  3.3<L>  /  TBili  0.6  /  DBili  0.3<H>  /  AST  14  /  ALT  16  /  AlkPhos  97  02-11        Urinalysis Basic - ( 2019 04:25 )    Color: Orange / Appearance: Slightly Cloudy / S.025 / pH: x  Gluc: x / Ketone: Trace  / Bili: Negative / Urobili: 1.0 mg/dL   Blood: x / Protein: >=300 mg/dL / Nitrite: Positive   Leuk Esterase: Negative / RBC: >50 /HPF / WBC 10-25 /HPF   Sq Epi: x / Non Sq Epi: Few /HPF / Bacteria: Moderate        MEDICATIONS  (STANDING):  ALBUTerol/ipratropium for Nebulization 3 milliLiter(s) Nebulizer every 6 hours  amLODIPine   Tablet 10 milliGRAM(s) Oral daily  ampicillin  IVPB 1 Gram(s) IV Intermittent every 6 hours  aspirin  chewable 81 milliGRAM(s) Oral daily  atorvastatin 20 milliGRAM(s) Oral at bedtime  buDESOnide 160 MICROgram(s)/formoterol 4.5 MICROgram(s) Inhaler 2 Puff(s) Inhalation two times a day  busPIRone 5 milliGRAM(s) Oral three times a day  chlorhexidine 4% Liquid 1 Application(s) Topical <User Schedule>  docusate sodium 200 milliGRAM(s) Oral at bedtime  DULoxetine 20 milliGRAM(s) Oral daily  heparin  Injectable 5000 Unit(s) SubCutaneous every 8 hours  hydrocortisone 2.5% Rectal Cream 1 Application(s) Rectal daily  meropenem  IVPB 1000 milliGRAM(s) IV Intermittent every 8 hours  metoprolol succinate ER 50 milliGRAM(s) Oral daily  pantoprazole    Tablet 40 milliGRAM(s) Oral before breakfast  senna 2 Tablet(s) Oral daily  vancomycin    Solution 125 milliGRAM(s) Oral every 6 hours    MEDICATIONS  (PRN):  bisacodyl Suppository 10 milliGRAM(s) Rectal every 24 hours PRN Constipation  cloNIDine 0.1 milliGRAM(s) Oral every 8 hours PRN for sbp > 180  HYDROcodone 10 mG/acetaminophen 325 mG 1 Tablet(s) Oral every 4 hours PRN Moderate Pain (4 - 6)  ondansetron Injectable 4 milliGRAM(s) IV Push every 8 hours PRN Nausea and/or Vomiting  simethicone 80 milliGRAM(s) Chew every 8 hours PRN Gas          RADIOLOGY & ADDITIONAL TESTS:    < from: CT Abdomen and Pelvis w/ Oral Cont (19 @ 23:46) >    New diffuse colonic wall thickening involving the cecum, right colon and   transverse colon at the hepatic flexure. There is additional involvement   of the terminal ileum. Findings are consistent with colitis and enteritis   of unclear etiology, likely infectious or inflammatory.    Moderate right hydroureteronephrosis to the level of the bladder without   a definitive obstructing cause, as seen on recent ultrasound.     New small amount of abdominopelvic ascites.    New small to moderateright pleural effusion and trace left pleural   effusion.    Resolution of some pulmonary nodules at the left lung base with new   pulmonary nodules measuring up to 12 mm. Follow-up noncontrast chest CT   is recommended in 3 months to demonstrate resolution.    < end of copied text >    < from: Xray Chest 1 View- PORTABLE-Routine (02.10.19 @ 17:59) >    Layering density in the right lower lung with a blunted costophrenic   angle, which may represent a pleural effusion. Cannot exclude right lower   lobe underlying consolidation. Recommend follow-up.     < end of copied text >      < from: US Retroperitoneal B-Scan Limited (19 @ 09:40) >  Moderate right-sided hydronephrosis, unchanged.      < end of copied text >      < from: Xray Chest 1 View- PORTABLE-Urgent (19 @ 07:00) >    No radiographic evidence of acute cardiopulmonary disease.      < end of copied text >        MICROBIOLOGY DATA:    Clostridium difficile Toxin by PCR (19 @ 16:20)    Clostridium difficile Toxin by PCR: RESULT INTERPRETATION:    Detected - Clostridium difficile toxin B detected by amplified DNA PCR    C.difficile PCR test results should be interpreted only with  consideration of the patient's clinical situation and history.  This test  will detect the presence of toxigenic C. difficile.  However it cannot be  used as the sole criteria  for the diagnosis of antibiotic associated diarrhea, antibiotic  associated colitis, or pseudomembranous colitis.  Colonization with  C.difficile may exceed 20% in hosptial patients, the majority of whom are  without Toxigenic  Clostridium Difficile disease.  Testing is generally not recommened in  children below the age of 1 year, as up to half of healthy infants are  asymptomatically colonized with C.difficile.  In addition, C.difficile  PCR testing  cannot be used as a "test of cure" as dead organism nucleic acids will  persist and be detected after treatment.  Successful treatment of  C.difficile disease is determined by resolution of clinical symptoms. Method: CDPCR  TOXIGENIC CLOST.DIFFICILE POSITIVE;  027-NAP1-B1 PRESUMPTIVE POSITIVE.  By: Real-Time PCR (Polymerase Chain Reaction)  NOTE: This method detects the Toxin B gene (tCdB), the  binary toxin gene (CDT), and the single-base-pair  deletionat nucleotide 117 within the gene encoding  a negative regulator of toxin production (tcdC 117).  The combined presence of genes encoding Toxin B and  binary toxin and the tcdC 117 deletion has been  associated with hypervirulent C. difficile strain  known as 027/NAP1/B1, which has been associated with  severe disease outbreaks in healthcare facilities  worldwide.    C Diff by PCR Result: Positive        Urine Microscopic-Add On (NC) (02.10.19 @ 19:08)    Bacteria: Moderate    Epithelial Cells: Few /HPF    Red Blood Cell - Urine: 26-50 /HPF    White Blood Cell - Urine: 3-5 /HPF        Culture - Blood (19 @ 13:16)    Specimen Source: .Blood Blood    Culture Results:   No growth to date.        Culture - Urine (19 @ 04:25)    Specimen Source: .Urine Clean Catch (Midstream)    Culture Results:   No growth      Culture - Urine (19 @ 13:38)    -  Nitrofurantoin: S <=32 Should not be used to treat pyelonephritis.    -  Tetra/Doxy: R >8    -  Vancomycin: S 1    -  Ampicillin: S <=2 Predicts results to ampicillin/sulbactam, amoxacillin-clavulanate and  piperacillin-tazobactam.    -  Ciprofloxacin: S <=1    -  Levofloxacin: S 1    Specimen Source: .Urine Clean Catch (Midstream)    Culture Results:   10,000 - 49,000 CFU/mL Enterococcus faecalis  Normal Urogenital katie present    Organism Identification: Enterococcus faecalis    Organism: Enterococcus faecalis    Method Type: ANUM Patient is seen and examined at the bed side, is afebrile. She has no bowel movements in last 4 days. The Leukocytosis is worsening, most likely due to Stool impaction. may resulted  Stercoral colitis.      REVIEW OF SYSTEMS: All other review systems are negative        ALLERGIES: SULFA drugs         Vital Signs Last 24 Hrs  T(C): 36.1 (2019 21:14), Max: 36.5 (2019 13:46)  T(F): 97 (2019 21:14), Max: 97.7 (2019 13:46)  HR: 91 (2019 21:14) (79 - 99)  BP: 148/60 (2019 21:14) (131/57 - 148/60)  BP(mean): --  RR: 16 (2019 13:46) (16 - 16)  SpO2: 96% (2019 06:26) (96% - 96%)        PHYSICAL EXAM:  GENERAL: Not in distress  CHEST/LUNG: Air  entry bilaterally  HEART: s1 and s2 present  ABDOMEN: mild distended  : Rowe catheter in placed  EXTREMITIES: B/L pedal  edema  CNS: Awake and alert        LABS:                        9.7    38.51 )-----------( 219      ( 2019 14:50 )             29.4                           9.8    34.22 )-----------( 188      ( 2019 06:49 )             29.6                           9.4    9.15  )-----------( 160      ( 2019 07:12 )             28.5             125<L>  |  89<L>  |  46<H>  ----------------------------<  97  4.9   |  25  |  1.0    Ca    8.0<L>      2019 14:50  Mg     1.7         TPro  5.1<L>  /  Alb  3.0<L>  /  TBili  0.5  /  DBili  x   /  AST  13  /  ALT  15  /  AlkPhos  111  02-12      02-11    129<L>  |  92<L>  |  28<H>  ----------------------------<  124<H>  4.8   |  28  |  0.8    Ca    8.5      2019 06:49  Mg     1.7     02-11    TPro  5.4<L>  /  Alb  3.3<L>  /  TBili  0.6  /  DBili  0.3<H>  /  AST  14  /  ALT  16  /  AlkPhos  97  02-11        Urinalysis Basic - ( 2019 04:25 )    Color: Orange / Appearance: Slightly Cloudy / S.025 / pH: x  Gluc: x / Ketone: Trace  / Bili: Negative / Urobili: 1.0 mg/dL   Blood: x / Protein: >=300 mg/dL / Nitrite: Positive   Leuk Esterase: Negative / RBC: >50 /HPF / WBC 10-25 /HPF   Sq Epi: x / Non Sq Epi: Few /HPF / Bacteria: Moderate        MEDICATIONS  (STANDING):  ALBUTerol/ipratropium for Nebulization 3 milliLiter(s) Nebulizer every 6 hours  amLODIPine   Tablet 10 milliGRAM(s) Oral daily  ampicillin  IVPB 1 Gram(s) IV Intermittent every 6 hours  aspirin  chewable 81 milliGRAM(s) Oral daily  atorvastatin 20 milliGRAM(s) Oral at bedtime  buDESOnide 160 MICROgram(s)/formoterol 4.5 MICROgram(s) Inhaler 2 Puff(s) Inhalation two times a day  busPIRone 5 milliGRAM(s) Oral three times a day  chlorhexidine 4% Liquid 1 Application(s) Topical <User Schedule>  docusate sodium 200 milliGRAM(s) Oral at bedtime  DULoxetine 20 milliGRAM(s) Oral daily  heparin  Injectable 5000 Unit(s) SubCutaneous every 8 hours  hydrocortisone 2.5% Rectal Cream 1 Application(s) Rectal daily  meropenem  IVPB 1000 milliGRAM(s) IV Intermittent every 8 hours  metoprolol succinate ER 50 milliGRAM(s) Oral daily  pantoprazole    Tablet 40 milliGRAM(s) Oral before breakfast  senna 2 Tablet(s) Oral daily  vancomycin    Solution 125 milliGRAM(s) Oral every 6 hours    MEDICATIONS  (PRN):  bisacodyl Suppository 10 milliGRAM(s) Rectal every 24 hours PRN Constipation  cloNIDine 0.1 milliGRAM(s) Oral every 8 hours PRN for sbp > 180  HYDROcodone 10 mG/acetaminophen 325 mG 1 Tablet(s) Oral every 4 hours PRN Moderate Pain (4 - 6)  ondansetron Injectable 4 milliGRAM(s) IV Push every 8 hours PRN Nausea and/or Vomiting  simethicone 80 milliGRAM(s) Chew every 8 hours PRN Gas          RADIOLOGY & ADDITIONAL TESTS:    < from: CT Abdomen and Pelvis w/ Oral Cont (19 @ 23:46) >    New diffuse colonic wall thickening involving the cecum, right colon and   transverse colon at the hepatic flexure. There is additional involvement   of the terminal ileum. Findings are consistent with colitis and enteritis   of unclear etiology, likely infectious or inflammatory.    Moderate right hydroureteronephrosis to the level of the bladder without   a definitive obstructing cause, as seen on recent ultrasound.     New small amount of abdominopelvic ascites.    New small to moderateright pleural effusion and trace left pleural   effusion.    Resolution of some pulmonary nodules at the left lung base with new   pulmonary nodules measuring up to 12 mm. Follow-up noncontrast chest CT   is recommended in 3 months to demonstrate resolution.    < end of copied text >    < from: Xray Chest 1 View- PORTABLE-Routine (02.10.19 @ 17:59) >    Layering density in the right lower lung with a blunted costophrenic   angle, which may represent a pleural effusion. Cannot exclude right lower   lobe underlying consolidation. Recommend follow-up.     < end of copied text >      < from: US Retroperitoneal B-Scan Limited (19 @ 09:40) >  Moderate right-sided hydronephrosis, unchanged.      < end of copied text >      < from: Xray Chest 1 View- PORTABLE-Urgent (19 @ 07:00) >    No radiographic evidence of acute cardiopulmonary disease.      < end of copied text >        MICROBIOLOGY DATA:    Clostridium difficile Toxin by PCR (19 @ 16:20)    Clostridium difficile Toxin by PCR: RESULT INTERPRETATION:    Detected - Clostridium difficile toxin B detected by amplified DNA PCR    C.difficile PCR test results should be interpreted only with  consideration of the patient's clinical situation and history.  This test  will detect the presence of toxigenic C. difficile.  However it cannot be  used as the sole criteria  for the diagnosis of antibiotic associated diarrhea, antibiotic  associated colitis, or pseudomembranous colitis.  Colonization with  C.difficile may exceed 20% in hosptial patients, the majority of whom are  without Toxigenic  Clostridium Difficile disease.  Testing is generally not recommened in  children below the age of 1 year, as up to half of healthy infants are  asymptomatically colonized with C.difficile.  In addition, C.difficile  PCR testing  cannot be used as a "test of cure" as dead organism nucleic acids will  persist and be detected after treatment.  Successful treatment of  C.difficile disease is determined by resolution of clinical symptoms. Method: CDPCR  TOXIGENIC CLOST.DIFFICILE POSITIVE;  027-NAP1-B1 PRESUMPTIVE POSITIVE.  By: Real-Time PCR (Polymerase Chain Reaction)  NOTE: This method detects the Toxin B gene (tCdB), the  binary toxin gene (CDT), and the single-base-pair  deletionat nucleotide 117 within the gene encoding  a negative regulator of toxin production (tcdC 117).  The combined presence of genes encoding Toxin B and  binary toxin and the tcdC 117 deletion has been  associated with hypervirulent C. difficile strain  known as 027/NAP1/B1, which has been associated with  severe disease outbreaks in healthcare facilities  worldwide.    C Diff by PCR Result: Positive        Urine Microscopic-Add On (NC) (02.10.19 @ 19:08)    Bacteria: Moderate    Epithelial Cells: Few /HPF    Red Blood Cell - Urine: 26-50 /HPF    White Blood Cell - Urine: 3-5 /HPF        Culture - Blood (19 @ 13:16)    Specimen Source: .Blood Blood    Culture Results:   No growth to date.        Culture - Urine (19 @ 04:25)    Specimen Source: .Urine Clean Catch (Midstream)    Culture Results:   No growth      Culture - Urine (19 @ 13:38)    -  Nitrofurantoin: S <=32 Should not be used to treat pyelonephritis.    -  Tetra/Doxy: R >8    -  Vancomycin: S 1    -  Ampicillin: S <=2 Predicts results to ampicillin/sulbactam, amoxacillin-clavulanate and  piperacillin-tazobactam.    -  Ciprofloxacin: S <=1    -  Levofloxacin: S 1    Specimen Source: .Urine Clean Catch (Midstream)    Culture Results:   10,000 - 49,000 CFU/mL Enterococcus faecalis  Normal Urogenital katie present    Organism Identification: Enterococcus faecalis    Organism: Enterococcus faecalis    Method Type: ANUM

## 2019-02-13 NOTE — PROGRESS NOTE ADULT - ASSESSMENT
IMPRESSION:  83 yo female ex smoker with a history of COPD on home O2  Admitted for electrolyte imbalance, GEETA,. UTI with elevated WBC  Complaining of abdominal pains and diarrhea  CT shows colitis and right hydroureteronephrosis  R/O C. diff  Small effusions on CT right > left  Lung nodules, some resolved some new      PLAN:  Continue O2 to maintain saturation > 90% and current bronchodilators  Antibiotics as per ID recommendations  Check stool for C diff  Follow labs, fluid management as per renal  Outpatient follow up of lung nodules, repeat CT chest in 3 months  GI and DVT prophylaxis  D/W RN

## 2019-02-13 NOTE — PROGRESS NOTE ADULT - ASSESSMENT
· Assessment		  83yo F with urine retention, VUR, and urinary incontinence, treated for e faecalis UTI (po amoxicillin), urine cultures now negative;  and found to have persistently elevated wbc. Rowe has been out and no clinical evidence of retention. Pt to be evaluated for c-dif    Case D/W Dr. Siegel : there are  no current urologic issues for this patient. Per daughter, she has spoke with Dr. Sanchez and has received pathology results from recent TURBT; they are to F/U again in the office  - will sign off. Reconsult PRN

## 2019-02-13 NOTE — DIETITIAN INITIAL EVALUATION ADULT. - PERTINENT LABORATORY DATA
(2/12) H/H 10.3/31.3, sodium 129, chloride 91, BUN 34, glucose 121, calcium 8.2, albumin 3, magnesium 1.7

## 2019-02-14 ENCOUNTER — OUTPATIENT (OUTPATIENT)
Dept: OUTPATIENT SERVICES | Facility: HOSPITAL | Age: 84
LOS: 1 days | Discharge: HOME | End: 2019-02-14

## 2019-02-14 DIAGNOSIS — Z98.890 OTHER SPECIFIED POSTPROCEDURAL STATES: Chronic | ICD-10-CM

## 2019-02-14 DIAGNOSIS — A04.72 ENTEROCOLITIS DUE TO CLOSTRIDIUM DIFFICILE, NOT SPECIFIED AS RECURRENT: ICD-10-CM

## 2019-02-14 DIAGNOSIS — Z02.9 ENCOUNTER FOR ADMINISTRATIVE EXAMINATIONS, UNSPECIFIED: ICD-10-CM

## 2019-02-14 RX ORDER — AMOXICILLIN 250 MG/5ML
500 SUSPENSION, RECONSTITUTED, ORAL (ML) ORAL EVERY 8 HOURS
Qty: 0 | Refills: 0 | Status: COMPLETED | OUTPATIENT
Start: 2019-02-14 | End: 2019-02-17

## 2019-02-14 RX ORDER — ERYTHROPOIETIN 10000 [IU]/ML
40000 INJECTION, SOLUTION INTRAVENOUS; SUBCUTANEOUS
Qty: 0 | Refills: 0 | Status: DISCONTINUED | OUTPATIENT
Start: 2019-02-14 | End: 2019-02-14

## 2019-02-14 RX ORDER — VANCOMYCIN HCL 1 G
125 VIAL (EA) INTRAVENOUS EVERY 6 HOURS
Qty: 0 | Refills: 0 | Status: DISCONTINUED | OUTPATIENT
Start: 2019-02-14 | End: 2019-02-22

## 2019-02-14 RX ORDER — ERYTHROPOIETIN 10000 [IU]/ML
40000 INJECTION, SOLUTION INTRAVENOUS; SUBCUTANEOUS
Qty: 0 | Refills: 0 | Status: DISCONTINUED | OUTPATIENT
Start: 2019-02-14 | End: 2019-03-13

## 2019-02-14 RX ORDER — SODIUM CHLORIDE 9 MG/ML
1000 INJECTION INTRAMUSCULAR; INTRAVENOUS; SUBCUTANEOUS
Qty: 0 | Refills: 0 | Status: DISCONTINUED | OUTPATIENT
Start: 2019-02-14 | End: 2019-02-19

## 2019-02-14 RX ADMIN — Medication 5 MILLIGRAM(S): at 22:26

## 2019-02-14 RX ADMIN — CHLORHEXIDINE GLUCONATE 1 APPLICATION(S): 213 SOLUTION TOPICAL at 09:38

## 2019-02-14 RX ADMIN — Medication 125 MILLIGRAM(S): at 11:40

## 2019-02-14 RX ADMIN — Medication 500 MILLIGRAM(S): at 13:38

## 2019-02-14 RX ADMIN — Medication 125 MILLIGRAM(S): at 06:00

## 2019-02-14 RX ADMIN — BUDESONIDE AND FORMOTEROL FUMARATE DIHYDRATE 2 PUFF(S): 160; 4.5 AEROSOL RESPIRATORY (INHALATION) at 22:25

## 2019-02-14 RX ADMIN — MEROPENEM 100 MILLIGRAM(S): 1 INJECTION INTRAVENOUS at 05:59

## 2019-02-14 RX ADMIN — HEPARIN SODIUM 5000 UNIT(S): 5000 INJECTION INTRAVENOUS; SUBCUTANEOUS at 06:01

## 2019-02-14 RX ADMIN — PANTOPRAZOLE SODIUM 40 MILLIGRAM(S): 20 TABLET, DELAYED RELEASE ORAL at 06:02

## 2019-02-14 RX ADMIN — ATORVASTATIN CALCIUM 20 MILLIGRAM(S): 80 TABLET, FILM COATED ORAL at 22:26

## 2019-02-14 RX ADMIN — Medication 3 MILLILITER(S): at 08:36

## 2019-02-14 RX ADMIN — Medication 1 APPLICATION(S): at 11:41

## 2019-02-14 RX ADMIN — HEPARIN SODIUM 5000 UNIT(S): 5000 INJECTION INTRAVENOUS; SUBCUTANEOUS at 13:38

## 2019-02-14 RX ADMIN — Medication 125 MILLIGRAM(S): at 18:25

## 2019-02-14 RX ADMIN — Medication 5 MILLIGRAM(S): at 06:00

## 2019-02-14 RX ADMIN — BUDESONIDE AND FORMOTEROL FUMARATE DIHYDRATE 2 PUFF(S): 160; 4.5 AEROSOL RESPIRATORY (INHALATION) at 09:39

## 2019-02-14 RX ADMIN — Medication 81 MILLIGRAM(S): at 11:40

## 2019-02-14 RX ADMIN — Medication 5 MILLIGRAM(S): at 13:38

## 2019-02-14 RX ADMIN — HEPARIN SODIUM 5000 UNIT(S): 5000 INJECTION INTRAVENOUS; SUBCUTANEOUS at 22:27

## 2019-02-14 RX ADMIN — SENNA PLUS 2 TABLET(S): 8.6 TABLET ORAL at 11:39

## 2019-02-14 RX ADMIN — Medication 50 MILLIGRAM(S): at 06:01

## 2019-02-14 RX ADMIN — SODIUM CHLORIDE 60 MILLILITER(S): 9 INJECTION INTRAMUSCULAR; INTRAVENOUS; SUBCUTANEOUS at 18:25

## 2019-02-14 RX ADMIN — Medication 125 MILLIGRAM(S): at 22:27

## 2019-02-14 RX ADMIN — Medication 500 MILLIGRAM(S): at 22:26

## 2019-02-14 RX ADMIN — AMLODIPINE BESYLATE 10 MILLIGRAM(S): 2.5 TABLET ORAL at 06:01

## 2019-02-14 RX ADMIN — DULOXETINE HYDROCHLORIDE 20 MILLIGRAM(S): 30 CAPSULE, DELAYED RELEASE ORAL at 11:39

## 2019-02-14 RX ADMIN — Medication 100 MILLIGRAM(S): at 05:59

## 2019-02-14 NOTE — PHYSICAL THERAPY INITIAL EVALUATION ADULT - GAIT DEVIATIONS NOTED, PT EVAL
decreased alena/decreased step length/decreased weight-shifting ability/stooped posture, dec heel strike/pushoff

## 2019-02-14 NOTE — PHYSICAL THERAPY INITIAL EVALUATION ADULT - IMPAIRED TRANSFERS: SIT/STAND, REHAB EVAL
decreased strength/impaired balance/decreased endurance/narrow base of support/impaired postural control

## 2019-02-14 NOTE — PHYSICAL THERAPY INITIAL EVALUATION ADULT - IMPAIRMENTS CONTRIBUTING TO GAIT DEVIATIONS, PT EVAL
impaired balance/decreased endurance/decreased strength/impaired postural control/narrow base of support

## 2019-02-14 NOTE — PROGRESS NOTE ADULT - SUBJECTIVE AND OBJECTIVE BOX
Patient is a 84y old  Female who presents with a chief complaint of Urinary Retention/Hperkalemia with EKG changes (14 Feb 2019 16:08)       Pt is seen and examined this morning  pt is awake and lying in bed          ROS:  Negative except for:abdo pain    MEDICATIONS  (STANDING):  ALBUTerol/ipratropium for Nebulization 3 milliLiter(s) Nebulizer every 6 hours  amLODIPine   Tablet 10 milliGRAM(s) Oral daily  amoxicillin 500 milliGRAM(s) Oral every 8 hours  aspirin  chewable 81 milliGRAM(s) Oral daily  atorvastatin 20 milliGRAM(s) Oral at bedtime  buDESOnide 160 MICROgram(s)/formoterol 4.5 MICROgram(s) Inhaler 2 Puff(s) Inhalation two times a day  busPIRone 5 milliGRAM(s) Oral three times a day  chlorhexidine 4% Liquid 1 Application(s) Topical <User Schedule>  docusate sodium 200 milliGRAM(s) Oral at bedtime  DULoxetine 20 milliGRAM(s) Oral daily  epoetin spencer Injectable 13031 Unit(s) SubCutaneous every 7 days  heparin  Injectable 5000 Unit(s) SubCutaneous every 8 hours  hydrocortisone 2.5% Rectal Cream 1 Application(s) Rectal daily  metoprolol succinate ER 50 milliGRAM(s) Oral daily  senna 2 Tablet(s) Oral daily  sodium chloride 0.9%. 1000 milliLiter(s) (60 mL/Hr) IV Continuous <Continuous>  vancomycin    Solution 125 milliGRAM(s) Oral every 6 hours    MEDICATIONS  (PRN):  bisacodyl Suppository 10 milliGRAM(s) Rectal every 24 hours PRN Constipation  cloNIDine 0.1 milliGRAM(s) Oral every 8 hours PRN for sbp > 180  HYDROcodone 10 mG/acetaminophen 325 mG 1 Tablet(s) Oral every 4 hours PRN Moderate Pain (4 - 6)  ondansetron Injectable 4 milliGRAM(s) IV Push every 8 hours PRN Nausea and/or Vomiting  simethicone 80 milliGRAM(s) Chew every 8 hours PRN Gas      Allergies    hair dyes (Hives)  sulfa drugs (Headache)  Zoloft (Headache)    Intolerances        Vital Signs Last 24 Hrs  T(C): 36.8 (14 Feb 2019 13:58), Max: 36.9 (14 Feb 2019 05:15)  T(F): 98.2 (14 Feb 2019 13:58), Max: 98.4 (14 Feb 2019 05:15)  HR: 98 (14 Feb 2019 13:58) (91 - 104)  BP: 159/70 (14 Feb 2019 13:58) (148/60 - 171/71)  BP(mean): --  RR: 16 (14 Feb 2019 13:58) (16 - 16)  SpO2: --    PHYSICAL EXAM  General: adult in NAD  HEENT: clear oropharynx, anicteric sclera, pink conjunctiva  Neck: supple  CV: normal S1/S2 with no murmur rubs or gallops  Lungs: positive air movement b/l ant lungs,clear to auscultation, no wheezes, no rales  Abdomen: soft non-tender non-distended, no hepatosplenomegaly  Ext: no clubbing cyanosis or edema  Skin: no rashes and no petechiae  Neuro: alert and oriented X 4, no focal deficits  LABS:                          9.7    38.51 )-----------( 219      ( 13 Feb 2019 14:50 )             29.4         Mean Cell Volume : 99.7 fL  Mean Cell Hemoglobin : 32.9 pg  Mean Cell Hemoglobin Concentration : 33.0 g/dL  Auto Neutrophil # : x  Auto Lymphocyte # : x  Auto Monocyte # : x  Auto Eosinophil # : x  Auto Basophil # : x  Auto Neutrophil % : x  Auto Lymphocyte % : x  Auto Monocyte % : x  Auto Eosinophil % : x  Auto Basophil % : x    Serial CBC's  02-13 @ 14:50  Hct-29.4 / Hgb-9.7 / Plat-219 / RBC-2.95 / WBC-38.51          Serial CBC's  02-12 @ 08:01  Hct-31.3 / Hgb-10.3 / Plat-213 / RBC-3.14 / WBC-38.19          Serial CBC's  02-11 @ 06:49  Hct-29.6 / Hgb-9.8 / Plat-188 / RBC-2.93 / WBC-34.22            02-13    125<L>  |  89<L>  |  46<H>  ----------------------------<  97  4.9   |  25  |  1.0    Ca    8.0<L>      13 Feb 2019 14:50            Hematocrit: 29.4 % (02-13-19 @ 14:50)  Platelet Count - Automated: 219 K/uL (02-13-19 @ 14:50)  Hemoglobin: 9.7 g/dL (02-13-19 @ 14:50)  WBC Count: 38.51 K/uL (02-13-19 @ 14:50)  Hematocrit: 31.3 % (02-12-19 @ 08:01)  Hemoglobin: 10.3 g/dL (02-12-19 @ 08:01)  WBC Count: 38.19 K/uL (02-12-19 @ 08:01)  Platelet Count - Automated: 213 K/uL (02-12-19 @ 08:01)  Hemoglobin: 9.8 g/dL (02-11-19 @ 06:49)  WBC Count: 34.22 K/uL (02-11-19 @ 06:49)  Platelet Count - Automated: 188 K/uL (02-11-19 @ 06:49)  Hematocrit: 29.6 % (02-11-19 @ 06:49)  WBC Count: 32.94 K/uL (02-10-19 @ 15:05)  Hematocrit: 30.4 % (02-10-19 @ 15:05)  Platelet Count - Automated: 164 K/uL (02-10-19 @ 15:05)  Hemoglobin: 10.0 g/dL (02-10-19 @ 15:05)  Hematocrit: 31.7 % (02-09-19 @ 18:50)  Platelet Count - Automated: 188 K/uL (02-09-19 @ 18:50)  Hemoglobin: 10.1 g/dL (02-09-19 @ 18:50)  WBC Count: 26.14 K/uL (02-09-19 @ 18:50)  Hemoglobin: 9.4 g/dL (02-06-19 @ 07:12)  WBC Count: 9.15 K/uL (02-06-19 @ 07:12)  Platelet Count - Automated: 160 K/uL (02-06-19 @ 07:12)  Hematocrit: 28.5 % (02-06-19 @ 07:12)  Hemoglobin: 11.6 g/dL (02-05-19 @ 04:05)  WBC Count: 16.66 K/uL (02-05-19 @ 04:05)  Hematocrit: 36.2 % (02-05-19 @ 04:05)  Platelet Count - Automated: 249 K/uL (02-05-19 @ 04:05)                BLOOD SMEAR INTERPRETATION:       RADIOLOGY & ADDITIONAL STUDIES:

## 2019-02-14 NOTE — PROGRESS NOTE ADULT - ASSESSMENT
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 UTI with elevated WBC,s/p abx.  seen by urology,no other intervention by urology  Complaining of abdominal pains and diarrhea sec to  C. diff colitis  CT shows colitis and right hydroureteronephrosis  Small effusions on CT right > left with lung nodules.      PLAN:  on po vanco for c diff.  f/u with ID.  contact isolation.  start on procrit 40,000 unit qweekly if hb <10 ,one dose today  monitor counts.  f/u with urology for recent TURBT path result  agree with Outpatient follow up of lung nodules, repeat CT chest in 3 months  cont other supportive care.  plan of care was explained to family at length over ph .  will f/u

## 2019-02-14 NOTE — PROGRESS NOTE ADULT - ASSESSMENT
hypovolemic hyponatremia  GEETA resolved, but now with worsening prerenal azotemia  cdiff colitis without diarrhea  leukocytosis from cdiff  HTN   bladder ca  s/p TURBT  right hydro   UTI - enterococci  COPD  CHF  anemia / MDS  chronic back pain with spinal stimulator    Plan:    PO vanco  complete po amoxicillin course  NS @ 60 cc/hr  encourage po intake  cont norvasc and lopressor  clonidine 0.1mg po prn for sbp > 180  off aldactone - do not restart  f/u labs  will follow

## 2019-02-14 NOTE — PROGRESS NOTE ADULT - PROBLEM SELECTOR PLAN 2
DC PPIs  Acidophilus  PO Vanco - at least 17 days   avoid opiates if possible  follow wbc  Dc Flagyl - pt IS Tolerating PO abx  NO NEED FOR MEROPENEM

## 2019-02-14 NOTE — CONSULT NOTE ADULT - SUBJECTIVE AND OBJECTIVE BOX
HPI:  84 year old female w hx of HTN, CHF, COPD on 2 LPM NC daily, bladder cancer presents to the ED with urinary retention, crampy/spasm-like lower abdominal pain and mild distention. Patient had bladder surgery w Dr. Sanchez on 1/30/19 and was d/c home with a lynne catheter in place. The catheter was removed yesterday. Patient had two episodes of normal urination after removal, but has since been experiencing retention and dribbling at most. Denies fevers, chills, cough, chest pain, shortness of breath, flank pain, myalgias, incontinence, rashes, or recent illness.  Patient presents to ED with family this am.  Dtr reports that patient s/p cysto with bladder scrapping for Bladder Ca on 1/30/19.  Patient tolerated procedure well and was discharged home with Lynne.  Patient did very well since then and went to follow up at office ystd.  At that point lynne was removed and patient went home.  Dtr reports that patient urinated x 2 since removal however last evening began only dribbling and began having severe bladder spasms, unrelieved by oxybutynin.  Family brought patient to ED 2/2 to this.  Patient reports she is feeling well.  No CP.  SOB at baseline.  Still with some minor abdominal cramping, suprapubic area.  No fever.  Last BM ystd AM and regular.  ptn  is  delimited      PTN  REFERRED TO ACUTE  REHAB  FOR  EVAL AND  TX   PAST MEDICAL & SURGICAL HISTORY:  Anemia: 4 PRBC 11/2018  Oxygen dependent: O2 2L Via NC  Bladder cancer: 2018  CHF (congestive heart failure)  Breast tumor: removal benign bl breasts  Anal cancer: Chemo and radiation 1992  COPD (chronic obstructive pulmonary disease)  Lung disease: COPD  Hypertension  History of cystoscopy: 10/2018  H/O breast surgery: 39 yrs. ago, bilateral  History of back surgery: 10 yrs. ago stimulator implant 2001, 2011( cervical and Lumbar)      Hospital Course:    TODAY'S SUBJECTIVE & REVIEW OF SYMPTOMS:     Constitutional WNL   Cardio WNL   Resp WNL   GI WNL  Heme WNL  Endo WNL  Skin WNL  MSK WNL  Neuro WNL  Cognitive WNL  Psych WNL      MEDICATIONS  (STANDING):  ALBUTerol/ipratropium for Nebulization 3 milliLiter(s) Nebulizer every 6 hours  amLODIPine   Tablet 10 milliGRAM(s) Oral daily  amoxicillin 500 milliGRAM(s) Oral every 8 hours  aspirin  chewable 81 milliGRAM(s) Oral daily  atorvastatin 20 milliGRAM(s) Oral at bedtime  buDESOnide 160 MICROgram(s)/formoterol 4.5 MICROgram(s) Inhaler 2 Puff(s) Inhalation two times a day  busPIRone 5 milliGRAM(s) Oral three times a day  chlorhexidine 4% Liquid 1 Application(s) Topical <User Schedule>  docusate sodium 200 milliGRAM(s) Oral at bedtime  DULoxetine 20 milliGRAM(s) Oral daily  epoetin spencer Injectable 78784 Unit(s) SubCutaneous every 7 days  heparin  Injectable 5000 Unit(s) SubCutaneous every 8 hours  hydrocortisone 2.5% Rectal Cream 1 Application(s) Rectal daily  metoprolol succinate ER 50 milliGRAM(s) Oral daily  senna 2 Tablet(s) Oral daily  sodium chloride 0.9%. 1000 milliLiter(s) (60 mL/Hr) IV Continuous <Continuous>  vancomycin    Solution 125 milliGRAM(s) Oral every 6 hours    MEDICATIONS  (PRN):  bisacodyl Suppository 10 milliGRAM(s) Rectal every 24 hours PRN Constipation  cloNIDine 0.1 milliGRAM(s) Oral every 8 hours PRN for sbp > 180  HYDROcodone 10 mG/acetaminophen 325 mG 1 Tablet(s) Oral every 4 hours PRN Moderate Pain (4 - 6)  ondansetron Injectable 4 milliGRAM(s) IV Push every 8 hours PRN Nausea and/or Vomiting  simethicone 80 milliGRAM(s) Chew every 8 hours PRN Gas      FAMILY HISTORY:  No pertinent family history in first degree relatives      Allergies    hair dyes (Hives)  sulfa drugs (Headache)  Zoloft (Headache)    Intolerances        SOCIAL HISTORY:    [  ] Etoh  [  ] Smoking  [  ] Substance abuse     Home Environment:  [  ] Home Alone  [ x ] Lives with Family  [  ] Home Health Aid    Dwelling:  [  ] Apartment  [x  ] Private House  [  ] Adult Home  [  ] Skilled Nursing Facility      [  ] Short Term  [  ] Long Term  [ x ] Stairs       Elevator [  ]    FUNCTIONAL STATUS PTA: (Check all that apply)  Ambulation: [  x ]Independent    [  ] Dependent     [  ] Non-Ambulatory  Assistive Device: [  ] SA Cane  [  ]  Q Cane  [x  ] Walker  [  ]  Wheelchair  ADL : [ x ] Independent  [  ]  Dependent       Vital Signs Last 24 Hrs  T(C): 36.8 (14 Feb 2019 13:58), Max: 36.9 (14 Feb 2019 05:15)  T(F): 98.2 (14 Feb 2019 13:58), Max: 98.4 (14 Feb 2019 05:15)  HR: 98 (14 Feb 2019 13:58) (91 - 104)  BP: 159/70 (14 Feb 2019 13:58) (148/60 - 171/71)  BP(mean): --  RR: 16 (14 Feb 2019 13:58) (16 - 16)  SpO2: --      PHYSICAL EXAM: Alert & Oriented X3  GENERAL: NAD, well-groomed, well-developed  HEAD:  Atraumatic, Normocephalic  EYES: EOMI, PERRLA, conjunctiva and sclera clear  NECK: Supple, No JVD, Normal thyroid  CHEST/LUNG: Clear to percussion bilaterally; No rales, rhonchi, wheezing, or rubs  HEART: Regular rate and rhythm; No murmurs, rubs, or gallops  ABDOMEN: Soft, Nontender, Nondistended; Bowel sounds present  EXTREMITIES:  2+ Peripheral Pulses, No clubbing, cyanosis, or edema    NERVOUS SYSTEM:  Cranial Nerves 2-12 intact [ x ] Abnormal  [  ]  ROM: WFL all extremities [  ]  Abnormal [x  ]  Motor Strength: WFL all extremities  [  ]  Abnormal [x  ]  Sensation: intact to light touch [  ] Abnormal [ x ]  Reflexes: Symmetric [  ]  Abnormal [ x ]    FUNCTIONAL STATUS:  Bed Mobility: Independent [  ]  Supervision [  ]  Needs Assistance [x ]  N/A [  ]  Transfers: Independent [  ]  Supervision [  ]  Needs Assistance [  x]  N/A [  ]   Ambulation: Independent [  ]  Supervision [  ]  Needs Assistance [ x ]  N/A [  ]  ADL: Independent [  ] Requires Assistance [x  ] N/A [  ]  SEE PT/OT IE NOTES    LABS:                        9.7    38.51 )-----------( 219      ( 13 Feb 2019 14:50 )             29.4     02-13    125<L>  |  89<L>  |  46<H>  ----------------------------<  97  4.9   |  25  |  1.0    Ca    8.0<L>      13 Feb 2019 14:50            RADIOLOGY & ADDITIONAL STUDIES:    Assesment:

## 2019-02-14 NOTE — PROGRESS NOTE ADULT - SUBJECTIVE AND OBJECTIVE BOX
NEPHROLOGY FOLLOW UP NOTE    pt seen and examined  d/w daughters at bedside  pt c/o poor appetite, insomnia and weakness  no diarrhea  also c/o back pain      PAST MEDICAL & SURGICAL HISTORY:  CHF (congestive heart failure)  Breast tumor: removal benign bl breasts  Anal cancer  COPD (chronic obstructive pulmonary disease)  Lung disease: COPD  Hypertension  H/O breast surgery: 39 yrs. ago, bilateral  History of back surgery: 10 yrs. ago stimulator implant 2001, 2011    Allergies:  hair dyes (Hives)  sulfa drugs (Headache)    Home Medications Reviewed    SOCIAL HISTORY:  Denies ETOH,Smoking,   FAMILY HISTORY:  No pertinent family history in first degree relatives        REVIEW OF SYSTEMS:  All other review of systems is negative unless indicated above.        PHYSICAL EXAM:  NAD  awake and alert  dry mm  no jvd  b/l bs  rrr  soft, + bs  no edema    Hospital Medications:   MEDICATIONS  (STANDING):  ALBUTerol/ipratropium for Nebulization 3 milliLiter(s) Nebulizer every 6 hours  amLODIPine   Tablet 10 milliGRAM(s) Oral daily  amoxicillin 500 milliGRAM(s) Oral every 8 hours  aspirin  chewable 81 milliGRAM(s) Oral daily  atorvastatin 20 milliGRAM(s) Oral at bedtime  buDESOnide 160 MICROgram(s)/formoterol 4.5 MICROgram(s) Inhaler 2 Puff(s) Inhalation two times a day  busPIRone 5 milliGRAM(s) Oral three times a day  chlorhexidine 4% Liquid 1 Application(s) Topical <User Schedule>  docusate sodium 200 milliGRAM(s) Oral at bedtime  DULoxetine 20 milliGRAM(s) Oral daily  heparin  Injectable 5000 Unit(s) SubCutaneous every 8 hours  hydrocortisone 2.5% Rectal Cream 1 Application(s) Rectal daily  metoprolol succinate ER 50 milliGRAM(s) Oral daily  senna 2 Tablet(s) Oral daily  sodium chloride 0.9%. 1000 milliLiter(s) (60 mL/Hr) IV Continuous <Continuous>  vancomycin    Solution 125 milliGRAM(s) Oral every 6 hours        VITALS:  T(F): 98.2 (02-14-19 @ 13:58), Max: 98.4 (02-14-19 @ 05:15)  HR: 98 (02-14-19 @ 13:58)  BP: 159/70 (02-14-19 @ 13:58)  RR: 16 (02-14-19 @ 13:58)  SpO2: --  Wt(kg): --    02-12 @ 07:01  -  02-13 @ 07:00  --------------------------------------------------------  IN: 680 mL / OUT: 0 mL / NET: 680 mL      Height (cm): 157.48 (02-14 @ 11:14)    LABS:  02-13    125<L>  |  89<L>  |  46<H>  ----------------------------<  97  4.9   |  25  |  1.0    Ca    8.0<L>      13 Feb 2019 14:50                            9.7    38.51 )-----------( 219      ( 13 Feb 2019 14:50 )             29.4       Urine Studies:  Urinalysis Basic - ( 10 Feb 2019 19:08 )    Color: Yellow / Appearance: Clear / SG: >=1.030 / pH:   Gluc:  / Ketone: 15  / Bili: Small / Urobili: 0.2 mg/dL   Blood:  / Protein: >=300 mg/dL / Nitrite: Negative   Leuk Esterase: Negative / RBC: 26-50 /HPF / WBC 3-5 /HPF   Sq Epi:  / Non Sq Epi: Few /HPF / Bacteria: Moderate          RADIOLOGY & ADDITIONAL STUDIES:

## 2019-02-14 NOTE — PROGRESS NOTE ADULT - SUBJECTIVE AND OBJECTIVE BOX
SUBJECTIVE:    Patient is a 84y old Female who presents with a chief complaint of Urinary Retention/Hperkalemia with EKG changes (14 Feb 2019 11:11)    Currently admitted to medicine with the primary diagnosis of Hyperkalemia     Today is hospital day 9d. This morning she is resting comfortably in bed and reports no new issues or overnight events.     PAST MEDICAL & SURGICAL HISTORY  Anemia: 4 PRBC 11/2018  Oxygen dependent: O2 2L Via NC  Bladder cancer: 2018  CHF (congestive heart failure)  Breast tumor: removal benign bl breasts  Anal cancer: Chemo and radiation 1992  COPD (chronic obstructive pulmonary disease)  Lung disease: COPD  Hypertension  History of cystoscopy: 10/2018  H/O breast surgery: 39 yrs. ago, bilateral  History of back surgery: 10 yrs. ago stimulator implant 2001, 2011( cervical and Lumbar)    SOCIAL HISTORY:  Negative for smoking/alcohol/drug use.     ALLERGIES:  hair dyes (Hives)  sulfa drugs (Headache)  Zoloft (Headache)    MEDICATIONS:  STANDING MEDICATIONS  ALBUTerol/ipratropium for Nebulization 3 milliLiter(s) Nebulizer every 6 hours  amLODIPine   Tablet 10 milliGRAM(s) Oral daily  amoxicillin 500 milliGRAM(s) Oral every 8 hours  aspirin  chewable 81 milliGRAM(s) Oral daily  atorvastatin 20 milliGRAM(s) Oral at bedtime  buDESOnide 160 MICROgram(s)/formoterol 4.5 MICROgram(s) Inhaler 2 Puff(s) Inhalation two times a day  busPIRone 5 milliGRAM(s) Oral three times a day  chlorhexidine 4% Liquid 1 Application(s) Topical <User Schedule>  docusate sodium 200 milliGRAM(s) Oral at bedtime  DULoxetine 20 milliGRAM(s) Oral daily  heparin  Injectable 5000 Unit(s) SubCutaneous every 8 hours  hydrocortisone 2.5% Rectal Cream 1 Application(s) Rectal daily  metoprolol succinate ER 50 milliGRAM(s) Oral daily  senna 2 Tablet(s) Oral daily  vancomycin    Solution 125 milliGRAM(s) Oral every 6 hours    PRN MEDICATIONS  bisacodyl Suppository 10 milliGRAM(s) Rectal every 24 hours PRN  cloNIDine 0.1 milliGRAM(s) Oral every 8 hours PRN  HYDROcodone 10 mG/acetaminophen 325 mG 1 Tablet(s) Oral every 4 hours PRN  ondansetron Injectable 4 milliGRAM(s) IV Push every 8 hours PRN  simethicone 80 milliGRAM(s) Chew every 8 hours PRN    VITALS:   T(F): 98.4  HR: 104  BP: 171/71  RR: 16  SpO2: --    LABS:                        9.7    38.51 )-----------( 219      ( 13 Feb 2019 14:50 )             29.4     02-13    125<L>  |  89<L>  |  46<H>  ----------------------------<  97  4.9   |  25  |  1.0    Ca    8.0<L>      13 Feb 2019 14:50                Culture - Blood (collected 12 Feb 2019 08:01)  Source: .Blood None  Preliminary Report (13 Feb 2019 23:01):    No growth to date.    Culture - Blood (collected 11 Feb 2019 12:09)  Source: .Blood Blood  Preliminary Report (12 Feb 2019 23:01):    No growth to date.          RADIOLOGY:    PHYSICAL EXAM:  GEN: No acute distress  LUNGS: Clear to auscultation bilaterally   HEART: Regular  ABD: Soft, non-tender, non-distended.  EXT: NC/NC/NE/2+PP/SARAVIA/Skin Intact.   NEURO: AAOX3    Intravenous access:   NG tube:   Rowe Catheter:

## 2019-02-14 NOTE — CONSULT NOTE ADULT - ASSESSMENT
IMPRESSION: Rehab of 85 y/o  f rehab  for  debility      PRECAUTIONS: [x ] Cardiac  [ x ] Respiratory  [  ] Seizures [  ] Contact Isolation  [  ] Droplet Isolation  [ FALL ] Other    Weight Bearing Status:     RECOMMENDATION:    Out of Bed to Chair     DVT/Decubiti Prophylaxis    REHAB PLAN:     [ x  ] Bedside P/T 3-5 times a week   [   ]   Bedside O/T  2-3 times a week             [   ] No Rehab Therapy Indicated                   [   ]  Speech Therapy   Conditioning/ROM                                    ADL  Bed Mobility                                               Conditioning/ROM  Transfers                                                     Bed Mobility  Sitting /Standing Balance                         Transfers                                        Gait Training                                               Sitting/Standing Balance  Stair Training [   ]Applicable                    Home equipment Eval                                                                        Splinting  [   ] Only      GOALS:   ADL   [  x ]   Independent                    Transfers  [  x ] Independent                          Ambulation  [ x  ] Independent     [ x   ] With device                            [   ]  CG                                                         [   ]  CG                                                                  [   ] CG                            [    ] Min A                                                   [   ] Min A                                                              [   ] Min  A          DISCHARGE PLAN:   [   ]  Good candidate for Intensive Rehabilitation/Hospital based-[facility]A [facility]                                             Will tolerate 3hrs Intensive Rehab Daily                                       [ xxx   ]  Short Term Rehab in Skilled Nursing Facility ptn and family  agree                                       [    ]  Home with Outpatient or VN services                                         [    ]  Possible Candidate for Intensive Hospital based Rehab

## 2019-02-14 NOTE — PHYSICAL THERAPY INITIAL EVALUATION ADULT - GENERAL OBSERVATIONS, REHAB EVAL
14:55-15:30 Chart reviewed. Pt encountered semireclined in bed,may be seen by Physical Therapist as confirmed with Nurse. Patient denied pain at rest but feels "weak and tired"; +O2 via NC

## 2019-02-14 NOTE — PROGRESS NOTE ADULT - SUBJECTIVE AND OBJECTIVE BOX
SUBJECTIVE:    Patient is a 84y old Female who presents with a chief complaint of Urinary Retention/Hperkalemia with EKG changes (14 Feb 2019 06:31)    Currently admitted to medicine with the primary diagnosis of Hyperkalemia     Today is hospital day 9d. This morning she is resting comfortably in bed and reports no new issues or overnight events.     PAST MEDICAL & SURGICAL HISTORY  Anemia: 4 PRBC 11/2018  Oxygen dependent: O2 2L Via NC  Bladder cancer: 2018  CHF (congestive heart failure)  Breast tumor: removal benign bl breasts  Anal cancer: Chemo and radiation 1992  COPD (chronic obstructive pulmonary disease)  Lung disease: COPD  Hypertension  History of cystoscopy: 10/2018  H/O breast surgery: 39 yrs. ago, bilateral  History of back surgery: 10 yrs. ago stimulator implant 2001, 2011( cervical and Lumbar)    SOCIAL HISTORY:  Negative for smoking/alcohol/drug use.     ALLERGIES:  hair dyes (Hives)  sulfa drugs (Headache)  Zoloft (Headache)    MEDICATIONS:  STANDING MEDICATIONS  ALBUTerol/ipratropium for Nebulization 3 milliLiter(s) Nebulizer every 6 hours  amLODIPine   Tablet 10 milliGRAM(s) Oral daily  amoxicillin 500 milliGRAM(s) Oral every 8 hours  aspirin  chewable 81 milliGRAM(s) Oral daily  atorvastatin 20 milliGRAM(s) Oral at bedtime  buDESOnide 160 MICROgram(s)/formoterol 4.5 MICROgram(s) Inhaler 2 Puff(s) Inhalation two times a day  busPIRone 5 milliGRAM(s) Oral three times a day  chlorhexidine 4% Liquid 1 Application(s) Topical <User Schedule>  docusate sodium 200 milliGRAM(s) Oral at bedtime  DULoxetine 20 milliGRAM(s) Oral daily  heparin  Injectable 5000 Unit(s) SubCutaneous every 8 hours  hydrocortisone 2.5% Rectal Cream 1 Application(s) Rectal daily  metoprolol succinate ER 50 milliGRAM(s) Oral daily  senna 2 Tablet(s) Oral daily  vancomycin    Solution 125 milliGRAM(s) Oral every 6 hours    PRN MEDICATIONS  bisacodyl Suppository 10 milliGRAM(s) Rectal every 24 hours PRN  cloNIDine 0.1 milliGRAM(s) Oral every 8 hours PRN  HYDROcodone 10 mG/acetaminophen 325 mG 1 Tablet(s) Oral every 4 hours PRN  ondansetron Injectable 4 milliGRAM(s) IV Push every 8 hours PRN  simethicone 80 milliGRAM(s) Chew every 8 hours PRN    VITALS:   T(F): 98.4  HR: 104  BP: 171/71  RR: 16  SpO2: --    LABS:                        9.7    38.51 )-----------( 219      ( 13 Feb 2019 14:50 )             29.4     02-13    125<L>  |  89<L>  |  46<H>  ----------------------------<  97  4.9   |  25  |  1.0    Ca    8.0<L>      13 Feb 2019 14:50                Culture - Blood (collected 12 Feb 2019 08:01)  Source: .Blood None  Preliminary Report (13 Feb 2019 23:01):    No growth to date.    Culture - Blood (collected 11 Feb 2019 12:09)  Source: .Blood Blood  Preliminary Report (12 Feb 2019 23:01):    No growth to date.          RADIOLOGY:    PHYSICAL EXAM:  GEN: No acute distress  LUNGS: Clear to auscultation bilaterally   HEART: Regular  ABD: Soft, non-tender, non-distended.  EXT: NC/NC/NE/2+PP/SARAVIA/Skin Intact.   NEURO: AAOX3    Intravenous access:   NG tube:   Rowe Catheter:

## 2019-02-15 LAB
ALBUMIN SERPL ELPH-MCNC: 2.6 G/DL — LOW (ref 3.5–5.2)
ALP SERPL-CCNC: 125 U/L — HIGH (ref 30–115)
ALT FLD-CCNC: 15 U/L — SIGNIFICANT CHANGE UP (ref 0–41)
ANION GAP SERPL CALC-SCNC: 10 MMOL/L — SIGNIFICANT CHANGE UP (ref 7–14)
AST SERPL-CCNC: 25 U/L — SIGNIFICANT CHANGE UP (ref 0–41)
BILIRUB SERPL-MCNC: 0.3 MG/DL — SIGNIFICANT CHANGE UP (ref 0.2–1.2)
BUN SERPL-MCNC: 48 MG/DL — HIGH (ref 10–20)
CALCIUM SERPL-MCNC: 7.7 MG/DL — LOW (ref 8.5–10.1)
CHLORIDE SERPL-SCNC: 92 MMOL/L — LOW (ref 98–110)
CO2 SERPL-SCNC: 25 MMOL/L — SIGNIFICANT CHANGE UP (ref 17–32)
CREAT SERPL-MCNC: 1 MG/DL — SIGNIFICANT CHANGE UP (ref 0.7–1.5)
GLUCOSE BLDC GLUCOMTR-MCNC: 148 MG/DL — HIGH (ref 70–99)
GLUCOSE SERPL-MCNC: 100 MG/DL — HIGH (ref 70–99)
HCT VFR BLD CALC: 30.7 % — LOW (ref 37–47)
HGB BLD-MCNC: 10.1 G/DL — LOW (ref 12–16)
MCHC RBC-ENTMCNC: 32.4 PG — HIGH (ref 27–31)
MCHC RBC-ENTMCNC: 32.9 G/DL — SIGNIFICANT CHANGE UP (ref 32–37)
MCV RBC AUTO: 98.4 FL — SIGNIFICANT CHANGE UP (ref 81–99)
NRBC # BLD: 0 /100 WBCS — SIGNIFICANT CHANGE UP (ref 0–0)
PLATELET # BLD AUTO: 214 K/UL — SIGNIFICANT CHANGE UP (ref 130–400)
POTASSIUM SERPL-MCNC: 5.1 MMOL/L — HIGH (ref 3.5–5)
POTASSIUM SERPL-SCNC: 5.1 MMOL/L — HIGH (ref 3.5–5)
PROT SERPL-MCNC: 4.5 G/DL — LOW (ref 6–8)
RBC # BLD: 3.12 M/UL — LOW (ref 4.2–5.4)
RBC # FLD: 14.9 % — HIGH (ref 11.5–14.5)
SODIUM SERPL-SCNC: 127 MMOL/L — LOW (ref 135–146)
WBC # BLD: 34.64 K/UL — HIGH (ref 4.8–10.8)
WBC # FLD AUTO: 34.64 K/UL — HIGH (ref 4.8–10.8)

## 2019-02-15 RX ORDER — METOPROLOL TARTRATE 50 MG
1 TABLET ORAL
Qty: 0 | Refills: 0 | COMMUNITY

## 2019-02-15 RX ORDER — AMLODIPINE BESYLATE 2.5 MG/1
1 TABLET ORAL
Qty: 0 | Refills: 0 | COMMUNITY
Start: 2019-02-15

## 2019-02-15 RX ORDER — DULOXETINE HYDROCHLORIDE 30 MG/1
1 CAPSULE, DELAYED RELEASE ORAL
Qty: 0 | Refills: 0 | COMMUNITY
Start: 2019-02-15

## 2019-02-15 RX ORDER — AMLODIPINE BESYLATE 2.5 MG/1
1 TABLET ORAL
Qty: 0 | Refills: 0 | COMMUNITY

## 2019-02-15 RX ORDER — ASPIRIN/CALCIUM CARB/MAGNESIUM 324 MG
1 TABLET ORAL
Qty: 0 | Refills: 0 | COMMUNITY
Start: 2019-02-15

## 2019-02-15 RX ORDER — IPRATROPIUM/ALBUTEROL SULFATE 18-103MCG
3 AEROSOL WITH ADAPTER (GRAM) INHALATION
Qty: 0 | Refills: 0 | COMMUNITY
Start: 2019-02-15

## 2019-02-15 RX ORDER — ATORVASTATIN CALCIUM 80 MG/1
1 TABLET, FILM COATED ORAL
Qty: 0 | Refills: 0 | COMMUNITY

## 2019-02-15 RX ORDER — MULTIVIT-MIN/FERROUS GLUCONATE 9 MG/15 ML
1 LIQUID (ML) ORAL
Qty: 0 | Refills: 0 | COMMUNITY

## 2019-02-15 RX ORDER — BUDESONIDE AND FORMOTEROL FUMARATE DIHYDRATE 160; 4.5 UG/1; UG/1
0 AEROSOL RESPIRATORY (INHALATION)
Qty: 0 | Refills: 0 | COMMUNITY
Start: 2019-02-15

## 2019-02-15 RX ORDER — METOPROLOL TARTRATE 50 MG
1 TABLET ORAL
Qty: 0 | Refills: 0 | COMMUNITY
Start: 2019-02-15

## 2019-02-15 RX ORDER — HYDROCORTISONE 1 %
1 OINTMENT (GRAM) TOPICAL
Qty: 0 | Refills: 0 | COMMUNITY
Start: 2019-02-15

## 2019-02-15 RX ORDER — VANCOMYCIN HCL 1 G
125 VIAL (EA) INTRAVENOUS
Qty: 0 | Refills: 0 | COMMUNITY
Start: 2019-02-15

## 2019-02-15 RX ORDER — ATORVASTATIN CALCIUM 80 MG/1
1 TABLET, FILM COATED ORAL
Qty: 0 | Refills: 0 | COMMUNITY
Start: 2019-02-15

## 2019-02-15 RX ADMIN — BUDESONIDE AND FORMOTEROL FUMARATE DIHYDRATE 2 PUFF(S): 160; 4.5 AEROSOL RESPIRATORY (INHALATION) at 21:23

## 2019-02-15 RX ADMIN — DULOXETINE HYDROCHLORIDE 20 MILLIGRAM(S): 30 CAPSULE, DELAYED RELEASE ORAL at 12:48

## 2019-02-15 RX ADMIN — HEPARIN SODIUM 5000 UNIT(S): 5000 INJECTION INTRAVENOUS; SUBCUTANEOUS at 21:23

## 2019-02-15 RX ADMIN — Medication 125 MILLIGRAM(S): at 23:16

## 2019-02-15 RX ADMIN — Medication 5 MILLIGRAM(S): at 06:10

## 2019-02-15 RX ADMIN — Medication 125 MILLIGRAM(S): at 06:10

## 2019-02-15 RX ADMIN — Medication 3 MILLILITER(S): at 19:42

## 2019-02-15 RX ADMIN — AMLODIPINE BESYLATE 10 MILLIGRAM(S): 2.5 TABLET ORAL at 06:10

## 2019-02-15 RX ADMIN — BUDESONIDE AND FORMOTEROL FUMARATE DIHYDRATE 2 PUFF(S): 160; 4.5 AEROSOL RESPIRATORY (INHALATION) at 08:03

## 2019-02-15 RX ADMIN — Medication 1 APPLICATION(S): at 12:48

## 2019-02-15 RX ADMIN — HEPARIN SODIUM 5000 UNIT(S): 5000 INJECTION INTRAVENOUS; SUBCUTANEOUS at 13:07

## 2019-02-15 RX ADMIN — Medication 81 MILLIGRAM(S): at 12:48

## 2019-02-15 RX ADMIN — Medication 50 MILLIGRAM(S): at 06:10

## 2019-02-15 RX ADMIN — Medication 500 MILLIGRAM(S): at 13:07

## 2019-02-15 RX ADMIN — Medication 125 MILLIGRAM(S): at 17:14

## 2019-02-15 RX ADMIN — Medication 3 MILLILITER(S): at 08:03

## 2019-02-15 RX ADMIN — Medication 5 MILLIGRAM(S): at 21:22

## 2019-02-15 RX ADMIN — Medication 5 MILLIGRAM(S): at 13:07

## 2019-02-15 RX ADMIN — ATORVASTATIN CALCIUM 20 MILLIGRAM(S): 80 TABLET, FILM COATED ORAL at 21:22

## 2019-02-15 RX ADMIN — Medication 500 MILLIGRAM(S): at 21:21

## 2019-02-15 RX ADMIN — HEPARIN SODIUM 5000 UNIT(S): 5000 INJECTION INTRAVENOUS; SUBCUTANEOUS at 06:11

## 2019-02-15 RX ADMIN — Medication 125 MILLIGRAM(S): at 12:47

## 2019-02-15 RX ADMIN — Medication 500 MILLIGRAM(S): at 06:11

## 2019-02-15 NOTE — PROGRESS NOTE ADULT - SUBJECTIVE AND OBJECTIVE BOX
Patient is a 84y old  Female who presents with a chief complaint of Urinary Retention/Hperkalemia with EKG changes (15 Feb 2019 17:20)       Pt is seen and examined  pt is awake and lying in bed        ROS:  Negative except for:abdo pain/diarrhea    MEDICATIONS  (STANDING):  ALBUTerol/ipratropium for Nebulization 3 milliLiter(s) Nebulizer every 6 hours  amLODIPine   Tablet 10 milliGRAM(s) Oral daily  amoxicillin 500 milliGRAM(s) Oral every 8 hours  aspirin  chewable 81 milliGRAM(s) Oral daily  atorvastatin 20 milliGRAM(s) Oral at bedtime  buDESOnide 160 MICROgram(s)/formoterol 4.5 MICROgram(s) Inhaler 2 Puff(s) Inhalation two times a day  busPIRone 5 milliGRAM(s) Oral three times a day  chlorhexidine 4% Liquid 1 Application(s) Topical <User Schedule>  docusate sodium 200 milliGRAM(s) Oral at bedtime  DULoxetine 20 milliGRAM(s) Oral daily  epoetin spencer Injectable 14015 Unit(s) SubCutaneous every 7 days  heparin  Injectable 5000 Unit(s) SubCutaneous every 8 hours  hydrocortisone 2.5% Rectal Cream 1 Application(s) Rectal daily  metoprolol succinate ER 50 milliGRAM(s) Oral daily  senna 2 Tablet(s) Oral daily  sodium chloride 0.9%. 1000 milliLiter(s) (60 mL/Hr) IV Continuous <Continuous>  vancomycin    Solution 125 milliGRAM(s) Oral every 6 hours    MEDICATIONS  (PRN):  bisacodyl Suppository 10 milliGRAM(s) Rectal every 24 hours PRN Constipation  cloNIDine 0.1 milliGRAM(s) Oral every 8 hours PRN for sbp > 180  HYDROcodone 10 mG/acetaminophen 325 mG 1 Tablet(s) Oral every 4 hours PRN Moderate Pain (4 - 6)  ondansetron Injectable 4 milliGRAM(s) IV Push every 8 hours PRN Nausea and/or Vomiting  simethicone 80 milliGRAM(s) Chew every 8 hours PRN Gas      Allergies    hair dyes (Hives)  sulfa drugs (Headache)  Zoloft (Headache)    Intolerances        Vital Signs Last 24 Hrs  T(C): 36.2 (15 Feb 2019 14:07), Max: 36.3 (14 Feb 2019 22:40)  T(F): 97.1 (15 Feb 2019 14:07), Max: 97.3 (14 Feb 2019 22:40)  HR: 77 (15 Feb 2019 14:07) (77 - 86)  BP: 130/60 (15 Feb 2019 14:07) (123/58 - 147/67)  BP(mean): --  RR: 16 (15 Feb 2019 14:07) (16 - 16)  SpO2: --    PHYSICAL EXAM  General: adult in NAD  HEENT: clear oropharynx, anicteric sclera, pink conjunctiva  Neck: supple  CV: normal S1/S2 with no murmur rubs or gallops  Lungs: positive air movement b/l ant lungs,clear to auscultation, no wheezes, no rales  Abdomen: soft non-tender non-distended, no hepatosplenomegaly  Ext: no clubbing cyanosis or edema  Skin: no rashes and no petechiae  Neuro: alert and oriented X 4, no focal deficits  LABS:                          10.1   34.64 )-----------( 214      ( 15 Feb 2019 06:13 )             30.7         Mean Cell Volume : 98.4 fL  Mean Cell Hemoglobin : 32.4 pg  Mean Cell Hemoglobin Concentration : 32.9 g/dL  Auto Neutrophil # : x  Auto Lymphocyte # : x  Auto Monocyte # : x  Auto Eosinophil # : x  Auto Basophil # : x  Auto Neutrophil % : x  Auto Lymphocyte % : x  Auto Monocyte % : x  Auto Eosinophil % : x  Auto Basophil % : x    Serial CBC's  02-15 @ 06:13  Hct-30.7 / Hgb-10.1 / Plat-214 / RBC-3.12 / WBC-34.64          Serial CBC's  02-13 @ 14:50  Hct-29.4 / Hgb-9.7 / Plat-219 / RBC-2.95 / WBC-38.51          Serial CBC's  02-12 @ 08:01  Hct-31.3 / Hgb-10.3 / Plat-213 / RBC-3.14 / WBC-38.19            02-15    127<L>  |  92<L>  |  48<H>  ----------------------------<  100<H>  5.1<H>   |  25  |  1.0    Ca    7.7<L>      15 Feb 2019 06:13    TPro  4.5<L>  /  Alb  2.6<L>  /  TBili  0.3  /  DBili  x   /  AST  25  /  ALT  15  /  AlkPhos  125<H>  02-15          Platelet Count - Automated: 214 K/uL (02-15-19 @ 06:13)  Hemoglobin: 10.1 g/dL (02-15-19 @ 06:13)  WBC Count: 34.64 K/uL (02-15-19 @ 06:13)  Hematocrit: 30.7 % (02-15-19 @ 06:13)  Hematocrit: 29.4 % (02-13-19 @ 14:50)  Platelet Count - Automated: 219 K/uL (02-13-19 @ 14:50)  Hemoglobin: 9.7 g/dL (02-13-19 @ 14:50)  WBC Count: 38.51 K/uL (02-13-19 @ 14:50)  Hematocrit: 31.3 % (02-12-19 @ 08:01)  Hemoglobin: 10.3 g/dL (02-12-19 @ 08:01)  WBC Count: 38.19 K/uL (02-12-19 @ 08:01)  Platelet Count - Automated: 213 K/uL (02-12-19 @ 08:01)  Hemoglobin: 9.8 g/dL (02-11-19 @ 06:49)  WBC Count: 34.22 K/uL (02-11-19 @ 06:49)  Platelet Count - Automated: 188 K/uL (02-11-19 @ 06:49)  Hematocrit: 29.6 % (02-11-19 @ 06:49)  WBC Count: 32.94 K/uL (02-10-19 @ 15:05)  Hematocrit: 30.4 % (02-10-19 @ 15:05)  Platelet Count - Automated: 164 K/uL (02-10-19 @ 15:05)  Hemoglobin: 10.0 g/dL (02-10-19 @ 15:05)  Hematocrit: 31.7 % (02-09-19 @ 18:50)  Platelet Count - Automated: 188 K/uL (02-09-19 @ 18:50)  Hemoglobin: 10.1 g/dL (02-09-19 @ 18:50)  WBC Count: 26.14 K/uL (02-09-19 @ 18:50)  Hemoglobin: 9.4 g/dL (02-06-19 @ 07:12)  WBC Count: 9.15 K/uL (02-06-19 @ 07:12)  Platelet Count - Automated: 160 K/uL (02-06-19 @ 07:12)  Hematocrit: 28.5 % (02-06-19 @ 07:12)                BLOOD SMEAR INTERPRETATION:       RADIOLOGY & ADDITIONAL STUDIES:

## 2019-02-15 NOTE — DISCHARGE NOTE ADULT - MEDICATION SUMMARY - MEDICATIONS TO CHANGE
I will SWITCH the dose or number of times a day I take the medications listed below when I get home from the hospital:    Centrum Adults oral tablet  -- 1 tab(s) by mouth once a day    Vicodin HP 10 mg-300 mg oral tablet  -- 1 tab(s) by mouth every 6 hours

## 2019-02-15 NOTE — PROGRESS NOTE ADULT - SUBJECTIVE AND OBJECTIVE BOX
infectious diseases progress note:  KOTA SCHAFER is a 84yFemale patient    HYPERKALEMIA UTI URINARY RETENTION LEUKOCYTOSIS GEETA    Clostridium difficile colitis  Leukocytosis, unspecified type  VUR (vesicoureteric reflux)  Urinary tract infection without hematuria, site unspecified  Urinary tract infection associated with indwelling urethral catheter, subsequent encounter  CHF (congestive heart failure)  COPD (chronic obstructive pulmonary disease)  GEETA (acute kidney injury)  UTI (urinary tract infection)  Urinary retention  Hyperkalemia      ROS:  reduced BMs - loose   Allergies    hair dyes (Hives)  sulfa drugs (Headache)  Zoloft (Headache)    Intolerances        ANTIBIOTICS/RELEVANT:  antimicrobials  amoxicillin 500 milliGRAM(s) Oral every 8 hours  vancomycin    Solution 125 milliGRAM(s) Oral every 6 hours    immunologic:  epoetin spencer Injectable 03208 Unit(s) SubCutaneous every 7 days    OTHER:  ALBUTerol/ipratropium for Nebulization 3 milliLiter(s) Nebulizer every 6 hours  amLODIPine   Tablet 10 milliGRAM(s) Oral daily  aspirin  chewable 81 milliGRAM(s) Oral daily  atorvastatin 20 milliGRAM(s) Oral at bedtime  bisacodyl Suppository 10 milliGRAM(s) Rectal every 24 hours PRN  buDESOnide 160 MICROgram(s)/formoterol 4.5 MICROgram(s) Inhaler 2 Puff(s) Inhalation two times a day  busPIRone 5 milliGRAM(s) Oral three times a day  chlorhexidine 4% Liquid 1 Application(s) Topical <User Schedule>  cloNIDine 0.1 milliGRAM(s) Oral every 8 hours PRN  docusate sodium 200 milliGRAM(s) Oral at bedtime  DULoxetine 20 milliGRAM(s) Oral daily  heparin  Injectable 5000 Unit(s) SubCutaneous every 8 hours  HYDROcodone 10 mG/acetaminophen 325 mG 1 Tablet(s) Oral every 4 hours PRN  hydrocortisone 2.5% Rectal Cream 1 Application(s) Rectal daily  metoprolol succinate ER 50 milliGRAM(s) Oral daily  ondansetron Injectable 4 milliGRAM(s) IV Push every 8 hours PRN  senna 2 Tablet(s) Oral daily  simethicone 80 milliGRAM(s) Chew every 8 hours PRN  sodium chloride 0.9%. 1000 milliLiter(s) IV Continuous <Continuous>      Objective:  T(F): 96.7 (02-15-19 @ 05:15), Max: 98.2 (02-14-19 @ 13:58)  HR: 83 (02-15-19 @ 05:15) (83 - 98)  BP: 123/58 (02-15-19 @ 05:15) (123/58 - 159/70)  RR: 16 (02-15-19 @ 05:15) (16 - 16)  SpO2: --    PHYSICAL EXAM:  Constitutional: comfortable 	  Neck:no JVD, no lymphadenopathy, supple  Respiratory: CTA dalia  Cardiovascular: S1S2 +  Gastrointestinal:soft, (+) BS, no HSM  Extremities:no phlebitis         LABS:                        9.7    38.51 )-----------( 219      ( 13 Feb 2019 14:50 )             29.4     02-13    125<L>  |  89<L>  |  46<H>  ----------------------------<  97  4.9   |  25  |  1.0    Ca    8.0<L>      13 Feb 2019 14:50              MICROBIOLOGY:    Culture - Blood (collected 02-12-19 @ 08:01)  Source: .Blood None  Preliminary Report (02-13-19 @ 23:01):    No growth to date.    Culture - Blood (collected 02-11-19 @ 12:09)  Source: .Blood Blood  Preliminary Report (02-12-19 @ 23:01):    No growth to date.    Culture - Urine (collected 02-10-19 @ 19:08)  Source: .Urine Catheterized  Final Report (02-11-19 @ 18:43):    No growth        Culture - Blood (collected 12 Feb 2019 08:01)  Source: .Blood None  Preliminary Report (13 Feb 2019 23:01):    No growth to date.      Culture Results:   No growth to date. (02-12 @ 08:01)  Culture Results:   No growth to date. (02-11 @ 12:09)  Culture Results:   No growth (02-10 @ 19:08)    C Diff by PCR Result: Positive (02-13 @ 16:20)      RADIOLOGY & ADDITIONAL STUDIES:

## 2019-02-15 NOTE — DISCHARGE NOTE ADULT - CARE PLAN
Principal Discharge DX:	Bladder cancer  Goal:	urinary retention and UTI resoved  Assessment and plan of treatment:	finished amoxicillin  Secondary Diagnosis:	Clostridium difficile colitis  Goal:	cintinue Vancomycin

## 2019-02-15 NOTE — PROGRESS NOTE ADULT - ASSESSMENT
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 UTI with elevated WBC,s/p abx.  seen by urology,no other intervention by urology  Complaining of abdominal pains and diarrhea sec to  C. diff colitis  CT shows colitis and right hydroureteronephrosis  Small effusions on CT right > left with lung nodules.      PLAN:  on po vanco for c diff.  f/u with ID.  contact isolation.  start on procrit 40,000 unit qweekly if hb <10 ,one dose today  monitor counts.  f/u with urology for recent TURBT path result  agree with Outpatient follow up of lung nodules, repeat CT chest in 3 months  cont other supportive care.  plan of care was explained to pt   will f/u

## 2019-02-15 NOTE — DISCHARGE NOTE ADULT - HOSPITAL COURSE
· Chief Complaint: The patient is a 84y Female complaining of abdominal pain.	  · HPI Objective Statement: 84 year old female w hx of HTN, CHF, COPD on 2 LPM NC daily, bladder cancer presents to the ED with urinary retention, crampy/spasm-like lower abdominal pain and mild distention. Patient had bladder surgery w Dr. Sanchez on 1/30/19 and was d/c home with a lynne catheter in place. The catheter was removed yesterday. Patient had two episodes of normal urination after removal, but has since been experiencing retention and dribbling at most. Denies fevers, chills, cough, chest pain, shortness of breath, flank pain, myalgias, incontinence, rashes, or recent illness.	    PAST MEDICAL/SURGICAL/FAMILY/SOCIAL HISTORY:   Past Medical History:  Anal cancer  Chemo and radiation 1992  Anemia  4 PRBC 11/2018  Bladder cancer  2018  Breast tumor  removal benign bl breasts  CHF (congestive heart failure)    COPD (chronic obstructive pulmonary disease)    Hypertension    Lung disease  COPD  Oxygen dependent  O2 2L Via NC.    final dx    C diff colitis  MDS  Anemia    discharged om PO vanco

## 2019-02-15 NOTE — PROGRESS NOTE ADULT - ASSESSMENT
hypovolemic hyponatremia  GEETA resolved, but now with worsening prerenal azotemia  cdiff colitis without diarrhea  leukocytosis from cdiff  HTN   bladder ca  s/p TURBT  right hydro   UTI - enterococci  COPD  CHF  anemia / MDS  chronic back pain with spinal stimulator    Plan:    PO vanco  complete po amoxicillin course  NS @ 60 cc/hr another 24 - 48 hours  encourage po intake  cont norvasc and lopressor  clonidine 0.1mg po prn for sbp > 180  off aldactone - do not restart  contact isolation  f/u labs  will follow

## 2019-02-15 NOTE — DISCHARGE NOTE ADULT - MEDICATION SUMMARY - MEDICATIONS TO TAKE
I will START or STAY ON the medications listed below when I get home from the hospital:    spironolactone 25 mg oral tablet  -- 1 tab(s) by mouth once a day  -- Indication: For Htn    hydrocodone-acetaminophen 10 mg-325 mg oral tablet  -- 1 tab(s) by mouth every 4 hours, As needed, Moderate Pain (4 - 6)  -- Indication: For pain    aspirin 81 mg oral tablet, chewable  -- 1 tab(s) by mouth once a day  -- Indication: For prevention    cloNIDine 0.1 mg oral tablet  -- 1 tab(s) by mouth every 8 hours, As needed, for sbp > 180  -- Indication: For Hypertension    DULoxetine 20 mg oral delayed release capsule  -- 1 cap(s) by mouth once a day  -- Indication: For depression    atorvastatin 20 mg oral tablet  -- 1 tab(s) by mouth once a day (at bedtime)  -- Indication: For GEETA (acute kidney injury)    busPIRone 5 mg oral tablet  -- 1 tab(s) by mouth 3 times a day  -- Indication: For depression    metoprolol succinate 50 mg oral tablet, extended release  -- 1 tab(s) by mouth once a day  -- Indication: For Hypertension    ipratropium-albuterol 0.5 mg-2.5 mg/3 mLinhalation solution  -- 3 milliliter(s) inhaled every 6 hours  -- Indication: For sob    budesonide-formoterol 160 mcg-4.5 mcg/inh inhalation aerosol  --  inhaled   -- Indication: For sob    ProAir HFA 90 mcg/inh inhalation aerosol  -- 2 puff(s) inhaled 4 times a day  -- Indication: For sob    amLODIPine 10 mg oral tablet  -- 1 tab(s) by mouth once a day  -- Indication: For Hypertension    hydrocortisone 2.5% rectal cream with applicator  -- 1 application rectally once a day  -- Indication: For irritaion    Procrit  -- injectable once a week  -- Indication: For Anemia    vancomycin 50 mg/mL oral liquid  -- 125 milliliter(s) by mouth every 6 hours  -- Indication: For C diff    bisacodyl 10 mg rectal suppository  -- 1 suppository(ies) rectally every 24 hours, As needed, Constipation  -- Indication: For COnstipation    Calcium 500+D oral tablet, chewable  -- 1 tab(s) by mouth 2 times a day  -- Indication: For supplement

## 2019-02-15 NOTE — DISCHARGE NOTE ADULT - PATIENT PORTAL LINK FT
You can access the [facility] Patient Portal, offered by [facility], by registering with the following website: [email or web address]

## 2019-02-15 NOTE — PROGRESS NOTE ADULT - SUBJECTIVE AND OBJECTIVE BOX
NEPHROLOGY FOLLOW UP NOTE    still on ivf  ID input noted  WBC lower  no diarrhea  no fever      PAST MEDICAL & SURGICAL HISTORY:  CHF (congestive heart failure)  Breast tumor: removal benign bl breasts  Anal cancer  COPD (chronic obstructive pulmonary disease)  Lung disease: COPD  Hypertension  H/O breast surgery: 39 yrs. ago, bilateral  History of back surgery: 10 yrs. ago stimulator implant 2001, 2011    Allergies:  hair dyes (Hives)  sulfa drugs (Headache)    Home Medications Reviewed    SOCIAL HISTORY:  Denies ETOH,Smoking,   FAMILY HISTORY:  No pertinent family history in first degree relatives        REVIEW OF SYSTEMS:  All other review of systems is negative unless indicated above.        PHYSICAL EXAM:  NAD  awake and alert  dry mm  no jvd  b/l bs  rrr  soft, + bs  no edema    Hospital Medications:   MEDICATIONS  (STANDING):  ALBUTerol/ipratropium for Nebulization 3 milliLiter(s) Nebulizer every 6 hours  amLODIPine   Tablet 10 milliGRAM(s) Oral daily  amoxicillin 500 milliGRAM(s) Oral every 8 hours  aspirin  chewable 81 milliGRAM(s) Oral daily  atorvastatin 20 milliGRAM(s) Oral at bedtime  buDESOnide 160 MICROgram(s)/formoterol 4.5 MICROgram(s) Inhaler 2 Puff(s) Inhalation two times a day  busPIRone 5 milliGRAM(s) Oral three times a day  chlorhexidine 4% Liquid 1 Application(s) Topical <User Schedule>  docusate sodium 200 milliGRAM(s) Oral at bedtime  DULoxetine 20 milliGRAM(s) Oral daily  epoetin spencer Injectable 63045 Unit(s) SubCutaneous every 7 days  heparin  Injectable 5000 Unit(s) SubCutaneous every 8 hours  hydrocortisone 2.5% Rectal Cream 1 Application(s) Rectal daily  metoprolol succinate ER 50 milliGRAM(s) Oral daily  senna 2 Tablet(s) Oral daily  sodium chloride 0.9%. 1000 milliLiter(s) (60 mL/Hr) IV Continuous <Continuous>  vancomycin    Solution 125 milliGRAM(s) Oral every 6 hours        VITALS:  T(F): 97.1 (02-15-19 @ 14:07), Max: 97.3 (02-14-19 @ 22:40)  HR: 77 (02-15-19 @ 14:07)  BP: 130/60 (02-15-19 @ 14:07)  RR: 16 (02-15-19 @ 14:07)  SpO2: --  Wt(kg): --        LABS:  02-15    127<L>  |  92<L>  |  48<H>  ----------------------------<  100<H>  5.1<H>   |  25  |  1.0    Ca    7.7<L>      15 Feb 2019 06:13    TPro  4.5<L>  /  Alb  2.6<L>  /  TBili  0.3  /  DBili      /  AST  25  /  ALT  15  /  AlkPhos  125<H>  02-15                          10.1   34.64 )-----------( 214      ( 15 Feb 2019 06:13 )             30.7       Urine Studies:  Urinalysis Basic - ( 10 Feb 2019 19:08 )    Color: Yellow / Appearance: Clear / SG: >=1.030 / pH:   Gluc:  / Ketone: 15  / Bili: Small / Urobili: 0.2 mg/dL   Blood:  / Protein: >=300 mg/dL / Nitrite: Negative   Leuk Esterase: Negative / RBC: 26-50 /HPF / WBC 3-5 /HPF   Sq Epi:  / Non Sq Epi: Few /HPF / Bacteria: Moderate          RADIOLOGY & ADDITIONAL STUDIES:

## 2019-02-16 LAB
ALBUMIN SERPL ELPH-MCNC: 2.4 G/DL — LOW (ref 3.5–5.2)
ALP SERPL-CCNC: 134 U/L — HIGH (ref 30–115)
ALT FLD-CCNC: 19 U/L — SIGNIFICANT CHANGE UP (ref 0–41)
ANION GAP SERPL CALC-SCNC: 9 MMOL/L — SIGNIFICANT CHANGE UP (ref 7–14)
AST SERPL-CCNC: 32 U/L — SIGNIFICANT CHANGE UP (ref 0–41)
BILIRUB SERPL-MCNC: 0.4 MG/DL — SIGNIFICANT CHANGE UP (ref 0.2–1.2)
BUN SERPL-MCNC: 43 MG/DL — HIGH (ref 10–20)
CALCIUM SERPL-MCNC: 7.9 MG/DL — LOW (ref 8.5–10.1)
CHLORIDE SERPL-SCNC: 94 MMOL/L — LOW (ref 98–110)
CO2 SERPL-SCNC: 25 MMOL/L — SIGNIFICANT CHANGE UP (ref 17–32)
CREAT SERPL-MCNC: 0.8 MG/DL — SIGNIFICANT CHANGE UP (ref 0.7–1.5)
CULTURE RESULTS: SIGNIFICANT CHANGE UP
GLUCOSE SERPL-MCNC: 132 MG/DL — HIGH (ref 70–99)
HCT VFR BLD CALC: 30.3 % — LOW (ref 37–47)
HGB BLD-MCNC: 9.9 G/DL — LOW (ref 12–16)
MCHC RBC-ENTMCNC: 32.7 G/DL — SIGNIFICANT CHANGE UP (ref 32–37)
MCHC RBC-ENTMCNC: 33.1 PG — HIGH (ref 27–31)
MCV RBC AUTO: 101.3 FL — HIGH (ref 81–99)
NRBC # BLD: 0 /100 WBCS — SIGNIFICANT CHANGE UP (ref 0–0)
PLATELET # BLD AUTO: 213 K/UL — SIGNIFICANT CHANGE UP (ref 130–400)
POTASSIUM SERPL-MCNC: 4.9 MMOL/L — SIGNIFICANT CHANGE UP (ref 3.5–5)
POTASSIUM SERPL-SCNC: 4.9 MMOL/L — SIGNIFICANT CHANGE UP (ref 3.5–5)
PROT SERPL-MCNC: 4.4 G/DL — LOW (ref 6–8)
RBC # BLD: 2.99 M/UL — LOW (ref 4.2–5.4)
RBC # FLD: 14.9 % — HIGH (ref 11.5–14.5)
SODIUM SERPL-SCNC: 128 MMOL/L — LOW (ref 135–146)
SPECIMEN SOURCE: SIGNIFICANT CHANGE UP
WBC # BLD: 24.04 K/UL — HIGH (ref 4.8–10.8)
WBC # FLD AUTO: 24.04 K/UL — HIGH (ref 4.8–10.8)

## 2019-02-16 RX ADMIN — Medication 125 MILLIGRAM(S): at 23:17

## 2019-02-16 RX ADMIN — Medication 3 MILLILITER(S): at 07:33

## 2019-02-16 RX ADMIN — Medication 5 MILLIGRAM(S): at 16:36

## 2019-02-16 RX ADMIN — AMLODIPINE BESYLATE 10 MILLIGRAM(S): 2.5 TABLET ORAL at 05:48

## 2019-02-16 RX ADMIN — ONDANSETRON 4 MILLIGRAM(S): 8 TABLET, FILM COATED ORAL at 19:36

## 2019-02-16 RX ADMIN — Medication 500 MILLIGRAM(S): at 22:02

## 2019-02-16 RX ADMIN — Medication 5 MILLIGRAM(S): at 05:48

## 2019-02-16 RX ADMIN — SODIUM CHLORIDE 60 MILLILITER(S): 9 INJECTION INTRAMUSCULAR; INTRAVENOUS; SUBCUTANEOUS at 16:37

## 2019-02-16 RX ADMIN — Medication 500 MILLIGRAM(S): at 16:36

## 2019-02-16 RX ADMIN — HEPARIN SODIUM 5000 UNIT(S): 5000 INJECTION INTRAVENOUS; SUBCUTANEOUS at 16:36

## 2019-02-16 RX ADMIN — Medication 3 MILLILITER(S): at 13:05

## 2019-02-16 RX ADMIN — CHLORHEXIDINE GLUCONATE 1 APPLICATION(S): 213 SOLUTION TOPICAL at 09:20

## 2019-02-16 RX ADMIN — SODIUM CHLORIDE 60 MILLILITER(S): 9 INJECTION INTRAMUSCULAR; INTRAVENOUS; SUBCUTANEOUS at 01:57

## 2019-02-16 RX ADMIN — Medication 81 MILLIGRAM(S): at 12:52

## 2019-02-16 RX ADMIN — Medication 125 MILLIGRAM(S): at 05:49

## 2019-02-16 RX ADMIN — Medication 125 MILLIGRAM(S): at 17:28

## 2019-02-16 RX ADMIN — SENNA PLUS 2 TABLET(S): 8.6 TABLET ORAL at 12:52

## 2019-02-16 RX ADMIN — HEPARIN SODIUM 5000 UNIT(S): 5000 INJECTION INTRAVENOUS; SUBCUTANEOUS at 05:48

## 2019-02-16 RX ADMIN — DULOXETINE HYDROCHLORIDE 20 MILLIGRAM(S): 30 CAPSULE, DELAYED RELEASE ORAL at 12:52

## 2019-02-16 RX ADMIN — Medication 1 APPLICATION(S): at 12:53

## 2019-02-16 RX ADMIN — BUDESONIDE AND FORMOTEROL FUMARATE DIHYDRATE 2 PUFF(S): 160; 4.5 AEROSOL RESPIRATORY (INHALATION) at 09:20

## 2019-02-16 RX ADMIN — ATORVASTATIN CALCIUM 20 MILLIGRAM(S): 80 TABLET, FILM COATED ORAL at 22:02

## 2019-02-16 RX ADMIN — Medication 200 MILLIGRAM(S): at 22:02

## 2019-02-16 RX ADMIN — HEPARIN SODIUM 5000 UNIT(S): 5000 INJECTION INTRAVENOUS; SUBCUTANEOUS at 22:02

## 2019-02-16 RX ADMIN — Medication 125 MILLIGRAM(S): at 12:52

## 2019-02-16 RX ADMIN — Medication 5 MILLIGRAM(S): at 22:02

## 2019-02-16 RX ADMIN — BUDESONIDE AND FORMOTEROL FUMARATE DIHYDRATE 2 PUFF(S): 160; 4.5 AEROSOL RESPIRATORY (INHALATION) at 22:02

## 2019-02-16 RX ADMIN — Medication 500 MILLIGRAM(S): at 05:47

## 2019-02-16 RX ADMIN — Medication 50 MILLIGRAM(S): at 05:48

## 2019-02-16 NOTE — CHART NOTE - NSCHARTNOTEFT_GEN_A_CORE
Registered Dietitian Follow-Up     Patient Profile Reviewed                           Yes [X]   No []     Nutrition History Previously Obtained        Yes [X]  No []       Pertinent  Information: pt presents with urinary retention/abd pain, s/p cysto 2/2 bladder CA on 1/30/19.pt admitted with urinary retention/hyperkalemia and EKG changes. 2/12 CT indicative of colitis with elevated WBC, as per GI no intervention warranted at this time           Diet order: low k+ diet tolerating well 50% of meals consumed      Anthropometrics:  - Ht.   - Wt. 56.3 kgs   - %wt change  - BMI   - IBW      Pertinent Lab Data: 2/16 BUN 43H, gluc 132H, alb 2.4L, Na 128L,  2/15 WBC 34.64H, hgb 10.1L, hct 30.7L      Pertinent Meds: amoxicillin, vanco, epogen, zofran, buspar, senna, toprol      Physical Findings:  - Appearance: resting   - GI function: + BS   - Tubes:  - Oral/Mouth cavity:  - Skin: intact      Nutrition Requirements  Weight Used:  56.3 kgs      Estimated Energy Needs    Continue [X  Adjust []  Adjusted Energy Recommendations:   kcal/day        Estimated Protein Needs    Continue X[]  Adjust []  Adjusted Protein Recommendations:   gm/day        Estimated Fluid Needs        Continue [X]  Adjust []  Adjusted Fluid Recommendations:   mL/day     Nutrient Intake: 50 % OF MEALS CONSUMED        [] Previous Nutrition Diagnosis:            [x] Ongoing          [] Resolved    [] No active nutrition diagnosis identified at this time     Nutrition Diagnostic #1  Problem:   Etiology:   Statement:      Nutrition Diagnostic #2  Problem:  Etiology:  Statement:     Nutrition Intervention:  add ensure 240 ml with meals      Goal/Expected Outcome: pt to tolerate po diet and consume at least 50% of meals/supplements      Indicator/Monitoring: po intake, labs/meds, skin integrity

## 2019-02-16 NOTE — PROGRESS NOTE ADULT - SUBJECTIVE AND OBJECTIVE BOX
NEPHROLOGY FOLLOW UP NOTE    still on ivf  c/o weakness  no diarrhea  wbc improving  d/w daughters      PAST MEDICAL & SURGICAL HISTORY:  CHF (congestive heart failure)  Breast tumor: removal benign bl breasts  Anal cancer  COPD (chronic obstructive pulmonary disease)  Lung disease: COPD  Hypertension  H/O breast surgery: 39 yrs. ago, bilateral  History of back surgery: 10 yrs. ago stimulator implant 2001, 2011    Allergies:  hair dyes (Hives)  sulfa drugs (Headache)    Home Medications Reviewed    SOCIAL HISTORY:  Denies ETOH,Smoking,   FAMILY HISTORY:  No pertinent family history in first degree relatives        REVIEW OF SYSTEMS:  All other review of systems is negative unless indicated above.        PHYSICAL EXAM:  NAD  awake and alert  dry mm  no jvd  b/l bs  rrr  soft, + bs  no edema    Hospital Medications:   MEDICATIONS  (STANDING):  ALBUTerol/ipratropium for Nebulization 3 milliLiter(s) Nebulizer every 6 hours  amLODIPine   Tablet 10 milliGRAM(s) Oral daily  amoxicillin 500 milliGRAM(s) Oral every 8 hours  aspirin  chewable 81 milliGRAM(s) Oral daily  atorvastatin 20 milliGRAM(s) Oral at bedtime  buDESOnide 160 MICROgram(s)/formoterol 4.5 MICROgram(s) Inhaler 2 Puff(s) Inhalation two times a day  busPIRone 5 milliGRAM(s) Oral three times a day  chlorhexidine 4% Liquid 1 Application(s) Topical <User Schedule>  docusate sodium 200 milliGRAM(s) Oral at bedtime  DULoxetine 20 milliGRAM(s) Oral daily  epoetin spencer Injectable 55093 Unit(s) SubCutaneous every 7 days  heparin  Injectable 5000 Unit(s) SubCutaneous every 8 hours  hydrocortisone 2.5% Rectal Cream 1 Application(s) Rectal daily  metoprolol succinate ER 50 milliGRAM(s) Oral daily  senna 2 Tablet(s) Oral daily  sodium chloride 0.9%. 1000 milliLiter(s) (60 mL/Hr) IV Continuous <Continuous>  vancomycin    Solution 125 milliGRAM(s) Oral every 6 hours        VITALS:  T(F): 96.9 (02-16-19 @ 14:10), Max: 97.6 (02-15-19 @ 20:42)  HR: 79 (02-16-19 @ 14:10)  BP: 131/62 (02-16-19 @ 14:10)  RR: 16 (02-16-19 @ 14:10)  SpO2: 97% (02-16-19 @ 09:23)  Wt(kg): --    Height (cm): 157.48 (02-16 @ 12:19)    LABS:  02-16    128<L>  |  94<L>  |  43<H>  ----------------------------<  132<H>  4.9   |  25  |  0.8    Ca    7.9<L>      16 Feb 2019 08:23    TPro  4.4<L>  /  Alb  2.4<L>  /  TBili  0.4  /  DBili      /  AST  32  /  ALT  19  /  AlkPhos  134<H>  02-16                          9.9    24.04 )-----------( 213      ( 16 Feb 2019 15:09 )             30.3       Urine Studies:  Urinalysis Basic - ( 10 Feb 2019 19:08 )    Color: Yellow / Appearance: Clear / SG: >=1.030 / pH:   Gluc:  / Ketone: 15  / Bili: Small / Urobili: 0.2 mg/dL   Blood:  / Protein: >=300 mg/dL / Nitrite: Negative   Leuk Esterase: Negative / RBC: 26-50 /HPF / WBC 3-5 /HPF   Sq Epi:  / Non Sq Epi: Few /HPF / Bacteria: Moderate          RADIOLOGY & ADDITIONAL STUDIES:

## 2019-02-16 NOTE — PROGRESS NOTE ADULT - SUBJECTIVE AND OBJECTIVE BOX
SUBJECTIVE:    Patient is a 84y old Female who presents with a chief complaint of Urinary Retention/Hperkalemia with EKG changes (15 Feb 2019 17:37)    Currently admitted to medicine with the primary diagnosis of Bladder cancer     Today is hospital day 11d. This morning she is resting comfortably in bed and reports no new issues or overnight events.     PAST MEDICAL & SURGICAL HISTORY  Anemia: 4 PRBC 11/2018  Oxygen dependent: O2 2L Via NC  Bladder cancer: 2018  CHF (congestive heart failure)  Breast tumor: removal benign bl breasts  Anal cancer: Chemo and radiation 1992  COPD (chronic obstructive pulmonary disease)  Lung disease: COPD  Hypertension  History of cystoscopy: 10/2018  H/O breast surgery: 39 yrs. ago, bilateral  History of back surgery: 10 yrs. ago stimulator implant 2001, 2011( cervical and Lumbar)    SOCIAL HISTORY:  Negative for smoking/alcohol/drug use.     ALLERGIES:  hair dyes (Hives)  sulfa drugs (Headache)  Zoloft (Headache)    MEDICATIONS:  STANDING MEDICATIONS  ALBUTerol/ipratropium for Nebulization 3 milliLiter(s) Nebulizer every 6 hours  amLODIPine   Tablet 10 milliGRAM(s) Oral daily  amoxicillin 500 milliGRAM(s) Oral every 8 hours  aspirin  chewable 81 milliGRAM(s) Oral daily  atorvastatin 20 milliGRAM(s) Oral at bedtime  buDESOnide 160 MICROgram(s)/formoterol 4.5 MICROgram(s) Inhaler 2 Puff(s) Inhalation two times a day  busPIRone 5 milliGRAM(s) Oral three times a day  chlorhexidine 4% Liquid 1 Application(s) Topical <User Schedule>  docusate sodium 200 milliGRAM(s) Oral at bedtime  DULoxetine 20 milliGRAM(s) Oral daily  epoetin spencer Injectable 33280 Unit(s) SubCutaneous every 7 days  heparin  Injectable 5000 Unit(s) SubCutaneous every 8 hours  hydrocortisone 2.5% Rectal Cream 1 Application(s) Rectal daily  metoprolol succinate ER 50 milliGRAM(s) Oral daily  senna 2 Tablet(s) Oral daily  sodium chloride 0.9%. 1000 milliLiter(s) IV Continuous <Continuous>  vancomycin    Solution 125 milliGRAM(s) Oral every 6 hours    PRN MEDICATIONS  bisacodyl Suppository 10 milliGRAM(s) Rectal every 24 hours PRN  cloNIDine 0.1 milliGRAM(s) Oral every 8 hours PRN  HYDROcodone 10 mG/acetaminophen 325 mG 1 Tablet(s) Oral every 4 hours PRN  ondansetron Injectable 4 milliGRAM(s) IV Push every 8 hours PRN  simethicone 80 milliGRAM(s) Chew every 8 hours PRN    VITALS:   T(F): 97.5  HR: 88  BP: 147/66  RR: 16  SpO2: 97%    LABS:                        10.1   34.64 )-----------( 214      ( 15 Feb 2019 06:13 )             30.7     02-16    128<L>  |  94<L>  |  43<H>  ----------------------------<  132<H>  4.9   |  25  |  0.8    Ca    7.9<L>      16 Feb 2019 08:23    TPro  4.4<L>  /  Alb  2.4<L>  /  TBili  0.4  /  DBili  x   /  AST  32  /  ALT  19  /  AlkPhos  134<H>  02-16                  RADIOLOGY:    PHYSICAL EXAM:  GEN: No acute distress  LUNGS: Clear to auscultation bilaterally   HEART: Regular  ABD: Soft, non-tender, non-distended.  EXT: NC/NC/NE/2+PP/SARAVIA/Skin Intact.   NEURO: AAOX3    Intravenous access:   NG tube:   Rowe Catheter:

## 2019-02-16 NOTE — PROGRESS NOTE ADULT - ASSESSMENT
hypovolemic hyponatremia  GEETA better but with prerenal azotemia  cdiff colitis without diarrhea  leukocytosis from cdiff - better  HTN   bladder ca  s/p TURBT  right hydro   UTI - enterococci  COPD  CHF  anemia / MDS  chronic back pain with spinal stimulator  resolved hyperkalemia    Plan:    cont PO vanco  complete po amoxicillin course tomorrow  cont NS @ 60 cc/hr   encourage po intake  cont norvasc and lopressor  off aldactone - do not restart  contact isolation  trend WBC  needs PT / rehab / OOB  DC planning to STR - daughters feel pt not ready for DC yet

## 2019-02-16 NOTE — PROGRESS NOTE ADULT - ASSESSMENT
85 yo female ex smoker with a history of COPD on home O2,Anemia sec to low grade MDS by recent bmbx,on weekly procrit   Admitted for electrolyte imbalance, GEETA,. UTI with   leukocytosis sec to c diff  seen by urology,no other intervention by urology  Complaining of abdominal pains and diarrhea sec to  C. diff colitis  CT shows colitis and right hydroureteronephrosis  Small effusions on CT right > left with lung nodules.      PLAN:  on po vanco for c diff.  leukocytosis is improving   f/u with ID.  contact isolation.  start on procrit 40,000 unit qweekly if hb <10 ,one dose today  monitor counts.  f/u with urology for recent TURBT path result  agree with Outpatient follow up of lung nodules, repeat CT chest in 3 months  cont other supportive care.  plan of care was explained to pt   will f/u

## 2019-02-16 NOTE — PROGRESS NOTE ADULT - SUBJECTIVE AND OBJECTIVE BOX
Patient is a 84y old  Female who presents with a chief complaint of Urinary Retention/Hperkalemia with EKG changes (16 Feb 2019 16:06)       Pt is seen and examined  pt is awake and lying in bed  pt seems comfortable      ROS:  Negative except for:abdo pain which is better now    MEDICATIONS  (STANDING):  ALBUTerol/ipratropium for Nebulization 3 milliLiter(s) Nebulizer every 6 hours  amLODIPine   Tablet 10 milliGRAM(s) Oral daily  amoxicillin 500 milliGRAM(s) Oral every 8 hours  aspirin  chewable 81 milliGRAM(s) Oral daily  atorvastatin 20 milliGRAM(s) Oral at bedtime  buDESOnide 160 MICROgram(s)/formoterol 4.5 MICROgram(s) Inhaler 2 Puff(s) Inhalation two times a day  busPIRone 5 milliGRAM(s) Oral three times a day  chlorhexidine 4% Liquid 1 Application(s) Topical <User Schedule>  docusate sodium 200 milliGRAM(s) Oral at bedtime  DULoxetine 20 milliGRAM(s) Oral daily  epoetin spencer Injectable 91272 Unit(s) SubCutaneous every 7 days  heparin  Injectable 5000 Unit(s) SubCutaneous every 8 hours  hydrocortisone 2.5% Rectal Cream 1 Application(s) Rectal daily  metoprolol succinate ER 50 milliGRAM(s) Oral daily  senna 2 Tablet(s) Oral daily  sodium chloride 0.9%. 1000 milliLiter(s) (60 mL/Hr) IV Continuous <Continuous>  vancomycin    Solution 125 milliGRAM(s) Oral every 6 hours    MEDICATIONS  (PRN):  bisacodyl Suppository 10 milliGRAM(s) Rectal every 24 hours PRN Constipation  cloNIDine 0.1 milliGRAM(s) Oral every 8 hours PRN for sbp > 180  HYDROcodone 10 mG/acetaminophen 325 mG 1 Tablet(s) Oral every 4 hours PRN Moderate Pain (4 - 6)  ondansetron Injectable 4 milliGRAM(s) IV Push every 8 hours PRN Nausea and/or Vomiting  simethicone 80 milliGRAM(s) Chew every 8 hours PRN Gas      Allergies    hair dyes (Hives)  sulfa drugs (Headache)  Zoloft (Headache)    Intolerances        Vital Signs Last 24 Hrs  T(C): 36.1 (16 Feb 2019 14:10), Max: 36.4 (15 Feb 2019 20:42)  T(F): 96.9 (16 Feb 2019 14:10), Max: 97.6 (15 Feb 2019 20:42)  HR: 79 (16 Feb 2019 14:10) (79 - 88)  BP: 131/62 (16 Feb 2019 14:10) (131/62 - 150/72)  BP(mean): --  RR: 16 (16 Feb 2019 14:10) (16 - 16)  SpO2: 97% (16 Feb 2019 09:23) (97% - 97%)    PHYSICAL EXAM  General: adult in NAD  HEENT: clear oropharynx, anicteric sclera, pink conjunctiva  Neck: supple  CV: normal S1/S2 with no murmur rubs or gallops  Lungs: positive air movement b/l ant lungs,clear to auscultation, no wheezes, no rales  Abdomen: soft non-tender non-distended, no hepatosplenomegaly  Ext: no clubbing cyanosis or edema  Skin: no rashes and no petechiae  Neuro: alert and oriented X 4, no focal deficits  LABS:                          9.9    24.04 )-----------( 213      ( 16 Feb 2019 15:09 )             30.3         Mean Cell Volume : 101.3 fL  Mean Cell Hemoglobin : 33.1 pg  Mean Cell Hemoglobin Concentration : 32.7 g/dL  Auto Neutrophil # : x  Auto Lymphocyte # : x  Auto Monocyte # : x  Auto Eosinophil # : x  Auto Basophil # : x  Auto Neutrophil % : x  Auto Lymphocyte % : x  Auto Monocyte % : x  Auto Eosinophil % : x  Auto Basophil % : x    Serial CBC's  02-16 @ 15:09  Hct-30.3 / Hgb-9.9 / Plat-213 / RBC-2.99 / WBC-24.04          Serial CBC's  02-15 @ 06:13  Hct-30.7 / Hgb-10.1 / Plat-214 / RBC-3.12 / WBC-34.64          Serial CBC's  02-13 @ 14:50  Hct-29.4 / Hgb-9.7 / Plat-219 / RBC-2.95 / WBC-38.51            02-16    128<L>  |  94<L>  |  43<H>  ----------------------------<  132<H>  4.9   |  25  |  0.8    Ca    7.9<L>      16 Feb 2019 08:23    TPro  4.4<L>  /  Alb  2.4<L>  /  TBili  0.4  /  DBili  x   /  AST  32  /  ALT  19  /  AlkPhos  134<H>  02-16          WBC Count: 24.04 K/uL (02-16-19 @ 15:09)  Hematocrit: 30.3 % (02-16-19 @ 15:09)  Platelet Count - Automated: 213 K/uL (02-16-19 @ 15:09)  Hemoglobin: 9.9 g/dL (02-16-19 @ 15:09)  Platelet Count - Automated: 214 K/uL (02-15-19 @ 06:13)  Hemoglobin: 10.1 g/dL (02-15-19 @ 06:13)  WBC Count: 34.64 K/uL (02-15-19 @ 06:13)  Hematocrit: 30.7 % (02-15-19 @ 06:13)  Hematocrit: 29.4 % (02-13-19 @ 14:50)  Platelet Count - Automated: 219 K/uL (02-13-19 @ 14:50)  Hemoglobin: 9.7 g/dL (02-13-19 @ 14:50)  WBC Count: 38.51 K/uL (02-13-19 @ 14:50)  Hematocrit: 31.3 % (02-12-19 @ 08:01)  Hemoglobin: 10.3 g/dL (02-12-19 @ 08:01)  WBC Count: 38.19 K/uL (02-12-19 @ 08:01)  Platelet Count - Automated: 213 K/uL (02-12-19 @ 08:01)  Hemoglobin: 9.8 g/dL (02-11-19 @ 06:49)  WBC Count: 34.22 K/uL (02-11-19 @ 06:49)  Platelet Count - Automated: 188 K/uL (02-11-19 @ 06:49)  Hematocrit: 29.6 % (02-11-19 @ 06:49)  WBC Count: 32.94 K/uL (02-10-19 @ 15:05)  Hematocrit: 30.4 % (02-10-19 @ 15:05)  Platelet Count - Automated: 164 K/uL (02-10-19 @ 15:05)  Hemoglobin: 10.0 g/dL (02-10-19 @ 15:05)  Hematocrit: 31.7 % (02-09-19 @ 18:50)  Platelet Count - Automated: 188 K/uL (02-09-19 @ 18:50)  Hemoglobin: 10.1 g/dL (02-09-19 @ 18:50)  WBC Count: 26.14 K/uL (02-09-19 @ 18:50)                BLOOD SMEAR INTERPRETATION:       RADIOLOGY & ADDITIONAL STUDIES:

## 2019-02-17 LAB
ALBUMIN SERPL ELPH-MCNC: 1.9 G/DL — LOW (ref 3.5–5.2)
ALP SERPL-CCNC: 121 U/L — HIGH (ref 30–115)
ALT FLD-CCNC: 20 U/L — SIGNIFICANT CHANGE UP (ref 0–41)
ANION GAP SERPL CALC-SCNC: 10 MMOL/L — SIGNIFICANT CHANGE UP (ref 7–14)
AST SERPL-CCNC: 31 U/L — SIGNIFICANT CHANGE UP (ref 0–41)
BILIRUB SERPL-MCNC: 0.2 MG/DL — SIGNIFICANT CHANGE UP (ref 0.2–1.2)
BUN SERPL-MCNC: 35 MG/DL — HIGH (ref 10–20)
CALCIUM SERPL-MCNC: 5.9 MG/DL — CRITICAL LOW (ref 8.5–10.1)
CHLORIDE SERPL-SCNC: 106 MMOL/L — SIGNIFICANT CHANGE UP (ref 98–110)
CO2 SERPL-SCNC: 21 MMOL/L — SIGNIFICANT CHANGE UP (ref 17–32)
CREAT SERPL-MCNC: 0.6 MG/DL — LOW (ref 0.7–1.5)
CULTURE RESULTS: SIGNIFICANT CHANGE UP
GLUCOSE SERPL-MCNC: 98 MG/DL — SIGNIFICANT CHANGE UP (ref 70–99)
HCT VFR BLD CALC: 31.3 % — LOW (ref 37–47)
HGB BLD-MCNC: 9.9 G/DL — LOW (ref 12–16)
MAGNESIUM SERPL-MCNC: 1.7 MG/DL — LOW (ref 1.8–2.4)
MCHC RBC-ENTMCNC: 31.6 G/DL — LOW (ref 32–37)
MCHC RBC-ENTMCNC: 32 PG — HIGH (ref 27–31)
MCV RBC AUTO: 101.3 FL — HIGH (ref 81–99)
NRBC # BLD: 0 /100 WBCS — SIGNIFICANT CHANGE UP (ref 0–0)
PHOSPHATE SERPL-MCNC: 2.8 MG/DL — SIGNIFICANT CHANGE UP (ref 2.1–4.9)
PLATELET # BLD AUTO: 230 K/UL — SIGNIFICANT CHANGE UP (ref 130–400)
POTASSIUM SERPL-MCNC: 4.2 MMOL/L — SIGNIFICANT CHANGE UP (ref 3.5–5)
POTASSIUM SERPL-SCNC: 4.2 MMOL/L — SIGNIFICANT CHANGE UP (ref 3.5–5)
PROT SERPL-MCNC: 3.5 G/DL — LOW (ref 6–8)
RBC # BLD: 3.09 M/UL — LOW (ref 4.2–5.4)
RBC # FLD: 14.7 % — HIGH (ref 11.5–14.5)
SODIUM SERPL-SCNC: 137 MMOL/L — SIGNIFICANT CHANGE UP (ref 135–146)
SPECIMEN SOURCE: SIGNIFICANT CHANGE UP
WBC # BLD: 20 K/UL — HIGH (ref 4.8–10.8)
WBC # FLD AUTO: 20 K/UL — HIGH (ref 4.8–10.8)

## 2019-02-17 RX ORDER — CALCIUM GLUCONATE 100 MG/ML
2 VIAL (ML) INTRAVENOUS ONCE
Qty: 0 | Refills: 0 | Status: COMPLETED | OUTPATIENT
Start: 2019-02-17 | End: 2019-02-17

## 2019-02-17 RX ORDER — CALCIUM CARBONATE 500(1250)
1 TABLET ORAL
Qty: 0 | Refills: 0 | Status: COMPLETED | OUTPATIENT
Start: 2019-02-17 | End: 2019-02-19

## 2019-02-17 RX ADMIN — SODIUM CHLORIDE 60 MILLILITER(S): 9 INJECTION INTRAMUSCULAR; INTRAVENOUS; SUBCUTANEOUS at 11:08

## 2019-02-17 RX ADMIN — Medication 200 MILLIGRAM(S): at 22:07

## 2019-02-17 RX ADMIN — Medication 5 MILLIGRAM(S): at 22:06

## 2019-02-17 RX ADMIN — BUDESONIDE AND FORMOTEROL FUMARATE DIHYDRATE 2 PUFF(S): 160; 4.5 AEROSOL RESPIRATORY (INHALATION) at 08:19

## 2019-02-17 RX ADMIN — HEPARIN SODIUM 5000 UNIT(S): 5000 INJECTION INTRAVENOUS; SUBCUTANEOUS at 05:49

## 2019-02-17 RX ADMIN — SENNA PLUS 2 TABLET(S): 8.6 TABLET ORAL at 11:07

## 2019-02-17 RX ADMIN — HEPARIN SODIUM 5000 UNIT(S): 5000 INJECTION INTRAVENOUS; SUBCUTANEOUS at 22:07

## 2019-02-17 RX ADMIN — Medication 125 MILLIGRAM(S): at 05:48

## 2019-02-17 RX ADMIN — Medication 500 MILLIGRAM(S): at 05:50

## 2019-02-17 RX ADMIN — ATORVASTATIN CALCIUM 20 MILLIGRAM(S): 80 TABLET, FILM COATED ORAL at 22:06

## 2019-02-17 RX ADMIN — Medication 125 MILLIGRAM(S): at 17:45

## 2019-02-17 RX ADMIN — DULOXETINE HYDROCHLORIDE 20 MILLIGRAM(S): 30 CAPSULE, DELAYED RELEASE ORAL at 11:07

## 2019-02-17 RX ADMIN — Medication 125 MILLIGRAM(S): at 23:01

## 2019-02-17 RX ADMIN — Medication 3 MILLILITER(S): at 14:33

## 2019-02-17 RX ADMIN — Medication 1 TABLET(S): at 22:07

## 2019-02-17 RX ADMIN — ONDANSETRON 4 MILLIGRAM(S): 8 TABLET, FILM COATED ORAL at 04:40

## 2019-02-17 RX ADMIN — AMLODIPINE BESYLATE 10 MILLIGRAM(S): 2.5 TABLET ORAL at 05:49

## 2019-02-17 RX ADMIN — Medication 5 MILLIGRAM(S): at 05:49

## 2019-02-17 RX ADMIN — Medication 5 MILLIGRAM(S): at 13:39

## 2019-02-17 RX ADMIN — Medication 1 APPLICATION(S): at 11:08

## 2019-02-17 RX ADMIN — HEPARIN SODIUM 5000 UNIT(S): 5000 INJECTION INTRAVENOUS; SUBCUTANEOUS at 13:38

## 2019-02-17 RX ADMIN — CHLORHEXIDINE GLUCONATE 1 APPLICATION(S): 213 SOLUTION TOPICAL at 08:20

## 2019-02-17 RX ADMIN — Medication 200 GRAM(S): at 22:31

## 2019-02-17 RX ADMIN — Medication 81 MILLIGRAM(S): at 11:06

## 2019-02-17 RX ADMIN — Medication 125 MILLIGRAM(S): at 11:07

## 2019-02-17 RX ADMIN — Medication 50 MILLIGRAM(S): at 05:49

## 2019-02-17 RX ADMIN — BUDESONIDE AND FORMOTEROL FUMARATE DIHYDRATE 2 PUFF(S): 160; 4.5 AEROSOL RESPIRATORY (INHALATION) at 22:13

## 2019-02-17 NOTE — PROGRESS NOTE ADULT - SUBJECTIVE AND OBJECTIVE BOX
SUBJECTIVE:    Patient is a 84y old Female who presents with a chief complaint of Urinary Retention/Hperkalemia with EKG changes (16 Feb 2019 18:58)    Currently admitted to medicine with the primary diagnosis of Bladder cancer     Today is hospital day 12d. This morning she is resting comfortably in bed and reports no new issues or overnight events.     PAST MEDICAL & SURGICAL HISTORY  Anemia: 4 PRBC 11/2018  Oxygen dependent: O2 2L Via NC  Bladder cancer: 2018  CHF (congestive heart failure)  Breast tumor: removal benign bl breasts  Anal cancer: Chemo and radiation 1992  COPD (chronic obstructive pulmonary disease)  Lung disease: COPD  Hypertension  History of cystoscopy: 10/2018  H/O breast surgery: 39 yrs. ago, bilateral  History of back surgery: 10 yrs. ago stimulator implant 2001, 2011( cervical and Lumbar)    SOCIAL HISTORY:  Negative for smoking/alcohol/drug use.     ALLERGIES:  hair dyes (Hives)  sulfa drugs (Headache)  Zoloft (Headache)    MEDICATIONS:  STANDING MEDICATIONS  ALBUTerol/ipratropium for Nebulization 3 milliLiter(s) Nebulizer every 6 hours  amLODIPine   Tablet 10 milliGRAM(s) Oral daily  aspirin  chewable 81 milliGRAM(s) Oral daily  atorvastatin 20 milliGRAM(s) Oral at bedtime  buDESOnide 160 MICROgram(s)/formoterol 4.5 MICROgram(s) Inhaler 2 Puff(s) Inhalation two times a day  busPIRone 5 milliGRAM(s) Oral three times a day  chlorhexidine 4% Liquid 1 Application(s) Topical <User Schedule>  docusate sodium 200 milliGRAM(s) Oral at bedtime  DULoxetine 20 milliGRAM(s) Oral daily  epoetin spencer Injectable 31294 Unit(s) SubCutaneous every 7 days  heparin  Injectable 5000 Unit(s) SubCutaneous every 8 hours  hydrocortisone 2.5% Rectal Cream 1 Application(s) Rectal daily  metoprolol succinate ER 50 milliGRAM(s) Oral daily  senna 2 Tablet(s) Oral daily  sodium chloride 0.9%. 1000 milliLiter(s) IV Continuous <Continuous>  vancomycin    Solution 125 milliGRAM(s) Oral every 6 hours    PRN MEDICATIONS  bisacodyl Suppository 10 milliGRAM(s) Rectal every 24 hours PRN  cloNIDine 0.1 milliGRAM(s) Oral every 8 hours PRN  HYDROcodone 10 mG/acetaminophen 325 mG 1 Tablet(s) Oral every 4 hours PRN  ondansetron Injectable 4 milliGRAM(s) IV Push every 8 hours PRN  simethicone 80 milliGRAM(s) Chew every 8 hours PRN    VITALS:   T(F): 97  HR: 83  BP: 140/65  RR: 16  SpO2: --    LABS:                        9.9    20.00 )-----------( 230      ( 17 Feb 2019 08:42 )             31.3     02-17    137  |  106  |  35<H>  ----------------------------<  98  4.2   |  21  |  0.6<L>    Ca    5.9<LL>      17 Feb 2019 08:42  Phos  2.8     02-17  Mg     1.7     02-17    TPro  3.5<L>  /  Alb  1.9<L>  /  TBili  0.2  /  DBili  x   /  AST  31  /  ALT  20  /  AlkPhos  121<H>  02-17                  RADIOLOGY:    PHYSICAL EXAM:  GEN: No acute distress  LUNGS: Clear to auscultation bilaterally   HEART: Regular  ABD: Soft, non-tender, non-distended.  EXT: NC/NC/NE/2+PP/SARAVIA/Skin Intact.   NEURO: AAOX3    Intravenous access:   NG tube:   Rowe Catheter:

## 2019-02-17 NOTE — PROGRESS NOTE ADULT - SUBJECTIVE AND OBJECTIVE BOX
Patient is a 84y old  Female who presents with a chief complaint of Urinary Retention/Hperkalemia with EKG changes (17 Feb 2019 12:28)       Pt is seen and examined  pt is awake and lying in bed  pt seems comfortable         ROS:  Negative except for:mild abdo pain    MEDICATIONS  (STANDING):  ALBUTerol/ipratropium for Nebulization 3 milliLiter(s) Nebulizer every 6 hours  amLODIPine   Tablet 10 milliGRAM(s) Oral daily  aspirin  chewable 81 milliGRAM(s) Oral daily  atorvastatin 20 milliGRAM(s) Oral at bedtime  buDESOnide 160 MICROgram(s)/formoterol 4.5 MICROgram(s) Inhaler 2 Puff(s) Inhalation two times a day  busPIRone 5 milliGRAM(s) Oral three times a day  chlorhexidine 4% Liquid 1 Application(s) Topical <User Schedule>  docusate sodium 200 milliGRAM(s) Oral at bedtime  DULoxetine 20 milliGRAM(s) Oral daily  epoetin spencer Injectable 80080 Unit(s) SubCutaneous every 7 days  heparin  Injectable 5000 Unit(s) SubCutaneous every 8 hours  hydrocortisone 2.5% Rectal Cream 1 Application(s) Rectal daily  metoprolol succinate ER 50 milliGRAM(s) Oral daily  senna 2 Tablet(s) Oral daily  sodium chloride 0.9%. 1000 milliLiter(s) (60 mL/Hr) IV Continuous <Continuous>  vancomycin    Solution 125 milliGRAM(s) Oral every 6 hours    MEDICATIONS  (PRN):  bisacodyl Suppository 10 milliGRAM(s) Rectal every 24 hours PRN Constipation  cloNIDine 0.1 milliGRAM(s) Oral every 8 hours PRN for sbp > 180  HYDROcodone 10 mG/acetaminophen 325 mG 1 Tablet(s) Oral every 4 hours PRN Moderate Pain (4 - 6)  ondansetron Injectable 4 milliGRAM(s) IV Push every 8 hours PRN Nausea and/or Vomiting  simethicone 80 milliGRAM(s) Chew every 8 hours PRN Gas      Allergies    hair dyes (Hives)  sulfa drugs (Headache)  Zoloft (Headache)    Intolerances        Vital Signs Last 24 Hrs  T(C): 35.6 (17 Feb 2019 14:10), Max: 36.1 (17 Feb 2019 05:15)  T(F): 96 (17 Feb 2019 14:10), Max: 97 (17 Feb 2019 05:15)  HR: 71 (17 Feb 2019 14:10) (71 - 83)  BP: 135/63 (17 Feb 2019 14:10) (128/56 - 140/65)  BP(mean): --  RR: 18 (17 Feb 2019 14:10) (16 - 18)  SpO2: --    PHYSICAL EXAM  General: adult in NAD  HEENT: clear oropharynx, anicteric sclera, pink conjunctiva  Neck: supple  CV: normal S1/S2 with no murmur rubs or gallops  Lungs: positive air movement b/l ant lungs,clear to auscultation, no wheezes, no rales  Abdomen: soft non-tender non-distended, no hepatosplenomegaly  Ext: no clubbing cyanosis or edema  Skin: no rashes and no petechiae  Neuro: alert and oriented X 4, no focal deficits  LABS:                          9.9    20.00 )-----------( 230      ( 17 Feb 2019 08:42 )             31.3         Mean Cell Volume : 101.3 fL  Mean Cell Hemoglobin : 32.0 pg  Mean Cell Hemoglobin Concentration : 31.6 g/dL  Auto Neutrophil # : x  Auto Lymphocyte # : x  Auto Monocyte # : x  Auto Eosinophil # : x  Auto Basophil # : x  Auto Neutrophil % : x  Auto Lymphocyte % : x  Auto Monocyte % : x  Auto Eosinophil % : x  Auto Basophil % : x    Serial CBC's  02-17 @ 08:42  Hct-31.3 / Hgb-9.9 / Plat-230 / RBC-3.09 / WBC-20.00          Serial CBC's  02-16 @ 15:09  Hct-30.3 / Hgb-9.9 / Plat-213 / RBC-2.99 / WBC-24.04          Serial CBC's  02-15 @ 06:13  Hct-30.7 / Hgb-10.1 / Plat-214 / RBC-3.12 / WBC-34.64            02-17    137  |  106  |  35<H>  ----------------------------<  98  4.2   |  21  |  0.6<L>    Ca    5.9<LL>      17 Feb 2019 08:42  Phos  2.8     02-17  Mg     1.7     02-17    TPro  3.5<L>  /  Alb  1.9<L>  /  TBili  0.2  /  DBili  x   /  AST  31  /  ALT  20  /  AlkPhos  121<H>  02-17          WBC Count: 20.00 K/uL (02-17-19 @ 08:42)  Hematocrit: 31.3 % (02-17-19 @ 08:42)  Platelet Count - Automated: 230 K/uL (02-17-19 @ 08:42)  Hemoglobin: 9.9 g/dL (02-17-19 @ 08:42)  WBC Count: 24.04 K/uL (02-16-19 @ 15:09)  Hematocrit: 30.3 % (02-16-19 @ 15:09)  Platelet Count - Automated: 213 K/uL (02-16-19 @ 15:09)  Hemoglobin: 9.9 g/dL (02-16-19 @ 15:09)  Platelet Count - Automated: 214 K/uL (02-15-19 @ 06:13)  Hemoglobin: 10.1 g/dL (02-15-19 @ 06:13)  WBC Count: 34.64 K/uL (02-15-19 @ 06:13)  Hematocrit: 30.7 % (02-15-19 @ 06:13)  Hematocrit: 29.4 % (02-13-19 @ 14:50)  Platelet Count - Automated: 219 K/uL (02-13-19 @ 14:50)  Hemoglobin: 9.7 g/dL (02-13-19 @ 14:50)  WBC Count: 38.51 K/uL (02-13-19 @ 14:50)  Hematocrit: 31.3 % (02-12-19 @ 08:01)  Hemoglobin: 10.3 g/dL (02-12-19 @ 08:01)  WBC Count: 38.19 K/uL (02-12-19 @ 08:01)  Platelet Count - Automated: 213 K/uL (02-12-19 @ 08:01)  Hemoglobin: 9.8 g/dL (02-11-19 @ 06:49)  WBC Count: 34.22 K/uL (02-11-19 @ 06:49)  Platelet Count - Automated: 188 K/uL (02-11-19 @ 06:49)  Hematocrit: 29.6 % (02-11-19 @ 06:49)  WBC Count: 32.94 K/uL (02-10-19 @ 15:05)  Hematocrit: 30.4 % (02-10-19 @ 15:05)  Platelet Count - Automated: 164 K/uL (02-10-19 @ 15:05)  Hemoglobin: 10.0 g/dL (02-10-19 @ 15:05)  Hematocrit: 31.7 % (02-09-19 @ 18:50)  Platelet Count - Automated: 188 K/uL (02-09-19 @ 18:50)  Hemoglobin: 10.1 g/dL (02-09-19 @ 18:50)  WBC Count: 26.14 K/uL (02-09-19 @ 18:50)                BLOOD SMEAR INTERPRETATION:       RADIOLOGY & ADDITIONAL STUDIES:

## 2019-02-17 NOTE — PROGRESS NOTE ADULT - SUBJECTIVE AND OBJECTIVE BOX
NEPHROLOGY FOLLOW UP NOTE    no fever   no diarrhea  feels better  still weak  tolerating po - ate 50% lunch  wbc improving   still on ivf      PAST MEDICAL & SURGICAL HISTORY:  CHF (congestive heart failure)  Breast tumor: removal benign bl breasts  Anal cancer  COPD (chronic obstructive pulmonary disease)  Lung disease: COPD  Hypertension  H/O breast surgery: 39 yrs. ago, bilateral  History of back surgery: 10 yrs. ago stimulator implant 2001, 2011    Allergies:  hair dyes (Hives)  sulfa drugs (Headache)    Home Medications Reviewed    SOCIAL HISTORY:  Denies ETOH,Smoking,   FAMILY HISTORY:  No pertinent family history in first degree relatives        REVIEW OF SYSTEMS:  All other review of systems is negative unless indicated above.        PHYSICAL EXAM:  NAD  awake and alert  dry mm  no jvd  b/l bs  rrr  soft, + bs  no edema    Hospital Medications:   MEDICATIONS  (STANDING):  ALBUTerol/ipratropium for Nebulization 3 milliLiter(s) Nebulizer every 6 hours  amLODIPine   Tablet 10 milliGRAM(s) Oral daily  aspirin  chewable 81 milliGRAM(s) Oral daily  atorvastatin 20 milliGRAM(s) Oral at bedtime  buDESOnide 160 MICROgram(s)/formoterol 4.5 MICROgram(s) Inhaler 2 Puff(s) Inhalation two times a day  busPIRone 5 milliGRAM(s) Oral three times a day  chlorhexidine 4% Liquid 1 Application(s) Topical <User Schedule>  docusate sodium 200 milliGRAM(s) Oral at bedtime  DULoxetine 20 milliGRAM(s) Oral daily  epoetin spencer Injectable 76626 Unit(s) SubCutaneous every 7 days  heparin  Injectable 5000 Unit(s) SubCutaneous every 8 hours  hydrocortisone 2.5% Rectal Cream 1 Application(s) Rectal daily  metoprolol succinate ER 50 milliGRAM(s) Oral daily  senna 2 Tablet(s) Oral daily  sodium chloride 0.9%. 1000 milliLiter(s) (60 mL/Hr) IV Continuous <Continuous>  vancomycin    Solution 125 milliGRAM(s) Oral every 6 hours        VITALS:  T(F): 97 (02-17-19 @ 05:15), Max: 97 (02-17-19 @ 05:15)  HR: 83 (02-17-19 @ 05:15)  BP: 140/65 (02-17-19 @ 05:15)  RR: 16 (02-17-19 @ 05:15)  SpO2: --  Wt(kg): --    Height (cm): 157.48 (02-17 @ 11:10)    LABS:  02-17    137  |  106  |  35<H>  ----------------------------<  98  4.2   |  21  |  0.6<L>    Ca    5.9<LL>      17 Feb 2019 08:42  Phos  2.8     02-17  Mg     1.7     02-17    TPro  3.5<L>  /  Alb  1.9<L>  /  TBili  0.2  /  DBili      /  AST  31  /  ALT  20  /  AlkPhos  121<H>  02-17                          9.9    20.00 )-----------( 230      ( 17 Feb 2019 08:42 )             31.3       Urine Studies:  Urinalysis Basic - ( 10 Feb 2019 19:08 )    Color: Yellow / Appearance: Clear / SG: >=1.030 / pH:   Gluc:  / Ketone: 15  / Bili: Small / Urobili: 0.2 mg/dL   Blood:  / Protein: >=300 mg/dL / Nitrite: Negative   Leuk Esterase: Negative / RBC: 26-50 /HPF / WBC 3-5 /HPF   Sq Epi:  / Non Sq Epi: Few /HPF / Bacteria: Moderate          RADIOLOGY & ADDITIONAL STUDIES:

## 2019-02-17 NOTE — PROGRESS NOTE ADULT - ASSESSMENT
hypovolemic hyponatremia - resolved  GEETA    - better  cdiff colitis without diarrhea  leukocytosis from cdiff - better  HTN   bladder ca  s/p TURBT  right hydro   UTI - enterococci  COPD  CHF  anemia / MDS  chronic back pain with spinal stimulator  resolved hyperkalemia    Plan:    cont PO vanco  complete po amoxicillin course today  cont NS @ 60 cc/hr another 24 hours  encourage po intake  cont norvasc and lopressor  off aldactone - do not restart  contact isolation  trend WBC  needs PT / rehab / OOB  DC planning to STR

## 2019-02-17 NOTE — PROGRESS NOTE ADULT - ASSESSMENT
85 yo female ex smoker with a history of COPD on home O2,Anemia sec to low grade MDS by recent bmbx,on weekly procrit   Admitted for electrolyte imbalance, GEETA,. UTI with   leukocytosis sec to c diff  seen by urology,no other intervention by urology  Complaining of abdominal pains and diarrhea sec to  C. diff colitis  CT shows colitis and right hydroureteronephrosis  Small effusions on CT right > left with lung nodules.      PLAN:  on po vanco for c diff.  leukocytosis is improving   f/u with ID.  contact isolation.  start on procrit 40,000 unit qweekly if hb <10 ,one dose today  monitor counts.  f/u with urology for recent TURBT path result  will f/u as outpt for further Mn of bladder ca.  agree with Outpatient follow up of lung nodules, repeat CT chest in 3 months  cont other supportive care.  plan of care was explained to pt and her family  will f/u

## 2019-02-18 LAB — CALCIUM SERPL-MCNC: 8.1 MG/DL — LOW (ref 8.5–10.1)

## 2019-02-18 RX ORDER — SPIRONOLACTONE 25 MG/1
25 TABLET, FILM COATED ORAL DAILY
Qty: 0 | Refills: 0 | Status: DISCONTINUED | OUTPATIENT
Start: 2019-02-18 | End: 2019-02-18

## 2019-02-18 RX ADMIN — HEPARIN SODIUM 5000 UNIT(S): 5000 INJECTION INTRAVENOUS; SUBCUTANEOUS at 14:56

## 2019-02-18 RX ADMIN — Medication 125 MILLIGRAM(S): at 17:26

## 2019-02-18 RX ADMIN — BUDESONIDE AND FORMOTEROL FUMARATE DIHYDRATE 2 PUFF(S): 160; 4.5 AEROSOL RESPIRATORY (INHALATION) at 11:21

## 2019-02-18 RX ADMIN — SENNA PLUS 2 TABLET(S): 8.6 TABLET ORAL at 11:22

## 2019-02-18 RX ADMIN — ONDANSETRON 4 MILLIGRAM(S): 8 TABLET, FILM COATED ORAL at 14:55

## 2019-02-18 RX ADMIN — Medication 5 MILLIGRAM(S): at 14:54

## 2019-02-18 RX ADMIN — Medication 81 MILLIGRAM(S): at 11:21

## 2019-02-18 RX ADMIN — Medication 125 MILLIGRAM(S): at 11:22

## 2019-02-18 RX ADMIN — DULOXETINE HYDROCHLORIDE 20 MILLIGRAM(S): 30 CAPSULE, DELAYED RELEASE ORAL at 11:21

## 2019-02-18 RX ADMIN — Medication 5 MILLIGRAM(S): at 21:06

## 2019-02-18 RX ADMIN — Medication 5 MILLIGRAM(S): at 06:05

## 2019-02-18 RX ADMIN — Medication 1 TABLET(S): at 17:26

## 2019-02-18 RX ADMIN — Medication 200 MILLIGRAM(S): at 21:07

## 2019-02-18 RX ADMIN — SODIUM CHLORIDE 60 MILLILITER(S): 9 INJECTION INTRAMUSCULAR; INTRAVENOUS; SUBCUTANEOUS at 06:03

## 2019-02-18 RX ADMIN — Medication 1 TABLET(S): at 06:06

## 2019-02-18 RX ADMIN — SODIUM CHLORIDE 60 MILLILITER(S): 9 INJECTION INTRAMUSCULAR; INTRAVENOUS; SUBCUTANEOUS at 22:23

## 2019-02-18 RX ADMIN — CHLORHEXIDINE GLUCONATE 1 APPLICATION(S): 213 SOLUTION TOPICAL at 11:20

## 2019-02-18 RX ADMIN — Medication 50 MILLIGRAM(S): at 06:05

## 2019-02-18 RX ADMIN — HEPARIN SODIUM 5000 UNIT(S): 5000 INJECTION INTRAVENOUS; SUBCUTANEOUS at 21:07

## 2019-02-18 RX ADMIN — HEPARIN SODIUM 5000 UNIT(S): 5000 INJECTION INTRAVENOUS; SUBCUTANEOUS at 06:06

## 2019-02-18 RX ADMIN — Medication 125 MILLIGRAM(S): at 23:21

## 2019-02-18 RX ADMIN — BUDESONIDE AND FORMOTEROL FUMARATE DIHYDRATE 2 PUFF(S): 160; 4.5 AEROSOL RESPIRATORY (INHALATION) at 21:07

## 2019-02-18 RX ADMIN — AMLODIPINE BESYLATE 10 MILLIGRAM(S): 2.5 TABLET ORAL at 06:05

## 2019-02-18 RX ADMIN — ATORVASTATIN CALCIUM 20 MILLIGRAM(S): 80 TABLET, FILM COATED ORAL at 21:06

## 2019-02-18 RX ADMIN — Medication 125 MILLIGRAM(S): at 06:06

## 2019-02-18 RX ADMIN — Medication 1 APPLICATION(S): at 11:22

## 2019-02-18 RX ADMIN — Medication 3 MILLILITER(S): at 07:54

## 2019-02-18 NOTE — PROGRESS NOTE ADULT - SUBJECTIVE AND OBJECTIVE BOX
NEPHROLOGY FOLLOW UP NOTE    restig comfortably  didnt eat breakfast yest  no fever  no diarrhea  wbc improving   still on ivf      PAST MEDICAL & SURGICAL HISTORY:  CHF (congestive heart failure)  Breast tumor: removal benign bl breasts  Anal cancer  COPD (chronic obstructive pulmonary disease)  Lung disease: COPD  Hypertension  H/O breast surgery: 39 yrs. ago, bilateral  History of back surgery: 10 yrs. ago stimulator implant 2001, 2011    Allergies:  hair dyes (Hives)  sulfa drugs (Headache)    Home Medications Reviewed    SOCIAL HISTORY:  Denies ETOH,Smoking,   FAMILY HISTORY:  No pertinent family history in first degree relatives        REVIEW OF SYSTEMS:  All other review of systems is negative unless indicated above.        PHYSICAL EXAM:  NAD  awake and alert  dry mm  no jvd  b/l bs  rrr  soft, + bs  no edema    Hospital Medications:   MEDICATIONS  (STANDING):  ALBUTerol/ipratropium for Nebulization 3 milliLiter(s) Nebulizer every 6 hours  amLODIPine   Tablet 10 milliGRAM(s) Oral daily  aspirin  chewable 81 milliGRAM(s) Oral daily  atorvastatin 20 milliGRAM(s) Oral at bedtime  buDESOnide 160 MICROgram(s)/formoterol 4.5 MICROgram(s) Inhaler 2 Puff(s) Inhalation two times a day  busPIRone 5 milliGRAM(s) Oral three times a day  calcium carbonate    500 mG (Tums) Chewable 1 Tablet(s) Chew two times a day  chlorhexidine 4% Liquid 1 Application(s) Topical <User Schedule>  docusate sodium 200 milliGRAM(s) Oral at bedtime  DULoxetine 20 milliGRAM(s) Oral daily  epoetin spencer Injectable 88334 Unit(s) SubCutaneous every 7 days  heparin  Injectable 5000 Unit(s) SubCutaneous every 8 hours  hydrocortisone 2.5% Rectal Cream 1 Application(s) Rectal daily  metoprolol succinate ER 50 milliGRAM(s) Oral daily  senna 2 Tablet(s) Oral daily  sodium chloride 0.9%. 1000 milliLiter(s) (60 mL/Hr) IV Continuous <Continuous>  vancomycin    Solution 125 milliGRAM(s) Oral every 6 hours        VITALS:  T(F): 96.4 (02-18-19 @ 05:15), Max: 96.4 (02-18-19 @ 05:15)  HR: 87 (02-18-19 @ 05:15)  BP: 156/70 (02-18-19 @ 05:15)  RR: 16 (02-18-19 @ 05:15)  SpO2: --  Wt(kg): --    Height (cm): 157.48 (02-17 @ 11:10)    LABS:  02-17    137  |  106  |  35<H>  ----------------------------<  98  4.2   |  21  |  0.6<L>    Ca    5.9<LL>      17 Feb 2019 08:42  Phos  2.8     02-17  Mg     1.7     02-17    TPro  3.5<L>  /  Alb  1.9<L>  /  TBili  0.2  /  DBili      /  AST  31  /  ALT  20  /  AlkPhos  121<H>  02-17                          9.9    20.00 )-----------( 230      ( 17 Feb 2019 08:42 )             31.3       Urine Studies:        RADIOLOGY & ADDITIONAL STUDIES:

## 2019-02-18 NOTE — PROGRESS NOTE ADULT - ASSESSMENT
85 yo female ex smoker with a history of COPD on home O2,Anemia sec to low grade MDS by recent bmbx,on weekly procrit   Admitted for electrolyte imbalance, GEETA,. UTI with   leukocytosis sec to c diff  seen by urology,no other intervention by urology  Complaining of abdominal pains and diarrhea sec to  C. diff colitis  CT shows colitis and right hydroureteronephrosis  Small effusions on CT right > left with lung nodules.      PLAN:  on po vanco for c diff.  leukocytosis is improving   f/u with ID.  contact isolation.  cont  procrit 40,000 unit qweekly if hb <10 ,  monitor counts.  f/u with urology for recent TURBT path result  will f/u as outpt for further Mn of bladder ca.  agree with Outpatient follow up of lung nodules, repeat CT chest in 3 months  cont other supportive care.  plan of care was explained to pt and her family  d/c plan as per primary team  will f/u

## 2019-02-18 NOTE — PROGRESS NOTE ADULT - SUBJECTIVE AND OBJECTIVE BOX
SUBJECTIVE:    Patient is a 84y old Female who presents with a chief complaint of Urinary Retention/Hperkalemia with EKG changes (18 Feb 2019 08:36)    Currently admitted to medicine with the primary diagnosis of Bladder cancer     Today is hospital day 13d. This morning she is resting comfortably in bed and reports no new issues or overnight events.     PAST MEDICAL & SURGICAL HISTORY  Anemia: 4 PRBC 11/2018  Oxygen dependent: O2 2L Via NC  Bladder cancer: 2018  CHF (congestive heart failure)  Breast tumor: removal benign bl breasts  Anal cancer: Chemo and radiation 1992  COPD (chronic obstructive pulmonary disease)  Lung disease: COPD  Hypertension  History of cystoscopy: 10/2018  H/O breast surgery: 39 yrs. ago, bilateral  History of back surgery: 10 yrs. ago stimulator implant 2001, 2011( cervical and Lumbar)    SOCIAL HISTORY:  Negative for smoking/alcohol/drug use.     ALLERGIES:  hair dyes (Hives)  sulfa drugs (Headache)  Zoloft (Headache)    MEDICATIONS:  STANDING MEDICATIONS  ALBUTerol/ipratropium for Nebulization 3 milliLiter(s) Nebulizer every 6 hours  amLODIPine   Tablet 10 milliGRAM(s) Oral daily  aspirin  chewable 81 milliGRAM(s) Oral daily  atorvastatin 20 milliGRAM(s) Oral at bedtime  buDESOnide 160 MICROgram(s)/formoterol 4.5 MICROgram(s) Inhaler 2 Puff(s) Inhalation two times a day  busPIRone 5 milliGRAM(s) Oral three times a day  calcium carbonate    500 mG (Tums) Chewable 1 Tablet(s) Chew two times a day  chlorhexidine 4% Liquid 1 Application(s) Topical <User Schedule>  docusate sodium 200 milliGRAM(s) Oral at bedtime  DULoxetine 20 milliGRAM(s) Oral daily  epoetin spencer Injectable 25824 Unit(s) SubCutaneous every 7 days  heparin  Injectable 5000 Unit(s) SubCutaneous every 8 hours  hydrocortisone 2.5% Rectal Cream 1 Application(s) Rectal daily  metoprolol succinate ER 50 milliGRAM(s) Oral daily  senna 2 Tablet(s) Oral daily  sodium chloride 0.9%. 1000 milliLiter(s) IV Continuous <Continuous>  vancomycin    Solution 125 milliGRAM(s) Oral every 6 hours    PRN MEDICATIONS  bisacodyl Suppository 10 milliGRAM(s) Rectal every 24 hours PRN  cloNIDine 0.1 milliGRAM(s) Oral every 8 hours PRN  HYDROcodone 10 mG/acetaminophen 325 mG 1 Tablet(s) Oral every 4 hours PRN  ondansetron Injectable 4 milliGRAM(s) IV Push every 8 hours PRN  simethicone 80 milliGRAM(s) Chew every 8 hours PRN    VITALS:   T(F): 96.4  HR: 87  BP: 156/70  RR: 16  SpO2: --    LABS:                        9.9    20.00 )-----------( 230      ( 17 Feb 2019 08:42 )             31.3     02-17    137  |  106  |  35<H>  ----------------------------<  98  4.2   |  21  |  0.6<L>    Ca    5.9<LL>      17 Feb 2019 08:42  Phos  2.8     02-17  Mg     1.7     02-17    TPro  3.5<L>  /  Alb  1.9<L>  /  TBili  0.2  /  DBili  x   /  AST  31  /  ALT  20  /  AlkPhos  121<H>  02-17                  RADIOLOGY:    PHYSICAL EXAM:  GEN: No acute distress  LUNGS: Clear to auscultation bilaterally   HEART: Regular  ABD: Soft, non-tender, non-distended.  EXT: NC/NC/NE/2+PP/SARAVIA/Skin Intact.   NEURO: AAOX3    Intravenous access:   NG tube:   Rowe Catheter:

## 2019-02-18 NOTE — PROGRESS NOTE ADULT - ASSESSMENT
hypovolemic hyponatremia - resolved  GEETA    - better  cdiff colitis without diarrhea  leukocytosis from cdiff - better  HTN   bladder ca  s/p TURBT  right hydro   UTI - enterococci  COPD  CHF  anemia / MDS  chronic back pain with spinal stimulator  resolved hyperkalemia    Plan:    cont PO vanco - complete full course  off amoxicillin  cont NS @ 60 cc/hr today  encourage po intake  cont norvasc and lopressor  off aldactone - do not restart  contact isolation  trend WBC  needs PT / rehab / OOB  DC planning to STR   oupt f/u / check path  hem / onc f/u

## 2019-02-18 NOTE — PROGRESS NOTE ADULT - SUBJECTIVE AND OBJECTIVE BOX
Patient is a 84y old  Female who presents with a chief complaint of Urinary Retention/Hperkalemia with EKG changes (18 Feb 2019 10:56)       Pt is seen and examined  pt is awake and lying in bed    pt seems comfortable and denies any complaints at this time          ROS:  Negative   MEDICATIONS  (STANDING):  ALBUTerol/ipratropium for Nebulization 3 milliLiter(s) Nebulizer every 6 hours  amLODIPine   Tablet 10 milliGRAM(s) Oral daily  aspirin  chewable 81 milliGRAM(s) Oral daily  atorvastatin 20 milliGRAM(s) Oral at bedtime  buDESOnide 160 MICROgram(s)/formoterol 4.5 MICROgram(s) Inhaler 2 Puff(s) Inhalation two times a day  busPIRone 5 milliGRAM(s) Oral three times a day  calcium carbonate    500 mG (Tums) Chewable 1 Tablet(s) Chew two times a day  chlorhexidine 4% Liquid 1 Application(s) Topical <User Schedule>  docusate sodium 200 milliGRAM(s) Oral at bedtime  DULoxetine 20 milliGRAM(s) Oral daily  epoetin spencer Injectable 69594 Unit(s) SubCutaneous every 7 days  heparin  Injectable 5000 Unit(s) SubCutaneous every 8 hours  hydrocortisone 2.5% Rectal Cream 1 Application(s) Rectal daily  metoprolol succinate ER 50 milliGRAM(s) Oral daily  senna 2 Tablet(s) Oral daily  sodium chloride 0.9%. 1000 milliLiter(s) (60 mL/Hr) IV Continuous <Continuous>  vancomycin    Solution 125 milliGRAM(s) Oral every 6 hours    MEDICATIONS  (PRN):  bisacodyl Suppository 10 milliGRAM(s) Rectal every 24 hours PRN Constipation  cloNIDine 0.1 milliGRAM(s) Oral every 8 hours PRN for sbp > 180  HYDROcodone 10 mG/acetaminophen 325 mG 1 Tablet(s) Oral every 4 hours PRN Moderate Pain (4 - 6)  ondansetron Injectable 4 milliGRAM(s) IV Push every 8 hours PRN Nausea and/or Vomiting  simethicone 80 milliGRAM(s) Chew every 8 hours PRN Gas      Allergies    hair dyes (Hives)  sulfa drugs (Headache)  Zoloft (Headache)    Intolerances        Vital Signs Last 24 Hrs  T(C): 35.8 (18 Feb 2019 05:15), Max: 35.8 (18 Feb 2019 05:15)  T(F): 96.4 (18 Feb 2019 05:15), Max: 96.4 (18 Feb 2019 05:15)  HR: 87 (18 Feb 2019 05:15) (71 - 87)  BP: 156/70 (18 Feb 2019 05:15) (135/63 - 156/70)  BP(mean): --  RR: 16 (18 Feb 2019 05:15) (16 - 18)  SpO2: --    PHYSICAL EXAM  General: adult in NAD  HEENT: clear oropharynx, anicteric sclera, pink conjunctiva  Neck: supple  CV: normal S1/S2 with no murmur rubs or gallops  Lungs: positive air movement b/l ant lungs,clear to auscultation, no wheezes, no rales  Abdomen: soft non-tender non-distended, no hepatosplenomegaly  Ext: no clubbing cyanosis or edema  Skin: no rashes and no petechiae  Neuro: alert and oriented X 4, no focal deficits  LABS:                          9.9    20.00 )-----------( 230      ( 17 Feb 2019 08:42 )             31.3         Mean Cell Volume : 101.3 fL  Mean Cell Hemoglobin : 32.0 pg  Mean Cell Hemoglobin Concentration : 31.6 g/dL  Auto Neutrophil # : x  Auto Lymphocyte # : x  Auto Monocyte # : x  Auto Eosinophil # : x  Auto Basophil # : x  Auto Neutrophil % : x  Auto Lymphocyte % : x  Auto Monocyte % : x  Auto Eosinophil % : x  Auto Basophil % : x    Serial CBC's  02-17 @ 08:42  Hct-31.3 / Hgb-9.9 / Plat-230 / RBC-3.09 / WBC-20.00          Serial CBC's  02-16 @ 15:09  Hct-30.3 / Hgb-9.9 / Plat-213 / RBC-2.99 / WBC-24.04          Serial CBC's  02-15 @ 06:13  Hct-30.7 / Hgb-10.1 / Plat-214 / RBC-3.12 / WBC-34.64            02-17    137  |  106  |  35<H>  ----------------------------<  98  4.2   |  21  |  0.6<L>    Ca    5.9<LL>      17 Feb 2019 08:42  Phos  2.8     02-17  Mg     1.7     02-17    TPro  3.5<L>  /  Alb  1.9<L>  /  TBili  0.2  /  DBili  x   /  AST  31  /  ALT  20  /  AlkPhos  121<H>  02-17          WBC Count: 20.00 K/uL (02-17-19 @ 08:42)  Hematocrit: 31.3 % (02-17-19 @ 08:42)  Platelet Count - Automated: 230 K/uL (02-17-19 @ 08:42)  Hemoglobin: 9.9 g/dL (02-17-19 @ 08:42)  WBC Count: 24.04 K/uL (02-16-19 @ 15:09)  Hematocrit: 30.3 % (02-16-19 @ 15:09)  Platelet Count - Automated: 213 K/uL (02-16-19 @ 15:09)  Hemoglobin: 9.9 g/dL (02-16-19 @ 15:09)  Platelet Count - Automated: 214 K/uL (02-15-19 @ 06:13)  Hemoglobin: 10.1 g/dL (02-15-19 @ 06:13)  WBC Count: 34.64 K/uL (02-15-19 @ 06:13)  Hematocrit: 30.7 % (02-15-19 @ 06:13)  Hematocrit: 29.4 % (02-13-19 @ 14:50)  Platelet Count - Automated: 219 K/uL (02-13-19 @ 14:50)  Hemoglobin: 9.7 g/dL (02-13-19 @ 14:50)  WBC Count: 38.51 K/uL (02-13-19 @ 14:50)  Hematocrit: 31.3 % (02-12-19 @ 08:01)  Hemoglobin: 10.3 g/dL (02-12-19 @ 08:01)  WBC Count: 38.19 K/uL (02-12-19 @ 08:01)  Platelet Count - Automated: 213 K/uL (02-12-19 @ 08:01)  Hemoglobin: 9.8 g/dL (02-11-19 @ 06:49)  WBC Count: 34.22 K/uL (02-11-19 @ 06:49)  Platelet Count - Automated: 188 K/uL (02-11-19 @ 06:49)  Hematocrit: 29.6 % (02-11-19 @ 06:49)  WBC Count: 32.94 K/uL (02-10-19 @ 15:05)  Hematocrit: 30.4 % (02-10-19 @ 15:05)  Platelet Count - Automated: 164 K/uL (02-10-19 @ 15:05)  Hemoglobin: 10.0 g/dL (02-10-19 @ 15:05)  Hematocrit: 31.7 % (02-09-19 @ 18:50)  Platelet Count - Automated: 188 K/uL (02-09-19 @ 18:50)  Hemoglobin: 10.1 g/dL (02-09-19 @ 18:50)  WBC Count: 26.14 K/uL (02-09-19 @ 18:50)                BLOOD SMEAR INTERPRETATION:       RADIOLOGY & ADDITIONAL STUDIES:

## 2019-02-19 LAB
ALBUMIN SERPL ELPH-MCNC: 2.4 G/DL — LOW (ref 3.5–5.2)
ALP SERPL-CCNC: 142 U/L — HIGH (ref 30–115)
ALT FLD-CCNC: 25 U/L — SIGNIFICANT CHANGE UP (ref 0–41)
ANION GAP SERPL CALC-SCNC: 8 MMOL/L — SIGNIFICANT CHANGE UP (ref 7–14)
AST SERPL-CCNC: 27 U/L — SIGNIFICANT CHANGE UP (ref 0–41)
BILIRUB SERPL-MCNC: 0.2 MG/DL — SIGNIFICANT CHANGE UP (ref 0.2–1.2)
BUN SERPL-MCNC: 41 MG/DL — HIGH (ref 10–20)
CALCIUM SERPL-MCNC: 8 MG/DL — LOW (ref 8.5–10.1)
CHLORIDE SERPL-SCNC: 104 MMOL/L — SIGNIFICANT CHANGE UP (ref 98–110)
CO2 SERPL-SCNC: 25 MMOL/L — SIGNIFICANT CHANGE UP (ref 17–32)
CREAT SERPL-MCNC: 0.7 MG/DL — SIGNIFICANT CHANGE UP (ref 0.7–1.5)
GLUCOSE SERPL-MCNC: 134 MG/DL — HIGH (ref 70–99)
HCT VFR BLD CALC: 32.6 % — LOW (ref 37–47)
HGB BLD-MCNC: 10.3 G/DL — LOW (ref 12–16)
MAGNESIUM SERPL-MCNC: 2.3 MG/DL — SIGNIFICANT CHANGE UP (ref 1.8–2.4)
MCHC RBC-ENTMCNC: 31.6 G/DL — LOW (ref 32–37)
MCHC RBC-ENTMCNC: 32.5 PG — HIGH (ref 27–31)
MCV RBC AUTO: 102.8 FL — HIGH (ref 81–99)
NRBC # BLD: 0 /100 WBCS — SIGNIFICANT CHANGE UP (ref 0–0)
PHOSPHATE SERPL-MCNC: 3.2 MG/DL — SIGNIFICANT CHANGE UP (ref 2.1–4.9)
PLATELET # BLD AUTO: 293 K/UL — SIGNIFICANT CHANGE UP (ref 130–400)
POTASSIUM SERPL-MCNC: 5.1 MMOL/L — HIGH (ref 3.5–5)
POTASSIUM SERPL-SCNC: 5.1 MMOL/L — HIGH (ref 3.5–5)
PROT SERPL-MCNC: 4.5 G/DL — LOW (ref 6–8)
RBC # BLD: 3.17 M/UL — LOW (ref 4.2–5.4)
RBC # FLD: 14.7 % — HIGH (ref 11.5–14.5)
SODIUM SERPL-SCNC: 137 MMOL/L — SIGNIFICANT CHANGE UP (ref 135–146)
WBC # BLD: 19.66 K/UL — HIGH (ref 4.8–10.8)
WBC # FLD AUTO: 19.66 K/UL — HIGH (ref 4.8–10.8)

## 2019-02-19 RX ORDER — FUROSEMIDE 40 MG
40 TABLET ORAL ONCE
Qty: 0 | Refills: 0 | Status: COMPLETED | OUTPATIENT
Start: 2019-02-19 | End: 2019-02-19

## 2019-02-19 RX ORDER — DOCUSATE SODIUM 100 MG
200 CAPSULE ORAL
Qty: 0 | Refills: 0 | Status: DISCONTINUED | OUTPATIENT
Start: 2019-02-19 | End: 2019-02-21

## 2019-02-19 RX ORDER — SODIUM CHLORIDE 9 MG/ML
3 INJECTION INTRAMUSCULAR; INTRAVENOUS; SUBCUTANEOUS EVERY 4 HOURS
Qty: 0 | Refills: 0 | Status: DISCONTINUED | OUTPATIENT
Start: 2019-02-19 | End: 2019-03-09

## 2019-02-19 RX ADMIN — SODIUM CHLORIDE 3 MILLILITER(S): 9 INJECTION INTRAMUSCULAR; INTRAVENOUS; SUBCUTANEOUS at 14:00

## 2019-02-19 RX ADMIN — Medication 1 TABLET(S): at 05:07

## 2019-02-19 RX ADMIN — Medication 3 MILLILITER(S): at 08:06

## 2019-02-19 RX ADMIN — Medication 3 MILLILITER(S): at 19:33

## 2019-02-19 RX ADMIN — Medication 125 MILLIGRAM(S): at 05:07

## 2019-02-19 RX ADMIN — Medication 125 MILLIGRAM(S): at 11:37

## 2019-02-19 RX ADMIN — ATORVASTATIN CALCIUM 20 MILLIGRAM(S): 80 TABLET, FILM COATED ORAL at 22:31

## 2019-02-19 RX ADMIN — Medication 5 MILLIGRAM(S): at 05:07

## 2019-02-19 RX ADMIN — HEPARIN SODIUM 5000 UNIT(S): 5000 INJECTION INTRAVENOUS; SUBCUTANEOUS at 05:07

## 2019-02-19 RX ADMIN — AMLODIPINE BESYLATE 10 MILLIGRAM(S): 2.5 TABLET ORAL at 05:07

## 2019-02-19 RX ADMIN — Medication 1 TABLET(S): at 18:22

## 2019-02-19 RX ADMIN — Medication 5 MILLIGRAM(S): at 22:31

## 2019-02-19 RX ADMIN — SODIUM CHLORIDE 60 MILLILITER(S): 9 INJECTION INTRAMUSCULAR; INTRAVENOUS; SUBCUTANEOUS at 12:02

## 2019-02-19 RX ADMIN — HEPARIN SODIUM 5000 UNIT(S): 5000 INJECTION INTRAVENOUS; SUBCUTANEOUS at 22:32

## 2019-02-19 RX ADMIN — Medication 50 MILLIGRAM(S): at 05:06

## 2019-02-19 RX ADMIN — DULOXETINE HYDROCHLORIDE 20 MILLIGRAM(S): 30 CAPSULE, DELAYED RELEASE ORAL at 11:37

## 2019-02-19 RX ADMIN — Medication 5 MILLIGRAM(S): at 13:00

## 2019-02-19 RX ADMIN — CHLORHEXIDINE GLUCONATE 1 APPLICATION(S): 213 SOLUTION TOPICAL at 05:12

## 2019-02-19 RX ADMIN — Medication 81 MILLIGRAM(S): at 11:38

## 2019-02-19 RX ADMIN — Medication 125 MILLIGRAM(S): at 23:03

## 2019-02-19 RX ADMIN — SENNA PLUS 2 TABLET(S): 8.6 TABLET ORAL at 11:37

## 2019-02-19 RX ADMIN — BUDESONIDE AND FORMOTEROL FUMARATE DIHYDRATE 2 PUFF(S): 160; 4.5 AEROSOL RESPIRATORY (INHALATION) at 20:41

## 2019-02-19 RX ADMIN — Medication 1 APPLICATION(S): at 11:37

## 2019-02-19 RX ADMIN — HEPARIN SODIUM 5000 UNIT(S): 5000 INJECTION INTRAVENOUS; SUBCUTANEOUS at 13:00

## 2019-02-19 RX ADMIN — Medication 125 MILLIGRAM(S): at 18:18

## 2019-02-19 RX ADMIN — BUDESONIDE AND FORMOTEROL FUMARATE DIHYDRATE 2 PUFF(S): 160; 4.5 AEROSOL RESPIRATORY (INHALATION) at 08:19

## 2019-02-19 RX ADMIN — Medication 40 MILLIGRAM(S): at 13:00

## 2019-02-19 RX ADMIN — Medication 3 MILLILITER(S): at 13:58

## 2019-02-19 NOTE — PROGRESS NOTE ADULT - SUBJECTIVE AND OBJECTIVE BOX
NEPHROLOGY FOLLOW UP NOTE    pt continues to do poorly  not eating  albumin worsening  no diarrhea  still on IVF  very weak  c/o chronic back pain and abd discomfort      PAST MEDICAL & SURGICAL HISTORY:  CHF (congestive heart failure)  Breast tumor: removal benign bl breasts  Anal cancer  COPD (chronic obstructive pulmonary disease)  Lung disease: COPD  Hypertension  H/O breast surgery: 39 yrs. ago, bilateral  History of back surgery: 10 yrs. ago stimulator implant 2001, 2011    Allergies:  hair dyes (Hives)  sulfa drugs (Headache)    Home Medications Reviewed    SOCIAL HISTORY:  Denies ETOH,Smoking,   FAMILY HISTORY:  No pertinent family history in first degree relatives        REVIEW OF SYSTEMS:  All other review of systems is negative unless indicated above.        PHYSICAL EXAM:  NAD  weak appearing  dry mm  no jvd  b/l bs decreased  rrr  soft, + bs  no edema    Hospital Medications:   MEDICATIONS  (STANDING):  ALBUTerol/ipratropium for Nebulization 3 milliLiter(s) Nebulizer every 6 hours  amLODIPine   Tablet 10 milliGRAM(s) Oral daily  aspirin  chewable 81 milliGRAM(s) Oral daily  atorvastatin 20 milliGRAM(s) Oral at bedtime  buDESOnide 160 MICROgram(s)/formoterol 4.5 MICROgram(s) Inhaler 2 Puff(s) Inhalation two times a day  busPIRone 5 milliGRAM(s) Oral three times a day  calcium carbonate    500 mG (Tums) Chewable 1 Tablet(s) Chew two times a day  chlorhexidine 4% Liquid 1 Application(s) Topical <User Schedule>  docusate sodium Liquid 200 milliGRAM(s) Oral two times a day  DULoxetine 20 milliGRAM(s) Oral daily  epoetin spencer Injectable 07639 Unit(s) SubCutaneous every 7 days  furosemide   Injectable 40 milliGRAM(s) IV Push once  heparin  Injectable 5000 Unit(s) SubCutaneous every 8 hours  hydrocortisone 2.5% Rectal Cream 1 Application(s) Rectal daily  metoprolol succinate ER 50 milliGRAM(s) Oral daily  senna 2 Tablet(s) Oral daily  vancomycin    Solution 125 milliGRAM(s) Oral every 6 hours        VITALS:  T(F): 97 (02-19-19 @ 05:36), Max: 97 (02-19-19 @ 05:36)  HR: 94 (02-19-19 @ 05:36)  BP: 154/70 (02-19-19 @ 05:36)  RR: 18 (02-19-19 @ 05:36)  SpO2: --  Wt(kg): --        LABS:    Ca    8.1<L>      18 Feb 2019 12:27          Urine Studies:        RADIOLOGY & ADDITIONAL STUDIES:

## 2019-02-19 NOTE — PROGRESS NOTE ADULT - ASSESSMENT
GEETA   - resolved  cdiff colitis without diarrhea  leukocytosis from cdiff - better  malnutrition  debility with progressive clinical deterioration  HTN   bladder ca  s/p TURBT  right hydro   UTI - enterococci  COPD  CHF  anemia / MDS  chronic back pain with spinal stimulator  resolved hyperkalemia    Plan:    dc IVF  lasix 40mg ivp x 1 now  f/u cxray  f/u labs  nutrition consult  cont PO vanco - complete full course  off amoxicillin  encourage po intake  cont norvasc and lopressor  off aldactone - do not restart  contact isolation   PT / rehab / OOB    f/u/ check path  hem / onc f/u  pt overall worsening with progressive weakness and debility.  High risk for nosocomial complications and clinical deterioration.  Prognosis guarded.  Spoke with daughter in detail re prognosis  pulmonary consult done

## 2019-02-19 NOTE — PROGRESS NOTE ADULT - SUBJECTIVE AND OBJECTIVE BOX
SUBJECTIVE:    Patient is a 84y old Female who presents with a chief complaint of Urinary Retention/Hperkalemia with EKG changes (19 Feb 2019 12:29)    Currently admitted to medicine with the primary diagnosis of Bladder cancer     Today is hospital day 14d. This morning she is resting comfortably in bed and reports no new issues or overnight events.     PAST MEDICAL & SURGICAL HISTORY  Anemia: 4 PRBC 11/2018  Oxygen dependent: O2 2L Via [place]  Bladder cancer: 2018  CHF (congestive heart failure)  Breast tumor: removal benign bl breasts  Anal cancer: Chemo and radiation 1992  COPD (chronic obstructive pulmonary disease)  Lung disease: COPD  Hypertension  History of cystoscopy: 10/2018  H/O breast surgery: 39 yrs. ago, bilateral  History of back surgery: 10 yrs. ago stimulator implant 2001, 2011( cervical and Lumbar)    SOCIAL HISTORY:  Negative for smoking/alcohol/drug use.     ALLERGIES:  hair dyes (Hives)  sulfa drugs (Headache)  Zoloft (Headache)    MEDICATIONS:  STANDING MEDICATIONS  ALBUTerol/ipratropium for Nebulization 3 milliLiter(s) Nebulizer every 6 hours  amLODIPine   Tablet 10 milliGRAM(s) Oral daily  aspirin  chewable 81 milliGRAM(s) Oral daily  atorvastatin 20 milliGRAM(s) Oral at bedtime  buDESOnide 160 MICROgram(s)/formoterol 4.5 MICROgram(s) Inhaler 2 Puff(s) Inhalation two times a day  busPIRone 5 milliGRAM(s) Oral three times a day  calcium carbonate    500 mG (Tums) Chewable 1 Tablet(s) Chew two times a day  chlorhexidine 4% Liquid 1 Application(s) Topical <User Schedule>  docusate sodium Liquid 200 milliGRAM(s) Oral two times a day  DULoxetine 20 milliGRAM(s) Oral daily  epoetin spencer Injectable 09889 Unit(s) SubCutaneous every 7 days  heparin  Injectable 5000 Unit(s) SubCutaneous every 8 hours  hydrocortisone 2.5% Rectal Cream 1 Application(s) Rectal daily  metoprolol succinate ER 50 milliGRAM(s) Oral daily  senna 2 Tablet(s) Oral daily  vancomycin    Solution 125 milliGRAM(s) Oral every 6 hours    PRN MEDICATIONS  bisacodyl Suppository 10 milliGRAM(s) Rectal every 24 hours PRN  cloNIDine 0.1 milliGRAM(s) Oral every 8 hours PRN  HYDROcodone 10 mG/acetaminophen 325 mG 1 Tablet(s) Oral every 4 hours PRN  ondansetron Injectable 4 milliGRAM(s) IV Push every 8 hours PRN  simethicone 80 milliGRAM(s) Chew every 8 hours PRN  sodium chloride 3%  Inhalation 3 milliLiter(s) Inhalation every 4 hours PRN    VITALS:   T(F): 97  HR: 94  BP: 154/70  RR: 18  SpO2: --    LABS:      Ca    8.1<L>      18 Feb 2019 12:27      low albumin               RADIOLOGY:    PHYSICAL EXAM:  GEN: No acute distress  LUNGS: left lung collapse  HEART: Regular  ABD: Soft, non-tender, non-distended.  EXT: NC/NC/NE/2+PP/SARAVIA/Skin Intact.   NEURO: AAOX3    Intravenous access:   NG tube:   Rowe Catheter:

## 2019-02-19 NOTE — PROGRESS NOTE ADULT - ASSESSMENT
GEETA   - resolved  cdiff colitis without diarrhea  leukocytosis from cdiff - better  malnutrition  debility with progressive clinical deterioration  HTN   bladder ca  s/p TURBT  right hydro   UTI - enterococci  COPD  CHF  anemia / MDS  chronic back pain with spinal stimulator  resolved hyperkalemia    Plan:    dc IVF  lasix 40mg ivp x 1 now  f/u cxray  f/u labs  nutrition consult  cont PO vanco - complete full course  off amoxicillin  encourage po intake  cont norvasc and lopressor  off aldactone - do not restart  contact isolation   PT / rehab / OOB    f/u/ check path  hem / onc f/u  pt overall worsening with progressive weakness and debility.  High risk for nosocomial complications and clinical deterioration.  Prognosis guarded.  Spoke with daughter in detail re prognosis

## 2019-02-19 NOTE — CHART NOTE - NSCHARTNOTEFT_GEN_A_CORE
Registered Dietitian Follow-Up     Patient Profile Reviewed                           Yes [x]   No []     Nutrition History Previously Obtained        Yes [x]  No []       Pertinent Subjective Information: Pt reports that her appetite is on and off. Her daughter reports that she ate ok for breakfast but overall she has not been eating that much. EMR reports 0-25% intake. Assistance is required with meals. Pt would like to try a supplement but her daughter is concerned with the K+ amount. Daughter and Pt agreed to try Nepro. Spoke w/ MD to activate order.      Pertinent Medical Interventions: Pt is admitted for urinary retention, hyperkalemia-resolved (4.2), and bladder CA. S/p bladder surgery on 1/30. Pt is pending NH placement.      Diet order: low potassium and sodium      Anthropometrics:  - Ht. 157.48cm/5'2  - Wt. 53kg/117#  - wt range: Pt has a large wt range. Most likely a bedscale error. Pt reports a UBW of 115#. Wt documented as 117# is most likely accurate.   - BMI 21.4  - IBW 50kg     Pertinent Lab Data: (2/17) H/H 9.9/31.3, BUN 35, creat 0.6, albumin 1.9, magnesium 1.7     Pertinent Meds: heparin, sodium chloride 0.9%, docusate sodium, Tums, zofran, senna, bisacodyl suppository, atorvastatin      Physical Findings:  - Appearance: a little sleepy but able to answer questions  - GI function: Last BM was yesterday 2/18. Fecal incontinence. Abdomen noted as soft, nontender, nondistended.   - Tubes: n/a  - Oral/Mouth cavity: n/a  - Skin: Drew score 14. Skin intact. Edema 3+ dependent, right and left wrist, right and left hand.      Nutrition Requirements: Needs estimated on 2/13  Weight Used: 53kg     Estimated Energy Needs    Continue [x]  Adjust []  1126-1219kcals/day (MSJ A.f 1.2-1.3)      Estimated Protein Needs    Continue [x]  Adjust []  53-64g/day (1-1.2g/kg) Monitor renal function     Estimated Fluid Needs        Continue [x]  Adjust []  1:1ml/kcal or fluid as per LIP     [] Previous Nutrition Diagnosis: Inadequate oral intake            [x] Ongoing          [] Resolved    [] No active nutrition diagnosis identified at this time     Goal/Expected Outcome: Meals and snacks/ Medical food supplements      Indicator/Monitoring: Will monitor intake, nutrition focused physical findings, and labs. At risk    Recommendations:  Meals and snacks: continue on a low K+ and low sodium   Medical food supplements: Nepro daily Registered Dietitian Follow-Up     Patient Profile Reviewed                           Yes [x]   No []     Nutrition History Previously Obtained        Yes [x]  No []       Pertinent Subjective Information: Pt reports that her appetite is on and off. Her daughter reports that she ate ok for breakfast but overall she has not been eating that much. EMR reports 0-25% intake. Assistance is required with meals. Pt would like to try a supplement but her daughter is concerned with the K+ amount. Daughter and Pt agreed to try Nepro. Spoke w/ MD to activate order.      Pertinent Medical Interventions: Pt is admitted for urinary retention, hyperkalemia-resolved (4.2), and bladder CA. S/p bladder surgery on 1/30. Pt is pending NH placement.      Diet order: low potassium and sodium      Anthropometrics:  - Ht. 157.48cm/5'2  - Wt. 53kg/117#  - wt range: Pt has a large wt range. bedscale error vs edema. Pt reports a UBW of 115#. Wt documented as 117# is most likely accurate.   - BMI 21.4  - IBW 50kg     Pertinent Lab Data: (2/17) H/H 9.9/31.3, BUN 35, creat 0.6, albumin 1.9, magnesium 1.7     Pertinent Meds: heparin, sodium chloride 0.9%, docusate sodium, Tums, zofran, senna, bisacodyl suppository, atorvastatin      Physical Findings:  - Appearance: a little sleepy but able to answer questions  - GI function: Last BM was yesterday 2/18. Fecal incontinence. Abdomen noted as soft, nontender, nondistended.   - Tubes: n/a  - Oral/Mouth cavity: n/a  - Skin: Drew score 14. Skin intact. Edema 3+ dependent, right and left wrist, right and left hand.      Nutrition Requirements: Needs estimated on 2/13  Weight Used: 53kg     Estimated Energy Needs    Continue [x]  Adjust []  1126-1219kcals/day (MSJ A.f 1.2-1.3)      Estimated Protein Needs    Continue [x]  Adjust []  53-64g/day (1-1.2g/kg) Monitor renal function     Estimated Fluid Needs        Continue [x]  Adjust []  1:1ml/kcal or fluid as per LIP     [] Previous Nutrition Diagnosis: Inadequate oral intake            [x] Ongoing          [] Resolved    [] No active nutrition diagnosis identified at this time     Goal/Expected Outcome: Meals and snacks/ Medical food supplements      Indicator/Monitoring: Will monitor intake, nutrition focused physical findings, and labs. At risk    Recommendations:  Meals and snacks: continue on a low K+ and low sodium   Medical food supplements: Nepro daily

## 2019-02-19 NOTE — CONSULT NOTE ADULT - SUBJECTIVE AND OBJECTIVE BOX
Patient is a 84y old  Female who presents with a chief complaint of Urinary Retention/Hperkalemia with EKG changes (19 Feb 2019 13:08)    HPI:  From ED:  84 year old female w hx of HTN, CHF, COPD on 2 LPM NC daily, bladder cancer presents to the ED with urinary retention, crampy/spasm-like lower abdominal pain and mild distention. Patient had bladder surgery w Dr. Sanchez on 1/30/19 and was d/c home with a lynne catheter in place. The catheter was removed yesterday. Patient had two episodes of normal urination after removal, but has since been experiencing retention and dribbling at most. Denies fevers, chills, cough, chest pain, shortness of breath, flank pain, myalgias, incontinence, rashes, or recent illness.  ________________________________________________________________________________________________________   Patient presents to ED with family this am.  Dtr reports that patient s/p cysto with bladder scrapping for Bladder Ca on 1/30/19.  Patient tolerated procedure well and was discharged home with Lynne.  Patient did very well since then and went to follow up at office ystd.  At that point lynne was removed and patient went home.  Dtr reports that patient urinated x 2 since removal however last evening began only dribbling and began having severe bladder spasms, unrelieved by oxybutynin.  Family brought patient to ED 2/2 to this.  Patient reports she is feeling well.  No CP.  SOB at baseline.  Still with some minor abdominal cramping, suprapubic area.  No fever.  Last BM ystd AM and regular. (05 Feb 2019 07:48)    Pulm called for left white out. Pt c/o some chest pain. Minimal cough. Not in respiratory distress.        PAST MEDICAL & SURGICAL HISTORY:  Anemia: 4 PRBC 11/2018  Oxygen dependent: O2 2L Via NC  Bladder cancer: 2018  CHF (congestive heart failure)  Breast tumor: removal benign bl breasts  Anal cancer: Chemo and radiation 1992  COPD (chronic obstructive pulmonary disease)  Lung disease: COPD  Hypertension  History of cystoscopy: 10/2018  H/O breast surgery: 39 yrs. ago, bilateral  History of back surgery: 10 yrs. ago stimulator implant 2001, 2011( cervical and Lumbar)      SOCIAL HX:   Smoking         -ve x 3                 ETOH                            Other    FAMILY HISTORY:  No pertinent family history in first degree relatives  .  No cardiovascular or pulmonary family history     Review of System:  See HPI    Allergies    hair dyes (Hives)  sulfa drugs (Headache)  Zoloft (Headache)    Intolerances          PHYSICAL EXAM  Vital Signs Last 24 Hrs  T(C): 36.1 (19 Feb 2019 05:36), Max: 36.1 (18 Feb 2019 14:00)  T(F): 97 (19 Feb 2019 05:36), Max: 97 (19 Feb 2019 05:36)  HR: 94 (19 Feb 2019 05:36) (74 - 94)  BP: 154/70 (19 Feb 2019 05:36) (137/65 - 154/70)  BP(mean): --  RR: 18 (19 Feb 2019 05:36) (16 - 18)  SpO2: --    General: In NAD  HEENT: ANDRÉS             Lymphatic system: No cervical LN     Lungs: Jonathan BS reduced breath sounds on the left   Cardiovascular: Regular  Gastrointestinal: Soft.  + BS   Musculoskeletal: No Clubbing.  Full range of motion.. Moves all extremities  Skin: Warm.  Intact  Neurological: No motor or sensory deficit       LABS:                          10.3   19.66 )-----------( 293      ( 19 Feb 2019 13:10 )             32.6                                                 Ca    8.1<L>      18 Feb 2019 12:27                                        MEDICATIONS  (STANDING):  ALBUTerol/ipratropium for Nebulization 3 milliLiter(s) Nebulizer every 6 hours  amLODIPine   Tablet 10 milliGRAM(s) Oral daily  aspirin  chewable 81 milliGRAM(s) Oral daily  atorvastatin 20 milliGRAM(s) Oral at bedtime  buDESOnide 160 MICROgram(s)/formoterol 4.5 MICROgram(s) Inhaler 2 Puff(s) Inhalation two times a day  busPIRone 5 milliGRAM(s) Oral three times a day  calcium carbonate    500 mG (Tums) Chewable 1 Tablet(s) Chew two times a day  chlorhexidine 4% Liquid 1 Application(s) Topical <User Schedule>  docusate sodium Liquid 200 milliGRAM(s) Oral two times a day  DULoxetine 20 milliGRAM(s) Oral daily  epoetin spencer Injectable 54133 Unit(s) SubCutaneous every 7 days  heparin  Injectable 5000 Unit(s) SubCutaneous every 8 hours  hydrocortisone 2.5% Rectal Cream 1 Application(s) Rectal daily  metoprolol succinate ER 50 milliGRAM(s) Oral daily  senna 2 Tablet(s) Oral daily  vancomycin    Solution 125 milliGRAM(s) Oral every 6 hours    MEDICATIONS  (PRN):  bisacodyl Suppository 10 milliGRAM(s) Rectal every 24 hours PRN Constipation  cloNIDine 0.1 milliGRAM(s) Oral every 8 hours PRN for sbp > 180  HYDROcodone 10 mG/acetaminophen 325 mG 1 Tablet(s) Oral every 4 hours PRN Moderate Pain (4 - 6)  ondansetron Injectable 4 milliGRAM(s) IV Push every 8 hours PRN Nausea and/or Vomiting  simethicone 80 milliGRAM(s) Chew every 8 hours PRN Gas  sodium chloride 3%  Inhalation 3 milliLiter(s) Inhalation every 4 hours PRN ..    CXR : left white out Patient is a 84y old  Female who presents with a chief complaint of Urinary Retention/Hperkalemia with EKG changes (19 Feb 2019 13:08)    HPI:  From ED:  84 year old female w hx of HTN, CHF, COPD on 2 LPM NC daily, bladder cancer presents to the ED with urinary retention, crampy/spasm-like lower abdominal pain and mild distention. Patient had bladder surgery w Dr. Sanchez on 1/30/19 and was d/c home with a lynne catheter in place. The catheter was removed yesterday. Patient had two episodes of normal urination after removal, but has since been experiencing retention and dribbling at most. Denies fevers, chills, cough, chest pain, shortness of breath, flank pain, myalgias, incontinence, rashes, or recent illness.  ________________________________________________________________________________________________________   Patient presents to ED with family this am.  Dtr reports that patient s/p cysto with bladder scrapping for Bladder Ca on 1/30/19.  Patient tolerated procedure well and was discharged home with Lynne.  Patient did very well since then and went to follow up at office ystd.  At that point lynne was removed and patient went home.  Dtr reports that patient urinated x 2 since removal however last evening began only dribbling and began having severe bladder spasms, unrelieved by oxybutynin.  Family brought patient to ED 2/2 to this.  Patient reports she is feeling well.  No CP.  SOB at baseline.  Still with some minor abdominal cramping, suprapubic area.  No fever.  Last BM ystd AM and regular. (05 Feb 2019 07:48)    Pulm called for left white out. Pt c/o some chest pain. Minimal cough. Not in respiratory distress.        PAST MEDICAL & SURGICAL HISTORY:  Anemia: 4 PRBC 11/2018  Oxygen dependent: O2 2L Via NC  Bladder cancer: 2018  CHF (congestive heart failure)  Breast tumor: removal benign bl breasts  Anal cancer: Chemo and radiation 1992  COPD (chronic obstructive pulmonary disease)  Lung disease: COPD  Hypertension  History of cystoscopy: 10/2018  H/O breast surgery: 39 yrs. ago, bilateral  History of back surgery: 10 yrs. ago stimulator implant 2001, 2011( cervical and Lumbar)      SOCIAL HX:   Smoking         -ve x 3                 ETOH                            Other    FAMILY HISTORY:  No pertinent family history in first degree relatives  .  No cardiovascular or pulmonary family history     Review of System:  See HPI    Allergies    hair dyes (Hives)  sulfa drugs (Headache)  Zoloft (Headache)    Intolerances          PHYSICAL EXAM  Vital Signs Last 24 Hrs  T(C): 36.1 (19 Feb 2019 05:36), Max: 36.1 (18 Feb 2019 14:00)  T(F): 97 (19 Feb 2019 05:36), Max: 97 (19 Feb 2019 05:36)  HR: 94 (19 Feb 2019 05:36) (74 - 94)  BP: 154/70 (19 Feb 2019 05:36) (137/65 - 154/70)  BP(mean): --  RR: 18 (19 Feb 2019 05:36) (16 - 18)  SpO2: --    General: In NAD  HEENT: ANDRÉS             Lymphatic system: No cervical LN     Lungs: Jonathan BS, reduced breath sounds on the left   Cardiovascular: Regular  Gastrointestinal: Soft.  + BS   Musculoskeletal: No Clubbing.  Full range of motion.. Moves all extremities  Skin: Warm.  Intact  Neurological: No motor or sensory deficit       LABS:                          10.3   19.66 )-----------( 293      ( 19 Feb 2019 13:10 )             32.6                                                 Ca    8.1<L>      18 Feb 2019 12:27                                        MEDICATIONS  (STANDING):  ALBUTerol/ipratropium for Nebulization 3 milliLiter(s) Nebulizer every 6 hours  amLODIPine   Tablet 10 milliGRAM(s) Oral daily  aspirin  chewable 81 milliGRAM(s) Oral daily  atorvastatin 20 milliGRAM(s) Oral at bedtime  buDESOnide 160 MICROgram(s)/formoterol 4.5 MICROgram(s) Inhaler 2 Puff(s) Inhalation two times a day  busPIRone 5 milliGRAM(s) Oral three times a day  calcium carbonate    500 mG (Tums) Chewable 1 Tablet(s) Chew two times a day  chlorhexidine 4% Liquid 1 Application(s) Topical <User Schedule>  docusate sodium Liquid 200 milliGRAM(s) Oral two times a day  DULoxetine 20 milliGRAM(s) Oral daily  epoetin spencer Injectable 47828 Unit(s) SubCutaneous every 7 days  heparin  Injectable 5000 Unit(s) SubCutaneous every 8 hours  hydrocortisone 2.5% Rectal Cream 1 Application(s) Rectal daily  metoprolol succinate ER 50 milliGRAM(s) Oral daily  senna 2 Tablet(s) Oral daily  vancomycin    Solution 125 milliGRAM(s) Oral every 6 hours    MEDICATIONS  (PRN):  bisacodyl Suppository 10 milliGRAM(s) Rectal every 24 hours PRN Constipation  cloNIDine 0.1 milliGRAM(s) Oral every 8 hours PRN for sbp > 180  HYDROcodone 10 mG/acetaminophen 325 mG 1 Tablet(s) Oral every 4 hours PRN Moderate Pain (4 - 6)  ondansetron Injectable 4 milliGRAM(s) IV Push every 8 hours PRN Nausea and/or Vomiting  simethicone 80 milliGRAM(s) Chew every 8 hours PRN Gas  sodium chloride 3%  Inhalation 3 milliLiter(s) Inhalation every 4 hours PRN ..    CXR : left white out   mediastinal shift ipsilateral

## 2019-02-19 NOTE — CONSULT NOTE ADULT - ASSESSMENT
Impression  >Left lung atelectasis 2/2 mucous plug   >CDiff colitis   >Bladder ca     Recommendations    3% NS nebulizations q 6   OOB to chair   Chest PT   HS Bipap   If all measures fail will consider a bronch tomorrow.   DVT px   Family wants least aggressive measures.     Thank you for the consult. Impression  >Left lung atelectasis 2/2 mucous plug   >CDiff colitis   >Bladder ca     Recommendations    3% NS nebulizations q 6   OOB to chair   Chest PT   HS Bipap   repeat CXR in AM  If all measures fail will consider a bronch tomorrow.   DVT px   Family wants least aggressive measures.     Thank you for the consult.

## 2019-02-19 NOTE — PROGRESS NOTE ADULT - SUBJECTIVE AND OBJECTIVE BOX
Patient is a 84y old  Female who presents with a chief complaint of Urinary Retention/Hyperkalemia with EKG changes (19 Feb 2019 13:36)       Pt is seen and examined this morning  pt is awake and lying in bed          ROS:  Negative   MEDICATIONS  (STANDING):  ALBUTerol/ipratropium for Nebulization 3 milliLiter(s) Nebulizer every 6 hours  amLODIPine   Tablet 10 milliGRAM(s) Oral daily  aspirin  chewable 81 milliGRAM(s) Oral daily  atorvastatin 20 milliGRAM(s) Oral at bedtime  buDESOnide 160 MICROgram(s)/formoterol 4.5 MICROgram(s) Inhaler 2 Puff(s) Inhalation two times a day  busPIRone 5 milliGRAM(s) Oral three times a day  chlorhexidine 4% Liquid 1 Application(s) Topical <User Schedule>  docusate sodium Liquid 200 milliGRAM(s) Oral two times a day  DULoxetine 20 milliGRAM(s) Oral daily  epoetin spencer Injectable 15003 Unit(s) SubCutaneous every 7 days  heparin  Injectable 5000 Unit(s) SubCutaneous every 8 hours  hydrocortisone 2.5% Rectal Cream 1 Application(s) Rectal daily  metoprolol succinate ER 50 milliGRAM(s) Oral daily  senna 2 Tablet(s) Oral daily  vancomycin    Solution 125 milliGRAM(s) Oral every 6 hours    MEDICATIONS  (PRN):  bisacodyl Suppository 10 milliGRAM(s) Rectal every 24 hours PRN Constipation  cloNIDine 0.1 milliGRAM(s) Oral every 8 hours PRN for sbp > 180  HYDROcodone 10 mG/acetaminophen 325 mG 1 Tablet(s) Oral every 4 hours PRN Moderate Pain (4 - 6)  ondansetron Injectable 4 milliGRAM(s) IV Push every 8 hours PRN Nausea and/or Vomiting  simethicone 80 milliGRAM(s) Chew every 8 hours PRN Gas  sodium chloride 3%  Inhalation 3 milliLiter(s) Inhalation every 4 hours PRN ..      Allergies    hair dyes (Hives)  sulfa drugs (Headache)  Zoloft (Headache)    Intolerances        Vital Signs Last 24 Hrs  T(C): 36.1 (19 Feb 2019 14:00), Max: 36.1 (19 Feb 2019 05:36)  T(F): 97 (19 Feb 2019 14:00), Max: 97 (19 Feb 2019 05:36)  HR: 90 (19 Feb 2019 14:00) (76 - 94)  BP: 149/66 (19 Feb 2019 14:00) (149/66 - 154/70)  BP(mean): --  RR: 18 (19 Feb 2019 14:00) (16 - 18)  SpO2: --    PHYSICAL EXAM  General: adult in NAD  HEENT: clear oropharynx, anicteric sclera, pink conjunctiva  Neck: supple  CV: normal S1/S2 with no murmur rubs or gallops  Lungs: positive air movement b/l ant lungs,clear to auscultation, no wheezes, no rales  Abdomen: soft non-tender non-distended, no hepatosplenomegaly  Ext: no clubbing cyanosis or edema  Skin: no rashes and no petechiae  Neuro: alert and oriented X 4, no focal deficits  LABS:                          10.3   19.66 )-----------( 293      ( 19 Feb 2019 13:10 )             32.6         Mean Cell Volume : 102.8 fL  Mean Cell Hemoglobin : 32.5 pg  Mean Cell Hemoglobin Concentration : 31.6 g/dL  Auto Neutrophil # : x  Auto Lymphocyte # : x  Auto Monocyte # : x  Auto Eosinophil # : x  Auto Basophil # : x  Auto Neutrophil % : x  Auto Lymphocyte % : x  Auto Monocyte % : x  Auto Eosinophil % : x  Auto Basophil % : x    Serial CBC's  02-19 @ 13:10  Hct-32.6 / Hgb-10.3 / Plat-293 / RBC-3.17 / WBC-19.66          Serial CBC's  02-17 @ 08:42  Hct-31.3 / Hgb-9.9 / Plat-230 / RBC-3.09 / WBC-20.00          Serial CBC's  02-16 @ 15:09  Hct-30.3 / Hgb-9.9 / Plat-213 / RBC-2.99 / WBC-24.04            02-19    137  |  104  |  41<H>  ----------------------------<  134<H>  5.1<H>   |  25  |  0.7    Ca    8.0<L>      19 Feb 2019 13:10  Phos  3.2     02-19  Mg     2.3     02-19    TPro  4.5<L>  /  Alb  2.4<L>  /  TBili  0.2  /  DBili  x   /  AST  27  /  ALT  25  /  AlkPhos  142<H>  02-19          Hemoglobin: 10.3 g/dL (02-19-19 @ 13:10)  WBC Count: 19.66 K/uL (02-19-19 @ 13:10)  Hematocrit: 32.6 % (02-19-19 @ 13:10)  Platelet Count - Automated: 293 K/uL (02-19-19 @ 13:10)  WBC Count: 20.00 K/uL (02-17-19 @ 08:42)  Hematocrit: 31.3 % (02-17-19 @ 08:42)  Platelet Count - Automated: 230 K/uL (02-17-19 @ 08:42)  Hemoglobin: 9.9 g/dL (02-17-19 @ 08:42)  WBC Count: 24.04 K/uL (02-16-19 @ 15:09)  Hematocrit: 30.3 % (02-16-19 @ 15:09)  Platelet Count - Automated: 213 K/uL (02-16-19 @ 15:09)  Hemoglobin: 9.9 g/dL (02-16-19 @ 15:09)  Platelet Count - Automated: 214 K/uL (02-15-19 @ 06:13)  Hemoglobin: 10.1 g/dL (02-15-19 @ 06:13)  WBC Count: 34.64 K/uL (02-15-19 @ 06:13)  Hematocrit: 30.7 % (02-15-19 @ 06:13)  Hematocrit: 29.4 % (02-13-19 @ 14:50)  Platelet Count - Automated: 219 K/uL (02-13-19 @ 14:50)  Hemoglobin: 9.7 g/dL (02-13-19 @ 14:50)  WBC Count: 38.51 K/uL (02-13-19 @ 14:50)  Hematocrit: 31.3 % (02-12-19 @ 08:01)  Hemoglobin: 10.3 g/dL (02-12-19 @ 08:01)  WBC Count: 38.19 K/uL (02-12-19 @ 08:01)  Platelet Count - Automated: 213 K/uL (02-12-19 @ 08:01)  Hemoglobin: 9.8 g/dL (02-11-19 @ 06:49)  WBC Count: 34.22 K/uL (02-11-19 @ 06:49)  Platelet Count - Automated: 188 K/uL (02-11-19 @ 06:49)  Hematocrit: 29.6 % (02-11-19 @ 06:49)  WBC Count: 32.94 K/uL (02-10-19 @ 15:05)  Hematocrit: 30.4 % (02-10-19 @ 15:05)  Platelet Count - Automated: 164 K/uL (02-10-19 @ 15:05)  Hemoglobin: 10.0 g/dL (02-10-19 @ 15:05)  Hematocrit: 31.7 % (02-09-19 @ 18:50)  Platelet Count - Automated: 188 K/uL (02-09-19 @ 18:50)  Hemoglobin: 10.1 g/dL (02-09-19 @ 18:50)  WBC Count: 26.14 K/uL (02-09-19 @ 18:50)                BLOOD SMEAR INTERPRETATION:       RADIOLOGY & ADDITIONAL STUDIES:

## 2019-02-20 LAB
ALBUMIN SERPL ELPH-MCNC: 2.8 G/DL — LOW (ref 3.5–5.2)
ALP SERPL-CCNC: 93 U/L — SIGNIFICANT CHANGE UP (ref 30–115)
ALT FLD-CCNC: 23 U/L — SIGNIFICANT CHANGE UP (ref 0–41)
ANION GAP SERPL CALC-SCNC: 8 MMOL/L — SIGNIFICANT CHANGE UP (ref 7–14)
AST SERPL-CCNC: 21 U/L — SIGNIFICANT CHANGE UP (ref 0–41)
BILIRUB SERPL-MCNC: 0.2 MG/DL — SIGNIFICANT CHANGE UP (ref 0.2–1.2)
BUN SERPL-MCNC: 48 MG/DL — HIGH (ref 10–20)
CALCIUM SERPL-MCNC: 7.9 MG/DL — LOW (ref 8.5–10.1)
CHLORIDE SERPL-SCNC: 104 MMOL/L — SIGNIFICANT CHANGE UP (ref 98–110)
CO2 SERPL-SCNC: 25 MMOL/L — SIGNIFICANT CHANGE UP (ref 17–32)
CREAT SERPL-MCNC: 0.9 MG/DL — SIGNIFICANT CHANGE UP (ref 0.7–1.5)
GLUCOSE SERPL-MCNC: 171 MG/DL — HIGH (ref 70–99)
GRAM STN FLD: SIGNIFICANT CHANGE UP
HCT VFR BLD CALC: 31 % — LOW (ref 37–47)
HGB BLD-MCNC: 9.9 G/DL — LOW (ref 12–16)
MAGNESIUM SERPL-MCNC: 2.2 MG/DL — SIGNIFICANT CHANGE UP (ref 1.8–2.4)
MCHC RBC-ENTMCNC: 31.9 G/DL — LOW (ref 32–37)
MCHC RBC-ENTMCNC: 32.8 PG — HIGH (ref 27–31)
MCV RBC AUTO: 102.6 FL — HIGH (ref 81–99)
NRBC # BLD: 0 /100 WBCS — SIGNIFICANT CHANGE UP (ref 0–0)
PHOSPHATE SERPL-MCNC: 3.2 MG/DL — SIGNIFICANT CHANGE UP (ref 2.1–4.9)
PLATELET # BLD AUTO: 302 K/UL — SIGNIFICANT CHANGE UP (ref 130–400)
POTASSIUM SERPL-MCNC: 5 MMOL/L — SIGNIFICANT CHANGE UP (ref 3.5–5)
POTASSIUM SERPL-SCNC: 5 MMOL/L — SIGNIFICANT CHANGE UP (ref 3.5–5)
PROT SERPL-MCNC: 4.6 G/DL — LOW (ref 6–8)
RBC # BLD: 3.02 M/UL — LOW (ref 4.2–5.4)
RBC # FLD: 14.5 % — SIGNIFICANT CHANGE UP (ref 11.5–14.5)
SODIUM SERPL-SCNC: 137 MMOL/L — SIGNIFICANT CHANGE UP (ref 135–146)
SPECIMEN SOURCE: SIGNIFICANT CHANGE UP
WBC # BLD: 25.13 K/UL — HIGH (ref 4.8–10.8)
WBC # FLD AUTO: 25.13 K/UL — HIGH (ref 4.8–10.8)

## 2019-02-20 RX ADMIN — Medication 3 MILLILITER(S): at 19:17

## 2019-02-20 RX ADMIN — SENNA PLUS 2 TABLET(S): 8.6 TABLET ORAL at 11:50

## 2019-02-20 RX ADMIN — SODIUM CHLORIDE 3 MILLILITER(S): 9 INJECTION INTRAMUSCULAR; INTRAVENOUS; SUBCUTANEOUS at 11:35

## 2019-02-20 RX ADMIN — ATORVASTATIN CALCIUM 20 MILLIGRAM(S): 80 TABLET, FILM COATED ORAL at 22:21

## 2019-02-20 RX ADMIN — Medication 81 MILLIGRAM(S): at 11:40

## 2019-02-20 RX ADMIN — Medication 125 MILLIGRAM(S): at 06:02

## 2019-02-20 RX ADMIN — HEPARIN SODIUM 5000 UNIT(S): 5000 INJECTION INTRAVENOUS; SUBCUTANEOUS at 13:37

## 2019-02-20 RX ADMIN — Medication 1 APPLICATION(S): at 11:51

## 2019-02-20 RX ADMIN — SODIUM CHLORIDE 3 MILLILITER(S): 9 INJECTION INTRAMUSCULAR; INTRAVENOUS; SUBCUTANEOUS at 15:29

## 2019-02-20 RX ADMIN — AMLODIPINE BESYLATE 10 MILLIGRAM(S): 2.5 TABLET ORAL at 06:01

## 2019-02-20 RX ADMIN — Medication 3 MILLILITER(S): at 13:39

## 2019-02-20 RX ADMIN — Medication 125 MILLIGRAM(S): at 11:40

## 2019-02-20 RX ADMIN — Medication 125 MILLIGRAM(S): at 17:46

## 2019-02-20 RX ADMIN — Medication 125 MILLIGRAM(S): at 22:22

## 2019-02-20 RX ADMIN — DULOXETINE HYDROCHLORIDE 20 MILLIGRAM(S): 30 CAPSULE, DELAYED RELEASE ORAL at 11:40

## 2019-02-20 RX ADMIN — CHLORHEXIDINE GLUCONATE 1 APPLICATION(S): 213 SOLUTION TOPICAL at 08:43

## 2019-02-20 RX ADMIN — Medication 50 MILLIGRAM(S): at 06:03

## 2019-02-20 RX ADMIN — ONDANSETRON 4 MILLIGRAM(S): 8 TABLET, FILM COATED ORAL at 03:47

## 2019-02-20 RX ADMIN — HEPARIN SODIUM 5000 UNIT(S): 5000 INJECTION INTRAVENOUS; SUBCUTANEOUS at 06:07

## 2019-02-20 RX ADMIN — Medication 5 MILLIGRAM(S): at 06:01

## 2019-02-20 RX ADMIN — Medication 5 MILLIGRAM(S): at 13:37

## 2019-02-20 RX ADMIN — BUDESONIDE AND FORMOTEROL FUMARATE DIHYDRATE 2 PUFF(S): 160; 4.5 AEROSOL RESPIRATORY (INHALATION) at 20:01

## 2019-02-20 RX ADMIN — BUDESONIDE AND FORMOTEROL FUMARATE DIHYDRATE 2 PUFF(S): 160; 4.5 AEROSOL RESPIRATORY (INHALATION) at 08:43

## 2019-02-20 RX ADMIN — Medication 200 MILLIGRAM(S): at 17:46

## 2019-02-20 RX ADMIN — HEPARIN SODIUM 5000 UNIT(S): 5000 INJECTION INTRAVENOUS; SUBCUTANEOUS at 22:22

## 2019-02-20 RX ADMIN — Medication 3 MILLILITER(S): at 07:42

## 2019-02-20 RX ADMIN — Medication 5 MILLIGRAM(S): at 22:21

## 2019-02-20 NOTE — PROGRESS NOTE ADULT - ASSESSMENT
GEETA   - resolved  cdiff colitis without diarrhea  leukocytosis from cdiff - better  left lung atelectasis from mucous plug  malnutrition  debility with progressive clinical deterioration  HTN   bladder ca  s/p TURBT  right hydro   UTI - enterococci  COPD  CHF  anemia / MDS  chronic back pain with spinal stimulator  resolved hyperkalemia    Plan:    off fluids and diuretics  repeat labs today  f/u pulm  nutrition consult pending - spoke with Dr. Doan   cont PO vanco - complete full course  off amoxicillin  encourage po intake  cont norvasc and lopressor  off aldactone - do not restart   PT / rehab / OOB   f/u/ check path  hem / onc f/u

## 2019-02-20 NOTE — PROGRESS NOTE ADULT - SUBJECTIVE AND OBJECTIVE BOX
Patient is a 84y old  Female who presents with a chief complaint of Urinary Retention/Hperkalemia with EKG changes (19 Feb 2019 18:29)       Pt is seen and examined  pt is awake and lying in bed  pt seems comfortable and denies any complaints at this time          ROS:  Negative    MEDICATIONS  (STANDING):  ALBUTerol/ipratropium for Nebulization 3 milliLiter(s) Nebulizer every 6 hours  amLODIPine   Tablet 10 milliGRAM(s) Oral daily  aspirin  chewable 81 milliGRAM(s) Oral daily  atorvastatin 20 milliGRAM(s) Oral at bedtime  buDESOnide 160 MICROgram(s)/formoterol 4.5 MICROgram(s) Inhaler 2 Puff(s) Inhalation two times a day  busPIRone 5 milliGRAM(s) Oral three times a day  chlorhexidine 4% Liquid 1 Application(s) Topical <User Schedule>  docusate sodium Liquid 200 milliGRAM(s) Oral two times a day  DULoxetine 20 milliGRAM(s) Oral daily  epoetin spencer Injectable 54042 Unit(s) SubCutaneous every 7 days  heparin  Injectable 5000 Unit(s) SubCutaneous every 8 hours  hydrocortisone 2.5% Rectal Cream 1 Application(s) Rectal daily  metoprolol succinate ER 50 milliGRAM(s) Oral daily  senna 2 Tablet(s) Oral daily  vancomycin    Solution 125 milliGRAM(s) Oral every 6 hours    MEDICATIONS  (PRN):  bisacodyl Suppository 10 milliGRAM(s) Rectal every 24 hours PRN Constipation  cloNIDine 0.1 milliGRAM(s) Oral every 8 hours PRN for sbp > 180  HYDROcodone 10 mG/acetaminophen 325 mG 1 Tablet(s) Oral every 4 hours PRN Moderate Pain (4 - 6)  ondansetron Injectable 4 milliGRAM(s) IV Push every 8 hours PRN Nausea and/or Vomiting  simethicone 80 milliGRAM(s) Chew every 8 hours PRN Gas  sodium chloride 3%  Inhalation 3 milliLiter(s) Inhalation every 4 hours PRN ..      Allergies    hair dyes (Hives)  sulfa drugs (Headache)  Zoloft (Headache)    Intolerances        Vital Signs Last 24 Hrs  T(C): 36.2 (20 Feb 2019 05:28), Max: 36.2 (20 Feb 2019 05:28)  T(F): 97.2 (20 Feb 2019 05:28), Max: 97.2 (20 Feb 2019 05:28)  HR: 95 (20 Feb 2019 05:28) (90 - 95)  BP: 111/56 (20 Feb 2019 05:28) (111/56 - 152/66)  BP(mean): --  RR: 16 (20 Feb 2019 05:28) (16 - 18)  SpO2: 95% (19 Feb 2019 22:03) (95% - 95%)    PHYSICAL EXAM  General: adult in NAD  HEENT: clear oropharynx, anicteric sclera, pink conjunctiva  Neck: supple  CV: normal S1/S2 with no murmur rubs or gallops  Lungs: positive air movement b/l ant lungs,clear to auscultation, no wheezes, no rales  Abdomen: soft non-tender non-distended, no hepatosplenomegaly  Ext: no clubbing cyanosis or edema  Skin: no rashes and no petechiae  Neuro: alert and oriented X 4, no focal deficits  LABS:                          10.3   19.66 )-----------( 293      ( 19 Feb 2019 13:10 )             32.6         Mean Cell Volume : 102.8 fL  Mean Cell Hemoglobin : 32.5 pg  Mean Cell Hemoglobin Concentration : 31.6 g/dL  Auto Neutrophil # : x  Auto Lymphocyte # : x  Auto Monocyte # : x  Auto Eosinophil # : x  Auto Basophil # : x  Auto Neutrophil % : x  Auto Lymphocyte % : x  Auto Monocyte % : x  Auto Eosinophil % : x  Auto Basophil % : x    Serial CBC's  02-19 @ 13:10  Hct-32.6 / Hgb-10.3 / Plat-293 / RBC-3.17 / WBC-19.66          Serial CBC's  02-17 @ 08:42  Hct-31.3 / Hgb-9.9 / Plat-230 / RBC-3.09 / WBC-20.00          Serial CBC's  02-16 @ 15:09  Hct-30.3 / Hgb-9.9 / Plat-213 / RBC-2.99 / WBC-24.04            02-19    137  |  104  |  41<H>  ----------------------------<  134<H>  5.1<H>   |  25  |  0.7    Ca    8.0<L>      19 Feb 2019 13:10  Phos  3.2     02-19  Mg     2.3     02-19    TPro  4.5<L>  /  Alb  2.4<L>  /  TBili  0.2  /  DBili  x   /  AST  27  /  ALT  25  /  AlkPhos  142<H>  02-19          Hemoglobin: 10.3 g/dL (02-19-19 @ 13:10)  WBC Count: 19.66 K/uL (02-19-19 @ 13:10)  Hematocrit: 32.6 % (02-19-19 @ 13:10)  Platelet Count - Automated: 293 K/uL (02-19-19 @ 13:10)  WBC Count: 20.00 K/uL (02-17-19 @ 08:42)  Hematocrit: 31.3 % (02-17-19 @ 08:42)  Platelet Count - Automated: 230 K/uL (02-17-19 @ 08:42)  Hemoglobin: 9.9 g/dL (02-17-19 @ 08:42)  WBC Count: 24.04 K/uL (02-16-19 @ 15:09)  Hematocrit: 30.3 % (02-16-19 @ 15:09)  Platelet Count - Automated: 213 K/uL (02-16-19 @ 15:09)  Hemoglobin: 9.9 g/dL (02-16-19 @ 15:09)  Platelet Count - Automated: 214 K/uL (02-15-19 @ 06:13)  Hemoglobin: 10.1 g/dL (02-15-19 @ 06:13)  WBC Count: 34.64 K/uL (02-15-19 @ 06:13)  Hematocrit: 30.7 % (02-15-19 @ 06:13)  Hematocrit: 29.4 % (02-13-19 @ 14:50)  Platelet Count - Automated: 219 K/uL (02-13-19 @ 14:50)  Hemoglobin: 9.7 g/dL (02-13-19 @ 14:50)  WBC Count: 38.51 K/uL (02-13-19 @ 14:50)  Hematocrit: 31.3 % (02-12-19 @ 08:01)  Hemoglobin: 10.3 g/dL (02-12-19 @ 08:01)  WBC Count: 38.19 K/uL (02-12-19 @ 08:01)  Platelet Count - Automated: 213 K/uL (02-12-19 @ 08:01)  Hemoglobin: 9.8 g/dL (02-11-19 @ 06:49)  WBC Count: 34.22 K/uL (02-11-19 @ 06:49)  Platelet Count - Automated: 188 K/uL (02-11-19 @ 06:49)  Hematocrit: 29.6 % (02-11-19 @ 06:49)  WBC Count: 32.94 K/uL (02-10-19 @ 15:05)  Hematocrit: 30.4 % (02-10-19 @ 15:05)  Platelet Count - Automated: 164 K/uL (02-10-19 @ 15:05)  Hemoglobin: 10.0 g/dL (02-10-19 @ 15:05)                BLOOD SMEAR INTERPRETATION:       RADIOLOGY & ADDITIONAL STUDIES:

## 2019-02-20 NOTE — PROGRESS NOTE ADULT - SUBJECTIVE AND OBJECTIVE BOX
Patient is a 84y old  Female who presents with a chief complaint of Urinary Retention/Hperkalemia with EKG changes (20 Feb 2019 09:54)        Interval Events: No overnight events.    REVIEW OF SYSTEMS:  Constitutional: No fevers or chills. +weight loss. +fatigue +generalized malaise.  Eyes: No itching or discharge from the eyes  ENT: No ear pain. No ear discharge. No nasal congestion. No post nasal drip. No epistaxis. No throat pain. No sore throat. No difficulty swallowing.   CV: No chest pain. No palpitations. No lightheadedness or dizziness.   Resp: No dyspnea at rest. No dyspnea on exertion. No orthopnea. No wheezing. +cough. No stridor. No sputum production. No chest pain with respiration.  GI: No nausea. No vomiting. No diarrhea.  MSK: No joint pain or pain in any extremities  Integumentary: No skin lesions. No pedal edema.  Neurological: No gross motor weakness. No sensory changes.      OBJECTIVE:  ICU Vital Signs Last 24 Hrs  T(C): 36.2 (20 Feb 2019 05:28), Max: 36.2 (20 Feb 2019 05:28)  T(F): 97.2 (20 Feb 2019 05:28), Max: 97.2 (20 Feb 2019 05:28)  HR: 95 (20 Feb 2019 05:28) (90 - 95)  BP: 111/56 (20 Feb 2019 05:28) (111/56 - 152/66)    RR: 16 (20 Feb 2019 05:28) (16 - 18)  SpO2: 95% (19 Feb 2019 22:03) (95% - 95%)        PHYSICAL EXAM:  General: Awake, alert, oriented X 3.   HEENT: Atraumatic, normocephalic.                 Mallampatti Grade                 No nasal congestion.                No tonsillar or pharyngeal exudates.  Lymph Nodes: No palpable lymphadenopathy neck, supraclavicular region  Neck: No JVD. No carotid bruit, no thyromegally  Respiratory: Normal chest expansion                         Normal percussion                         no  air entry L                         No wheeze, rhonchi or rales.  Cardiovascular: S1 S2 normal. No murmurs, rubs or gallops.   Abdomen: Soft, non-tender, non-distended. No organomegaly.  Extremities: Warm to touch. Peripheral pulse palpable. No pedal edema.   Skin: No rashes or skin lesions, warm dry  Neurological: Motor and sensory examination equal and normal in all four extremities.  Psychiatry: Appropriate mood and affect.    HOSPITAL MEDICATIONS:  MEDICATIONS  (STANDING):  ALBUTerol/ipratropium for Nebulization 3 milliLiter(s) Nebulizer every 6 hours  amLODIPine   Tablet 10 milliGRAM(s) Oral daily  aspirin  chewable 81 milliGRAM(s) Oral daily  atorvastatin 20 milliGRAM(s) Oral at bedtime  buDESOnide 160 MICROgram(s)/formoterol 4.5 MICROgram(s) Inhaler 2 Puff(s) Inhalation two times a day  busPIRone 5 milliGRAM(s) Oral three times a day  chlorhexidine 4% Liquid 1 Application(s) Topical <User Schedule>  docusate sodium Liquid 200 milliGRAM(s) Oral two times a day  DULoxetine 20 milliGRAM(s) Oral daily  epoetin spencer Injectable 41983 Unit(s) SubCutaneous every 7 days  heparin  Injectable 5000 Unit(s) SubCutaneous every 8 hours  hydrocortisone 2.5% Rectal Cream 1 Application(s) Rectal daily  metoprolol succinate ER 50 milliGRAM(s) Oral daily  senna 2 Tablet(s) Oral daily  vancomycin    Solution 125 milliGRAM(s) Oral every 6 hours    MEDICATIONS  (PRN):  bisacodyl Suppository 10 milliGRAM(s) Rectal every 24 hours PRN Constipation  cloNIDine 0.1 milliGRAM(s) Oral every 8 hours PRN for sbp > 180  HYDROcodone 10 mG/acetaminophen 325 mG 1 Tablet(s) Oral every 4 hours PRN Moderate Pain (4 - 6)  ondansetron Injectable 4 milliGRAM(s) IV Push every 8 hours PRN Nausea and/or Vomiting  simethicone 80 milliGRAM(s) Chew every 8 hours PRN Gas  sodium chloride 3%  Inhalation 3 milliLiter(s) Inhalation every 4 hours PRN ..      LABS:                        10.3   19.66 )-----------( 293      ( 19 Feb 2019 13:10 )             32.6     02-19    137  |  104  |  41<H>  ----------------------------<  134<H>  5.1<H>   |  25  |  0.7    Ca    8.0<L>      19 Feb 2019 13:10  Phos  3.2     02-19  Mg     2.3     02-19    TPro  4.5<L>  /  Alb  2.4<L>  /  TBili  0.2  /  DBili  x   /  AST  27  /  ALT  25  /  AlkPhos  142<H>  02-19                      RADIOLOGY:Radiology personally reviewed. No change in atelectasis

## 2019-02-20 NOTE — PROGRESS NOTE ADULT - ASSESSMENT
Impression  >Left lung atelectasis due to mucous plug   >CDiff colitis   >Bladder ca     Recommendations  still refusing FOB wants to wait more time agrees that if no improvement by Friday they agree to have FOB  discussed explicitly with several family members and the patient at bedside  3% NS nebulizations q 6   OOB to chair   Chest PT   HS Bipap   repeat CXR in AM  DVT px   Family wants least aggressive measures.

## 2019-02-20 NOTE — PROGRESS NOTE ADULT - SUBJECTIVE AND OBJECTIVE BOX
NEPHROLOGY FOLLOW UP NOTE    spoke with family in detail  off ivf  pulm input noted  no new complaints  eating little      PAST MEDICAL & SURGICAL HISTORY:  CHF (congestive heart failure)  Breast tumor: removal benign bl breasts  Anal cancer  COPD (chronic obstructive pulmonary disease)  Lung disease: COPD  Hypertension  H/O breast surgery: 39 yrs. ago, bilateral  History of back surgery: 10 yrs. ago stimulator implant 2001, 2011    Allergies:  hair dyes (Hives)  sulfa drugs (Headache)    Home Medications Reviewed    SOCIAL HISTORY:  Denies ETOH,Smoking,   FAMILY HISTORY:  No pertinent family history in first degree relatives        REVIEW OF SYSTEMS:  All other review of systems is negative unless indicated above.        PHYSICAL EXAM:  NAD  weak appearing  dry mm  no jvd  b/l bs decreased  rrr  soft, + bs  no edema    Hospital Medications:   MEDICATIONS  (STANDING):  ALBUTerol/ipratropium for Nebulization 3 milliLiter(s) Nebulizer every 6 hours  amLODIPine   Tablet 10 milliGRAM(s) Oral daily  aspirin  chewable 81 milliGRAM(s) Oral daily  atorvastatin 20 milliGRAM(s) Oral at bedtime  buDESOnide 160 MICROgram(s)/formoterol 4.5 MICROgram(s) Inhaler 2 Puff(s) Inhalation two times a day  busPIRone 5 milliGRAM(s) Oral three times a day  chlorhexidine 4% Liquid 1 Application(s) Topical <User Schedule>  docusate sodium Liquid 200 milliGRAM(s) Oral two times a day  DULoxetine 20 milliGRAM(s) Oral daily  epoetin spencer Injectable 55298 Unit(s) SubCutaneous every 7 days  heparin  Injectable 5000 Unit(s) SubCutaneous every 8 hours  hydrocortisone 2.5% Rectal Cream 1 Application(s) Rectal daily  metoprolol succinate ER 50 milliGRAM(s) Oral daily  senna 2 Tablet(s) Oral daily  vancomycin    Solution 125 milliGRAM(s) Oral every 6 hours        VITALS:  T(F): 97.2 (02-20-19 @ 05:28), Max: 97.2 (02-20-19 @ 05:28)  HR: 95 (02-20-19 @ 05:28)  BP: 111/56 (02-20-19 @ 05:28)  RR: 16 (02-20-19 @ 05:28)  SpO2: 95% (02-19-19 @ 22:03)  Wt(kg): --        LABS:  02-19    137  |  104  |  41<H>  ----------------------------<  134<H>  5.1<H>   |  25  |  0.7    Ca    8.0<L>      19 Feb 2019 13:10  Phos  3.2     02-19  Mg     2.3     02-19    TPro  4.5<L>  /  Alb  2.4<L>  /  TBili  0.2  /  DBili      /  AST  27  /  ALT  25  /  AlkPhos  142<H>  02-19                          10.3   19.66 )-----------( 293      ( 19 Feb 2019 13:10 )             32.6       Urine Studies:        RADIOLOGY & ADDITIONAL STUDIES:

## 2019-02-20 NOTE — PROGRESS NOTE ADULT - SUBJECTIVE AND OBJECTIVE BOX
SUBJECTIVE:    Patient is a 84y old Female who presents with a chief complaint of Urinary Retention/Hperkalemia with EKG changes (20 Feb 2019 15:08)    Currently admitted to medicine with the primary diagnosis of Bladder cancer     Today is hospital day 15d. This morning she is resting comfortably in bed and reports no new issues or overnight events.     PAST MEDICAL & SURGICAL HISTORY  Anemia: 4 PRBC 11/2018  Oxygen dependent: O2 2L Via NC  Bladder cancer: 2018  CHF (congestive heart failure)  Breast tumor: removal benign bl breasts  Anal cancer: Chemo and radiation 1992  COPD (chronic obstructive pulmonary disease)  Lung disease: COPD  Hypertension  History of cystoscopy: 10/2018  H/O breast surgery: 39 yrs. ago, bilateral  History of back surgery: 10 yrs. ago stimulator implant 2001, 2011( cervical and Lumbar)    SOCIAL HISTORY:  Negative for smoking/alcohol/drug use.     ALLERGIES:  hair dyes (Hives)  sulfa drugs (Headache)  Zoloft (Headache)    MEDICATIONS:  STANDING MEDICATIONS  ALBUTerol/ipratropium for Nebulization 3 milliLiter(s) Nebulizer every 6 hours  amLODIPine   Tablet 10 milliGRAM(s) Oral daily  aspirin  chewable 81 milliGRAM(s) Oral daily  atorvastatin 20 milliGRAM(s) Oral at bedtime  buDESOnide 160 MICROgram(s)/formoterol 4.5 MICROgram(s) Inhaler 2 Puff(s) Inhalation two times a day  busPIRone 5 milliGRAM(s) Oral three times a day  chlorhexidine 4% Liquid 1 Application(s) Topical <User Schedule>  docusate sodium Liquid 200 milliGRAM(s) Oral two times a day  DULoxetine 20 milliGRAM(s) Oral daily  epoetin spencer Injectable 85671 Unit(s) SubCutaneous every 7 days  heparin  Injectable 5000 Unit(s) SubCutaneous every 8 hours  hydrocortisone 2.5% Rectal Cream 1 Application(s) Rectal daily  metoprolol succinate ER 50 milliGRAM(s) Oral daily  senna 2 Tablet(s) Oral daily  vancomycin    Solution 125 milliGRAM(s) Oral every 6 hours    PRN MEDICATIONS  bisacodyl Suppository 10 milliGRAM(s) Rectal every 24 hours PRN  cloNIDine 0.1 milliGRAM(s) Oral every 8 hours PRN  HYDROcodone 10 mG/acetaminophen 325 mG 1 Tablet(s) Oral every 4 hours PRN  ondansetron Injectable 4 milliGRAM(s) IV Push every 8 hours PRN  simethicone 80 milliGRAM(s) Chew every 8 hours PRN  sodium chloride 3%  Inhalation 3 milliLiter(s) Inhalation every 4 hours PRN    VITALS:   T(F): 96.3  HR: 80  BP: 141/63  RR: 16  SpO2: 95%    LABS:                        10.3   19.66 )-----------( 293      ( 19 Feb 2019 13:10 )             32.6     02-19    137  |  104  |  41<H>  ----------------------------<  134<H>  5.1<H>   |  25  |  0.7    Ca    8.0<L>      19 Feb 2019 13:10  Phos  3.2     02-19  Mg     2.3     02-19    TPro  4.5<L>  /  Alb  2.4<L>  /  TBili  0.2  /  DBili  x   /  AST  27  /  ALT  25  /  AlkPhos  142<H>  02-19              Culture - Sputum (collected 19 Feb 2019 20:55)  Source: .Sputum Sputum conical  Gram Stain (20 Feb 2019 13:10):    Rare polymorphonuclear leukocytes per low power field    Rare Squamous epithelial cells per low power field    Rare Yeast like cells per oil power field    Numerous Gram Negative Rods per oil power field          RADIOLOGY:    PHYSICAL EXAM:  GEN: No acute distress  LUNGS: collapse lung mucus   HEART: Regular  ABD: Soft, non-tender, non-distended.  EXT: NC/NC/NE/2+PP/SARAVIA/Skin Intact.   NEURO: AAOX3    Intravenous access:   NG tube:   Rowe Catheter:

## 2019-02-20 NOTE — PROGRESS NOTE ADULT - ASSESSMENT
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 UTI with   leukocytosis sec to c diff  seen by urology,no other intervention by urology  Complaining of abdominal pains and diarrhea sec to  C. diff colitis  CT shows colitis and right hydroureteronephrosis  Small effusions on CT right > left with lung nodules.      PLAN:  on po vanco for c diff.  leukocytosis is improving   f/u with ID.  contact isolation.  cont  procrit 40,000 unit qweekly if hb <10 ,  monitor counts.  f/u with urology for recent TURBT path result  will f/u as outpt for further Mn of bladder ca.  agree with Outpatient follow up of lung nodules, repeat CT chest in 3 months  cont other supportive care.  plan of care was explained to pt and her family  d/c plan as per primary team  will f/u

## 2019-02-21 RX ORDER — DOCUSATE SODIUM 100 MG
200 CAPSULE ORAL
Qty: 0 | Refills: 0 | Status: DISCONTINUED | OUTPATIENT
Start: 2019-02-21 | End: 2019-03-13

## 2019-02-21 RX ORDER — CEFEPIME 1 G/1
1000 INJECTION, POWDER, FOR SOLUTION INTRAMUSCULAR; INTRAVENOUS DAILY
Qty: 0 | Refills: 0 | Status: DISCONTINUED | OUTPATIENT
Start: 2019-02-22 | End: 2019-02-24

## 2019-02-21 RX ORDER — CEFEPIME 1 G/1
1000 INJECTION, POWDER, FOR SOLUTION INTRAMUSCULAR; INTRAVENOUS ONCE
Qty: 0 | Refills: 0 | Status: COMPLETED | OUTPATIENT
Start: 2019-02-21 | End: 2019-02-21

## 2019-02-21 RX ORDER — CEFEPIME 1 G/1
INJECTION, POWDER, FOR SOLUTION INTRAMUSCULAR; INTRAVENOUS
Qty: 0 | Refills: 0 | Status: DISCONTINUED | OUTPATIENT
Start: 2019-02-21 | End: 2019-02-24

## 2019-02-21 RX ADMIN — Medication 125 MILLIGRAM(S): at 13:13

## 2019-02-21 RX ADMIN — Medication 1 APPLICATION(S): at 13:14

## 2019-02-21 RX ADMIN — BUDESONIDE AND FORMOTEROL FUMARATE DIHYDRATE 2 PUFF(S): 160; 4.5 AEROSOL RESPIRATORY (INHALATION) at 21:47

## 2019-02-21 RX ADMIN — Medication 125 MILLIGRAM(S): at 17:53

## 2019-02-21 RX ADMIN — SODIUM CHLORIDE 3 MILLILITER(S): 9 INJECTION INTRAMUSCULAR; INTRAVENOUS; SUBCUTANEOUS at 19:58

## 2019-02-21 RX ADMIN — BUDESONIDE AND FORMOTEROL FUMARATE DIHYDRATE 2 PUFF(S): 160; 4.5 AEROSOL RESPIRATORY (INHALATION) at 09:17

## 2019-02-21 RX ADMIN — Medication 5 MILLIGRAM(S): at 13:13

## 2019-02-21 RX ADMIN — Medication 3 MILLILITER(S): at 13:07

## 2019-02-21 RX ADMIN — Medication 125 MILLIGRAM(S): at 23:14

## 2019-02-21 RX ADMIN — ATORVASTATIN CALCIUM 20 MILLIGRAM(S): 80 TABLET, FILM COATED ORAL at 21:46

## 2019-02-21 RX ADMIN — Medication 200 MILLIGRAM(S): at 18:22

## 2019-02-21 RX ADMIN — SENNA PLUS 2 TABLET(S): 8.6 TABLET ORAL at 13:13

## 2019-02-21 RX ADMIN — AMLODIPINE BESYLATE 10 MILLIGRAM(S): 2.5 TABLET ORAL at 05:37

## 2019-02-21 RX ADMIN — Medication 81 MILLIGRAM(S): at 13:12

## 2019-02-21 RX ADMIN — ONDANSETRON 4 MILLIGRAM(S): 8 TABLET, FILM COATED ORAL at 05:12

## 2019-02-21 RX ADMIN — Medication 3 MILLILITER(S): at 19:56

## 2019-02-21 RX ADMIN — CEFEPIME 100 MILLIGRAM(S): 1 INJECTION, POWDER, FOR SOLUTION INTRAMUSCULAR; INTRAVENOUS at 18:22

## 2019-02-21 RX ADMIN — Medication 200 MILLIGRAM(S): at 05:38

## 2019-02-21 RX ADMIN — CHLORHEXIDINE GLUCONATE 1 APPLICATION(S): 213 SOLUTION TOPICAL at 09:18

## 2019-02-21 RX ADMIN — SODIUM CHLORIDE 3 MILLILITER(S): 9 INJECTION INTRAMUSCULAR; INTRAVENOUS; SUBCUTANEOUS at 07:16

## 2019-02-21 RX ADMIN — Medication 3 MILLILITER(S): at 07:13

## 2019-02-21 RX ADMIN — Medication 5 MILLIGRAM(S): at 21:46

## 2019-02-21 RX ADMIN — Medication 50 MILLIGRAM(S): at 05:37

## 2019-02-21 RX ADMIN — Medication 125 MILLIGRAM(S): at 05:38

## 2019-02-21 RX ADMIN — HEPARIN SODIUM 5000 UNIT(S): 5000 INJECTION INTRAVENOUS; SUBCUTANEOUS at 13:13

## 2019-02-21 RX ADMIN — HEPARIN SODIUM 5000 UNIT(S): 5000 INJECTION INTRAVENOUS; SUBCUTANEOUS at 05:38

## 2019-02-21 RX ADMIN — DULOXETINE HYDROCHLORIDE 20 MILLIGRAM(S): 30 CAPSULE, DELAYED RELEASE ORAL at 13:12

## 2019-02-21 RX ADMIN — SODIUM CHLORIDE 3 MILLILITER(S): 9 INJECTION INTRAMUSCULAR; INTRAVENOUS; SUBCUTANEOUS at 13:10

## 2019-02-21 RX ADMIN — SODIUM CHLORIDE 3 MILLILITER(S): 9 INJECTION INTRAMUSCULAR; INTRAVENOUS; SUBCUTANEOUS at 00:35

## 2019-02-21 RX ADMIN — SODIUM CHLORIDE 3 MILLILITER(S): 9 INJECTION INTRAMUSCULAR; INTRAVENOUS; SUBCUTANEOUS at 15:47

## 2019-02-21 RX ADMIN — HEPARIN SODIUM 5000 UNIT(S): 5000 INJECTION INTRAVENOUS; SUBCUTANEOUS at 21:47

## 2019-02-21 RX ADMIN — Medication 5 MILLIGRAM(S): at 05:37

## 2019-02-21 NOTE — PROGRESS NOTE ADULT - ASSESSMENT
GEETA   - resolved, but with persistent prerenal azotemia  cdiff colitis without diarrhea  leukocytosis from cdiff - worsening again  left lung atelectasis from mucous plug  malnutrition  debility with progressive clinical deterioration  HTN   bladder ca  s/p TURBT  right hydro   UTI - enterococci  COPD  CHF  anemia / MDS  chronic back pain with spinal stimulator  resolved hyperkalemia    Plan:    off fluids and diuretics  f/u pulm  nutrition consult pending - spoke with Dr. Doan   cont PO vanco - complete full course  off amoxicillin  encourage po intake  cont norvasc and lopressor  off aldactone - do not restart   PT / rehab / OOB   f/u/ check path  hem / onc f/u  cont her own vicodin

## 2019-02-21 NOTE — PROGRESS NOTE ADULT - ASSESSMENT
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 UTI with   leukocytosis likely sec to c diff  seen by urology,no other intervention by urology  Complaining of abdominal pains and diarrhea sec to  C. diff colitis  CT shows colitis and right hydroureteronephrosis  Small effusions on CT right > left with lung nodules.  left lung atelectasis from mucous plug      PLAN:  on po vanco for c diff.  leukocytosis -monitor trend  f/u with ID.  contact isolation.  cont  procrit 40,000 unit qweekly if hb <10 ,  monitor counts.  pulmonary on board,awaiting bronchoscopy  f/u with urology for recent TURBT path result  will f/u as outpt for further Mn of bladder ca.  agree with Outpatient follow up of lung nodules, repeat CT chest in 3 months  cont other supportive care.  will f/u

## 2019-02-21 NOTE — CHART NOTE - NSCHARTNOTEFT_GEN_A_CORE
sputum cx +GNR - as per pulm note today rec starting Cefepime. Pt with low GFR so will start 1000mg stat and then Q24h. Pulm also wants repeat sputums today.    Ordered Cefipime and repeat sputum.  Pt requesting Colace be changed to pill form

## 2019-02-21 NOTE — PROGRESS NOTE ADULT - ASSESSMENT
GEETA   - resolved, but with persistent prerenal azotemia  cdiff colitis without diarrhea  leukocytosis from cdiff - worsening again  left lung atelectasis from mucous plug  malnutrition  debility with progressive clinical deterioration  HTN   bladder ca  s/p TURBT  right hydro   UTI - enterococci  COPD  CHF  anemia / MDS  chronic back pain with spinal stimulator  resolved hyperkalemia    Plan:    nephrology inputs appreciated   going for FOB friday  off fluids and diuretics  f/u pulm  nutrition consult pending - spoke with Dr. Doan   cont PO vanco - complete full course  off amoxicillin  encourage po intake  cont norvasc and lopressor  off aldactone - do not restart   PT / rehab / OOB   f/u/ check path  hem / onc f/u  cont her own vicodin

## 2019-02-21 NOTE — PROGRESS NOTE ADULT - SUBJECTIVE AND OBJECTIVE BOX
Patient is a 84y old  Female who presents with a chief complaint of Urinary Retention/Hperkalemia with EKG changes (21 Feb 2019 10:08)        Interval Events: No overnight events. Did not cough up much secretions    REVIEW OF SYSTEMS:  Constitutional: No fevers or chills. No weight loss. No fatigue or generalized malaise.  Eyes: No itching or discharge from the eyes  ENT: No ear pain. No ear discharge. No nasal congestion. No post nasal drip. No epistaxis. No throat pain. No sore throat. No difficulty swallowing.   CV: No chest pain. No palpitations. No lightheadedness or dizziness.   Resp: +dyspnea at rest. +dyspnea on exertion. No orthopnea. No wheezing. +cough. No stridor. No sputum production. No chest pain with respiration.  GI: No nausea. No vomiting. No diarrhea.  MSK: No joint pain or pain in any extremities  Integumentary: No skin lesions. No pedal edema.  Neurological: No gross motor weakness. No sensory changes.      OBJECTIVE:  ICU Vital Signs Last 24 Hrs  T(C): 35.7 (21 Feb 2019 05:33), Max: 36.1 (20 Feb 2019 21:15)  T(F): 96.2 (21 Feb 2019 05:33), Max: 96.9 (20 Feb 2019 21:15)  HR: 97 (21 Feb 2019 05:33) (80 - 97)  BP: 141/63 (21 Feb 2019 05:33) (141/63 - 149/67)    RR: 14 (21 Feb 2019 05:33) (14 - 16)  SpO2: 95% (21 Feb 2019 04:11) (95% - 95%)        CAPILLARY BLOOD GLUCOSE          PHYSICAL EXAM:  General: Awake, alert, oriented X 3.   HEENT: Atraumatic, normocephalic.                 Mallampatti Grade                 No nasal congestion.                No tonsillar or pharyngeal exudates.  Lymph Nodes: No palpable lymphadenopathy neck, supraclavicular region  Neck: No JVD. No carotid bruit, no thyromegally  Respiratory: Normal chest expansion                         Normal percussion                         No air entry left                         No wheeze, rhonchi or rales.  Cardiovascular: S1 S2 normal. No murmurs, rubs or gallops.   Abdomen: Soft, non-tender, non-distended. No organomegaly.  Extremities: Warm to touch. Peripheral pulse palpable. No pedal edema.   Skin: No rashes or skin lesions, warm dry  Neurological: Motor and sensory examination equal and normal in all four extremities.  Psychiatry: Appropriate mood and affect.    HOSPITAL MEDICATIONS:  MEDICATIONS  (STANDING):  ALBUTerol/ipratropium for Nebulization 3 milliLiter(s) Nebulizer every 6 hours  amLODIPine   Tablet 10 milliGRAM(s) Oral daily  aspirin  chewable 81 milliGRAM(s) Oral daily  atorvastatin 20 milliGRAM(s) Oral at bedtime  buDESOnide 160 MICROgram(s)/formoterol 4.5 MICROgram(s) Inhaler 2 Puff(s) Inhalation two times a day  busPIRone 5 milliGRAM(s) Oral three times a day  chlorhexidine 4% Liquid 1 Application(s) Topical <User Schedule>  docusate sodium Liquid 200 milliGRAM(s) Oral two times a day  DULoxetine 20 milliGRAM(s) Oral daily  epoetin spencer Injectable 11940 Unit(s) SubCutaneous every 7 days  heparin  Injectable 5000 Unit(s) SubCutaneous every 8 hours  hydrocortisone 2.5% Rectal Cream 1 Application(s) Rectal daily  metoprolol succinate ER 50 milliGRAM(s) Oral daily  senna 2 Tablet(s) Oral daily  vancomycin    Solution 125 milliGRAM(s) Oral every 6 hours    MEDICATIONS  (PRN):  bisacodyl Suppository 10 milliGRAM(s) Rectal every 24 hours PRN Constipation  cloNIDine 0.1 milliGRAM(s) Oral every 8 hours PRN for sbp > 180  HYDROcodone 10 mG/acetaminophen 325 mG 1 Tablet(s) Oral every 4 hours PRN Moderate Pain (4 - 6)  ondansetron Injectable 4 milliGRAM(s) IV Push every 8 hours PRN Nausea and/or Vomiting  simethicone 80 milliGRAM(s) Chew every 8 hours PRN Gas  sodium chloride 3%  Inhalation 3 milliLiter(s) Inhalation every 4 hours PRN ..      LABS:                        9.9    25.13 )-----------( 302      ( 20 Feb 2019 18:58 )             31.0     02-20    137  |  104  |  48<H>  ----------------------------<  171<H>  5.0   |  25  |  0.9    Ca    7.9<L>      20 Feb 2019 18:58  Phos  3.2     02-20  Mg     2.2     02-20    TPro  4.6<L>  /  Alb  2.8<L>  /  TBili  0.2  /  DBili  x   /  AST  21  /  ALT  23  /  AlkPhos  93  02-20                      RADIOLOGY:Radiology personally reviewed.

## 2019-02-21 NOTE — PROGRESS NOTE ADULT - SUBJECTIVE AND OBJECTIVE BOX
Patient is a 84y old  Female who presents with a chief complaint of Urinary Retention/Hperkalemia with EKG changes (21 Feb 2019 11:07)       Pt is seen and examined  pt is awake and lying in bed            ROS:  Negative   MEDICATIONS  (STANDING):  ALBUTerol/ipratropium for Nebulization 3 milliLiter(s) Nebulizer every 6 hours  amLODIPine   Tablet 10 milliGRAM(s) Oral daily  aspirin  chewable 81 milliGRAM(s) Oral daily  atorvastatin 20 milliGRAM(s) Oral at bedtime  buDESOnide 160 MICROgram(s)/formoterol 4.5 MICROgram(s) Inhaler 2 Puff(s) Inhalation two times a day  busPIRone 5 milliGRAM(s) Oral three times a day  chlorhexidine 4% Liquid 1 Application(s) Topical <User Schedule>  docusate sodium Liquid 200 milliGRAM(s) Oral two times a day  DULoxetine 20 milliGRAM(s) Oral daily  epoetin spencer Injectable 13009 Unit(s) SubCutaneous every 7 days  heparin  Injectable 5000 Unit(s) SubCutaneous every 8 hours  hydrocortisone 2.5% Rectal Cream 1 Application(s) Rectal daily  metoprolol succinate ER 50 milliGRAM(s) Oral daily  senna 2 Tablet(s) Oral daily  vancomycin    Solution 125 milliGRAM(s) Oral every 6 hours    MEDICATIONS  (PRN):  bisacodyl Suppository 10 milliGRAM(s) Rectal every 24 hours PRN Constipation  cloNIDine 0.1 milliGRAM(s) Oral every 8 hours PRN for sbp > 180  HYDROcodone 10 mG/acetaminophen 325 mG 1 Tablet(s) Oral every 4 hours PRN Moderate Pain (4 - 6)  ondansetron Injectable 4 milliGRAM(s) IV Push every 8 hours PRN Nausea and/or Vomiting  simethicone 80 milliGRAM(s) Chew every 8 hours PRN Gas  sodium chloride 3%  Inhalation 3 milliLiter(s) Inhalation every 4 hours PRN ..      Allergies    hair dyes (Hives)  sulfa drugs (Headache)  Zoloft (Headache)    Intolerances        Vital Signs Last 24 Hrs  T(C): 35.7 (21 Feb 2019 14:15), Max: 36.1 (20 Feb 2019 21:15)  T(F): 96.3 (21 Feb 2019 14:15), Max: 96.9 (20 Feb 2019 21:15)  HR: 85 (21 Feb 2019 14:15) (85 - 97)  BP: 112/51 (21 Feb 2019 14:15) (112/51 - 149/67)  BP(mean): --  RR: 16 (21 Feb 2019 14:15) (14 - 16)  SpO2: 95% (21 Feb 2019 04:11) (95% - 95%)    PHYSICAL EXAM  General: adult in NAD  HEENT: clear oropharynx, anicteric sclera, pink conjunctiva  Neck: supple  CV: normal S1/S2 with no murmur rubs or gallops  Lungs: positive air movement b/l ant lungs,clear to auscultation, no wheezes, no rales  Abdomen: soft non-tender non-distended, no hepatosplenomegaly  Ext: no clubbing cyanosis or edema  Skin: no rashes and no petechiae  Neuro: alert and oriented X 4, no focal deficits  LABS:                          9.9    25.13 )-----------( 302      ( 20 Feb 2019 18:58 )             31.0         Mean Cell Volume : 102.6 fL  Mean Cell Hemoglobin : 32.8 pg  Mean Cell Hemoglobin Concentration : 31.9 g/dL  Auto Neutrophil # : x  Auto Lymphocyte # : x  Auto Monocyte # : x  Auto Eosinophil # : x  Auto Basophil # : x  Auto Neutrophil % : x  Auto Lymphocyte % : x  Auto Monocyte % : x  Auto Eosinophil % : x  Auto Basophil % : x    Serial CBC's  02-20 @ 18:58  Hct-31.0 / Hgb-9.9 / Plat-302 / RBC-3.02 / WBC-25.13          Serial CBC's  02-19 @ 13:10  Hct-32.6 / Hgb-10.3 / Plat-293 / RBC-3.17 / WBC-19.66            02-20    137  |  104  |  48<H>  ----------------------------<  171<H>  5.0   |  25  |  0.9    Ca    7.9<L>      20 Feb 2019 18:58  Phos  3.2     02-20  Mg     2.2     02-20    TPro  4.6<L>  /  Alb  2.8<L>  /  TBili  0.2  /  DBili  x   /  AST  21  /  ALT  23  /  AlkPhos  93  02-20          Hematocrit: 31.0 % (02-20-19 @ 18:58)  Platelet Count - Automated: 302 K/uL (02-20-19 @ 18:58)  Hemoglobin: 9.9 g/dL (02-20-19 @ 18:58)  WBC Count: 25.13 K/uL (02-20-19 @ 18:58)  Hemoglobin: 10.3 g/dL (02-19-19 @ 13:10)  WBC Count: 19.66 K/uL (02-19-19 @ 13:10)  Hematocrit: 32.6 % (02-19-19 @ 13:10)  Platelet Count - Automated: 293 K/uL (02-19-19 @ 13:10)  WBC Count: 20.00 K/uL (02-17-19 @ 08:42)  Hematocrit: 31.3 % (02-17-19 @ 08:42)  Platelet Count - Automated: 230 K/uL (02-17-19 @ 08:42)  Hemoglobin: 9.9 g/dL (02-17-19 @ 08:42)  WBC Count: 24.04 K/uL (02-16-19 @ 15:09)  Hematocrit: 30.3 % (02-16-19 @ 15:09)  Platelet Count - Automated: 213 K/uL (02-16-19 @ 15:09)  Hemoglobin: 9.9 g/dL (02-16-19 @ 15:09)  Platelet Count - Automated: 214 K/uL (02-15-19 @ 06:13)  Hemoglobin: 10.1 g/dL (02-15-19 @ 06:13)  WBC Count: 34.64 K/uL (02-15-19 @ 06:13)  Hematocrit: 30.7 % (02-15-19 @ 06:13)  Hematocrit: 29.4 % (02-13-19 @ 14:50)  Platelet Count - Automated: 219 K/uL (02-13-19 @ 14:50)  Hemoglobin: 9.7 g/dL (02-13-19 @ 14:50)  WBC Count: 38.51 K/uL (02-13-19 @ 14:50)  Hematocrit: 31.3 % (02-12-19 @ 08:01)  Hemoglobin: 10.3 g/dL (02-12-19 @ 08:01)  WBC Count: 38.19 K/uL (02-12-19 @ 08:01)  Platelet Count - Automated: 213 K/uL (02-12-19 @ 08:01)                BLOOD SMEAR INTERPRETATION:       RADIOLOGY & ADDITIONAL STUDIES:

## 2019-02-21 NOTE — PROGRESS NOTE ADULT - ASSESSMENT
Impression  >Left lung atelectasis due to mucous plug   >CDiff colitis   >Bladder ca     Recommendations  still refusing FOB wants to wait more time agrees that if no improvement by Friday they agree to have FOB  discussed explicitly with several family members and the patient at bedside yest  cont 3% NS nebulizations q 6   OOB to chair   Chest PT   HS Bipap   repeat CXR now and in AM  DVT px   WBC rising again send sputum gm stain cs   add GNR coverage with cefepime  Family wants least aggressive measures.

## 2019-02-21 NOTE — PROGRESS NOTE ADULT - SUBJECTIVE AND OBJECTIVE BOX
NEPHROLOGY FOLLOW UP NOTE    pt seen and examined  denies sob, but on O2  c/o back pain, which is chronic (she sees pain mgm't)  requesting to use her home marijuana      PAST MEDICAL & SURGICAL HISTORY:  CHF (congestive heart failure)  Breast tumor: removal benign bl breasts  Anal cancer  COPD (chronic obstructive pulmonary disease)  Lung disease: COPD  Hypertension  H/O breast surgery: 39 yrs. ago, bilateral  History of back surgery: 10 yrs. ago stimulator implant 2001, 2011    Allergies:  hair dyes (Hives)  sulfa drugs (Headache)    Home Medications Reviewed    SOCIAL HISTORY:  Denies ETOH,Smoking,   FAMILY HISTORY:  No pertinent family history in first degree relatives        REVIEW OF SYSTEMS:  All other review of systems is negative unless indicated above.        PHYSICAL EXAM:  NAD  weak appearing  dry mm  no jvd  b/l bs decreased  rrr  soft, + bs  no edema    Hospital Medications:   MEDICATIONS  (STANDING):  ALBUTerol/ipratropium for Nebulization 3 milliLiter(s) Nebulizer every 6 hours  amLODIPine   Tablet 10 milliGRAM(s) Oral daily  aspirin  chewable 81 milliGRAM(s) Oral daily  atorvastatin 20 milliGRAM(s) Oral at bedtime  buDESOnide 160 MICROgram(s)/formoterol 4.5 MICROgram(s) Inhaler 2 Puff(s) Inhalation two times a day  busPIRone 5 milliGRAM(s) Oral three times a day  chlorhexidine 4% Liquid 1 Application(s) Topical <User Schedule>  docusate sodium Liquid 200 milliGRAM(s) Oral two times a day  DULoxetine 20 milliGRAM(s) Oral daily  epoetin spencer Injectable 94277 Unit(s) SubCutaneous every 7 days  heparin  Injectable 5000 Unit(s) SubCutaneous every 8 hours  hydrocortisone 2.5% Rectal Cream 1 Application(s) Rectal daily  metoprolol succinate ER 50 milliGRAM(s) Oral daily  senna 2 Tablet(s) Oral daily  vancomycin    Solution 125 milliGRAM(s) Oral every 6 hours        VITALS:  T(F): 96.2 (02-21-19 @ 05:33), Max: 96.9 (02-20-19 @ 21:15)  HR: 97 (02-21-19 @ 05:33)  BP: 141/63 (02-21-19 @ 05:33)  RR: 14 (02-21-19 @ 05:33)  SpO2: 95% (02-21-19 @ 04:11)  Wt(kg): --    Height (cm): 157.48 (02-20 @ 15:29)    LABS:  02-20    137  |  104  |  48<H>  ----------------------------<  171<H>  5.0   |  25  |  0.9    Ca    7.9<L>      20 Feb 2019 18:58  Phos  3.2     02-20  Mg     2.2     02-20    TPro  4.6<L>  /  Alb  2.8<L>  /  TBili  0.2  /  DBili      /  AST  21  /  ALT  23  /  AlkPhos  93  02-20                          9.9    25.13 )-----------( 302      ( 20 Feb 2019 18:58 )             31.0       Urine Studies:        RADIOLOGY & ADDITIONAL STUDIES:

## 2019-02-21 NOTE — PROGRESS NOTE ADULT - SUBJECTIVE AND OBJECTIVE BOX
SUBJECTIVE:    Patient is a 84y old Female who presents with a chief complaint of Urinary Retention/Hperkalemia with EKG changes (21 Feb 2019 08:15)    Currently admitted to medicine with the primary diagnosis of Bladder cancer     Today is hospital day 16d. Epigastric pain    PAST MEDICAL & SURGICAL HISTORY  Anemia: 4 PRBC 11/2018  Oxygen dependent: O2 2L Via NC  Bladder cancer: 2018  CHF (congestive heart failure)  Breast tumor: removal benign bl breasts  Anal cancer: Chemo and radiation 1992  COPD (chronic obstructive pulmonary disease)  Lung disease: COPD  Hypertension  History of cystoscopy: 10/2018  H/O breast surgery: 39 yrs. ago, bilateral  History of back surgery: 10 yrs. ago stimulator implant 2001, 2011( cervical and Lumbar)    SOCIAL HISTORY:  Negative for smoking/alcohol/drug use.     ALLERGIES:  hair dyes (Hives)  sulfa drugs (Headache)  Zoloft (Headache)    MEDICATIONS:  STANDING MEDICATIONS  ALBUTerol/ipratropium for Nebulization 3 milliLiter(s) Nebulizer every 6 hours  amLODIPine   Tablet 10 milliGRAM(s) Oral daily  aspirin  chewable 81 milliGRAM(s) Oral daily  atorvastatin 20 milliGRAM(s) Oral at bedtime  buDESOnide 160 MICROgram(s)/formoterol 4.5 MICROgram(s) Inhaler 2 Puff(s) Inhalation two times a day  busPIRone 5 milliGRAM(s) Oral three times a day  chlorhexidine 4% Liquid 1 Application(s) Topical <User Schedule>  docusate sodium Liquid 200 milliGRAM(s) Oral two times a day  DULoxetine 20 milliGRAM(s) Oral daily  epoetin spencer Injectable 27967 Unit(s) SubCutaneous every 7 days  heparin  Injectable 5000 Unit(s) SubCutaneous every 8 hours  hydrocortisone 2.5% Rectal Cream 1 Application(s) Rectal daily  metoprolol succinate ER 50 milliGRAM(s) Oral daily  senna 2 Tablet(s) Oral daily  vancomycin    Solution 125 milliGRAM(s) Oral every 6 hours    PRN MEDICATIONS  bisacodyl Suppository 10 milliGRAM(s) Rectal every 24 hours PRN  cloNIDine 0.1 milliGRAM(s) Oral every 8 hours PRN  HYDROcodone 10 mG/acetaminophen 325 mG 1 Tablet(s) Oral every 4 hours PRN  ondansetron Injectable 4 milliGRAM(s) IV Push every 8 hours PRN  simethicone 80 milliGRAM(s) Chew every 8 hours PRN  sodium chloride 3%  Inhalation 3 milliLiter(s) Inhalation every 4 hours PRN    VITALS:   T(F): 96.2  HR: 97  BP: 141/63  RR: 14  SpO2: 95%    LABS:                        9.9    25.13 )-----------( 302      ( 20 Feb 2019 18:58 )             31.0     02-20    137  |  104  |  48<H>  ----------------------------<  171<H>  5.0   |  25  |  0.9    Ca    7.9<L>      20 Feb 2019 18:58  Phos  3.2     02-20  Mg     2.2     02-20    TPro  4.6<L>  /  Alb  2.8<L>  /  TBili  0.2  /  DBili  x   /  AST  21  /  ALT  23  /  AlkPhos  93  02-20              Culture - Sputum (collected 19 Feb 2019 20:55)  Source: .Sputum Sputum conical  Gram Stain (20 Feb 2019 13:10):    Rare polymorphonuclear leukocytes per low power field    Rare Squamous epithelial cells per low power field    Rare Yeast like cells per oil power field    Numerous Gram Negative Rods per oil power field  Preliminary Report (21 Feb 2019 09:11):    Numerous Gram Negative Rods Identification and susceptibility to follow.          RADIOLOGY:    PHYSICAL EXAM:  GEN: No acute distress  LUNGS: Clear to auscultation bilaterally   HEART: Regular  ABD: Soft, non-tender, non-distended.  EXT: NC/NC/NE/2+PP/SARAVIA/Skin Intact.   NEURO: AAOX3    Intravenous access:   NG tube:   Rowe Catheter:

## 2019-02-22 LAB
-  CEFTAZIDIME: SIGNIFICANT CHANGE UP
-  LEVOFLOXACIN: SIGNIFICANT CHANGE UP
-  TRIMETHOPRIM/SULFAMETHOXAZOLE: SIGNIFICANT CHANGE UP
ALBUMIN SERPL ELPH-MCNC: 2.5 G/DL — LOW (ref 3.5–5.2)
ALP SERPL-CCNC: 71 U/L — SIGNIFICANT CHANGE UP (ref 30–115)
ALT FLD-CCNC: 15 U/L — SIGNIFICANT CHANGE UP (ref 0–41)
ANION GAP SERPL CALC-SCNC: 7 MMOL/L — SIGNIFICANT CHANGE UP (ref 7–14)
AST SERPL-CCNC: 16 U/L — SIGNIFICANT CHANGE UP (ref 0–41)
BILIRUB SERPL-MCNC: 0.2 MG/DL — SIGNIFICANT CHANGE UP (ref 0.2–1.2)
BUN SERPL-MCNC: 50 MG/DL — HIGH (ref 10–20)
CALCIUM SERPL-MCNC: 7.7 MG/DL — LOW (ref 8.5–10.1)
CHLORIDE SERPL-SCNC: 105 MMOL/L — SIGNIFICANT CHANGE UP (ref 98–110)
CO2 SERPL-SCNC: 27 MMOL/L — SIGNIFICANT CHANGE UP (ref 17–32)
CREAT SERPL-MCNC: 0.8 MG/DL — SIGNIFICANT CHANGE UP (ref 0.7–1.5)
CULTURE RESULTS: SIGNIFICANT CHANGE UP
GLUCOSE SERPL-MCNC: 130 MG/DL — HIGH (ref 70–99)
GRAM STN FLD: SIGNIFICANT CHANGE UP
HCT VFR BLD CALC: 29.4 % — LOW (ref 37–47)
HGB BLD-MCNC: 9.4 G/DL — LOW (ref 12–16)
MCHC RBC-ENTMCNC: 32 G/DL — SIGNIFICANT CHANGE UP (ref 32–37)
MCHC RBC-ENTMCNC: 32.8 PG — HIGH (ref 27–31)
MCV RBC AUTO: 102.4 FL — HIGH (ref 81–99)
METHOD TYPE: SIGNIFICANT CHANGE UP
NRBC # BLD: 0 /100 WBCS — SIGNIFICANT CHANGE UP (ref 0–0)
OB PNL STL: POSITIVE
ORGANISM # SPEC MICROSCOPIC CNT: SIGNIFICANT CHANGE UP
ORGANISM # SPEC MICROSCOPIC CNT: SIGNIFICANT CHANGE UP
PLATELET # BLD AUTO: 278 K/UL — SIGNIFICANT CHANGE UP (ref 130–400)
POTASSIUM SERPL-MCNC: 5 MMOL/L — SIGNIFICANT CHANGE UP (ref 3.5–5)
POTASSIUM SERPL-SCNC: 5 MMOL/L — SIGNIFICANT CHANGE UP (ref 3.5–5)
PROT SERPL-MCNC: 4.3 G/DL — LOW (ref 6–8)
RBC # BLD: 2.87 M/UL — LOW (ref 4.2–5.4)
RBC # FLD: 14.7 % — HIGH (ref 11.5–14.5)
SODIUM SERPL-SCNC: 139 MMOL/L — SIGNIFICANT CHANGE UP (ref 135–146)
SPECIMEN SOURCE: SIGNIFICANT CHANGE UP
SPECIMEN SOURCE: SIGNIFICANT CHANGE UP
WBC # BLD: 26.45 K/UL — HIGH (ref 4.8–10.8)
WBC # FLD AUTO: 26.45 K/UL — HIGH (ref 4.8–10.8)

## 2019-02-22 RX ORDER — ALBUTEROL 90 UG/1
2.5 AEROSOL, METERED ORAL ONCE
Qty: 0 | Refills: 0 | Status: COMPLETED | OUTPATIENT
Start: 2019-02-22 | End: 2019-02-22

## 2019-02-22 RX ADMIN — Medication 81 MILLIGRAM(S): at 15:24

## 2019-02-22 RX ADMIN — ATORVASTATIN CALCIUM 20 MILLIGRAM(S): 80 TABLET, FILM COATED ORAL at 21:08

## 2019-02-22 RX ADMIN — Medication 5 MILLIGRAM(S): at 21:08

## 2019-02-22 RX ADMIN — SENNA PLUS 2 TABLET(S): 8.6 TABLET ORAL at 15:24

## 2019-02-22 RX ADMIN — SODIUM CHLORIDE 3 MILLILITER(S): 9 INJECTION INTRAMUSCULAR; INTRAVENOUS; SUBCUTANEOUS at 00:07

## 2019-02-22 RX ADMIN — Medication 3 MILLILITER(S): at 14:50

## 2019-02-22 RX ADMIN — Medication 3 MILLILITER(S): at 07:31

## 2019-02-22 RX ADMIN — HEPARIN SODIUM 5000 UNIT(S): 5000 INJECTION INTRAVENOUS; SUBCUTANEOUS at 21:08

## 2019-02-22 RX ADMIN — ERYTHROPOIETIN 40000 UNIT(S): 10000 INJECTION, SOLUTION INTRAVENOUS; SUBCUTANEOUS at 15:25

## 2019-02-22 RX ADMIN — AMLODIPINE BESYLATE 10 MILLIGRAM(S): 2.5 TABLET ORAL at 05:57

## 2019-02-22 RX ADMIN — SODIUM CHLORIDE 3 MILLILITER(S): 9 INJECTION INTRAMUSCULAR; INTRAVENOUS; SUBCUTANEOUS at 19:13

## 2019-02-22 RX ADMIN — Medication 200 MILLIGRAM(S): at 05:57

## 2019-02-22 RX ADMIN — Medication 5 MILLIGRAM(S): at 05:57

## 2019-02-22 RX ADMIN — Medication 125 MILLIGRAM(S): at 15:24

## 2019-02-22 RX ADMIN — BUDESONIDE AND FORMOTEROL FUMARATE DIHYDRATE 2 PUFF(S): 160; 4.5 AEROSOL RESPIRATORY (INHALATION) at 10:35

## 2019-02-22 RX ADMIN — DULOXETINE HYDROCHLORIDE 20 MILLIGRAM(S): 30 CAPSULE, DELAYED RELEASE ORAL at 15:23

## 2019-02-22 RX ADMIN — Medication 5 MILLIGRAM(S): at 15:24

## 2019-02-22 RX ADMIN — Medication 1 APPLICATION(S): at 15:26

## 2019-02-22 RX ADMIN — Medication 3 MILLILITER(S): at 19:12

## 2019-02-22 RX ADMIN — Medication 50 MILLIGRAM(S): at 05:57

## 2019-02-22 RX ADMIN — Medication 125 MILLIGRAM(S): at 05:57

## 2019-02-22 RX ADMIN — ONDANSETRON 4 MILLIGRAM(S): 8 TABLET, FILM COATED ORAL at 18:41

## 2019-02-22 RX ADMIN — CEFEPIME 100 MILLIGRAM(S): 1 INJECTION, POWDER, FOR SOLUTION INTRAMUSCULAR; INTRAVENOUS at 15:27

## 2019-02-22 RX ADMIN — CHLORHEXIDINE GLUCONATE 1 APPLICATION(S): 213 SOLUTION TOPICAL at 05:57

## 2019-02-22 RX ADMIN — SODIUM CHLORIDE 3 MILLILITER(S): 9 INJECTION INTRAMUSCULAR; INTRAVENOUS; SUBCUTANEOUS at 05:40

## 2019-02-22 RX ADMIN — ALBUTEROL 2.5 MILLIGRAM(S): 90 AEROSOL, METERED ORAL at 11:49

## 2019-02-22 RX ADMIN — BUDESONIDE AND FORMOTEROL FUMARATE DIHYDRATE 2 PUFF(S): 160; 4.5 AEROSOL RESPIRATORY (INHALATION) at 21:09

## 2019-02-22 NOTE — PROGRESS NOTE ADULT - ASSESSMENT
85 yo female ex smoker with a history of COPD on home O2,Anemia sec to low grade MDS by recent bmbx,on weekly procrit   Admitted for electrolyte imbalance, GEETA,. UTI with   leukocytosis likely sec to c diff  seen by urology,no other intervention by urology  Complaining of abdominal pains and diarrhea sec to  C. diff colitis  CT shows colitis and right hydroureteronephrosis  Small effusions on CT right > left with lung nodules.  left lung atelectasis from mucous plug      PLAN:  on po vanco for c diff.  leukocytosis -monitor trend  cont  procrit 40,000 unit qweekly if hb <10 ,  monitor counts.  pulmonary on board,s/p  bronchoscopy with removal of mucus plug  f/u with urology for recent TURBT path result  will f/u as outpt for further Mn of bladder ca.  agree with Outpatient follow up of lung nodules, repeat CT chest in 3 months  cont other supportive care.

## 2019-02-22 NOTE — CONSULT NOTE ADULT - ASSESSMENT
· Assessment		   - GEETA - resolved, but with persistent prerenal azotemia, R hydro, hyperkalemia resolved   - cdiff colitis without diarrhea   - leukocytosis from cdiff - worsening again   - left lung atelectasis from mucous plug   - malnutrition - acute on chronic severe protein calorie malnutrition   - debility with progressive clinical deterioration   - HTN    - bladder ca - s/p TURBT   - UTI - enterococci   - COPD   - h/o CHF   - anemia / MDS   - chronic back pain with spinal stimulator    - poor po intake    SUGGEST:  - document intake of either the Ensure Enlive or the Nepro (pt doesn't require Nepro, but if she likes it, it's helpful)  - pt would definitely benefit from placement of small NG feeding tube (8 or 10 Fr, NOT a [place] or Pate), continuing to encourage po meals, and providing postprandial enteral Osmolite 1.5 240 ml after each meal. If pt cont c/o intermittent nausea, place naso-jejunal tube (Dobbhoff type) and once tube tip in distal duodenum start Osmolite 1.5 at 25 ml/h and later increase to 40 ml/h.  - weigh pt every 3 days  - once wbc trends down and abd exam improves, consider 14 day course of FloraStor 250 mg twice a day.

## 2019-02-22 NOTE — CONSULT NOTE ADULT - SUBJECTIVE AND OBJECTIVE BOX
84 year old female w hx of HTN, CHF, COPD on 2 LPM NC daily, bladder cancer presents to the ED with urinary retention, crampy/spasm-like lower abdominal pain and mild distention. Patient had bladder surgery w Dr. Sanchez on 1/30/19 and was d/c home with a lynne catheter in place.   + GEETA and hyperkalemia and UTI on admission, followed by Nephrology.  Consulted GI for colitis on CT and increasing WBC. Patient denies nausea, vomiting, diarrhea, constipation, hematemesis melena, blood in stool, abdominal pain. Patient had a colonoscopy about 25 years ago that revealed Anal Cancer and patient was treated with chemo and radiation. pt found to be C diff +, with high leukocytosis but has not had diarrhea.      PMHX/PSHX:  PAST MEDICAL & SURGICAL HISTORY:  Anemia: 4 PRBC 11/2018  Oxygen dependent: O2 2L Via NC - former smoker  Bladder cancer: 2018  CHF (congestive heart failure)  Breast tumor: removal benign bl breasts  Anal cancer: Chemo and radiation 1992  COPD (chronic obstructive pulmonary disease)  Lung disease: COPD  Hypertension  History of cystoscopy: 10/2018  H/O breast surgery: 39 yrs. ago, bilateral  History of back surgery: 10 yrs. ago stimulator implant 2001, 2011( cervical and Lumbar)    Vital Signs Last 24 Hrs  T(C): 35.6 (22 Feb 2019 05:53), Max: 35.8 (21 Feb 2019 21:38)  T(F): 96 (22 Feb 2019 05:53), Max: 96.4 (21 Feb 2019 21:38)  HR: 87 (22 Feb 2019 05:53) (85 - 97)  BP: 154/67 (22 Feb 2019 05:53) (112/51 - 154/67)  RR: 20 (22 Feb 2019 05:53) (16 - 20)  SpO2: 95% (22 Feb 2019 08:47) (95% - 98%)  sleepy, c/o being weak and tires, alert and verbal with clear speech when wakened  skin turgor fair, sclerae anicteric  ++ facial and infraclavicular wasting  abd soft, mildly distended, pt denies abd pain or tenderness, c/o "sore" from shots  no LE edema, + reduced calves and hamstring muscle mass mostly c/w age, but also + loss of overall fat mass  pt uncertain regarding recent weight loss, but that she has lost r/t illness and surgery  Height (cm): 157.48 (21 Feb 2019 10:08)  Weight (kg): 52.2 (30 Jan 2019 07:25)  BMI (kg/m2): 21 (21 Feb 2019 10:08)  BSA (m2): 1.51 (21 Feb 2019 10:08)    MEDICATIONS  (STANDING):  ALBUTerol/ipratropium for Nebulization 3 milliLiter(s) Nebulizer every 6 hours  amLODIPine   Tablet 10 milliGRAM(s) Oral daily  aspirin  chewable 81 milliGRAM(s) Oral daily  atorvastatin 20 milliGRAM(s) Oral at bedtime  buDESOnide 160 MICROgram(s)/formoterol 4.5 MICROgram(s) Inhaler 2 Puff(s) Inhalation two times a day  busPIRone 5 milliGRAM(s) Oral three times a day  cefepime   IVPB 1000 milliGRAM(s) IV Intermittent daily  chlorhexidine 4% Liquid 1 Application(s) Topical <User Schedule>  docusate sodium 200 milliGRAM(s) Oral two times a day  DULoxetine 20 milliGRAM(s) Oral daily  epoetin spencer Injectable 05911 Unit(s) SubCutaneous every 7 days  heparin  Injectable 5000 Unit(s) SubCutaneous every 8 hours  hydrocortisone 2.5% Rectal Cream 1 Application(s) Rectal daily  metoprolol succinate ER 50 milliGRAM(s) Oral daily  senna 2 Tablet(s) Oral daily  vancomycin    Solution 125 milliGRAM(s) Oral every 6 hours                        9.4    26.45 )-----------( 278      ( 22 Feb 2019 07:48 )             29.4   02-22    139  |  105  |  50<H>  ----------------------------<  130<H>  5.0   |  27  |  0.8    Ca    7.7<L>      22 Feb 2019 07:48  Phos  3.2     02-20  Mg     2.2     02-20    TPro  4.3<L>  /  Alb  2.5<L>  /  TBili  0.2  /  DBili  x   /  AST  16  /  ALT  15  /  AlkPhos  71  02-22

## 2019-02-23 LAB
ALLERGY+IMMUNOLOGY DIAG STUDY NOTE: SIGNIFICANT CHANGE UP
ANION GAP SERPL CALC-SCNC: 3 MMOL/L — LOW (ref 7–14)
ANION GAP SERPL CALC-SCNC: 4 MMOL/L — LOW (ref 7–14)
BUN SERPL-MCNC: 65 MG/DL — CRITICAL HIGH (ref 10–20)
BUN SERPL-MCNC: 65 MG/DL — CRITICAL HIGH (ref 10–20)
C DIFF BY PCR RESULT: POSITIVE
C DIFF TOX GENS STL QL NAA+PROBE: SIGNIFICANT CHANGE UP
CALCIUM SERPL-MCNC: 7.7 MG/DL — LOW (ref 8.5–10.1)
CALCIUM SERPL-MCNC: 7.8 MG/DL — LOW (ref 8.5–10.1)
CHLORIDE SERPL-SCNC: 107 MMOL/L — SIGNIFICANT CHANGE UP (ref 98–110)
CHLORIDE SERPL-SCNC: 107 MMOL/L — SIGNIFICANT CHANGE UP (ref 98–110)
CO2 SERPL-SCNC: 29 MMOL/L — SIGNIFICANT CHANGE UP (ref 17–32)
CO2 SERPL-SCNC: 30 MMOL/L — SIGNIFICANT CHANGE UP (ref 17–32)
CREAT SERPL-MCNC: 0.8 MG/DL — SIGNIFICANT CHANGE UP (ref 0.7–1.5)
CREAT SERPL-MCNC: 0.8 MG/DL — SIGNIFICANT CHANGE UP (ref 0.7–1.5)
GLUCOSE SERPL-MCNC: 126 MG/DL — HIGH (ref 70–99)
GLUCOSE SERPL-MCNC: 128 MG/DL — HIGH (ref 70–99)
HCT VFR BLD CALC: 22.4 % — LOW (ref 37–47)
HCT VFR BLD CALC: 24.6 % — LOW (ref 37–47)
HGB BLD-MCNC: 7.1 G/DL — CRITICAL LOW (ref 12–16)
HGB BLD-MCNC: 7.9 G/DL — LOW (ref 12–16)
MCHC RBC-ENTMCNC: 31.7 G/DL — LOW (ref 32–37)
MCHC RBC-ENTMCNC: 32.1 G/DL — SIGNIFICANT CHANGE UP (ref 32–37)
MCHC RBC-ENTMCNC: 32.1 PG — HIGH (ref 27–31)
MCHC RBC-ENTMCNC: 32.5 PG — HIGH (ref 27–31)
MCV RBC AUTO: 101.2 FL — HIGH (ref 81–99)
MCV RBC AUTO: 101.4 FL — HIGH (ref 81–99)
NRBC # BLD: 0 /100 WBCS — SIGNIFICANT CHANGE UP (ref 0–0)
NRBC # BLD: 0 /100 WBCS — SIGNIFICANT CHANGE UP (ref 0–0)
PLATELET # BLD AUTO: 236 K/UL — SIGNIFICANT CHANGE UP (ref 130–400)
PLATELET # BLD AUTO: 257 K/UL — SIGNIFICANT CHANGE UP (ref 130–400)
POTASSIUM SERPL-MCNC: 5.2 MMOL/L — HIGH (ref 3.5–5)
POTASSIUM SERPL-MCNC: 5.2 MMOL/L — HIGH (ref 3.5–5)
POTASSIUM SERPL-SCNC: 5.2 MMOL/L — HIGH (ref 3.5–5)
POTASSIUM SERPL-SCNC: 5.2 MMOL/L — HIGH (ref 3.5–5)
RBC # BLD: 2.21 M/UL — LOW (ref 4.2–5.4)
RBC # BLD: 2.43 M/UL — LOW (ref 4.2–5.4)
RBC # FLD: 14.8 % — HIGH (ref 11.5–14.5)
RBC # FLD: 15 % — HIGH (ref 11.5–14.5)
SODIUM SERPL-SCNC: 140 MMOL/L — SIGNIFICANT CHANGE UP (ref 135–146)
SODIUM SERPL-SCNC: 140 MMOL/L — SIGNIFICANT CHANGE UP (ref 135–146)
TYPE + AB SCN PNL BLD: SIGNIFICANT CHANGE UP
WBC # BLD: 31.27 K/UL — HIGH (ref 4.8–10.8)
WBC # BLD: 36.78 K/UL — HIGH (ref 4.8–10.8)
WBC # FLD AUTO: 31.27 K/UL — HIGH (ref 4.8–10.8)
WBC # FLD AUTO: 36.78 K/UL — HIGH (ref 4.8–10.8)

## 2019-02-23 RX ORDER — VANCOMYCIN HCL 1 G
125 VIAL (EA) INTRAVENOUS EVERY 6 HOURS
Qty: 0 | Refills: 0 | Status: DISCONTINUED | OUTPATIENT
Start: 2019-02-23 | End: 2019-03-07

## 2019-02-23 RX ORDER — PANTOPRAZOLE SODIUM 20 MG/1
40 TABLET, DELAYED RELEASE ORAL EVERY 12 HOURS
Qty: 0 | Refills: 0 | Status: DISCONTINUED | OUTPATIENT
Start: 2019-02-23 | End: 2019-03-05

## 2019-02-23 RX ADMIN — Medication 125 MILLIGRAM(S): at 17:13

## 2019-02-23 RX ADMIN — HEPARIN SODIUM 5000 UNIT(S): 5000 INJECTION INTRAVENOUS; SUBCUTANEOUS at 05:01

## 2019-02-23 RX ADMIN — Medication 50 MILLIGRAM(S): at 05:00

## 2019-02-23 RX ADMIN — PANTOPRAZOLE SODIUM 40 MILLIGRAM(S): 20 TABLET, DELAYED RELEASE ORAL at 22:28

## 2019-02-23 RX ADMIN — BUDESONIDE AND FORMOTEROL FUMARATE DIHYDRATE 2 PUFF(S): 160; 4.5 AEROSOL RESPIRATORY (INHALATION) at 22:20

## 2019-02-23 RX ADMIN — Medication 3 MILLILITER(S): at 14:48

## 2019-02-23 RX ADMIN — Medication 3 MILLILITER(S): at 08:34

## 2019-02-23 RX ADMIN — ATORVASTATIN CALCIUM 20 MILLIGRAM(S): 80 TABLET, FILM COATED ORAL at 22:29

## 2019-02-23 RX ADMIN — Medication 5 MILLIGRAM(S): at 13:54

## 2019-02-23 RX ADMIN — Medication 5 MILLIGRAM(S): at 05:00

## 2019-02-23 RX ADMIN — CEFEPIME 100 MILLIGRAM(S): 1 INJECTION, POWDER, FOR SOLUTION INTRAMUSCULAR; INTRAVENOUS at 13:52

## 2019-02-23 RX ADMIN — CHLORHEXIDINE GLUCONATE 1 APPLICATION(S): 213 SOLUTION TOPICAL at 05:02

## 2019-02-23 RX ADMIN — DULOXETINE HYDROCHLORIDE 20 MILLIGRAM(S): 30 CAPSULE, DELAYED RELEASE ORAL at 13:54

## 2019-02-23 RX ADMIN — AMLODIPINE BESYLATE 10 MILLIGRAM(S): 2.5 TABLET ORAL at 05:01

## 2019-02-23 RX ADMIN — Medication 125 MILLIGRAM(S): at 13:53

## 2019-02-23 RX ADMIN — BUDESONIDE AND FORMOTEROL FUMARATE DIHYDRATE 2 PUFF(S): 160; 4.5 AEROSOL RESPIRATORY (INHALATION) at 08:56

## 2019-02-23 RX ADMIN — Medication 125 MILLIGRAM(S): at 22:41

## 2019-02-23 RX ADMIN — Medication 5 MILLIGRAM(S): at 22:29

## 2019-02-23 RX ADMIN — Medication 1 APPLICATION(S): at 13:54

## 2019-02-23 NOTE — PROGRESS NOTE ADULT - ASSESSMENT
# Complicated UTI  # Moderate Right sided hydronephrosis  # C.difficile colitis  # Low grade  MDS - on procrit    would recommend:    1. Add IV Flagyl and c/w oral vancomycin  2. Continue Meropenem for a short course in the setting of C.difficile colitis and discontinue Ampicillin  3. Monitor WBC count , is worsening  4. Pain management as needed    d/w patient, and nursing staff    will follow the patient with you # Complicated UTI  # Moderate Right sided hydronephrosis  # C.difficile colitis  # Low grade  MDS - on procrit    would recommend:    1. Add IV Flagyl and c/w oral vancomycin  2. Discontinue All other systemic antibiotics   3. Monitor WBC count , stay elevated  4. CT abd /pelvis to rule out pseudomembranous colitis    d/w patient, and nursing staff    will follow the patient with you

## 2019-02-23 NOTE — PROGRESS NOTE ADULT - ASSESSMENT
chest x ray improved     increase wbc  stool guaiac positive    anemia/MDS  CHF  plan ---------------------------    restart Vanco po     continue current meds chest x ray improved     increase wbc  stool guaiac positive    anemia/MDS  CHF  plan ---------------------------    restart Vanco po     continue current meds  may need blood transfusion

## 2019-02-23 NOTE — PROGRESS NOTE ADULT - ASSESSMENT
85 yo female ex smoker with a history of COPD on home O2,Anemia sec to low grade MDS by recent bmbx,on weekly procrit   Admitted for electrolyte imbalance, GEETA,. UTI with   leukocytosis likely sec to c diff  Complaining of abdominal pains and diarrhea sec to  C. diff colitis  CT shows colitis and right hydroureteronephrosis  Small effusions on CT right > left with lung nodules.  left lung atelectasis from mucous plug      PLAN:  on po vanco for c diff.  leukocytosis -monitor trend..    Worsening .. s/p mucus plug removed.. observe closely   cont  procrit 40,000 unit qweekly if hb <10... Guaiac postive and decreased hb to 7.. transfusion requested  ,  monitor counts.  pulmonary on board,s/p  bronchoscopy with removal of mucus plug  f/u with urology for recent TURBT path result  will f/u as outpt for further Mn of bladder ca.  agree with Outpatient follow up of lung nodules, repeat CT chest in 3 months  cont other supportive care.

## 2019-02-23 NOTE — PROGRESS NOTE ADULT - SUBJECTIVE AND OBJECTIVE BOX
Patient is a 84y old  Female who presents with a chief complaint of Urinary Retention/Hperkalemia with EKG changes (22 Feb 2019 09:55)       Pt is seen and examined  pt is awake and lying in bed  appears to be weak.           ROS:  Negative   MEDICATIONS  (STANDING):  ALBUTerol/ipratropium for Nebulization 3 milliLiter(s) Nebulizer every 6 hours  amLODIPine   Tablet 10 milliGRAM(s) Oral daily  aspirin  chewable 81 milliGRAM(s) Oral daily  atorvastatin 20 milliGRAM(s) Oral at bedtime  buDESOnide 160 MICROgram(s)/formoterol 4.5 MICROgram(s) Inhaler 2 Puff(s) Inhalation two times a day  busPIRone 5 milliGRAM(s) Oral three times a day  cefepime   IVPB      cefepime   IVPB 1000 milliGRAM(s) IV Intermittent daily  chlorhexidine 4% Liquid 1 Application(s) Topical <User Schedule>  docusate sodium 200 milliGRAM(s) Oral two times a day  DULoxetine 20 milliGRAM(s) Oral daily  epoetin spencer Injectable 12287 Unit(s) SubCutaneous every 7 days  heparin  Injectable 5000 Unit(s) SubCutaneous every 8 hours  hydrocortisone 2.5% Rectal Cream 1 Application(s) Rectal daily  metoprolol succinate ER 50 milliGRAM(s) Oral daily  senna 2 Tablet(s) Oral daily  vancomycin    Solution 125 milliGRAM(s) Oral every 6 hours    MEDICATIONS  (PRN):  bisacodyl Suppository 10 milliGRAM(s) Rectal every 24 hours PRN Constipation  cloNIDine 0.1 milliGRAM(s) Oral every 8 hours PRN for sbp > 180  HYDROcodone 10 mG/acetaminophen 325 mG 1 Tablet(s) Oral every 4 hours PRN Moderate Pain (4 - 6)  ondansetron Injectable 4 milliGRAM(s) IV Push every 8 hours PRN Nausea and/or Vomiting  simethicone 80 milliGRAM(s) Chew every 8 hours PRN Gas  sodium chloride 3%  Inhalation 3 milliLiter(s) Inhalation every 4 hours PRN ..      Allergies    hair dyes (Hives)  sulfa drugs (Headache)  Zoloft (Headache)    Intolerances        Vital Signs Last 24 Hrs  T(C): 36.7 (22 Feb 2019 14:00), Max: 36.7 (22 Feb 2019 10:12)  T(F): 98 (22 Feb 2019 14:00), Max: 98 (22 Feb 2019 10:12)  HR: 77 (22 Feb 2019 14:00) (77 - 97)  BP: 141/63 (22 Feb 2019 14:00) (133/58 - 154/67)  BP(mean): --  RR: 16 (22 Feb 2019 14:00) (16 - 20)  SpO2: 96% (22 Feb 2019 11:19) (94% - 98%)    PHYSICAL EXAM  General: adult in NAD  HEENT: clear oropharynx, anicteric sclera, pink conjunctiva  Neck: supple  CV: normal S1/S2 with no murmur rubs or gallops  Lungs: positive air movement b/l ant lungs,clear to auscultation, no wheezes, no rales  Abdomen: soft non-tender non-distended, no hepatosplenomegaly  Ext: no clubbing cyanosis or edema  Skin: no rashes and no petechiae  Neuro: alert and oriented X 4, no focal deficits  LABS:                          9.4    26.45 )-----------( 278      ( 22 Feb 2019 07:48 )             29.4         Mean Cell Volume : 102.4 fL  Mean Cell Hemoglobin : 32.8 pg  Mean Cell Hemoglobin Concentration : 32.0 g/dL  Auto Neutrophil # : x  Auto Lymphocyte # : x  Auto Monocyte # : x  Auto Eosinophil # : x  Auto Basophil # : x  Auto Neutrophil % : x  Auto Lymphocyte % : x  Auto Monocyte % : x  Auto Eosinophil % : x  Auto Basophil % : x    Serial CBC's  02-22 @ 07:48  Hct-29.4 / Hgb-9.4 / Plat-278 / RBC-2.87 / WBC-26.45          Serial CBC's  02-20 @ 18:58  Hct-31.0 / Hgb-9.9 / Plat-302 / RBC-3.02 / WBC-25.13          Serial CBC's  02-19 @ 13:10  Hct-32.6 / Hgb-10.3 / Plat-293 / RBC-3.17 / WBC-19.66            02-22    139  |  105  |  50<H>  ----------------------------<  130<H>  5.0   |  27  |  0.8    Ca    7.7<L>      22 Feb 2019 07:48  Phos  3.2     02-20  Mg     2.2     02-20    TPro  4.3<L>  /  Alb  2.5<L>  /  TBili  0.2  /  DBili  x   /  AST  16  /  ALT  15  /  AlkPhos  71  02-22          Platelet Count - Automated: 278 K/uL (02-22-19 @ 07:48)  Hemoglobin: 9.4 g/dL (02-22-19 @ 07:48)  WBC Count: 26.45 K/uL (02-22-19 @ 07:48)  Hematocrit: 29.4 % (02-22-19 @ 07:48)  Hematocrit: 31.0 % (02-20-19 @ 18:58)  Platelet Count - Automated: 302 K/uL (02-20-19 @ 18:58)  Hemoglobin: 9.9 g/dL (02-20-19 @ 18:58)  WBC Count: 25.13 K/uL (02-20-19 @ 18:58)  Hemoglobin: 10.3 g/dL (02-19-19 @ 13:10)  WBC Count: 19.66 K/uL (02-19-19 @ 13:10)  Hematocrit: 32.6 % (02-19-19 @ 13:10)  Platelet Count - Automated: 293 K/uL (02-19-19 @ 13:10)  WBC Count: 20.00 K/uL (02-17-19 @ 08:42)  Hematocrit: 31.3 % (02-17-19 @ 08:42)  Platelet Count - Automated: 230 K/uL (02-17-19 @ 08:42)  Hemoglobin: 9.9 g/dL (02-17-19 @ 08:42)  WBC Count: 24.04 K/uL (02-16-19 @ 15:09)  Hematocrit: 30.3 % (02-16-19 @ 15:09)  Platelet Count - Automated: 213 K/uL (02-16-19 @ 15:09)  Hemoglobin: 9.9 g/dL (02-16-19 @ 15:09)  Platelet Count - Automated: 214 K/uL (02-15-19 @ 06:13)  Hemoglobin: 10.1 g/dL (02-15-19 @ 06:13)  WBC Count: 34.64 K/uL (02-15-19 @ 06:13)  Hematocrit: 30.7 % (02-15-19 @ 06:13)  Hematocrit: 29.4 % (02-13-19 @ 14:50)  Platelet Count - Automated: 219 K/uL (02-13-19 @ 14:50)  Hemoglobin: 9.7 g/dL (02-13-19 @ 14:50)  WBC Count: 38.51 K/uL (02-13-19 @ 14:50)                BLOOD SMEAR INTERPRETATION:       RADIOLOGY & ADDITIONAL STUDIES:

## 2019-02-23 NOTE — PROGRESS NOTE ADULT - SUBJECTIVE AND OBJECTIVE BOX
SUBJECTIVE:    Patient is a 84y old Female who presents with a chief complaint of Urinary Retention/Hperkalemia with EKG changes (22 Feb 2019 18:02)    Currently admitted to medicine with the primary diagnosis of Bladder cancer     Today is hospital day 18d. This morning she is resting comfortably in bed and reports no new issues or overnight events.     PAST MEDICAL & SURGICAL HISTORY  Anemia: 4 PRBC 11/2018  Oxygen dependent: O2 2L Via NC  Bladder cancer: 2018  CHF (congestive heart failure)  Breast tumor: removal benign bl breasts  Anal cancer: Chemo and radiation 1992  COPD (chronic obstructive pulmonary disease)  Lung disease: COPD  Hypertension  History of cystoscopy: 10/2018  H/O breast surgery: 39 yrs. ago, bilateral  History of back surgery: 10 yrs. ago stimulator implant 2001, 2011( cervical and Lumbar)    SOCIAL HISTORY:  Negative for smoking/alcohol/drug use.     ALLERGIES:  hair dyes (Hives)  sulfa drugs (Headache)  Zoloft (Headache)    MEDICATIONS:  STANDING MEDICATIONS  ALBUTerol/ipratropium for Nebulization 3 milliLiter(s) Nebulizer every 6 hours  amLODIPine   Tablet 10 milliGRAM(s) Oral daily  aspirin  chewable 81 milliGRAM(s) Oral daily  atorvastatin 20 milliGRAM(s) Oral at bedtime  buDESOnide 160 MICROgram(s)/formoterol 4.5 MICROgram(s) Inhaler 2 Puff(s) Inhalation two times a day  busPIRone 5 milliGRAM(s) Oral three times a day  cefepime   IVPB      cefepime   IVPB 1000 milliGRAM(s) IV Intermittent daily  chlorhexidine 4% Liquid 1 Application(s) Topical <User Schedule>  docusate sodium 200 milliGRAM(s) Oral two times a day  DULoxetine 20 milliGRAM(s) Oral daily  epoetin spencer Injectable 01554 Unit(s) SubCutaneous every 7 days  heparin  Injectable 5000 Unit(s) SubCutaneous every 8 hours  hydrocortisone 2.5% Rectal Cream 1 Application(s) Rectal daily  metoprolol succinate ER 50 milliGRAM(s) Oral daily  senna 2 Tablet(s) Oral daily  vancomycin    Solution 125 milliGRAM(s) Oral every 6 hours    PRN MEDICATIONS  bisacodyl Suppository 10 milliGRAM(s) Rectal every 24 hours PRN  cloNIDine 0.1 milliGRAM(s) Oral every 8 hours PRN  HYDROcodone 10 mG/acetaminophen 325 mG 1 Tablet(s) Oral every 4 hours PRN  ondansetron Injectable 4 milliGRAM(s) IV Push every 8 hours PRN  simethicone 80 milliGRAM(s) Chew every 8 hours PRN  sodium chloride 3%  Inhalation 3 milliLiter(s) Inhalation every 4 hours PRN    VITALS:   T(F): 97.9  HR: 99  BP: 136/60  RR: 18  SpO2: 95%    LABS:                        7.9    36.78 )-----------( 257      ( 23 Feb 2019 07:25 )             24.6     02-22    139  |  105  |  50<H>  ----------------------------<  130<H>  5.0   |  27  |  0.8    Ca    7.7<L>      22 Feb 2019 07:48    TPro  4.3<L>  /  Alb  2.5<L>  /  TBili  0.2  /  DBili  x   /  AST  16  /  ALT  15  /  AlkPhos  71  02-22              Culture - Bronchial (collected 22 Feb 2019 14:29)  Source: .Broncial bronchaol wash  Gram Stain (23 Feb 2019 07:29):    No polymorphonuclear cells seen per low power field    Rare Squamous epithelial cells seen per low power field    Rare Gram Positive Cocci in Pairs and Chains seen per oil power field    Culture - Sputum (collected 21 Feb 2019 21:17)  Source: .Sputum Sputum cup  Gram Stain (22 Feb 2019 15:58):    Few polymorphonuclear leukocytes per low power field    Few Squamous epithelial cells per low power field    Numerous Gram Negative Rods per oil power field    Few Yeast like cells per oil power field  Preliminary Report (23 Feb 2019 09:43):    Numerous Gram Negative Rods          RADIOLOGY:    PHYSICAL EXAM:  GEN: No acute distress  LUNGS: Clear to auscultation bilaterally   HEART: Regular  ABD: Soft, non-tender, non-distended.  EXT: NC/NC/NE/2+PP/SARAVIA/Skin Intact.   NEURO: AAOX3    Intravenous access:   NG tube:   Rowe Catheter: SUBJECTIVE:    Patient is a 84y old Female who presents with a chief complaint of Urinary Retention/Hperkalemia with EKG changes (22 Feb 2019 18:02)    Currently admitted to medicine with the primary diagnosis of Bladder cancer     Today is hospital day 18d. large melena hgb drop      PAST MEDICAL & SURGICAL HISTORY  Anemia: 4 PRBC 11/2018  Oxygen dependent: O2 2L Via NC  Bladder cancer: 2018  CHF (congestive heart failure)  Breast tumor: removal benign bl breasts  Anal cancer: Chemo and radiation 1992  COPD (chronic obstructive pulmonary disease)  Lung disease: COPD  Hypertension  History of cystoscopy: 10/2018  H/O breast surgery: 39 yrs. ago, bilateral  History of back surgery: 10 yrs. ago stimulator implant 2001, 2011( cervical and Lumbar)    SOCIAL HISTORY:  Negative for smoking/alcohol/drug use.     ALLERGIES:  hair dyes (Hives)  sulfa drugs (Headache)  Zoloft (Headache)    MEDICATIONS:  STANDING MEDICATIONS  ALBUTerol/ipratropium for Nebulization 3 milliLiter(s) Nebulizer every 6 hours  amLODIPine   Tablet 10 milliGRAM(s) Oral daily  aspirin  chewable 81 milliGRAM(s) Oral daily  atorvastatin 20 milliGRAM(s) Oral at bedtime  buDESOnide 160 MICROgram(s)/formoterol 4.5 MICROgram(s) Inhaler 2 Puff(s) Inhalation two times a day  busPIRone 5 milliGRAM(s) Oral three times a day  cefepime   IVPB      cefepime   IVPB 1000 milliGRAM(s) IV Intermittent daily  chlorhexidine 4% Liquid 1 Application(s) Topical <User Schedule>  docusate sodium 200 milliGRAM(s) Oral two times a day  DULoxetine 20 milliGRAM(s) Oral daily  epoetin spencer Injectable 49226 Unit(s) SubCutaneous every 7 days  heparin  Injectable 5000 Unit(s) SubCutaneous every 8 hours  hydrocortisone 2.5% Rectal Cream 1 Application(s) Rectal daily  metoprolol succinate ER 50 milliGRAM(s) Oral daily  senna 2 Tablet(s) Oral daily  vancomycin    Solution 125 milliGRAM(s) Oral every 6 hours    PRN MEDICATIONS  bisacodyl Suppository 10 milliGRAM(s) Rectal every 24 hours PRN  cloNIDine 0.1 milliGRAM(s) Oral every 8 hours PRN  HYDROcodone 10 mG/acetaminophen 325 mG 1 Tablet(s) Oral every 4 hours PRN  ondansetron Injectable 4 milliGRAM(s) IV Push every 8 hours PRN  simethicone 80 milliGRAM(s) Chew every 8 hours PRN  sodium chloride 3%  Inhalation 3 milliLiter(s) Inhalation every 4 hours PRN    VITALS:   T(F): 97.9  HR: 99  BP: 136/60  RR: 18  SpO2: 95%    LABS:                        7.9    36.78 )-----------( 257      ( 23 Feb 2019 07:25 )             24.6     02-22    139  |  105  |  50<H>  ----------------------------<  130<H>  5.0   |  27  |  0.8    Ca    7.7<L>      22 Feb 2019 07:48    TPro  4.3<L>  /  Alb  2.5<L>  /  TBili  0.2  /  DBili  x   /  AST  16  /  ALT  15  /  AlkPhos  71  02-22              Culture - Bronchial (collected 22 Feb 2019 14:29)  Source: .Broncial bronchaol wash  Gram Stain (23 Feb 2019 07:29):    No polymorphonuclear cells seen per low power field    Rare Squamous epithelial cells seen per low power field    Rare Gram Positive Cocci in Pairs and Chains seen per oil power field    Culture - Sputum (collected 21 Feb 2019 21:17)  Source: .Sputum Sputum cup  Gram Stain (22 Feb 2019 15:58):    Few polymorphonuclear leukocytes per low power field    Few Squamous epithelial cells per low power field    Numerous Gram Negative Rods per oil power field    Few Yeast like cells per oil power field  Preliminary Report (23 Feb 2019 09:43):    Numerous Gram Negative Rods          RADIOLOGY:    PHYSICAL EXAM:  GEN: No acute distress  LUNGS: Clear to auscultation bilaterally   HEART: Regular  ABD: Soft, non-tender, non-distended.  EXT: NC/NC/NE/2+PP/SARAVIA/Skin Intact.   NEURO: AAOX3    Intravenous access:   NG tube:   Rowe Catheter:

## 2019-02-23 NOTE — CHART NOTE - NSCHARTNOTEFT_GEN_A_CORE
Pt with recent bronchoscopy secondary to mucous plug, left lung opacification. CXR show improvement. Pt with +C.Diff and completed 1 week PO Vanco.   Last dose was received yesterday and pt w episodes of dark loose stools x 2 overnight and 2 today. WBC increased to 36, from 26, down to 31 this afternoon following dose of antibiotics.   H/H decreased to 7.1/22 this afternoon, from 7.9/28 this am, 9.4 yesterday. Pt denies chest pain or worsened SOB. She reports mild lower abdominal discomfort. On PE +LLQ/lower abdominal mild tenderness, not distended, no guarding, no rebound.   Will transfuse 2 units PRBC, each over 3 hours, +contact precautions, Send stool for c.diff, recall ID and GI Consults, IV Protonix BID. D/W Pt and children. +consent for blood transfusion obtained.    Case d/w Dr Boggs.

## 2019-02-23 NOTE — PROGRESS NOTE ADULT - SUBJECTIVE AND OBJECTIVE BOX
Patient is seen and examined at the bed side, is afebrile. She has no bowel movements in last 4 days. The Leukocytosis is worsening, most likely due to Stool impaction. may resulted  Stercoral colitis.      REVIEW OF SYSTEMS: All other review systems are negative        ALLERGIES: SULFA drugs         Vital Signs Last 24 Hrs  T(C): 35.6 (2019 17:08), Max: 36.7 (2019 04:51)  T(F): 96.1 (2019 17:08), Max: 98 (2019 04:51)  HR: 84 (2019 17:08) (84 - 99)  BP: 122/59 (2019 17:08) (122/59 - 142/59)  BP(mean): --  RR: 18 (2019 14:00) (18 - 18)  SpO2: 95% (2019 17:08) (95% - 96%)      PHYSICAL EXAM:  GENERAL: Not in distress  CHEST/LUNG: Air  entry bilaterally  HEART: s1 and s2 present  ABDOMEN: mild distended  : Rowe catheter in placed  EXTREMITIES: B/L pedal  edema  CNS: Awake and alert        LABS:                        7.1    31.27 )-----------( 236      ( 2019 15:04 )             22.4                           9.7    38.51 )-----------( 219      ( 2019 14:50 )             29.4                9.4    9.15  )-----------( 160      ( 2019 07:12 )             28.5           140  |  107  |  65<HH>  ----------------------------<  128<H>  5.2<H>   |  30  |  0.8    Ca    7.7<L>      2019 15:04    TPro  4.3<L>  /  Alb  2.5<L>  /  TBili  0.2  /  DBili  x   /  AST  16  /  ALT  15  /  AlkPhos  71  -    02-13    125<L>  |  89<L>  |  46<H>  ----------------------------<  97  4.9   |  25  |  1.0    Ca    8.0<L>      2019 14:50  Mg     1.7     02    TPro  5.1<L>  /  Alb  3.0<L>  /  TBili  0.5  /  DBili  x   /  AST  13  /  ALT  15  /  AlkPhos  111  02-12      02-    129<L>  |  92<L>  |  28<H>  ----------------------------<  124<H>  4.8   |  28  |  0.8    Ca    8.5      2019 06:49  Mg     1.7     -    TPro  5.4<L>  /  Alb  3.3<L>  /  TBili  0.6  /  DBili  0.3<H>  /  AST  14  /  ALT  16  /  AlkPhos  97  02-        Urinalysis Basic - ( 2019 04:25 )    Color: Orange / Appearance: Slightly Cloudy / S.025 / pH: x  Gluc: x / Ketone: Trace  / Bili: Negative / Urobili: 1.0 mg/dL   Blood: x / Protein: >=300 mg/dL / Nitrite: Positive   Leuk Esterase: Negative / RBC: >50 /HPF / WBC 10-25 /HPF   Sq Epi: x / Non Sq Epi: Few /HPF / Bacteria: Moderate        MEDICATIONS  (STANDING):  ALBUTerol/ipratropium for Nebulization 3 milliLiter(s) Nebulizer every 6 hours  amLODIPine   Tablet 10 milliGRAM(s) Oral daily  aspirin  chewable 81 milliGRAM(s) Oral daily  atorvastatin 20 milliGRAM(s) Oral at bedtime  buDESOnide 160 MICROgram(s)/formoterol 4.5 MICROgram(s) Inhaler 2 Puff(s) Inhalation two times a day  busPIRone 5 milliGRAM(s) Oral three times a day  cefepime   IVPB      cefepime   IVPB 1000 milliGRAM(s) IV Intermittent daily  chlorhexidine 4% Liquid 1 Application(s) Topical <User Schedule>  docusate sodium 200 milliGRAM(s) Oral two times a day  DULoxetine 20 milliGRAM(s) Oral daily  epoetin spencer Injectable 31088 Unit(s) SubCutaneous every 7 days  heparin  Injectable 5000 Unit(s) SubCutaneous every 8 hours  hydrocortisone 2.5% Rectal Cream 1 Application(s) Rectal daily  metoprolol succinate ER 50 milliGRAM(s) Oral daily  pantoprazole  Injectable 40 milliGRAM(s) IV Push every 12 hours  senna 2 Tablet(s) Oral daily  vancomycin    Solution 125 milliGRAM(s) Oral every 6 hours    MEDICATIONS  (PRN):  bisacodyl Suppository 10 milliGRAM(s) Rectal every 24 hours PRN Constipation  cloNIDine 0.1 milliGRAM(s) Oral every 8 hours PRN for sbp > 180  HYDROcodone 10 mG/acetaminophen 325 mG 1 Tablet(s) Oral every 4 hours PRN Moderate Pain (4 - 6)  ondansetron Injectable 4 milliGRAM(s) IV Push every 8 hours PRN Nausea and/or Vomiting  simethicone 80 milliGRAM(s) Chew every 8 hours PRN Gas  sodium chloride 3%  Inhalation 3 milliLiter(s) Inhalation every 4 hours PRN ..          RADIOLOGY & ADDITIONAL TESTS:    < from: CT Abdomen and Pelvis w/ Oral Cont (19 @ 23:46) >    New diffuse colonic wall thickening involving the cecum, right colon and   transverse colon at the hepatic flexure. There is additional involvement   of the terminal ileum. Findings are consistent with colitis and enteritis   of unclear etiology, likely infectious or inflammatory.    Moderate right hydroureteronephrosis to the level of the bladder without   a definitive obstructing cause, as seen on recent ultrasound.     New small amount of abdominopelvic ascites.    New small to moderateright pleural effusion and trace left pleural   effusion.    Resolution of some pulmonary nodules at the left lung base with new   pulmonary nodules measuring up to 12 mm. Follow-up noncontrast chest CT   is recommended in 3 months to demonstrate resolution.    < end of copied text >    < from: Xray Chest 1 View- PORTABLE-Routine (02.10.19 @ 17:59) >    Layering density in the right lower lung with a blunted costophrenic   angle, which may represent a pleural effusion. Cannot exclude right lower   lobe underlying consolidation. Recommend follow-up.     < end of copied text >      < from: US Retroperitoneal B-Scan Limited (19 @ 09:40) >  Moderate right-sided hydronephrosis, unchanged.      < end of copied text >      < from: Xray Chest 1 View- PORTABLE-Urgent (19 @ 07:00) >    No radiographic evidence of acute cardiopulmonary disease.      < end of copied text >        MICROBIOLOGY DATA:    Clostridium difficile Toxin by PCR (19 @ 16:20)    Clostridium difficile Toxin by PCR: RESULT INTERPRETATION:    Detected - Clostridium difficile toxin B detected by amplified DNA PCR    C.difficile PCR test results should be interpreted only with  consideration of the patient's clinical situation and history.  This test  will detect the presence of toxigenic C. difficile.  However it cannot be  used as the sole criteria  for the diagnosis of antibiotic associated diarrhea, antibiotic  associated colitis, or pseudomembranous colitis.  Colonization with  C.difficile may exceed 20% in hosptial patients, the majority of whom are  without Toxigenic  Clostridium Difficile disease.  Testing is generally not recommened in  children below the age of 1 year, as up to half of healthy infants are  asymptomatically colonized with C.difficile.  In addition, C.difficile  PCR testing  cannot be used as a "test of cure" as dead organism nucleic acids will  persist and be detected after treatment.  Successful treatment of  C.difficile disease is determined by resolution of clinical symptoms. Method: CDPCR  TOXIGENIC CLOST.DIFFICILE POSITIVE;  027-NAP1-B1 PRESUMPTIVE POSITIVE.  By: Real-Time PCR (Polymerase Chain Reaction)  NOTE: This method detects the Toxin B gene (tCdB), the  binary toxin gene (CDT), and the single-base-pair  deletionat nucleotide 117 within the gene encoding  a negative regulator of toxin production (tcdC 117).  The combined presence of genes encoding Toxin B and  binary toxin and the tcdC 117 deletion has been  associated with hypervirulent C. difficile strain  known as 027/NAP1/B1, which has been associated with  severe disease outbreaks in healthcare facilities  worldwide.    C Diff by PCR Result: Positive        Urine Microscopic-Add On (NC) (02.10.19 @ 19:08)    Bacteria: Moderate    Epithelial Cells: Few /HPF    Red Blood Cell - Urine: 26-50 /HPF    White Blood Cell - Urine: 3-5 /HPF        Culture - Blood (19 @ 13:16)    Specimen Source: .Blood Blood    Culture Results:   No growth to date.        Culture - Urine (19 @ 04:25)    Specimen Source: .Urine Clean Catch (Midstream)    Culture Results:   No growth      Culture - Urine (19 @ 13:38)    -  Nitrofurantoin: S <=32 Should not be used to treat pyelonephritis.    -  Tetra/Doxy: R >8    -  Vancomycin: S 1    -  Ampicillin: S <=2 Predicts results to ampicillin/sulbactam, amoxacillin-clavulanate and  piperacillin-tazobactam.    -  Ciprofloxacin: S <=1    -  Levofloxacin: S 1    Specimen Source: .Urine Clean Catch (Midstream)    Culture Results:   10,000 - 49,000 CFU/mL Enterococcus faecalis  Normal Urogenital katie present    Organism Identification: Enterococcus faecalis    Organism: Enterococcus faecalis    Method Type: ANUM Patient is seen and examined at the bed side, is afebrile. She has two episode of bloody bowel movements today. The WBC count stay elevated.      REVIEW OF SYSTEMS: All other review systems are negative        ALLERGIES: SULFA drugs         Vital Signs Last 24 Hrs  T(C): 35.6 (2019 17:08), Max: 36.7 (2019 04:51)  T(F): 96.1 (2019 17:08), Max: 98 (2019 04:51)  HR: 84 (2019 17:08) (84 - 99)  BP: 122/59 (2019 17:08) (122/59 - 142/59)  BP(mean): --  RR: 18 (2019 14:00) (18 - 18)  SpO2: 95% (2019 17:08) (95% - 96%)      PHYSICAL EXAM:  GENERAL: Not in distress  CHEST/LUNG: Air  entry bilaterally  HEART: s1 and s2 present  ABDOMEN: mild distended  : Rowe catheter in placed  EXTREMITIES: B/L pedal  edema  CNS: Awake and alert        LABS:                        7.1    31.27 )-----------( 236      ( 2019 15:04 )             22.4                           9.7    38.51 )-----------( 219      ( 2019 14:50 )             29.4                9.4    9.15  )-----------( 160      ( 2019 07:12 )             28.5           140  |  107  |  65<HH>  ----------------------------<  128<H>  5.2<H>   |  30  |  0.8    Ca    7.7<L>      2019 15:04    TPro  4.3<L>  /  Alb  2.5<L>  /  TBili  0.2  /  DBili  x   /  AST  16  /  ALT  15  /  AlkPhos  71      125<L>  |  89<L>  |  46<H>  ----------------------------<  97  4.9   |  25  |  1.0    Ca    8.0<L>      2019 14:50  Mg     1.7         TPro  5.1<L>  /  Alb  3.0<L>  /  TBili  0.5  /  DBili  x   /  AST  13  /  ALT  15  /  AlkPhos  111  -      02-    129<L>  |  92<L>  |  28<H>  ----------------------------<  124<H>  4.8   |  28  |  0.8    Ca    8.5      2019 06:49  Mg     1.7         TPro  5.4<L>  /  Alb  3.3<L>  /  TBili  0.6  /  DBili  0.3<H>  /  AST  14  /  ALT  16  /  AlkPhos  97  02-        Urinalysis Basic - ( 2019 04:25 )    Color: Orange / Appearance: Slightly Cloudy / S.025 / pH: x  Gluc: x / Ketone: Trace  / Bili: Negative / Urobili: 1.0 mg/dL   Blood: x / Protein: >=300 mg/dL / Nitrite: Positive   Leuk Esterase: Negative / RBC: >50 /HPF / WBC 10-25 /HPF   Sq Epi: x / Non Sq Epi: Few /HPF / Bacteria: Moderate        MEDICATIONS  (STANDING):  ALBUTerol/ipratropium for Nebulization 3 milliLiter(s) Nebulizer every 6 hours  amLODIPine   Tablet 10 milliGRAM(s) Oral daily  aspirin  chewable 81 milliGRAM(s) Oral daily  atorvastatin 20 milliGRAM(s) Oral at bedtime  buDESOnide 160 MICROgram(s)/formoterol 4.5 MICROgram(s) Inhaler 2 Puff(s) Inhalation two times a day  busPIRone 5 milliGRAM(s) Oral three times a day  cefepime   IVPB      cefepime   IVPB 1000 milliGRAM(s) IV Intermittent daily  chlorhexidine 4% Liquid 1 Application(s) Topical <User Schedule>  docusate sodium 200 milliGRAM(s) Oral two times a day  DULoxetine 20 milliGRAM(s) Oral daily  epoetin spencer Injectable 14950 Unit(s) SubCutaneous every 7 days  heparin  Injectable 5000 Unit(s) SubCutaneous every 8 hours  hydrocortisone 2.5% Rectal Cream 1 Application(s) Rectal daily  metoprolol succinate ER 50 milliGRAM(s) Oral daily  pantoprazole  Injectable 40 milliGRAM(s) IV Push every 12 hours  senna 2 Tablet(s) Oral daily  vancomycin    Solution 125 milliGRAM(s) Oral every 6 hours    MEDICATIONS  (PRN):  bisacodyl Suppository 10 milliGRAM(s) Rectal every 24 hours PRN Constipation  cloNIDine 0.1 milliGRAM(s) Oral every 8 hours PRN for sbp > 180  HYDROcodone 10 mG/acetaminophen 325 mG 1 Tablet(s) Oral every 4 hours PRN Moderate Pain (4 - 6)  ondansetron Injectable 4 milliGRAM(s) IV Push every 8 hours PRN Nausea and/or Vomiting  simethicone 80 milliGRAM(s) Chew every 8 hours PRN Gas  sodium chloride 3%  Inhalation 3 milliLiter(s) Inhalation every 4 hours PRN ..          RADIOLOGY & ADDITIONAL TEST    < from: CT Abdomen and Pelvis w/ Oral Cont (19 @ 23:46) >    New diffuse colonic wall thickening involving the cecum, right colon and   transverse colon at the hepatic flexure. There is additional involvement   of the terminal ileum. Findings are consistent with colitis and enteritis   of unclear etiology, likely infectious or inflammatory.    Moderate right hydroureteronephrosis to the level of the bladder without   a definitive obstructing cause, as seen on recent ultrasound.     New small amount of abdominopelvic ascites.    New small to moderateright pleural effusion and trace left pleural   effusion.    Resolution of some pulmonary nodules at the left lung base with new   pulmonary nodules measuring up to 12 mm. Follow-up noncontrast chest CT   is recommended in 3 months to demonstrate resolution.    < end of copied text >    < from: Xray Chest 1 View- PORTABLE-Routine (02.10.19 @ 17:59) >    Layering density in the right lower lung with a blunted costophrenic   angle, which may represent a pleural effusion. Cannot exclude right lower   lobe underlying consolidation. Recommend follow-up.     < end of copied text >      < from: US Retroperitoneal B-Scan Limited (19 @ 09:40) >  Moderate right-sided hydronephrosis, unchanged.      < end of copied text >      < from: Xray Chest 1 View- PORTABLE-Urgent (19 @ 07:00) >    No radiographic evidence of acute cardiopulmonary disease.      < end of copied text >        MICROBIOLOGY DATA:    Clostridium difficile Toxin by PCR (19 @ 16:20)    Clostridium difficile Toxin by PCR: RESULT INTERPRETATION:    Detected - Clostridium difficile toxin B detected by amplified DNA PCR    C.difficile PCR test results should be interpreted only with  consideration of the patient's clinical situation and history.  This test  will detect the presence of toxigenic C. difficile.  However it cannot be  used as the sole criteria  for the diagnosis of antibiotic associated diarrhea, antibiotic  associated colitis, or pseudomembranous colitis.  Colonization with  C.difficile may exceed 20% in hosptial patients, the majority of whom are  without Toxigenic  Clostridium Difficile disease.  Testing is generally not recommened in  children below the age of 1 year, as up to half of healthy infants are  asymptomatically colonized with C.difficile.  In addition, C.difficile  PCR testing  cannot be used as a "test of cure" as dead organism nucleic acids will  persist and be detected after treatment.  Successful treatment of  C.difficile disease is determined by resolution of clinical symptoms. Method: CDPCR  TOXIGENIC CLOST.DIFFICILE POSITIVE;  027-NAP1-B1 PRESUMPTIVE POSITIVE.      C Diff by PCR Result: Positive        Urine Microscopic-Add On (NC) (02.10.19 @ 19:08)    Bacteria: Moderate    Epithelial Cells: Few /HPF    Red Blood Cell - Urine: 26-50 /HPF    White Blood Cell - Urine: 3-5 /HPF        Culture - Blood (19 @ 13:16)    Specimen Source: .Blood Blood    Culture Results:   No growth to date.        Culture - Urine (19 @ 04:25)    Specimen Source: .Urine Clean Catch (Midstream)    Culture Results:   No growth

## 2019-02-24 LAB
-  CEFTAZIDIME: SIGNIFICANT CHANGE UP
-  LEVOFLOXACIN: SIGNIFICANT CHANGE UP
-  TRIMETHOPRIM/SULFAMETHOXAZOLE: SIGNIFICANT CHANGE UP
ANION GAP SERPL CALC-SCNC: 8 MMOL/L — SIGNIFICANT CHANGE UP (ref 7–14)
BUN SERPL-MCNC: 60 MG/DL — HIGH (ref 10–20)
CALCIUM SERPL-MCNC: 7.7 MG/DL — LOW (ref 8.5–10.1)
CHLORIDE SERPL-SCNC: 107 MMOL/L — SIGNIFICANT CHANGE UP (ref 98–110)
CO2 SERPL-SCNC: 27 MMOL/L — SIGNIFICANT CHANGE UP (ref 17–32)
CREAT SERPL-MCNC: 0.9 MG/DL — SIGNIFICANT CHANGE UP (ref 0.7–1.5)
CULTURE RESULTS: SIGNIFICANT CHANGE UP
GLUCOSE SERPL-MCNC: 115 MG/DL — HIGH (ref 70–99)
HCT VFR BLD CALC: 29.4 % — LOW (ref 37–47)
HGB BLD-MCNC: 9.7 G/DL — LOW (ref 12–16)
MCHC RBC-ENTMCNC: 31.6 PG — HIGH (ref 27–31)
MCHC RBC-ENTMCNC: 33 G/DL — SIGNIFICANT CHANGE UP (ref 32–37)
MCV RBC AUTO: 95.8 FL — SIGNIFICANT CHANGE UP (ref 81–99)
METHOD TYPE: SIGNIFICANT CHANGE UP
NRBC # BLD: 0 /100 WBCS — SIGNIFICANT CHANGE UP (ref 0–0)
ORGANISM # SPEC MICROSCOPIC CNT: SIGNIFICANT CHANGE UP
ORGANISM # SPEC MICROSCOPIC CNT: SIGNIFICANT CHANGE UP
PLATELET # BLD AUTO: 196 K/UL — SIGNIFICANT CHANGE UP (ref 130–400)
POTASSIUM SERPL-MCNC: 5.1 MMOL/L — HIGH (ref 3.5–5)
POTASSIUM SERPL-SCNC: 5.1 MMOL/L — HIGH (ref 3.5–5)
RBC # BLD: 3.07 M/UL — LOW (ref 4.2–5.4)
RBC # FLD: 18.6 % — HIGH (ref 11.5–14.5)
SODIUM SERPL-SCNC: 142 MMOL/L — SIGNIFICANT CHANGE UP (ref 135–146)
SPECIMEN SOURCE: SIGNIFICANT CHANGE UP
WBC # BLD: 23.8 K/UL — HIGH (ref 4.8–10.8)
WBC # FLD AUTO: 23.8 K/UL — HIGH (ref 4.8–10.8)

## 2019-02-24 RX ORDER — METRONIDAZOLE 500 MG
500 TABLET ORAL EVERY 8 HOURS
Qty: 0 | Refills: 0 | Status: DISCONTINUED | OUTPATIENT
Start: 2019-02-24 | End: 2019-03-05

## 2019-02-24 RX ORDER — IOHEXOL 300 MG/ML
30 INJECTION, SOLUTION INTRAVENOUS ONCE
Qty: 0 | Refills: 0 | Status: DISCONTINUED | OUTPATIENT
Start: 2019-02-24 | End: 2019-03-12

## 2019-02-24 RX ADMIN — Medication 3 MILLILITER(S): at 01:05

## 2019-02-24 RX ADMIN — Medication 100 MILLIGRAM(S): at 15:07

## 2019-02-24 RX ADMIN — PANTOPRAZOLE SODIUM 40 MILLIGRAM(S): 20 TABLET, DELAYED RELEASE ORAL at 05:51

## 2019-02-24 RX ADMIN — Medication 3 MILLILITER(S): at 13:40

## 2019-02-24 RX ADMIN — Medication 5 MILLIGRAM(S): at 21:52

## 2019-02-24 RX ADMIN — AMLODIPINE BESYLATE 10 MILLIGRAM(S): 2.5 TABLET ORAL at 05:51

## 2019-02-24 RX ADMIN — Medication 3 MILLILITER(S): at 10:53

## 2019-02-24 RX ADMIN — SENNA PLUS 2 TABLET(S): 8.6 TABLET ORAL at 12:31

## 2019-02-24 RX ADMIN — BUDESONIDE AND FORMOTEROL FUMARATE DIHYDRATE 2 PUFF(S): 160; 4.5 AEROSOL RESPIRATORY (INHALATION) at 09:00

## 2019-02-24 RX ADMIN — Medication 125 MILLIGRAM(S): at 23:44

## 2019-02-24 RX ADMIN — Medication 100 MILLIGRAM(S): at 21:52

## 2019-02-24 RX ADMIN — ATORVASTATIN CALCIUM 20 MILLIGRAM(S): 80 TABLET, FILM COATED ORAL at 21:52

## 2019-02-24 RX ADMIN — CHLORHEXIDINE GLUCONATE 1 APPLICATION(S): 213 SOLUTION TOPICAL at 05:51

## 2019-02-24 RX ADMIN — Medication 81 MILLIGRAM(S): at 12:36

## 2019-02-24 RX ADMIN — PANTOPRAZOLE SODIUM 40 MILLIGRAM(S): 20 TABLET, DELAYED RELEASE ORAL at 17:29

## 2019-02-24 RX ADMIN — Medication 5 MILLIGRAM(S): at 05:51

## 2019-02-24 RX ADMIN — Medication 125 MILLIGRAM(S): at 12:31

## 2019-02-24 RX ADMIN — Medication 5 MILLIGRAM(S): at 15:07

## 2019-02-24 RX ADMIN — Medication 50 MILLIGRAM(S): at 05:51

## 2019-02-24 RX ADMIN — Medication 100 MILLIGRAM(S): at 06:41

## 2019-02-24 RX ADMIN — Medication 0.1 MILLIGRAM(S): at 15:06

## 2019-02-24 RX ADMIN — Medication 125 MILLIGRAM(S): at 17:29

## 2019-02-24 RX ADMIN — Medication 125 MILLIGRAM(S): at 05:51

## 2019-02-24 RX ADMIN — Medication 1 APPLICATION(S): at 12:35

## 2019-02-24 RX ADMIN — DULOXETINE HYDROCHLORIDE 20 MILLIGRAM(S): 30 CAPSULE, DELAYED RELEASE ORAL at 12:31

## 2019-02-24 NOTE — PROGRESS NOTE ADULT - ASSESSMENT
85 yo female ex smoker with a history of COPD on home O2,Anemia sec to low grade MDS by recent bmbx,on weekly procrit   Admitted for electrolyte imbalance, GEETA,. UTI with   leukocytosis likely sec to c diff  Complaining of abdominal pains and diarrhea sec to  C. diff colitis  CT shows colitis and right hydroureteronephrosis  Small effusions on CT right > left with lung nodules.  left lung atelectasis from mucous plug  another C diff toxin postive... on vanco     PLAN:  on po vanco for c diff.  leukocytosis -monitor trend..    improving  .. s/p mucus plug removed.. observe closely   cont  procrit 40,000 unit qweekly if hb <10... Guaiac postive and decreased hb to 7.. transfusion >>>hb 9 ,  monitor counts.  pulmonary on board,s/p  bronchoscopy with removal of mucus plug  f/u with urology for recent TURBT path result  will f/u as outpt for further Mn of bladder ca.  agree with Outpatient follow up of lung nodules, repeat CT chest in 3 months  cont other supportive care.

## 2019-02-24 NOTE — PROGRESS NOTE ADULT - SUBJECTIVE AND OBJECTIVE BOX
Patient is a 84y old  Female who presents with a chief complaint of Urinary Retention/Hperkalemia with EKG changes (22 Feb 2019 09:55)       Pt is seen and examined  pt is awake and lying in bed  more wake, s/p transfusion           ROS:  Negative   MEDICATIONS  (STANDING):  ALBUTerol/ipratropium for Nebulization 3 milliLiter(s) Nebulizer every 6 hours  amLODIPine   Tablet 10 milliGRAM(s) Oral daily  aspirin  chewable 81 milliGRAM(s) Oral daily  atorvastatin 20 milliGRAM(s) Oral at bedtime  buDESOnide 160 MICROgram(s)/formoterol 4.5 MICROgram(s) Inhaler 2 Puff(s) Inhalation two times a day  busPIRone 5 milliGRAM(s) Oral three times a day  cefepime   IVPB      cefepime   IVPB 1000 milliGRAM(s) IV Intermittent daily  chlorhexidine 4% Liquid 1 Application(s) Topical <User Schedule>  docusate sodium 200 milliGRAM(s) Oral two times a day  DULoxetine 20 milliGRAM(s) Oral daily  epoetin spencer Injectable 64026 Unit(s) SubCutaneous every 7 days  heparin  Injectable 5000 Unit(s) SubCutaneous every 8 hours  hydrocortisone 2.5% Rectal Cream 1 Application(s) Rectal daily  metoprolol succinate ER 50 milliGRAM(s) Oral daily  senna 2 Tablet(s) Oral daily  vancomycin    Solution 125 milliGRAM(s) Oral every 6 hours    MEDICATIONS  (PRN):  bisacodyl Suppository 10 milliGRAM(s) Rectal every 24 hours PRN Constipation  cloNIDine 0.1 milliGRAM(s) Oral every 8 hours PRN for sbp > 180  HYDROcodone 10 mG/acetaminophen 325 mG 1 Tablet(s) Oral every 4 hours PRN Moderate Pain (4 - 6)  ondansetron Injectable 4 milliGRAM(s) IV Push every 8 hours PRN Nausea and/or Vomiting  simethicone 80 milliGRAM(s) Chew every 8 hours PRN Gas  sodium chloride 3%  Inhalation 3 milliLiter(s) Inhalation every 4 hours PRN ..      Allergies    hair dyes (Hives)  sulfa drugs (Headache)  Zoloft (Headache)    Intolerances        Vital Signs Last 24 Hrs  T(C): 36.7 (22 Feb 2019 14:00), Max: 36.7 (22 Feb 2019 10:12)  T(F): 98 (22 Feb 2019 14:00), Max: 98 (22 Feb 2019 10:12)  HR: 77 (22 Feb 2019 14:00) (77 - 97)  BP: 141/63 (22 Feb 2019 14:00) (133/58 - 154/67)  BP(mean): --  RR: 16 (22 Feb 2019 14:00) (16 - 20)  SpO2: 96% (22 Feb 2019 11:19) (94% - 98%)    PHYSICAL EXAM  General: adult in NAD  HEENT: clear oropharynx, anicteric sclera, pink conjunctiva  Neck: supple  CV: normal S1/S2 with no murmur rubs or gallops  Lungs: positive air movement b/l ant lungs,clear to auscultation, no wheezes, no rales  Abdomen: soft non-tender non-distended, no hepatosplenomegaly  Ext: no clubbing cyanosis or edema  Skin: no rashes and no petechiae  Neuro: alert and oriented X 4, no focal deficits  LABS:                          9.4    26.45 )-----------( 278      ( 22 Feb 2019 07:48 )             29.4         Mean Cell Volume : 102.4 fL  Mean Cell Hemoglobin : 32.8 pg  Mean Cell Hemoglobin Concentration : 32.0 g/dL  Auto Neutrophil # : x  Auto Lymphocyte # : x  Auto Monocyte # : x  Auto Eosinophil # : x  Auto Basophil # : x  Auto Neutrophil % : x  Auto Lymphocyte % : x  Auto Monocyte % : x  Auto Eosinophil % : x  Auto Basophil % : x    Serial CBC's  02-22 @ 07:48  Hct-29.4 / Hgb-9.4 / Plat-278 / RBC-2.87 / WBC-26.45          Serial CBC's  02-20 @ 18:58  Hct-31.0 / Hgb-9.9 / Plat-302 / RBC-3.02 / WBC-25.13          Serial CBC's  02-19 @ 13:10  Hct-32.6 / Hgb-10.3 / Plat-293 / RBC-3.17 / WBC-19.66            02-22    139  |  105  |  50<H>  ----------------------------<  130<H>  5.0   |  27  |  0.8    Ca    7.7<L>      22 Feb 2019 07:48  Phos  3.2     02-20  Mg     2.2     02-20    TPro  4.3<L>  /  Alb  2.5<L>  /  TBili  0.2  /  DBili  x   /  AST  16  /  ALT  15  /  AlkPhos  71  02-22          Platelet Count - Automated: 278 K/uL (02-22-19 @ 07:48)  Hemoglobin: 9.4 g/dL (02-22-19 @ 07:48)  WBC Count: 26.45 K/uL (02-22-19 @ 07:48)  Hematocrit: 29.4 % (02-22-19 @ 07:48)  Hematocrit: 31.0 % (02-20-19 @ 18:58)  Platelet Count - Automated: 302 K/uL (02-20-19 @ 18:58)  Hemoglobin: 9.9 g/dL (02-20-19 @ 18:58)  WBC Count: 25.13 K/uL (02-20-19 @ 18:58)  Hemoglobin: 10.3 g/dL (02-19-19 @ 13:10)  WBC Count: 19.66 K/uL (02-19-19 @ 13:10)  Hematocrit: 32.6 % (02-19-19 @ 13:10)  Platelet Count - Automated: 293 K/uL (02-19-19 @ 13:10)  WBC Count: 20.00 K/uL (02-17-19 @ 08:42)  Hematocrit: 31.3 % (02-17-19 @ 08:42)  Platelet Count - Automated: 230 K/uL (02-17-19 @ 08:42)  Hemoglobin: 9.9 g/dL (02-17-19 @ 08:42)  WBC Count: 24.04 K/uL (02-16-19 @ 15:09)  Hematocrit: 30.3 % (02-16-19 @ 15:09)  Platelet Count - Automated: 213 K/uL (02-16-19 @ 15:09)  Hemoglobin: 9.9 g/dL (02-16-19 @ 15:09)  Platelet Count - Automated: 214 K/uL (02-15-19 @ 06:13)  Hemoglobin: 10.1 g/dL (02-15-19 @ 06:13)  WBC Count: 34.64 K/uL (02-15-19 @ 06:13)  Hematocrit: 30.7 % (02-15-19 @ 06:13)  Hematocrit: 29.4 % (02-13-19 @ 14:50)  Platelet Count - Automated: 219 K/uL (02-13-19 @ 14:50)  Hemoglobin: 9.7 g/dL (02-13-19 @ 14:50)  WBC Count: 38.51 K/uL (02-13-19 @ 14:50)                BLOOD SMEAR INTERPRETATION:       RADIOLOGY & ADDITIONAL STUDIES:

## 2019-02-24 NOTE — CONSULT NOTE ADULT - SUBJECTIVE AND OBJECTIVE BOX
Chief Complaint:  C. difficile colitis.    History of Present Illness:  The patient is an 84F admitted with a history of bladder cancer, and found to have urinary retention and hyperkalemia.  Since that time she was seen by Hematology and low grade MDS was suspected.  A CT scan was done on 2/11 and showed enteritis and colitis and on 2/23 c. difficile infection was noted.  She has not had diarrhea, hematochezia, hematemesis today but yesterday according to the nurse she had two black bowel movements.  She has seen ID while here and they have started po vancomycin and IV Flagyl and since that time her WBC has decreased from 37k yesterday to 24k currently.  Her Hb is presently 9.7 with 196K PLT.  Her K+ currently is 5.1.  She has mild abdominal discomfort and nursing reports that here has been no bleeding or diarrhea today.      PAST MEDICAL & SURGICAL HISTORY:  Anemia: 4 PRBC 11/2018  Oxygen dependent: O2 2L Via NC  Bladder cancer: 2018  CHF (congestive heart failure)  Breast tumor: removal benign bl breasts  Anal cancer: Chemo and radiation 1992  COPD (chronic obstructive pulmonary disease)  Lung disease: COPD  Hypertension  History of cystoscopy: 10/2018  H/O breast surgery: 39 yrs. ago, bilateral  History of back surgery: 10 yrs. ago stimulator implant 2001, 2011( cervical and Lumbar)      Home Medications:  amLODIPine 10 mg oral tablet: 1 tab(s) orally once a day (15 Feb 2019 11:20)  aspirin 81 mg oral tablet, chewable: 1 tab(s) orally once a day (15 Feb 2019 11:20)  atorvastatin 20 mg oral tablet: 1 tab(s) orally once a day (at bedtime) (15 Feb 2019 11:20)  bisacodyl 10 mg rectal suppository: 1 suppository(ies) rectal every 24 hours, As needed, Constipation (15 Feb 2019 11:20)  budesonide-formoterol 160 mcg-4.5 mcg/inh inhalation aerosol:  inhaled  (15 Feb 2019 11:20)  busPIRone 5 mg oral tablet: 1 tab(s) orally 3 times a day (15 Feb 2019 11:20)  Calcium 500+D oral tablet, chewable: 1 tab(s) orally 2 times a day (05 Feb 2019 07:54)  cloNIDine 0.1 mg oral tablet: 1 tab(s) orally every 8 hours, As needed, for sbp &gt; 180 (15 Feb 2019 11:20)  DULoxetine 20 mg oral delayed release capsule: 1 cap(s) orally once a day (15 Feb 2019 11:20)  hydrocodone-acetaminophen 10 mg-325 mg oral tablet: 1 tab(s) orally every 4 hours, As needed, Moderate Pain (4 - 6) (15 Feb 2019 11:20)  hydrocortisone 2.5% rectal cream with applicator: 1 application rectal once a day (15 Feb 2019 11:20)  ipratropium-albuterol 0.5 mg-2.5 mg/3 mLinhalation solution: 3 milliliter(s) inhaled every 6 hours (15 Feb 2019 11:20)  metoprolol succinate 50 mg oral tablet, extended release: 1 tab(s) orally once a day (15 Feb 2019 11:20)  ProAir HFA 90 mcg/inh inhalation aerosol: 2 puff(s) inhaled 4 times a day (05 Feb 2019 07:54)  Procrit: injectable once a week (05 Feb 2019 07:54)  spironolactone 25 mg oral tablet: 1 tab(s) orally once a day (05 Feb 2019 07:54)  vancomycin 50 mg/mL oral liquid: 125 milliliter(s) orally every 6 hours (15 Feb 2019 11:20)      MEDICATIONS  (STANDING):  ALBUTerol/ipratropium for Nebulization 3 milliLiter(s) Nebulizer every 6 hours  amLODIPine   Tablet 10 milliGRAM(s) Oral daily  aspirin  chewable 81 milliGRAM(s) Oral daily  atorvastatin 20 milliGRAM(s) Oral at bedtime  buDESOnide 160 MICROgram(s)/formoterol 4.5 MICROgram(s) Inhaler 2 Puff(s) Inhalation two times a day  busPIRone 5 milliGRAM(s) Oral three times a day  chlorhexidine 4% Liquid 1 Application(s) Topical <User Schedule>  docusate sodium 200 milliGRAM(s) Oral two times a day  DULoxetine 20 milliGRAM(s) Oral daily  epoetin spencer Injectable 68235 Unit(s) SubCutaneous every 7 days  hydrocortisone 2.5% Rectal Cream 1 Application(s) Rectal daily  metoprolol succinate ER 50 milliGRAM(s) Oral daily  metroNIDAZOLE  IVPB 500 milliGRAM(s) IV Intermittent every 8 hours  pantoprazole  Injectable 40 milliGRAM(s) IV Push every 12 hours  senna 2 Tablet(s) Oral daily  vancomycin    Solution 125 milliGRAM(s) Oral every 6 hours    MEDICATIONS  (PRN):  bisacodyl Suppository 10 milliGRAM(s) Rectal every 24 hours PRN Constipation  cloNIDine 0.1 milliGRAM(s) Oral every 8 hours PRN for sbp > 180  HYDROcodone 10 mG/acetaminophen 325 mG 1 Tablet(s) Oral every 4 hours PRN Moderate Pain (4 - 6)  ondansetron Injectable 4 milliGRAM(s) IV Push every 8 hours PRN Nausea and/or Vomiting  simethicone 80 milliGRAM(s) Chew every 8 hours PRN Gas  sodium chloride 3%  Inhalation 3 milliLiter(s) Inhalation every 4 hours PRN ..      Allergies    hair dyes (Hives)  sulfa drugs (Headache)  Zoloft (Headache)    Intolerances        FAMILY HISTORY:  No pertinent family history in first degree relatives      Social History:    Physical Exam:  Vital Signs Last 24 Hrs  T(C): 36.1 (24 Feb 2019 05:45), Max: 36.3 (23 Feb 2019 22:04)  T(F): 96.9 (24 Feb 2019 05:45), Max: 97.3 (23 Feb 2019 22:04)  HR: 95 (24 Feb 2019 05:45) (82 - 95)  BP: 167/72 (24 Feb 2019 05:45) (122/59 - 167/72)  BP(mean): --  RR: 14 (24 Feb 2019 05:45) (14 - 19)  SpO2: 95% (23 Feb 2019 17:08) (95% - 96%)  HEENT:          Anicteric, neck supple, no JVD  Chest:            No rales, rhonchi, or wheezes.  Full breath sounds.  Cardiac:         RRR.  No S3/S4.  Abdomen:     Soft, mild diffuse tenderness noted, non-distended, normoactive bowel sounds.  Extremities:  No edema.  Neurologic:   Alert and oriented x 3.    Laboratories:    CBC Full  -  ( 24 Feb 2019 06:50 )  WBC Count : 23.80 K/uL  Hemoglobin : 9.7 g/dL  Hematocrit : 29.4 %  Platelet Count - Automated : 196 K/uL  Mean Cell Volume : 95.8 fL  Mean Cell Hemoglobin : 31.6 pg  Mean Cell Hemoglobin Concentration : 33.0 g/dL  Auto Neutrophil # : x  Auto Lymphocyte # : x  Auto Monocyte # : x  Auto Eosinophil # : x  Auto Basophil # : x  Auto Neutrophil % : x  Auto Lymphocyte % : x  Auto Monocyte % : x  Auto Eosinophil % : x  Auto Basophil % : x    02-24    142  |  107  |  60<H>  ----------------------------<  115<H>  5.1<H>   |  27  |  0.9    Ca    7.7<L>      24 Feb 2019 06:50            Radiologic Imaging:

## 2019-02-24 NOTE — CONSULT NOTE ADULT - ASSESSMENT
The patient is an 84F admitted with a history of bladder cancer, and found to have urinary retention and hyperkalemia.  Since that time she was seen by Hematology and low grade MDS was suspected.  A CT scan was done on 2/11 and showed enteritis and colitis and on 2/23 c. difficile infection was noted.  She has not had diarrhea, hematochezia, hematemesis today but yesterday according to the nurse she had two black bowel movements.  She has seen ID while here and they have started po vancomycin and IV Flagyl and since that time her WBC has decreased from 37k yesterday to 24k currently.  Her Hb is presently 9.7 with 196K PLT.  Her K+ currently is 5.1.  She has mild abdominal discomfort and nursing reports that here has been no bleeding or diarrhea today.  I agree with the current management plan in relation to the antibiotics chosen and I believe her black stool is likely related to her colitis.  It should slowly improve over time.  I would continue to monitor her Hb and transfuse to a Hb > 8.  I would recommend starting a PPI IV BID.  I would give IVF and hold po intake today.  Spoke to CASEY Justin about management plan.  ID has recommended a repeat CT scan which from my standpoint is not unreasonable.

## 2019-02-24 NOTE — PROGRESS NOTE ADULT - SUBJECTIVE AND OBJECTIVE BOX
SUBJECTIVE:    Patient is a 84y old Female who presents with a chief complaint of Urinary Retention/Hperkalemia with EKG changes, low grade MDS, hydronephrosis, bladder cancer (24 Feb 2019 10:14)    Currently admitted to medicine with the primary diagnosis of Bladder cancer     Today is hospital day 19d. s/p blood transfusion     PAST MEDICAL & SURGICAL HISTORY  Anemia: 4 PRBC 11/2018  Oxygen dependent: O2 2L Via NC  Bladder cancer: 2018  CHF (congestive heart failure)  Breast tumor: removal benign bl breasts  Anal cancer: Chemo and radiation 1992  COPD (chronic obstructive pulmonary disease)  Lung disease: COPD  Hypertension  History of cystoscopy: 10/2018  H/O breast surgery: 39 yrs. ago, bilateral  History of back surgery: 10 yrs. ago stimulator implant 2001, 2011( cervical and Lumbar)    SOCIAL HISTORY:  Negative for smoking/alcohol/drug use.     ALLERGIES:  hair dyes (Hives)  sulfa drugs (Headache)  Zoloft (Headache)    MEDICATIONS:  STANDING MEDICATIONS  ALBUTerol/ipratropium for Nebulization 3 milliLiter(s) Nebulizer every 6 hours  amLODIPine   Tablet 10 milliGRAM(s) Oral daily  aspirin  chewable 81 milliGRAM(s) Oral daily  atorvastatin 20 milliGRAM(s) Oral at bedtime  buDESOnide 160 MICROgram(s)/formoterol 4.5 MICROgram(s) Inhaler 2 Puff(s) Inhalation two times a day  busPIRone 5 milliGRAM(s) Oral three times a day  chlorhexidine 4% Liquid 1 Application(s) Topical <User Schedule>  docusate sodium 200 milliGRAM(s) Oral two times a day  DULoxetine 20 milliGRAM(s) Oral daily  epoetin spencer Injectable 72631 Unit(s) SubCutaneous every 7 days  hydrocortisone 2.5% Rectal Cream 1 Application(s) Rectal daily  metoprolol succinate ER 50 milliGRAM(s) Oral daily  metroNIDAZOLE  IVPB 500 milliGRAM(s) IV Intermittent every 8 hours  pantoprazole  Injectable 40 milliGRAM(s) IV Push every 12 hours  senna 2 Tablet(s) Oral daily  vancomycin    Solution 125 milliGRAM(s) Oral every 6 hours    PRN MEDICATIONS  bisacodyl Suppository 10 milliGRAM(s) Rectal every 24 hours PRN  cloNIDine 0.1 milliGRAM(s) Oral every 8 hours PRN  HYDROcodone 10 mG/acetaminophen 325 mG 1 Tablet(s) Oral every 4 hours PRN  ondansetron Injectable 4 milliGRAM(s) IV Push every 8 hours PRN  simethicone 80 milliGRAM(s) Chew every 8 hours PRN  sodium chloride 3%  Inhalation 3 milliLiter(s) Inhalation every 4 hours PRN    VITALS:   T(F): 96.9  HR: 95  BP: 167/72  RR: 14  SpO2: 95%    LABS:                        9.7    23.80 )-----------( 196      ( 24 Feb 2019 06:50 )             29.4     02-24    142  |  107  |  60<H>  ----------------------------<  115<H>  5.1<H>   |  27  |  0.9    Ca    7.7<L>      24 Feb 2019 06:50                Culture - Bronchial (collected 22 Feb 2019 14:29)  Source: .Broncial bronchaol wash  Gram Stain (23 Feb 2019 07:29):    No polymorphonuclear cells seen per low power field    Rare Squamous epithelial cells seen per low power field    Rare Gram Positive Cocci in Pairs and Chains seen per oil power field  Preliminary Report (23 Feb 2019 19:26):    Normal Respiratory Alyssa present    Culture - Sputum (collected 21 Feb 2019 21:17)  Source: .Sputum Sputum cup  Gram Stain (22 Feb 2019 15:58):    Few polymorphonuclear leukocytes per low power field    Few Squamous epithelial cells per low power field    Numerous Gram Negative Rods per oil power field    Few Yeast like cells per oil power field  Preliminary Report (23 Feb 2019 09:43):    Numerous Gram Negative Rods          RADIOLOGY:    PHYSICAL EXAM:  GEN: No acute distress  LUNGS: Clear to auscultation bilaterally   HEART: Regular  ABD: Soft, non-tender, non-distended.  EXT: NC/NC/NE/2+PP/SARAVIA/Skin Intact.   NEURO: AAOX3    Intravenous access:   NG tube:   Rowe Catheter:

## 2019-02-24 NOTE — PROGRESS NOTE ADULT - ASSESSMENT
chest x ray improved     Infectious dis advised CT scan of abdomen and pelvis    continue PO Vancomycin

## 2019-02-25 LAB
ANION GAP SERPL CALC-SCNC: 9 MMOL/L — SIGNIFICANT CHANGE UP (ref 7–14)
BUN SERPL-MCNC: 49 MG/DL — HIGH (ref 10–20)
CALCIUM SERPL-MCNC: 7.6 MG/DL — LOW (ref 8.5–10.1)
CHLORIDE SERPL-SCNC: 106 MMOL/L — SIGNIFICANT CHANGE UP (ref 98–110)
CO2 SERPL-SCNC: 26 MMOL/L — SIGNIFICANT CHANGE UP (ref 17–32)
CREAT SERPL-MCNC: 0.9 MG/DL — SIGNIFICANT CHANGE UP (ref 0.7–1.5)
GLUCOSE SERPL-MCNC: 116 MG/DL — HIGH (ref 70–99)
HCT VFR BLD CALC: 29.4 % — LOW (ref 37–47)
HGB BLD-MCNC: 9.6 G/DL — LOW (ref 12–16)
MCHC RBC-ENTMCNC: 31.7 PG — HIGH (ref 27–31)
MCHC RBC-ENTMCNC: 32.7 G/DL — SIGNIFICANT CHANGE UP (ref 32–37)
MCV RBC AUTO: 97 FL — SIGNIFICANT CHANGE UP (ref 81–99)
NRBC # BLD: 0 /100 WBCS — SIGNIFICANT CHANGE UP (ref 0–0)
PLATELET # BLD AUTO: 177 K/UL — SIGNIFICANT CHANGE UP (ref 130–400)
POTASSIUM SERPL-MCNC: 4.9 MMOL/L — SIGNIFICANT CHANGE UP (ref 3.5–5)
POTASSIUM SERPL-SCNC: 4.9 MMOL/L — SIGNIFICANT CHANGE UP (ref 3.5–5)
RBC # BLD: 3.03 M/UL — LOW (ref 4.2–5.4)
RBC # FLD: 18.4 % — HIGH (ref 11.5–14.5)
SODIUM SERPL-SCNC: 141 MMOL/L — SIGNIFICANT CHANGE UP (ref 135–146)
WBC # BLD: 24.89 K/UL — HIGH (ref 4.8–10.8)
WBC # FLD AUTO: 24.89 K/UL — HIGH (ref 4.8–10.8)

## 2019-02-25 RX ORDER — IPRATROPIUM/ALBUTEROL SULFATE 18-103MCG
3 AEROSOL WITH ADAPTER (GRAM) INHALATION EVERY 6 HOURS
Qty: 0 | Refills: 0 | Status: DISCONTINUED | OUTPATIENT
Start: 2019-02-25 | End: 2019-03-09

## 2019-02-25 RX ORDER — ACETYLCYSTEINE 200 MG/ML
3 VIAL (ML) MISCELLANEOUS
Qty: 0 | Refills: 0 | Status: DISCONTINUED | OUTPATIENT
Start: 2019-02-25 | End: 2019-03-09

## 2019-02-25 RX ADMIN — Medication 100 MILLIGRAM(S): at 05:43

## 2019-02-25 RX ADMIN — DULOXETINE HYDROCHLORIDE 20 MILLIGRAM(S): 30 CAPSULE, DELAYED RELEASE ORAL at 11:13

## 2019-02-25 RX ADMIN — Medication 5 MILLIGRAM(S): at 21:48

## 2019-02-25 RX ADMIN — Medication 1 APPLICATION(S): at 11:14

## 2019-02-25 RX ADMIN — Medication 125 MILLIGRAM(S): at 11:13

## 2019-02-25 RX ADMIN — PANTOPRAZOLE SODIUM 40 MILLIGRAM(S): 20 TABLET, DELAYED RELEASE ORAL at 17:38

## 2019-02-25 RX ADMIN — Medication 200 MILLIGRAM(S): at 05:43

## 2019-02-25 RX ADMIN — ATORVASTATIN CALCIUM 20 MILLIGRAM(S): 80 TABLET, FILM COATED ORAL at 21:48

## 2019-02-25 RX ADMIN — BUDESONIDE AND FORMOTEROL FUMARATE DIHYDRATE 2 PUFF(S): 160; 4.5 AEROSOL RESPIRATORY (INHALATION) at 21:48

## 2019-02-25 RX ADMIN — Medication 125 MILLIGRAM(S): at 17:37

## 2019-02-25 RX ADMIN — BUDESONIDE AND FORMOTEROL FUMARATE DIHYDRATE 2 PUFF(S): 160; 4.5 AEROSOL RESPIRATORY (INHALATION) at 08:15

## 2019-02-25 RX ADMIN — AMLODIPINE BESYLATE 10 MILLIGRAM(S): 2.5 TABLET ORAL at 05:43

## 2019-02-25 RX ADMIN — SENNA PLUS 2 TABLET(S): 8.6 TABLET ORAL at 11:13

## 2019-02-25 RX ADMIN — PANTOPRAZOLE SODIUM 40 MILLIGRAM(S): 20 TABLET, DELAYED RELEASE ORAL at 05:54

## 2019-02-25 RX ADMIN — BUDESONIDE AND FORMOTEROL FUMARATE DIHYDRATE 2 PUFF(S): 160; 4.5 AEROSOL RESPIRATORY (INHALATION) at 00:20

## 2019-02-25 RX ADMIN — Medication 5 MILLIGRAM(S): at 13:09

## 2019-02-25 RX ADMIN — Medication 100 MILLIGRAM(S): at 13:09

## 2019-02-25 RX ADMIN — Medication 125 MILLIGRAM(S): at 21:48

## 2019-02-25 RX ADMIN — Medication 125 MILLIGRAM(S): at 05:42

## 2019-02-25 RX ADMIN — Medication 5 MILLIGRAM(S): at 05:42

## 2019-02-25 RX ADMIN — Medication 50 MILLIGRAM(S): at 05:42

## 2019-02-25 RX ADMIN — Medication 3 MILLILITER(S): at 07:43

## 2019-02-25 RX ADMIN — Medication 3 MILLILITER(S): at 14:11

## 2019-02-25 RX ADMIN — Medication 100 MILLIGRAM(S): at 21:47

## 2019-02-25 RX ADMIN — Medication 200 MILLIGRAM(S): at 17:37

## 2019-02-25 RX ADMIN — Medication 81 MILLIGRAM(S): at 11:13

## 2019-02-25 RX ADMIN — ONDANSETRON 4 MILLIGRAM(S): 8 TABLET, FILM COATED ORAL at 02:04

## 2019-02-25 RX ADMIN — Medication 3 MILLILITER(S): at 20:22

## 2019-02-25 NOTE — PROGRESS NOTE ADULT - SUBJECTIVE AND OBJECTIVE BOX
Patient is a 84y old  Female who presents with a chief complaint of Urinary Retention/Hperkalemia with EKG changes (25 Feb 2019 12:20)       Pt is seen and examined  pt is awake and lying in bed  pt seems comfortable and denies any complaints at this time          ROS:  Negative except for:cough    MEDICATIONS  (STANDING):  ALBUTerol/ipratropium for Nebulization 3 milliLiter(s) Nebulizer every 6 hours  amLODIPine   Tablet 10 milliGRAM(s) Oral daily  aspirin  chewable 81 milliGRAM(s) Oral daily  atorvastatin 20 milliGRAM(s) Oral at bedtime  buDESOnide 160 MICROgram(s)/formoterol 4.5 MICROgram(s) Inhaler 2 Puff(s) Inhalation two times a day  busPIRone 5 milliGRAM(s) Oral three times a day  chlorhexidine 4% Liquid 1 Application(s) Topical <User Schedule>  docusate sodium 200 milliGRAM(s) Oral two times a day  DULoxetine 20 milliGRAM(s) Oral daily  epoetin spencer Injectable 35669 Unit(s) SubCutaneous every 7 days  hydrocortisone 2.5% Rectal Cream 1 Application(s) Rectal daily  iohexol 300 mG (iodine)/mL Oral Solution 30 milliLiter(s) Oral once  metoprolol succinate ER 50 milliGRAM(s) Oral daily  metroNIDAZOLE  IVPB 500 milliGRAM(s) IV Intermittent every 8 hours  pantoprazole  Injectable 40 milliGRAM(s) IV Push every 12 hours  senna 2 Tablet(s) Oral daily  vancomycin    Solution 125 milliGRAM(s) Oral every 6 hours    MEDICATIONS  (PRN):  bisacodyl Suppository 10 milliGRAM(s) Rectal every 24 hours PRN Constipation  cloNIDine 0.1 milliGRAM(s) Oral every 8 hours PRN for sbp > 180  HYDROcodone 10 mG/acetaminophen 325 mG 1 Tablet(s) Oral every 4 hours PRN Moderate Pain (4 - 6)  ondansetron Injectable 4 milliGRAM(s) IV Push every 8 hours PRN Nausea and/or Vomiting  simethicone 80 milliGRAM(s) Chew every 8 hours PRN Gas  sodium chloride 3%  Inhalation 3 milliLiter(s) Inhalation every 4 hours PRN ..      Allergies    hair dyes (Hives)  sulfa drugs (Headache)  Zoloft (Headache)    Intolerances        Vital Signs Last 24 Hrs  T(C): 35.7 (25 Feb 2019 06:13), Max: 36 (24 Feb 2019 14:20)  T(F): 96.3 (25 Feb 2019 06:13), Max: 96.8 (24 Feb 2019 14:20)  HR: 78 (25 Feb 2019 06:13) (76 - 86)  BP: 120/66 (25 Feb 2019 06:13) (120/66 - 141/62)  BP(mean): --  RR: 16 (25 Feb 2019 06:13) (14 - 16)  SpO2: --    PHYSICAL EXAM  General: adult in NAD  HEENT: clear oropharynx, anicteric sclera, pink conjunctiva  Neck: supple  CV: normal S1/S2 with no murmur rubs or gallops  Lungs: positive air movement b/l ant lungs,clear to auscultation, no wheezes, no rales  Abdomen: soft non-tender non-distended, no hepatosplenomegaly  Ext: no clubbing cyanosis or edema  Skin: no rashes and no petechiae  Neuro: alert and oriented X 4, no focal deficits  LABS:                          9.6    24.89 )-----------( 177      ( 25 Feb 2019 06:48 )             29.4         Mean Cell Volume : 97.0 fL  Mean Cell Hemoglobin : 31.7 pg  Mean Cell Hemoglobin Concentration : 32.7 g/dL  Auto Neutrophil # : x  Auto Lymphocyte # : x  Auto Monocyte # : x  Auto Eosinophil # : x  Auto Basophil # : x  Auto Neutrophil % : x  Auto Lymphocyte % : x  Auto Monocyte % : x  Auto Eosinophil % : x  Auto Basophil % : x    Serial CBC's  02-25 @ 06:48  Hct-29.4 / Hgb-9.6 / Plat-177 / RBC-3.03 / WBC-24.89          Serial CBC's  02-24 @ 06:50  Hct-29.4 / Hgb-9.7 / Plat-196 / RBC-3.07 / WBC-23.80          Serial CBC's  02-23 @ 15:04  Hct-22.4 / Hgb-7.1 / Plat-236 / RBC-2.21 / WBC-31.27          Serial CBC's  02-23 @ 07:25  Hct-24.6 / Hgb-7.9 / Plat-257 / RBC-2.43 / WBC-36.78          Serial CBC's  02-22 @ 07:48  Hct-29.4 / Hgb-9.4 / Plat-278 / RBC-2.87 / WBC-26.45            02-25    141  |  106  |  49<H>  ----------------------------<  116<H>  4.9   |  26  |  0.9    Ca    7.6<L>      25 Feb 2019 06:48            Hematocrit: 29.4 % (02-25-19 @ 06:48)  Platelet Count - Automated: 177 K/uL (02-25-19 @ 06:48)  Hemoglobin: 9.6 g/dL (02-25-19 @ 06:48)  WBC Count: 24.89 K/uL (02-25-19 @ 06:48)  Hemoglobin: 9.7 g/dL (02-24-19 @ 06:50)  WBC Count: 23.80 K/uL (02-24-19 @ 06:50)  Hematocrit: 29.4 % (02-24-19 @ 06:50)  Platelet Count - Automated: 196 K/uL (02-24-19 @ 06:50)  WBC Count: 31.27 K/uL (02-23-19 @ 15:04)  Hematocrit: 22.4 % (02-23-19 @ 15:04)  Platelet Count - Automated: 236 K/uL (02-23-19 @ 15:04)  Hemoglobin: 7.1 g/dL (02-23-19 @ 15:04)  Hemoglobin: 7.9 g/dL (02-23-19 @ 07:25)  WBC Count: 36.78 K/uL (02-23-19 @ 07:25)  Hematocrit: 24.6 % (02-23-19 @ 07:25)  Platelet Count - Automated: 257 K/uL (02-23-19 @ 07:25)  Platelet Count - Automated: 278 K/uL (02-22-19 @ 07:48)  Hemoglobin: 9.4 g/dL (02-22-19 @ 07:48)  WBC Count: 26.45 K/uL (02-22-19 @ 07:48)  Hematocrit: 29.4 % (02-22-19 @ 07:48)  Hematocrit: 31.0 % (02-20-19 @ 18:58)  Platelet Count - Automated: 302 K/uL (02-20-19 @ 18:58)  Hemoglobin: 9.9 g/dL (02-20-19 @ 18:58)  WBC Count: 25.13 K/uL (02-20-19 @ 18:58)  Hemoglobin: 10.3 g/dL (02-19-19 @ 13:10)  WBC Count: 19.66 K/uL (02-19-19 @ 13:10)  Hematocrit: 32.6 % (02-19-19 @ 13:10)  Platelet Count - Automated: 293 K/uL (02-19-19 @ 13:10)  WBC Count: 20.00 K/uL (02-17-19 @ 08:42)  Hematocrit: 31.3 % (02-17-19 @ 08:42)  Platelet Count - Automated: 230 K/uL (02-17-19 @ 08:42)  Hemoglobin: 9.9 g/dL (02-17-19 @ 08:42)  WBC Count: 24.04 K/uL (02-16-19 @ 15:09)  Hematocrit: 30.3 % (02-16-19 @ 15:09)  Platelet Count - Automated: 213 K/uL (02-16-19 @ 15:09)  Hemoglobin: 9.9 g/dL (02-16-19 @ 15:09)                BLOOD SMEAR INTERPRETATION:       RADIOLOGY & ADDITIONAL STUDIES:

## 2019-02-25 NOTE — PROGRESS NOTE ADULT - ASSESSMENT
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 UTI with   leukocytosis likely sec to c diff  Complaining of abdominal pains and diarrhea sec to  C. diff colitis  CT shows colitis and right hydroureteronephrosis  Small effusions on CT right > left with lung nodules.  left lung atelectasis from mucous plug  another C diff toxin postive... on vanco   renal cystic lesion     PLAN:  on po vanco for c diff.  leukocytosis -monitor trend..   .. s/p mucus plug removed.. observe closely   cont  procrit 40,000 unit qweekly if hb <10... Guaiac postive and decreased hb to 7.. transfusion >>>no w stable around 9 ,  monitor counts.  GI on board  pulmonary on board,s/p  bronchoscopy with removal of mucus plug  f/u with urology for recent TURBT path result  will f/u as outpt for further Mn of bladder ca.  outpt MRI to assess renal cystic lesion  Outpatient follow up of lung nodules, repeat CT chest in 3 months  cont other supportive care.

## 2019-02-25 NOTE — PROGRESS NOTE ADULT - ASSESSMENT
Pt with C.difficile colitis, Complicated UTI, Right sided hydronephrosis, Low grade  MDS - on procrit    Two episodes of diarrhea last evening; CT with worsening pancolitis    On metroNIDAZOLE  IVPB 500 milliGRAM(s) IV Intermittent every 8 hours  vancomycin    Solution 125 milliGRAM(s) Oral every 6 hours    Tmax 96.8 with wbc 24.89    Pt with ascites, new moderate left pleural effusion with overlying compressive atelectasis and table right pleural effusion on CT.      PLAN  Continue IV Flagyl and PO vancomycin  F/U wbc

## 2019-02-25 NOTE — PROGRESS NOTE ADULT - ASSESSMENT
GEETA   - resolved, but with persistent prerenal azotemia  cdiff colitis - worsening pancolitis on ct abd  leukocytosis   left lung atelectasis from mucous plug  left moderate pleural effusion  malnutrition  debility  left 2F complex renal cyst  HTN   bladder ca  s/p TURBT  right hydro   UTI - enterococci  COPD  CHF  anemia / MDS  chronic back pain with spinal stimulator   hyperkalemia    Plan:      cont PO vanco and IV flagyl  f/u H/H / protonix / f/u GI  procrit per hem  encourage po intake  cont norvasc and lopressor  off aldactone - do not restart  PT / rehab / OOB   f/u/ check path  cont her own vicodin   f/u lytes and renal function  d/w daughter

## 2019-02-25 NOTE — PROGRESS NOTE ADULT - SUBJECTIVE AND OBJECTIVE BOX
84yFemale  Being followed for C-Diff Colitis   Interval history: Patient denies nausea, vomiting, hematemesis, melena, constipation, blood in stool, abdominal pain. Patient has diarrhea that is improving, patient had 3 loose bowel movements yesterday and, today she has had none so far and no further blood in stool noted.      PMHX/PSHX: PAST MEDICAL & SURGICAL HISTORY:  Anemia: 4 PRBC 11/2018  Oxygen dependent: O2 2L Via NC  Bladder cancer: 2018  CHF (congestive heart failure)  Breast tumor: removal benign bl breasts  Anal cancer: Chemo and radiation 1992  COPD (chronic obstructive pulmonary disease)  Lung disease: COPD  Hypertension  History of cystoscopy: 10/2018  H/O breast surgery: 39 yrs. ago, bilateral  History of back surgery: 10 yrs. ago stimulator implant 2001, 2011( cervical and Lumbar)          Social History: No smoking. No alcohol. No illegal drug use.          MEDICATIONS:  MEDICATIONS  (STANDING):  ALBUTerol/ipratropium for Nebulization 3 milliLiter(s) Nebulizer every 6 hours  amLODIPine   Tablet 10 milliGRAM(s) Oral daily  aspirin  chewable 81 milliGRAM(s) Oral daily  atorvastatin 20 milliGRAM(s) Oral at bedtime  buDESOnide 160 MICROgram(s)/formoterol 4.5 MICROgram(s) Inhaler 2 Puff(s) Inhalation two times a day  busPIRone 5 milliGRAM(s) Oral three times a day  chlorhexidine 4% Liquid 1 Application(s) Topical <User Schedule>  docusate sodium 200 milliGRAM(s) Oral two times a day  DULoxetine 20 milliGRAM(s) Oral daily  epoetin spencer Injectable 76122 Unit(s) SubCutaneous every 7 days  hydrocortisone 2.5% Rectal Cream 1 Application(s) Rectal daily  iohexol 300 mG (iodine)/mL Oral Solution 30 milliLiter(s) Oral once  metoprolol succinate ER 50 milliGRAM(s) Oral daily  metroNIDAZOLE  IVPB 500 milliGRAM(s) IV Intermittent every 8 hours  pantoprazole  Injectable 40 milliGRAM(s) IV Push every 12 hours  senna 2 Tablet(s) Oral daily  vancomycin    Solution 125 milliGRAM(s) Oral every 6 hours    MEDICATIONS  (PRN):  bisacodyl Suppository 10 milliGRAM(s) Rectal every 24 hours PRN Constipation  cloNIDine 0.1 milliGRAM(s) Oral every 8 hours PRN for sbp > 180  HYDROcodone 10 mG/acetaminophen 325 mG 1 Tablet(s) Oral every 4 hours PRN Moderate Pain (4 - 6)  ondansetron Injectable 4 milliGRAM(s) IV Push every 8 hours PRN Nausea and/or Vomiting  simethicone 80 milliGRAM(s) Chew every 8 hours PRN Gas  sodium chloride 3%  Inhalation 3 milliLiter(s) Inhalation every 4 hours PRN ..        Allergies:    hair dyes (Hives)  sulfa drugs (Headache)  Zoloft (Headache)    Intolerances          REVIEW OF SYSTEMS:  General:  No  fevers, or chills.  Eyes:  No reported pain or visual changes  ENT:  No sore throat or runny nose.  NECK: No stiffness   CV:  No chest pain or palpitations.  Resp:  No shortness of breath, +cough  GI:  No abdominal pain, nausea, vomiting, dysphagia, +diarrhea No constipation. No rectal bleeding, melena, or hematemesis.  Neuro:  No tingling, numbness         VITAL SIGNS:   T(F): 96.3 (02-25-19 @ 06:13), Max: 96.8 (02-24-19 @ 14:20)  HR: 78 (02-25-19 @ 06:13) (76 - 86)  BP: 120/66 (02-25-19 @ 06:13) (120/66 - 141/62)  RR: 16 (02-25-19 @ 06:13) (14 - 16)  SpO2: --    PHYSICAL EXAM:  GENERAL: AAOx3, no acute distress.  HEAD:  Atraumatic, Normocephalic  EYES: conjunctiva and sclera clear  NECK: Supple, no JVD or thyromegaly  CHEST/LUNG: Clear to auscultation bilaterally; No wheeze, rhonchi, or rales  HEART: Regular rate and rhythm; normal S1, S2, No murmurs.  ABDOMEN: Soft, nontender, nondistended; Bowel sounds present, no abdominal bruit, masses, or hepatosplenomegaly  NEUROLOGY: No asterixis or tremor.   SKIN: Intact, no jaundice            LABS:                        9.6    24.89 )-----------( 177      ( 25 Feb 2019 06:48 )             29.4     02-25    141  |  106  |  49<H>  ----------------------------<  116<H>  4.9   |  26  |  0.9    Ca    7.6<L>      25 Feb 2019 06:48            IMAGING:  < from: CT Abdomen and Pelvis w/ Oral Cont and w/ IV Cont (02.24.19 @ 18:18) >  EXAM:  CT ABDOMEN AND PELVIS OC IC            PROCEDURE DATE:  02/24/2019            INTERPRETATION:  CLINICAL STATEMENT: Colitis. Gastrointestinal bleeding.      TECHNIQUE: Contiguous axial CT images were obtained from the lower chest   to the pubic symphysis following administration of 100cc Optiray 320   intravenous contrast.  Oral contrast was administered.  Reformatted   images in the coronal and sagittal planes were acquired.    Comparison made with CT abdomen and pelvis February 11, 2019, October 19, 2018.    FINDINGS:    LOWER CHEST: New moderate left pleural effusion with overlying   compressive atelectasis. Stable right pleural effusion.    HEPATOBILIARY: Gallbladder with sludge/stones. Subcentimeter right   hepatic lobe hypodensity, likely cyst or hemangioma..    SPLEEN: Unremarkable.    PANCREAS: Unremarkable.    ADRENAL GLANDS: Stable left adrenal gland thickening.    KIDNEYS: Unchanged right hydroureteronephrosis. Enlarging exophytic   indeterminate left lower renal polecystic lesion with perceivable   enhancement of wall (series 601, image 80). Additional subcentimeter   renal hypodensities, too small to fully characterize    ABDOMINOPELVIC NODES: Evaluation for lymph nodes limited by patient   condition..    PELVIC ORGANS: Urinary bladder distended, with posterior diverticulum.   Stable gynecological structures.    PERITONEUM/MESENTERY/BOWEL: Worsening, now pancolitis; no evidence of   abscess. Increased moderate ascites.    BONES/SOFT TISSUES: Stable osseous structures..    OTHER: Unchanged spinal stimulator. Vascular calcifications.      IMPRESSION:   Since February 11, 2019:    1. Enlarging (since October 19, 2018) exophytic indeterminate left lower   renal pole cystic lesion with perceivable enhancement of wall, Bosniak   2F; outpatient MRI abdomen with and without IV contrast recommended   following discharge from hospital.    2. Worsening, now pancolitis; no evidence of abscess.     3. Increased moderate ascites.    4. Unchanged right hydroureteronephrosis.    5. New moderate left pleural effusion with overlying compressive   atelectasis. Stable right pleural effusion.                  HARI CROWLEY M.D., ATTENDING RADIOLOGIST  This document has been electronically signed. Feb 25 2019  8:26AM       < end of copied text >

## 2019-02-25 NOTE — PROGRESS NOTE ADULT - SUBJECTIVE AND OBJECTIVE BOX
infectious diseases progress note:  KOAT SCHAFER is a 84yFemale patient    HYPERKALEMIA UTI URINARY RETENTION LEUKOCYTOSIS GEETA    Clostridium difficile colitis  Leukocytosis, unspecified type  VUR (vesicoureteric reflux)  Urinary tract infection without hematuria, site unspecified  Urinary tract infection associated with indwelling urethral catheter, subsequent encounter  CHF (congestive heart failure)  COPD (chronic obstructive pulmonary disease)  GEETA (acute kidney injury)  UTI (urinary tract infection)  Urinary retention  Hyperkalemia      ROS:  CONSTITUTIONAL:  Negative fever or chills, feels well, good appetite  EYES:  Negative  blurry vision or double vision  CARDIOVASCULAR:  Negative for chest pain or palpitations  RESPIRATORY:  Negative for cough, wheezing, or SOB   GASTROINTESTINAL:  Negative for nausea, vomiting, diarrhea, constipation, or abdominal pain  GENITOURINARY:  Negative frequency, urgency or dysuria  NEUROLOGIC:  No headache, confusion, dizziness, lightheadedness    Allergies    hair dyes (Hives)  sulfa drugs (Headache)  Zoloft (Headache)    Intolerances        ANTIBIOTICS/RELEVANT:  antimicrobials  metroNIDAZOLE  IVPB 500 milliGRAM(s) IV Intermittent every 8 hours  vancomycin    Solution 125 milliGRAM(s) Oral every 6 hours    immunologic:  epoetin spencer Injectable 99861 Unit(s) SubCutaneous every 7 days    OTHER:  ALBUTerol/ipratropium for Nebulization 3 milliLiter(s) Nebulizer every 6 hours  amLODIPine   Tablet 10 milliGRAM(s) Oral daily  aspirin  chewable 81 milliGRAM(s) Oral daily  atorvastatin 20 milliGRAM(s) Oral at bedtime  bisacodyl Suppository 10 milliGRAM(s) Rectal every 24 hours PRN  buDESOnide 160 MICROgram(s)/formoterol 4.5 MICROgram(s) Inhaler 2 Puff(s) Inhalation two times a day  busPIRone 5 milliGRAM(s) Oral three times a day  chlorhexidine 4% Liquid 1 Application(s) Topical <User Schedule>  cloNIDine 0.1 milliGRAM(s) Oral every 8 hours PRN  docusate sodium 200 milliGRAM(s) Oral two times a day  DULoxetine 20 milliGRAM(s) Oral daily  HYDROcodone 10 mG/acetaminophen 325 mG 1 Tablet(s) Oral every 4 hours PRN  hydrocortisone 2.5% Rectal Cream 1 Application(s) Rectal daily  iohexol 300 mG (iodine)/mL Oral Solution 30 milliLiter(s) Oral once  metoprolol succinate ER 50 milliGRAM(s) Oral daily  ondansetron Injectable 4 milliGRAM(s) IV Push every 8 hours PRN  pantoprazole  Injectable 40 milliGRAM(s) IV Push every 12 hours  senna 2 Tablet(s) Oral daily  simethicone 80 milliGRAM(s) Chew every 8 hours PRN  sodium chloride 3%  Inhalation 3 milliLiter(s) Inhalation every 4 hours PRN      Objective:  T(F): 96.3 (02-25-19 @ 06:13), Max: 96.8 (02-24-19 @ 14:20)  HR: 78 (02-25-19 @ 06:13) (76 - 86)  BP: 120/66 (02-25-19 @ 06:13) (120/66 - 141/62)  RR: 16 (02-25-19 @ 06:13) (14 - 16)  SpO2: --    PHYSICAL EXAM  Eyes:ANDRÉS, EOMI  Ear/Nose/Throat: no oral lesion, no sinus tenderness on percussion	  Neck:no JVD, no lymphadenopathy, supple  Respiratory: CTA dalia  Cardiovascular: S1S2 RRR, no murmurs  Gastrointestinal:soft, (+) BS, no HSM  Extremities:no e/e/c    02-25    141  |  106  |  49<H>  ----------------------------<  116<H>  4.9   |  26  |  0.9                                9.6    24.89 )-----------( 177      ( 25 Feb 2019 06:48 )             29.4           Culture - Bronchial (collected 22 Feb 2019 14:29)  Source: .Broncial bronchaol wash  Gram Stain (23 Feb 2019 07:29):    No polymorphonuclear cells seen per low power field    Rare Squamous epithelial cells seen per low power field    Rare Gram Positive Cocci in Pairs and Chains seen per oil power field  Final Report (24 Feb 2019 18:55):    Normal Respiratory Alyssa present    Culture - Sputum (collected 21 Feb 2019 21:17)  Source: .Sputum Sputum cup  Gram Stain (22 Feb 2019 15:58):    Few polymorphonuclear leukocytes per low power field    Few Squamous epithelial cells per low power field    Numerous Gram Negative Rods per oil power field    Few Yeast like cells per oil power field  Final Report (24 Feb 2019 11:33):    Numerous Stenotrophomonas maltophilia    Normal Respiratory Alyssa present  Organism: Stenotrophomonas maltophilia (24 Feb 2019 11:33)  Organism: Stenotrophomonas maltophilia (24 Feb 2019 11:33)      -  Ceftazidime: S <=1      -  Levofloxacin: S <=2      -  Trimethoprim/Sulfamethoxazole: S <=2/38      Method Type: ANUM    Culture - Sputum (collected 19 Feb 2019 20:55)  Source: .Sputum Sputum conical  Gram Stain (20 Feb 2019 13:10):    Rare polymorphonuclear leukocytes per low power field    Rare Squamous epithelial cells per low power field    Rare Yeast like cells per oil power field    Numerous Gram Negative Rods per oil power field  Final Report (22 Feb 2019 10:02):    Numerous Stenotrophomonas maltophilia    Normal Respiratory Alyssa present  Organism: Stenotrophomonas maltophilia (22 Feb 2019 10:02)  Organism: Stenotrophomonas maltophilia (22 Feb 2019 10:02)      -  Ceftazidime: S <=1      -  Levofloxacin: S <=2      -  Trimethoprim/Sulfamethoxazole: S <=2/38      Method Type: ANUM

## 2019-02-25 NOTE — PROGRESS NOTE ADULT - SUBJECTIVE AND OBJECTIVE BOX
NEPHROLOGY FOLLOW UP NOTE    pt seen and examined  spoke with daughter in depth  weekend events noted: ct abd done with iv dye, anemic s/p prbc tx  pt still weak  no sob, cp, fever      PAST MEDICAL & SURGICAL HISTORY:  CHF (congestive heart failure)  Breast tumor: removal benign bl breasts  Anal cancer  COPD (chronic obstructive pulmonary disease)  Lung disease: COPD  Hypertension  H/O breast surgery: 39 yrs. ago, bilateral  History of back surgery: 10 yrs. ago stimulator implant 2001, 2011    Allergies:  hair dyes (Hives)  sulfa drugs (Headache)    Home Medications Reviewed    SOCIAL HISTORY:  Denies ETOH,Smoking,   FAMILY HISTORY:  No pertinent family history in first degree relatives        REVIEW OF SYSTEMS:  All other review of systems is negative unless indicated above.        PHYSICAL EXAM:  NAD  weak appearing  dry mm  no jvd  b/l bs decreased  rrr  soft, + bs  no edema    Hospital Medications:   MEDICATIONS  (STANDING):  ALBUTerol/ipratropium for Nebulization 3 milliLiter(s) Nebulizer every 6 hours  amLODIPine   Tablet 10 milliGRAM(s) Oral daily  aspirin  chewable 81 milliGRAM(s) Oral daily  atorvastatin 20 milliGRAM(s) Oral at bedtime  buDESOnide 160 MICROgram(s)/formoterol 4.5 MICROgram(s) Inhaler 2 Puff(s) Inhalation two times a day  busPIRone 5 milliGRAM(s) Oral three times a day  chlorhexidine 4% Liquid 1 Application(s) Topical <User Schedule>  docusate sodium 200 milliGRAM(s) Oral two times a day  DULoxetine 20 milliGRAM(s) Oral daily  epoetin spencer Injectable 11982 Unit(s) SubCutaneous every 7 days  hydrocortisone 2.5% Rectal Cream 1 Application(s) Rectal daily  iohexol 300 mG (iodine)/mL Oral Solution 30 milliLiter(s) Oral once  metoprolol succinate ER 50 milliGRAM(s) Oral daily  metroNIDAZOLE  IVPB 500 milliGRAM(s) IV Intermittent every 8 hours  pantoprazole  Injectable 40 milliGRAM(s) IV Push every 12 hours  senna 2 Tablet(s) Oral daily  vancomycin    Solution 125 milliGRAM(s) Oral every 6 hours        VITALS:  T(F): 96.3 (02-25-19 @ 06:13), Max: 96.8 (02-24-19 @ 14:20)  HR: 78 (02-25-19 @ 06:13)  BP: 120/66 (02-25-19 @ 06:13)  RR: 16 (02-25-19 @ 06:13)  SpO2: --  Wt(kg): --    02-23 @ 07:01  -  02-24 @ 07:00  --------------------------------------------------------  IN: 1 mL / OUT: 0 mL / NET: 1 mL    02-24 @ 07:01  -  02-25 @ 07:00  --------------------------------------------------------  IN: 100 mL / OUT: 0 mL / NET: 100 mL      Height (cm): 157.48 (02-24 @ 10:57)  Weight (kg): 63 (02-24 @ 10:57)  BMI (kg/m2): 25.4 (02-24 @ 10:57)  BSA (m2): 1.64 (02-24 @ 10:57)    LABS:  02-25    141  |  106  |  49<H>  ----------------------------<  116<H>  4.9   |  26  |  0.9    Ca    7.6<L>      25 Feb 2019 06:48                            9.6    24.89 )-----------( 177      ( 25 Feb 2019 06:48 )             29.4       Urine Studies:        RADIOLOGY & ADDITIONAL STUDIES:  ct abd noted - left renal complex 2F cyst, left pleural eff, worsening pancolitis

## 2019-02-25 NOTE — CHART NOTE - NSCHARTNOTEFT_GEN_A_CORE
AS PER PULM. FELLOW     REVIEWED CXR FROM TODAY  OOB-CHAIR  FREQUENT SUCTIONING   NPO P MN IN CASE OF BRONCH IN AM   CXR IN AM   DUONEB Q6H  CHEST PT       ALL ORDERED PLACED

## 2019-02-25 NOTE — PROGRESS NOTE ADULT - ASSESSMENT
83 yo female being followed by the GI service for C-Diff Colitis     Problem 1-C-Diff Colitis now pancolitis, diarrhea improving  Rec  -Continue Antibiotics as per ID and carry out full course this time of Vanco and Flagyl   -Monitor WBC and CBC daily   -Monitor Stool for blood, frequency, and number of bowel movements daily  -IV hydration    Problem 2-Enlarging (since October 19, 2018) exophytic indeterminate left lower renal pole cystic lesion with perceivable enhancement of wall, Bosniak   2F;   Rec  -Outpatient MRI abdomen with and without IV contrast recommended   following discharge from hospital.    Problem 3-New moderate left pleural effusion with overlying compressive atelectasis. Stable right pleural effusion.  Rec  -Care as per primary team

## 2019-02-25 NOTE — PROGRESS NOTE ADULT - SUBJECTIVE AND OBJECTIVE BOX
SUBJECTIVE:    Patient is a 84y old Female who presents with a chief complaint of Urinary Retention/Hperkalemia with EKG changes (25 Feb 2019 09:47)    Currently admitted to medicine with the primary diagnosis of Bladder cancer     Today is hospital day 20d. This morning she is resting comfortably in bed and reports no new issues or overnight events.     PAST MEDICAL & SURGICAL HISTORY  Anemia: 4 PRBC 11/2018  Oxygen dependent: O2 2L Via NC  Bladder cancer: 2018  CHF (congestive heart failure)  Breast tumor: removal benign bl breasts  Anal cancer: Chemo and radiation 1992  COPD (chronic obstructive pulmonary disease)  Lung disease: COPD  Hypertension  History of cystoscopy: 10/2018  H/O breast surgery: 39 yrs. ago, bilateral  History of back surgery: 10 yrs. ago stimulator implant 2001, 2011( cervical and Lumbar)    SOCIAL HISTORY:  Negative for smoking/alcohol/drug use.     ALLERGIES:  hair dyes (Hives)  sulfa drugs (Headache)  Zoloft (Headache)    MEDICATIONS:  STANDING MEDICATIONS  ALBUTerol/ipratropium for Nebulization 3 milliLiter(s) Nebulizer every 6 hours  amLODIPine   Tablet 10 milliGRAM(s) Oral daily  aspirin  chewable 81 milliGRAM(s) Oral daily  atorvastatin 20 milliGRAM(s) Oral at bedtime  buDESOnide 160 MICROgram(s)/formoterol 4.5 MICROgram(s) Inhaler 2 Puff(s) Inhalation two times a day  busPIRone 5 milliGRAM(s) Oral three times a day  chlorhexidine 4% Liquid 1 Application(s) Topical <User Schedule>  docusate sodium 200 milliGRAM(s) Oral two times a day  DULoxetine 20 milliGRAM(s) Oral daily  epoetin spencer Injectable 78154 Unit(s) SubCutaneous every 7 days  hydrocortisone 2.5% Rectal Cream 1 Application(s) Rectal daily  iohexol 300 mG (iodine)/mL Oral Solution 30 milliLiter(s) Oral once  metoprolol succinate ER 50 milliGRAM(s) Oral daily  metroNIDAZOLE  IVPB 500 milliGRAM(s) IV Intermittent every 8 hours  pantoprazole  Injectable 40 milliGRAM(s) IV Push every 12 hours  senna 2 Tablet(s) Oral daily  vancomycin    Solution 125 milliGRAM(s) Oral every 6 hours    PRN MEDICATIONS  bisacodyl Suppository 10 milliGRAM(s) Rectal every 24 hours PRN  cloNIDine 0.1 milliGRAM(s) Oral every 8 hours PRN  HYDROcodone 10 mG/acetaminophen 325 mG 1 Tablet(s) Oral every 4 hours PRN  ondansetron Injectable 4 milliGRAM(s) IV Push every 8 hours PRN  simethicone 80 milliGRAM(s) Chew every 8 hours PRN  sodium chloride 3%  Inhalation 3 milliLiter(s) Inhalation every 4 hours PRN    VITALS:   T(F): 96.3  HR: 78  BP: 120/66  RR: 16  SpO2: --    LABS:                        9.6    24.89 )-----------( 177      ( 25 Feb 2019 06:48 )             29.4     02-25    141  |  106  |  49<H>  ----------------------------<  116<H>  4.9   |  26  |  0.9    Ca    7.6<L>      25 Feb 2019 06:48                Culture - Bronchial (collected 22 Feb 2019 14:29)  Source: .Broncial bronchaol wash  Gram Stain (23 Feb 2019 07:29):    No polymorphonuclear cells seen per low power field    Rare Squamous epithelial cells seen per low power field    Rare Gram Positive Cocci in Pairs and Chains seen per oil power field  Final Report (24 Feb 2019 18:55):    Normal Respiratory Alyssa present          RADIOLOGY:    PHYSICAL EXAM:  GEN: No acute distress  LUNGS: Clear to auscultation bilaterally   HEART: Regular  ABD: Soft, non-tender, non-distended.  EXT: NC/NC/NE/2+PP/SARAVIA/Skin Intact.   NEURO: AAOX3    Intravenous access:   NG tube:   Rowe Catheter:

## 2019-02-26 LAB
ANION GAP SERPL CALC-SCNC: 6 MMOL/L — LOW (ref 7–14)
BUN SERPL-MCNC: 43 MG/DL — HIGH (ref 10–20)
CALCIUM SERPL-MCNC: 7.7 MG/DL — LOW (ref 8.5–10.1)
CHLORIDE SERPL-SCNC: 107 MMOL/L — SIGNIFICANT CHANGE UP (ref 98–110)
CO2 SERPL-SCNC: 28 MMOL/L — SIGNIFICANT CHANGE UP (ref 17–32)
CREAT SERPL-MCNC: 0.8 MG/DL — SIGNIFICANT CHANGE UP (ref 0.7–1.5)
GLUCOSE SERPL-MCNC: 97 MG/DL — SIGNIFICANT CHANGE UP (ref 70–99)
HCT VFR BLD CALC: 29.8 % — LOW (ref 37–47)
HGB BLD-MCNC: 9.6 G/DL — LOW (ref 12–16)
MCHC RBC-ENTMCNC: 31.5 PG — HIGH (ref 27–31)
MCHC RBC-ENTMCNC: 32.2 G/DL — SIGNIFICANT CHANGE UP (ref 32–37)
MCV RBC AUTO: 97.7 FL — SIGNIFICANT CHANGE UP (ref 81–99)
NRBC # BLD: 0 /100 WBCS — SIGNIFICANT CHANGE UP (ref 0–0)
PLATELET # BLD AUTO: 172 K/UL — SIGNIFICANT CHANGE UP (ref 130–400)
POTASSIUM SERPL-MCNC: 5.3 MMOL/L — HIGH (ref 3.5–5)
POTASSIUM SERPL-SCNC: 5.3 MMOL/L — HIGH (ref 3.5–5)
RBC # BLD: 3.05 M/UL — LOW (ref 4.2–5.4)
RBC # FLD: 17.3 % — HIGH (ref 11.5–14.5)
SODIUM SERPL-SCNC: 141 MMOL/L — SIGNIFICANT CHANGE UP (ref 135–146)
WBC # BLD: 22.7 K/UL — HIGH (ref 4.8–10.8)
WBC # FLD AUTO: 22.7 K/UL — HIGH (ref 4.8–10.8)

## 2019-02-26 RX ADMIN — ATORVASTATIN CALCIUM 20 MILLIGRAM(S): 80 TABLET, FILM COATED ORAL at 21:31

## 2019-02-26 RX ADMIN — Medication 200 MILLIGRAM(S): at 06:05

## 2019-02-26 RX ADMIN — Medication 5 MILLIGRAM(S): at 06:05

## 2019-02-26 RX ADMIN — Medication 100 MILLIGRAM(S): at 06:05

## 2019-02-26 RX ADMIN — CHLORHEXIDINE GLUCONATE 1 APPLICATION(S): 213 SOLUTION TOPICAL at 06:05

## 2019-02-26 RX ADMIN — DULOXETINE HYDROCHLORIDE 20 MILLIGRAM(S): 30 CAPSULE, DELAYED RELEASE ORAL at 12:17

## 2019-02-26 RX ADMIN — Medication 125 MILLIGRAM(S): at 23:57

## 2019-02-26 RX ADMIN — BUDESONIDE AND FORMOTEROL FUMARATE DIHYDRATE 2 PUFF(S): 160; 4.5 AEROSOL RESPIRATORY (INHALATION) at 19:31

## 2019-02-26 RX ADMIN — PANTOPRAZOLE SODIUM 40 MILLIGRAM(S): 20 TABLET, DELAYED RELEASE ORAL at 06:05

## 2019-02-26 RX ADMIN — Medication 81 MILLIGRAM(S): at 12:17

## 2019-02-26 RX ADMIN — Medication 5 MILLIGRAM(S): at 14:32

## 2019-02-26 RX ADMIN — Medication 125 MILLIGRAM(S): at 06:05

## 2019-02-26 RX ADMIN — PANTOPRAZOLE SODIUM 40 MILLIGRAM(S): 20 TABLET, DELAYED RELEASE ORAL at 17:26

## 2019-02-26 RX ADMIN — AMLODIPINE BESYLATE 10 MILLIGRAM(S): 2.5 TABLET ORAL at 06:05

## 2019-02-26 RX ADMIN — Medication 100 MILLIGRAM(S): at 21:31

## 2019-02-26 RX ADMIN — BUDESONIDE AND FORMOTEROL FUMARATE DIHYDRATE 2 PUFF(S): 160; 4.5 AEROSOL RESPIRATORY (INHALATION) at 09:46

## 2019-02-26 RX ADMIN — Medication 3 MILLILITER(S): at 14:14

## 2019-02-26 RX ADMIN — Medication 5 MILLIGRAM(S): at 21:31

## 2019-02-26 RX ADMIN — Medication 1 APPLICATION(S): at 12:18

## 2019-02-26 RX ADMIN — Medication 50 MILLIGRAM(S): at 06:05

## 2019-02-26 RX ADMIN — Medication 3 MILLILITER(S): at 07:52

## 2019-02-26 RX ADMIN — Medication 100 MILLIGRAM(S): at 14:32

## 2019-02-26 RX ADMIN — Medication 3 MILLILITER(S): at 19:29

## 2019-02-26 RX ADMIN — Medication 125 MILLIGRAM(S): at 12:17

## 2019-02-26 RX ADMIN — Medication 125 MILLIGRAM(S): at 17:26

## 2019-02-26 NOTE — PROGRESS NOTE ADULT - SUBJECTIVE AND OBJECTIVE BOX
NEPHROLOGY FOLLOW UP NOTE    pt seen and examined  spoke with daughter again this AM  no diarrhea, no melena  tolerating clears  pt still weak  no sob, cp, fever      PAST MEDICAL & SURGICAL HISTORY:  CHF (congestive heart failure)  Breast tumor: removal benign bl breasts  Anal cancer  COPD (chronic obstructive pulmonary disease)  Lung disease: COPD  Hypertension  H/O breast surgery: 39 yrs. ago, bilateral  History of back surgery: 10 yrs. ago stimulator implant 2001, 2011    Allergies:  hair dyes (Hives)  sulfa drugs (Headache)    Home Medications Reviewed    SOCIAL HISTORY:  Denies ETOH,Smoking,   FAMILY HISTORY:  No pertinent family history in first degree relatives        REVIEW OF SYSTEMS:  All other review of systems is negative unless indicated above.        PHYSICAL EXAM:  NAD  weak appearing  dry mm  no jvd  b/l bs decreased  rrr  soft, + bs  no edema    Hospital Medications:   MEDICATIONS  (STANDING):  ALBUTerol/ipratropium for Nebulization 3 milliLiter(s) Nebulizer every 6 hours  amLODIPine   Tablet 10 milliGRAM(s) Oral daily  aspirin  chewable 81 milliGRAM(s) Oral daily  atorvastatin 20 milliGRAM(s) Oral at bedtime  buDESOnide 160 MICROgram(s)/formoterol 4.5 MICROgram(s) Inhaler 2 Puff(s) Inhalation two times a day  busPIRone 5 milliGRAM(s) Oral three times a day  chlorhexidine 4% Liquid 1 Application(s) Topical <User Schedule>  docusate sodium 200 milliGRAM(s) Oral two times a day  DULoxetine 20 milliGRAM(s) Oral daily  epoetin spencer Injectable 40317 Unit(s) SubCutaneous every 7 days  hydrocortisone 2.5% Rectal Cream 1 Application(s) Rectal daily  iohexol 300 mG (iodine)/mL Oral Solution 30 milliLiter(s) Oral once  metoprolol succinate ER 50 milliGRAM(s) Oral daily  metroNIDAZOLE  IVPB 500 milliGRAM(s) IV Intermittent every 8 hours  pantoprazole  Injectable 40 milliGRAM(s) IV Push every 12 hours  senna 2 Tablet(s) Oral daily  vancomycin    Solution 125 milliGRAM(s) Oral every 6 hours        VITALS:  T(F): 96.9 (02-26-19 @ 06:04), Max: 96.9 (02-25-19 @ 22:00)  HR: 63 (02-26-19 @ 06:04)  BP: 126/63 (02-26-19 @ 06:04)  RR: 18 (02-26-19 @ 06:39)  SpO2: 96% (02-26-19 @ 06:39)  Wt(kg): --    02-24 @ 07:01  -  02-25 @ 07:00  --------------------------------------------------------  IN: 100 mL / OUT: 0 mL / NET: 100 mL      Height (cm): 157.48 (02-25 @ 11:47)  Weight (kg): 63 (02-25 @ 11:47)  BMI (kg/m2): 25.4 (02-25 @ 11:47)  BSA (m2): 1.64 (02-25 @ 11:47)    LABS:  02-26    141  |  107  |  43<H>  ----------------------------<  97  5.3<H>   |  28  |  0.8    Ca    7.7<L>      26 Feb 2019 06:26                            9.6    22.70 )-----------( 172      ( 26 Feb 2019 06:26 )             29.8       Urine Studies:        RADIOLOGY & ADDITIONAL STUDIES:

## 2019-02-26 NOTE — PROGRESS NOTE ADULT - ASSESSMENT
ASSESSMENT   - cdiff colitis without diarrhea   - leukocytosis from cdiff - worsening again   - left lung atelectasis from mucous plug   - malnutrition - acute on chronic severe protein calorie malnutrition   - debility with progressive clinical deterioration   - HTN    - bladder ca - s/p TURBT   - UTI - enterococci   - COPD   - h/o CHF   - anemia / MDS   - chronic back pain with spinal stimulator  PLAN  Continue with p.o diet  if p.o inadequate consider and NGT and feed with osmolite 1.5 at 250ml after each meal x3 feeds  check bmp/phos/mg and correct lytes ASSESSMENT   - cdiff colitis without diarrhea   - leukocytosis from cdiff - worsening again   - left lung atelectasis from mucous plug   - malnutrition - acute on chronic severe protein calorie malnutrition   - debility with progressive clinical deterioration   - HTN    - bladder ca - s/p TURBT   - UTI - enterococci   - COPD   - h/o CHF   - anemia / MDS   - chronic back pain with spinal stimulator  PLAN  Continue with p.o diet  if p.o inadequate consider and NGT and feed with osmolite 1.5 at 240ml after each meal x3 feeds  check bmp/phos/mg and correct lytes

## 2019-02-26 NOTE — PROGRESS NOTE ADULT - SUBJECTIVE AND OBJECTIVE BOX
Patient is a 84y old  Female who presents with a chief complaint of Urinary Retention/Hperkalemia with EKG changes (26 Feb 2019 12:20)       Pt is seen and examined  pt is awake and out of bed to chair  pt seems comfortable and denies any complaints at this time          ROS:  Negative   MEDICATIONS  (STANDING):  ALBUTerol/ipratropium for Nebulization 3 milliLiter(s) Nebulizer every 6 hours  amLODIPine   Tablet 10 milliGRAM(s) Oral daily  aspirin  chewable 81 milliGRAM(s) Oral daily  atorvastatin 20 milliGRAM(s) Oral at bedtime  buDESOnide 160 MICROgram(s)/formoterol 4.5 MICROgram(s) Inhaler 2 Puff(s) Inhalation two times a day  busPIRone 5 milliGRAM(s) Oral three times a day  chlorhexidine 4% Liquid 1 Application(s) Topical <User Schedule>  docusate sodium 200 milliGRAM(s) Oral two times a day  DULoxetine 20 milliGRAM(s) Oral daily  epoetin spencer Injectable 92977 Unit(s) SubCutaneous every 7 days  hydrocortisone 2.5% Rectal Cream 1 Application(s) Rectal daily  iohexol 300 mG (iodine)/mL Oral Solution 30 milliLiter(s) Oral once  metoprolol succinate ER 50 milliGRAM(s) Oral daily  metroNIDAZOLE  IVPB 500 milliGRAM(s) IV Intermittent every 8 hours  pantoprazole  Injectable 40 milliGRAM(s) IV Push every 12 hours  senna 2 Tablet(s) Oral daily  vancomycin    Solution 125 milliGRAM(s) Oral every 6 hours    MEDICATIONS  (PRN):  acetylcysteine 10%  Inhalation 3 milliLiter(s) Inhalation two times a day PRN sob  bisacodyl Suppository 10 milliGRAM(s) Rectal every 24 hours PRN Constipation  cloNIDine 0.1 milliGRAM(s) Oral every 8 hours PRN for sbp > 180  HYDROcodone 10 mG/acetaminophen 325 mG 1 Tablet(s) Oral every 4 hours PRN Moderate Pain (4 - 6)  ondansetron Injectable 4 milliGRAM(s) IV Push every 8 hours PRN Nausea and/or Vomiting  simethicone 80 milliGRAM(s) Chew every 8 hours PRN Gas  sodium chloride 3%  Inhalation 3 milliLiter(s) Inhalation every 4 hours PRN ..      Allergies    hair dyes (Hives)  sulfa drugs (Headache)  Zoloft (Headache)    Intolerances        Vital Signs Last 24 Hrs  T(C): 35.6 (26 Feb 2019 14:47), Max: 36.1 (25 Feb 2019 22:00)  T(F): 96.1 (26 Feb 2019 14:47), Max: 96.9 (25 Feb 2019 22:00)  HR: 69 (26 Feb 2019 14:47) (63 - 95)  BP: 133/58 (26 Feb 2019 14:47) (125/55 - 133/58)  BP(mean): --  RR: 20 (26 Feb 2019 14:14) (16 - 20)  SpO2: 98% (26 Feb 2019 14:14) (86% - 98%)    PHYSICAL EXAM  General: adult in NAD  HEENT: clear oropharynx, anicteric sclera, pink conjunctiva  Neck: supple  CV: normal S1/S2 with no murmur rubs or gallops  Lungs: positive air movement b/l ant lungs,clear to auscultation, no wheezes, no rales  Abdomen: soft non-tender non-distended, no hepatosplenomegaly  Ext: no clubbing cyanosis or edema  Skin: no rashes and no petechiae  Neuro: alert and oriented X 4, no focal deficits  LABS:                          9.6    22.70 )-----------( 172      ( 26 Feb 2019 06:26 )             29.8         Mean Cell Volume : 97.7 fL  Mean Cell Hemoglobin : 31.5 pg  Mean Cell Hemoglobin Concentration : 32.2 g/dL  Auto Neutrophil # : x  Auto Lymphocyte # : x  Auto Monocyte # : x  Auto Eosinophil # : x  Auto Basophil # : x  Auto Neutrophil % : x  Auto Lymphocyte % : x  Auto Monocyte % : x  Auto Eosinophil % : x  Auto Basophil % : x    Serial CBC's  02-26 @ 06:26  Hct-29.8 / Hgb-9.6 / Plat-172 / RBC-3.05 / WBC-22.70          Serial CBC's  02-25 @ 06:48  Hct-29.4 / Hgb-9.6 / Plat-177 / RBC-3.03 / WBC-24.89          Serial CBC's  02-24 @ 06:50  Hct-29.4 / Hgb-9.7 / Plat-196 / RBC-3.07 / WBC-23.80          Serial CBC's  02-23 @ 15:04  Hct-22.4 / Hgb-7.1 / Plat-236 / RBC-2.21 / WBC-31.27          Serial CBC's  02-23 @ 07:25  Hct-24.6 / Hgb-7.9 / Plat-257 / RBC-2.43 / WBC-36.78            02-26    141  |  107  |  43<H>  ----------------------------<  97  5.3<H>   |  28  |  0.8    Ca    7.7<L>      26 Feb 2019 06:26            Hematocrit: 29.8 % (02-26-19 @ 06:26)  Platelet Count - Automated: 172 K/uL (02-26-19 @ 06:26)  Hemoglobin: 9.6 g/dL (02-26-19 @ 06:26)  WBC Count: 22.70 K/uL (02-26-19 @ 06:26)  Hematocrit: 29.4 % (02-25-19 @ 06:48)  Platelet Count - Automated: 177 K/uL (02-25-19 @ 06:48)  Hemoglobin: 9.6 g/dL (02-25-19 @ 06:48)  WBC Count: 24.89 K/uL (02-25-19 @ 06:48)  Hemoglobin: 9.7 g/dL (02-24-19 @ 06:50)  WBC Count: 23.80 K/uL (02-24-19 @ 06:50)  Hematocrit: 29.4 % (02-24-19 @ 06:50)  Platelet Count - Automated: 196 K/uL (02-24-19 @ 06:50)  WBC Count: 31.27 K/uL (02-23-19 @ 15:04)  Hematocrit: 22.4 % (02-23-19 @ 15:04)  Platelet Count - Automated: 236 K/uL (02-23-19 @ 15:04)  Hemoglobin: 7.1 g/dL (02-23-19 @ 15:04)  Hemoglobin: 7.9 g/dL (02-23-19 @ 07:25)  WBC Count: 36.78 K/uL (02-23-19 @ 07:25)  Hematocrit: 24.6 % (02-23-19 @ 07:25)  Platelet Count - Automated: 257 K/uL (02-23-19 @ 07:25)  Platelet Count - Automated: 278 K/uL (02-22-19 @ 07:48)  Hemoglobin: 9.4 g/dL (02-22-19 @ 07:48)  WBC Count: 26.45 K/uL (02-22-19 @ 07:48)  Hematocrit: 29.4 % (02-22-19 @ 07:48)  Hematocrit: 31.0 % (02-20-19 @ 18:58)  Platelet Count - Automated: 302 K/uL (02-20-19 @ 18:58)  Hemoglobin: 9.9 g/dL (02-20-19 @ 18:58)  WBC Count: 25.13 K/uL (02-20-19 @ 18:58)  Hemoglobin: 10.3 g/dL (02-19-19 @ 13:10)  WBC Count: 19.66 K/uL (02-19-19 @ 13:10)  Hematocrit: 32.6 % (02-19-19 @ 13:10)  Platelet Count - Automated: 293 K/uL (02-19-19 @ 13:10)  WBC Count: 20.00 K/uL (02-17-19 @ 08:42)  Hematocrit: 31.3 % (02-17-19 @ 08:42)  Platelet Count - Automated: 230 K/uL (02-17-19 @ 08:42)  Hemoglobin: 9.9 g/dL (02-17-19 @ 08:42)                BLOOD SMEAR INTERPRETATION:       RADIOLOGY & ADDITIONAL STUDIES:

## 2019-02-26 NOTE — PROGRESS NOTE ADULT - SUBJECTIVE AND OBJECTIVE BOX
84yFemale  Being followed for C-Diff Colitis    Interval history: Patient notes diarrhea has stopped. Patient had a small bowel movement last night that was loose but described as a smear. Patient denies nausea, vomiting, diarrhea, constipation, hematemesis, melena, blood in stool, abdominal pain.       PMHX/PSHX:  PAST MEDICAL & SURGICAL HISTORY:  Anemia: 4 PRBC 11/2018  Oxygen dependent: O2 2L Via NC  Bladder cancer: 2018  CHF (congestive heart failure)  Breast tumor: removal benign bl breasts  Anal cancer: Chemo and radiation 1992  COPD (chronic obstructive pulmonary disease)  Lung disease: COPD  Hypertension  History of cystoscopy: 10/2018  H/O breast surgery: 39 yrs. ago, bilateral  History of back surgery: 10 yrs. ago stimulator implant 2001, 2011( cervical and Lumbar)        Social History: +pack and a half cigarette smoking for 50 years, quit 5 years ago smoking. No alcohol. No illegal drug use.          MEDICATIONS:  MEDICATIONS  (STANDING):  ALBUTerol/ipratropium for Nebulization 3 milliLiter(s) Nebulizer every 6 hours  amLODIPine   Tablet 10 milliGRAM(s) Oral daily  aspirin  chewable 81 milliGRAM(s) Oral daily  atorvastatin 20 milliGRAM(s) Oral at bedtime  buDESOnide 160 MICROgram(s)/formoterol 4.5 MICROgram(s) Inhaler 2 Puff(s) Inhalation two times a day  busPIRone 5 milliGRAM(s) Oral three times a day  chlorhexidine 4% Liquid 1 Application(s) Topical <User Schedule>  docusate sodium 200 milliGRAM(s) Oral two times a day  DULoxetine 20 milliGRAM(s) Oral daily  epoetin spencer Injectable 03856 Unit(s) SubCutaneous every 7 days  hydrocortisone 2.5% Rectal Cream 1 Application(s) Rectal daily  iohexol 300 mG (iodine)/mL Oral Solution 30 milliLiter(s) Oral once  metoprolol succinate ER 50 milliGRAM(s) Oral daily  metroNIDAZOLE  IVPB 500 milliGRAM(s) IV Intermittent every 8 hours  pantoprazole  Injectable 40 milliGRAM(s) IV Push every 12 hours  senna 2 Tablet(s) Oral daily  vancomycin    Solution 125 milliGRAM(s) Oral every 6 hours    MEDICATIONS  (PRN):  acetylcysteine 10%  Inhalation 3 milliLiter(s) Inhalation two times a day PRN sob  bisacodyl Suppository 10 milliGRAM(s) Rectal every 24 hours PRN Constipation  cloNIDine 0.1 milliGRAM(s) Oral every 8 hours PRN for sbp > 180  HYDROcodone 10 mG/acetaminophen 325 mG 1 Tablet(s) Oral every 4 hours PRN Moderate Pain (4 - 6)  ondansetron Injectable 4 milliGRAM(s) IV Push every 8 hours PRN Nausea and/or Vomiting  simethicone 80 milliGRAM(s) Chew every 8 hours PRN Gas  sodium chloride 3%  Inhalation 3 milliLiter(s) Inhalation every 4 hours PRN ..      Allergies:    hair dyes (Hives)  sulfa drugs (Headache)  Zoloft (Headache)    Intolerances          REVIEW OF SYSTEMS:  General:  No, fevers, or chills.  Eyes:  No reported pain or visual changes  ENT:  No sore throat or runny nose.  NECK: No stiffness   CV:  No chest pain or palpitations.  Resp:  No shortness of breath, cough  GI:  No abdominal pain, nausea, vomiting, dysphagia, diarrhea or constipation. No rectal bleeding, melena, or hematemesis  Neuro:  No tingling, numbness         VITAL SIGNS:   T(F): 96.9 (02-26-19 @ 06:04), Max: 96.9 (02-25-19 @ 22:00)  HR: 63 (02-26-19 @ 06:04) (63 - 95)  BP: 126/63 (02-26-19 @ 06:04) (124/57 - 126/63)  RR: 18 (02-26-19 @ 06:39) (16 - 18)  SpO2: 96% (02-26-19 @ 06:39) (86% - 97%)    PHYSICAL EXAM:  GENERAL: AAOx3, no acute distress.  HEAD:  Atraumatic, Normocephalic  EYES: conjunctiva and sclera clear  NECK: Supple, no JVD or thyromegaly  CHEST/LUNG: Clear to auscultation bilaterally; No wheeze, rhonchi, or rales  HEART: Regular rate and rhythm; normal S1, S2, No murmurs.  ABDOMEN: Soft, nontender, nondistended; Bowel sounds present, no abdominal bruit, masses, or hepatosplenomegaly  NEUROLOGY: No asterixis or tremor.   SKIN: Intact, no jaundice            LABS:                        9.6    22.70 )-----------( 172      ( 26 Feb 2019 06:26 )             29.8     02-26    141  |  107  |  43<H>  ----------------------------<  97  5.3<H>   |  28  |  0.8    Ca    7.7<L>      26 Feb 2019 06:26            IMAGING:  < from: CT Abdomen and Pelvis w/ Oral Cont and w/ IV Cont (02.24.19 @ 18:18) >  EXAM:  CT ABDOMEN AND PELVIS OC IC            PROCEDURE DATE:  02/24/2019            INTERPRETATION:  CLINICAL STATEMENT: Colitis. Gastrointestinal bleeding.      TECHNIQUE: Contiguous axial CT images were obtained from the lower chest   to the pubic symphysis following administration of 100cc Optiray 320   intravenous contrast.  Oral contrast was administered.  Reformatted   images in the coronal and sagittal planes were acquired.    Comparison made with CT abdomen and pelvis February 11, 2019, October 19, 2018.    FINDINGS:    LOWER CHEST: New moderate left pleural effusion with overlying   compressive atelectasis. Stable right pleural effusion.    HEPATOBILIARY: Gallbladder with sludge/stones. Subcentimeter right   hepatic lobe hypodensity, likely cyst or hemangioma..    SPLEEN: Unremarkable.    PANCREAS: Unremarkable.    ADRENAL GLANDS: Stable left adrenal gland thickening.    KIDNEYS: Unchanged right hydroureteronephrosis. Enlarging exophytic   indeterminate left lower renal polecystic lesion with perceivable   enhancement of wall (series 601, image 80). Additional subcentimeter   renal hypodensities, too small to fully characterize    ABDOMINOPELVIC NODES: Evaluation for lymph nodes limited by patient   condition..    PELVIC ORGANS: Urinary bladder distended, with posterior diverticulum.   Stable gynecological structures.    PERITONEUM/MESENTERY/BOWEL: Worsening, now pancolitis; no evidence of   abscess. Increased moderate ascites.    BONES/SOFT TISSUES: Stable osseous structures..    OTHER: Unchanged spinal stimulator. Vascular calcifications.      IMPRESSION:   Since February 11, 2019:    1. Enlarging (since October 19, 2018) exophytic indeterminate left lower   renal pole cystic lesion with perceivable enhancement of wall, Bosniak   2F; outpatient MRI abdomen with and without IV contrast recommended   following discharge from hospital.    2. Worsening, now pancolitis; no evidence of abscess.     3. Increased moderate ascites.    4. Unchanged right hydroureteronephrosis.    5. New moderate left pleural effusion with overlying compressive   atelectasis. Stable right pleural effusion.                  HARI CROWLEY M.D., ATTENDING RADIOLOGIST  This document has been electronically signed. Feb 25 2019  8:26AM       < end of copied text > 84yFemale  Being followed for C-Diff Colitis    Interval history: Patient notes diarrhea has stopped. Patient had a small bowel movement last night that was loose but described as a smear. Patient denies nausea, vomiting, diarrhea, constipation, hematemesis, melena, blood in stool, abdominal pain.       PMHX/PSHX:  PAST MEDICAL & SURGICAL HISTORY:  Anemia: 4 PRBC 11/2018  Oxygen dependent: O2 2L Via NC  Bladder cancer: 2018  CHF (congestive heart failure)  Breast tumor: removal benign bl breasts  Anal cancer: Chemo and radiation 1992  COPD (chronic obstructive pulmonary disease)  Lung disease: COPD  Hypertension  History of cystoscopy: 10/2018  H/O breast surgery: 39 yrs. ago, bilateral  History of back surgery: 10 yrs. ago stimulator implant 2001, 2011( cervical and Lumbar)        Social History: +pack and a half cigarette smoking for 50 years, quit 5 years ago smoking. No alcohol. No illegal drug use.          MEDICATIONS:  MEDICATIONS  (STANDING):  ALBUTerol/ipratropium for Nebulization 3 milliLiter(s) Nebulizer every 6 hours  amLODIPine   Tablet 10 milliGRAM(s) Oral daily  aspirin  chewable 81 milliGRAM(s) Oral daily  atorvastatin 20 milliGRAM(s) Oral at bedtime  buDESOnide 160 MICROgram(s)/formoterol 4.5 MICROgram(s) Inhaler 2 Puff(s) Inhalation two times a day  busPIRone 5 milliGRAM(s) Oral three times a day  chlorhexidine 4% Liquid 1 Application(s) Topical <User Schedule>  docusate sodium 200 milliGRAM(s) Oral two times a day  DULoxetine 20 milliGRAM(s) Oral daily  epoetin spencer Injectable 05186 Unit(s) SubCutaneous every 7 days  hydrocortisone 2.5% Rectal Cream 1 Application(s) Rectal daily  iohexol 300 mG (iodine)/mL Oral Solution 30 milliLiter(s) Oral once  metoprolol succinate ER 50 milliGRAM(s) Oral daily  metroNIDAZOLE  IVPB 500 milliGRAM(s) IV Intermittent every 8 hours  pantoprazole  Injectable 40 milliGRAM(s) IV Push every 12 hours  senna 2 Tablet(s) Oral daily  vancomycin    Solution 125 milliGRAM(s) Oral every 6 hours    MEDICATIONS  (PRN):  acetylcysteine 10%  Inhalation 3 milliLiter(s) Inhalation two times a day PRN sob  bisacodyl Suppository 10 milliGRAM(s) Rectal every 24 hours PRN Constipation  cloNIDine 0.1 milliGRAM(s) Oral every 8 hours PRN for sbp > 180  HYDROcodone 10 mG/acetaminophen 325 mG 1 Tablet(s) Oral every 4 hours PRN Moderate Pain (4 - 6)  ondansetron Injectable 4 milliGRAM(s) IV Push every 8 hours PRN Nausea and/or Vomiting  simethicone 80 milliGRAM(s) Chew every 8 hours PRN Gas  sodium chloride 3%  Inhalation 3 milliLiter(s) Inhalation every 4 hours PRN ..      Allergies  hair dyes (Hives)  sulfa drugs (Headache)  Zoloft (Headache)        REVIEW OF SYSTEMS:  General:  No, fevers, or chills.  Eyes:  No reported pain or visual changes  ENT:  No sore throat or runny nose.  NECK: No stiffness   CV:  No chest pain or palpitations.  Resp:  No shortness of breath, cough  GI:  No abdominal pain, nausea, vomiting, dysphagia, diarrhea or constipation. No rectal bleeding, melena, or hematemesis  Neuro:  No tingling, numbness         VITAL SIGNS:   T(F): 96.9 (02-26-19 @ 06:04), Max: 96.9 (02-25-19 @ 22:00)  HR: 63 (02-26-19 @ 06:04) (63 - 95)  BP: 126/63 (02-26-19 @ 06:04) (124/57 - 126/63)  RR: 18 (02-26-19 @ 06:39) (16 - 18)  SpO2: 96% (02-26-19 @ 06:39) (86% - 97%)    PHYSICAL EXAM:  GENERAL: AAOx3, no acute distress.  HEAD:  Atraumatic, Normocephalic  EYES: conjunctiva and sclera clear  NECK: Supple, no JVD or thyromegaly  CHEST/LUNG: Clear to auscultation bilaterally; No wheeze, rhonchi, or rales  HEART: Regular rate and rhythm; normal S1, S2, No murmurs.  ABDOMEN: Soft, nontender, nondistended; Bowel sounds present, no abdominal bruit, masses, or hepatosplenomegaly  NEUROLOGY: No asterixis or tremor.   SKIN: Intact, no jaundice            LABS:                        9.6    22.70 )-----------( 172      ( 26 Feb 2019 06:26 )             29.8     02-26    141  |  107  |  43<H>  ----------------------------<  97  5.3<H>   |  28  |  0.8    Ca    7.7<L>      26 Feb 2019 06:26            IMAGING:  < from: CT Abdomen and Pelvis w/ Oral Cont and w/ IV Cont (02.24.19 @ 18:18) >  EXAM:  CT ABDOMEN AND PELVIS OC IC            PROCEDURE DATE:  02/24/2019            INTERPRETATION:  CLINICAL STATEMENT: Colitis. Gastrointestinal bleeding.      TECHNIQUE: Contiguous axial CT images were obtained from the lower chest   to the pubic symphysis following administration of 100cc Optiray 320   intravenous contrast.  Oral contrast was administered.  Reformatted   images in the coronal and sagittal planes were acquired.    Comparison made with CT abdomen and pelvis February 11, 2019, October 19, 2018.    FINDINGS:    LOWER CHEST: New moderate left pleural effusion with overlying   compressive atelectasis. Stable right pleural effusion.    HEPATOBILIARY: Gallbladder with sludge/stones. Subcentimeter right   hepatic lobe hypodensity, likely cyst or hemangioma..    SPLEEN: Unremarkable.    PANCREAS: Unremarkable.    ADRENAL GLANDS: Stable left adrenal gland thickening.    KIDNEYS: Unchanged right hydroureteronephrosis. Enlarging exophytic   indeterminate left lower renal polecystic lesion with perceivable   enhancement of wall (series 601, image 80). Additional subcentimeter   renal hypodensities, too small to fully characterize    ABDOMINOPELVIC NODES: Evaluation for lymph nodes limited by patient   condition..    PELVIC ORGANS: Urinary bladder distended, with posterior diverticulum.   Stable gynecological structures.    PERITONEUM/MESENTERY/BOWEL: Worsening, now pancolitis; no evidence of   abscess. Increased moderate ascites.    BONES/SOFT TISSUES: Stable osseous structures..    OTHER: Unchanged spinal stimulator. Vascular calcifications.      IMPRESSION:   Since February 11, 2019:    1. Enlarging (since October 19, 2018) exophytic indeterminate left lower   renal pole cystic lesion with perceivable enhancement of wall, Bosniak   2F; outpatient MRI abdomen with and without IV contrast recommended   following discharge from hospital.    2. Worsening, now pancolitis; no evidence of abscess.     3. Increased moderate ascites.    4. Unchanged right hydroureteronephrosis.    5. New moderate left pleural effusion with overlying compressive   atelectasis. Stable right pleural effusion.                  HARI CROWLEY M.D., ATTENDING RADIOLOGIST  This document has been electronically signed. Feb 25 2019  8:26AM       < end of copied text >

## 2019-02-26 NOTE — PROGRESS NOTE ADULT - ASSESSMENT
Impression  >Left lung atelectasis 2/2 mucous plug   >CDiff colitis   >Bladder ca     Recommendations    3% NS nebulizations q 6   OOB to chair as much she can   incentive alida   Chest PT   HS Bipap   repeat CXR in AM  If all measures fail will consider a bronch tomorrow if family agree   DVT px   Family wants least aggressive measures.

## 2019-02-26 NOTE — PROGRESS NOTE ADULT - SUBJECTIVE AND OBJECTIVE BOX
SUBJECTIVE:    Patient is a 84y old Female who presents with a chief complaint of Urinary Retention/Hperkalemia with EKG changes (26 Feb 2019 10:33)    Currently admitted to medicine with the primary diagnosis of Bladder cancer     Today is hospital day 21d. This morning she is resting comfortably in bed and reports no new issues or overnight events.     PAST MEDICAL & SURGICAL HISTORY  Anemia: 4 PRBC 11/2018  Oxygen dependent: O2 2L Via NC  Bladder cancer: 2018  CHF (congestive heart failure)  Breast tumor: removal benign bl breasts  Anal cancer: Chemo and radiation 1992  COPD (chronic obstructive pulmonary disease)  Lung disease: COPD  Hypertension  History of cystoscopy: 10/2018  H/O breast surgery: 39 yrs. ago, bilateral  History of back surgery: 10 yrs. ago stimulator implant 2001, 2011( cervical and Lumbar)    SOCIAL HISTORY:  Negative for smoking/alcohol/drug use.     ALLERGIES:  hair dyes (Hives)  sulfa drugs (Headache)  Zoloft (Headache)    MEDICATIONS:  STANDING MEDICATIONS  ALBUTerol/ipratropium for Nebulization 3 milliLiter(s) Nebulizer every 6 hours  amLODIPine   Tablet 10 milliGRAM(s) Oral daily  aspirin  chewable 81 milliGRAM(s) Oral daily  atorvastatin 20 milliGRAM(s) Oral at bedtime  buDESOnide 160 MICROgram(s)/formoterol 4.5 MICROgram(s) Inhaler 2 Puff(s) Inhalation two times a day  busPIRone 5 milliGRAM(s) Oral three times a day  chlorhexidine 4% Liquid 1 Application(s) Topical <User Schedule>  docusate sodium 200 milliGRAM(s) Oral two times a day  DULoxetine 20 milliGRAM(s) Oral daily  epoetin spencer Injectable 05453 Unit(s) SubCutaneous every 7 days  hydrocortisone 2.5% Rectal Cream 1 Application(s) Rectal daily  iohexol 300 mG (iodine)/mL Oral Solution 30 milliLiter(s) Oral once  metoprolol succinate ER 50 milliGRAM(s) Oral daily  metroNIDAZOLE  IVPB 500 milliGRAM(s) IV Intermittent every 8 hours  pantoprazole  Injectable 40 milliGRAM(s) IV Push every 12 hours  senna 2 Tablet(s) Oral daily  vancomycin    Solution 125 milliGRAM(s) Oral every 6 hours    PRN MEDICATIONS  acetylcysteine 10%  Inhalation 3 milliLiter(s) Inhalation two times a day PRN  bisacodyl Suppository 10 milliGRAM(s) Rectal every 24 hours PRN  cloNIDine 0.1 milliGRAM(s) Oral every 8 hours PRN  HYDROcodone 10 mG/acetaminophen 325 mG 1 Tablet(s) Oral every 4 hours PRN  ondansetron Injectable 4 milliGRAM(s) IV Push every 8 hours PRN  simethicone 80 milliGRAM(s) Chew every 8 hours PRN  sodium chloride 3%  Inhalation 3 milliLiter(s) Inhalation every 4 hours PRN    VITALS:   T(F): 96.9  HR: 63  BP: 126/63  RR: 18  SpO2: 96%    LABS:                        9.6    22.70 )-----------( 172      ( 26 Feb 2019 06:26 )             29.8     02-26    141  |  107  |  43<H>  ----------------------------<  97  5.3<H>   |  28  |  0.8    Ca    7.7<L>      26 Feb 2019 06:26                    RADIOLOGY:    PHYSICAL EXAM:  GEN: No acute distress  LUNGS: Clear to auscultation bilaterally   HEART: Regular  ABD: Soft, non-tender, non-distended.  EXT: NC/NC/NE/2+PP/SARAVIA/Skin Intact.   NEURO: AAOX3    Intravenous access:   NG tube:   Rowe Catheter:

## 2019-02-26 NOTE — PROGRESS NOTE ADULT - SUBJECTIVE AND OBJECTIVE BOX
Patient is a 84y old  Female who presents with a chief complaint of Urinary Retention/Hperkalemia with EKG changes (26 Feb 2019 09:41)      Over Night Events:  Patient seen and examined. comfortable case discussed with family her cxr today improved a little want to wait on bronch       ROS:  See HPI    PHYSICAL EXAM    ICU Vital Signs Last 24 Hrs  T(C): 36.1 (26 Feb 2019 06:04), Max: 36.1 (25 Feb 2019 22:00)  T(F): 96.9 (26 Feb 2019 06:04), Max: 96.9 (25 Feb 2019 22:00)  HR: 63 (26 Feb 2019 06:04) (63 - 95)  BP: 126/63 (26 Feb 2019 06:04) (124/57 - 126/63)  BP(mean): --  ABP: --  ABP(mean): --  RR: 18 (26 Feb 2019 06:39) (16 - 18)  SpO2: 96% (26 Feb 2019 06:39) (86% - 97%)      General: awake   HEENT:     jose enrique           Lymph Nodes: NO cervical LN   Lungs: Bilateral BS  Cardiovascular: Regular   Abdomen: Soft, Positive BS  Extremities: No clubbing   Skin:   Neurological: weakness b/l   Musculoskeletal: move all ext     I&O's Detail      LABS:                          9.6    22.70 )-----------( 172      ( 26 Feb 2019 06:26 )             29.8         26 Feb 2019 06:26    141    |  107    |  43     ----------------------------<  97     5.3     |  28     |  0.8      Ca    7.7        26 Feb 2019 06:26                                                                                                                                                                                                                                       MEDICATIONS  (STANDING):  ALBUTerol/ipratropium for Nebulization 3 milliLiter(s) Nebulizer every 6 hours  amLODIPine   Tablet 10 milliGRAM(s) Oral daily  aspirin  chewable 81 milliGRAM(s) Oral daily  atorvastatin 20 milliGRAM(s) Oral at bedtime  buDESOnide 160 MICROgram(s)/formoterol 4.5 MICROgram(s) Inhaler 2 Puff(s) Inhalation two times a day  busPIRone 5 milliGRAM(s) Oral three times a day  chlorhexidine 4% Liquid 1 Application(s) Topical <User Schedule>  docusate sodium 200 milliGRAM(s) Oral two times a day  DULoxetine 20 milliGRAM(s) Oral daily  epoetin spencer Injectable 43559 Unit(s) SubCutaneous every 7 days  hydrocortisone 2.5% Rectal Cream 1 Application(s) Rectal daily  iohexol 300 mG (iodine)/mL Oral Solution 30 milliLiter(s) Oral once  metoprolol succinate ER 50 milliGRAM(s) Oral daily  metroNIDAZOLE  IVPB 500 milliGRAM(s) IV Intermittent every 8 hours  pantoprazole  Injectable 40 milliGRAM(s) IV Push every 12 hours  senna 2 Tablet(s) Oral daily  vancomycin    Solution 125 milliGRAM(s) Oral every 6 hours    MEDICATIONS  (PRN):  acetylcysteine 10%  Inhalation 3 milliLiter(s) Inhalation two times a day PRN sob  bisacodyl Suppository 10 milliGRAM(s) Rectal every 24 hours PRN Constipation  cloNIDine 0.1 milliGRAM(s) Oral every 8 hours PRN for sbp > 180  HYDROcodone 10 mG/acetaminophen 325 mG 1 Tablet(s) Oral every 4 hours PRN Moderate Pain (4 - 6)  ondansetron Injectable 4 milliGRAM(s) IV Push every 8 hours PRN Nausea and/or Vomiting  simethicone 80 milliGRAM(s) Chew every 8 hours PRN Gas  sodium chloride 3%  Inhalation 3 milliLiter(s) Inhalation every 4 hours PRN ..          Xrays:  TLC:  OG:  ET tube:                                                                                    better aeration  on left    ECHO:

## 2019-02-26 NOTE — PROGRESS NOTE ADULT - SUBJECTIVE AND OBJECTIVE BOX
Patient is a 84y old  Female who presents with a chief complaint of Urinary Retention/Hperkalemia with EKG changes (26 Feb 2019 10:21)  pt found to be C diff +, with high leukocytosis but has not had diarrhea.  ICU Vital Signs Last 24 Hrs  T(C): 36.1 (26 Feb 2019 06:04), Max: 36.1 (25 Feb 2019 22:00)  T(F): 96.9 (26 Feb 2019 06:04), Max: 96.9 (25 Feb 2019 22:00)  HR: 63 (26 Feb 2019 06:04) (63 - 95)  BP: 126/63 (26 Feb 2019 06:04) (124/57 - 126/63)  RR: 18 (26 Feb 2019 06:39) (16 - 18)  SpO2: 96% (26 Feb 2019 06:39) (86% - 97%)    Drug Dosing Weight  Height (cm): 157.48 (25 Feb 2019 11:47)  Weight (kg): 63 (25 Feb 2019 11:47)  BMI (kg/m2): 25.4 (25 Feb 2019 11:47)  BSA (m2): 1.64 (25 Feb 2019 11:47)    I&O's Detail       PHYSICAL EXAM:  Constitutional: awake , alert  nasal cannula in place  Gastrointestinal: soft n/d, n/t  Extremities no edema  MEDICATIONS  (STANDING):  ALBUTerol/ipratropium for Nebulization 3 milliLiter(s) Nebulizer every 6 hours  amLODIPine   Tablet 10 milliGRAM(s) Oral daily  aspirin  chewable 81 milliGRAM(s) Oral daily  atorvastatin 20 milliGRAM(s) Oral at bedtime  buDESOnide 160 MICROgram(s)/formoterol 4.5 MICROgram(s) Inhaler 2 Puff(s) Inhalation two times a day  busPIRone 5 milliGRAM(s) Oral three times a day  chlorhexidine 4% Liquid 1 Application(s) Topical <User Schedule>  docusate sodium 200 milliGRAM(s) Oral two times a day  DULoxetine 20 milliGRAM(s) Oral daily  epoetin spencer Injectable 60781 Unit(s) SubCutaneous every 7 days  hydrocortisone 2.5% Rectal Cream 1 Application(s) Rectal daily  iohexol 300 mG (iodine)/mL Oral Solution 30 milliLiter(s) Oral once  metoprolol succinate ER 50 milliGRAM(s) Oral daily  metroNIDAZOLE  IVPB 500 milliGRAM(s) IV Intermittent every 8 hours  pantoprazole  Injectable 40 milliGRAM(s) IV Push every 12 hours  senna 2 Tablet(s) Oral daily  vancomycin    Solution 125 milliGRAM(s) Oral every 6 hours      Diet, Regular:   No Concentrated Potassium  Low Sodium  Supplement Feeding Modality:  Oral  Nepro Cans or Servings Per Day:  1       Frequency:  Daily (02-26-19 @ 09:46)      LABS  02-26    141  |  107  |  43<H>  ----------------------------<  97  5.3<H>   |  28  |  0.8    Ca    7.7<L>      26 Feb 2019 06:26                            9.6    22.70 )-----------( 172      ( 26 Feb 2019 06:26 )             29.8

## 2019-02-26 NOTE — PROGRESS NOTE ADULT - ASSESSMENT
85 yo female ex smoker with a history of COPD on home O2,Anemia sec to low grade MDS by recent bmbx,on weekly procrit   Admitted for electrolyte imbalance, GEETA,. UTI with   leukocytosis likely sec to c diff  Complaining of abdominal pains and diarrhea sec to  C. diff colitis  CT shows colitis and right hydroureteronephrosis  Small effusions on CT right > left with lung nodules.  left lung atelectasis from mucous plug  another C diff toxin postive... on vanco   renal cystic lesion     PLAN:  on po vanco for c diff.  leukocytosis -monitor trend..   .. s/p mucus plug removed.. observe closely   cont  procrit 40,000 unit qweekly if hb <10... Guaiac postive and decreased hb to 7.. transfusion >>>no w stable around 9 ,  monitor counts.  GI on board  pulmonary on board,s/p  bronchoscopy with removal of mucus plug  f/u with urology for recent TURBT path result  will f/u as outpt for further Mn of bladder ca.  outpt MRI to assess renal cystic lesion  Outpatient follow up of lung nodules, repeat CT chest in 3 months  cont other supportive care.

## 2019-02-26 NOTE — PROGRESS NOTE ADULT - ASSESSMENT
85 yo female being followed by the GI service for C-Diff Colitis     Problem 1-C-Diff Colitis now pancolitis, diarrhea improving  Rec  -Continue Antibiotics as per ID and carry out full course this time of Vanco and Flagyl   -Monitor WBC and CBC daily   -Monitor Stool for blood, frequency, and number of bowel movements daily  -IV hydration  -Probiotics     Problem 2-Enlarging (since October 19, 2018) exophytic indeterminate left lower renal pole cystic lesion with perceivable enhancement of wall, Bosniak   2F;   Rec  -Outpatient MRI abdomen with and without IV contrast recommended   following discharge from hospital.    Problem 3-New moderate left pleural effusion with overlying compressive atelectasis. Stable right pleural effusion.  Rec  -Care as per primary team 85 yo female being followed by the GI service for C-Diff Colitis     Problem 1-C-Diff Colitis pancolitis, responding to vanco- diarrhea improving  Rec  -Continue Antibiotics as per ID and carry out full course this time of Vanco and Flagyl   -Monitor WBC and CBC daily   -Monitor Stool for blood, frequency, and number of bowel movements daily  -PO hydration  -Probiotics  Advance diet as tolerated.     Problem 2-Enlarging (since October 19, 2018) exophytic indeterminate left lower renal pole cystic lesion with perceivable enhancement of wall, Bosniak   2F;   Rec  -Outpatient MRI abdomen with and without IV contrast recommended   following discharge from hospital.    Problem 3-New moderate left pleural effusion with overlying compressive atelectasis. Stable right pleural effusion.  Rec  -Care as per primary team

## 2019-02-26 NOTE — PROGRESS NOTE ADULT - ASSESSMENT
GEETA   - resolved, but with persistent prerenal azotemia  hyperkalemia  cdiff colitis - worsening pancolitis on ct abd  leukocytosis   left lung atelectasis from mucous plug  left moderate pleural effusion  malnutrition  debility  left 2F complex renal cyst  HTN   bladder ca  s/p TURBT  right hydro   UTI - enterococci  COPD  CHF  anemia / MDS  chronic back pain with spinal stimulator    Plan:      cont PO vanco and IV flagyl  f/u H/H / protonix / f/u GI  bronchoscopy per pulm  procrit per hem  encourage po intake / advance diet after procedure  cont norvasc and lopressor  off aldactone - do not restart  PT / rehab / OOB   f/u/ check path / oupt f/u of complex renal cyst  cont her own vicodin   f/u lytes and renal function  d/w daughter

## 2019-02-27 RX ADMIN — ATORVASTATIN CALCIUM 20 MILLIGRAM(S): 80 TABLET, FILM COATED ORAL at 21:58

## 2019-02-27 RX ADMIN — Medication 1 APPLICATION(S): at 11:17

## 2019-02-27 RX ADMIN — Medication 125 MILLIGRAM(S): at 23:07

## 2019-02-27 RX ADMIN — Medication 3 MILLILITER(S): at 13:41

## 2019-02-27 RX ADMIN — Medication 125 MILLIGRAM(S): at 11:16

## 2019-02-27 RX ADMIN — ONDANSETRON 4 MILLIGRAM(S): 8 TABLET, FILM COATED ORAL at 03:52

## 2019-02-27 RX ADMIN — AMLODIPINE BESYLATE 10 MILLIGRAM(S): 2.5 TABLET ORAL at 05:57

## 2019-02-27 RX ADMIN — Medication 3 MILLILITER(S): at 01:46

## 2019-02-27 RX ADMIN — CHLORHEXIDINE GLUCONATE 1 APPLICATION(S): 213 SOLUTION TOPICAL at 09:49

## 2019-02-27 RX ADMIN — Medication 5 MILLIGRAM(S): at 15:21

## 2019-02-27 RX ADMIN — Medication 3 MILLILITER(S): at 08:05

## 2019-02-27 RX ADMIN — Medication 50 MILLIGRAM(S): at 05:57

## 2019-02-27 RX ADMIN — Medication 100 MILLIGRAM(S): at 21:57

## 2019-02-27 RX ADMIN — DULOXETINE HYDROCHLORIDE 20 MILLIGRAM(S): 30 CAPSULE, DELAYED RELEASE ORAL at 11:15

## 2019-02-27 RX ADMIN — BUDESONIDE AND FORMOTEROL FUMARATE DIHYDRATE 2 PUFF(S): 160; 4.5 AEROSOL RESPIRATORY (INHALATION) at 08:10

## 2019-02-27 RX ADMIN — PANTOPRAZOLE SODIUM 40 MILLIGRAM(S): 20 TABLET, DELAYED RELEASE ORAL at 05:57

## 2019-02-27 RX ADMIN — Medication 100 MILLIGRAM(S): at 15:21

## 2019-02-27 RX ADMIN — Medication 5 MILLIGRAM(S): at 05:57

## 2019-02-27 RX ADMIN — Medication 5 MILLIGRAM(S): at 21:58

## 2019-02-27 RX ADMIN — Medication 125 MILLIGRAM(S): at 05:57

## 2019-02-27 RX ADMIN — Medication 81 MILLIGRAM(S): at 11:15

## 2019-02-27 RX ADMIN — PANTOPRAZOLE SODIUM 40 MILLIGRAM(S): 20 TABLET, DELAYED RELEASE ORAL at 20:02

## 2019-02-27 RX ADMIN — Medication 125 MILLIGRAM(S): at 20:03

## 2019-02-27 RX ADMIN — Medication 100 MILLIGRAM(S): at 05:58

## 2019-02-27 RX ADMIN — BUDESONIDE AND FORMOTEROL FUMARATE DIHYDRATE 2 PUFF(S): 160; 4.5 AEROSOL RESPIRATORY (INHALATION) at 20:44

## 2019-02-27 NOTE — PROGRESS NOTE ADULT - ASSESSMENT
Impression  >Left lung atelectasis 2/2 mucous plug   >CDiff colitis   >Bladder ca     Recommendations    continue 3% NS nebulizations q 6   OOB to chair as much she can   incentive alida   Chest PT   HS Bipap   repeat CXR in AM  If all measures fail will consider a bronch tomorrow if family agree   DVT px   Family wants least aggressive measures.

## 2019-02-27 NOTE — PROGRESS NOTE ADULT - ASSESSMENT
GEETA   - resolved, but with persistent prerenal azotemia  hyperkalemia   - mild  cdiff colitis - worsening pancolitis on ct abd  leukocytosis   left lung atelectasis from mucous plug  left moderate pleural effusion  malnutrition  debility  left 2F complex renal cyst  HTN   bladder ca  s/p TURBT  right hydro   UTI - enterococci  COPD  CHF  anemia / MDS  chronic back pain with spinal stimulator    Plan:    low k diet  cont PO vanco and IV flagyl  f/u H/H / protonix / f/u GI  f/u pulm for possible repeat bronch  procrit per hem  encourage po intake / advance diet after procedure  cont norvasc and lopressor  off aldactone - do not restart  PT / rehab / OOB   f/u/ check path / oupt f/u of complex renal cyst  cont her own vicodin   f/u lytes and renal function

## 2019-02-27 NOTE — PROGRESS NOTE ADULT - ASSESSMENT
85 yo female ex smoker with a history of COPD on home O2,Anemia sec to low grade MDS by recent bmbx,on weekly procrit   Admitted for electrolyte imbalance, GEETA,. UTI with   leukocytosis likely sec to c diff  Complaining of abdominal pains and diarrhea sec to  C. diff colitis  CT shows colitis and right hydroureteronephrosis  Small effusions on CT right > left with lung nodules.  left lung atelectasis from mucous plug  another C diff toxin postive... on vanco   renal cystic lesion     PLAN:  on po vanco for c diff.  leukocytosis -monitor trend..   .. s/p mucus plug removed.. observe closely   cont  procrit 40,000 unit qweekly if hb <10... Guaiac postive and decreased hb to 7.. transfusion >>>no w stable around 9 ,  monitor counts.  repeat CBC  due for procrit on 3/1 if hb stays <10  GI on board  pulmonary on board,s/p  bronchoscopy with removal of mucus plug  f/u with urology for recent TURBT path result  will f/u as outpt for further Mn of bladder ca.  outpt MRI to assess renal cystic lesion  Outpatient follow up of lung nodules, repeat CT chest in 3 months  cont other supportive care.

## 2019-02-27 NOTE — PROGRESS NOTE ADULT - ASSESSMENT
# Complicated UTI  # Moderate Right sided hydronephrosis  # C.difficile colitis  # Low grade  MDS - on procrit    would recommend:    1. Add IV Flagyl and c/w oral vancomycin  2. Discontinue All other systemic antibiotics   3. Monitor WBC count , stay elevated  4. CT abd /pelvis to rule out pseudomembranous colitis    d/w patient, and nursing staff    will follow the patient with you # Complicated UTI  # Moderate Right sided hydronephrosis  # C.difficile colitis  # Low grade  MDS - on procrit  # Pneumonia-  on CXR 2/27/19      would recommend:  1. Add Levaquin to cover Stenotrophomonas since patient is allergic to Bactrim ( eventhough Bactrim would be ideal in the setting of C.difficile infection)  2. Continue IV Flagyl and oral vancomycin  3. Monitor WBC count , stay elevated  4. OOB to chair     d/w patient,  family  at the bed side and nursing staff    will follow the patient with you

## 2019-02-27 NOTE — PROGRESS NOTE ADULT - SUBJECTIVE AND OBJECTIVE BOX
Patient is seen and examined at the bed side, is afebrile. She has two episode of bloody bowel movements today. The WBC count stay elevated.      REVIEW OF SYSTEMS: All other review systems are negative        ALLERGIES: SULFA drugs         Vital Signs Last 24 Hrs  T(C): 35.9 (2019 13:41), Max: 36.2 (2019 06:24)  T(F): 96.6 (2019 13:41), Max: 97.1 (2019 06:24)  HR: 79 (2019 13:41) (79 - 94)  BP: 130/60 (2019 13:41) (130/58 - 132/58)  BP(mean): --  RR: 16 (2019 13:41) (16 - 16)  SpO2: --        PHYSICAL EXAM:  GENERAL: Not in distress  CHEST/LUNG: Air  entry bilaterally  HEART: s1 and s2 present  ABDOMEN: mild distended  : Rowe catheter in placed  EXTREMITIES: B/L pedal  edema  CNS: Awake and alert        LABS:                                   9.6    22.70 )-----------( 172      ( 2019 06:26 )             29.8                7.1    31.27 )-----------( 236      ( 2019 15:04 )             22.4                           9.7    38.51 )-----------( 219      ( 2019 14:50 )             29.4                9.4    9.15  )-----------( 160      ( 2019 07:12 )             28.5             141  |  107  |  43<H>  ----------------------------<  97  5.3<H>   |  28  |  0.8    Ca    7.7<L>      2019 06:26      02-23    140  |  107  |  65<HH>  ----------------------------<  128<H>  5.2<H>   |  30  |  0.8    Ca    7.7<L>      2019 15:04    TPro  4.3<L>  /  Alb  2.5<L>  /  TBili  0.2  /  DBili  x   /  AST  16  /  ALT  15  /  AlkPhos  71      125<L>  |  89<L>  |  46<H>  ----------------------------<  97  4.9   |  25  |  1.0    Ca    8.0<L>      2019 14:50  Mg     1.7         TPro  5.1<L>  /  Alb  3.0<L>  /  TBili  0.5  /  DBili  x   /  AST  13  /  ALT  15  /  AlkPhos  111          Urinalysis Basic - ( 2019 04:25 )    Color: Orange / Appearance: Slightly Cloudy / S.025 / pH: x  Gluc: x / Ketone: Trace  / Bili: Negative / Urobili: 1.0 mg/dL   Blood: x / Protein: >=300 mg/dL / Nitrite: Positive   Leuk Esterase: Negative / RBC: >50 /HPF / WBC 10-25 /HPF   Sq Epi: x / Non Sq Epi: Few /HPF / Bacteria: Moderate      MEDICATIONS  (STANDING):  ALBUTerol/ipratropium for Nebulization 3 milliLiter(s) Nebulizer every 6 hours  amLODIPine   Tablet 10 milliGRAM(s) Oral daily  aspirin  chewable 81 milliGRAM(s) Oral daily  atorvastatin 20 milliGRAM(s) Oral at bedtime  buDESOnide 160 MICROgram(s)/formoterol 4.5 MICROgram(s) Inhaler 2 Puff(s) Inhalation two times a day  busPIRone 5 milliGRAM(s) Oral three times a day  chlorhexidine 4% Liquid 1 Application(s) Topical <User Schedule>  docusate sodium 200 milliGRAM(s) Oral two times a day  DULoxetine 20 milliGRAM(s) Oral daily  epoetin spencer Injectable 98900 Unit(s) SubCutaneous every 7 days  hydrocortisone 2.5% Rectal Cream 1 Application(s) Rectal daily  iohexol 300 mG (iodine)/mL Oral Solution 30 milliLiter(s) Oral once  metoprolol succinate ER 50 milliGRAM(s) Oral daily  metroNIDAZOLE  IVPB 500 milliGRAM(s) IV Intermittent every 8 hours  pantoprazole  Injectable 40 milliGRAM(s) IV Push every 12 hours  senna 2 Tablet(s) Oral daily  vancomycin    Solution 125 milliGRAM(s) Oral every 6 hours    MEDICATIONS  (PRN):  acetylcysteine 10%  Inhalation 3 milliLiter(s) Inhalation two times a day PRN sob  bisacodyl Suppository 10 milliGRAM(s) Rectal every 24 hours PRN Constipation  cloNIDine 0.1 milliGRAM(s) Oral every 8 hours PRN for sbp > 180  ondansetron Injectable 4 milliGRAM(s) IV Push every 8 hours PRN Nausea and/or Vomiting  simethicone 80 milliGRAM(s) Chew every 8 hours PRN Gas  sodium chloride 3%  Inhalation 3 milliLiter(s) Inhalation every 4 hours PRN ..      RADIOLOGY & ADDITIONAL TEST    < from: CT Abdomen and Pelvis w/ Oral Cont (19 @ 23:46) >    New diffuse colonic wall thickening involving the cecum, right colon and   transverse colon at the hepatic flexure. There is additional involvement   of the terminal ileum. Findings are consistent with colitis and enteritis   of unclear etiology, likely infectious or inflammatory.    Moderate right hydroureteronephrosis to the level of the bladder without   a definitive obstructing cause, as seen on recent ultrasound.     New small amount of abdominopelvic ascites.    New small to moderateright pleural effusion and trace left pleural   effusion.    Resolution of some pulmonary nodules at the left lung base with new   pulmonary nodules measuring up to 12 mm. Follow-up noncontrast chest CT   is recommended in 3 months to demonstrate resolution.    < end of copied text >    < from: Xray Chest 1 View- PORTABLE-Routine (02.10.19 @ 17:59) >    Layering density in the right lower lung with a blunted costophrenic   angle, which may represent a pleural effusion. Cannot exclude right lower   lobe underlying consolidation. Recommend follow-up.     < end of copied text >      < from: US Retroperitoneal B-Scan Limited (19 @ 09:40) >  Moderate right-sided hydronephrosis, unchanged.      < end of copied text >      < from: Xray Chest 1 View- PORTABLE-Urgent (19 @ 07:00) >    No radiographic evidence of acute cardiopulmonary disease.      < end of copied text >        MICROBIOLOGY DATA:    Clostridium difficile Toxin by PCR (19 @ 16:20)    Clostridium difficile Toxin by PCR: RESULT INTERPRETATION:    Detected - Clostridium difficile toxin B detected by amplified DNA PCR    C.difficile PCR test results should be interpreted only with  consideration of the patient's clinical situation and history.  This test  will detect the presence of toxigenic C. difficile.  However it cannot be  used as the sole criteria  for the diagnosis of antibiotic associated diarrhea, antibiotic  associated colitis, or pseudomembranous colitis.  Colonization with  C.difficile may exceed 20% in hosptial patients, the majority of whom are  without Toxigenic  Clostridium Difficile disease.  Testing is generally not recommened in  children below the age of 1 year, as up to half of healthy infants are  asymptomatically colonized with C.difficile.  In addition, C.difficile  PCR testing  cannot be used as a "test of cure" as dead organism nucleic acids will  persist and be detected after treatment.  Successful treatment of  C.difficile disease is determined by resolution of clinical symptoms. Method: CDPCR  TOXIGENIC CLOST.DIFFICILE POSITIVE;  027-NAP1-B1 PRESUMPTIVE POSITIVE.      C Diff by PCR Result: Positive        Urine Microscopic-Add On (NC) (02.10.19 @ 19:08)    Bacteria: Moderate    Epithelial Cells: Few /HPF    Red Blood Cell - Urine: 26-50 /HPF    White Blood Cell - Urine: 3-5 /HPF        Culture - Blood (19 @ 13:16)    Specimen Source: .Blood Blood    Culture Results:   No growth to date.        Culture - Urine (19 @ 04:25)    Specimen Source: .Urine Clean Catch (Midstream)    Culture Results:   No growth Patient is seen and examined at the bed side, is afebrile.   The repeat CXR shows  from today, 19, shows Left apical opacity and sputum culture from 19 grew Stenotrophomonas.  The WBC count stay elevated.      REVIEW OF SYSTEMS: All other review systems are negative        ALLERGIES: SULFA drugs         Vital Signs Last 24 Hrs  T(C): 35.9 (2019 13:41), Max: 36.2 (2019 06:24)  T(F): 96.6 (2019 13:41), Max: 97.1 (2019 06:24)  HR: 79 (2019 13:41) (79 - 94)  BP: 130/60 (2019 13:41) (130/58 - 132/58)  BP(mean): --  RR: 16 (2019 13:41) (16 - 16)  SpO2: --        PHYSICAL EXAM:  GENERAL: Not in distress  CHEST/LUNG: Air  entry bilaterally  HEART: s1 and s2 present  ABDOMEN: mild distended  : Rowe catheter in placed  EXTREMITIES: B/L pedal  edema  CNS: Awake and alert        LABS:                                   9.6    22.70 )-----------( 172      ( 2019 06:26 )             29.8                7.1    31.27 )-----------( 236      ( 2019 15:04 )             22.4                           9.7    38.51 )-----------( 219      ( 2019 14:50 )             29.4                9.4    9.15  )-----------( 160      ( 2019 07:12 )             28.5             141  |  107  |  43<H>  ----------------------------<  97  5.3<H>   |  28  |  0.8    Ca    7.7<L>      2019 06:26      02-23    140  |  107  |  65<HH>  ----------------------------<  128<H>  5.2<H>   |  30  |  0.8    Ca    7.7<L>      2019 15:04    TPro  4.3<L>  /  Alb  2.5<L>  /  TBili  0.2  /  DBili  x   /  AST  16  /  ALT  15  /  AlkPhos  71      125<L>  |  89<L>  |  46<H>  ----------------------------<  97  4.9   |  25  |  1.0    Ca    8.0<L>      2019 14:50  Mg     1.7         TPro  5.1<L>  /  Alb  3.0<L>  /  TBili  0.5  /  DBili  x   /  AST  13  /  ALT  15  /  AlkPhos  111          Urinalysis Basic - ( 2019 04:25 )    Color: Orange / Appearance: Slightly Cloudy / S.025 / pH: x  Gluc: x / Ketone: Trace  / Bili: Negative / Urobili: 1.0 mg/dL   Blood: x / Protein: >=300 mg/dL / Nitrite: Positive   Leuk Esterase: Negative / RBC: >50 /HPF / WBC 10-25 /HPF   Sq Epi: x / Non Sq Epi: Few /HPF / Bacteria: Moderate      MEDICATIONS  (STANDING):  ALBUTerol/ipratropium for Nebulization 3 milliLiter(s) Nebulizer every 6 hours  amLODIPine   Tablet 10 milliGRAM(s) Oral daily  aspirin  chewable 81 milliGRAM(s) Oral daily  atorvastatin 20 milliGRAM(s) Oral at bedtime  buDESOnide 160 MICROgram(s)/formoterol 4.5 MICROgram(s) Inhaler 2 Puff(s) Inhalation two times a day  busPIRone 5 milliGRAM(s) Oral three times a day  chlorhexidine 4% Liquid 1 Application(s) Topical <User Schedule>  docusate sodium 200 milliGRAM(s) Oral two times a day  DULoxetine 20 milliGRAM(s) Oral daily  epoetin spencer Injectable 42417 Unit(s) SubCutaneous every 7 days  hydrocortisone 2.5% Rectal Cream 1 Application(s) Rectal daily  iohexol 300 mG (iodine)/mL Oral Solution 30 milliLiter(s) Oral once  metoprolol succinate ER 50 milliGRAM(s) Oral daily  metroNIDAZOLE  IVPB 500 milliGRAM(s) IV Intermittent every 8 hours  pantoprazole  Injectable 40 milliGRAM(s) IV Push every 12 hours  senna 2 Tablet(s) Oral daily  vancomycin    Solution 125 milliGRAM(s) Oral every 6 hours    MEDICATIONS  (PRN):  acetylcysteine 10%  Inhalation 3 milliLiter(s) Inhalation two times a day PRN sob  bisacodyl Suppository 10 milliGRAM(s) Rectal every 24 hours PRN Constipation  cloNIDine 0.1 milliGRAM(s) Oral every 8 hours PRN for sbp > 180  ondansetron Injectable 4 milliGRAM(s) IV Push every 8 hours PRN Nausea and/or Vomiting  simethicone 80 milliGRAM(s) Chew every 8 hours PRN Gas  sodium chloride 3%  Inhalation 3 milliLiter(s) Inhalation every 4 hours PRN ..      RADIOLOGY & ADDITIONAL TEST    < from: Xray Chest 1 View- PORTABLE-Urgent (19 @ 09:19) >  Left apical opacity. Left opacity/left pleural effusion, decreased. Right   basilar opacity/pleural effusion, stable. .        < end of copied text >      < from: CT Abdomen and Pelvis w/ Oral Cont (19 @ 23:46) >    New diffuse colonic wall thickening involving the cecum, right colon and   transverse colon at the hepatic flexure. There is additional involvement   of the terminal ileum. Findings are consistent with colitis and enteritis   of unclear etiology, likely infectious or inflammatory.    Moderate right hydroureteronephrosis to the level of the bladder without   a definitive obstructing cause, as seen on recent ultrasound.     New small amount of abdominopelvic ascites.    New small to moderateright pleural effusion and trace left pleural   effusion.    Resolution of some pulmonary nodules at the left lung base with new   pulmonary nodules measuring up to 12 mm. Follow-up noncontrast chest CT   is recommended in 3 months to demonstrate resolution.    < end of copied text >    < from: Xray Chest 1 View- PORTABLE-Routine (02.10.19 @ 17:59) >    Layering density in the right lower lung with a blunted costophrenic   angle, which may represent a pleural effusion. Cannot exclude right lower   lobe underlying consolidation. Recommend follow-up.     < end of copied text >      < from: US Retroperitoneal B-Scan Limited (19 @ 09:40) >  Moderate right-sided hydronephrosis, unchanged.      < end of copied text >      < from: Xray Chest 1 View- PORTABLE-Urgent (19 @ 07:00) >    No radiographic evidence of acute cardiopulmonary disease.      < end of copied text >        MICROBIOLOGY DATA:    Clostridium difficile Toxin by PCR (19 @ 16:20)    Clostridium difficile Toxin by PCR: RESULT INTERPRETATION:    Detected - Clostridium difficile toxin B detected by amplified DNA PCR    C.difficile PCR test results should be interpreted only with  consideration of the patient's clinical situation and history.  This test  will detect the presence of toxigenic C. difficile.  However it cannot be  used as the sole criteria  for the diagnosis of antibiotic associated diarrhea, antibiotic  associated colitis, or pseudomembranous colitis.  Colonization with  C.difficile may exceed 20% in hosptial patients, the majority of whom are  without Toxigenic  Clostridium Difficile disease.  Testing is generally not recommened in  children below the age of 1 year, as up to half of healthy infants are  asymptomatically colonized with C.difficile.  In addition, C.difficile  PCR testing  cannot be used as a "test of cure" as dead organism nucleic acids will  persist and be detected after treatment.  Successful treatment of  C.difficile disease is determined by resolution of clinical symptoms. Method: CDPCR  TOXIGENIC CLOST.DIFFICILE POSITIVE;  027-NAP1-B1 PRESUMPTIVE POSITIVE.      C Diff by PCR Result: Positive        Urine Microscopic-Add On (NC) (02.10.19 @ 19:08)    Bacteria: Moderate    Epithelial Cells: Few /HPF    Red Blood Cell - Urine: 26-50 /HPF    White Blood Cell - Urine: 3-5 /HPF        Culture - Blood (19 @ 13:16)    Specimen Source: .Blood Blood    Culture Results:   No growth to date.        Culture - Urine (19 @ 04:25)    Specimen Source: .Urine Clean Catch (Midstream)    Culture Results:   No growth

## 2019-02-27 NOTE — PROGRESS NOTE ADULT - SUBJECTIVE AND OBJECTIVE BOX
SUBJECTIVE:    Patient is a 84y old Female who presents with a chief complaint of Urinary Retention/Hperkalemia with EKG changes (26 Feb 2019 15:49)    Currently admitted to medicine with the primary diagnosis of Bladder cancer     Today is hospital day 22d. This morning she is resting comfortably in bed and reports no new issues or overnight events.     PAST MEDICAL & SURGICAL HISTORY  Anemia: 4 PRBC 11/2018  Oxygen dependent: O2 2L Via NC  Bladder cancer: 2018  CHF (congestive heart failure)  Breast tumor: removal benign bl breasts  Anal cancer: Chemo and radiation 1992  COPD (chronic obstructive pulmonary disease)  Lung disease: COPD  Hypertension  History of cystoscopy: 10/2018  H/O breast surgery: 39 yrs. ago, bilateral  History of back surgery: 10 yrs. ago stimulator implant 2001, 2011( cervical and Lumbar)    SOCIAL HISTORY:  Negative for smoking/alcohol/drug use.     ALLERGIES:  hair dyes (Hives)  sulfa drugs (Headache)  Zoloft (Headache)    MEDICATIONS:  STANDING MEDICATIONS  ALBUTerol/ipratropium for Nebulization 3 milliLiter(s) Nebulizer every 6 hours  amLODIPine   Tablet 10 milliGRAM(s) Oral daily  aspirin  chewable 81 milliGRAM(s) Oral daily  atorvastatin 20 milliGRAM(s) Oral at bedtime  buDESOnide 160 MICROgram(s)/formoterol 4.5 MICROgram(s) Inhaler 2 Puff(s) Inhalation two times a day  busPIRone 5 milliGRAM(s) Oral three times a day  chlorhexidine 4% Liquid 1 Application(s) Topical <User Schedule>  docusate sodium 200 milliGRAM(s) Oral two times a day  DULoxetine 20 milliGRAM(s) Oral daily  epoetin spencer Injectable 74177 Unit(s) SubCutaneous every 7 days  hydrocortisone 2.5% Rectal Cream 1 Application(s) Rectal daily  iohexol 300 mG (iodine)/mL Oral Solution 30 milliLiter(s) Oral once  metoprolol succinate ER 50 milliGRAM(s) Oral daily  metroNIDAZOLE  IVPB 500 milliGRAM(s) IV Intermittent every 8 hours  pantoprazole  Injectable 40 milliGRAM(s) IV Push every 12 hours  senna 2 Tablet(s) Oral daily  vancomycin    Solution 125 milliGRAM(s) Oral every 6 hours    PRN MEDICATIONS  acetylcysteine 10%  Inhalation 3 milliLiter(s) Inhalation two times a day PRN  bisacodyl Suppository 10 milliGRAM(s) Rectal every 24 hours PRN  cloNIDine 0.1 milliGRAM(s) Oral every 8 hours PRN  HYDROcodone 10 mG/acetaminophen 325 mG 1 Tablet(s) Oral every 4 hours PRN  ondansetron Injectable 4 milliGRAM(s) IV Push every 8 hours PRN  simethicone 80 milliGRAM(s) Chew every 8 hours PRN  sodium chloride 3%  Inhalation 3 milliLiter(s) Inhalation every 4 hours PRN    VITALS:   T(F): 97.1  HR: 94  BP: 132/58  RR: 16  SpO2: 98%    LABS:                        9.6    22.70 )-----------( 172      ( 26 Feb 2019 06:26 )             29.8     02-26    141  |  107  |  43<H>  ----------------------------<  97  5.3<H>   |  28  |  0.8    Ca    7.7<L>      26 Feb 2019 06:26                    RADIOLOGY:    PHYSICAL EXAM:  GEN: No acute distress  LUNGS: Clear to auscultation bilaterally   HEART: Regular  ABD: Soft, non-tender, non-distended.  EXT: NC/NC/NE/2+PP/SARAVIA/Skin Intact.   NEURO: AAOX3    Intravenous access:   NG tube:   Rowe Catheter:

## 2019-02-27 NOTE — PROGRESS NOTE ADULT - SUBJECTIVE AND OBJECTIVE BOX
Patient is a 84y old  Female who presents with a chief complaint of Urinary Retention/Hperkalemia with EKG changes (2019 08:14)      INTERVAL HISTORY/overnight events feel good not on distress   cxr open left lung improved         Vital Signs Last 24 Hrs  T(C): 36.2 (2019 06:24), Max: 36.2 (2019 06:24)  T(F): 97.1 (2019 06:24), Max: 97.1 (2019 06:24)  HR: 94 (2019 06:24) (69 - 94)  BP: 132/58 (2019 06:24) (130/58 - 133/58)  BP(mean): --  RR: 16 (2019 06:24) (16 - 20)  SpO2: 98% (2019 14:14) (98% - 98%)  Daily Height in cm: 157.48 (2019 08:14)    Daily Weight in k.5 (2019 06:24)  I&O's Summary      Physical Examination:    General: No acute distress.  Alert, oriented, interactive, nonfocal    HEENT: Pupils equal, reactive to light.  Symmetric.    PULM: Clear to auscultation bilaterally, no significant sputum production    CVS: Regular rate and rhythm, no murmurs, rubs, or gallops    ABD: Soft, nondistended, nontender, normoactive bowel sounds, no masses    EXT: No edema, nontender    SKIN: Warm and well perfused, no rashes noted.    Neurology: no focal deficit     Musculoskeletal : Move all extremety         Lab Results:                        9.6    22.70 )-----------( 172      ( 2019 06:26 )             29.8     2019 06:26    141    |  107    |  43     ----------------------------<  97     5.3     |  28     |  0.8      Ca    7.7        2019 06:26                Microbiology      Medication:  MEDICATIONS  (STANDING):  ALBUTerol/ipratropium for Nebulization 3 milliLiter(s) Nebulizer every 6 hours  amLODIPine   Tablet 10 milliGRAM(s) Oral daily  aspirin  chewable 81 milliGRAM(s) Oral daily  atorvastatin 20 milliGRAM(s) Oral at bedtime  buDESOnide 160 MICROgram(s)/formoterol 4.5 MICROgram(s) Inhaler 2 Puff(s) Inhalation two times a day  busPIRone 5 milliGRAM(s) Oral three times a day  chlorhexidine 4% Liquid 1 Application(s) Topical <User Schedule>  docusate sodium 200 milliGRAM(s) Oral two times a day  DULoxetine 20 milliGRAM(s) Oral daily  epoetin spencer Injectable 43582 Unit(s) SubCutaneous every 7 days  hydrocortisone 2.5% Rectal Cream 1 Application(s) Rectal daily  iohexol 300 mG (iodine)/mL Oral Solution 30 milliLiter(s) Oral once  metoprolol succinate ER 50 milliGRAM(s) Oral daily  metroNIDAZOLE  IVPB 500 milliGRAM(s) IV Intermittent every 8 hours  pantoprazole  Injectable 40 milliGRAM(s) IV Push every 12 hours  senna 2 Tablet(s) Oral daily  vancomycin    Solution 125 milliGRAM(s) Oral every 6 hours    MEDICATIONS  (PRN):  acetylcysteine 10%  Inhalation 3 milliLiter(s) Inhalation two times a day PRN sob  bisacodyl Suppository 10 milliGRAM(s) Rectal every 24 hours PRN Constipation  cloNIDine 0.1 milliGRAM(s) Oral every 8 hours PRN for sbp > 180  HYDROcodone 10 mG/acetaminophen 325 mG 1 Tablet(s) Oral every 4 hours PRN Moderate Pain (4 - 6)  ondansetron Injectable 4 milliGRAM(s) IV Push every 8 hours PRN Nausea and/or Vomiting  simethicone 80 milliGRAM(s) Chew every 8 hours PRN Gas  sodium chloride 3%  Inhalation 3 milliLiter(s) Inhalation every 4 hours PRN ..        IMAGING STUDIES:  decrease left opacity   :

## 2019-02-27 NOTE — PROGRESS NOTE ADULT - SUBJECTIVE AND OBJECTIVE BOX
Patient is a 84y old  Female who presents with a chief complaint of Urinary Retention/Hperkalemia with EKG changes (27 Feb 2019 10:17)       Pt is seen and examined  pt is awake and lying in bed  pt seems comfortable and denies any complaints at this time          ROS:  Negative   MEDICATIONS  (STANDING):  ALBUTerol/ipratropium for Nebulization 3 milliLiter(s) Nebulizer every 6 hours  amLODIPine   Tablet 10 milliGRAM(s) Oral daily  aspirin  chewable 81 milliGRAM(s) Oral daily  atorvastatin 20 milliGRAM(s) Oral at bedtime  buDESOnide 160 MICROgram(s)/formoterol 4.5 MICROgram(s) Inhaler 2 Puff(s) Inhalation two times a day  busPIRone 5 milliGRAM(s) Oral three times a day  chlorhexidine 4% Liquid 1 Application(s) Topical <User Schedule>  docusate sodium 200 milliGRAM(s) Oral two times a day  DULoxetine 20 milliGRAM(s) Oral daily  epoetin spencer Injectable 38130 Unit(s) SubCutaneous every 7 days  hydrocortisone 2.5% Rectal Cream 1 Application(s) Rectal daily  iohexol 300 mG (iodine)/mL Oral Solution 30 milliLiter(s) Oral once  metoprolol succinate ER 50 milliGRAM(s) Oral daily  metroNIDAZOLE  IVPB 500 milliGRAM(s) IV Intermittent every 8 hours  pantoprazole  Injectable 40 milliGRAM(s) IV Push every 12 hours  senna 2 Tablet(s) Oral daily  vancomycin    Solution 125 milliGRAM(s) Oral every 6 hours    MEDICATIONS  (PRN):  acetylcysteine 10%  Inhalation 3 milliLiter(s) Inhalation two times a day PRN sob  bisacodyl Suppository 10 milliGRAM(s) Rectal every 24 hours PRN Constipation  cloNIDine 0.1 milliGRAM(s) Oral every 8 hours PRN for sbp > 180  HYDROcodone 10 mG/acetaminophen 325 mG 1 Tablet(s) Oral every 4 hours PRN Moderate Pain (4 - 6)  ondansetron Injectable 4 milliGRAM(s) IV Push every 8 hours PRN Nausea and/or Vomiting  simethicone 80 milliGRAM(s) Chew every 8 hours PRN Gas  sodium chloride 3%  Inhalation 3 milliLiter(s) Inhalation every 4 hours PRN ..      Allergies    hair dyes (Hives)  sulfa drugs (Headache)  Zoloft (Headache)    Intolerances        Vital Signs Last 24 Hrs  T(C): 35.9 (27 Feb 2019 13:41), Max: 36.2 (27 Feb 2019 06:24)  T(F): 96.6 (27 Feb 2019 13:41), Max: 97.1 (27 Feb 2019 06:24)  HR: 79 (27 Feb 2019 13:41) (79 - 94)  BP: 130/60 (27 Feb 2019 13:41) (130/58 - 132/58)  BP(mean): --  RR: 16 (27 Feb 2019 13:41) (16 - 16)  SpO2: --    PHYSICAL EXAM  General: adult in NAD  HEENT: clear oropharynx, anicteric sclera, pink conjunctiva  Neck: supple  CV: normal S1/S2 with no murmur rubs or gallops  Lungs: positive air movement b/l ant lungs,clear to auscultation, no wheezes, no rales  Abdomen: soft non-tender non-distended, no hepatosplenomegaly  Ext: no clubbing cyanosis or edema  Skin: no rashes and no petechiae  Neuro: alert and oriented X 4, no focal deficits  LABS:                          9.6    22.70 )-----------( 172      ( 26 Feb 2019 06:26 )             29.8         Mean Cell Volume : 97.7 fL  Mean Cell Hemoglobin : 31.5 pg  Mean Cell Hemoglobin Concentration : 32.2 g/dL  Auto Neutrophil # : x  Auto Lymphocyte # : x  Auto Monocyte # : x  Auto Eosinophil # : x  Auto Basophil # : x  Auto Neutrophil % : x  Auto Lymphocyte % : x  Auto Monocyte % : x  Auto Eosinophil % : x  Auto Basophil % : x    Serial CBC's  02-26 @ 06:26  Hct-29.8 / Hgb-9.6 / Plat-172 / RBC-3.05 / WBC-22.70          Serial CBC's  02-25 @ 06:48  Hct-29.4 / Hgb-9.6 / Plat-177 / RBC-3.03 / WBC-24.89          Serial CBC's  02-24 @ 06:50  Hct-29.4 / Hgb-9.7 / Plat-196 / RBC-3.07 / WBC-23.80            02-26    141  |  107  |  43<H>  ----------------------------<  97  5.3<H>   |  28  |  0.8    Ca    7.7<L>      26 Feb 2019 06:26            Hematocrit: 29.8 % (02-26-19 @ 06:26)  Platelet Count - Automated: 172 K/uL (02-26-19 @ 06:26)  Hemoglobin: 9.6 g/dL (02-26-19 @ 06:26)  WBC Count: 22.70 K/uL (02-26-19 @ 06:26)  Hematocrit: 29.4 % (02-25-19 @ 06:48)  Platelet Count - Automated: 177 K/uL (02-25-19 @ 06:48)  Hemoglobin: 9.6 g/dL (02-25-19 @ 06:48)  WBC Count: 24.89 K/uL (02-25-19 @ 06:48)  Hemoglobin: 9.7 g/dL (02-24-19 @ 06:50)  WBC Count: 23.80 K/uL (02-24-19 @ 06:50)  Hematocrit: 29.4 % (02-24-19 @ 06:50)  Platelet Count - Automated: 196 K/uL (02-24-19 @ 06:50)  WBC Count: 31.27 K/uL (02-23-19 @ 15:04)  Hematocrit: 22.4 % (02-23-19 @ 15:04)  Platelet Count - Automated: 236 K/uL (02-23-19 @ 15:04)  Hemoglobin: 7.1 g/dL (02-23-19 @ 15:04)  Hemoglobin: 7.9 g/dL (02-23-19 @ 07:25)  WBC Count: 36.78 K/uL (02-23-19 @ 07:25)  Hematocrit: 24.6 % (02-23-19 @ 07:25)  Platelet Count - Automated: 257 K/uL (02-23-19 @ 07:25)  Platelet Count - Automated: 278 K/uL (02-22-19 @ 07:48)  Hemoglobin: 9.4 g/dL (02-22-19 @ 07:48)  WBC Count: 26.45 K/uL (02-22-19 @ 07:48)  Hematocrit: 29.4 % (02-22-19 @ 07:48)  Hematocrit: 31.0 % (02-20-19 @ 18:58)  Platelet Count - Automated: 302 K/uL (02-20-19 @ 18:58)  Hemoglobin: 9.9 g/dL (02-20-19 @ 18:58)  WBC Count: 25.13 K/uL (02-20-19 @ 18:58)  Hemoglobin: 10.3 g/dL (02-19-19 @ 13:10)  WBC Count: 19.66 K/uL (02-19-19 @ 13:10)  Hematocrit: 32.6 % (02-19-19 @ 13:10)  Platelet Count - Automated: 293 K/uL (02-19-19 @ 13:10)                BLOOD SMEAR INTERPRETATION:       RADIOLOGY & ADDITIONAL STUDIES:

## 2019-02-27 NOTE — PROGRESS NOTE ADULT - SUBJECTIVE AND OBJECTIVE BOX
NEPHROLOGY FOLLOW UP NOTE    no diarrhea, no melena, no fever, no cp  no overnight events  spoke with pulm fellow  now on regular diet      PAST MEDICAL & SURGICAL HISTORY:  CHF (congestive heart failure)  Breast tumor: removal benign bl breasts  Anal cancer  COPD (chronic obstructive pulmonary disease)  Lung disease: COPD  Hypertension  H/O breast surgery: 39 yrs. ago, bilateral  History of back surgery: 10 yrs. ago stimulator implant 2001, 2011    Allergies:  hair dyes (Hives)  sulfa drugs (Headache)    Home Medications Reviewed    SOCIAL HISTORY:  Denies ETOH,Smoking,   FAMILY HISTORY:  No pertinent family history in first degree relatives        REVIEW OF SYSTEMS:  All other review of systems is negative unless indicated above.        PHYSICAL EXAM:  NAD  weak appearing  dry mm  no jvd  b/l bs decreased  rrr  soft, + bs  no edema    Hospital Medications:   MEDICATIONS  (STANDING):  ALBUTerol/ipratropium for Nebulization 3 milliLiter(s) Nebulizer every 6 hours  amLODIPine   Tablet 10 milliGRAM(s) Oral daily  aspirin  chewable 81 milliGRAM(s) Oral daily  atorvastatin 20 milliGRAM(s) Oral at bedtime  buDESOnide 160 MICROgram(s)/formoterol 4.5 MICROgram(s) Inhaler 2 Puff(s) Inhalation two times a day  busPIRone 5 milliGRAM(s) Oral three times a day  chlorhexidine 4% Liquid 1 Application(s) Topical <User Schedule>  docusate sodium 200 milliGRAM(s) Oral two times a day  DULoxetine 20 milliGRAM(s) Oral daily  epoetin spencer Injectable 48339 Unit(s) SubCutaneous every 7 days  hydrocortisone 2.5% Rectal Cream 1 Application(s) Rectal daily  iohexol 300 mG (iodine)/mL Oral Solution 30 milliLiter(s) Oral once  metoprolol succinate ER 50 milliGRAM(s) Oral daily  metroNIDAZOLE  IVPB 500 milliGRAM(s) IV Intermittent every 8 hours  pantoprazole  Injectable 40 milliGRAM(s) IV Push every 12 hours  senna 2 Tablet(s) Oral daily  vancomycin    Solution 125 milliGRAM(s) Oral every 6 hours        VITALS:  T(F): 96.6 (02-27-19 @ 13:41), Max: 97.1 (02-27-19 @ 06:24)  HR: 79 (02-27-19 @ 13:41)  BP: 130/60 (02-27-19 @ 13:41)  RR: 16 (02-27-19 @ 13:41)  SpO2: --  Wt(kg): --    02-27 @ 07:01  -  02-27 @ 17:01  --------------------------------------------------------  IN: 400 mL / OUT: 0 mL / NET: 400 mL      Height (cm): 157.48 (02-27 @ 08:14)  Weight (kg): 63 (02-27 @ 08:14)  BMI (kg/m2): 25.4 (02-27 @ 08:14)  BSA (m2): 1.64 (02-27 @ 08:14)    LABS:  02-26    141  |  107  |  43<H>  ----------------------------<  97  5.3<H>   |  28  |  0.8    Ca    7.7<L>      26 Feb 2019 06:26                            9.6    22.70 )-----------( 172      ( 26 Feb 2019 06:26 )             29.8       Urine Studies:        RADIOLOGY & ADDITIONAL STUDIES:

## 2019-02-28 LAB
HCT VFR BLD CALC: 27.9 % — LOW (ref 37–47)
HGB BLD-MCNC: 8.8 G/DL — LOW (ref 12–16)
MCHC RBC-ENTMCNC: 31.5 G/DL — LOW (ref 32–37)
MCHC RBC-ENTMCNC: 31.5 PG — HIGH (ref 27–31)
MCV RBC AUTO: 100 FL — HIGH (ref 81–99)
NRBC # BLD: 0 /100 WBCS — SIGNIFICANT CHANGE UP (ref 0–0)
PLATELET # BLD AUTO: 161 K/UL — SIGNIFICANT CHANGE UP (ref 130–400)
RBC # BLD: 2.79 M/UL — LOW (ref 4.2–5.4)
RBC # FLD: 16.3 % — HIGH (ref 11.5–14.5)
WBC # BLD: 19.2 K/UL — HIGH (ref 4.8–10.8)
WBC # FLD AUTO: 19.2 K/UL — HIGH (ref 4.8–10.8)

## 2019-02-28 RX ADMIN — Medication 5 MILLIGRAM(S): at 14:29

## 2019-02-28 RX ADMIN — BUDESONIDE AND FORMOTEROL FUMARATE DIHYDRATE 2 PUFF(S): 160; 4.5 AEROSOL RESPIRATORY (INHALATION) at 07:53

## 2019-02-28 RX ADMIN — Medication 3 MILLILITER(S): at 20:00

## 2019-02-28 RX ADMIN — Medication 100 MILLIGRAM(S): at 21:39

## 2019-02-28 RX ADMIN — Medication 125 MILLIGRAM(S): at 06:18

## 2019-02-28 RX ADMIN — PANTOPRAZOLE SODIUM 40 MILLIGRAM(S): 20 TABLET, DELAYED RELEASE ORAL at 17:46

## 2019-02-28 RX ADMIN — Medication 3 MILLILITER(S): at 01:22

## 2019-02-28 RX ADMIN — SENNA PLUS 2 TABLET(S): 8.6 TABLET ORAL at 11:56

## 2019-02-28 RX ADMIN — Medication 3 MILLILITER(S): at 07:54

## 2019-02-28 RX ADMIN — Medication 200 MILLIGRAM(S): at 17:46

## 2019-02-28 RX ADMIN — Medication 125 MILLIGRAM(S): at 23:40

## 2019-02-28 RX ADMIN — ATORVASTATIN CALCIUM 20 MILLIGRAM(S): 80 TABLET, FILM COATED ORAL at 21:39

## 2019-02-28 RX ADMIN — AMLODIPINE BESYLATE 10 MILLIGRAM(S): 2.5 TABLET ORAL at 06:18

## 2019-02-28 RX ADMIN — BUDESONIDE AND FORMOTEROL FUMARATE DIHYDRATE 2 PUFF(S): 160; 4.5 AEROSOL RESPIRATORY (INHALATION) at 20:01

## 2019-02-28 RX ADMIN — PANTOPRAZOLE SODIUM 40 MILLIGRAM(S): 20 TABLET, DELAYED RELEASE ORAL at 06:19

## 2019-02-28 RX ADMIN — Medication 3 MILLILITER(S): at 13:47

## 2019-02-28 RX ADMIN — Medication 125 MILLIGRAM(S): at 17:46

## 2019-02-28 RX ADMIN — Medication 81 MILLIGRAM(S): at 11:56

## 2019-02-28 RX ADMIN — Medication 100 MILLIGRAM(S): at 06:18

## 2019-02-28 RX ADMIN — Medication 50 MILLIGRAM(S): at 06:18

## 2019-02-28 RX ADMIN — Medication 1 APPLICATION(S): at 11:58

## 2019-02-28 RX ADMIN — Medication 125 MILLIGRAM(S): at 11:58

## 2019-02-28 RX ADMIN — Medication 100 MILLIGRAM(S): at 14:28

## 2019-02-28 RX ADMIN — CHLORHEXIDINE GLUCONATE 1 APPLICATION(S): 213 SOLUTION TOPICAL at 08:31

## 2019-02-28 RX ADMIN — Medication 5 MILLIGRAM(S): at 21:39

## 2019-02-28 RX ADMIN — Medication 5 MILLIGRAM(S): at 06:19

## 2019-02-28 RX ADMIN — DULOXETINE HYDROCHLORIDE 20 MILLIGRAM(S): 30 CAPSULE, DELAYED RELEASE ORAL at 11:56

## 2019-02-28 NOTE — PROGRESS NOTE ADULT - SUBJECTIVE AND OBJECTIVE BOX
NEPHROLOGY FOLLOW UP NOTE    pt seen and examined  d/w daughter in depth  pt overall better  oob today        PAST MEDICAL & SURGICAL HISTORY:  CHF (congestive heart failure)  Breast tumor: removal benign bl breasts  Anal cancer  COPD (chronic obstructive pulmonary disease)  Lung disease: COPD  Hypertension  H/O breast surgery: 39 yrs. ago, bilateral  History of back surgery: 10 yrs. ago stimulator implant 2001, 2011    Allergies:  hair dyes (Hives)  sulfa drugs (Headache)    Home Medications Reviewed    SOCIAL HISTORY:  Denies ETOH,Smoking,   FAMILY HISTORY:  No pertinent family history in first degree relatives        REVIEW OF SYSTEMS:  All other review of systems is negative unless indicated above.        PHYSICAL EXAM:  NAD  weak appearing  dry mm  no jvd  b/l bs decreased  rrr  soft, + bs  no edema    Hospital Medications:   MEDICATIONS  (STANDING):  ALBUTerol/ipratropium for Nebulization 3 milliLiter(s) Nebulizer every 6 hours  amLODIPine   Tablet 10 milliGRAM(s) Oral daily  aspirin  chewable 81 milliGRAM(s) Oral daily  atorvastatin 20 milliGRAM(s) Oral at bedtime  buDESOnide 160 MICROgram(s)/formoterol 4.5 MICROgram(s) Inhaler 2 Puff(s) Inhalation two times a day  busPIRone 5 milliGRAM(s) Oral three times a day  chlorhexidine 4% Liquid 1 Application(s) Topical <User Schedule>  docusate sodium 200 milliGRAM(s) Oral two times a day  DULoxetine 20 milliGRAM(s) Oral daily  epoetin spencer Injectable 37551 Unit(s) SubCutaneous every 7 days  hydrocortisone 2.5% Rectal Cream 1 Application(s) Rectal daily  iohexol 300 mG (iodine)/mL Oral Solution 30 milliLiter(s) Oral once  levoFLOXacin IVPB 500 milliGRAM(s) IV Intermittent every 24 hours  levoFLOXacin IVPB      metoprolol succinate ER 50 milliGRAM(s) Oral daily  metroNIDAZOLE  IVPB 500 milliGRAM(s) IV Intermittent every 8 hours  pantoprazole  Injectable 40 milliGRAM(s) IV Push every 12 hours  senna 2 Tablet(s) Oral daily  vancomycin    Solution 125 milliGRAM(s) Oral every 6 hours        VITALS:  T(F): 97.4 (02-28-19 @ 14:00), Max: 97.4 (02-28-19 @ 14:00)  HR: 89 (02-28-19 @ 14:00)  BP: 140/53 (02-28-19 @ 14:00)  RR: 18 (02-28-19 @ 14:00)  SpO2: 93% (02-28-19 @ 07:57)  Wt(kg): --    02-27 @ 07:01  -  02-28 @ 07:00  --------------------------------------------------------  IN: 400 mL / OUT: 0 mL / NET: 400 mL    02-28 @ 07:01  -  02-28 @ 18:12  --------------------------------------------------------  IN: 360 mL / OUT: 0 mL / NET: 360 mL      Height (cm): 157.48 (02-28 @ 12:33)  Weight (kg): 63 (02-28 @ 12:33)  BMI (kg/m2): 25.4 (02-28 @ 12:33)  BSA (m2): 1.64 (02-28 @ 12:33)    LABS:                              8.8    19.20 )-----------( 161      ( 28 Feb 2019 15:35 )             27.9       Urine Studies:        RADIOLOGY & ADDITIONAL STUDIES:

## 2019-02-28 NOTE — PROGRESS NOTE ADULT - SUBJECTIVE AND OBJECTIVE BOX
Patient is a 84y old  Female who presents with a chief complaint of Urinary Retention/Hperkalemia with EKG changes (28 Feb 2019 12:33)       Pt is seen and examined  pt is awake and lying in bed  pt seems comfortable and denies any complaints at this time          ROS:  Negative     MEDICATIONS  (STANDING):  ALBUTerol/ipratropium for Nebulization 3 milliLiter(s) Nebulizer every 6 hours  amLODIPine   Tablet 10 milliGRAM(s) Oral daily  aspirin  chewable 81 milliGRAM(s) Oral daily  atorvastatin 20 milliGRAM(s) Oral at bedtime  buDESOnide 160 MICROgram(s)/formoterol 4.5 MICROgram(s) Inhaler 2 Puff(s) Inhalation two times a day  busPIRone 5 milliGRAM(s) Oral three times a day  chlorhexidine 4% Liquid 1 Application(s) Topical <User Schedule>  docusate sodium 200 milliGRAM(s) Oral two times a day  DULoxetine 20 milliGRAM(s) Oral daily  epoetin spencer Injectable 45428 Unit(s) SubCutaneous every 7 days  hydrocortisone 2.5% Rectal Cream 1 Application(s) Rectal daily  iohexol 300 mG (iodine)/mL Oral Solution 30 milliLiter(s) Oral once  levoFLOXacin IVPB 500 milliGRAM(s) IV Intermittent every 24 hours  levoFLOXacin IVPB      metoprolol succinate ER 50 milliGRAM(s) Oral daily  metroNIDAZOLE  IVPB 500 milliGRAM(s) IV Intermittent every 8 hours  pantoprazole  Injectable 40 milliGRAM(s) IV Push every 12 hours  senna 2 Tablet(s) Oral daily  vancomycin    Solution 125 milliGRAM(s) Oral every 6 hours    MEDICATIONS  (PRN):  acetylcysteine 10%  Inhalation 3 milliLiter(s) Inhalation two times a day PRN sob  bisacodyl Suppository 10 milliGRAM(s) Rectal every 24 hours PRN Constipation  cloNIDine 0.1 milliGRAM(s) Oral every 8 hours PRN for sbp > 180  HYDROcodone 10 mG/acetaminophen 325 mG 1 Tablet(s) Oral every 4 hours PRN Severe Pain (7 - 10)  ondansetron Injectable 4 milliGRAM(s) IV Push every 8 hours PRN Nausea and/or Vomiting  simethicone 80 milliGRAM(s) Chew every 8 hours PRN Gas  sodium chloride 3%  Inhalation 3 milliLiter(s) Inhalation every 4 hours PRN ..      Allergies    hair dyes (Hives)  sulfa drugs (Headache)  Zoloft (Headache)    Intolerances        Vital Signs Last 24 Hrs  T(C): 36.3 (28 Feb 2019 14:00), Max: 36.3 (28 Feb 2019 14:00)  T(F): 97.4 (28 Feb 2019 14:00), Max: 97.4 (28 Feb 2019 14:00)  HR: 89 (28 Feb 2019 14:00) (80 - 94)  BP: 140/53 (28 Feb 2019 14:00) (121/56 - 147/64)  BP(mean): --  RR: 18 (28 Feb 2019 14:00) (16 - 22)  SpO2: 93% (28 Feb 2019 07:57) (93% - 93%)    PHYSICAL EXAM  General: adult in NAD  HEENT: clear oropharynx, anicteric sclera, pink conjunctiva  Neck: supple  CV: normal S1/S2 with no murmur rubs or gallops  Lungs: positive air movement b/l ant lungs,clear to auscultation, no wheezes, no rales  Abdomen: soft non-tender non-distended, no hepatosplenomegaly  Ext: no clubbing cyanosis or edema  Skin: no rashes and no petechiae  Neuro: alert and oriented X 4, no focal deficits  LABS:          Serial CBC's  02-26 @ 06:26  Hct-29.8 / Hgb-9.6 / Plat-172 / RBC-3.05 / WBC-22.70          Serial CBC's  02-25 @ 06:48  Hct-29.4 / Hgb-9.6 / Plat-177 / RBC-3.03 / WBC-24.89                        Hematocrit: 29.8 % (02-26-19 @ 06:26)  Platelet Count - Automated: 172 K/uL (02-26-19 @ 06:26)  Hemoglobin: 9.6 g/dL (02-26-19 @ 06:26)  WBC Count: 22.70 K/uL (02-26-19 @ 06:26)  Hematocrit: 29.4 % (02-25-19 @ 06:48)  Platelet Count - Automated: 177 K/uL (02-25-19 @ 06:48)  Hemoglobin: 9.6 g/dL (02-25-19 @ 06:48)  WBC Count: 24.89 K/uL (02-25-19 @ 06:48)  Hemoglobin: 9.7 g/dL (02-24-19 @ 06:50)  WBC Count: 23.80 K/uL (02-24-19 @ 06:50)  Hematocrit: 29.4 % (02-24-19 @ 06:50)  Platelet Count - Automated: 196 K/uL (02-24-19 @ 06:50)  WBC Count: 31.27 K/uL (02-23-19 @ 15:04)  Hematocrit: 22.4 % (02-23-19 @ 15:04)  Platelet Count - Automated: 236 K/uL (02-23-19 @ 15:04)  Hemoglobin: 7.1 g/dL (02-23-19 @ 15:04)  Hemoglobin: 7.9 g/dL (02-23-19 @ 07:25)  WBC Count: 36.78 K/uL (02-23-19 @ 07:25)  Hematocrit: 24.6 % (02-23-19 @ 07:25)  Platelet Count - Automated: 257 K/uL (02-23-19 @ 07:25)  Platelet Count - Automated: 278 K/uL (02-22-19 @ 07:48)  Hemoglobin: 9.4 g/dL (02-22-19 @ 07:48)  WBC Count: 26.45 K/uL (02-22-19 @ 07:48)  Hematocrit: 29.4 % (02-22-19 @ 07:48)  Hematocrit: 31.0 % (02-20-19 @ 18:58)  Platelet Count - Automated: 302 K/uL (02-20-19 @ 18:58)  Hemoglobin: 9.9 g/dL (02-20-19 @ 18:58)  WBC Count: 25.13 K/uL (02-20-19 @ 18:58)  Hemoglobin: 10.3 g/dL (02-19-19 @ 13:10)  WBC Count: 19.66 K/uL (02-19-19 @ 13:10)  Hematocrit: 32.6 % (02-19-19 @ 13:10)  Platelet Count - Automated: 293 K/uL (02-19-19 @ 13:10)                BLOOD SMEAR INTERPRETATION:       RADIOLOGY & ADDITIONAL STUDIES:

## 2019-02-28 NOTE — PROGRESS NOTE ADULT - ASSESSMENT
85 yo female ex smoker with a history of COPD on home O2,Anemia sec to low grade MDS by recent bmbx,on weekly procrit   Admitted for electrolyte imbalance, GEETA,. UTI with   leukocytosis likely sec to c diff  Complaining of abdominal pains and diarrhea sec to  C. diff colitis  CT shows colitis and right hydroureteronephrosis  Small effusions on CT right > left with lung nodules.  left lung atelectasis from mucous plug  another C diff toxin postive... on vanco   renal cystic lesion     PLAN:  on po vanco for c diff.  ?PNA,on abx,ID following   leukocytosis -monitor trend..   .. s/p mucus plug removed.. observe closely   cont  procrit 40,000 unit qweekly if hb <10... Guaiac postive and decreased hb to 7.. transfusion >>>no w stable around 9 ,  monitor counts.  repeat CBC  due for procrit on 3/1 if hb stays <10  GI on board  pulmonary on board,s/p  bronchoscopy with removal of mucus plug  f/u with urology for recent TURBT path result  will f/u as outpt for further Mn of bladder ca.  outpt MRI to assess renal cystic lesion  Outpatient follow up of lung nodules, repeat CT chest in 3 months  cont other supportive care.  will f/u

## 2019-02-28 NOTE — PROGRESS NOTE ADULT - ASSESSMENT
# Complicated UTI  # Moderate Right sided hydronephrosis  # C.difficile colitis  # Low grade  MDS - on procrit  # Pneumonia-  on CXR 2/27/19    plan--------------------------------------------        1. Add Levaquin to cover Stenotrophomonas since patient is allergic to Bactrim ( eventhough Bactrim would be ideal in the setting of C.difficile infection)  2. Continue IV Flagyl and oral vancomycin  3. Monitor WBC count , stay elevated  4. OOB to chair     d/w patient,  family  at the bed side and nursing staff

## 2019-02-28 NOTE — PROGRESS NOTE ADULT - SUBJECTIVE AND OBJECTIVE BOX
SUBJECTIVE:    Patient is a 84y old Female who presents with a chief complaint of Urinary Retention/Hperkalemia with EKG changes (27 Feb 2019 20:47)    Currently admitted to medicine with the primary diagnosis of Bladder cancer     Today is hospital day 23d. This morning she is resting comfortably in bed and reports no new issues or overnight events.     PAST MEDICAL & SURGICAL HISTORY  Anemia: 4 PRBC 11/2018  Oxygen dependent: O2 2L Via NC  Bladder cancer: 2018  CHF (congestive heart failure)  Breast tumor: removal benign bl breasts  Anal cancer: Chemo and radiation 1992  COPD (chronic obstructive pulmonary disease)  Lung disease: COPD  Hypertension  History of cystoscopy: 10/2018  H/O breast surgery: 39 yrs. ago, bilateral  History of back surgery: 10 yrs. ago stimulator implant 2001, 2011( cervical and Lumbar)    SOCIAL HISTORY:  Negative for smoking/alcohol/drug use.     ALLERGIES:  hair dyes (Hives)  sulfa drugs (Headache)  Zoloft (Headache)    MEDICATIONS:  STANDING MEDICATIONS  ALBUTerol/ipratropium for Nebulization 3 milliLiter(s) Nebulizer every 6 hours  amLODIPine   Tablet 10 milliGRAM(s) Oral daily  aspirin  chewable 81 milliGRAM(s) Oral daily  atorvastatin 20 milliGRAM(s) Oral at bedtime  buDESOnide 160 MICROgram(s)/formoterol 4.5 MICROgram(s) Inhaler 2 Puff(s) Inhalation two times a day  busPIRone 5 milliGRAM(s) Oral three times a day  chlorhexidine 4% Liquid 1 Application(s) Topical <User Schedule>  docusate sodium 200 milliGRAM(s) Oral two times a day  DULoxetine 20 milliGRAM(s) Oral daily  epoetin spencer Injectable 92007 Unit(s) SubCutaneous every 7 days  hydrocortisone 2.5% Rectal Cream 1 Application(s) Rectal daily  iohexol 300 mG (iodine)/mL Oral Solution 30 milliLiter(s) Oral once  levoFLOXacin IVPB 500 milliGRAM(s) IV Intermittent every 24 hours  levoFLOXacin IVPB      metoprolol succinate ER 50 milliGRAM(s) Oral daily  metroNIDAZOLE  IVPB 500 milliGRAM(s) IV Intermittent every 8 hours  pantoprazole  Injectable 40 milliGRAM(s) IV Push every 12 hours  senna 2 Tablet(s) Oral daily  vancomycin    Solution 125 milliGRAM(s) Oral every 6 hours    PRN MEDICATIONS  acetylcysteine 10%  Inhalation 3 milliLiter(s) Inhalation two times a day PRN  bisacodyl Suppository 10 milliGRAM(s) Rectal every 24 hours PRN  cloNIDine 0.1 milliGRAM(s) Oral every 8 hours PRN  HYDROcodone 10 mG/acetaminophen 325 mG 1 Tablet(s) Oral every 4 hours PRN  ondansetron Injectable 4 milliGRAM(s) IV Push every 8 hours PRN  simethicone 80 milliGRAM(s) Chew every 8 hours PRN  sodium chloride 3%  Inhalation 3 milliLiter(s) Inhalation every 4 hours PRN    VITALS:   T(F): 96.5  HR: 94  BP: 121/56  RR: 22  SpO2: 93%    LABS:                        RADIOLOGY:    PHYSICAL EXAM:  GEN: No acute distress  LUNGS: Clear to auscultation bilaterally   HEART: Regular  ABD: Soft, non-tender, non-distended.  EXT: edema right leg  NEURO: AAOX3    Intravenous access:   NG tube:   Rowe Catheter:

## 2019-02-28 NOTE — PROGRESS NOTE ADULT - ASSESSMENT
GEETA  hyperkalemia  cdiff colitis - worsening pancolitis on ct abd  leukocytosis   left lung atelectasis from mucous plug  left moderate pleural effusion  malnutrition  debility  left 2F complex renal cyst  HTN   bladder ca  s/p TURBT  right hydro   UTI - enterococci  COPD  CHF  anemia / MDS  chronic back pain with spinal stimulator    Plan:    low k diet  cont PO vanco and IV flagyl  f/u H/H    protonix   procrit per hem  encourage po intake   cont norvasc and lopressor  off aldactone - do not restart  PT / rehab / OOB   f/u/ check path / oupt f/u of complex renal cyst  cont her own vicodin   f/u lytes and renal function  spoke with daughter

## 2019-03-01 RX ADMIN — Medication 5 MILLIGRAM(S): at 14:07

## 2019-03-01 RX ADMIN — Medication 100 MILLIGRAM(S): at 21:45

## 2019-03-01 RX ADMIN — ERYTHROPOIETIN 40000 UNIT(S): 10000 INJECTION, SOLUTION INTRAVENOUS; SUBCUTANEOUS at 14:06

## 2019-03-01 RX ADMIN — PANTOPRAZOLE SODIUM 40 MILLIGRAM(S): 20 TABLET, DELAYED RELEASE ORAL at 05:07

## 2019-03-01 RX ADMIN — SENNA PLUS 2 TABLET(S): 8.6 TABLET ORAL at 13:10

## 2019-03-01 RX ADMIN — ATORVASTATIN CALCIUM 20 MILLIGRAM(S): 80 TABLET, FILM COATED ORAL at 21:45

## 2019-03-01 RX ADMIN — Medication 125 MILLIGRAM(S): at 13:10

## 2019-03-01 RX ADMIN — CHLORHEXIDINE GLUCONATE 1 APPLICATION(S): 213 SOLUTION TOPICAL at 05:06

## 2019-03-01 RX ADMIN — Medication 3 MILLILITER(S): at 08:05

## 2019-03-01 RX ADMIN — Medication 81 MILLIGRAM(S): at 13:09

## 2019-03-01 RX ADMIN — BUDESONIDE AND FORMOTEROL FUMARATE DIHYDRATE 2 PUFF(S): 160; 4.5 AEROSOL RESPIRATORY (INHALATION) at 21:46

## 2019-03-01 RX ADMIN — Medication 3 MILLILITER(S): at 14:32

## 2019-03-01 RX ADMIN — Medication 200 MILLIGRAM(S): at 05:06

## 2019-03-01 RX ADMIN — Medication 125 MILLIGRAM(S): at 18:33

## 2019-03-01 RX ADMIN — Medication 100 MILLIGRAM(S): at 05:08

## 2019-03-01 RX ADMIN — BUDESONIDE AND FORMOTEROL FUMARATE DIHYDRATE 2 PUFF(S): 160; 4.5 AEROSOL RESPIRATORY (INHALATION) at 09:05

## 2019-03-01 RX ADMIN — Medication 125 MILLIGRAM(S): at 23:50

## 2019-03-01 RX ADMIN — Medication 5 MILLIGRAM(S): at 21:45

## 2019-03-01 RX ADMIN — Medication 125 MILLIGRAM(S): at 05:06

## 2019-03-01 RX ADMIN — Medication 1 APPLICATION(S): at 13:13

## 2019-03-01 RX ADMIN — DULOXETINE HYDROCHLORIDE 20 MILLIGRAM(S): 30 CAPSULE, DELAYED RELEASE ORAL at 13:09

## 2019-03-01 RX ADMIN — Medication 5 MILLIGRAM(S): at 05:05

## 2019-03-01 RX ADMIN — Medication 50 MILLIGRAM(S): at 05:05

## 2019-03-01 RX ADMIN — PANTOPRAZOLE SODIUM 40 MILLIGRAM(S): 20 TABLET, DELAYED RELEASE ORAL at 18:33

## 2019-03-01 RX ADMIN — Medication 100 MILLIGRAM(S): at 14:07

## 2019-03-01 RX ADMIN — AMLODIPINE BESYLATE 10 MILLIGRAM(S): 2.5 TABLET ORAL at 05:05

## 2019-03-01 NOTE — PROGRESS NOTE ADULT - SUBJECTIVE AND OBJECTIVE BOX
Patient is a 84y old  Female who presents with a chief complaint of Urinary Retention/Hperkalemia with EKG changes (22 Feb 2019 09:55)       Pt is seen and examined  pt is awake and lying in bed  isolation for C diff colitis   Received procrit shot today           ROS:  Negative   MEDICATIONS  (STANDING):  ALBUTerol/ipratropium for Nebulization 3 milliLiter(s) Nebulizer every 6 hours  amLODIPine   Tablet 10 milliGRAM(s) Oral daily  aspirin  chewable 81 milliGRAM(s) Oral daily  atorvastatin 20 milliGRAM(s) Oral at bedtime  buDESOnide 160 MICROgram(s)/formoterol 4.5 MICROgram(s) Inhaler 2 Puff(s) Inhalation two times a day  busPIRone 5 milliGRAM(s) Oral three times a day  cefepime   IVPB      cefepime   IVPB 1000 milliGRAM(s) IV Intermittent daily  chlorhexidine 4% Liquid 1 Application(s) Topical <User Schedule>  docusate sodium 200 milliGRAM(s) Oral two times a day  DULoxetine 20 milliGRAM(s) Oral daily  epoetin spencer Injectable 05696 Unit(s) SubCutaneous every 7 days  heparin  Injectable 5000 Unit(s) SubCutaneous every 8 hours  hydrocortisone 2.5% Rectal Cream 1 Application(s) Rectal daily  metoprolol succinate ER 50 milliGRAM(s) Oral daily  senna 2 Tablet(s) Oral daily  vancomycin    Solution 125 milliGRAM(s) Oral every 6 hours    MEDICATIONS  (PRN):  bisacodyl Suppository 10 milliGRAM(s) Rectal every 24 hours PRN Constipation  cloNIDine 0.1 milliGRAM(s) Oral every 8 hours PRN for sbp > 180  HYDROcodone 10 mG/acetaminophen 325 mG 1 Tablet(s) Oral every 4 hours PRN Moderate Pain (4 - 6)  ondansetron Injectable 4 milliGRAM(s) IV Push every 8 hours PRN Nausea and/or Vomiting  simethicone 80 milliGRAM(s) Chew every 8 hours PRN Gas  sodium chloride 3%  Inhalation 3 milliLiter(s) Inhalation every 4 hours PRN ..      Allergies    hair dyes (Hives)  sulfa drugs (Headache)  Zoloft (Headache)    Intolerances        Vital Signs Last 24 Hrs  T(C): 36.7 (22 Feb 2019 14:00), Max: 36.7 (22 Feb 2019 10:12)  T(F): 98 (22 Feb 2019 14:00), Max: 98 (22 Feb 2019 10:12)  HR: 77 (22 Feb 2019 14:00) (77 - 97)  BP: 141/63 (22 Feb 2019 14:00) (133/58 - 154/67)  BP(mean): --  RR: 16 (22 Feb 2019 14:00) (16 - 20)  SpO2: 96% (22 Feb 2019 11:19) (94% - 98%)    PHYSICAL EXAM  General: adult in NAD  HEENT: clear oropharynx, anicteric sclera, pink conjunctiva  Neck: supple  CV: normal S1/S2 with no murmur rubs or gallops  Lungs: positive air movement b/l ant lungs,clear to auscultation, no wheezes, no rales  Abdomen: soft non-tender non-distended, no hepatosplenomegaly  Ext: no clubbing cyanosis or edema  Skin: no rashes and no petechiae  Neuro: alert and oriented X 4, no focal deficits  LABS:                          9.4>>8.8.  19)-----------( 278      ( 28 Feb 2019 07:48 )             29.4         Mean Cell Volume : 102.4 fL  Mean Cell Hemoglobin : 32.8 pg  Mean Cell Hemoglobin Concentration : 32.0 g/dL  Auto Neutrophil # : x  Auto Lymphocyte # : x  Auto Monocyte # : x  Auto Eosinophil # : x  Auto Basophil # : x  Auto Neutrophil % : x  Auto Lymphocyte % : x  Auto Monocyte % : x  Auto Eosinophil % : x  Auto Basophil % : x    Serial CBC's  02-22 @ 07:48  Hct-29.4 / Hgb-9.4 / Plat-278 / RBC-2.87 / WBC-26.45          Serial CBC's  02-20 @ 18:58  Hct-31.0 / Hgb-9.9 / Plat-302 / RBC-3.02 / WBC-25.13          Serial CBC's  02-19 @ 13:10  Hct-32.6 / Hgb-10.3 / Plat-293 / RBC-3.17 / WBC-19.66            02-22    139  |  105  |  50<H>  ----------------------------<  130<H>  5.0   |  27  |  0.8    Ca    7.7<L>      22 Feb 2019 07:48  Phos  3.2     02-20  Mg     2.2     02-20    TPro  4.3<L>  /  Alb  2.5<L>  /  TBili  0.2  /  DBili  x   /  AST  16  /  ALT  15  /  AlkPhos  71  02-22          Platelet Count - Automated: 278 K/uL (02-22-19 @ 07:48)  Hemoglobin: 9.4 g/dL (02-22-19 @ 07:48)  WBC Count: 26.45 K/uL (02-22-19 @ 07:48)  Hematocrit: 29.4 % (02-22-19 @ 07:48)  Hematocrit: 31.0 % (02-20-19 @ 18:58)  Platelet Count - Automated: 302 K/uL (02-20-19 @ 18:58)  Hemoglobin: 9.9 g/dL (02-20-19 @ 18:58)  WBC Count: 25.13 K/uL (02-20-19 @ 18:58)  Hemoglobin: 10.3 g/dL (02-19-19 @ 13:10)  WBC Count: 19.66 K/uL (02-19-19 @ 13:10)  Hematocrit: 32.6 % (02-19-19 @ 13:10)  Platelet Count - Automated: 293 K/uL (02-19-19 @ 13:10)  WBC Count: 20.00 K/uL (02-17-19 @ 08:42)  Hematocrit: 31.3 % (02-17-19 @ 08:42)  Platelet Count - Automated: 230 K/uL (02-17-19 @ 08:42)  Hemoglobin: 9.9 g/dL (02-17-19 @ 08:42)  WBC Count: 24.04 K/uL (02-16-19 @ 15:09)  Hematocrit: 30.3 % (02-16-19 @ 15:09)  Platelet Count - Automated: 213 K/uL (02-16-19 @ 15:09)  Hemoglobin: 9.9 g/dL (02-16-19 @ 15:09)  Platelet Count - Automated: 214 K/uL (02-15-19 @ 06:13)  Hemoglobin: 10.1 g/dL (02-15-19 @ 06:13)  WBC Count: 34.64 K/uL (02-15-19 @ 06:13)  Hematocrit: 30.7 % (02-15-19 @ 06:13)  Hematocrit: 29.4 % (02-13-19 @ 14:50)  Platelet Count - Automated: 219 K/uL (02-13-19 @ 14:50)  Hemoglobin: 9.7 g/dL (02-13-19 @ 14:50)  WBC Count: 38.51 K/uL (02-13-19 @ 14:50)                BLOOD SMEAR INTERPRETATION:       RADIOLOGY & ADDITIONAL STUDIES:

## 2019-03-01 NOTE — PROGRESS NOTE ADULT - SUBJECTIVE AND OBJECTIVE BOX
SUBJECTIVE:    Patient is a 84y old Female who presents with a chief complaint of Urinary Retention/Hperkalemia with EKG changes (28 Feb 2019 18:11)    Currently admitted to medicine with the primary diagnosis of Bladder cancer     Today is hospital day 24d. This morning she is resting comfortably in bed --rash on chest   PAST MEDICAL & SURGICAL HISTORY  Anemia: 4 PRBC 11/2018  Oxygen dependent: O2 2L Via NC  Bladder cancer: 2018  CHF (congestive heart failure)  Breast tumor: removal benign bl breasts  Anal cancer: Chemo and radiation 1992  COPD (chronic obstructive pulmonary disease)  Lung disease: COPD  Hypertension  History of cystoscopy: 10/2018  H/O breast surgery: 39 yrs. ago, bilateral  History of back surgery: 10 yrs. ago stimulator implant 2001, 2011( cervical and Lumbar)    SOCIAL HISTORY:  Negative for smoking/alcohol/drug use.     ALLERGIES:  hair dyes (Hives)  sulfa drugs (Headache)  Zoloft (Headache)    MEDICATIONS:  STANDING MEDICATIONS  ALBUTerol/ipratropium for Nebulization 3 milliLiter(s) Nebulizer every 6 hours  amLODIPine   Tablet 10 milliGRAM(s) Oral daily  aspirin  chewable 81 milliGRAM(s) Oral daily  atorvastatin 20 milliGRAM(s) Oral at bedtime  buDESOnide 160 MICROgram(s)/formoterol 4.5 MICROgram(s) Inhaler 2 Puff(s) Inhalation two times a day  busPIRone 5 milliGRAM(s) Oral three times a day  chlorhexidine 4% Liquid 1 Application(s) Topical <User Schedule>  docusate sodium 200 milliGRAM(s) Oral two times a day  DULoxetine 20 milliGRAM(s) Oral daily  epoetin spencer Injectable 95022 Unit(s) SubCutaneous every 7 days  hydrocortisone 2.5% Rectal Cream 1 Application(s) Rectal daily  iohexol 300 mG (iodine)/mL Oral Solution 30 milliLiter(s) Oral once  metoprolol succinate ER 50 milliGRAM(s) Oral daily  metroNIDAZOLE  IVPB 500 milliGRAM(s) IV Intermittent every 8 hours  pantoprazole  Injectable 40 milliGRAM(s) IV Push every 12 hours  senna 2 Tablet(s) Oral daily  vancomycin    Solution 125 milliGRAM(s) Oral every 6 hours    PRN MEDICATIONS  acetylcysteine 10%  Inhalation 3 milliLiter(s) Inhalation two times a day PRN  bisacodyl Suppository 10 milliGRAM(s) Rectal every 24 hours PRN  cloNIDine 0.1 milliGRAM(s) Oral every 8 hours PRN  HYDROcodone 10 mG/acetaminophen 325 mG 1 Tablet(s) Oral every 4 hours PRN  ondansetron Injectable 4 milliGRAM(s) IV Push every 8 hours PRN  simethicone 80 milliGRAM(s) Chew every 8 hours PRN  sodium chloride 3%  Inhalation 3 milliLiter(s) Inhalation every 4 hours PRN    VITALS:   T(F): 97.2  HR: 98  BP: 134/63  RR: 18  SpO2: 93%    LABS:                        8.8    19.20 )-----------( 161      ( 28 Feb 2019 15:35 )             27.9                         RADIOLOGY:    PHYSICAL EXAM:  GEN: No acute distress  LUNGS: Clear to auscultation bilaterally   HEART: Regular  ABD: Soft, non-tender, non-distended.  EXT: NC/NC/NE/2+PP/SARAVIA/Skin Intact.   NEURO: AAOX3    Intravenous access:   NG tube:   Rowe Catheter:

## 2019-03-01 NOTE — PROGRESS NOTE ADULT - SUBJECTIVE AND OBJECTIVE BOX
NEPHROLOGY FOLLOW UP NOTE    off abx  new rash  no sob  no fefver  wbc better        PAST MEDICAL & SURGICAL HISTORY:  CHF (congestive heart failure)  Breast tumor: removal benign bl breasts  Anal cancer  COPD (chronic obstructive pulmonary disease)  Lung disease: COPD  Hypertension  H/O breast surgery: 39 yrs. ago, bilateral  History of back surgery: 10 yrs. ago stimulator implant 2001, 2011    Allergies:  hair dyes (Hives)  sulfa drugs (Headache)    Home Medications Reviewed    SOCIAL HISTORY:  Denies ETOH,Smoking,   FAMILY HISTORY:  No pertinent family history in first degree relatives        REVIEW OF SYSTEMS:  All other review of systems is negative unless indicated above.        PHYSICAL EXAM:  NAD  weak appearing  dry mm  no jvd  b/l bs decreased  rrr  soft, + bs  no edema    Hospital Medications:   MEDICATIONS  (STANDING):  ALBUTerol/ipratropium for Nebulization 3 milliLiter(s) Nebulizer every 6 hours  amLODIPine   Tablet 10 milliGRAM(s) Oral daily  aspirin  chewable 81 milliGRAM(s) Oral daily  atorvastatin 20 milliGRAM(s) Oral at bedtime  buDESOnide 160 MICROgram(s)/formoterol 4.5 MICROgram(s) Inhaler 2 Puff(s) Inhalation two times a day  busPIRone 5 milliGRAM(s) Oral three times a day  chlorhexidine 4% Liquid 1 Application(s) Topical <User Schedule>  docusate sodium 200 milliGRAM(s) Oral two times a day  DULoxetine 20 milliGRAM(s) Oral daily  epoetin spencer Injectable 03109 Unit(s) SubCutaneous every 7 days  hydrocortisone 2.5% Rectal Cream 1 Application(s) Rectal daily  iohexol 300 mG (iodine)/mL Oral Solution 30 milliLiter(s) Oral once  metoprolol succinate ER 50 milliGRAM(s) Oral daily  metroNIDAZOLE  IVPB 500 milliGRAM(s) IV Intermittent every 8 hours  pantoprazole  Injectable 40 milliGRAM(s) IV Push every 12 hours  senna 2 Tablet(s) Oral daily  vancomycin    Solution 125 milliGRAM(s) Oral every 6 hours        VITALS:  T(F): 96.1 (03-01-19 @ 14:00), Max: 97.2 (03-01-19 @ 05:47)  HR: 88 (03-01-19 @ 14:00)  BP: 130/62 (03-01-19 @ 14:00)  RR: 16 (03-01-19 @ 14:00)  SpO2: 93% (03-01-19 @ 09:13)  Wt(kg): --    02-27 @ 07:01  -  02-28 @ 07:00  --------------------------------------------------------  IN: 400 mL / OUT: 0 mL / NET: 400 mL    02-28 @ 07:01  -  03-01 @ 07:00  --------------------------------------------------------  IN: 360 mL / OUT: 0 mL / NET: 360 mL    03-01 @ 07:01  -  03-01 @ 16:27  --------------------------------------------------------  IN: 280 mL / OUT: 0 mL / NET: 280 mL      Height (cm): 157.48 (03-01 @ 10:24)  Weight (kg): 63 (03-01 @ 10:24)  BMI (kg/m2): 25.4 (03-01 @ 10:24)  BSA (m2): 1.64 (03-01 @ 10:24)    LABS:                              8.8    19.20 )-----------( 161      ( 28 Feb 2019 15:35 )             27.9       Urine Studies:        RADIOLOGY & ADDITIONAL STUDIES:

## 2019-03-01 NOTE — PROGRESS NOTE ADULT - ASSESSMENT
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 UTI with   leukocytosis likely sec to c diff  Complaining of abdominal pains and diarrhea sec to  C. diff colitis  CT shows worsening  colitis and right hydroureteronephrosis  Left kidney mass   Small effusions on CT right > left with lung nodules.  left lung atelectasis from mucous plug  another C diff toxin postive... on vanco     PLAN:  on po vanco for c diff.  leukocytosis -monitor trend..    improving  .. s/p mucus plug removed.. observe closely   cont  procrit 40,000 unit qweekly   ... Guaiac postive GI bleed  s/p transfusion   >>>hb 9 ,>> hb 8.8 procrit otday   monitor counts.  pulmonary on board,s/p  bronchoscopy with removal of mucus plug  f/u with urology for recent TURBT path result  will f/u as outpt for further Mn of bladder ca.  agree with Outpatient follow up of lung nodules, repeat CT chest in 3 months  cont other supportive care.

## 2019-03-01 NOTE — PROGRESS NOTE ADULT - ASSESSMENT
rash on chest --secondary to Levaquin will stop ---recall infectious for change of antibiotic     continue current meds

## 2019-03-01 NOTE — PROGRESS NOTE ADULT - ASSESSMENT
GEETA  hyperkalemia  cdiff colitis - worsening pancolitis on ct abd  leukocytosis   left lung atelectasis from mucous plug  left moderate pleural effusion  malnutrition  debility  left 2F complex renal cyst  HTN   bladder ca  s/p TURBT  right hydro   UTI - enterococci  COPD  CHF  anemia / MDS  chronic back pain with spinal stimulator  rash    Plan:    low k diet  cont PO vanco and IV flagyl  off levaquin  f/u H/H  / f/u bmp  protonix   procrit per hem  encourage po intake   cont norvasc and lopressor  off aldactone - do not restart  PT / rehab / OOB   f/u/ check path / oupt f/u of complex renal cyst  cont her own vicodin   f/u lytes and renal function

## 2019-03-01 NOTE — PROGRESS NOTE ADULT - ASSESSMENT
# Complicated UTI  # Moderate Right sided hydronephrosis  # C.difficile colitis: one episode of diarrhea last night  # Low grade  MDS - on procrit  # Pneumonia-  on CXR 2/27/19; S/P mucous plug    Pt with rash on Levaquin  sulfa drugs (Bad Headache as per pt & daughter)    wbc 19.76 hx MDS    On metroNIDAZOLE  IVPB 500 milliGRAM(s) IV Intermittent every 8 hours  vancomycin    Solution 125 milliGRAM(s) Oral every 6 hours    PLAN  Continue IV Flagyl and oral vancomycin  Monitor WBC count   Pulmonary F/U

## 2019-03-01 NOTE — PROGRESS NOTE ADULT - SUBJECTIVE AND OBJECTIVE BOX
infectious diseases progress note:  KOTA SCHAFER is a 84yFemale patient    HYPERKALEMIA UTI URINARY RETENTION LEUKOCYTOSIS GEETA    Clostridium difficile colitis  Leukocytosis, unspecified type  VUR (vesicoureteric reflux)  Urinary tract infection without hematuria, site unspecified  Urinary tract infection associated with indwelling urethral catheter, subsequent encounter  CHF (congestive heart failure)  COPD (chronic obstructive pulmonary disease)  GEETA (acute kidney injury)  UTI (urinary tract infection)  Urinary retention  Hyperkalemia        Allergies    hair dyes (Hives)  sulfa drugs (Headache)  Zoloft (Headache)    Intolerances        ANTIBIOTICS/RELEVANT:  antimicrobials  metroNIDAZOLE  IVPB 500 milliGRAM(s) IV Intermittent every 8 hours  vancomycin    Solution 125 milliGRAM(s) Oral every 6 hours    immunologic:  epoetin spencer Injectable 77135 Unit(s) SubCutaneous every 7 days    OTHER:  acetylcysteine 10%  Inhalation 3 milliLiter(s) Inhalation two times a day PRN  ALBUTerol/ipratropium for Nebulization 3 milliLiter(s) Nebulizer every 6 hours  amLODIPine   Tablet 10 milliGRAM(s) Oral daily  aspirin  chewable 81 milliGRAM(s) Oral daily  atorvastatin 20 milliGRAM(s) Oral at bedtime  bisacodyl Suppository 10 milliGRAM(s) Rectal every 24 hours PRN  buDESOnide 160 MICROgram(s)/formoterol 4.5 MICROgram(s) Inhaler 2 Puff(s) Inhalation two times a day  busPIRone 5 milliGRAM(s) Oral three times a day  chlorhexidine 4% Liquid 1 Application(s) Topical <User Schedule>  cloNIDine 0.1 milliGRAM(s) Oral every 8 hours PRN  docusate sodium 200 milliGRAM(s) Oral two times a day  DULoxetine 20 milliGRAM(s) Oral daily  HYDROcodone 10 mG/acetaminophen 325 mG 1 Tablet(s) Oral every 4 hours PRN  hydrocortisone 2.5% Rectal Cream 1 Application(s) Rectal daily  iohexol 300 mG (iodine)/mL Oral Solution 30 milliLiter(s) Oral once  metoprolol succinate ER 50 milliGRAM(s) Oral daily  ondansetron Injectable 4 milliGRAM(s) IV Push every 8 hours PRN  pantoprazole  Injectable 40 milliGRAM(s) IV Push every 12 hours  senna 2 Tablet(s) Oral daily  simethicone 80 milliGRAM(s) Chew every 8 hours PRN  sodium chloride 3%  Inhalation 3 milliLiter(s) Inhalation every 4 hours PRN      Objective:  T(F): 97.2 (03-01-19 @ 05:47), Max: 97.4 (02-28-19 @ 14:00)  HR: 98 (03-01-19 @ 05:47) (54 - 98)  BP: 134/63 (03-01-19 @ 05:47) (115/55 - 140/53)  RR: 18 (03-01-19 @ 09:13) (18 - 18)  SpO2: 93% (03-01-19 @ 09:13) (93% - 93%)    PHYSICAL EXAM  Rash  Eyes:ANDRÉS, EOMI  Ear/Nose/Throat: no oral lesion, no sinus tenderness on percussion	  Neck:no JVD, no lymphadenopathy, supple  Respiratory: CTA dalia  Cardiovascular: S1S2 RRR, no murmurs  Gastrointestinal:soft, (+) BS, no HSM  Extremities:no e/e/c                                  8.8    19.20 )-----------( 161      ( 28 Feb 2019 15:35 )             27.9           Culture - Bronchial (collected 22 Feb 2019 14:29)  Source: .Broncial bronchaol wash  Gram Stain (23 Feb 2019 07:29):    No polymorphonuclear cells seen per low power field    Rare Squamous epithelial cells seen per low power field    Rare Gram Positive Cocci in Pairs and Chains seen per oil power field  Final Report (24 Feb 2019 18:55):    Normal Respiratory Alyssa present

## 2019-03-02 LAB
ALBUMIN SERPL ELPH-MCNC: 2.5 G/DL — LOW (ref 3.5–5.2)
ALP SERPL-CCNC: 119 U/L — HIGH (ref 30–115)
ALT FLD-CCNC: 12 U/L — SIGNIFICANT CHANGE UP (ref 0–41)
ANION GAP SERPL CALC-SCNC: 10 MMOL/L — SIGNIFICANT CHANGE UP (ref 7–14)
ANISOCYTOSIS BLD QL: SLIGHT — SIGNIFICANT CHANGE UP
AST SERPL-CCNC: 14 U/L — SIGNIFICANT CHANGE UP (ref 0–41)
BILIRUB SERPL-MCNC: 0.3 MG/DL — SIGNIFICANT CHANGE UP (ref 0.2–1.2)
BUN SERPL-MCNC: 30 MG/DL — HIGH (ref 10–20)
CALCIUM SERPL-MCNC: 7.6 MG/DL — LOW (ref 8.5–10.1)
CHLORIDE SERPL-SCNC: 108 MMOL/L — SIGNIFICANT CHANGE UP (ref 98–110)
CO2 SERPL-SCNC: 24 MMOL/L — SIGNIFICANT CHANGE UP (ref 17–32)
CREAT SERPL-MCNC: 0.9 MG/DL — SIGNIFICANT CHANGE UP (ref 0.7–1.5)
GLUCOSE SERPL-MCNC: 117 MG/DL — HIGH (ref 70–99)
HCT VFR BLD CALC: 31.6 % — LOW (ref 37–47)
HGB BLD-MCNC: 10 G/DL — LOW (ref 12–16)
MACROCYTES BLD QL: SLIGHT — SIGNIFICANT CHANGE UP
MCHC RBC-ENTMCNC: 31.6 G/DL — LOW (ref 32–37)
MCHC RBC-ENTMCNC: 32.1 PG — HIGH (ref 27–31)
MCV RBC AUTO: 101.3 FL — HIGH (ref 81–99)
NEUTS BAND # BLD: 8 % — HIGH (ref 0–6)
NRBC # BLD: 0 /100 WBCS — SIGNIFICANT CHANGE UP (ref 0–0)
NRBC # BLD: 0 /100 — SIGNIFICANT CHANGE UP (ref 0–0)
PLAT MORPH BLD: NORMAL — SIGNIFICANT CHANGE UP
PLATELET # BLD AUTO: 192 K/UL — SIGNIFICANT CHANGE UP (ref 130–400)
PLATELET CLUMP BLD QL SMEAR: ABNORMAL
POIKILOCYTOSIS BLD QL AUTO: SLIGHT — SIGNIFICANT CHANGE UP
POTASSIUM SERPL-MCNC: 5.1 MMOL/L — HIGH (ref 3.5–5)
POTASSIUM SERPL-SCNC: 5.1 MMOL/L — HIGH (ref 3.5–5)
PROT SERPL-MCNC: 4.5 G/DL — LOW (ref 6–8)
RBC # BLD: 3.12 M/UL — LOW (ref 4.2–5.4)
RBC # FLD: 17.1 % — HIGH (ref 11.5–14.5)
RBC BLD AUTO: NORMAL — SIGNIFICANT CHANGE UP
SODIUM SERPL-SCNC: 142 MMOL/L — SIGNIFICANT CHANGE UP (ref 135–146)
WBC # BLD: 29.57 K/UL — HIGH (ref 4.8–10.8)
WBC # FLD AUTO: 29.57 K/UL — HIGH (ref 4.8–10.8)

## 2019-03-02 RX ADMIN — Medication 50 MILLIGRAM(S): at 05:45

## 2019-03-02 RX ADMIN — Medication 125 MILLIGRAM(S): at 05:46

## 2019-03-02 RX ADMIN — Medication 5 MILLIGRAM(S): at 22:14

## 2019-03-02 RX ADMIN — Medication 200 MILLIGRAM(S): at 05:45

## 2019-03-02 RX ADMIN — DULOXETINE HYDROCHLORIDE 20 MILLIGRAM(S): 30 CAPSULE, DELAYED RELEASE ORAL at 13:04

## 2019-03-02 RX ADMIN — PANTOPRAZOLE SODIUM 40 MILLIGRAM(S): 20 TABLET, DELAYED RELEASE ORAL at 18:35

## 2019-03-02 RX ADMIN — Medication 100 MILLIGRAM(S): at 05:46

## 2019-03-02 RX ADMIN — Medication 5 MILLIGRAM(S): at 13:05

## 2019-03-02 RX ADMIN — Medication 125 MILLIGRAM(S): at 13:04

## 2019-03-02 RX ADMIN — PANTOPRAZOLE SODIUM 40 MILLIGRAM(S): 20 TABLET, DELAYED RELEASE ORAL at 05:47

## 2019-03-02 RX ADMIN — SENNA PLUS 2 TABLET(S): 8.6 TABLET ORAL at 13:06

## 2019-03-02 RX ADMIN — Medication 3 MILLILITER(S): at 07:34

## 2019-03-02 RX ADMIN — Medication 125 MILLIGRAM(S): at 23:04

## 2019-03-02 RX ADMIN — ATORVASTATIN CALCIUM 20 MILLIGRAM(S): 80 TABLET, FILM COATED ORAL at 22:14

## 2019-03-02 RX ADMIN — Medication 100 MILLIGRAM(S): at 13:05

## 2019-03-02 RX ADMIN — Medication 100 MILLIGRAM(S): at 22:13

## 2019-03-02 RX ADMIN — Medication 5 MILLIGRAM(S): at 05:45

## 2019-03-02 RX ADMIN — AMLODIPINE BESYLATE 10 MILLIGRAM(S): 2.5 TABLET ORAL at 05:45

## 2019-03-02 RX ADMIN — Medication 125 MILLIGRAM(S): at 18:34

## 2019-03-02 RX ADMIN — BUDESONIDE AND FORMOTEROL FUMARATE DIHYDRATE 2 PUFF(S): 160; 4.5 AEROSOL RESPIRATORY (INHALATION) at 22:15

## 2019-03-02 NOTE — PROGRESS NOTE ADULT - SUBJECTIVE AND OBJECTIVE BOX
SUBJECTIVE:    Patient is a 84y old Female who presents with a chief complaint of Urinary Retention/Hperkalemia with EKG changes (01 Mar 2019 20:44)    Currently admitted to medicine with the primary diagnosis of Bladder cancer     Today is hospital day 25d. This morning she is resting comfortably in bed and reports no new issues or overnight events.     PAST MEDICAL & SURGICAL HISTORY  Anemia: 4 PRBC 11/2018  Oxygen dependent: O2 2L Via NC  Bladder cancer: 2018  CHF (congestive heart failure)  Breast tumor: removal benign bl breasts  Anal cancer: Chemo and radiation 1992  COPD (chronic obstructive pulmonary disease)  Lung disease: COPD  Hypertension  History of cystoscopy: 10/2018  H/O breast surgery: 39 yrs. ago, bilateral  History of back surgery: 10 yrs. ago stimulator implant 2001, 2011( cervical and Lumbar)    SOCIAL HISTORY:  Negative for smoking/alcohol/drug use.     ALLERGIES:  hair dyes (Hives)  sulfa drugs (Headache)  Zoloft (Headache)    MEDICATIONS:  STANDING MEDICATIONS  ALBUTerol/ipratropium for Nebulization 3 milliLiter(s) Nebulizer every 6 hours  amLODIPine   Tablet 10 milliGRAM(s) Oral daily  aspirin  chewable 81 milliGRAM(s) Oral daily  atorvastatin 20 milliGRAM(s) Oral at bedtime  buDESOnide 160 MICROgram(s)/formoterol 4.5 MICROgram(s) Inhaler 2 Puff(s) Inhalation two times a day  busPIRone 5 milliGRAM(s) Oral three times a day  chlorhexidine 4% Liquid 1 Application(s) Topical <User Schedule>  docusate sodium 200 milliGRAM(s) Oral two times a day  DULoxetine 20 milliGRAM(s) Oral daily  epoetin spencer Injectable 30223 Unit(s) SubCutaneous every 7 days  hydrocortisone 2.5% Rectal Cream 1 Application(s) Rectal daily  iohexol 300 mG (iodine)/mL Oral Solution 30 milliLiter(s) Oral once  metoprolol succinate ER 50 milliGRAM(s) Oral daily  metroNIDAZOLE  IVPB 500 milliGRAM(s) IV Intermittent every 8 hours  pantoprazole  Injectable 40 milliGRAM(s) IV Push every 12 hours  senna 2 Tablet(s) Oral daily  vancomycin    Solution 125 milliGRAM(s) Oral every 6 hours    PRN MEDICATIONS  acetylcysteine 10%  Inhalation 3 milliLiter(s) Inhalation two times a day PRN  bisacodyl Suppository 10 milliGRAM(s) Rectal every 24 hours PRN  cloNIDine 0.1 milliGRAM(s) Oral every 8 hours PRN  HYDROcodone 10 mG/acetaminophen 325 mG 1 Tablet(s) Oral every 4 hours PRN  ondansetron Injectable 4 milliGRAM(s) IV Push every 8 hours PRN  simethicone 80 milliGRAM(s) Chew every 8 hours PRN  sodium chloride 3%  Inhalation 3 milliLiter(s) Inhalation every 4 hours PRN    VITALS:   T(F): 97.7  HR: 94  BP: 146/63  RR: 16  SpO2: --    LABS:                        10.0   29.57 )-----------( 192      ( 02 Mar 2019 07:59 )             31.6     03-02    142  |  108  |  30<H>  ----------------------------<  117<H>  5.1<H>   |  24  |  0.9    Ca    7.6<L>      02 Mar 2019 07:59    TPro  4.5<L>  /  Alb  2.5<L>  /  TBili  0.3  /  DBili  x   /  AST  14  /  ALT  12  /  AlkPhos  119<H>  03-02                  RADIOLOGY:    PHYSICAL EXAM:  GEN: No acute distress  LUNGS: Clear to auscultation bilaterally   HEART: Regular  ABD: Soft, non-tender, non-distended.  EXT: NC/NC/NE/2+PP/SARAVIA/Skin Intact.   NEURO: AAOX3    Intravenous access:   NG tube:   Orwe Catheter:

## 2019-03-02 NOTE — PROGRESS NOTE ADULT - SUBJECTIVE AND OBJECTIVE BOX
Patient is a 84y old  Female who presents with a chief complaint of Urinary Retention/Hperkalemia with EKG changes (02 Mar 2019 10:27)       Pt is seen and examined  pt is awake and out of bed to chair  pt seems comfortable and denies any complaints at this time          ROS:  Negative  MEDICATIONS  (STANDING):  ALBUTerol/ipratropium for Nebulization 3 milliLiter(s) Nebulizer every 6 hours  amLODIPine   Tablet 10 milliGRAM(s) Oral daily  aspirin  chewable 81 milliGRAM(s) Oral daily  atorvastatin 20 milliGRAM(s) Oral at bedtime  buDESOnide 160 MICROgram(s)/formoterol 4.5 MICROgram(s) Inhaler 2 Puff(s) Inhalation two times a day  busPIRone 5 milliGRAM(s) Oral three times a day  chlorhexidine 4% Liquid 1 Application(s) Topical <User Schedule>  docusate sodium 200 milliGRAM(s) Oral two times a day  DULoxetine 20 milliGRAM(s) Oral daily  epoetin spencer Injectable 23039 Unit(s) SubCutaneous every 7 days  hydrocortisone 2.5% Rectal Cream 1 Application(s) Rectal daily  iohexol 300 mG (iodine)/mL Oral Solution 30 milliLiter(s) Oral once  metoprolol succinate ER 50 milliGRAM(s) Oral daily  metroNIDAZOLE  IVPB 500 milliGRAM(s) IV Intermittent every 8 hours  pantoprazole  Injectable 40 milliGRAM(s) IV Push every 12 hours  senna 2 Tablet(s) Oral daily  vancomycin    Solution 125 milliGRAM(s) Oral every 6 hours    MEDICATIONS  (PRN):  acetylcysteine 10%  Inhalation 3 milliLiter(s) Inhalation two times a day PRN sob  bisacodyl Suppository 10 milliGRAM(s) Rectal every 24 hours PRN Constipation  cloNIDine 0.1 milliGRAM(s) Oral every 8 hours PRN for sbp > 180  HYDROcodone 10 mG/acetaminophen 325 mG 1 Tablet(s) Oral every 4 hours PRN Severe Pain (7 - 10)  ondansetron Injectable 4 milliGRAM(s) IV Push every 8 hours PRN Nausea and/or Vomiting  simethicone 80 milliGRAM(s) Chew every 8 hours PRN Gas  sodium chloride 3%  Inhalation 3 milliLiter(s) Inhalation every 4 hours PRN ..      Allergies    hair dyes (Hives)  sulfa drugs (Headache)  Zoloft (Headache)    Intolerances        Vital Signs Last 24 Hrs  T(C): 36.5 (02 Mar 2019 06:11), Max: 36.5 (02 Mar 2019 06:11)  T(F): 97.7 (02 Mar 2019 06:11), Max: 97.7 (02 Mar 2019 06:11)  HR: 94 (02 Mar 2019 06:11) (88 - 94)  BP: 146/63 (02 Mar 2019 06:11) (129/60 - 146/63)  BP(mean): --  RR: 16 (02 Mar 2019 06:11) (15 - 16)  SpO2: --    PHYSICAL EXAM  General: adult in NAD  HEENT: clear oropharynx, anicteric sclera, pink conjunctiva  Neck: supple  CV: normal S1/S2 with no murmur rubs or gallops  Lungs: positive air movement b/l ant lungs,clear to auscultation, no wheezes, no rales  Abdomen: soft non-tender non-distended, no hepatosplenomegaly  Ext: no clubbing cyanosis or edema  Skin: no rashes and no petechiae  Neuro: alert and oriented X 4, no focal deficits  LABS:                          10.0   29.57 )-----------( 192      ( 02 Mar 2019 07:59 )             31.6         Mean Cell Volume : 101.3 fL  Mean Cell Hemoglobin : 32.1 pg  Mean Cell Hemoglobin Concentration : 31.6 g/dL  Auto Neutrophil # : x  Auto Lymphocyte # : x  Auto Monocyte # : x  Auto Eosinophil # : x  Auto Basophil # : x  Auto Neutrophil % : x  Auto Lymphocyte % : x  Auto Monocyte % : x  Auto Eosinophil % : x  Auto Basophil % : x    Serial CBC's  03-02 @ 07:59  Hct-31.6 / Hgb-10.0 / Plat-192 / RBC-3.12 / WBC-29.57          Serial CBC's  02-28 @ 15:35  Hct-27.9 / Hgb-8.8 / Plat-161 / RBC-2.79 / WBC-19.20            03-02    142  |  108  |  30<H>  ----------------------------<  117<H>  5.1<H>   |  24  |  0.9    Ca    7.6<L>      02 Mar 2019 07:59    TPro  4.5<L>  /  Alb  2.5<L>  /  TBili  0.3  /  DBili  x   /  AST  14  /  ALT  12  /  AlkPhos  119<H>  03-02          Hemoglobin: 10.0 g/dL (03-02-19 @ 07:59)  WBC Count: 29.57 K/uL (03-02-19 @ 07:59)  Hematocrit: 31.6 % (03-02-19 @ 07:59)  Platelet Count - Automated: 192 K/uL (03-02-19 @ 07:59)  Hemoglobin: 8.8 g/dL (02-28-19 @ 15:35)  WBC Count: 19.20 K/uL (02-28-19 @ 15:35)  Hematocrit: 27.9 % (02-28-19 @ 15:35)  Platelet Count - Automated: 161 K/uL (02-28-19 @ 15:35)  Hematocrit: 29.8 % (02-26-19 @ 06:26)  Platelet Count - Automated: 172 K/uL (02-26-19 @ 06:26)  Hemoglobin: 9.6 g/dL (02-26-19 @ 06:26)  WBC Count: 22.70 K/uL (02-26-19 @ 06:26)  Hematocrit: 29.4 % (02-25-19 @ 06:48)  Platelet Count - Automated: 177 K/uL (02-25-19 @ 06:48)  Hemoglobin: 9.6 g/dL (02-25-19 @ 06:48)  WBC Count: 24.89 K/uL (02-25-19 @ 06:48)  Hemoglobin: 9.7 g/dL (02-24-19 @ 06:50)  WBC Count: 23.80 K/uL (02-24-19 @ 06:50)  Hematocrit: 29.4 % (02-24-19 @ 06:50)  Platelet Count - Automated: 196 K/uL (02-24-19 @ 06:50)  WBC Count: 31.27 K/uL (02-23-19 @ 15:04)  Hematocrit: 22.4 % (02-23-19 @ 15:04)  Platelet Count - Automated: 236 K/uL (02-23-19 @ 15:04)  Hemoglobin: 7.1 g/dL (02-23-19 @ 15:04)  Hemoglobin: 7.9 g/dL (02-23-19 @ 07:25)  WBC Count: 36.78 K/uL (02-23-19 @ 07:25)  Hematocrit: 24.6 % (02-23-19 @ 07:25)  Platelet Count - Automated: 257 K/uL (02-23-19 @ 07:25)  Platelet Count - Automated: 278 K/uL (02-22-19 @ 07:48)  Hemoglobin: 9.4 g/dL (02-22-19 @ 07:48)  WBC Count: 26.45 K/uL (02-22-19 @ 07:48)  Hematocrit: 29.4 % (02-22-19 @ 07:48)  Hematocrit: 31.0 % (02-20-19 @ 18:58)  Platelet Count - Automated: 302 K/uL (02-20-19 @ 18:58)  Hemoglobin: 9.9 g/dL (02-20-19 @ 18:58)  WBC Count: 25.13 K/uL (02-20-19 @ 18:58)                BLOOD SMEAR INTERPRETATION:       RADIOLOGY & ADDITIONAL STUDIES:

## 2019-03-02 NOTE — PROGRESS NOTE ADULT - ASSESSMENT
85 yo female ex smoker with a history of COPD on home O2,Anemia sec to low grade MDS by recent bmbx,on weekly procrit   Admitted for electrolyte imbalance, GEETA,. UTI with   leukocytosis likely sec to c diff  Complaining of abdominal pains and diarrhea sec to  C. diff colitis  CT shows worsening  colitis and right hydroureteronephrosis  Left kidney mass   Small effusions on CT right > left with lung nodules.  left lung atelectasis from mucous plug  another C diff toxin postive... on vanco     PLAN:  on po vanco for c diff.  worsening leukocytosis -ID f/u .      .. s/p mucus plug removed.. observe closely   cont  procrit 40,000 unit qweekly   ... Guaiac postive GI bleed  s/p transfusion   >>>hb 9 ,>>10,s/p procrit yesterday  monitor counts.  pulmonary on board,s/p  bronchoscopy with removal of mucus plug  f/u with urology for recent TURBT path result  will f/u as outpt for further Mn of bladder ca.  agree with Outpatient follow up of lung nodules, repeat CT chest in 3 months  cont other supportive care.

## 2019-03-03 RX ADMIN — Medication 125 MILLIGRAM(S): at 05:48

## 2019-03-03 RX ADMIN — CHLORHEXIDINE GLUCONATE 1 APPLICATION(S): 213 SOLUTION TOPICAL at 08:58

## 2019-03-03 RX ADMIN — Medication 3 MILLILITER(S): at 13:09

## 2019-03-03 RX ADMIN — Medication 200 MILLIGRAM(S): at 17:17

## 2019-03-03 RX ADMIN — Medication 200 MILLIGRAM(S): at 05:48

## 2019-03-03 RX ADMIN — ATORVASTATIN CALCIUM 20 MILLIGRAM(S): 80 TABLET, FILM COATED ORAL at 21:18

## 2019-03-03 RX ADMIN — Medication 100 MILLIGRAM(S): at 05:48

## 2019-03-03 RX ADMIN — PANTOPRAZOLE SODIUM 40 MILLIGRAM(S): 20 TABLET, DELAYED RELEASE ORAL at 05:49

## 2019-03-03 RX ADMIN — Medication 5 MILLIGRAM(S): at 13:15

## 2019-03-03 RX ADMIN — Medication 125 MILLIGRAM(S): at 17:17

## 2019-03-03 RX ADMIN — DULOXETINE HYDROCHLORIDE 20 MILLIGRAM(S): 30 CAPSULE, DELAYED RELEASE ORAL at 11:20

## 2019-03-03 RX ADMIN — SENNA PLUS 2 TABLET(S): 8.6 TABLET ORAL at 11:22

## 2019-03-03 RX ADMIN — Medication 5 MILLIGRAM(S): at 05:48

## 2019-03-03 RX ADMIN — Medication 1 APPLICATION(S): at 11:21

## 2019-03-03 RX ADMIN — Medication 125 MILLIGRAM(S): at 23:26

## 2019-03-03 RX ADMIN — PANTOPRAZOLE SODIUM 40 MILLIGRAM(S): 20 TABLET, DELAYED RELEASE ORAL at 17:17

## 2019-03-03 RX ADMIN — AMLODIPINE BESYLATE 10 MILLIGRAM(S): 2.5 TABLET ORAL at 13:15

## 2019-03-03 RX ADMIN — Medication 100 MILLIGRAM(S): at 21:18

## 2019-03-03 RX ADMIN — Medication 100 MILLIGRAM(S): at 13:15

## 2019-03-03 RX ADMIN — Medication 125 MILLIGRAM(S): at 11:20

## 2019-03-03 RX ADMIN — Medication 5 MILLIGRAM(S): at 21:18

## 2019-03-03 RX ADMIN — Medication 81 MILLIGRAM(S): at 11:20

## 2019-03-03 RX ADMIN — BUDESONIDE AND FORMOTEROL FUMARATE DIHYDRATE 2 PUFF(S): 160; 4.5 AEROSOL RESPIRATORY (INHALATION) at 21:19

## 2019-03-03 RX ADMIN — Medication 50 MILLIGRAM(S): at 05:48

## 2019-03-03 NOTE — CHART NOTE - NSCHARTNOTEFT_GEN_A_CORE
Called by RN due to episode of dyspnea on 2 LPM N/C, O2 Sat dropped under 90%, increased to 4LPM and O2 Sat now 96%.    Chest: decreased overall but slightly greater on right then left  -receiving Respirator Rx presently, appears on no clinical distress    Plan CXR urgent to evaluate lung fields. Called by RN due to episode of dyspnea on 2 LPM N/C, O2 Sat dropped under 90%, increased to 4LPM and O2 Sat now 96%.    Chest: decreased overall but slightly greater on right then left  -receiving Respirator Rx presently, appears on no clinical distress    Plan CXR urgent to evaluate lung fields.    Addendum: CXR increased effusion on Right lung base, stable opacity left lung base

## 2019-03-03 NOTE — PROGRESS NOTE ADULT - ASSESSMENT
84F  COPD  MDS  Prolonged hospital stay    +CDiff 2/23, 2/13 2/19, 2/21 sputum cx stenotrophomonas (rash to levaquin), sulfa allergy    mucus plugging  CXR Bilateral pleural effusions, increased on the right and stable on the   CT AP 2/24  Enlarging (since October 19, 2018) exophytic indeterminate left lower renal pole cystic lesion with perceivable enhancement of wall. New moderate left pleural effusion with overlying compressive atelectasis. Stable right pleural effusion. left.pancolitis, right hydroureteronephrosis.  WBC elevated since 2/9  Bands on differential yesterday    - ADD differential to CBC  - send blood cultures  - IV flagyl 500mg q8h  - PO vanc 125 q6h PO  - No utility in repeat Cdiff PCR as cannot distinguish btwn active infection  - Pulm following, consider CHAZ meza 84F  COPD  MDS  Prolonged hospital stay    +CDiff 2/23, 2/13 2/19, 2/21 sputum cx stenotrophomonas (rash to levaquin), sulfa allergy (only HA??)    mucus plugging  CXR Bilateral pleural effusions, increased on the right and stable on the left  CT AP 2/24  Enlarging (since October 19, 2018) exophytic indeterminate left lower renal pole cystic lesion with perceivable enhancement of wall. New moderate left pleural effusion with overlying compressive atelectasis. Stable right pleural effusion. left pancolitis, right hydroureteronephrosis.  WBC elevated since 2/9  Bands on differential yesterday    - CHECK CBC with diff  - send blood cultures  - IV flagyl 500mg q8h  - PO vanc 125 q6h PO  - Pulm following, consider R thora  - if hemodynamic compromise, or rising WBC would add PNA coverage with Ceftaz if available given hx steno and cannot tolerate other meds

## 2019-03-03 NOTE — PROGRESS NOTE ADULT - SUBJECTIVE AND OBJECTIVE BOX
KOTA SCHAFER  84y, Female      OVERNIGHT EVENTS:  afebrile  leukocytosis 29  3/2 8 % bands    ROS negative except as per above    VITALS:  T(F): 96.6, Max: 96.6 (03-03-19 @ 14:10)  HR: 82  BP: 135/60  RR: 16Vital Signs Last 24 Hrs  T(C): 35.9 (03 Mar 2019 14:10), Max: 35.9 (03 Mar 2019 14:10)  T(F): 96.6 (03 Mar 2019 14:10), Max: 96.6 (03 Mar 2019 14:10)  HR: 82 (03 Mar 2019 14:10) (82 - 97)  BP: 135/60 (03 Mar 2019 14:10) (122/65 - 135/60)  BP(mean): --  RR: 16 (03 Mar 2019 15:34) (16 - 18)  SpO2: 91% (03 Mar 2019 15:34) (78% - 98%)    PHYSICAL EXAM  ***    TESTS & MEASUREMENTS:                        10.0   29.57 )-----------( 192      ( 02 Mar 2019 07:59 )             31.6     03-02    142  |  108  |  30<H>  ----------------------------<  117<H>  5.1<H>   |  24  |  0.9    Ca    7.6<L>      02 Mar 2019 07:59    TPro  4.5<L>  /  Alb  2.5<L>  /  TBili  0.3  /  DBili  x   /  AST  14  /  ALT  12  /  AlkPhos  119<H>  03-02    LIVER FUNCTIONS - ( 02 Mar 2019 07:59 )  Alb: 2.5 g/dL / Pro: 4.5 g/dL / ALK PHOS: 119 U/L / ALT: 12 U/L / AST: 14 U/L / GGT: x                 RADIOLOGY & ADDITIONAL TESTS:    ANTIBIOTICS:  amoxicillin   500 milliGRAM(s) Oral (02-12-19 @ 01:06)   500 milliGRAM(s) Oral (02-11-19 @ 13:51)   500 milliGRAM(s) Oral (02-11-19 @ 04:49)   500 milliGRAM(s) Oral (02-10-19 @ 21:15)   500 milliGRAM(s) Oral (02-10-19 @ 14:16)   500 milliGRAM(s) Oral (02-10-19 @ 05:56)   500 milliGRAM(s) Oral (02-09-19 @ 22:04)   500 milliGRAM(s) Oral (02-09-19 @ 15:50)   500 milliGRAM(s) Oral (02-09-19 @ 06:01)   500 milliGRAM(s) Oral (02-08-19 @ 22:29)   500 milliGRAM(s) Oral (02-08-19 @ 14:28)   500 milliGRAM(s) Oral (02-08-19 @ 05:45)   500 milliGRAM(s) Oral (02-07-19 @ 22:00)   500 milliGRAM(s) Oral (02-07-19 @ 15:29)    amoxicillin   500 milliGRAM(s) Oral (02-17-19 @ 05:50)   500 milliGRAM(s) Oral (02-16-19 @ 22:02)   500 milliGRAM(s) Oral (02-16-19 @ 16:36)   500 milliGRAM(s) Oral (02-16-19 @ 05:47)   500 milliGRAM(s) Oral (02-15-19 @ 21:21)   500 milliGRAM(s) Oral (02-15-19 @ 13:07)   500 milliGRAM(s) Oral (02-15-19 @ 06:11)   500 milliGRAM(s) Oral (02-14-19 @ 22:26)   500 milliGRAM(s) Oral (02-14-19 @ 13:38)    ampicillin  IVPB   108 mL/Hr IV Intermittent (02-13-19 @ 18:23)   108 mL/Hr IV Intermittent (02-13-19 @ 11:55)   108 mL/Hr IV Intermittent (02-13-19 @ 06:23)   108 mL/Hr IV Intermittent (02-13-19 @ 00:04)   108 mL/Hr IV Intermittent (02-12-19 @ 17:21)   108 mL/Hr IV Intermittent (02-12-19 @ 11:39)    ampicillin  IVPB   108 mL/Hr IV Intermittent (02-07-19 @ 00:18)    ampicillin  IVPB   108 mL/Hr IV Intermittent (02-07-19 @ 06:05)    aztreonam  IVPB   50 mL/Hr IV Intermittent (02-11-19 @ 13:50)    aztreonam  IVPB   50 mL/Hr IV Intermittent (02-12-19 @ 01:10)    cefepime   IVPB   100 mL/Hr IV Intermittent (02-05-19 @ 06:18)    cefepime   IVPB   100 mL/Hr IV Intermittent (02-06-19 @ 12:34)   100 mL/Hr IV Intermittent (02-05-19 @ 11:34)    cefepime   IVPB   100 mL/Hr IV Intermittent (02-21-19 @ 18:22)    cefepime   IVPB   100 mL/Hr IV Intermittent (02-23-19 @ 13:52)   100 mL/Hr IV Intermittent (02-22-19 @ 15:27)    levoFLOXacin IVPB   100 mL/Hr IV Intermittent (02-28-19 @ 00:38)    levoFLOXacin IVPB   100 mL/Hr IV Intermittent (02-28-19 @ 23:40)    levoFLOXacin IVPB   100 mL/Hr IV Intermittent (02-11-19 @ 13:50)    meropenem  IVPB   100 mL/Hr IV Intermittent (02-14-19 @ 05:59)   100 mL/Hr IV Intermittent (02-13-19 @ 22:10)   100 mL/Hr IV Intermittent (02-13-19 @ 16:13)   100 mL/Hr IV Intermittent (02-13-19 @ 07:00)   100 mL/Hr IV Intermittent (02-12-19 @ 21:18)   100 mL/Hr IV Intermittent (02-12-19 @ 14:54)    metroNIDAZOLE  IVPB   100 mL/Hr IV Intermittent (02-13-19 @ 23:32)    metroNIDAZOLE  IVPB   100 mL/Hr IV Intermittent (02-14-19 @ 05:59)    metroNIDAZOLE  IVPB   100 mL/Hr IV Intermittent (03-03-19 @ 13:15)   100 mL/Hr IV Intermittent (03-03-19 @ 05:48)   100 mL/Hr IV Intermittent (03-02-19 @ 22:13)   100 mL/Hr IV Intermittent (03-02-19 @ 13:05)   100 mL/Hr IV Intermittent (03-02-19 @ 05:46)   100 mL/Hr IV Intermittent (03-01-19 @ 21:45)   100 mL/Hr IV Intermittent (03-01-19 @ 14:07)   100 mL/Hr IV Intermittent (03-01-19 @ 05:08)   100 mL/Hr IV Intermittent (02-28-19 @ 21:39)   100 mL/Hr IV Intermittent (02-28-19 @ 14:28)   100 mL/Hr IV Intermittent (02-28-19 @ 06:18)   100 mL/Hr IV Intermittent (02-27-19 @ 21:57)   100 mL/Hr IV Intermittent (02-27-19 @ 15:21)   100 mL/Hr IV Intermittent (02-27-19 @ 05:58)   100 mL/Hr IV Intermittent (02-26-19 @ 21:31)   100 mL/Hr IV Intermittent (02-26-19 @ 14:32)   100 mL/Hr IV Intermittent (02-26-19 @ 06:05)   100 mL/Hr IV Intermittent (02-25-19 @ 21:47)   100 mL/Hr IV Intermittent (02-25-19 @ 13:09)   100 mL/Hr IV Intermittent (02-25-19 @ 05:43)   100 mL/Hr IV Intermittent (02-24-19 @ 21:52)   100 mL/Hr IV Intermittent (02-24-19 @ 15:07)   100 mL/Hr IV Intermittent (02-24-19 @ 06:41)    vancomycin    Solution   125 milliGRAM(s) Oral (02-14-19 @ 06:00)   125 milliGRAM(s) Oral (02-13-19 @ 23:32)   125 milliGRAM(s) Oral (02-13-19 @ 19:42)    vancomycin    Solution   125 milliGRAM(s) Oral (02-22-19 @ 15:24)   125 milliGRAM(s) Oral (02-22-19 @ 05:57)   125 milliGRAM(s) Oral (02-21-19 @ 23:14)   125 milliGRAM(s) Oral (02-21-19 @ 17:53)   125 milliGRAM(s) Oral (02-21-19 @ 13:13)   125 milliGRAM(s) Oral (02-21-19 @ 05:38)   125 milliGRAM(s) Oral (02-20-19 @ 22:22)   125 milliGRAM(s) Oral (02-20-19 @ 17:46)   125 milliGRAM(s) Oral (02-20-19 @ 11:40)   125 milliGRAM(s) Oral (02-20-19 @ 06:02)   125 milliGRAM(s) Oral (02-19-19 @ 23:03)   125 milliGRAM(s) Oral (02-19-19 @ 18:18)   125 milliGRAM(s) Oral (02-19-19 @ 11:37)   125 milliGRAM(s) Oral (02-19-19 @ 05:07)   125 milliGRAM(s) Oral (02-18-19 @ 23:21)   125 milliGRAM(s) Oral (02-18-19 @ 17:26)   125 milliGRAM(s) Oral (02-18-19 @ 11:22)   125 milliGRAM(s) Oral (02-18-19 @ 06:06)   125 milliGRAM(s) Oral (02-17-19 @ 23:01)   125 milliGRAM(s) Oral (02-17-19 @ 17:45)   125 milliGRAM(s) Oral (02-17-19 @ 11:07)   125 milliGRAM(s) Oral (02-17-19 @ 05:48)   125 milliGRAM(s) Oral (02-16-19 @ 23:17)   125 milliGRAM(s) Oral (02-16-19 @ 17:28)   125 milliGRAM(s) Oral (02-16-19 @ 12:52)   125 milliGRAM(s) Oral (02-16-19 @ 05:49)   125 milliGRAM(s) Oral (02-15-19 @ 23:16)   125 milliGRAM(s) Oral (02-15-19 @ 17:14)   125 milliGRAM(s) Oral (02-15-19 @ 12:47)   125 milliGRAM(s) Oral (02-15-19 @ 06:10)   125 milliGRAM(s) Oral (02-14-19 @ 22:27)   125 milliGRAM(s) Oral (02-14-19 @ 18:25)   125 milliGRAM(s) Oral (02-14-19 @ 11:40)    vancomycin    Solution   125 milliGRAM(s) Oral (03-03-19 @ 11:20)   125 milliGRAM(s) Oral (03-03-19 @ 05:48)   125 milliGRAM(s) Oral (03-02-19 @ 23:04)   125 milliGRAM(s) Oral (03-02-19 @ 18:34)   125 milliGRAM(s) Oral (03-02-19 @ 13:04)   125 milliGRAM(s) Oral (03-02-19 @ 05:46)   125 milliGRAM(s) Oral (03-01-19 @ 23:50)   125 milliGRAM(s) Oral (03-01-19 @ 18:33)   125 milliGRAM(s) Oral (03-01-19 @ 13:10)   125 milliGRAM(s) Oral (03-01-19 @ 05:06)   125 milliGRAM(s) Oral (02-28-19 @ 23:40)   125 milliGRAM(s) Oral (02-28-19 @ 17:46)   125 milliGRAM(s) Oral (02-28-19 @ 11:58)   125 milliGRAM(s) Oral (02-28-19 @ 06:18)   125 milliGRAM(s) Oral (02-27-19 @ 23:07)   125 milliGRAM(s) Oral (02-27-19 @ 20:03)   125 milliGRAM(s) Oral (02-27-19 @ 11:16)   125 milliGRAM(s) Oral (02-27-19 @ 05:57)   125 milliGRAM(s) Oral (02-26-19 @ 23:57)   125 milliGRAM(s) Oral (02-26-19 @ 17:26)   125 milliGRAM(s) Oral (02-26-19 @ 12:17)   125 milliGRAM(s) Oral (02-26-19 @ 06:05)   125 milliGRAM(s) Oral (02-25-19 @ 21:48)   125 milliGRAM(s) Oral (02-25-19 @ 17:37)   125 milliGRAM(s) Oral (02-25-19 @ 11:13)   125 milliGRAM(s) Oral (02-25-19 @ 05:42)   125 milliGRAM(s) Oral (02-24-19 @ 23:44)   125 milliGRAM(s) Oral (02-24-19 @ 17:29)   125 milliGRAM(s) Oral (02-24-19 @ 12:31)   125 milliGRAM(s) Oral (02-24-19 @ 05:51)   125 milliGRAM(s) Oral (02-23-19 @ 22:41)   125 milliGRAM(s) Oral (02-23-19 @ 17:13)   125 milliGRAM(s) Oral (02-23-19 @ 13:53)        metroNIDAZOLE  IVPB 500 milliGRAM(s) IV Intermittent every 8 hours  vancomycin    Solution 125 milliGRAM(s) Oral every 6 hours KOTA SCHAFER  84y, Female      OVERNIGHT EVENTS:  afebrile  leukocytosis 29  3/2 8 % bands    ROS negative except as per above    VITALS:  T(F): 96.6, Max: 96.6 (03-03-19 @ 14:10)  HR: 82  BP: 135/60  RR: 16Vital Signs Last 24 Hrs  T(C): 35.9 (03 Mar 2019 14:10), Max: 35.9 (03 Mar 2019 14:10)  T(F): 96.6 (03 Mar 2019 14:10), Max: 96.6 (03 Mar 2019 14:10)  HR: 82 (03 Mar 2019 14:10) (82 - 97)  BP: 135/60 (03 Mar 2019 14:10) (122/65 - 135/60)  BP(mean): --  RR: 16 (03 Mar 2019 15:34) (16 - 18)  SpO2: 91% (03 Mar 2019 15:34) (78% - 98%)    PHYSICAL EXAM  Gen: Elderly F NAD  HEENT: NCAT. EOMI. MMM.   Neck: Supple  CV: RRR  Lungs: Coarse BS  Abd: Soft. NTND  Extr: wwp, no edema  Skin: erythematous rash UE, groin trunk  Neuro: No focal deficits  Lines: clean      TESTS & MEASUREMENTS:                        10.0   29.57 )-----------( 192      ( 02 Mar 2019 07:59 )             31.6     03-02    142  |  108  |  30<H>  ----------------------------<  117<H>  5.1<H>   |  24  |  0.9    Ca    7.6<L>      02 Mar 2019 07:59    TPro  4.5<L>  /  Alb  2.5<L>  /  TBili  0.3  /  DBili  x   /  AST  14  /  ALT  12  /  AlkPhos  119<H>  03-02    LIVER FUNCTIONS - ( 02 Mar 2019 07:59 )  Alb: 2.5 g/dL / Pro: 4.5 g/dL / ALK PHOS: 119 U/L / ALT: 12 U/L / AST: 14 U/L / GGT: x                 RADIOLOGY & ADDITIONAL TESTS:    ANTIBIOTICS:  amoxicillin   500 milliGRAM(s) Oral (02-12-19 @ 01:06)   500 milliGRAM(s) Oral (02-11-19 @ 13:51)   500 milliGRAM(s) Oral (02-11-19 @ 04:49)   500 milliGRAM(s) Oral (02-10-19 @ 21:15)   500 milliGRAM(s) Oral (02-10-19 @ 14:16)   500 milliGRAM(s) Oral (02-10-19 @ 05:56)   500 milliGRAM(s) Oral (02-09-19 @ 22:04)   500 milliGRAM(s) Oral (02-09-19 @ 15:50)   500 milliGRAM(s) Oral (02-09-19 @ 06:01)   500 milliGRAM(s) Oral (02-08-19 @ 22:29)   500 milliGRAM(s) Oral (02-08-19 @ 14:28)   500 milliGRAM(s) Oral (02-08-19 @ 05:45)   500 milliGRAM(s) Oral (02-07-19 @ 22:00)   500 milliGRAM(s) Oral (02-07-19 @ 15:29)    amoxicillin   500 milliGRAM(s) Oral (02-17-19 @ 05:50)   500 milliGRAM(s) Oral (02-16-19 @ 22:02)   500 milliGRAM(s) Oral (02-16-19 @ 16:36)   500 milliGRAM(s) Oral (02-16-19 @ 05:47)   500 milliGRAM(s) Oral (02-15-19 @ 21:21)   500 milliGRAM(s) Oral (02-15-19 @ 13:07)   500 milliGRAM(s) Oral (02-15-19 @ 06:11)   500 milliGRAM(s) Oral (02-14-19 @ 22:26)   500 milliGRAM(s) Oral (02-14-19 @ 13:38)    ampicillin  IVPB   108 mL/Hr IV Intermittent (02-13-19 @ 18:23)   108 mL/Hr IV Intermittent (02-13-19 @ 11:55)   108 mL/Hr IV Intermittent (02-13-19 @ 06:23)   108 mL/Hr IV Intermittent (02-13-19 @ 00:04)   108 mL/Hr IV Intermittent (02-12-19 @ 17:21)   108 mL/Hr IV Intermittent (02-12-19 @ 11:39)    ampicillin  IVPB   108 mL/Hr IV Intermittent (02-07-19 @ 00:18)    ampicillin  IVPB   108 mL/Hr IV Intermittent (02-07-19 @ 06:05)    aztreonam  IVPB   50 mL/Hr IV Intermittent (02-11-19 @ 13:50)    aztreonam  IVPB   50 mL/Hr IV Intermittent (02-12-19 @ 01:10)    cefepime   IVPB   100 mL/Hr IV Intermittent (02-05-19 @ 06:18)    cefepime   IVPB   100 mL/Hr IV Intermittent (02-06-19 @ 12:34)   100 mL/Hr IV Intermittent (02-05-19 @ 11:34)    cefepime   IVPB   100 mL/Hr IV Intermittent (02-21-19 @ 18:22)    cefepime   IVPB   100 mL/Hr IV Intermittent (02-23-19 @ 13:52)   100 mL/Hr IV Intermittent (02-22-19 @ 15:27)    levoFLOXacin IVPB   100 mL/Hr IV Intermittent (02-28-19 @ 00:38)    levoFLOXacin IVPB   100 mL/Hr IV Intermittent (02-28-19 @ 23:40)    levoFLOXacin IVPB   100 mL/Hr IV Intermittent (02-11-19 @ 13:50)    meropenem  IVPB   100 mL/Hr IV Intermittent (02-14-19 @ 05:59)   100 mL/Hr IV Intermittent (02-13-19 @ 22:10)   100 mL/Hr IV Intermittent (02-13-19 @ 16:13)   100 mL/Hr IV Intermittent (02-13-19 @ 07:00)   100 mL/Hr IV Intermittent (02-12-19 @ 21:18)   100 mL/Hr IV Intermittent (02-12-19 @ 14:54)    metroNIDAZOLE  IVPB   100 mL/Hr IV Intermittent (02-13-19 @ 23:32)    metroNIDAZOLE  IVPB   100 mL/Hr IV Intermittent (02-14-19 @ 05:59)    metroNIDAZOLE  IVPB   100 mL/Hr IV Intermittent (03-03-19 @ 13:15)   100 mL/Hr IV Intermittent (03-03-19 @ 05:48)   100 mL/Hr IV Intermittent (03-02-19 @ 22:13)   100 mL/Hr IV Intermittent (03-02-19 @ 13:05)   100 mL/Hr IV Intermittent (03-02-19 @ 05:46)   100 mL/Hr IV Intermittent (03-01-19 @ 21:45)   100 mL/Hr IV Intermittent (03-01-19 @ 14:07)   100 mL/Hr IV Intermittent (03-01-19 @ 05:08)   100 mL/Hr IV Intermittent (02-28-19 @ 21:39)   100 mL/Hr IV Intermittent (02-28-19 @ 14:28)   100 mL/Hr IV Intermittent (02-28-19 @ 06:18)   100 mL/Hr IV Intermittent (02-27-19 @ 21:57)   100 mL/Hr IV Intermittent (02-27-19 @ 15:21)   100 mL/Hr IV Intermittent (02-27-19 @ 05:58)   100 mL/Hr IV Intermittent (02-26-19 @ 21:31)   100 mL/Hr IV Intermittent (02-26-19 @ 14:32)   100 mL/Hr IV Intermittent (02-26-19 @ 06:05)   100 mL/Hr IV Intermittent (02-25-19 @ 21:47)   100 mL/Hr IV Intermittent (02-25-19 @ 13:09)   100 mL/Hr IV Intermittent (02-25-19 @ 05:43)   100 mL/Hr IV Intermittent (02-24-19 @ 21:52)   100 mL/Hr IV Intermittent (02-24-19 @ 15:07)   100 mL/Hr IV Intermittent (02-24-19 @ 06:41)    vancomycin    Solution   125 milliGRAM(s) Oral (02-14-19 @ 06:00)   125 milliGRAM(s) Oral (02-13-19 @ 23:32)   125 milliGRAM(s) Oral (02-13-19 @ 19:42)    vancomycin    Solution   125 milliGRAM(s) Oral (02-22-19 @ 15:24)   125 milliGRAM(s) Oral (02-22-19 @ 05:57)   125 milliGRAM(s) Oral (02-21-19 @ 23:14)   125 milliGRAM(s) Oral (02-21-19 @ 17:53)   125 milliGRAM(s) Oral (02-21-19 @ 13:13)   125 milliGRAM(s) Oral (02-21-19 @ 05:38)   125 milliGRAM(s) Oral (02-20-19 @ 22:22)   125 milliGRAM(s) Oral (02-20-19 @ 17:46)   125 milliGRAM(s) Oral (02-20-19 @ 11:40)   125 milliGRAM(s) Oral (02-20-19 @ 06:02)   125 milliGRAM(s) Oral (02-19-19 @ 23:03)   125 milliGRAM(s) Oral (02-19-19 @ 18:18)   125 milliGRAM(s) Oral (02-19-19 @ 11:37)   125 milliGRAM(s) Oral (02-19-19 @ 05:07)   125 milliGRAM(s) Oral (02-18-19 @ 23:21)   125 milliGRAM(s) Oral (02-18-19 @ 17:26)   125 milliGRAM(s) Oral (02-18-19 @ 11:22)   125 milliGRAM(s) Oral (02-18-19 @ 06:06)   125 milliGRAM(s) Oral (02-17-19 @ 23:01)   125 milliGRAM(s) Oral (02-17-19 @ 17:45)   125 milliGRAM(s) Oral (02-17-19 @ 11:07)   125 milliGRAM(s) Oral (02-17-19 @ 05:48)   125 milliGRAM(s) Oral (02-16-19 @ 23:17)   125 milliGRAM(s) Oral (02-16-19 @ 17:28)   125 milliGRAM(s) Oral (02-16-19 @ 12:52)   125 milliGRAM(s) Oral (02-16-19 @ 05:49)   125 milliGRAM(s) Oral (02-15-19 @ 23:16)   125 milliGRAM(s) Oral (02-15-19 @ 17:14)   125 milliGRAM(s) Oral (02-15-19 @ 12:47)   125 milliGRAM(s) Oral (02-15-19 @ 06:10)   125 milliGRAM(s) Oral (02-14-19 @ 22:27)   125 milliGRAM(s) Oral (02-14-19 @ 18:25)   125 milliGRAM(s) Oral (02-14-19 @ 11:40)    vancomycin    Solution   125 milliGRAM(s) Oral (03-03-19 @ 11:20)   125 milliGRAM(s) Oral (03-03-19 @ 05:48)   125 milliGRAM(s) Oral (03-02-19 @ 23:04)   125 milliGRAM(s) Oral (03-02-19 @ 18:34)   125 milliGRAM(s) Oral (03-02-19 @ 13:04)   125 milliGRAM(s) Oral (03-02-19 @ 05:46)   125 milliGRAM(s) Oral (03-01-19 @ 23:50)   125 milliGRAM(s) Oral (03-01-19 @ 18:33)   125 milliGRAM(s) Oral (03-01-19 @ 13:10)   125 milliGRAM(s) Oral (03-01-19 @ 05:06)   125 milliGRAM(s) Oral (02-28-19 @ 23:40)   125 milliGRAM(s) Oral (02-28-19 @ 17:46)   125 milliGRAM(s) Oral (02-28-19 @ 11:58)   125 milliGRAM(s) Oral (02-28-19 @ 06:18)   125 milliGRAM(s) Oral (02-27-19 @ 23:07)   125 milliGRAM(s) Oral (02-27-19 @ 20:03)   125 milliGRAM(s) Oral (02-27-19 @ 11:16)   125 milliGRAM(s) Oral (02-27-19 @ 05:57)   125 milliGRAM(s) Oral (02-26-19 @ 23:57)   125 milliGRAM(s) Oral (02-26-19 @ 17:26)   125 milliGRAM(s) Oral (02-26-19 @ 12:17)   125 milliGRAM(s) Oral (02-26-19 @ 06:05)   125 milliGRAM(s) Oral (02-25-19 @ 21:48)   125 milliGRAM(s) Oral (02-25-19 @ 17:37)   125 milliGRAM(s) Oral (02-25-19 @ 11:13)   125 milliGRAM(s) Oral (02-25-19 @ 05:42)   125 milliGRAM(s) Oral (02-24-19 @ 23:44)   125 milliGRAM(s) Oral (02-24-19 @ 17:29)   125 milliGRAM(s) Oral (02-24-19 @ 12:31)   125 milliGRAM(s) Oral (02-24-19 @ 05:51)   125 milliGRAM(s) Oral (02-23-19 @ 22:41)   125 milliGRAM(s) Oral (02-23-19 @ 17:13)   125 milliGRAM(s) Oral (02-23-19 @ 13:53)        metroNIDAZOLE  IVPB 500 milliGRAM(s) IV Intermittent every 8 hours  vancomycin    Solution 125 milliGRAM(s) Oral every 6 hours

## 2019-03-03 NOTE — PROGRESS NOTE ADULT - SUBJECTIVE AND OBJECTIVE BOX
SUBJECTIVE:    Patient is a 84y old Female who presents with a chief complaint of Urinary Retention/Hperkalemia with EKG changes (02 Mar 2019 13:32)    Currently admitted to medicine with the primary diagnosis of Bladder cancer     Today is hospital day 26d. This morning she is resting comfortably in bed and reports no new issues or overnight events.     PAST MEDICAL & SURGICAL HISTORY  Anemia: 4 PRBC 11/2018  Oxygen dependent: O2 2L Via NC  Bladder cancer: 2018  CHF (congestive heart failure)  Breast tumor: removal benign bl breasts  Anal cancer: Chemo and radiation 1992  COPD (chronic obstructive pulmonary disease)  Lung disease: COPD  Hypertension  History of cystoscopy: 10/2018  H/O breast surgery: 39 yrs. ago, bilateral  History of back surgery: 10 yrs. ago stimulator implant 2001, 2011( cervical and Lumbar)    SOCIAL HISTORY:  Negative for smoking/alcohol/drug use.     ALLERGIES:  hair dyes (Hives)  sulfa drugs (Headache)  Zoloft (Headache)    MEDICATIONS:  STANDING MEDICATIONS  ALBUTerol/ipratropium for Nebulization 3 milliLiter(s) Nebulizer every 6 hours  amLODIPine   Tablet 10 milliGRAM(s) Oral daily  aspirin  chewable 81 milliGRAM(s) Oral daily  atorvastatin 20 milliGRAM(s) Oral at bedtime  buDESOnide 160 MICROgram(s)/formoterol 4.5 MICROgram(s) Inhaler 2 Puff(s) Inhalation two times a day  busPIRone 5 milliGRAM(s) Oral three times a day  chlorhexidine 4% Liquid 1 Application(s) Topical <User Schedule>  docusate sodium 200 milliGRAM(s) Oral two times a day  DULoxetine 20 milliGRAM(s) Oral daily  epoetin spencer Injectable 04528 Unit(s) SubCutaneous every 7 days  hydrocortisone 2.5% Rectal Cream 1 Application(s) Rectal daily  iohexol 300 mG (iodine)/mL Oral Solution 30 milliLiter(s) Oral once  metoprolol succinate ER 50 milliGRAM(s) Oral daily  metroNIDAZOLE  IVPB 500 milliGRAM(s) IV Intermittent every 8 hours  pantoprazole  Injectable 40 milliGRAM(s) IV Push every 12 hours  senna 2 Tablet(s) Oral daily  vancomycin    Solution 125 milliGRAM(s) Oral every 6 hours    PRN MEDICATIONS  acetylcysteine 10%  Inhalation 3 milliLiter(s) Inhalation two times a day PRN  bisacodyl Suppository 10 milliGRAM(s) Rectal every 24 hours PRN  cloNIDine 0.1 milliGRAM(s) Oral every 8 hours PRN  HYDROcodone 10 mG/acetaminophen 325 mG 1 Tablet(s) Oral every 4 hours PRN  ondansetron Injectable 4 milliGRAM(s) IV Push every 8 hours PRN  simethicone 80 milliGRAM(s) Chew every 8 hours PRN  sodium chloride 3%  Inhalation 3 milliLiter(s) Inhalation every 4 hours PRN    VITALS:   T(F): 96.5  HR: 89  BP: 122/65  RR: 16  SpO2: --    LABS:                        10.0   29.57 )-----------( 192      ( 02 Mar 2019 07:59 )             31.6     03-02    142  |  108  |  30<H>  ----------------------------<  117<H>  5.1<H>   |  24  |  0.9    Ca    7.6<L>      02 Mar 2019 07:59    TPro  4.5<L>  /  Alb  2.5<L>  /  TBili  0.3  /  DBili  x   /  AST  14  /  ALT  12  /  AlkPhos  119<H>  03-02                  RADIOLOGY:    PHYSICAL EXAM:  GEN: No acute distress  LUNGS: Clear to auscultation bilaterally   HEART: Regular  ABD: Soft, non-tender, non-distended.  EXT: NC/NC/NE/2+PP/SARAVIA/Skin Intact.   NEURO: AAOX3    Intravenous access:   NG tube:   Rowe Catheter:

## 2019-03-04 LAB
ANION GAP SERPL CALC-SCNC: 7 MMOL/L — SIGNIFICANT CHANGE UP (ref 7–14)
BUN SERPL-MCNC: 33 MG/DL — HIGH (ref 10–20)
CALCIUM SERPL-MCNC: 7.5 MG/DL — LOW (ref 8.5–10.1)
CHLORIDE SERPL-SCNC: 112 MMOL/L — HIGH (ref 98–110)
CO2 SERPL-SCNC: 27 MMOL/L — SIGNIFICANT CHANGE UP (ref 17–32)
CREAT SERPL-MCNC: 0.9 MG/DL — SIGNIFICANT CHANGE UP (ref 0.7–1.5)
GLUCOSE BLDC GLUCOMTR-MCNC: 150 MG/DL — HIGH (ref 70–99)
GLUCOSE SERPL-MCNC: 142 MG/DL — HIGH (ref 70–99)
HCT VFR BLD CALC: 28.2 % — LOW (ref 37–47)
HGB BLD-MCNC: 8.9 G/DL — LOW (ref 12–16)
MCHC RBC-ENTMCNC: 31.6 G/DL — LOW (ref 32–37)
MCHC RBC-ENTMCNC: 32.8 PG — HIGH (ref 27–31)
MCV RBC AUTO: 104.1 FL — HIGH (ref 81–99)
NRBC # BLD: 0 /100 WBCS — SIGNIFICANT CHANGE UP (ref 0–0)
PLATELET # BLD AUTO: 183 K/UL — SIGNIFICANT CHANGE UP (ref 130–400)
POTASSIUM SERPL-MCNC: 4.5 MMOL/L — SIGNIFICANT CHANGE UP (ref 3.5–5)
POTASSIUM SERPL-SCNC: 4.5 MMOL/L — SIGNIFICANT CHANGE UP (ref 3.5–5)
RBC # BLD: 2.71 M/UL — LOW (ref 4.2–5.4)
RBC # FLD: 18.4 % — HIGH (ref 11.5–14.5)
SODIUM SERPL-SCNC: 146 MMOL/L — SIGNIFICANT CHANGE UP (ref 135–146)
WBC # BLD: 21.19 K/UL — HIGH (ref 4.8–10.8)
WBC # FLD AUTO: 21.19 K/UL — HIGH (ref 4.8–10.8)

## 2019-03-04 RX ORDER — SODIUM CHLORIDE 9 MG/ML
1000 INJECTION INTRAMUSCULAR; INTRAVENOUS; SUBCUTANEOUS
Qty: 0 | Refills: 0 | Status: DISCONTINUED | OUTPATIENT
Start: 2019-03-04 | End: 2019-03-04

## 2019-03-04 RX ORDER — SODIUM CHLORIDE 9 MG/ML
1000 INJECTION INTRAMUSCULAR; INTRAVENOUS; SUBCUTANEOUS
Qty: 0 | Refills: 0 | Status: COMPLETED | OUTPATIENT
Start: 2019-03-04 | End: 2019-03-04

## 2019-03-04 RX ORDER — DULOXETINE HYDROCHLORIDE 30 MG/1
30 CAPSULE, DELAYED RELEASE ORAL DAILY
Qty: 0 | Refills: 0 | Status: DISCONTINUED | OUTPATIENT
Start: 2019-03-04 | End: 2019-03-07

## 2019-03-04 RX ADMIN — DULOXETINE HYDROCHLORIDE 20 MILLIGRAM(S): 30 CAPSULE, DELAYED RELEASE ORAL at 12:17

## 2019-03-04 RX ADMIN — Medication 125 MILLIGRAM(S): at 12:17

## 2019-03-04 RX ADMIN — BUDESONIDE AND FORMOTEROL FUMARATE DIHYDRATE 2 PUFF(S): 160; 4.5 AEROSOL RESPIRATORY (INHALATION) at 20:10

## 2019-03-04 RX ADMIN — Medication 5 MILLIGRAM(S): at 05:50

## 2019-03-04 RX ADMIN — ONDANSETRON 4 MILLIGRAM(S): 8 TABLET, FILM COATED ORAL at 12:58

## 2019-03-04 RX ADMIN — Medication 81 MILLIGRAM(S): at 12:17

## 2019-03-04 RX ADMIN — Medication 50 MILLIGRAM(S): at 05:50

## 2019-03-04 RX ADMIN — ATORVASTATIN CALCIUM 20 MILLIGRAM(S): 80 TABLET, FILM COATED ORAL at 22:19

## 2019-03-04 RX ADMIN — Medication 125 MILLIGRAM(S): at 18:29

## 2019-03-04 RX ADMIN — AMLODIPINE BESYLATE 10 MILLIGRAM(S): 2.5 TABLET ORAL at 15:09

## 2019-03-04 RX ADMIN — Medication 5 MILLIGRAM(S): at 22:19

## 2019-03-04 RX ADMIN — Medication 5 MILLIGRAM(S): at 15:09

## 2019-03-04 RX ADMIN — Medication 100 MILLIGRAM(S): at 22:19

## 2019-03-04 RX ADMIN — PANTOPRAZOLE SODIUM 40 MILLIGRAM(S): 20 TABLET, DELAYED RELEASE ORAL at 18:29

## 2019-03-04 RX ADMIN — Medication 3 MILLILITER(S): at 08:30

## 2019-03-04 RX ADMIN — Medication 125 MILLIGRAM(S): at 23:38

## 2019-03-04 RX ADMIN — SODIUM CHLORIDE 75 MILLILITER(S): 9 INJECTION INTRAMUSCULAR; INTRAVENOUS; SUBCUTANEOUS at 18:30

## 2019-03-04 RX ADMIN — PANTOPRAZOLE SODIUM 40 MILLIGRAM(S): 20 TABLET, DELAYED RELEASE ORAL at 05:50

## 2019-03-04 RX ADMIN — Medication 1 APPLICATION(S): at 12:18

## 2019-03-04 RX ADMIN — Medication 3 MILLILITER(S): at 20:10

## 2019-03-04 RX ADMIN — Medication 3 MILLILITER(S): at 13:20

## 2019-03-04 RX ADMIN — BUDESONIDE AND FORMOTEROL FUMARATE DIHYDRATE 2 PUFF(S): 160; 4.5 AEROSOL RESPIRATORY (INHALATION) at 09:47

## 2019-03-04 RX ADMIN — Medication 125 MILLIGRAM(S): at 05:50

## 2019-03-04 RX ADMIN — CHLORHEXIDINE GLUCONATE 1 APPLICATION(S): 213 SOLUTION TOPICAL at 09:47

## 2019-03-04 RX ADMIN — Medication 100 MILLIGRAM(S): at 15:08

## 2019-03-04 RX ADMIN — Medication 100 MILLIGRAM(S): at 05:49

## 2019-03-04 NOTE — PROGRESS NOTE ADULT - ASSESSMENT
85 yo female ex smoker with a history of COPD on home O2,Anemia sec to low grade MDS by recent bmbx,on weekly procrit   Admitted for electrolyte imbalance, GEETA,. UTI with   leukocytosis likely sec to c diff  Complaining of abdominal pains and diarrhea sec to  C. diff colitis  CT shows worsening  colitis and right hydroureteronephrosis  Left kidney mass   Small effusions on CT right > left with lung nodules.  left lung atelectasis from mucous plug..s/p mucus plug removed  anemia for hb 8 to 9 ,,GI bleed resolved       PLAN:  on po vanco and flagyl  for c diff.  leukocytosis -monitor trend..   21K .. pleural effusion   cont  procrit 40,000 unit qweekly   monitor counts.  f/u with urology for recent TURBT path result  will f/u as outpt for further Mn of bladder ca.  encourage po intake   cont other supportive care.

## 2019-03-04 NOTE — PROGRESS NOTE ADULT - SUBJECTIVE AND OBJECTIVE BOX
reviewed and referred to chart notes. family at bedside.    patient endorses continued anxiety with fear of dying. Also reports feeling tired. Family asks of medication optimization.       mse: pt awake and alert. answers questions. speech wnl. + psychomotor retardation. mood "anxious", affect constricted. thoughts linear. no evidence of delusions, si/hi. insight/judgement fair.

## 2019-03-04 NOTE — PROGRESS NOTE ADULT - ASSESSMENT
GEETA  hyperkalemia  cdiff colitis  leukocytosis   left lung atelectasis from mucous plug  left moderate pleural effusion  malnutrition  debility  left 2F complex renal cyst  HTN   bladder ca  s/p TURBT  right hydro   UTI - enterococci  COPD  CHF  anemia / MDS  chronic back pain with spinal stimulator  rash  poor appetite     Plan:    low k diet  cont PO vanco and IV flagyl  prn fluid  off levaquin  f/u H/H  / f/u bmp  protonix   procrit per hem  encourage po intake   cont norvasc and lopressor  off aldactone - do not restart  PT / rehab / OOB   f/u/ check path / oupt f/u of complex renal cyst  f/u lytes and renal function  d/w daughter

## 2019-03-04 NOTE — PROGRESS NOTE ADULT - SUBJECTIVE AND OBJECTIVE BOX
Patient is a 84y old  Female who presents with a chief complaint of Urinary Retention/Hperkalemia with EKG changes (22 Feb 2019 09:55)       Pt is seen and examined  pt is awake and lying in bed  isolation for C diff colitis       ROS:  Negative   MEDICATIONS  (STANDING):  ALBUTerol/ipratropium for Nebulization 3 milliLiter(s) Nebulizer every 6 hours  amLODIPine   Tablet 10 milliGRAM(s) Oral daily  aspirin  chewable 81 milliGRAM(s) Oral daily  atorvastatin 20 milliGRAM(s) Oral at bedtime  buDESOnide 160 MICROgram(s)/formoterol 4.5 MICROgram(s) Inhaler 2 Puff(s) Inhalation two times a day  busPIRone 5 milliGRAM(s) Oral three times a day  cefepime   IVPB      cefepime   IVPB 1000 milliGRAM(s) IV Intermittent daily  chlorhexidine 4% Liquid 1 Application(s) Topical <User Schedule>  docusate sodium 200 milliGRAM(s) Oral two times a day  DULoxetine 20 milliGRAM(s) Oral daily  epoetin spencer Injectable 93880 Unit(s) SubCutaneous every 7 days  heparin  Injectable 5000 Unit(s) SubCutaneous every 8 hours  hydrocortisone 2.5% Rectal Cream 1 Application(s) Rectal daily  metoprolol succinate ER 50 milliGRAM(s) Oral daily  senna 2 Tablet(s) Oral daily  vancomycin    Solution 125 milliGRAM(s) Oral every 6 hours    MEDICATIONS  (PRN):  bisacodyl Suppository 10 milliGRAM(s) Rectal every 24 hours PRN Constipation  cloNIDine 0.1 milliGRAM(s) Oral every 8 hours PRN for sbp > 180  HYDROcodone 10 mG/acetaminophen 325 mG 1 Tablet(s) Oral every 4 hours PRN Moderate Pain (4 - 6)  ondansetron Injectable 4 milliGRAM(s) IV Push every 8 hours PRN Nausea and/or Vomiting  simethicone 80 milliGRAM(s) Chew every 8 hours PRN Gas  sodium chloride 3%  Inhalation 3 milliLiter(s) Inhalation every 4 hours PRN ..      Allergies    hair dyes (Hives)  sulfa drugs (Headache)  Zoloft (Headache)    Intolerances        Vital Signs Last 24 Hrs  T(C): 36.7 (22 Feb 2019 14:00), Max: 36.7 (22 Feb 2019 10:12)  T(F): 98 (22 Feb 2019 14:00), Max: 98 (22 Feb 2019 10:12)  HR: 77 (22 Feb 2019 14:00) (77 - 97)  BP: 141/63 (22 Feb 2019 14:00) (133/58 - 154/67)  BP(mean): --  RR: 16 (22 Feb 2019 14:00) (16 - 20)  SpO2: 96% (22 Feb 2019 11:19) (94% - 98%)    PHYSICAL EXAM  General: adult in NAD  HEENT: clear oropharynx, anicteric sclera, pink conjunctiva  Neck: supple  CV: normal S1/S2 with no murmur rubs or gallops  Lungs: positive air movement b/l ant lungs,clear to auscultation, no wheezes, no rales  Abdomen: soft non-tender non-distended, no hepatosplenomegaly  Ext: no clubbing cyanosis or edema  Skin: no rashes and no petechiae  Neuro: alert and oriented X 4, no focal deficits  LABS:               3/4/19 [id number]  hb 8.9 platelet 183            creat 0..9         Mean Cell Volume : 102.4 fL  Mean Cell Hemoglobin : 32.8 pg  Mean Cell Hemoglobin Concentration : 32.0 g/dL  Auto Neutrophil # : x  Auto Lymphocyte # : x  Auto Monocyte # : x  Auto Eosinophil # : x  Auto Basophil # : x  Auto Neutrophil % : x  Auto Lymphocyte % : x  Auto Monocyte % : x  Auto Eosinophil % : x  Auto Basophil % : x    Serial CBC's  02-22 @ 07:48  Hct-29.4 / Hgb-9.4 / Plat-278 / RBC-2.87 / WBC-26.45          Serial CBC's  02-20 @ 18:58  Hct-31.0 / Hgb-9.9 / Plat-302 / RBC-3.02 / WBC-25.13          Serial CBC's  02-19 @ 13:10  Hct-32.6 / Hgb-10.3 / Plat-293 / RBC-3.17 / WBC-19.66            02-22    139  |  105  |  50<H>  ----------------------------<  130<H>  5.0   |  27  |  0.8    Ca    7.7<L>      22 Feb 2019 07:48  Phos  3.2     02-20  Mg     2.2     02-20    TPro  4.3<L>  /  Alb  2.5<L>  /  TBili  0.2  /  DBili  x   /  AST  16  /  ALT  15  /  AlkPhos  71  02-22          Platelet Count - Automated: 278 K/uL (02-22-19 @ 07:48)  Hemoglobin: 9.4 g/dL (02-22-19 @ 07:48)  WBC Count: 26.45 K/uL (02-22-19 @ 07:48)  Hematocrit: 29.4 % (02-22-19 @ 07:48)  Hematocrit: 31.0 % (02-20-19 @ 18:58)  Platelet Count - Automated: 302 K/uL (02-20-19 @ 18:58)  Hemoglobin: 9.9 g/dL (02-20-19 @ 18:58)  WBC Count: 25.13 K/uL (02-20-19 @ 18:58)  Hemoglobin: 10.3 g/dL (02-19-19 @ 13:10)  WBC Count: 19.66 K/uL (02-19-19 @ 13:10)  Hematocrit: 32.6 % (02-19-19 @ 13:10)  Platelet Count - Automated: 293 K/uL (02-19-19 @ 13:10)  WBC Count: 20.00 K/uL (02-17-19 @ 08:42)  Hematocrit: 31.3 % (02-17-19 @ 08:42)  Platelet Count - Automated: 230 K/uL (02-17-19 @ 08:42)  Hemoglobin: 9.9 g/dL (02-17-19 @ 08:42)  WBC Count: 24.04 K/uL (02-16-19 @ 15:09)  Hematocrit: 30.3 % (02-16-19 @ 15:09)  Platelet Count - Automated: 213 K/uL (02-16-19 @ 15:09)  Hemoglobin: 9.9 g/dL (02-16-19 @ 15:09)  Platelet Count - Automated: 214 K/uL (02-15-19 @ 06:13)  Hemoglobin: 10.1 g/dL (02-15-19 @ 06:13)  WBC Count: 34.64 K/uL (02-15-19 @ 06:13)  Hematocrit: 30.7 % (02-15-19 @ 06:13)  Hematocrit: 29.4 % (02-13-19 @ 14:50)  Platelet Count - Automated: 219 K/uL (02-13-19 @ 14:50)  Hemoglobin: 9.7 g/dL (02-13-19 @ 14:50)  WBC Count: 38.51 K/uL (02-13-19 @ 14:50)                BLOOD SMEAR INTERPRETATION:       RADIOLOGY & ADDITIONAL STUDIES:

## 2019-03-04 NOTE — PROGRESS NOTE ADULT - SUBJECTIVE AND OBJECTIVE BOX
NEPHROLOGY FOLLOW UP NOTE    pt seen earlier  spoke with daughter  pt still with diarrhea  not eating  very weak        PAST MEDICAL & SURGICAL HISTORY:  CHF (congestive heart failure)  Breast tumor: removal benign bl breasts  Anal cancer  COPD (chronic obstructive pulmonary disease)  Lung disease: COPD  Hypertension  H/O breast surgery: 39 yrs. ago, bilateral  History of back surgery: 10 yrs. ago stimulator implant 2001, 2011    Allergies:  hair dyes (Hives)  sulfa drugs (Headache)    Home Medications Reviewed    SOCIAL HISTORY:  Denies ETOH,Smoking,   FAMILY HISTORY:  No pertinent family history in first degree relatives        REVIEW OF SYSTEMS:  All other review of systems is negative unless indicated above.        PHYSICAL EXAM:  NAD  weak appearing  dry mm  no jvd  b/l bs decreased  rrr  soft, + bs  no edema    Hospital Medications:   MEDICATIONS  (STANDING):  ALBUTerol/ipratropium for Nebulization 3 milliLiter(s) Nebulizer every 6 hours  amLODIPine   Tablet 10 milliGRAM(s) Oral daily  aspirin  chewable 81 milliGRAM(s) Oral daily  atorvastatin 20 milliGRAM(s) Oral at bedtime  buDESOnide 160 MICROgram(s)/formoterol 4.5 MICROgram(s) Inhaler 2 Puff(s) Inhalation two times a day  busPIRone 5 milliGRAM(s) Oral three times a day  chlorhexidine 4% Liquid 1 Application(s) Topical <User Schedule>  docusate sodium 200 milliGRAM(s) Oral two times a day  DULoxetine 30 milliGRAM(s) Oral daily  epoetin spencer Injectable 52243 Unit(s) SubCutaneous every 7 days  hydrocortisone 2.5% Rectal Cream 1 Application(s) Rectal daily  iohexol 300 mG (iodine)/mL Oral Solution 30 milliLiter(s) Oral once  metoprolol succinate ER 50 milliGRAM(s) Oral daily  metroNIDAZOLE  IVPB 500 milliGRAM(s) IV Intermittent every 8 hours  pantoprazole  Injectable 40 milliGRAM(s) IV Push every 12 hours  senna 2 Tablet(s) Oral daily  vancomycin    Solution 125 milliGRAM(s) Oral every 6 hours        VITALS:  T(F): 95.6 (03-04-19 @ 14:42), Max: 96.8 (03-03-19 @ 22:08)  HR: 78 (03-04-19 @ 14:42)  BP: 116/62 (03-04-19 @ 14:42)  RR: 16 (03-04-19 @ 14:42)  SpO2: 91% (03-04-19 @ 08:44)  Wt(kg): --    03-02 @ 07:01  -  03-03 @ 07:00  --------------------------------------------------------  IN: 60 mL / OUT: 0 mL / NET: 60 mL      Height (cm): 157.48 (03-04 @ 09:32)  Weight (kg): 63 (03-04 @ 09:32)  BMI (kg/m2): 25.4 (03-04 @ 09:32)  BSA (m2): 1.64 (03-04 @ 09:32)    LABS:                              8.9    21.19 )-----------( 183      ( 04 Mar 2019 07:06 )             28.2       Urine Studies:        RADIOLOGY & ADDITIONAL STUDIES:

## 2019-03-04 NOTE — CHART NOTE - NSCHARTNOTEFT_GEN_A_CORE
Pt feeling weak. Actively having diarrhea and decreased PO intake.   /56 HR 84 temp 98    Pt on no fluids. Will give 1 liter NS and reassess  Check labs next rounds

## 2019-03-04 NOTE — PROGRESS NOTE ADULT - ASSESSMENT
84F  COPD  MDS  Prolonged hospital stay    +CDiff 2/23, 2/13 2/19, 2/21 sputum cx stenotrophomonas (rash to levaquin), sulfa allergy (only HA??)    mucus plugging  CXR Bilateral pleural effusions, increased on the right and stable on the left  CT AP 2/24  Enlarging (since October 19, 2018) exophytic indeterminate left lower renal pole cystic lesion with perceivable enhancement of wall. New moderate left pleural effusion with overlying compressive atelectasis. Stable right pleural effusion. left pancolitis, right hydroureteronephrosis.  WBC elevated since 2/9  Bands on differential yesterday    - CHECK CBC with diff  - send blood cultures  - IV flagyl 500mg q8h  - PO vanc 125 q6h PO  - Pulm following, consider R thora  - if hemodynamic compromise, or rising WBC would add PNA coverage with Ceftaz if available given hx steno and cannot tolerate other meds

## 2019-03-04 NOTE — PROGRESS NOTE ADULT - SUBJECTIVE AND OBJECTIVE BOX
SUBJECTIVE:    Patient is a 84y old Female who presents with a chief complaint of Urinary Retention/Hperkalemia with EKG changes (03 Mar 2019 15:49)    Currently admitted to medicine with the primary diagnosis of Bladder cancer     Today is hospital day 27d. This morning she is resting comfortably in bed and reports no new issues or overnight events.     PAST MEDICAL & SURGICAL HISTORY  Anemia: 4 PRBC 11/2018  Oxygen dependent: O2 2L Via NC  Bladder cancer: 2018  CHF (congestive heart failure)  Breast tumor: removal benign bl breasts  Anal cancer: Chemo and radiation 1992  COPD (chronic obstructive pulmonary disease)  Lung disease: COPD  Hypertension  History of cystoscopy: 10/2018  H/O breast surgery: 39 yrs. ago, bilateral  History of back surgery: 10 yrs. ago stimulator implant 2001, 2011( cervical and Lumbar)    SOCIAL HISTORY:  Negative for smoking/alcohol/drug use.     ALLERGIES:  hair dyes (Hives)  sulfa drugs (Headache)  Zoloft (Headache)    MEDICATIONS:  STANDING MEDICATIONS  ALBUTerol/ipratropium for Nebulization 3 milliLiter(s) Nebulizer every 6 hours  amLODIPine   Tablet 10 milliGRAM(s) Oral daily  aspirin  chewable 81 milliGRAM(s) Oral daily  atorvastatin 20 milliGRAM(s) Oral at bedtime  buDESOnide 160 MICROgram(s)/formoterol 4.5 MICROgram(s) Inhaler 2 Puff(s) Inhalation two times a day  busPIRone 5 milliGRAM(s) Oral three times a day  chlorhexidine 4% Liquid 1 Application(s) Topical <User Schedule>  docusate sodium 200 milliGRAM(s) Oral two times a day  DULoxetine 20 milliGRAM(s) Oral daily  epoetin spencer Injectable 67556 Unit(s) SubCutaneous every 7 days  hydrocortisone 2.5% Rectal Cream 1 Application(s) Rectal daily  iohexol 300 mG (iodine)/mL Oral Solution 30 milliLiter(s) Oral once  metoprolol succinate ER 50 milliGRAM(s) Oral daily  metroNIDAZOLE  IVPB 500 milliGRAM(s) IV Intermittent every 8 hours  pantoprazole  Injectable 40 milliGRAM(s) IV Push every 12 hours  senna 2 Tablet(s) Oral daily  vancomycin    Solution 125 milliGRAM(s) Oral every 6 hours    PRN MEDICATIONS  acetylcysteine 10%  Inhalation 3 milliLiter(s) Inhalation two times a day PRN  bisacodyl Suppository 10 milliGRAM(s) Rectal every 24 hours PRN  cloNIDine 0.1 milliGRAM(s) Oral every 8 hours PRN  HYDROcodone 10 mG/acetaminophen 325 mG 1 Tablet(s) Oral every 4 hours PRN  ondansetron Injectable 4 milliGRAM(s) IV Push every 8 hours PRN  simethicone 80 milliGRAM(s) Chew every 8 hours PRN  sodium chloride 3%  Inhalation 3 milliLiter(s) Inhalation every 4 hours PRN    VITALS:   T(F): 96.5  HR: 86  BP: 132/63  RR: 16  SpO2: 91%    LABS:                        8.9    21.19 )-----------( 183      ( 04 Mar 2019 07:06 )             28.2                         RADIOLOGY:    PHYSICAL EXAM:  GEN: No acute distress  LUNGS: Clear to auscultation bilaterally   HEART: Regular  ABD: Soft, non-tender, non-distended.  EXT: NC/NC/NE/2+PP/SARAVIA/Skin Intact.   NEURO: AAOX3    Intravenous access:   NG tube:   Rowe Catheter:

## 2019-03-04 NOTE — PROGRESS NOTE ADULT - ASSESSMENT
generalized anxiety disorder with lethargy likely secondary to medical complications.    increase cymbalta to 30mg daily  continue buspar 5mg tid  recommendations discussed with medical PA

## 2019-03-05 DIAGNOSIS — J44.9 CHRONIC OBSTRUCTIVE PULMONARY DISEASE, UNSPECIFIED: ICD-10-CM

## 2019-03-05 RX ORDER — FAMOTIDINE 10 MG/ML
20 INJECTION INTRAVENOUS DAILY
Qty: 0 | Refills: 0 | Status: DISCONTINUED | OUTPATIENT
Start: 2019-03-05 | End: 2019-03-13

## 2019-03-05 RX ADMIN — Medication 125 MILLIGRAM(S): at 05:36

## 2019-03-05 RX ADMIN — Medication 3 MILLILITER(S): at 14:45

## 2019-03-05 RX ADMIN — ONDANSETRON 4 MILLIGRAM(S): 8 TABLET, FILM COATED ORAL at 09:07

## 2019-03-05 RX ADMIN — BUDESONIDE AND FORMOTEROL FUMARATE DIHYDRATE 2 PUFF(S): 160; 4.5 AEROSOL RESPIRATORY (INHALATION) at 09:08

## 2019-03-05 RX ADMIN — Medication 125 MILLIGRAM(S): at 11:47

## 2019-03-05 RX ADMIN — Medication 5 MILLIGRAM(S): at 13:08

## 2019-03-05 RX ADMIN — Medication 3 MILLILITER(S): at 07:23

## 2019-03-05 RX ADMIN — PANTOPRAZOLE SODIUM 40 MILLIGRAM(S): 20 TABLET, DELAYED RELEASE ORAL at 05:37

## 2019-03-05 RX ADMIN — DULOXETINE HYDROCHLORIDE 30 MILLIGRAM(S): 30 CAPSULE, DELAYED RELEASE ORAL at 11:47

## 2019-03-05 RX ADMIN — Medication 3 MILLILITER(S): at 20:39

## 2019-03-05 RX ADMIN — Medication 1 APPLICATION(S): at 11:48

## 2019-03-05 RX ADMIN — Medication 81 MILLIGRAM(S): at 11:46

## 2019-03-05 RX ADMIN — CHLORHEXIDINE GLUCONATE 1 APPLICATION(S): 213 SOLUTION TOPICAL at 05:35

## 2019-03-05 RX ADMIN — Medication 125 MILLIGRAM(S): at 18:09

## 2019-03-05 RX ADMIN — SIMETHICONE 80 MILLIGRAM(S): 80 TABLET, CHEWABLE ORAL at 19:23

## 2019-03-05 RX ADMIN — Medication 125 MILLIGRAM(S): at 23:07

## 2019-03-05 RX ADMIN — BUDESONIDE AND FORMOTEROL FUMARATE DIHYDRATE 2 PUFF(S): 160; 4.5 AEROSOL RESPIRATORY (INHALATION) at 20:40

## 2019-03-05 RX ADMIN — ATORVASTATIN CALCIUM 20 MILLIGRAM(S): 80 TABLET, FILM COATED ORAL at 21:07

## 2019-03-05 RX ADMIN — Medication 5 MILLIGRAM(S): at 05:36

## 2019-03-05 RX ADMIN — Medication 100 MILLIGRAM(S): at 05:36

## 2019-03-05 RX ADMIN — FAMOTIDINE 20 MILLIGRAM(S): 10 INJECTION INTRAVENOUS at 11:47

## 2019-03-05 RX ADMIN — Medication 50 MILLIGRAM(S): at 05:36

## 2019-03-05 RX ADMIN — Medication 5 MILLIGRAM(S): at 21:07

## 2019-03-05 NOTE — PROGRESS NOTE ADULT - ASSESSMENT
ASSESSMENT/PLAN   - GEETA - resolved, but with persistent prerenal azotemia, R hydro, hyperkalemia resolved   - cdiff colitis without diarrhea   - leukocytosis from cdiff - worsening again   - left lung atelectasis from mucous plug   - malnutrition - acute on chronic severe protein calorie malnutrition   - debility with progressive clinical deterioration   - HTN    - bladder ca - s/p TURBT   - UTI - enterococci   - COPD   - h/o CHF   - anemia / MDS   - chronic back pain with spinal stimulator  - poor po intake  SUGGEST:  - document intake of either the Ensure Enlive or the Nepro (pt doesn't require Nepro, but if she likes it, it's helpful)  -suggest an NG feeding tube (8 or 10 Fr, NOT a Carver or Pate), continuing to encourage po meals, and providing postprandial enteral Osmolite 1.5 240 ml after each meal.  check bmp/phos/mg and correct lytes

## 2019-03-05 NOTE — PROGRESS NOTE ADULT - SUBJECTIVE AND OBJECTIVE BOX
SUBJECTIVE:    Patient is a 84y old Female who presents with a chief complaint of Urinary Retention/Hperkalemia with EKG changes (05 Mar 2019 11:29)    Currently admitted to medicine with the primary diagnosis of Bladder cancer     Today is hospital day 28d. This morning she is resting comfortably in bed and reports no new issues or overnight events.     PAST MEDICAL & SURGICAL HISTORY  Anemia: 4 PRBC 11/2018  Oxygen dependent: O2 2L Via NC  Bladder cancer: 2018  CHF (congestive heart failure)  Breast tumor: removal benign bl breasts  Anal cancer: Chemo and radiation 1992  COPD (chronic obstructive pulmonary disease)  Lung disease: COPD  Hypertension  History of cystoscopy: 10/2018  H/O breast surgery: 39 yrs. ago, bilateral  History of back surgery: 10 yrs. ago stimulator implant 2001, 2011( cervical and Lumbar)    SOCIAL HISTORY:  Negative for smoking/alcohol/drug use.     ALLERGIES:  hair dyes (Hives)  sulfa drugs (Headache)  Zoloft (Headache)    MEDICATIONS:  STANDING MEDICATIONS  ALBUTerol/ipratropium for Nebulization 3 milliLiter(s) Nebulizer every 6 hours  amLODIPine   Tablet 10 milliGRAM(s) Oral daily  aspirin  chewable 81 milliGRAM(s) Oral daily  atorvastatin 20 milliGRAM(s) Oral at bedtime  buDESOnide 160 MICROgram(s)/formoterol 4.5 MICROgram(s) Inhaler 2 Puff(s) Inhalation two times a day  busPIRone 5 milliGRAM(s) Oral three times a day  chlorhexidine 4% Liquid 1 Application(s) Topical <User Schedule>  docusate sodium 200 milliGRAM(s) Oral two times a day  DULoxetine 30 milliGRAM(s) Oral daily  epoetin spencer Injectable 36823 Unit(s) SubCutaneous every 7 days  famotidine    Tablet 20 milliGRAM(s) Oral daily  hydrocortisone 2.5% Rectal Cream 1 Application(s) Rectal daily  iohexol 300 mG (iodine)/mL Oral Solution 30 milliLiter(s) Oral once  metoprolol succinate ER 50 milliGRAM(s) Oral daily  senna 2 Tablet(s) Oral daily  vancomycin    Solution 125 milliGRAM(s) Oral every 6 hours    PRN MEDICATIONS  acetylcysteine 10%  Inhalation 3 milliLiter(s) Inhalation two times a day PRN  bisacodyl Suppository 10 milliGRAM(s) Rectal every 24 hours PRN  cloNIDine 0.1 milliGRAM(s) Oral every 8 hours PRN  HYDROcodone 10 mG/acetaminophen 325 mG 1 Tablet(s) Oral every 4 hours PRN  ondansetron Injectable 4 milliGRAM(s) IV Push every 8 hours PRN  simethicone 80 milliGRAM(s) Chew every 8 hours PRN  sodium chloride 3%  Inhalation 3 milliLiter(s) Inhalation every 4 hours PRN    VITALS:   T(F): 97.2  HR: 101  BP: 115/53  RR: 16  SpO2: --    LABS:                        8.9    21.19 )-----------( 183      ( 04 Mar 2019 07:06 )             28.2     03-04    146  |  112<H>  |  33<H>  ----------------------------<  142<H>  4.5   |  27  |  0.9    Ca    7.5<L>      04 Mar 2019 19:14                    RADIOLOGY:    PHYSICAL EXAM:  GEN: No acute distress  LUNGS: Clear to auscultation bilaterally   HEART: Regular  ABD: Soft, non-tender, non-distended.  EXT: NC/NC/NE/2+PP/SARAVIA/Skin Intact.   NEURO: AAOX3    Intravenous access:   NG tube:   Rowe Catheter:

## 2019-03-05 NOTE — PROGRESS NOTE ADULT - SUBJECTIVE AND OBJECTIVE BOX
Patient is a 84y old  Female who presents with a chief complaint of Urinary Retention/Hperkalemia with EKG changes (05 Mar 2019 08:04)  pt found to be C diff +, with high leukocytosis but has not had diarrhea.  ID and HEM/ONC f/u noted  today pt stated she does not feel like having breakfast '  family at bedside. pt is poorly eating  ICU Vital Signs Last 24 Hrs  T(C): 36.2 (05 Mar 2019 05:21), Max: 36.2 (05 Mar 2019 05:21)  T(F): 97.2 (05 Mar 2019 05:21), Max: 97.2 (05 Mar 2019 05:21)  HR: 101 (05 Mar 2019 05:21) (78 - 101)  BP: 115/53 (05 Mar 2019 05:21) (115/53 - 125/58)  RR: 16 (05 Mar 2019 05:21) (16 - 16)  Drug Dosing Weight  Height (cm): 157.48 (04 Mar 2019 09:32)  Weight (kg): 63 (04 Mar 2019 09:32)  BMI (kg/m2): 25.4 (04 Mar 2019 09:32)  BSA (m2): 1.64 (04 Mar 2019 09:32)    I&O's Detail       PHYSICAL EXAM:  Constitutional: awake , alert  nasal cannula in place  Gastrointestinal: soft n/d, n/t  Extremities no edema  MEDICATIONS  (STANDING):  ALBUTerol/ipratropium for Nebulization 3 milliLiter(s) Nebulizer every 6 hours  amLODIPine   Tablet 10 milliGRAM(s) Oral daily  aspirin  chewable 81 milliGRAM(s) Oral daily  atorvastatin 20 milliGRAM(s) Oral at bedtime  buDESOnide 160 MICROgram(s)/formoterol 4.5 MICROgram(s) Inhaler 2 Puff(s) Inhalation two times a day  busPIRone 5 milliGRAM(s) Oral three times a day  chlorhexidine 4% Liquid 1 Application(s) Topical <User Schedule>  docusate sodium 200 milliGRAM(s) Oral two times a day  DULoxetine 30 milliGRAM(s) Oral daily  epoetin spencer Injectable 63029 Unit(s) SubCutaneous every 7 days  famotidine    Tablet 20 milliGRAM(s) Oral daily  hydrocortisone 2.5% Rectal Cream 1 Application(s) Rectal daily  iohexol 300 mG (iodine)/mL Oral Solution 30 milliLiter(s) Oral once  metoprolol succinate ER 50 milliGRAM(s) Oral daily  senna 2 Tablet(s) Oral daily  vancomycin    Solution 125 milliGRAM(s) Oral every 6 hours    Diet, Regular:   No Concentrated Potassium  Low Sodium  Supplement Feeding Modality:  Oral  Nepro Cans or Servings Per Day:  1       Frequency:  Daily (02-26-19 @ 09:46)      LABS  03-04    146  |  112<H>  |  33<H>  ----------------------------<  142<H>  4.5   |  27  |  0.9    Ca    7.5<L>      04 Mar 2019 19:14                        8.9    21.19 )-----------( 183      ( 04 Mar 2019 07:06 )             28.2     CAPILLARY BLOOD GLUCOSE  POCT Blood Glucose.: 150 mg/dL (04 Mar 2019 16:04)

## 2019-03-05 NOTE — PROGRESS NOTE ADULT - SUBJECTIVE AND OBJECTIVE BOX
infectious diseases progress note:  KOTA SCHAFER is a 84yFemale patient    HYPERKALEMIA UTI URINARY RETENTION LEUKOCYTOSIS GEETA    Clostridium difficile colitis  Leukocytosis, unspecified type  VUR (vesicoureteric reflux)  Urinary tract infection without hematuria, site unspecified  Urinary tract infection associated with indwelling urethral catheter, subsequent encounter  CHF (congestive heart failure)  COPD (chronic obstructive pulmonary disease)  GEETA (acute kidney injury)  UTI (urinary tract infection)  Urinary retention  Hyperkalemia      ROS:  cough - mild     Allergies    hair dyes (Hives)  sulfa drugs (Headache)  Zoloft (Headache)    Intolerances        ANTIBIOTICS/RELEVANT:  antimicrobials  metroNIDAZOLE  IVPB 500 milliGRAM(s) IV Intermittent every 8 hours  vancomycin    Solution 125 milliGRAM(s) Oral every 6 hours    immunologic:  epoetin spencer Injectable 09359 Unit(s) SubCutaneous every 7 days    OTHER:  acetylcysteine 10%  Inhalation 3 milliLiter(s) Inhalation two times a day PRN  ALBUTerol/ipratropium for Nebulization 3 milliLiter(s) Nebulizer every 6 hours  amLODIPine   Tablet 10 milliGRAM(s) Oral daily  aspirin  chewable 81 milliGRAM(s) Oral daily  atorvastatin 20 milliGRAM(s) Oral at bedtime  bisacodyl Suppository 10 milliGRAM(s) Rectal every 24 hours PRN  buDESOnide 160 MICROgram(s)/formoterol 4.5 MICROgram(s) Inhaler 2 Puff(s) Inhalation two times a day  busPIRone 5 milliGRAM(s) Oral three times a day  chlorhexidine 4% Liquid 1 Application(s) Topical <User Schedule>  cloNIDine 0.1 milliGRAM(s) Oral every 8 hours PRN  docusate sodium 200 milliGRAM(s) Oral two times a day  DULoxetine 30 milliGRAM(s) Oral daily  HYDROcodone 10 mG/acetaminophen 325 mG 1 Tablet(s) Oral every 4 hours PRN  hydrocortisone 2.5% Rectal Cream 1 Application(s) Rectal daily  iohexol 300 mG (iodine)/mL Oral Solution 30 milliLiter(s) Oral once  metoprolol succinate ER 50 milliGRAM(s) Oral daily  ondansetron Injectable 4 milliGRAM(s) IV Push every 8 hours PRN  pantoprazole  Injectable 40 milliGRAM(s) IV Push every 12 hours  senna 2 Tablet(s) Oral daily  simethicone 80 milliGRAM(s) Chew every 8 hours PRN  sodium chloride 3%  Inhalation 3 milliLiter(s) Inhalation every 4 hours PRN      Objective:  T(F): 97.2 (03-05-19 @ 05:21), Max: 97.2 (03-05-19 @ 05:21)  HR: 101 (03-05-19 @ 05:21) (78 - 101)  BP: 115/53 (03-05-19 @ 05:21) (115/53 - 125/58)  RR: 16 (03-05-19 @ 05:21) (16 - 16)  SpO2: 91% (03-04-19 @ 08:44) (91% - 91%)    PHYSICAL EXAM:  Constitutional:Well-developed, well nourished  Neck:no JVD, no lymphadenopathy, supple  Respiratory: few rhonchi  dalia  Cardiovascular: S1S2 RRR, no murmurs  Gastrointestinal:soft, (+) BS, no HSM  Extremities:no phlebitis         LABS:                        8.9    21.19 )-----------( 183      ( 04 Mar 2019 07:06 )             28.2     03-04    146  |  112<H>  |  33<H>  ----------------------------<  142<H>  4.5   |  27  |  0.9    Ca    7.5<L>      04 Mar 2019 19:14              MICROBIOLOGY:            RADIOLOGY & ADDITIONAL STUDIES:

## 2019-03-05 NOTE — PROGRESS NOTE ADULT - SUBJECTIVE AND OBJECTIVE BOX
NEPHROLOGY FOLLOW UP NOTE    pt seen and examined  d/w daughter at bedside  diarrhea better today  eating a bit more  on ivf  no sob  no fever        PAST MEDICAL & SURGICAL HISTORY:  CHF (congestive heart failure)  Breast tumor: removal benign bl breasts  Anal cancer  COPD (chronic obstructive pulmonary disease)  Lung disease: COPD  Hypertension  H/O breast surgery: 39 yrs. ago, bilateral  History of back surgery: 10 yrs. ago stimulator implant 2001, 2011    Allergies:  hair dyes (Hives)  sulfa drugs (Headache)    Home Medications Reviewed    SOCIAL HISTORY:  Denies ETOH,Smoking,   FAMILY HISTORY:  No pertinent family history in first degree relatives        REVIEW OF SYSTEMS:  All other review of systems is negative unless indicated above.        PHYSICAL EXAM:  NAD  weak appearing  dry mm  no jvd  b/l bs decreased  rrr  soft, + bs  no edema    Hospital Medications:   MEDICATIONS  (STANDING):  ALBUTerol/ipratropium for Nebulization 3 milliLiter(s) Nebulizer every 6 hours  amLODIPine   Tablet 10 milliGRAM(s) Oral daily  aspirin  chewable 81 milliGRAM(s) Oral daily  atorvastatin 20 milliGRAM(s) Oral at bedtime  buDESOnide 160 MICROgram(s)/formoterol 4.5 MICROgram(s) Inhaler 2 Puff(s) Inhalation two times a day  busPIRone 5 milliGRAM(s) Oral three times a day  chlorhexidine 4% Liquid 1 Application(s) Topical <User Schedule>  docusate sodium 200 milliGRAM(s) Oral two times a day  DULoxetine 30 milliGRAM(s) Oral daily  epoetin spencer Injectable 96208 Unit(s) SubCutaneous every 7 days  famotidine    Tablet 20 milliGRAM(s) Oral daily  hydrocortisone 2.5% Rectal Cream 1 Application(s) Rectal daily  iohexol 300 mG (iodine)/mL Oral Solution 30 milliLiter(s) Oral once  metoprolol succinate ER 50 milliGRAM(s) Oral daily  senna 2 Tablet(s) Oral daily  vancomycin    Solution 125 milliGRAM(s) Oral every 6 hours        VITALS:  T(F): 97.2 (03-05-19 @ 05:21), Max: 97.2 (03-05-19 @ 05:21)  HR: 101 (03-05-19 @ 05:21)  BP: 115/53 (03-05-19 @ 05:21)  RR: 16 (03-05-19 @ 05:21)  SpO2: 91% (03-04-19 @ 08:44)  Wt(kg): --    Height (cm): 157.48 (03-04 @ 09:32)  Weight (kg): 63 (03-04 @ 09:32)  BMI (kg/m2): 25.4 (03-04 @ 09:32)  BSA (m2): 1.64 (03-04 @ 09:32)    LABS:  03-04    146  |  112<H>  |  33<H>  ----------------------------<  142<H>  4.5   |  27  |  0.9    Ca    7.5<L>      04 Mar 2019 19:14                            8.9    21.19 )-----------( 183      ( 04 Mar 2019 07:06 )             28.2       Urine Studies:        RADIOLOGY & ADDITIONAL STUDIES:

## 2019-03-05 NOTE — PROGRESS NOTE ADULT - ASSESSMENT
GEETA  hyperkalemia  cdiff colitis  leukocytosis   left lung atelectasis from mucous plug  left moderate pleural effusion  PNA  malnutrition  debility  left 2F complex renal cyst  HTN   bladder ca  s/p TURBT  right hydro   UTI - enterococci  COPD  CHF  anemia / MDS  chronic back pain with spinal stimulator  rash  poor appetite     Plan:    dc ivf  low k diet  cont PO vanco   off other abx  trend wbc  protonix   procrit per hem  cont norvasc and lopressor  off aldactone - do not restart  PT / rehab / OOB   f/u/ check path / oupt f/u of complex renal cyst

## 2019-03-05 NOTE — PROGRESS NOTE ADULT - SUBJECTIVE AND OBJECTIVE BOX
Patient is a 84y old  Female who presents with a chief complaint of Urinary Retention/Hperkalemia with EKG changes (22 Feb 2019 09:55)       Pt is seen and examined  pt is awake and lying in bed  isolation for C diff colitis   eating better with clear liquid diet       ROS:  Negative   MEDICATIONS  (STANDING):  ALBUTerol/ipratropium for Nebulization 3 milliLiter(s) Nebulizer every 6 hours  amLODIPine   Tablet 10 milliGRAM(s) Oral daily  aspirin  chewable 81 milliGRAM(s) Oral daily  atorvastatin 20 milliGRAM(s) Oral at bedtime  buDESOnide 160 MICROgram(s)/formoterol 4.5 MICROgram(s) Inhaler 2 Puff(s) Inhalation two times a day  busPIRone 5 milliGRAM(s) Oral three times a day  cefepime   IVPB      cefepime   IVPB 1000 milliGRAM(s) IV Intermittent daily  chlorhexidine 4% Liquid 1 Application(s) Topical <User Schedule>  docusate sodium 200 milliGRAM(s) Oral two times a day  DULoxetine 20 milliGRAM(s) Oral daily  epoetin spencer Injectable 43433 Unit(s) SubCutaneous every 7 days  heparin  Injectable 5000 Unit(s) SubCutaneous every 8 hours  hydrocortisone 2.5% Rectal Cream 1 Application(s) Rectal daily  metoprolol succinate ER 50 milliGRAM(s) Oral daily  senna 2 Tablet(s) Oral daily  vancomycin    Solution 125 milliGRAM(s) Oral every 6 hours    MEDICATIONS  (PRN):  bisacodyl Suppository 10 milliGRAM(s) Rectal every 24 hours PRN Constipation  cloNIDine 0.1 milliGRAM(s) Oral every 8 hours PRN for sbp > 180  HYDROcodone 10 mG/acetaminophen 325 mG 1 Tablet(s) Oral every 4 hours PRN Moderate Pain (4 - 6)  ondansetron Injectable 4 milliGRAM(s) IV Push every 8 hours PRN Nausea and/or Vomiting  simethicone 80 milliGRAM(s) Chew every 8 hours PRN Gas  sodium chloride 3%  Inhalation 3 milliLiter(s) Inhalation every 4 hours PRN ..      Allergies    hair dyes (Hives)  sulfa drugs (Headache)  Zoloft (Headache)    Intolerances        Vital Signs Last 24 Hrs  T(C): 36.7 (22 Feb 2019 14:00), Max: 36.7 (22 Feb 2019 10:12)  T(F): 98 (22 Feb 2019 14:00), Max: 98 (22 Feb 2019 10:12)  HR: 77 (22 Feb 2019 14:00) (77 - 97)  BP: 141/63 (22 Feb 2019 14:00) (133/58 - 154/67)  BP(mean): --  RR: 16 (22 Feb 2019 14:00) (16 - 20)  SpO2: 96% (22 Feb 2019 11:19) (94% - 98%)    PHYSICAL EXAM  General: adult in NAD  HEENT: clear oropharynx, anicteric sclera, pink conjunctiva  Neck: supple  CV: normal S1/S2 with no murmur rubs or gallops  Lungs: positive air movement b/l ant lungs,clear to auscultation, no wheezes, no rales  Abdomen: soft non-tender non-distended, no hepatosplenomegaly  Ext: no clubbing cyanosis or edema  Skin: no rashes and no petechiae  Neuro: alert and oriented X 4, no focal deficits  LABS:               3/4/19 [id number]  hb 8.9 platelet 183            creat 0..9         Mean Cell Volume : 102.4 fL  Mean Cell Hemoglobin : 32.8 pg  Mean Cell Hemoglobin Concentration : 32.0 g/dL  Auto Neutrophil # : x  Auto Lymphocyte # : x  Auto Monocyte # : x  Auto Eosinophil # : x  Auto Basophil # : x  Auto Neutrophil % : x  Auto Lymphocyte % : x  Auto Monocyte % : x  Auto Eosinophil % : x  Auto Basophil % : x    Serial CBC's  02-22 @ 07:48  Hct-29.4 / Hgb-9.4 / Plat-278 / RBC-2.87 / WBC-26.45          Serial CBC's  02-20 @ 18:58  Hct-31.0 / Hgb-9.9 / Plat-302 / RBC-3.02 / WBC-25.13          Serial CBC's  02-19 @ 13:10  Hct-32.6 / Hgb-10.3 / Plat-293 / RBC-3.17 / WBC-19.66            02-22    139  |  105  |  50<H>  ----------------------------<  130<H>  5.0   |  27  |  0.8    Ca    7.7<L>      22 Feb 2019 07:48  Phos  3.2     02-20  Mg     2.2     02-20    TPro  4.3<L>  /  Alb  2.5<L>  /  TBili  0.2  /  DBili  x   /  AST  16  /  ALT  15  /  AlkPhos  71  02-22          Platelet Count - Automated: 278 K/uL (02-22-19 @ 07:48)  Hemoglobin: 9.4 g/dL (02-22-19 @ 07:48)  WBC Count: 26.45 K/uL (02-22-19 @ 07:48)  Hematocrit: 29.4 % (02-22-19 @ 07:48)  Hematocrit: 31.0 % (02-20-19 @ 18:58)  Platelet Count - Automated: 302 K/uL (02-20-19 @ 18:58)  Hemoglobin: 9.9 g/dL (02-20-19 @ 18:58)  WBC Count: 25.13 K/uL (02-20-19 @ 18:58)  Hemoglobin: 10.3 g/dL (02-19-19 @ 13:10)  WBC Count: 19.66 K/uL (02-19-19 @ 13:10)  Hematocrit: 32.6 % (02-19-19 @ 13:10)  Platelet Count - Automated: 293 K/uL (02-19-19 @ 13:10)  WBC Count: 20.00 K/uL (02-17-19 @ 08:42)  Hematocrit: 31.3 % (02-17-19 @ 08:42)  Platelet Count - Automated: 230 K/uL (02-17-19 @ 08:42)  Hemoglobin: 9.9 g/dL (02-17-19 @ 08:42)  WBC Count: 24.04 K/uL (02-16-19 @ 15:09)  Hematocrit: 30.3 % (02-16-19 @ 15:09)  Platelet Count - Automated: 213 K/uL (02-16-19 @ 15:09)  Hemoglobin: 9.9 g/dL (02-16-19 @ 15:09)  Platelet Count - Automated: 214 K/uL (02-15-19 @ 06:13)  Hemoglobin: 10.1 g/dL (02-15-19 @ 06:13)  WBC Count: 34.64 K/uL (02-15-19 @ 06:13)  Hematocrit: 30.7 % (02-15-19 @ 06:13)  Hematocrit: 29.4 % (02-13-19 @ 14:50)  Platelet Count - Automated: 219 K/uL (02-13-19 @ 14:50)  Hemoglobin: 9.7 g/dL (02-13-19 @ 14:50)  WBC Count: 38.51 K/uL (02-13-19 @ 14:50)                BLOOD SMEAR INTERPRETATION:       RADIOLOGY & ADDITIONAL STUDIES:

## 2019-03-05 NOTE — PROGRESS NOTE ADULT - ASSESSMENT
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 UTI with   leukocytosis likely sec to c diff  Complaining of abdominal pains and diarrhea sec to  C. diff colitis  CT shows worsening  colitis and right hydroureteronephrosis  Left kidney mass   Small effusions on CT right > left with lung nodules.  left lung atelectasis from mucous plug..s/p mucus plug removed  anemia for hb 8 to 9 ,,GI bleed resolved       PLAN:  on po vanco and flagyl  for c diff.  leukocytosis -monitor trend..   21K .. pleural effusion   cont  procrit 40,000 unit qweekly   monitor counts.  f/u with urology for recent TURBT path result  will f/u as outpt for further Mn of bladder ca.  encourage po intake   cont other supportive care.

## 2019-03-06 LAB
ALBUMIN SERPL ELPH-MCNC: 2.3 G/DL — LOW (ref 3.5–5.2)
ALP SERPL-CCNC: 640 U/L — HIGH (ref 30–115)
ALT FLD-CCNC: 116 U/L — HIGH (ref 0–41)
ANION GAP SERPL CALC-SCNC: 4 MMOL/L — LOW (ref 7–14)
AST SERPL-CCNC: 51 U/L — HIGH (ref 0–41)
BILIRUB SERPL-MCNC: 0.3 MG/DL — SIGNIFICANT CHANGE UP (ref 0.2–1.2)
BUN SERPL-MCNC: 38 MG/DL — HIGH (ref 10–20)
CALCIUM SERPL-MCNC: 7.6 MG/DL — LOW (ref 8.5–10.1)
CHLORIDE SERPL-SCNC: 114 MMOL/L — HIGH (ref 98–110)
CO2 SERPL-SCNC: 28 MMOL/L — SIGNIFICANT CHANGE UP (ref 17–32)
CREAT SERPL-MCNC: 0.8 MG/DL — SIGNIFICANT CHANGE UP (ref 0.7–1.5)
GLUCOSE SERPL-MCNC: 113 MG/DL — HIGH (ref 70–99)
HCT VFR BLD CALC: 28.4 % — LOW (ref 37–47)
HGB BLD-MCNC: 8.5 G/DL — LOW (ref 12–16)
MCHC RBC-ENTMCNC: 29.9 G/DL — LOW (ref 32–37)
MCHC RBC-ENTMCNC: 32.3 PG — HIGH (ref 27–31)
MCV RBC AUTO: 108 FL — HIGH (ref 81–99)
NRBC # BLD: 0 /100 WBCS — SIGNIFICANT CHANGE UP (ref 0–0)
PLATELET # BLD AUTO: 198 K/UL — SIGNIFICANT CHANGE UP (ref 130–400)
POTASSIUM SERPL-MCNC: 4.8 MMOL/L — SIGNIFICANT CHANGE UP (ref 3.5–5)
POTASSIUM SERPL-SCNC: 4.8 MMOL/L — SIGNIFICANT CHANGE UP (ref 3.5–5)
PROT SERPL-MCNC: 4.2 G/DL — LOW (ref 6–8)
RBC # BLD: 2.63 M/UL — LOW (ref 4.2–5.4)
RBC # FLD: 19.7 % — HIGH (ref 11.5–14.5)
SODIUM SERPL-SCNC: 146 MMOL/L — SIGNIFICANT CHANGE UP (ref 135–146)
WBC # BLD: 17.86 K/UL — HIGH (ref 4.8–10.8)
WBC # FLD AUTO: 17.86 K/UL — HIGH (ref 4.8–10.8)

## 2019-03-06 RX ADMIN — Medication 3 MILLILITER(S): at 19:37

## 2019-03-06 RX ADMIN — Medication 5 MILLIGRAM(S): at 06:08

## 2019-03-06 RX ADMIN — Medication 125 MILLIGRAM(S): at 18:13

## 2019-03-06 RX ADMIN — AMLODIPINE BESYLATE 10 MILLIGRAM(S): 2.5 TABLET ORAL at 06:08

## 2019-03-06 RX ADMIN — DULOXETINE HYDROCHLORIDE 30 MILLIGRAM(S): 30 CAPSULE, DELAYED RELEASE ORAL at 12:12

## 2019-03-06 RX ADMIN — Medication 5 MILLIGRAM(S): at 15:57

## 2019-03-06 RX ADMIN — Medication 1 APPLICATION(S): at 12:14

## 2019-03-06 RX ADMIN — Medication 81 MILLIGRAM(S): at 12:13

## 2019-03-06 RX ADMIN — Medication 5 MILLIGRAM(S): at 21:02

## 2019-03-06 RX ADMIN — Medication 3 MILLILITER(S): at 13:25

## 2019-03-06 RX ADMIN — Medication 125 MILLIGRAM(S): at 12:13

## 2019-03-06 RX ADMIN — Medication 50 MILLIGRAM(S): at 06:08

## 2019-03-06 RX ADMIN — Medication 3 MILLILITER(S): at 07:06

## 2019-03-06 RX ADMIN — BUDESONIDE AND FORMOTEROL FUMARATE DIHYDRATE 2 PUFF(S): 160; 4.5 AEROSOL RESPIRATORY (INHALATION) at 08:31

## 2019-03-06 RX ADMIN — Medication 125 MILLIGRAM(S): at 06:08

## 2019-03-06 RX ADMIN — ATORVASTATIN CALCIUM 20 MILLIGRAM(S): 80 TABLET, FILM COATED ORAL at 21:02

## 2019-03-06 RX ADMIN — FAMOTIDINE 20 MILLIGRAM(S): 10 INJECTION INTRAVENOUS at 12:12

## 2019-03-06 RX ADMIN — CHLORHEXIDINE GLUCONATE 1 APPLICATION(S): 213 SOLUTION TOPICAL at 08:31

## 2019-03-06 NOTE — PROGRESS NOTE ADULT - SUBJECTIVE AND OBJECTIVE BOX
SUBJECTIVE:    Patient is a 84y old Female who presents with a chief complaint of Urinary Retention/Hperkalemia with EKG changes (05 Mar 2019 12:24)    Currently admitted to medicine with the primary diagnosis of Bladder cancer     Today is hospital day 29d. This morning she is resting comfortably in bed and reports no new issues or overnight events.     PAST MEDICAL & SURGICAL HISTORY  Anemia: 4 PRBC 11/2018  Oxygen dependent: O2 2L Via NC  Bladder cancer: 2018  CHF (congestive heart failure)  Breast tumor: removal benign bl breasts  Anal cancer: Chemo and radiation 1992  COPD (chronic obstructive pulmonary disease)  Lung disease: COPD  Hypertension  History of cystoscopy: 10/2018  H/O breast surgery: 39 yrs. ago, bilateral  History of back surgery: 10 yrs. ago stimulator implant 2001, 2011( cervical and Lumbar)    SOCIAL HISTORY:  Negative for smoking/alcohol/drug use.     ALLERGIES:  hair dyes (Hives)  sulfa drugs (Headache)  Zoloft (Headache)    MEDICATIONS:  STANDING MEDICATIONS  ALBUTerol/ipratropium for Nebulization 3 milliLiter(s) Nebulizer every 6 hours  amLODIPine   Tablet 10 milliGRAM(s) Oral daily  aspirin  chewable 81 milliGRAM(s) Oral daily  atorvastatin 20 milliGRAM(s) Oral at bedtime  buDESOnide 160 MICROgram(s)/formoterol 4.5 MICROgram(s) Inhaler 2 Puff(s) Inhalation two times a day  busPIRone 5 milliGRAM(s) Oral three times a day  chlorhexidine 4% Liquid 1 Application(s) Topical <User Schedule>  docusate sodium 200 milliGRAM(s) Oral two times a day  DULoxetine 30 milliGRAM(s) Oral daily  epoetin spencer Injectable 88325 Unit(s) SubCutaneous every 7 days  famotidine    Tablet 20 milliGRAM(s) Oral daily  hydrocortisone 2.5% Rectal Cream 1 Application(s) Rectal daily  iohexol 300 mG (iodine)/mL Oral Solution 30 milliLiter(s) Oral once  metoprolol succinate ER 50 milliGRAM(s) Oral daily  senna 2 Tablet(s) Oral daily  vancomycin    Solution 125 milliGRAM(s) Oral every 6 hours    PRN MEDICATIONS  acetylcysteine 10%  Inhalation 3 milliLiter(s) Inhalation two times a day PRN  bisacodyl Suppository 10 milliGRAM(s) Rectal every 24 hours PRN  cloNIDine 0.1 milliGRAM(s) Oral every 8 hours PRN  HYDROcodone 10 mG/acetaminophen 325 mG 1 Tablet(s) Oral every 4 hours PRN  ondansetron Injectable 4 milliGRAM(s) IV Push every 8 hours PRN  simethicone 80 milliGRAM(s) Chew every 8 hours PRN  sodium chloride 3%  Inhalation 3 milliLiter(s) Inhalation every 4 hours PRN    VITALS:   T(F): 96.9  HR: 95  BP: 145/62  RR: 16  SpO2: --    LABS:                        8.5    17.86 )-----------( 198      ( 06 Mar 2019 06:31 )             28.4     03-06    146  |  114<H>  |  38<H>  ----------------------------<  113<H>  4.8   |  28  |  0.8    Ca    7.6<L>      06 Mar 2019 06:31    TPro  4.2<L>  /  Alb  2.3<L>  /  TBili  0.3  /  DBili  x   /  AST  51<H>  /  ALT  116<H>  /  AlkPhos  640<H>  03-06                  RADIOLOGY:    PHYSICAL EXAM:  GEN: No acute distress  LUNGS: Clear to auscultation bilaterally   HEART: Regular  ABD: Soft, non-tender, non-distended.  EXT: NC/NC/NE/2+PP/SARAVIA/Skin Intact.   NEURO: AAOX3    Intravenous access:   NG tube:   Rowe Catheter:

## 2019-03-06 NOTE — PROGRESS NOTE ADULT - SUBJECTIVE AND OBJECTIVE BOX
Patient is a 84y old  Female who presents with a chief complaint of Urinary Retention/Hperkalemia with EKG changes (22 Feb 2019 09:55)       Pt is seen and examined  pt is awake and lying in bed  isolation for C diff colitis   eating better with clear liquid diet and increased energy       ROS:  Negative   MEDICATIONS  (STANDING):  ALBUTerol/ipratropium for Nebulization 3 milliLiter(s) Nebulizer every 6 hours  amLODIPine   Tablet 10 milliGRAM(s) Oral daily  aspirin  chewable 81 milliGRAM(s) Oral daily  atorvastatin 20 milliGRAM(s) Oral at bedtime  buDESOnide 160 MICROgram(s)/formoterol 4.5 MICROgram(s) Inhaler 2 Puff(s) Inhalation two times a day  busPIRone 5 milliGRAM(s) Oral three times a day  cefepime   IVPB      cefepime   IVPB 1000 milliGRAM(s) IV Intermittent daily  chlorhexidine 4% Liquid 1 Application(s) Topical <User Schedule>  docusate sodium 200 milliGRAM(s) Oral two times a day  DULoxetine 20 milliGRAM(s) Oral daily  epoetin spencer Injectable 89488 Unit(s) SubCutaneous every 7 days  heparin  Injectable 5000 Unit(s) SubCutaneous every 8 hours  hydrocortisone 2.5% Rectal Cream 1 Application(s) Rectal daily  metoprolol succinate ER 50 milliGRAM(s) Oral daily  senna 2 Tablet(s) Oral daily  vancomycin    Solution 125 milliGRAM(s) Oral every 6 hours    MEDICATIONS  (PRN):  bisacodyl Suppository 10 milliGRAM(s) Rectal every 24 hours PRN Constipation  cloNIDine 0.1 milliGRAM(s) Oral every 8 hours PRN for sbp > 180  HYDROcodone 10 mG/acetaminophen 325 mG 1 Tablet(s) Oral every 4 hours PRN Moderate Pain (4 - 6)  ondansetron Injectable 4 milliGRAM(s) IV Push every 8 hours PRN Nausea and/or Vomiting  simethicone 80 milliGRAM(s) Chew every 8 hours PRN Gas  sodium chloride 3%  Inhalation 3 milliLiter(s) Inhalation every 4 hours PRN ..      Allergies    hair dyes (Hives)  sulfa drugs (Headache)  Zoloft (Headache)    Intolerances        Vital Signs Last 24 Hrs  T(C): 36.7 (22 Feb 2019 14:00), Max: 36.7 (22 Feb 2019 10:12)  T(F): 98 (22 Feb 2019 14:00), Max: 98 (22 Feb 2019 10:12)  HR: 77 (22 Feb 2019 14:00) (77 - 97)  BP: 141/63 (22 Feb 2019 14:00) (133/58 - 154/67)  BP(mean): --  RR: 16 (22 Feb 2019 14:00) (16 - 20)  SpO2: 96% (22 Feb 2019 11:19) (94% - 98%)    PHYSICAL EXAM  General: adult in NAD  HEENT: clear oropharynx, anicteric sclera, pink conjunctiva  Neck: supple  CV: normal S1/S2 with no murmur rubs or gallops  Lungs: positive air movement b/l ant lungs,clear to auscultation, no wheezes, no rales  Abdomen: soft non-tender non-distended, no hepatosplenomegaly  Ext: no clubbing cyanosis or edema  Skin: no rashes and no petechiae  Neuro: alert and oriented X 4, no focal deficits  LABS:               3/4/19 [id number]  hb 8.9 platelet 183            creat 0..9         Mean Cell Volume : 102.4 fL  Mean Cell Hemoglobin : 32.8 pg  Mean Cell Hemoglobin Concentration : 32.0 g/dL  Auto Neutrophil # : x  Auto Lymphocyte # : x  Auto Monocyte # : x  Auto Eosinophil # : x  Auto Basophil # : x  Auto Neutrophil % : x  Auto Lymphocyte % : x  Auto Monocyte % : x  Auto Eosinophil % : x  Auto Basophil % : x    Serial CBC's  02-22 @ 07:48  Hct-29.4 / Hgb-9.4 / Plat-278 / RBC-2.87 / WBC-26.45          Serial CBC's  02-20 @ 18:58  Hct-31.0 / Hgb-9.9 / Plat-302 / RBC-3.02 / WBC-25.13          Serial CBC's  02-19 @ 13:10  Hct-32.6 / Hgb-10.3 / Plat-293 / RBC-3.17 / WBC-19.66            02-22    139  |  105  |  50<H>  ----------------------------<  130<H>  5.0   |  27  |  0.8    Ca    7.7<L>      22 Feb 2019 07:48  Phos  3.2     02-20  Mg     2.2     02-20    TPro  4.3<L>  /  Alb  2.5<L>  /  TBili  0.2  /  DBili  x   /  AST  16  /  ALT  15  /  AlkPhos  71  02-22          Platelet Count - Automated: 278 K/uL (02-22-19 @ 07:48)  Hemoglobin: 9.4 g/dL (02-22-19 @ 07:48)  WBC Count: 26.45 K/uL (02-22-19 @ 07:48)  Hematocrit: 29.4 % (02-22-19 @ 07:48)  Hematocrit: 31.0 % (02-20-19 @ 18:58)  Platelet Count - Automated: 302 K/uL (02-20-19 @ 18:58)  Hemoglobin: 9.9 g/dL (02-20-19 @ 18:58)  WBC Count: 25.13 K/uL (02-20-19 @ 18:58)  Hemoglobin: 10.3 g/dL (02-19-19 @ 13:10)  WBC Count: 19.66 K/uL (02-19-19 @ 13:10)  Hematocrit: 32.6 % (02-19-19 @ 13:10)  Platelet Count - Automated: 293 K/uL (02-19-19 @ 13:10)  WBC Count: 20.00 K/uL (02-17-19 @ 08:42)  Hematocrit: 31.3 % (02-17-19 @ 08:42)  Platelet Count - Automated: 230 K/uL (02-17-19 @ 08:42)  Hemoglobin: 9.9 g/dL (02-17-19 @ 08:42)  WBC Count: 24.04 K/uL (02-16-19 @ 15:09)  Hematocrit: 30.3 % (02-16-19 @ 15:09)  Platelet Count - Automated: 213 K/uL (02-16-19 @ 15:09)  Hemoglobin: 9.9 g/dL (02-16-19 @ 15:09)  Platelet Count - Automated: 214 K/uL (02-15-19 @ 06:13)  Hemoglobin: 10.1 g/dL (02-15-19 @ 06:13)  WBC Count: 34.64 K/uL (02-15-19 @ 06:13)  Hematocrit: 30.7 % (02-15-19 @ 06:13)  Hematocrit: 29.4 % (02-13-19 @ 14:50)  Platelet Count - Automated: 219 K/uL (02-13-19 @ 14:50)  Hemoglobin: 9.7 g/dL (02-13-19 @ 14:50)  WBC Count: 38.51 K/uL (02-13-19 @ 14:50)                BLOOD SMEAR INTERPRETATION:       RADIOLOGY & ADDITIONAL STUDIES:

## 2019-03-06 NOTE — PROGRESS NOTE ADULT - ASSESSMENT
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 UTI with   leukocytosis likely sec to c diff  Complaining of abdominal pains and diarrhea sec to  C. diff colitis  CT shows worsening  colitis and right hydroureteronephrosis  Left kidney mass   Small effusions on CT right > left with lung nodules.  left lung atelectasis from mucous plug..s/p mucus plug removed  anemia for hb 8 to 9 ,,GI bleed resolved   weakness ... improving.       PLAN:  on po vanco and flagyl  for c diff.  leukocytosis -monitor trend..   21K .. pleural effusion   cont  procrit 40,000 unit qweekly   monitor counts.  f/u with urology for recent TURBT path result  will f/u as outpt for further Mn of bladder ca.  encourage po intake   cont other supportive care.

## 2019-03-06 NOTE — PROGRESS NOTE ADULT - ASSESSMENT
85 yo female ex smoker with a history of COPD on home O2,Anemia sec to low grade MDS by recent bmbx,on weekly procrit   Admitted for electrolyte imbalance, GEETA,. UTI with   leukocytosis likely sec to c diff  Complaining of abdominal pains and diarrhea sec to  C. diff colitis  CT shows worsening  colitis and right hydroureteronephrosis  Left kidney mass   Small effusions on CT right > left with lung nodules.  left lung atelectasis from mucous plug..s/p mucus plug removed  anemia for hb 8 to 9 ,,GI bleed resolved       PLAN: ---------------------------------------------------------------------------------------------------  d/c c diff meds  leukocytosis -monitor trend..   21K .. pleural effusion   cont  procrit 40,000 unit qweekly   monitor counts.  f/u with urology for recent TURBT path result  will f/u as outpt for further Mn of bladder ca.  encourage po intake   cont other supportive care.

## 2019-03-07 RX ORDER — DULOXETINE HYDROCHLORIDE 30 MG/1
30 CAPSULE, DELAYED RELEASE ORAL AT BEDTIME
Qty: 0 | Refills: 0 | Status: DISCONTINUED | OUTPATIENT
Start: 2019-03-07 | End: 2019-03-13

## 2019-03-07 RX ORDER — VANCOMYCIN HCL 1 G
125 VIAL (EA) INTRAVENOUS EVERY 6 HOURS
Qty: 0 | Refills: 0 | Status: DISCONTINUED | OUTPATIENT
Start: 2019-03-07 | End: 2019-03-13

## 2019-03-07 RX ORDER — FUROSEMIDE 40 MG
20 TABLET ORAL ONCE
Qty: 0 | Refills: 0 | Status: COMPLETED | OUTPATIENT
Start: 2019-03-07 | End: 2019-03-07

## 2019-03-07 RX ORDER — FUROSEMIDE 40 MG
20 TABLET ORAL DAILY
Qty: 0 | Refills: 0 | Status: DISCONTINUED | OUTPATIENT
Start: 2019-03-07 | End: 2019-03-07

## 2019-03-07 RX ADMIN — CHLORHEXIDINE GLUCONATE 1 APPLICATION(S): 213 SOLUTION TOPICAL at 05:55

## 2019-03-07 RX ADMIN — AMLODIPINE BESYLATE 10 MILLIGRAM(S): 2.5 TABLET ORAL at 05:55

## 2019-03-07 RX ADMIN — Medication 5 MILLIGRAM(S): at 05:55

## 2019-03-07 RX ADMIN — BUDESONIDE AND FORMOTEROL FUMARATE DIHYDRATE 2 PUFF(S): 160; 4.5 AEROSOL RESPIRATORY (INHALATION) at 21:26

## 2019-03-07 RX ADMIN — Medication 20 MILLIGRAM(S): at 15:57

## 2019-03-07 RX ADMIN — Medication 125 MILLIGRAM(S): at 00:13

## 2019-03-07 RX ADMIN — BUDESONIDE AND FORMOTEROL FUMARATE DIHYDRATE 2 PUFF(S): 160; 4.5 AEROSOL RESPIRATORY (INHALATION) at 12:13

## 2019-03-07 RX ADMIN — ATORVASTATIN CALCIUM 20 MILLIGRAM(S): 80 TABLET, FILM COATED ORAL at 21:18

## 2019-03-07 RX ADMIN — BUDESONIDE AND FORMOTEROL FUMARATE DIHYDRATE 2 PUFF(S): 160; 4.5 AEROSOL RESPIRATORY (INHALATION) at 00:14

## 2019-03-07 RX ADMIN — Medication 125 MILLIGRAM(S): at 17:25

## 2019-03-07 RX ADMIN — Medication 125 MILLIGRAM(S): at 05:56

## 2019-03-07 RX ADMIN — Medication 125 MILLIGRAM(S): at 11:51

## 2019-03-07 RX ADMIN — FAMOTIDINE 20 MILLIGRAM(S): 10 INJECTION INTRAVENOUS at 11:51

## 2019-03-07 RX ADMIN — Medication 3 MILLILITER(S): at 15:16

## 2019-03-07 RX ADMIN — Medication 50 MILLIGRAM(S): at 05:55

## 2019-03-07 RX ADMIN — Medication 81 MILLIGRAM(S): at 11:51

## 2019-03-07 RX ADMIN — Medication 5 MILLIGRAM(S): at 17:24

## 2019-03-07 RX ADMIN — Medication 3 MILLILITER(S): at 07:07

## 2019-03-07 RX ADMIN — Medication 3 MILLILITER(S): at 19:14

## 2019-03-07 RX ADMIN — Medication 125 MILLIGRAM(S): at 23:48

## 2019-03-07 RX ADMIN — Medication 5 MILLIGRAM(S): at 21:18

## 2019-03-07 RX ADMIN — DULOXETINE HYDROCHLORIDE 30 MILLIGRAM(S): 30 CAPSULE, DELAYED RELEASE ORAL at 21:18

## 2019-03-07 NOTE — PROGRESS NOTE ADULT - SUBJECTIVE AND OBJECTIVE BOX
Patient is a 84y old  Female who presents with a chief complaint of Urinary Retention/Hperkalemia with EKG changes (22 Feb 2019 09:55)       Pt is seen and examined  pt is awake and lying in bed  isolation for C diff colitis   eating better with clear liquid diet and increased energy       ROS:  Negative   MEDICATIONS  (STANDING):  ALBUTerol/ipratropium for Nebulization 3 milliLiter(s) Nebulizer every 6 hours  amLODIPine   Tablet 10 milliGRAM(s) Oral daily  aspirin  chewable 81 milliGRAM(s) Oral daily  atorvastatin 20 milliGRAM(s) Oral at bedtime  buDESOnide 160 MICROgram(s)/formoterol 4.5 MICROgram(s) Inhaler 2 Puff(s) Inhalation two times a day  busPIRone 5 milliGRAM(s) Oral three times a day  cefepime   IVPB      cefepime   IVPB 1000 milliGRAM(s) IV Intermittent daily  chlorhexidine 4% Liquid 1 Application(s) Topical <User Schedule>  docusate sodium 200 milliGRAM(s) Oral two times a day  DULoxetine 20 milliGRAM(s) Oral daily  epoetin spencer Injectable 39926 Unit(s) SubCutaneous every 7 days  heparin  Injectable 5000 Unit(s) SubCutaneous every 8 hours  hydrocortisone 2.5% Rectal Cream 1 Application(s) Rectal daily  metoprolol succinate ER 50 milliGRAM(s) Oral daily  senna 2 Tablet(s) Oral daily  vancomycin    Solution 125 milliGRAM(s) Oral every 6 hours    MEDICATIONS  (PRN):  bisacodyl Suppository 10 milliGRAM(s) Rectal every 24 hours PRN Constipation  cloNIDine 0.1 milliGRAM(s) Oral every 8 hours PRN for sbp > 180  HYDROcodone 10 mG/acetaminophen 325 mG 1 Tablet(s) Oral every 4 hours PRN Moderate Pain (4 - 6)  ondansetron Injectable 4 milliGRAM(s) IV Push every 8 hours PRN Nausea and/or Vomiting  simethicone 80 milliGRAM(s) Chew every 8 hours PRN Gas  sodium chloride 3%  Inhalation 3 milliLiter(s) Inhalation every 4 hours PRN ..      Allergies    hair dyes (Hives)  sulfa drugs (Headache)  Zoloft (Headache)    Intolerances        Vital Signs Last 24 Hrs  T(C): 36.7 (22 Feb 2019 14:00), Max: 36.7 (22 Feb 2019 10:12)  T(F): 98 (22 Feb 2019 14:00), Max: 98 (22 Feb 2019 10:12)  HR: 77 (22 Feb 2019 14:00) (77 - 97)  BP: 141/63 (22 Feb 2019 14:00) (133/58 - 154/67)  BP(mean): --  RR: 16 (22 Feb 2019 14:00) (16 - 20)  SpO2: 96% (22 Feb 2019 11:19) (94% - 98%)    PHYSICAL EXAM  General: adult in NAD  HEENT: clear oropharynx, anicteric sclera, pink conjunctiva  Neck: supple  CV: normal S1/S2 with no murmur rubs or gallops  Lungs: positive air movement b/l ant lungs,clear to auscultation, no wheezes, no rales  Abdomen: soft non-tender non-distended, no hepatosplenomegaly  Ext: no clubbing cyanosis or edema  Skin: no rashes and no petechiae  Neuro: alert and oriented X 4, no focal deficits  LABS:                wbc21k>>17k    hb 8.9   platelet 183>>198    creat 0..9         Mean Cell Volume : 102.4 fL  Mean Cell Hemoglobin : 32.8 pg  Mean Cell Hemoglobin Concentration : 32.0 g/dL  Auto Neutrophil # : x  Auto Lymphocyte # : x  Auto Monocyte # : x  Auto Eosinophil # : x  Auto Basophil # : x  Auto Neutrophil % : x  Auto Lymphocyte % : x  Auto Monocyte % : x  Auto Eosinophil % : x  Auto Basophil % : x    Serial CBC's  02-22 @ 07:48  Hct-29.4 / Hgb-9.4 / Plat-278 / RBC-2.87 / WBC-26.45          Serial CBC's  02-20 @ 18:58  Hct-31.0 / Hgb-9.9 / Plat-302 / RBC-3.02 / WBC-25.13          Serial CBC's  02-19 @ 13:10  Hct-32.6 / Hgb-10.3 / Plat-293 / RBC-3.17 / WBC-19.66            02-22    139  |  105  |  50<H>  ----------------------------<  130<H>  5.0   |  27  |  0.8    Ca    7.7<L>      22 Feb 2019 07:48  Phos  3.2     02-20  Mg     2.2     02-20    TPro  4.3<L>  /  Alb  2.5<L>  /  TBili  0.2  /  DBili  x   /  AST  16  /  ALT  15  /  AlkPhos  71  02-22          Platelet Count - Automated: 278 K/uL (02-22-19 @ 07:48)  Hemoglobin: 9.4 g/dL (02-22-19 @ 07:48)  WBC Count: 26.45 K/uL (02-22-19 @ 07:48)  Hematocrit: 29.4 % (02-22-19 @ 07:48)  Hematocrit: 31.0 % (02-20-19 @ 18:58)  Platelet Count - Automated: 302 K/uL (02-20-19 @ 18:58)  Hemoglobin: 9.9 g/dL (02-20-19 @ 18:58)  WBC Count: 25.13 K/uL (02-20-19 @ 18:58)  Hemoglobin: 10.3 g/dL (02-19-19 @ 13:10)  WBC Count: 19.66 K/uL (02-19-19 @ 13:10)  Hematocrit: 32.6 % (02-19-19 @ 13:10)  Platelet Count - Automated: 293 K/uL (02-19-19 @ 13:10)  WBC Count: 20.00 K/uL (02-17-19 @ 08:42)  Hematocrit: 31.3 % (02-17-19 @ 08:42)  Platelet Count - Automated: 230 K/uL (02-17-19 @ 08:42)  Hemoglobin: 9.9 g/dL (02-17-19 @ 08:42)  WBC Count: 24.04 K/uL (02-16-19 @ 15:09)  Hematocrit: 30.3 % (02-16-19 @ 15:09)  Platelet Count - Automated: 213 K/uL (02-16-19 @ 15:09)  Hemoglobin: 9.9 g/dL (02-16-19 @ 15:09)  Platelet Count - Automated: 214 K/uL (02-15-19 @ 06:13)  Hemoglobin: 10.1 g/dL (02-15-19 @ 06:13)  WBC Count: 34.64 K/uL (02-15-19 @ 06:13)  Hematocrit: 30.7 % (02-15-19 @ 06:13)  Hematocrit: 29.4 % (02-13-19 @ 14:50)  Platelet Count - Automated: 219 K/uL (02-13-19 @ 14:50)  Hemoglobin: 9.7 g/dL (02-13-19 @ 14:50)  WBC Count: 38.51 K/uL (02-13-19 @ 14:50)                BLOOD SMEAR INTERPRETATION:       RADIOLOGY & ADDITIONAL STUDIES:

## 2019-03-07 NOTE — PROGRESS NOTE ADULT - SUBJECTIVE AND OBJECTIVE BOX
SUBJECTIVE:    Patient is a 84y old Female who presents with a chief complaint of Urinary Retention/Hperkalemia with EKG changes (07 Mar 2019 06:59)    Currently admitted to medicine with the primary diagnosis of Bladder cancer     Today is hospital day 30d. This morning she is resting comfortably in bed and reports no new issues or overnight events.     PAST MEDICAL & SURGICAL HISTORY  Anemia: 4 PRBC 11/2018  Oxygen dependent: O2 2L Via NC  Bladder cancer: 2018  CHF (congestive heart failure)  Breast tumor: removal benign bl breasts  Anal cancer: Chemo and radiation 1992  COPD (chronic obstructive pulmonary disease)  Lung disease: COPD  Hypertension  History of cystoscopy: 10/2018  H/O breast surgery: 39 yrs. ago, bilateral  History of back surgery: 10 yrs. ago stimulator implant 2001, 2011( cervical and Lumbar)    SOCIAL HISTORY:  Negative for smoking/alcohol/drug use.     ALLERGIES:  hair dyes (Hives)  sulfa drugs (Headache)  Zoloft (Headache)    MEDICATIONS:  STANDING MEDICATIONS  ALBUTerol/ipratropium for Nebulization 3 milliLiter(s) Nebulizer every 6 hours  amLODIPine   Tablet 10 milliGRAM(s) Oral daily  aspirin  chewable 81 milliGRAM(s) Oral daily  atorvastatin 20 milliGRAM(s) Oral at bedtime  buDESOnide 160 MICROgram(s)/formoterol 4.5 MICROgram(s) Inhaler 2 Puff(s) Inhalation two times a day  busPIRone 5 milliGRAM(s) Oral three times a day  chlorhexidine 4% Liquid 1 Application(s) Topical <User Schedule>  docusate sodium 200 milliGRAM(s) Oral two times a day  DULoxetine 30 milliGRAM(s) Oral daily  epoetin spencer Injectable 10248 Unit(s) SubCutaneous every 7 days  famotidine    Tablet 20 milliGRAM(s) Oral daily  hydrocortisone 2.5% Rectal Cream 1 Application(s) Rectal daily  iohexol 300 mG (iodine)/mL Oral Solution 30 milliLiter(s) Oral once  metoprolol succinate ER 50 milliGRAM(s) Oral daily  senna 2 Tablet(s) Oral daily  vancomycin    Solution 125 milliGRAM(s) Oral every 6 hours    PRN MEDICATIONS  acetylcysteine 10%  Inhalation 3 milliLiter(s) Inhalation two times a day PRN  bisacodyl Suppository 10 milliGRAM(s) Rectal every 24 hours PRN  cloNIDine 0.1 milliGRAM(s) Oral every 8 hours PRN  HYDROcodone 10 mG/acetaminophen 325 mG 1 Tablet(s) Oral every 4 hours PRN  ondansetron Injectable 4 milliGRAM(s) IV Push every 8 hours PRN  simethicone 80 milliGRAM(s) Chew every 8 hours PRN  sodium chloride 3%  Inhalation 3 milliLiter(s) Inhalation every 4 hours PRN    VITALS:   T(F): 96.2  HR: 97  BP: 141/65  RR: 16  SpO2: 98%    LABS:                        8.5    17.86 )-----------( 198      ( 06 Mar 2019 06:31 )             28.4     03-06    146  |  114<H>  |  38<H>  ----------------------------<  113<H>  4.8   |  28  |  0.8    Ca    7.6<L>      06 Mar 2019 06:31    TPro  4.2<L>  /  Alb  2.3<L>  /  TBili  0.3  /  DBili  x   /  AST  51<H>  /  ALT  116<H>  /  AlkPhos  640<H>  03-06                  RADIOLOGY:    PHYSICAL EXAM:  GEN: No acute distress  LUNGS: Clear to auscultation bilaterally   HEART: Regular  ABD: Soft, non-tender, non-distended.  EXT: NC/NC/NE/2+PP/SARAVIA/Skin Intact.   NEURO: AAOX3    Intravenous access:   NG tube:   Rowe Catheter:

## 2019-03-07 NOTE — PROGRESS NOTE ADULT - ASSESSMENT
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 UTI with   leukocytosis likely sec to c diff  Complaining of abdominal pains and diarrhea sec to  C. diff colitis  CT shows worsening  colitis and right hydroureteronephrosis  Left kidney mass   Small effusions on CT right > left with lung nodules.  left lung atelectasis from mucous plug..s/p mucus plug removed  anemia for hb 8 to 9 ,,GI bleed resolved   weakness ... improving.       PLAN:  on po vanco   for c diff.  leukocytosis -improving..   21K 17K   .. pleural effusion   cont  procrit 40,000 unit qweekly   monitor counts.    f/u with urology for recent TURBT path result  will f/u as outpt for further Mn of bladder ca.  encourage po intake   cont other supportive care.

## 2019-03-07 NOTE — PROGRESS NOTE ADULT - PROBLEM SELECTOR PLAN 1
D# 6 off all other abx  AMBULATE AGGRESSIVELY - INCREASED RISK OF HCAP    complete at least 8 more days PO Vanco   No other abx  NO PPIs  dc isolation if NO loose BMs for 24 hours   recall if needed

## 2019-03-07 NOTE — PROGRESS NOTE ADULT - SUBJECTIVE AND OBJECTIVE BOX
NEPHROLOGY FOLLOW UP NOTE    pt seen and examined  d/w daughter at bedside again  c/o worsening swelling  pt afraid to take too much PO           PAST MEDICAL & SURGICAL HISTORY:  CHF (congestive heart failure)  Breast tumor: removal benign bl breasts  Anal cancer  COPD (chronic obstructive pulmonary disease)  Lung disease: COPD  Hypertension  H/O breast surgery: 39 yrs. ago, bilateral  History of back surgery: 10 yrs. ago stimulator implant 2001, 2011    Allergies:  hair dyes (Hives)  sulfa drugs (Headache)    Home Medications Reviewed    SOCIAL HISTORY:  Denies ETOH,Smoking,   FAMILY HISTORY:  No pertinent family history in first degree relatives        REVIEW OF SYSTEMS:  All other review of systems is negative unless indicated above.        PHYSICAL EXAM:  NAD  weak appearing  dry mm  no jvd  b/l bs decreased  rrr  soft, + bs  no edema        Hospital Medications:   MEDICATIONS  (STANDING):  ALBUTerol/ipratropium for Nebulization 3 milliLiter(s) Nebulizer every 6 hours  amLODIPine   Tablet 10 milliGRAM(s) Oral daily  aspirin  chewable 81 milliGRAM(s) Oral daily  atorvastatin 20 milliGRAM(s) Oral at bedtime  buDESOnide 160 MICROgram(s)/formoterol 4.5 MICROgram(s) Inhaler 2 Puff(s) Inhalation two times a day  busPIRone 5 milliGRAM(s) Oral three times a day  chlorhexidine 4% Liquid 1 Application(s) Topical <User Schedule>  docusate sodium 200 milliGRAM(s) Oral two times a day  DULoxetine 30 milliGRAM(s) Oral at bedtime  epoetin spencer Injectable 58878 Unit(s) SubCutaneous every 7 days  famotidine    Tablet 20 milliGRAM(s) Oral daily  hydrocortisone 2.5% Rectal Cream 1 Application(s) Rectal daily  iohexol 300 mG (iodine)/mL Oral Solution 30 milliLiter(s) Oral once  metoprolol succinate ER 50 milliGRAM(s) Oral daily  senna 2 Tablet(s) Oral daily  vancomycin    Solution 125 milliGRAM(s) Oral every 6 hours        VITALS:  T(F): 96.9 (03-07-19 @ 14:29), Max: 96.9 (03-07-19 @ 14:29)  HR: 87 (03-07-19 @ 14:29)  BP: 125/65 (03-07-19 @ 14:29)  RR: 16 (03-07-19 @ 14:29)  SpO2: 98% (03-07-19 @ 08:27)  Wt(kg): --    03-05 @ 07:01  -  03-06 @ 07:00  --------------------------------------------------------  IN: 240 mL / OUT: 0 mL / NET: 240 mL    03-07 @ 07:01  -  03-07 @ 19:10  --------------------------------------------------------  IN: 240 mL / OUT: 0 mL / NET: 240 mL      Height (cm): 157.48 (03-07 @ 11:12)  Weight (kg): 63 (03-07 @ 11:12)  BMI (kg/m2): 25.4 (03-07 @ 11:12)  BSA (m2): 1.64 (03-07 @ 11:12)    LABS:  03-06    146  |  114<H>  |  38<H>  ----------------------------<  113<H>  4.8   |  28  |  0.8    Ca    7.6<L>      06 Mar 2019 06:31    TPro  4.2<L>  /  Alb  2.3<L>  /  TBili  0.3  /  DBili      /  AST  51<H>  /  ALT  116<H>  /  AlkPhos  640<H>  03-06                          8.5    17.86 )-----------( 198      ( 06 Mar 2019 06:31 )             28.4       Urine Studies:        RADIOLOGY & ADDITIONAL STUDIES:

## 2019-03-08 LAB
ALBUMIN SERPL ELPH-MCNC: 2.3 G/DL — LOW (ref 3.5–5.2)
ALBUMIN SERPL ELPH-MCNC: 2.5 G/DL — LOW (ref 3.5–5.2)
ALLERGY+IMMUNOLOGY DIAG STUDY NOTE: SIGNIFICANT CHANGE UP
ALP SERPL-CCNC: 1123 U/L — HIGH (ref 30–115)
ALP SERPL-CCNC: 1492 U/L — HIGH (ref 30–115)
ALT FLD-CCNC: 135 U/L — HIGH (ref 0–41)
ALT FLD-CCNC: 167 U/L — HIGH (ref 0–41)
ANION GAP SERPL CALC-SCNC: 3 MMOL/L — LOW (ref 7–14)
ANION GAP SERPL CALC-SCNC: 4 MMOL/L — LOW (ref 7–14)
APTT BLD: 29.2 SEC — SIGNIFICANT CHANGE UP (ref 27–39.2)
AST SERPL-CCNC: 104 U/L — HIGH (ref 0–41)
AST SERPL-CCNC: 198 U/L — HIGH (ref 0–41)
BILIRUB SERPL-MCNC: 0.3 MG/DL — SIGNIFICANT CHANGE UP (ref 0.2–1.2)
BILIRUB SERPL-MCNC: 0.3 MG/DL — SIGNIFICANT CHANGE UP (ref 0.2–1.2)
BUN SERPL-MCNC: 48 MG/DL — HIGH (ref 10–20)
BUN SERPL-MCNC: 49 MG/DL — HIGH (ref 10–20)
CALCIUM SERPL-MCNC: 8 MG/DL — LOW (ref 8.5–10.1)
CALCIUM SERPL-MCNC: 8 MG/DL — LOW (ref 8.5–10.1)
CHLORIDE SERPL-SCNC: 113 MMOL/L — HIGH (ref 98–110)
CHLORIDE SERPL-SCNC: 115 MMOL/L — HIGH (ref 98–110)
CO2 SERPL-SCNC: 32 MMOL/L — SIGNIFICANT CHANGE UP (ref 17–32)
CO2 SERPL-SCNC: 33 MMOL/L — HIGH (ref 17–32)
CREAT SERPL-MCNC: 0.7 MG/DL — SIGNIFICANT CHANGE UP (ref 0.7–1.5)
CREAT SERPL-MCNC: 0.8 MG/DL — SIGNIFICANT CHANGE UP (ref 0.7–1.5)
GLUCOSE SERPL-MCNC: 138 MG/DL — HIGH (ref 70–99)
GLUCOSE SERPL-MCNC: 149 MG/DL — HIGH (ref 70–99)
HCT VFR BLD CALC: 23.6 % — LOW (ref 37–47)
HCT VFR BLD CALC: 23.7 % — LOW (ref 37–47)
HGB BLD-MCNC: 6.8 G/DL — CRITICAL LOW (ref 12–16)
HGB BLD-MCNC: 7.1 G/DL — CRITICAL LOW (ref 12–16)
INR BLD: 1.04 RATIO — SIGNIFICANT CHANGE UP (ref 0.65–1.3)
MCHC RBC-ENTMCNC: 28.8 G/DL — LOW (ref 32–37)
MCHC RBC-ENTMCNC: 30 G/DL — LOW (ref 32–37)
MCHC RBC-ENTMCNC: 31.8 PG — HIGH (ref 27–31)
MCHC RBC-ENTMCNC: 32.6 PG — HIGH (ref 27–31)
MCV RBC AUTO: 108.7 FL — HIGH (ref 81–99)
MCV RBC AUTO: 110.3 FL — HIGH (ref 81–99)
NRBC # BLD: 0 /100 WBCS — SIGNIFICANT CHANGE UP (ref 0–0)
NRBC # BLD: 0 /100 WBCS — SIGNIFICANT CHANGE UP (ref 0–0)
PLATELET # BLD AUTO: 203 K/UL — SIGNIFICANT CHANGE UP (ref 130–400)
PLATELET # BLD AUTO: 204 K/UL — SIGNIFICANT CHANGE UP (ref 130–400)
POTASSIUM SERPL-MCNC: 4.9 MMOL/L — SIGNIFICANT CHANGE UP (ref 3.5–5)
POTASSIUM SERPL-MCNC: 5 MMOL/L — SIGNIFICANT CHANGE UP (ref 3.5–5)
POTASSIUM SERPL-SCNC: 4.9 MMOL/L — SIGNIFICANT CHANGE UP (ref 3.5–5)
POTASSIUM SERPL-SCNC: 5 MMOL/L — SIGNIFICANT CHANGE UP (ref 3.5–5)
PROT SERPL-MCNC: 4.5 G/DL — LOW (ref 6–8)
PROT SERPL-MCNC: 4.8 G/DL — LOW (ref 6–8)
PROTHROM AB SERPL-ACNC: 11.9 SEC — SIGNIFICANT CHANGE UP (ref 9.95–12.87)
RBC # BLD: 2.14 M/UL — LOW (ref 4.2–5.4)
RBC # BLD: 2.18 M/UL — LOW (ref 4.2–5.4)
RBC # FLD: 21.1 % — HIGH (ref 11.5–14.5)
RBC # FLD: 21.4 % — HIGH (ref 11.5–14.5)
SODIUM SERPL-SCNC: 149 MMOL/L — HIGH (ref 135–146)
SODIUM SERPL-SCNC: 151 MMOL/L — HIGH (ref 135–146)
TYPE + AB SCN PNL BLD: SIGNIFICANT CHANGE UP
WBC # BLD: 8.47 K/UL — SIGNIFICANT CHANGE UP (ref 4.8–10.8)
WBC # BLD: 8.63 K/UL — SIGNIFICANT CHANGE UP (ref 4.8–10.8)
WBC # FLD AUTO: 8.47 K/UL — SIGNIFICANT CHANGE UP (ref 4.8–10.8)
WBC # FLD AUTO: 8.63 K/UL — SIGNIFICANT CHANGE UP (ref 4.8–10.8)

## 2019-03-08 RX ADMIN — Medication 81 MILLIGRAM(S): at 11:54

## 2019-03-08 RX ADMIN — Medication 3 MILLILITER(S): at 13:25

## 2019-03-08 RX ADMIN — Medication 5 MILLIGRAM(S): at 05:49

## 2019-03-08 RX ADMIN — BUDESONIDE AND FORMOTEROL FUMARATE DIHYDRATE 2 PUFF(S): 160; 4.5 AEROSOL RESPIRATORY (INHALATION) at 08:47

## 2019-03-08 RX ADMIN — ATORVASTATIN CALCIUM 20 MILLIGRAM(S): 80 TABLET, FILM COATED ORAL at 22:03

## 2019-03-08 RX ADMIN — Medication 3 MILLILITER(S): at 19:25

## 2019-03-08 RX ADMIN — BUDESONIDE AND FORMOTEROL FUMARATE DIHYDRATE 2 PUFF(S): 160; 4.5 AEROSOL RESPIRATORY (INHALATION) at 22:05

## 2019-03-08 RX ADMIN — DULOXETINE HYDROCHLORIDE 30 MILLIGRAM(S): 30 CAPSULE, DELAYED RELEASE ORAL at 22:03

## 2019-03-08 RX ADMIN — Medication 1 APPLICATION(S): at 11:57

## 2019-03-08 RX ADMIN — Medication 5 MILLIGRAM(S): at 22:03

## 2019-03-08 RX ADMIN — Medication 125 MILLIGRAM(S): at 11:55

## 2019-03-08 RX ADMIN — Medication 125 MILLIGRAM(S): at 05:50

## 2019-03-08 RX ADMIN — ERYTHROPOIETIN 40000 UNIT(S): 10000 INJECTION, SOLUTION INTRAVENOUS; SUBCUTANEOUS at 12:39

## 2019-03-08 RX ADMIN — Medication 5 MILLIGRAM(S): at 15:55

## 2019-03-08 RX ADMIN — Medication 125 MILLIGRAM(S): at 18:11

## 2019-03-08 RX ADMIN — Medication 125 MILLIGRAM(S): at 23:14

## 2019-03-08 RX ADMIN — Medication 50 MILLIGRAM(S): at 05:49

## 2019-03-08 RX ADMIN — FAMOTIDINE 20 MILLIGRAM(S): 10 INJECTION INTRAVENOUS at 11:54

## 2019-03-08 RX ADMIN — AMLODIPINE BESYLATE 10 MILLIGRAM(S): 2.5 TABLET ORAL at 05:49

## 2019-03-08 NOTE — PROGRESS NOTE ADULT - ASSESSMENT
GEETA - now just prerenal azotemia  hypernatremia / hyperkalemia  pedal edema  LFT's worsening  pedal edema  cdiff colitis  leukocytosis   left lung atelectasis from mucous plug  left moderate pleural effusion  PNA  malnutrition  debility  left 2F complex renal cyst  HTN   bladder ca  s/p TURBT  right hydro   UTI - enterococci  COPD  CHF  anemia / MDS  chronic back pain with spinal stimulator  rash  poor appetite     Plan:    hold off lasix  free water PO  GI f/u re abnl LFT's  cont PO vanco   off other abx  f/u hematology - epo  cont norvasc and lopressor  off aldactone - do not restart  d/w daughter

## 2019-03-08 NOTE — PROGRESS NOTE ADULT - SUBJECTIVE AND OBJECTIVE BOX
84yFemale  Being followed for C-Diff Colitis and Transaminitis   Interval history: Patient states diarrhea has resolved. Patient's WBC has finally normalized as well. Patient denies nausea, vomiting, hematemesis, melena, blood in stool, diarrhea, constipation, abdominal pain.      PMHX/PSHX:  PAST MEDICAL & SURGICAL HISTORY:  Anemia: 4 PRBC 11/2018  Oxygen dependent: O2 2L Via NC  Bladder cancer: 2018  CHF (congestive heart failure)  Breast tumor: removal benign bl breasts  Anal cancer: Chemo and radiation 1992  COPD (chronic obstructive pulmonary disease)  Lung disease: COPD  Hypertension  History of cystoscopy: 10/2018  H/O breast surgery: 39 yrs. ago, bilateral  History of back surgery: 10 yrs. ago stimulator implant 2001, 2011( cervical and Lumbar)        Social History: +pack and a half smoking cigarettes for 50 years quit 5 years ago . No alcohol. No illegal drug use.          MEDICATIONS:  MEDICATIONS  (STANDING):  ALBUTerol/ipratropium for Nebulization 3 milliLiter(s) Nebulizer every 6 hours  amLODIPine   Tablet 10 milliGRAM(s) Oral daily  aspirin  chewable 81 milliGRAM(s) Oral daily  atorvastatin 20 milliGRAM(s) Oral at bedtime  buDESOnide 160 MICROgram(s)/formoterol 4.5 MICROgram(s) Inhaler 2 Puff(s) Inhalation two times a day  busPIRone 5 milliGRAM(s) Oral three times a day  chlorhexidine 4% Liquid 1 Application(s) Topical <User Schedule>  docusate sodium 200 milliGRAM(s) Oral two times a day  DULoxetine 30 milliGRAM(s) Oral at bedtime  epoetin spencer Injectable 78917 Unit(s) SubCutaneous every 7 days  famotidine    Tablet 20 milliGRAM(s) Oral daily  hydrocortisone 2.5% Rectal Cream 1 Application(s) Rectal daily  iohexol 300 mG (iodine)/mL Oral Solution 30 milliLiter(s) Oral once  metoprolol succinate ER 50 milliGRAM(s) Oral daily  senna 2 Tablet(s) Oral daily  vancomycin    Solution 125 milliGRAM(s) Oral every 6 hours    MEDICATIONS  (PRN):  acetylcysteine 10%  Inhalation 3 milliLiter(s) Inhalation two times a day PRN sob  bisacodyl Suppository 10 milliGRAM(s) Rectal every 24 hours PRN Constipation  cloNIDine 0.1 milliGRAM(s) Oral every 8 hours PRN for sbp > 180  HYDROcodone 10 mG/acetaminophen 325 mG 1 Tablet(s) Oral every 4 hours PRN Moderate Pain (4 - 6)  ondansetron Injectable 4 milliGRAM(s) IV Push every 8 hours PRN Nausea and/or Vomiting  simethicone 80 milliGRAM(s) Chew every 8 hours PRN Gas  sodium chloride 3%  Inhalation 3 milliLiter(s) Inhalation every 4 hours PRN ..      Allergies:    hair dyes (Hives)  sulfa drugs (Headache)  Zoloft (Headache)    Intolerances          REVIEW OF SYSTEMS:  General:  No fevers, or chills.  Eyes:  No reported pain or visual changes  ENT:  No sore throat or runny nose.  NECK: No stiffness   CV:  No chest pain or palpitations.  Resp:  No shortness of breath, cough  GI:  No abdominal pain, nausea, vomiting, dysphagia, diarrhea or constipation. No rectal bleeding, melena, or hematemesis.  Neuro:  No tingling, numbness         VITAL SIGNS:   T(F): 96.7 (03-08-19 @ 05:32), Max: 96.9 (03-07-19 @ 14:29)  HR: 91 (03-08-19 @ 05:32) (87 - 94)  BP: 152/61 (03-08-19 @ 05:32) (114/53 - 152/61)  RR: 16 (03-08-19 @ 05:32) (16 - 16)  SpO2: 97% (03-08-19 @ 08:32) (97% - 97%)    PHYSICAL EXAM:  GENERAL: AAOx3, no acute distress.  HEAD:  Atraumatic, Normocephalic  EYES: conjunctiva and sclera clear  NECK: Supple, no JVD or thyromegaly  CHEST/LUNG: Clear to auscultation bilaterally; No wheeze, rhonchi, or rales  HEART: Regular rate and rhythm; normal S1, S2, No murmurs.  ABDOMEN: Soft, nontender, nondistended; Bowel sounds present, no abdominal bruit, masses, or hepatosplenomegaly  NEUROLOGY: No asterixis or tremor.   SKIN: Intact, no jaundice            LABS:                        7.1    8.63  )-----------( 203      ( 08 Mar 2019 06:57 )             23.7     03-08    151<H>  |  115<H>  |  48<H>  ----------------------------<  149<H>  4.9   |  33<H>  |  0.8    Ca    8.0<L>      08 Mar 2019 06:57    TPro  4.5<L>  /  Alb  2.5<L>  /  TBili  0.3  /  DBili  x   /  AST  198<H>  /  ALT  167<H>  /  AlkPhos  1492<H>  03-08    LIVER FUNCTIONS - ( 08 Mar 2019 06:57 )  Alb: 2.5 g/dL / Pro: 4.5 g/dL / ALK PHOS: 1492 U/L / ALT: 167 U/L / AST: 198 U/L / GGT: x               IMAGING:  < from: CT Abdomen and Pelvis w/ Oral Cont and w/ IV Cont (02.24.19 @ 18:18) >  EXAM:  CT ABDOMEN AND PELVIS OC IC            PROCEDURE DATE:  02/24/2019            INTERPRETATION:  CLINICAL STATEMENT: Colitis. Gastrointestinal bleeding.      TECHNIQUE: Contiguous axial CT images were obtained from the lower chest   to the pubic symphysis following administration of 100cc Optiray 320   intravenous contrast.  Oral contrast was administered.  Reformatted   images in the coronal and sagittal planes were acquired.    Comparison made with CT abdomen and pelvis February 11, 2019, October 19, 2018.    FINDINGS:    LOWER CHEST: New moderate left pleural effusion with overlying   compressive atelectasis. Stable right pleural effusion.    HEPATOBILIARY: Gallbladder with sludge/stones. Subcentimeter right   hepatic lobe hypodensity, likely cyst or hemangioma..    SPLEEN: Unremarkable.    PANCREAS: Unremarkable.    ADRENAL GLANDS: Stable left adrenal gland thickening.    KIDNEYS: Unchanged right hydroureteronephrosis. Enlarging exophytic   indeterminate left lower renal polecystic lesion with perceivable   enhancement of wall (series 601, image 80). Additional subcentimeter   renal hypodensities, too small to fully characterize    ABDOMINOPELVIC NODES: Evaluation for lymph nodes limited by patient   condition..    PELVIC ORGANS: Urinary bladder distended, with posterior diverticulum.   Stable gynecological structures.    PERITONEUM/MESENTERY/BOWEL: Worsening, now pancolitis; no evidence of   abscess. Increased moderate ascites.    BONES/SOFT TISSUES: Stable osseous structures..    OTHER: Unchanged spinal stimulator. Vascular calcifications.      IMPRESSION:   Since February 11, 2019:    1. Enlarging (since October 19, 2018) exophytic indeterminate left lower   renal pole cystic lesion with perceivable enhancement of wall, Bosniak   2F; outpatient MRI abdomen with and without IV contrast recommended   following discharge from hospital.    2. Worsening, now pancolitis; no evidence of abscess.     3. Increased moderate ascites.    4. Unchanged right hydroureteronephrosis.    5. New moderate left pleural effusion with overlying compressive   atelectasis. Stable right pleural effusion.                  HARI CROWLEY M.D., ATTENDING RADIOLOGIST  This document has been electronically signed. Feb 25 2019  8:26AM             < end of copied text > 84yFemale  Being followed for C-Diff Colitis and Transaminitis   Interval history: Patient states diarrhea has resolved. Patient's WBC has finally normalized as well. Patient denies nausea, vomiting, hematemesis, melena, blood in stool, diarrhea, constipation, abdominal pain.      PMHX/PSHX:  PAST MEDICAL & SURGICAL HISTORY:  Anemia: 4 PRBC 11/2018  Oxygen dependent: O2 2L Via NC  Bladder cancer: 2018  CHF (congestive heart failure)  Breast tumor: removal benign bl breasts  Anal cancer: Chemo and radiation 1992  COPD (chronic obstructive pulmonary disease)  Lung disease: COPD  Hypertension  History of cystoscopy: 10/2018  H/O breast surgery: 39 yrs. ago, bilateral  History of back surgery: 10 yrs. ago stimulator implant 2001, 2011( cervical and Lumbar)        Social History: +pack and a half smoking cigarettes for 50 years quit 5 years ago . No alcohol. No illegal drug use.          MEDICATIONS:  MEDICATIONS  (STANDING):  ALBUTerol/ipratropium for Nebulization 3 milliLiter(s) Nebulizer every 6 hours  amLODIPine   Tablet 10 milliGRAM(s) Oral daily  aspirin  chewable 81 milliGRAM(s) Oral daily  atorvastatin 20 milliGRAM(s) Oral at bedtime  buDESOnide 160 MICROgram(s)/formoterol 4.5 MICROgram(s) Inhaler 2 Puff(s) Inhalation two times a day  busPIRone 5 milliGRAM(s) Oral three times a day  chlorhexidine 4% Liquid 1 Application(s) Topical <User Schedule>  docusate sodium 200 milliGRAM(s) Oral two times a day  DULoxetine 30 milliGRAM(s) Oral at bedtime  epoetin spencer Injectable 39864 Unit(s) SubCutaneous every 7 days  famotidine    Tablet 20 milliGRAM(s) Oral daily  hydrocortisone 2.5% Rectal Cream 1 Application(s) Rectal daily  iohexol 300 mG (iodine)/mL Oral Solution 30 milliLiter(s) Oral once  metoprolol succinate ER 50 milliGRAM(s) Oral daily  senna 2 Tablet(s) Oral daily  vancomycin    Solution 125 milliGRAM(s) Oral every 6 hours    MEDICATIONS  (PRN):  acetylcysteine 10%  Inhalation 3 milliLiter(s) Inhalation two times a day PRN sob  bisacodyl Suppository 10 milliGRAM(s) Rectal every 24 hours PRN Constipation  cloNIDine 0.1 milliGRAM(s) Oral every 8 hours PRN for sbp > 180  HYDROcodone 10 mG/acetaminophen 325 mG 1 Tablet(s) Oral every 4 hours PRN Moderate Pain (4 - 6)  ondansetron Injectable 4 milliGRAM(s) IV Push every 8 hours PRN Nausea and/or Vomiting  simethicone 80 milliGRAM(s) Chew every 8 hours PRN Gas  sodium chloride 3%  Inhalation 3 milliLiter(s) Inhalation every 4 hours PRN ..      Allergies:    hair dyes (Hives)  sulfa drugs (Headache)  Zoloft (Headache)         REVIEW OF SYSTEMS:  General:  No fevers, or chills.  Eyes:  No reported pain or visual changes  ENT:  No sore throat or runny nose.  NECK: No stiffness   CV:  No chest pain or palpitations.  Resp:  No shortness of breath, cough  GI:  No abdominal pain, nausea, vomiting, dysphagia, diarrhea or constipation. No rectal bleeding, melena, or hematemesis.  Neuro:  No tingling, numbness         VITAL SIGNS:   T(F): 96.7 (03-08-19 @ 05:32), Max: 96.9 (03-07-19 @ 14:29)  HR: 91 (03-08-19 @ 05:32) (87 - 94)  BP: 152/61 (03-08-19 @ 05:32) (114/53 - 152/61)  RR: 16 (03-08-19 @ 05:32) (16 - 16)  SpO2: 97% (03-08-19 @ 08:32) (97% - 97%)    PHYSICAL EXAM:  GENERAL: AAOx3, no acute distress.  HEAD:  Atraumatic, Normocephalic  EYES: conjunctiva and sclera clear  NECK: Supple, no JVD or thyromegaly  CHEST/LUNG: Clear to auscultation bilaterally; No wheeze, rhonchi, or rales  HEART: Regular rate and rhythm; normal S1, S2, No murmurs.  ABDOMEN: Soft, nontender, nondistended; Bowel sounds present, no abdominal bruit, masses, or hepatosplenomegaly  NEUROLOGY: No asterixis or tremor.   SKIN: Intact, no jaundice            LABS:                        7.1    8.63  )-----------( 203      ( 08 Mar 2019 06:57 )             23.7     03-08    151<H>  |  115<H>  |  48<H>  ----------------------------<  149<H>  4.9   |  33<H>  |  0.8    Ca    8.0<L>      08 Mar 2019 06:57    TPro  4.5<L>  /  Alb  2.5<L>  /  TBili  0.3  /  DBili  x   /  AST  198<H>  /  ALT  167<H>  /  AlkPhos  1492<H>  03-08    LIVER FUNCTIONS - ( 08 Mar 2019 06:57 )  Alb: 2.5 g/dL / Pro: 4.5 g/dL / ALK PHOS: 1492 U/L / ALT: 167 U/L / AST: 198 U/L / GGT: x               IMAGING:  < from: CT Abdomen and Pelvis w/ Oral Cont and w/ IV Cont (02.24.19 @ 18:18) >  EXAM:  CT ABDOMEN AND PELVIS OC IC            PROCEDURE DATE:  02/24/2019            INTERPRETATION:  CLINICAL STATEMENT: Colitis. Gastrointestinal bleeding.      TECHNIQUE: Contiguous axial CT images were obtained from the lower chest   to the pubic symphysis following administration of 100cc Optiray 320   intravenous contrast.  Oral contrast was administered.  Reformatted   images in the coronal and sagittal planes were acquired.    Comparison made with CT abdomen and pelvis February 11, 2019, October 19, 2018.    FINDINGS:    LOWER CHEST: New moderate left pleural effusion with overlying   compressive atelectasis. Stable right pleural effusion.    HEPATOBILIARY: Gallbladder with sludge/stones. Subcentimeter right   hepatic lobe hypodensity, likely cyst or hemangioma..    SPLEEN: Unremarkable.    PANCREAS: Unremarkable.    ADRENAL GLANDS: Stable left adrenal gland thickening.    KIDNEYS: Unchanged right hydroureteronephrosis. Enlarging exophytic   indeterminate left lower renal polecystic lesion with perceivable   enhancement of wall (series 601, image 80). Additional subcentimeter   renal hypodensities, too small to fully characterize    ABDOMINOPELVIC NODES: Evaluation for lymph nodes limited by patient   condition..    PELVIC ORGANS: Urinary bladder distended, with posterior diverticulum.   Stable gynecological structures.    PERITONEUM/MESENTERY/BOWEL: Worsening, now pancolitis; no evidence of   abscess. Increased moderate ascites.    BONES/SOFT TISSUES: Stable osseous structures..    OTHER: Unchanged spinal stimulator. Vascular calcifications.      IMPRESSION:   Since February 11, 2019:    1. Enlarging (since October 19, 2018) exophytic indeterminate left lower   renal pole cystic lesion with perceivable enhancement of wall, Bosniak   2F; outpatient MRI abdomen with and without IV contrast recommended   following discharge from hospital.    2. Worsening, now pancolitis; no evidence of abscess.     3. Increased moderate ascites.    4. Unchanged right hydroureteronephrosis.    5. New moderate left pleural effusion with overlying compressive   atelectasis. Stable right pleural effusion.                  HARI CROWLEY M.D., ATTENDING RADIOLOGIST  This document has been electronically signed. Feb 25 2019  8:26AM             < end of copied text >

## 2019-03-08 NOTE — PROGRESS NOTE ADULT - SUBJECTIVE AND OBJECTIVE BOX
Patient is a 84y old  Female who presents with a chief complaint of Urinary Retention/Hperkalemia with EKG changes (05 Mar 2019 08:04)  pt found to be C diff +, with high leukocytosis but has not had diarrhea.  pt seen this morning, daughter at bedside  pt ate breakfast fairly well today, per daughter  pt willing to drink a mixture of Nepro and Ensure strawberry, likely not more than 12 oz a day - but not documented  abd soft  mouth dry, skin turgor fair  abd soft, ND, no diarrhea reported  Vital Signs Last 24 Hrs  T(C): 36.6 (08 Mar 2019 14:39), Max: 36.6 (08 Mar 2019 14:39)  T(F): 97.8 (08 Mar 2019 14:39), Max: 97.8 (08 Mar 2019 14:39)  HR: 89 (08 Mar 2019 14:39) (89 - 94)  BP: 141/60 (08 Mar 2019 14:39) (114/53 - 152/61)  BP(mean): --  RR: 16 (08 Mar 2019 14:39) (16 - 16)  SpO2: 97% (08 Mar 2019 08:32) (97% - 97%)    MEDICATIONS  (STANDING):  ALBUTerol/ipratropium for Nebulization 3 milliLiter(s) Nebulizer every 6 hours  amLODIPine   Tablet 10 milliGRAM(s) Oral daily  aspirin  chewable 81 milliGRAM(s) Oral daily  atorvastatin 20 milliGRAM(s) Oral at bedtime  buDESOnide 160 MICROgram(s)/formoterol 4.5 MICROgram(s) Inhaler 2 Puff(s) Inhalation two times a day  busPIRone 5 milliGRAM(s) Oral three times a day  chlorhexidine 4% Liquid 1 Application(s) Topical <User Schedule>  docusate sodium 200 milliGRAM(s) Oral two times a day  DULoxetine 30 milliGRAM(s) Oral at bedtime  epoetin spencer Injectable 76044 Unit(s) SubCutaneous every 7 days  famotidine    Tablet 20 milliGRAM(s) Oral daily  hydrocortisone 2.5% Rectal Cream 1 Application(s) Rectal daily  iohexol 300 mG (iodine)/mL Oral Solution 30 milliLiter(s) Oral once  metoprolol succinate ER 50 milliGRAM(s) Oral daily  senna 2 Tablet(s) Oral daily  vancomycin    Solution 125 milliGRAM(s) Oral every 6 hours                        7.1    8.63  )-----------( 203      ( 08 Mar 2019 06:57 )             23.7   03-08    149<H>  |  113<H>  |  49<H>  ----------------------------<  138<H>  5.0   |  32  |  0.7    Ca    8.0<L>      08 Mar 2019 15:26    TPro  4.8<L>  /  Alb  2.3<L>  /  TBili  0.3  /  DBili  x   /  AST  104<H>  /  ALT  135<H>  /  AlkPhos  1123<H>  03-08

## 2019-03-08 NOTE — PROGRESS NOTE ADULT - ASSESSMENT
GEETA  hyperkalemia  pedal edema  cdiff colitis  leukocytosis   left lung atelectasis from mucous plug  left moderate pleural effusion  PNA  malnutrition  debility  left 2F complex renal cyst  HTN   bladder ca  s/p TURBT  right hydro   UTI - enterococci  COPD  CHF  anemia / MDS  chronic back pain with spinal stimulator  rash  poor appetite     Plan:    lasix 20mg ivp x 1  may need po lasix  low k diet  cont PO vanco   off other abx  trend wbc  protonix   procrit per hem  cont norvasc and lopressor  off aldactone - do not restart  PT / rehab / OOB   f/u/ check path / oupt f/u of complex renal cyst  d/w daughter

## 2019-03-08 NOTE — PROGRESS NOTE ADULT - ASSESSMENT
ASSESSMENT/PLAN   - GEETA - resolved, but with persistent prerenal azotemia, R hydro, hyperkalemia resolved   - cdiff colitis without diarrhea   - leukocytosis from cdiff resolved   - left lung atelectasis from mucous plug   - malnutrition - acute on chronic severe protein calorie malnutrition   - debility with progressive clinical deterioration   - HTN    - bladder ca - s/p TURBT   - UTI - enterococci   - COPD   - h/o CHF   - anemia / MDS   - chronic back pain with spinal stimulator  - poor po intake    d/w nephrology  need to document all po intake  pt would benefit from at least short term NG enteral supplementation, but this has been declined  cont to encourage intake, marcela of Nepro and other concentrated nutrients

## 2019-03-08 NOTE — PROGRESS NOTE ADULT - ASSESSMENT
83 yo female being followed by the GI service for C-Diff Colitis and transaminitis     Problem 1-C-Diff Colitis pancolitis, responding to Vanco- diarrhea resolved  Rec  -Continue Antibiotics  -Monitor WBC and CBC daily   -Monitor Stool for blood, frequency, and number of bowel movements daily  -PO hydration  -Probiotics  Advance diet as tolerated.       Problem 2-Transaminitis   Rec  -Ultrasound  -Trend LFTs  -Avoid hepatotoxic medications    Problem 3-Enlarging (since October 19, 2018) exophytic indeterminate left lower renal pole cystic lesion with perceivable enhancement of wall, Bosniak   2F;   Rec  -Outpatient MRI abdomen with and without IV contrast recommended following discharge from hospital.    Problem 4-New moderate left pleural effusion with overlying compressive atelectasis. Stable right pleural effusion.  Rec  -Care as per primary team 85 yo female being followed by the GI service for C-Diff Colitis and transaminitis     Problem 1-C-Diff Colitis pancolitis, responding to Vanco- diarrhea resolved  Rec  -Continue Antibiotics  -Monitor WBC and CBC daily   -Monitor Stool for blood, frequency, and number of bowel movements daily  -PO hydration  -Probiotics  Advance diet as tolerated.     Problem 2-Transaminitis   Rec  -Ultrasound  -Trend LFTs  -Avoid hepatotoxic medications    Problem 3-Enlarging (since October 19, 2018) exophytic indeterminate left lower renal pole cystic lesion with perceivable enhancement of wall, Bosniak   2F;   Rec  -Outpatient MRI abdomen with and without IV contrast recommended following discharge from hospital.    Problem 4-New moderate left pleural effusion with overlying compressive atelectasis. Stable right pleural effusion.  Rec  -Care as per primary team    Problem 5-MDS and Anemia  Rec  -Transfuse to hgb>8  -Management as per Primary and hematology team  -Monitor stools for blood, frequency, consistency  -Maintain hemodynamic stability     Problem 6-Electrolyte imbalance   Rec  -Please optimize electrolytes 85 yo female being followed by the GI service for C-Diff Colitis and transaminitis     Problem 1-C-Diff Colitis pancolitis, responding to Vanco- diarrhea resolved  Rec  -Continue Antibiotics  -Monitor WBC and CBC daily   -Monitor Stool for blood, frequency, and number of bowel movements daily  -PO hydration  -Probiotics  Advance diet as tolerated.     Problem 2-Transaminitis   Rec  -F/U Ultrasound  -Trend LFTs  -Avoid hepatotoxic medications  -If LFTs persist Check Hepatitis B Sag, Hep B SAB, Hep A Ab, Hep C Ab,Smooth Muscle Antibody, Transferrin Saturation, SPEP, Anti LK M1 antibody, AMA, ROWDY, Ceruloplasmin, Ferritin.  -Consider fractionated Alkaline Phosphatase      Problem 3-Enlarging (since October 19, 2018) exophytic indeterminate left lower renal pole cystic lesion with perceivable enhancement of wall, Bosniak   2F;   Rec  -Outpatient MRI abdomen with and without IV contrast recommended following discharge from hospital.    Problem 4-New moderate left pleural effusion with overlying compressive atelectasis. Stable right pleural effusion.  Rec  -Care as per primary team    Problem 5-MDS and Anemia  Rec  -Repeat CBC, CBC daily  -Transfuse to hgb>8  -Management as per Primary and hematology team  -Monitor stools for blood, frequency, consistency  -Maintain hemodynamic stability     Problem 6-Electrolyte imbalance   Rec  -Please optimize electrolytes 83 yo female being followed by the GI service for C-Diff Colitis and transaminitis     Problem 1-C-Diff Colitis pancolitis, responding to Vanco- diarrhea resolved  Rec  -Continue Antibiotics  -Monitor WBC and CBC daily   -Monitor Stool for blood, frequency, and number of bowel movements daily  -PO hydration  -Probiotics bid x 2 months  Advance diet as tolerated.     Problem 2-Transaminitis   Liver enzymes are elevated but today's labs might be an error.  Rec  -F/U Ultrasound  -repeat LFTs  -Avoid hepatotoxic medications  -If LFTs continue to rise Check Hepatitis B Sag, Hep B SAB, Hep A Ab, Hep C Ab,Smooth Muscle Antibody, Transferrin Saturation, SPEP, Anti LK M1 antibody, AMA, ROWDY, Ceruloplasmin, Ferritin, fractionated Alkaline Phosphatase

## 2019-03-08 NOTE — PROGRESS NOTE ADULT - SUBJECTIVE AND OBJECTIVE BOX
NEPHROLOGY FOLLOW UP NOTE    pt seen and examined  d/w daughter at bedside   still swollen  eating little  no sob, fever, melena          PAST MEDICAL & SURGICAL HISTORY:  CHF (congestive heart failure)  Breast tumor: removal benign bl breasts  Anal cancer  COPD (chronic obstructive pulmonary disease)  Lung disease: COPD  Hypertension  H/O breast surgery: 39 yrs. ago, bilateral  History of back surgery: 10 yrs. ago stimulator implant 2001, 2011    Allergies:  hair dyes (Hives)  sulfa drugs (Headache)    Home Medications Reviewed    SOCIAL HISTORY:  Denies ETOH,Smoking,   FAMILY HISTORY:  No pertinent family history in first degree relatives        REVIEW OF SYSTEMS:  All other review of systems is negative unless indicated above.        PHYSICAL EXAM:  NAD  weak appearing  dry mm  no jvd  b/l bs decreased  rrr  soft, + bs  + edema      Hospital Medications:   MEDICATIONS  (STANDING):  ALBUTerol/ipratropium for Nebulization 3 milliLiter(s) Nebulizer every 6 hours  amLODIPine   Tablet 10 milliGRAM(s) Oral daily  aspirin  chewable 81 milliGRAM(s) Oral daily  atorvastatin 20 milliGRAM(s) Oral at bedtime  buDESOnide 160 MICROgram(s)/formoterol 4.5 MICROgram(s) Inhaler 2 Puff(s) Inhalation two times a day  busPIRone 5 milliGRAM(s) Oral three times a day  chlorhexidine 4% Liquid 1 Application(s) Topical <User Schedule>  docusate sodium 200 milliGRAM(s) Oral two times a day  DULoxetine 30 milliGRAM(s) Oral at bedtime  epoetin spencer Injectable 40885 Unit(s) SubCutaneous every 7 days  famotidine    Tablet 20 milliGRAM(s) Oral daily  hydrocortisone 2.5% Rectal Cream 1 Application(s) Rectal daily  iohexol 300 mG (iodine)/mL Oral Solution 30 milliLiter(s) Oral once  metoprolol succinate ER 50 milliGRAM(s) Oral daily  senna 2 Tablet(s) Oral daily  vancomycin    Solution 125 milliGRAM(s) Oral every 6 hours        VITALS:  T(F): 96.7 (03-08-19 @ 05:32), Max: 96.9 (03-07-19 @ 14:29)  HR: 91 (03-08-19 @ 05:32)  BP: 152/61 (03-08-19 @ 05:32)  RR: 16 (03-08-19 @ 05:32)  SpO2: 97% (03-08-19 @ 08:32)  Wt(kg): --    03-07 @ 07:01  -  03-08 @ 07:00  --------------------------------------------------------  IN: 240 mL / OUT: 0 mL / NET: 240 mL      Height (cm): 157.48 (03-08 @ 09:36)  Weight (kg): 63 (03-08 @ 09:36)  BMI (kg/m2): 25.4 (03-08 @ 09:36)  BSA (m2): 1.64 (03-08 @ 09:36)    LABS:  03-08    151<H>  |  115<H>  |  48<H>  ----------------------------<  149<H>  4.9   |  33<H>  |  0.8    Ca    8.0<L>      08 Mar 2019 06:57    TPro  4.5<L>  /  Alb  2.5<L>  /  TBili  0.3  /  DBili      /  AST  198<H>  /  ALT  167<H>  /  AlkPhos  1492<H>  03-08                          7.1    8.63  )-----------( 203      ( 08 Mar 2019 06:57 )             23.7       Urine Studies:        RADIOLOGY & ADDITIONAL STUDIES:

## 2019-03-08 NOTE — PROGRESS NOTE ADULT - ASSESSMENT
85 yo female ex smoker with a history of COPD on home O2,Anemia sec to low grade MDS by recent bmbx,on weekly procrit   Admitted for electrolyte imbalance, GEETA,. UTI with   1  leukocytosis likely sec to c diff    2 Complaining of abdominal pains and diarrhea sec to  C. diff colitis  CT shows worsening  colitis and right hydroureteronephrosis    3 Small effusions on CT right > left with lung nodules.  left lung atelectasis from mucous plug..s/p mucus plug removed    4 anemia for hb 8 to 9 ,,hx of MDS GI bleed resolved >> hb 7   weakness ... improving.       PLAN:  on po vanco   for c diff.  leukocytosis -improving..   21K 17K resolved to 8k   .. pleural effusion   cont  procrit 40,000 unit qweekly   monitor counts.

## 2019-03-08 NOTE — PROGRESS NOTE ADULT - SUBJECTIVE AND OBJECTIVE BOX
SUBJECTIVE:    Patient is a 84y old Female who presents with a chief complaint of Urinary Retention/Hperkalemia with EKG changes (07 Mar 2019 20:02)    Currently admitted to medicine with the primary diagnosis of Bladder cancer     Today is hospital day 31d. This morning she is resting comfortably in bed and reports no new issues or overnight events.     PAST MEDICAL & SURGICAL HISTORY  Anemia: 4 PRBC 11/2018  Oxygen dependent: O2 2L Via NC  Bladder cancer: 2018  CHF (congestive heart failure)  Breast tumor: removal benign bl breasts  Anal cancer: Chemo and radiation 1992  COPD (chronic obstructive pulmonary disease)  Lung disease: COPD  Hypertension  History of cystoscopy: 10/2018  H/O breast surgery: 39 yrs. ago, bilateral  History of back surgery: 10 yrs. ago stimulator implant 2001, 2011( cervical and Lumbar)    SOCIAL HISTORY:  Negative for smoking/alcohol/drug use.     ALLERGIES:  hair dyes (Hives)  sulfa drugs (Headache)  Zoloft (Headache)    MEDICATIONS:  STANDING MEDICATIONS  ALBUTerol/ipratropium for Nebulization 3 milliLiter(s) Nebulizer every 6 hours  amLODIPine   Tablet 10 milliGRAM(s) Oral daily  aspirin  chewable 81 milliGRAM(s) Oral daily  atorvastatin 20 milliGRAM(s) Oral at bedtime  buDESOnide 160 MICROgram(s)/formoterol 4.5 MICROgram(s) Inhaler 2 Puff(s) Inhalation two times a day  busPIRone 5 milliGRAM(s) Oral three times a day  chlorhexidine 4% Liquid 1 Application(s) Topical <User Schedule>  docusate sodium 200 milliGRAM(s) Oral two times a day  DULoxetine 30 milliGRAM(s) Oral at bedtime  epoetin spencer Injectable 88495 Unit(s) SubCutaneous every 7 days  famotidine    Tablet 20 milliGRAM(s) Oral daily  hydrocortisone 2.5% Rectal Cream 1 Application(s) Rectal daily  iohexol 300 mG (iodine)/mL Oral Solution 30 milliLiter(s) Oral once  metoprolol succinate ER 50 milliGRAM(s) Oral daily  senna 2 Tablet(s) Oral daily  vancomycin    Solution 125 milliGRAM(s) Oral every 6 hours    PRN MEDICATIONS  acetylcysteine 10%  Inhalation 3 milliLiter(s) Inhalation two times a day PRN  bisacodyl Suppository 10 milliGRAM(s) Rectal every 24 hours PRN  cloNIDine 0.1 milliGRAM(s) Oral every 8 hours PRN  HYDROcodone 10 mG/acetaminophen 325 mG 1 Tablet(s) Oral every 4 hours PRN  ondansetron Injectable 4 milliGRAM(s) IV Push every 8 hours PRN  simethicone 80 milliGRAM(s) Chew every 8 hours PRN  sodium chloride 3%  Inhalation 3 milliLiter(s) Inhalation every 4 hours PRN    VITALS:   T(F): 96.7  HR: 91  BP: 152/61  RR: 16  SpO2: 97%    LABS:                        7.1    8.63  )-----------( 203      ( 08 Mar 2019 06:57 )             23.7     03-08    151<H>  |  115<H>  |  48<H>  ----------------------------<  149<H>  4.9   |  33<H>  |  0.8    Ca    8.0<L>      08 Mar 2019 06:57    TPro  4.5<L>  /  Alb  2.5<L>  /  TBili  0.3  /  DBili  x   /  AST  198<H>  /  ALT  167<H>  /  AlkPhos  1492<H>  03-08                  RADIOLOGY:    PHYSICAL EXAM:  GEN: No acute distress  LUNGS: Clear to auscultation bilaterally   HEART: Regular  ABD: Soft, non-tender, non-distended.  EXT: NC/NC/NE/2+PP/SARAVIA/Skin Intact.   NEURO: AAOX3    Intravenous access:   NG tube:   Rowe Catheter:

## 2019-03-08 NOTE — PROGRESS NOTE ADULT - SUBJECTIVE AND OBJECTIVE BOX
Patient is a 84y old  Female who presents with a chief complaint of Urinary Retention/Hperkalemia with EKG changes (22 Feb 2019 09:55)       Pt is seen and examined  pt is awake and lying in bed  isolation for C diff colitis   eating better with clear liquid diet       ROS:  Negative   MEDICATIONS  (STANDING):  ALBUTerol/ipratropium for Nebulization 3 milliLiter(s) Nebulizer every 6 hours  amLODIPine   Tablet 10 milliGRAM(s) Oral daily  aspirin  chewable 81 milliGRAM(s) Oral daily  atorvastatin 20 milliGRAM(s) Oral at bedtime  buDESOnide 160 MICROgram(s)/formoterol 4.5 MICROgram(s) Inhaler 2 Puff(s) Inhalation two times a day  busPIRone 5 milliGRAM(s) Oral three times a day  cefepime   IVPB      cefepime   IVPB 1000 milliGRAM(s) IV Intermittent daily  chlorhexidine 4% Liquid 1 Application(s) Topical <User Schedule>  docusate sodium 200 milliGRAM(s) Oral two times a day  DULoxetine 20 milliGRAM(s) Oral daily  epoetin spencer Injectable 99314 Unit(s) SubCutaneous every 7 days  heparin  Injectable 5000 Unit(s) SubCutaneous every 8 hours  hydrocortisone 2.5% Rectal Cream 1 Application(s) Rectal daily  metoprolol succinate ER 50 milliGRAM(s) Oral daily  senna 2 Tablet(s) Oral daily  vancomycin    Solution 125 milliGRAM(s) Oral every 6 hours    MEDICATIONS  (PRN):  bisacodyl Suppository 10 milliGRAM(s) Rectal every 24 hours PRN Constipation  cloNIDine 0.1 milliGRAM(s) Oral every 8 hours PRN for sbp > 180  HYDROcodone 10 mG/acetaminophen 325 mG 1 Tablet(s) Oral every 4 hours PRN Moderate Pain (4 - 6)  ondansetron Injectable 4 milliGRAM(s) IV Push every 8 hours PRN Nausea and/or Vomiting  simethicone 80 milliGRAM(s) Chew every 8 hours PRN Gas  sodium chloride 3%  Inhalation 3 milliLiter(s) Inhalation every 4 hours PRN ..      Allergies    hair dyes (Hives)  sulfa drugs (Headache)  Zoloft (Headache)    Intolerances        Vital Signs Last 24 Hrs  T(C): 36.7 (22 Feb 2019 14:00), Max: 36.7 (22 Feb 2019 10:12)  T(F): 98 (22 Feb 2019 14:00), Max: 98 (22 Feb 2019 10:12)  HR: 77 (22 Feb 2019 14:00) (77 - 97)  BP: 141/63 (22 Feb 2019 14:00) (133/58 - 154/67)  BP(mean): --  RR: 16 (22 Feb 2019 14:00) (16 - 20)  SpO2: 96% (22 Feb 2019 11:19) (94% - 98%)    PHYSICAL EXAM  General: adult in NAD  HEENT: clear oropharynx, anicteric sclera, pink conjunctiva  Neck: supple  CV: normal S1/S2 with no murmur rubs or gallops  Lungs: positive air movement b/l ant lungs,clear to auscultation, no wheezes, no rales  Abdomen: soft non-tender non-distended, no hepatosplenomegaly  Ext: no clubbing cyanosis or edema  Skin: no rashes and no petechiae  Neuro: alert and oriented X 4, no focal deficits  LABS:                wbc21k>>17k    hb 8.9 >>7  platelet 183>>198    creat 0..9         Mean Cell Volume : 102.4 fL  Mean Cell Hemoglobin : 32.8 pg  Mean Cell Hemoglobin Concentration : 32.0 g/dL  Auto Neutrophil # : x  Auto Lymphocyte # : x  Auto Monocyte # : x  Auto Eosinophil # : x  Auto Basophil # : x  Auto Neutrophil % : x  Auto Lymphocyte % : x  Auto Monocyte % : x  Auto Eosinophil % : x  Auto Basophil % : x    Serial CBC's  02-22 @ 07:48  Hct-29.4 / Hgb-9.4 / Plat-278 / RBC-2.87 / WBC-26.45          Serial CBC's  02-20 @ 18:58  Hct-31.0 / Hgb-9.9 / Plat-302 / RBC-3.02 / WBC-25.13          Serial CBC's  02-19 @ 13:10  Hct-32.6 / Hgb-10.3 / Plat-293 / RBC-3.17 / WBC-19.66            02-22    139  |  105  |  50<H>  ----------------------------<  130<H>  5.0   |  27  |  0.8    Ca    7.7<L>      22 Feb 2019 07:48  Phos  3.2     02-20  Mg     2.2     02-20    TPro  4.3<L>  /  Alb  2.5<L>  /  TBili  0.2  /  DBili  x   /  AST  16  /  ALT  15  /  AlkPhos  71  02-22          Platelet Count - Automated: 278 K/uL (02-22-19 @ 07:48)  Hemoglobin: 9.4 g/dL (02-22-19 @ 07:48)  WBC Count: 26.45 K/uL (02-22-19 @ 07:48)  Hematocrit: 29.4 % (02-22-19 @ 07:48)  Hematocrit: 31.0 % (02-20-19 @ 18:58)  Platelet Count - Automated: 302 K/uL (02-20-19 @ 18:58)  Hemoglobin: 9.9 g/dL (02-20-19 @ 18:58)  WBC Count: 25.13 K/uL (02-20-19 @ 18:58)  Hemoglobin: 10.3 g/dL (02-19-19 @ 13:10)  WBC Count: 19.66 K/uL (02-19-19 @ 13:10)  Hematocrit: 32.6 % (02-19-19 @ 13:10)  Platelet Count - Automated: 293 K/uL (02-19-19 @ 13:10)  WBC Count: 20.00 K/uL (02-17-19 @ 08:42)  Hematocrit: 31.3 % (02-17-19 @ 08:42)  Platelet Count - Automated: 230 K/uL (02-17-19 @ 08:42)  Hemoglobin: 9.9 g/dL (02-17-19 @ 08:42)  WBC Count: 24.04 K/uL (02-16-19 @ 15:09)  Hematocrit: 30.3 % (02-16-19 @ 15:09)  Platelet Count - Automated: 213 K/uL (02-16-19 @ 15:09)  Hemoglobin: 9.9 g/dL (02-16-19 @ 15:09)  Platelet Count - Automated: 214 K/uL (02-15-19 @ 06:13)  Hemoglobin: 10.1 g/dL (02-15-19 @ 06:13)  WBC Count: 34.64 K/uL (02-15-19 @ 06:13)  Hematocrit: 30.7 % (02-15-19 @ 06:13)  Hematocrit: 29.4 % (02-13-19 @ 14:50)  Platelet Count - Automated: 219 K/uL (02-13-19 @ 14:50)  Hemoglobin: 9.7 g/dL (02-13-19 @ 14:50)  WBC Count: 38.51 K/uL (02-13-19 @ 14:50)                BLOOD SMEAR INTERPRETATION:       RADIOLOGY & ADDITIONAL STUDIES:

## 2019-03-09 LAB
ALBUMIN SERPL ELPH-MCNC: 2.6 G/DL — LOW (ref 3.5–5.2)
ALP SERPL-CCNC: 860 U/L — HIGH (ref 30–115)
ALT FLD-CCNC: 99 U/L — HIGH (ref 0–41)
ANION GAP SERPL CALC-SCNC: 8 MMOL/L — SIGNIFICANT CHANGE UP (ref 7–14)
AST SERPL-CCNC: 45 U/L — HIGH (ref 0–41)
BILIRUB SERPL-MCNC: 0.3 MG/DL — SIGNIFICANT CHANGE UP (ref 0.2–1.2)
BUN SERPL-MCNC: 47 MG/DL — HIGH (ref 10–20)
CALCIUM SERPL-MCNC: 8 MG/DL — LOW (ref 8.5–10.1)
CHLORIDE SERPL-SCNC: 113 MMOL/L — HIGH (ref 98–110)
CO2 SERPL-SCNC: 31 MMOL/L — SIGNIFICANT CHANGE UP (ref 17–32)
CREAT SERPL-MCNC: 0.6 MG/DL — LOW (ref 0.7–1.5)
GAS PNL BLDA: SIGNIFICANT CHANGE UP
GAS PNL BLDA: SIGNIFICANT CHANGE UP
GLUCOSE BLDC GLUCOMTR-MCNC: 171 MG/DL — HIGH (ref 70–99)
GLUCOSE SERPL-MCNC: 113 MG/DL — HIGH (ref 70–99)
HCT VFR BLD CALC: 25.1 % — LOW (ref 37–47)
HGB BLD-MCNC: 7.3 G/DL — CRITICAL LOW (ref 12–16)
MCHC RBC-ENTMCNC: 29.1 G/DL — LOW (ref 32–37)
MCHC RBC-ENTMCNC: 32.6 PG — HIGH (ref 27–31)
MCV RBC AUTO: 112.1 FL — HIGH (ref 81–99)
NRBC # BLD: 1 /100 WBCS — HIGH (ref 0–0)
PLATELET # BLD AUTO: 213 K/UL — SIGNIFICANT CHANGE UP (ref 130–400)
POTASSIUM SERPL-MCNC: 5.4 MMOL/L — HIGH (ref 3.5–5)
POTASSIUM SERPL-SCNC: 5.4 MMOL/L — HIGH (ref 3.5–5)
PROT SERPL-MCNC: 4.7 G/DL — LOW (ref 6–8)
RBC # BLD: 2.24 M/UL — LOW (ref 4.2–5.4)
RBC # FLD: 22.1 % — HIGH (ref 11.5–14.5)
SODIUM SERPL-SCNC: 152 MMOL/L — HIGH (ref 135–146)
WBC # BLD: 9.19 K/UL — SIGNIFICANT CHANGE UP (ref 4.8–10.8)
WBC # FLD AUTO: 9.19 K/UL — SIGNIFICANT CHANGE UP (ref 4.8–10.8)

## 2019-03-09 PROCEDURE — 93970 EXTREMITY STUDY: CPT | Mod: 26

## 2019-03-09 RX ORDER — MIDAZOLAM HYDROCHLORIDE 1 MG/ML
0.02 INJECTION, SOLUTION INTRAMUSCULAR; INTRAVENOUS
Qty: 100 | Refills: 0 | Status: DISCONTINUED | OUTPATIENT
Start: 2019-03-09 | End: 2019-03-09

## 2019-03-09 RX ORDER — METOPROLOL TARTRATE 50 MG
25 TABLET ORAL
Qty: 0 | Refills: 0 | Status: DISCONTINUED | OUTPATIENT
Start: 2019-03-09 | End: 2019-03-13

## 2019-03-09 RX ORDER — FUROSEMIDE 40 MG
40 TABLET ORAL ONCE
Qty: 0 | Refills: 0 | Status: COMPLETED | OUTPATIENT
Start: 2019-03-09 | End: 2019-03-09

## 2019-03-09 RX ORDER — FENTANYL CITRATE 50 UG/ML
0.5 INJECTION INTRAVENOUS
Qty: 2500 | Refills: 0 | Status: DISCONTINUED | OUTPATIENT
Start: 2019-03-09 | End: 2019-03-13

## 2019-03-09 RX ORDER — MIDAZOLAM HYDROCHLORIDE 1 MG/ML
4 INJECTION, SOLUTION INTRAMUSCULAR; INTRAVENOUS ONCE
Qty: 0 | Refills: 0 | Status: DISCONTINUED | OUTPATIENT
Start: 2019-03-09 | End: 2019-03-09

## 2019-03-09 RX ORDER — FENTANYL CITRATE 50 UG/ML
50 INJECTION INTRAVENOUS ONCE
Qty: 0 | Refills: 0 | Status: DISCONTINUED | OUTPATIENT
Start: 2019-03-09 | End: 2019-03-12

## 2019-03-09 RX ADMIN — FENTANYL CITRATE 3.15 MICROGRAM(S)/KG/HR: 50 INJECTION INTRAVENOUS at 14:57

## 2019-03-09 RX ADMIN — FENTANYL CITRATE 3.15 MICROGRAM(S)/KG/HR: 50 INJECTION INTRAVENOUS at 20:43

## 2019-03-09 RX ADMIN — FAMOTIDINE 20 MILLIGRAM(S): 10 INJECTION INTRAVENOUS at 11:55

## 2019-03-09 RX ADMIN — Medication 40 MILLIGRAM(S): at 13:05

## 2019-03-09 RX ADMIN — BUDESONIDE AND FORMOTEROL FUMARATE DIHYDRATE 2 PUFF(S): 160; 4.5 AEROSOL RESPIRATORY (INHALATION) at 11:54

## 2019-03-09 RX ADMIN — Medication 125 MILLIGRAM(S): at 04:38

## 2019-03-09 RX ADMIN — Medication 125 MILLIGRAM(S): at 11:55

## 2019-03-09 RX ADMIN — Medication 5 MILLIGRAM(S): at 21:25

## 2019-03-09 RX ADMIN — Medication 125 MILLIGRAM(S): at 13:15

## 2019-03-09 RX ADMIN — Medication 81 MILLIGRAM(S): at 11:55

## 2019-03-09 RX ADMIN — Medication 5 MILLIGRAM(S): at 04:38

## 2019-03-09 RX ADMIN — Medication 125 MILLIGRAM(S): at 17:50

## 2019-03-09 RX ADMIN — Medication 1 APPLICATION(S): at 11:56

## 2019-03-09 RX ADMIN — AMLODIPINE BESYLATE 10 MILLIGRAM(S): 2.5 TABLET ORAL at 04:38

## 2019-03-09 RX ADMIN — ATORVASTATIN CALCIUM 20 MILLIGRAM(S): 80 TABLET, FILM COATED ORAL at 21:25

## 2019-03-09 RX ADMIN — Medication 50 MILLIGRAM(S): at 04:38

## 2019-03-09 NOTE — PROGRESS NOTE ADULT - ASSESSMENT
Oncology/Hematology Visit Note  Oct 18, 2017    DIAGNOSIS:  Mr. Win is a 73 year old male with a hx of CHF, CKD, CAD w/ hx of STEMI and CABG. He met with Dr. Robles at the end of July for consultation regarding metastatic prostate cancer. His story begins in May 2015 when he saw his PCP for back pain, present since Feb. He also had poor appetite, weight loss of 8lb and poor energy.  CT showed diffuse bony mets. CT Chest showed sclerotic mets throughout the bones. He was given degarelix on 7/22 in Urology. He was having pelvic pain and was evaluated by Dr Cavazos would did not feel XRT was appropriate.  He started on docetaxel 7/31. From 8/7-8/14 he was admitted with colitis, CHAR, elevated BNP, lactis acidosis, and poor appetite. He was discharged to a TCU and unfortunately, it sounds like he was not given his Lasix.  His BUBBA worsened and his breathing became compromised.  He was seen by Chelsi Corral on 8/21 and eventually transferred to the ED and admitted 8/21-8/24 for acute on chronic CHF.  At our last visit we discussed not pursuing further Taxotere at this time and discussed starting Zytiga when he was ready.  We pursued PT, OT, home lymphedema for supportive care at home in attempt for further rehabilitation. He was again admitted and discharged - 10/20/15.  He never started the Zytiga as he was still actively recovering from his cardiac issues.  His PSA remained stable, thus no intervention was pursued.  December 2015, Mainor met with Dr Robles and since his ECOG was back to a 1, they discussed re-challenging the Taxotere at a lower dose. 9/26/2016 he started Provenge off study. He continues on lupron every 3 mos and XGEVA. 9/26/2016 he started Provenge off study. He received 3 doses of Provenge (last 10/28), on 3rd dose had fevers, chills, myalgias, was bedbound for a week due to these symptoms. During this time he held his coumadin for 5 days prior to a central line removal 10/31. Later than week he had sudden onset  of right pleuritic chest pain and hemoptysis. Diagnosed with acute segmental/subsegmental PE on 11/8 and prescribed Lovenox.     Mainor was briefly on Zytiga and prednisone in November-December, however after one month his PSA went up, thus it was decided to add in Xofigo (1/11/17-- first).     On 12/28 he started xaralto and took his last pill on 1/10/17.  He stopped as he noticed melanotic stools and had some hemoptysis.  He was seen in clinic the next day with a hgb in th 6 range and was admitted.  Pan-scopes showed no active bleeding.  He was able to get his Xofigo in patient as well.  He was transitioned back to lovenox BID.     Mainor developed diarrhea on and off in the spring of 2017, Zytiga was stopped on 3/31 as it was unsure what was contributing.  Mainor felt it was related to the Xofigo and that his dose was too high as his weight is always at least 10 pounds higher in clinic than at home.  At his last visit with Dr Robles, his PSA was rising again and re-started Taxotere on 8/15/17.    INTERVAL HISTORY:  Mr. 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      Mainor is doing well and has no new complains at this visit. He is having most of the listed side effects. He has some fatigue, mucositis around day 4-7, greasy sensation in tongue, altered taste, mild nausea/feeling queasy, aches and pain in his muscles, diarrhea, but otherwise he feels perfectly fine.  He notes that he has started melting again - his hair has been shedding.     No f/s/c. No chest pain. No black stools. No new  issues.  He is on lovenox 40 mg BID.  Son is in town.     MEDICATIONS:   Current Outpatient Prescriptions   Medication Sig     predniSONE (DELTASONE) 5 MG tablet Take 1 tablet (5 mg) by mouth 2 times daily     dexamethasone (DECADRON) 4 MG tablet Take 2 tablets (8 mg) the morning after chemo (day 2) and 1 tab (4 mg) the following day (day 3)     LORazepam (ATIVAN) 0.5 MG tablet Take 1 tablet (0.5 mg) by mouth  continue current meds    intubated every 4 hours as needed (Anxiety, Nausea/Vomiting or Sleep)     enoxaparin (LOVENOX) 40 MG/0.4ML injection Inject 0.4 mLs (40 mg) Subcutaneous 2 times daily     aspirin 81 MG tablet Take 1 tablet (81 mg) by mouth daily     calcium carbonate (OS-RODNEY 500 MG [place]. [place]) 500 MG tablet Take 2 tablets (1,000 mg) by mouth 2 times daily     Cholecalciferol (VITAMIN D) 2000 UNITS tablet Take 1 tablet by mouth daily     simvastatin (ZOCOR) 40 MG tablet TAKE ONE TABLET BY MOUTH AT BEDTIME     HYDROcodone-acetaminophen (NORCO) 5-325 MG per tablet Take 1 tablet by mouth every 6 hours as needed for moderate to severe pain Reported on 5/22/2017     No current facility-administered medications for this visit.      Facility-Administered Medications Ordered in Other Visits   Medication     hydrocortisone sodium succinate (solu-CORTEF) injection     glycopyrrolate (ROBINUL) injection     neostigmine (PROSTIGMINE) injection     protamine injection     albuterol (PROAIR HFA, PROVENTIL HFA, VENTOLIN HFA) inhaler          ALLERGIES:  No Known Allergies    PHYSICAL EXAMINATION:  Vitals: /56  Pulse 108  Temp 97.8  F (36.6  C) (Oral)  Resp 18  Wt 60.8 kg (134 lb)  SpO2 98%  BMI 21.64 kg/m2   Wt Readings from Last 10 Encounters:   10/18/17 60.8 kg (134 lb)   10/18/17 60.8 kg (134 lb)   09/25/17 59 kg (130 lb)   09/25/17 59 kg (130 lb)   09/05/17 58.5 kg (129 lb)   08/15/17 57.6 kg (127 lb)   08/07/17 57.6 kg (127 lb)   08/02/17 58.1 kg (128 lb)   07/12/17 56.7 kg (125 lb)   06/27/17 56.2 kg (124 lb)       GENERAL:  A pleasant person in no acute distress.  Mildly pale and thin looking  LYMPH NODES:  No peripheral lymphadenopathy noted in the supraclavicular or cervical regions.   LUNGS:  Clear to auscultation bilaterally.   HEART:  Regular rate and rhythm   ABDOMEN:  Bowel sounds are active.  Soft and nontender.  No hepatosplenomegaly or other masses appreciated.  LOWER EXTREMITIES:  Without pitting edema to the knees bilaterally.    NEUROLOGICAL:  Alert/orientated/able to answer all questions.  CN grossly intact.     LAB:     Recent Labs   Lab Test  10/18/17   1125  09/25/17   1219  09/05/17   1239  08/15/17   1307  08/07/17   1246   NA  137  139  141  138  142   POTASSIUM  4.5  4.2  4.0  4.1  3.5   CHLORIDE  104  108  112*  108  110*   CO2  21  24  21  19*  22   ANIONGAP  11  8  8  12  9   BUN  27  25  21  20  20   CR  0.76  0.88  0.74  0.72  0.84   GLC  134*  105*  109*  204*  134*   RODNEY  9.3  8.6  7.7*  9.1  8.5     Recent Labs   Lab Test  04/05/17   0941  03/31/17   1411  03/15/17   1410  01/14/17   0808  01/13/17   1815  01/13/17   0801   MAG  2.0  2.2  2.4*  2.1  2.0  2.0   PHOS  2.3*  1.6*   --   2.0*  2.9  1.8*     Recent Labs   Lab Test  10/18/17   1125  09/25/17   1219  09/14/17   1211  09/05/17   1239  08/15/17   1307  08/07/17   1246   WBC  21.2*  8.7  14.8*  8.1  6.0  4.2   HGB  10.2*  11.1*  8.3*  10.1*  11.5*  7.8*   PLT  97*  74*  90*  79*  104*  93*   MCV  99  94  96  93  91  94   NEUTROPHIL   --   74.1  81.0  76.3  86.7  78.1     Recent Labs   Lab Test  10/18/17   1125  09/25/17   1219  09/05/17   1239   03/31/17   1411   12/21/16   1149  12/01/16   1247   BILITOTAL  1.4*  1.6*  0.9   < >  2.6*   < >  0.6  0.8   ALKPHOS  276*  288*  292*   < >  472*   < >  673*  649*   ALT  37  44  51   < >  16   < >  18  16   AST  30  28  35   < >  22   < >  24  22   ALBUMIN  4.0  3.5  3.5   < >  3.7   < >  3.5  3.8   LDH   --    --    --    --   409*   --   321*  348*    < > = values in this interval not displayed.     TSH   Date Value Ref Range Status   09/16/2015 3.18 0.40 - 4.00 mU/L Final     Recent Labs   Lab Test  09/25/17   1219  09/05/17   1239  08/01/17   0910  06/19/17   1500  05/31/17   0903   12/21/16   1149  12/01/16   1247  10/28/16   0942   09/07/16   1859   08/21/15   1405   PSA  480.00*  655.00*  741.00*  590.00*  542.00*   < >  424.22*  324.65*   --    < >  28.98*   < >  756.97*   TESTOSTTOTAL   --    --    --    --    --     --  <2 <2  11*   --   7*   --   4*    < > = values in this interval not displayed.         Recent Labs   Lab Test  10/18/17   1125  09/25/17   1219  09/05/17   1239  08/01/17   0910  06/19/17   1500   12/21/16   1149  12/01/16   1247  10/28/16   0942   09/07/16   1859   08/21/15   1405   PSA  479.00*  480.00*  655.00*  741.00*  590.00*   < >  424.22*  324.65*   --    < >  28.98*   < >  756.97*   TESTOSTTOTAL   --    --    --    --    --    --   <2  <2  11*   --   7*   --   4*    < > = values in this interval not displayed.          IMPRESSION/PLAN:   Metastatic prostate cancer: recent progression on Zytiga, Xofigo.  Now back on IV Taxotere every 3 weeks.  Tolerated well with mild fatigue, myelosuppression, mucositis, aches/pain, nausea, diarrhea, alopecia    We will continue with therapy as previously planned.     He dislikes going to pharmacy for dexamethasone for each cycle. He would like to have a single prescription.     He again had questions on the 'Triton 2' which we had discussed in the past. He wanted to know how long rucaparib has been in the market and efficacy data on this. I again reviewed the clinical trial in great details with him. In a study of similar class of agent - olaparib published in NEJ (N Engl J Med 2015; 373:4633-4523) about 33% of similar patients did have one of the mutations involving homologous DNA repair enzyme. 14 of the 16 patients with one of the mutations had a treatment response. Anemia (in 10 of the 50 patients [20%]) and fatigue (in 6 [12%]) were the most common grade 3 or 4 adverse events, findings that are consistent with previous studies of this class of agents. The current Triton 2 study selects patients with one of these mutations which is checked at screening (BRCA1/2, TRACY, CHK2, RAD51 and others). This can be done from a fresh biopsy or from next generation sequencing of circulating tumor cells.     He remains interested in the study. He would be a candidate after he  progresses post chemotherapy.     Anemia:Hgb is 10.2; will continue to observe. He will call if he has SOB. We could transfuse to keep Hgb closer to 10 given his CAD.     Heme: on lovenox for PE dx 11/2016. Had inpatient stay for GI bleed 1/11-1/14. Stopped xarelto and transitioned back to lovenox BID. No bleeding concerns. Recently dropped from 60mg BID to 50mg BID to 40 mg BID due to high Xa level.   -Continue at 40 mg BID.      Bone mets: on xgeva - had monthly for a year. OK to go to q 3 months now. Will administer at next visit for docetaxel(due in Nov of 2017)    CV: hx of CHF. No clinical s/s of failure.  Off metoprolol due to BP being too low.      Bili improved/stable.  OK for Taxotere    He will see Ms. Gabriela Mcguire in 3 weeks and me in 6 weeks.     Over 25 min of direct face to face time spent with patient with more than 50% time spent in counseling and coordinating care.

## 2019-03-09 NOTE — CONSULT NOTE ADULT - ASSESSMENT
Assessment-  1. Acute respiratory failure, intubated.  2. COPD with chronic co2 retention.  3. Mild fluid overload-CHF. Not clear systolic or diastolic.  4. Anaemia.  5. H/O G I Bleed in the past.  6. H/O Ca UB.    Recommendations  1. Patient transferred from the floor to CCU.  2. Intubated . On A/C and to continue.  3. IV Fluid only 20 ml /hr.  4. Transfuse 1 unit of PRBC today. Maintain HCT 25 or more.  5. PRN Laix.  6. Inhalers for COPD.  7. H/H after 1 unit PRBC.

## 2019-03-09 NOTE — AIRWAY PLACEMENT NOTE ADULT - POST AIRWAY PLACEMENT ASSESSMENT:
positive end tidal CO2 noted/breath sounds bilateral/breath sounds equal/skin color improved/CXR pending/chest excursion noted

## 2019-03-09 NOTE — PROGRESS NOTE ADULT - SUBJECTIVE AND OBJECTIVE BOX
SUBJECTIVE:    Patient is a 84y old Female who presents with a chief complaint of Urinary Retention/Hperkalemia with EKG changes (09 Mar 2019 07:28)    Currently admitted to medicine with the primary diagnosis of Bladder cancer     Today is hospital day 32d. This morning she is resting comfortably in bed and reports no new issues or overnight events.     PAST MEDICAL & SURGICAL HISTORY  Anemia: 4 PRBC 11/2018  Oxygen dependent: O2 2L Via NC  Bladder cancer: 2018  CHF (congestive heart failure)  Breast tumor: removal benign bl breasts  Anal cancer: Chemo and radiation 1992  COPD (chronic obstructive pulmonary disease)  Lung disease: COPD  Hypertension  History of cystoscopy: 10/2018  H/O breast surgery: 39 yrs. ago, bilateral  History of back surgery: 10 yrs. ago stimulator implant 2001, 2011( cervical and Lumbar)    SOCIAL HISTORY:  Negative for smoking/alcohol/drug use.     ALLERGIES:  hair dyes (Hives)  sulfa drugs (Headache)  Zoloft (Headache)    MEDICATIONS:  STANDING MEDICATIONS  amLODIPine   Tablet 10 milliGRAM(s) Oral daily  aspirin  chewable 81 milliGRAM(s) Oral daily  atorvastatin 20 milliGRAM(s) Oral at bedtime  busPIRone 5 milliGRAM(s) Oral three times a day  chlorhexidine 4% Liquid 1 Application(s) Topical <User Schedule>  docusate sodium 200 milliGRAM(s) Oral two times a day  DULoxetine 30 milliGRAM(s) Oral at bedtime  epoetin spencer Injectable 31918 Unit(s) SubCutaneous every 7 days  famotidine    Tablet 20 milliGRAM(s) Oral daily  fentaNYL    Injectable 50 MICROGram(s) IV Push once  fentaNYL   Infusion. 0.5 MICROgram(s)/kG/Hr IV Continuous <Continuous>  hydrocortisone 2.5% Rectal Cream 1 Application(s) Rectal daily  iohexol 300 mG (iodine)/mL Oral Solution 30 milliLiter(s) Oral once  metoprolol succinate ER 50 milliGRAM(s) Oral daily  senna 2 Tablet(s) Oral daily  vancomycin    Solution 125 milliGRAM(s) Oral every 6 hours    PRN MEDICATIONS  bisacodyl Suppository 10 milliGRAM(s) Rectal every 24 hours PRN  cloNIDine 0.1 milliGRAM(s) Oral every 8 hours PRN  HYDROcodone 10 mG/acetaminophen 325 mG 1 Tablet(s) Oral every 4 hours PRN  ondansetron Injectable 4 milliGRAM(s) IV Push every 8 hours PRN  simethicone 80 milliGRAM(s) Chew every 8 hours PRN    VITALS:   T(F): 96.9  HR: 82  BP: 125/57  RR: 42  SpO2: 84%    LABS:                        7.3    9.19  )-----------( 213      ( 09 Mar 2019 06:17 )             25.1     03-09    152<H>  |  113<H>  |  47<H>  ----------------------------<  113<H>  5.4<H>   |  31  |  0.6<L>    Ca    8.0<L>      09 Mar 2019 06:17    TPro  4.7<L>  /  Alb  2.6<L>  /  TBili  0.3  /  DBili  x   /  AST  45<H>  /  ALT  99<H>  /  AlkPhos  860<H>  03-09    PT/INR - ( 08 Mar 2019 15:26 )   PT: 11.90 sec;   INR: 1.04 ratio         PTT - ( 08 Mar 2019 15:26 )  PTT:29.2 sec    ABG - ( 09 Mar 2019 14:14 )  pH, Arterial: 7.30  pH, Blood: x     /  pCO2: 66    /  pO2: 245   / HCO3: 32    / Base Excess: 4.6   /  SaO2: 100                       RADIOLOGY:    PHYSICAL EXAM:  GEN: intubated  LUNGS: decrease bs  HEART: Regular  ABD: Soft, non-tender, non-distended.  EXT: NC/NC/NE/2+PP/SARAVIA/Skin Intact.   NEURO: AAOX3    Intravenous access:   NG tube:   Rowe Catheter:   Indwelling Urethral Catheter:     Connect To:  Straight Drainage/Lookout    Indication:  Urinary Retention / Obstruction    Additional Instructions:  if more then 250cc, keep in place (03-08-19 @ 18:13) (not performed) [active]

## 2019-03-09 NOTE — PROGRESS NOTE ADULT - SUBJECTIVE AND OBJECTIVE BOX
Patient is a 84y old  Female who presents with a chief complaint of Urinary Retention/Hperkalemia with EKG changes   PMH  sig  for  MDS  and  getting  Procrit  40,000  with  blood  tfx  supports  as  needed . Today  she  develeoped  hypoxia  acutely  and  was  intubated  and  transferred  to  CCU  , on [place]  drip       Pt is seen and examined  pt is awake and lying in bed  pt seems comfortable.          ROS:  Negative except for:    MEDICATIONS  (STANDING):  amLODIPine   Tablet 10 milliGRAM(s) Oral daily  aspirin  chewable 81 milliGRAM(s) Oral daily  atorvastatin 20 milliGRAM(s) Oral at bedtime  busPIRone 5 milliGRAM(s) Oral three times a day  chlorhexidine 4% Liquid 1 Application(s) Topical <User Schedule>  docusate sodium 200 milliGRAM(s) Oral two times a day  DULoxetine 30 milliGRAM(s) Oral at bedtime  epoetin spencer Injectable 92795 Unit(s) SubCutaneous every 7 days  famotidine    Tablet 20 milliGRAM(s) Oral daily  fentaNYL    Injectable 50 MICROGram(s) IV Push once  fentaNYL   Infusion. 0.5 MICROgram(s)/kG/Hr (3.15 mL/Hr) IV Continuous <Continuous>  hydrocortisone 2.5% Rectal Cream 1 Application(s) Rectal daily  iohexol 300 mG (iodine)/mL Oral Solution 30 milliLiter(s) Oral once  metoprolol succinate ER 50 milliGRAM(s) Oral daily  senna 2 Tablet(s) Oral daily  vancomycin    Solution 125 milliGRAM(s) Oral every 6 hours    MEDICATIONS  (PRN):  bisacodyl Suppository 10 milliGRAM(s) Rectal every 24 hours PRN Constipation  cloNIDine 0.1 milliGRAM(s) Oral every 8 hours PRN for sbp > 180  HYDROcodone 10 mG/acetaminophen 325 mG 1 Tablet(s) Oral every 4 hours PRN Moderate Pain (4 - 6)  ondansetron Injectable 4 milliGRAM(s) IV Push every 8 hours PRN Nausea and/or Vomiting  simethicone 80 milliGRAM(s) Chew every 8 hours PRN Gas      Allergies    hair dyes (Hives)  sulfa drugs (Headache)  Zoloft (Headache)    Intolerances        Vital Signs Last 24 Hrs  T(C): 36.1 (09 Mar 2019 16:00), Max: 36.1 (09 Mar 2019 16:00)  T(F): 96.9 (09 Mar 2019 16:00), Max: 96.9 (09 Mar 2019 16:00)  HR: 79 (09 Mar 2019 17:27) (23 - 94)  BP: 101/51 (09 Mar 2019 17:27) (95/46 - 163/86)  BP(mean): 73 (09 Mar 2019 17:27) (66 - 84)  RR: 45 (09 Mar 2019 17:27) (16 - 45)  SpO2: 95% (09 Mar 2019 17:27) (59% - 100%)    PHYSICAL EXAM  General: Intubated  and  well  sedated  HEENT: clear oropharynx, anicteric sclera, pink conjunctiva  Neck: supple  CV: normal S1/S2 with no murmur rubs or gallops  Lungs: positive air movement b/l ant lungs,clear to auscultation, no wheezes, no rales  Abdomen: soft non-tender non-distended, no hepatosplenomegaly  Ext: no clubbing cyanosis or edema  right  lower ext >  than  left  ? DVT  Skin: no rashes and no petechiae  Neuro: alert and oriented X 4, no focal deficits  LABS:                          7.3    9.19  )-----------( 213      ( 09 Mar 2019 06:17 )             25.1         Mean Cell Volume : 112.1 fL  Mean Cell Hemoglobin : 32.6 pg  Mean Cell Hemoglobin Concentration : 29.1 g/dL  Auto Neutrophil # : x  Auto Lymphocyte # : x  Auto Monocyte # : x  Auto Eosinophil # : x  Auto Basophil # : x  Auto Neutrophil % : x  Auto Lymphocyte % : x  Auto Monocyte % : x  Auto Eosinophil % : x  Auto Basophil % : x    Serial CBC's  03-09 @ 06:17  Hct-25.1 / Hgb-7.3 / Plat-213 / RBC-2.24 / WBC-9.19          Serial CBC's  03-08 @ 15:26  Hct-23.6 / Hgb-6.8 / Plat-204 / RBC-2.14 / WBC-8.47          Serial CBC's  03-08 @ 06:57  Hct-23.7 / Hgb-7.1 / Plat-203 / RBC-2.18 / WBC-8.63          Serial CBC's  03-06 @ 06:31  Hct-28.4 / Hgb-8.5 / Plat-198 / RBC-2.63 / WBC-17.86            03-09    152<H>  |  113<H>  |  47<H>  ----------------------------<  113<H>  5.4<H>   |  31  |  0.6<L>    Ca    8.0<L>      09 Mar 2019 06:17    TPro  4.7<L>  /  Alb  2.6<L>  /  TBili  0.3  /  DBili  x   /  AST  45<H>  /  ALT  99<H>  /  AlkPhos  860<H>  03-09      PT/INR - ( 08 Mar 2019 15:26 )   PT: 11.90 sec;   INR: 1.04 ratio         PTT - ( 08 Mar 2019 15:26 )  PTT:29.2 sec    Hemoglobin: 7.3 g/dL (03-09-19 @ 06:17)  WBC Count: 9.19 K/uL (03-09-19 @ 06:17)  Hematocrit: 25.1 % (03-09-19 @ 06:17)  Platelet Count - Automated: 213 K/uL (03-09-19 @ 06:17)  Hemoglobin: 6.8 g/dL (03-08-19 @ 15:26)  WBC Count: 8.47 K/uL (03-08-19 @ 15:26)  Hematocrit: 23.6 % (03-08-19 @ 15:26)  Platelet Count - Automated: 204 K/uL (03-08-19 @ 15:26)  WBC Count: 8.63 K/uL (03-08-19 @ 06:57)  Hematocrit: 23.7 % (03-08-19 @ 06:57)  Hemoglobin: 7.1 g/dL (03-08-19 @ 06:57)  Platelet Count - Automated: 203 K/uL (03-08-19 @ 06:57)  Hematocrit: 28.4 % (03-06-19 @ 06:31)  Hemoglobin: 8.5 g/dL (03-06-19 @ 06:31)  Platelet Count - Automated: 198 K/uL (03-06-19 @ 06:31)  WBC Count: 17.86 K/uL (03-06-19 @ 06:31)  WBC Count: 21.19 K/uL (03-04-19 @ 07:06)  Hematocrit: 28.2 % (03-04-19 @ 07:06)  Platelet Count - Automated: 183 K/uL (03-04-19 @ 07:06)  Hemoglobin: 8.9 g/dL (03-04-19 @ 07:06)  Hemoglobin: 10.0 g/dL (03-02-19 @ 07:59)  WBC Count: 29.57 K/uL (03-02-19 @ 07:59)  Hematocrit: 31.6 % (03-02-19 @ 07:59)  Platelet Count - Automated: 192 K/uL (03-02-19 @ 07:59)  Hemoglobin: 8.8 g/dL (02-28-19 @ 15:35)  WBC Count: 19.20 K/uL (02-28-19 @ 15:35)  Hematocrit: 27.9 % (02-28-19 @ 15:35)  Platelet Count - Automated: 161 K/uL (02-28-19 @ 15:35)                BLOOD SMEAR INTERPRETATION:       RADIOLOGY & ADDITIONAL STUDIES:

## 2019-03-09 NOTE — CONSULT NOTE ADULT - SUBJECTIVE AND OBJECTIVE BOX
Patient is a 84y old  Female who presents with a chief complaint of Urinary Retention/Hperkalemia with EKG changes (09 Mar 2019 17:20)      HPI:  From ED:  84 year old female w hx of HTN, CHF, COPD on 2 LPM NC daily, bladder cancer presents to the ED with urinary retention, crampy/spasm-like lower abdominal pain and mild distention. Patient had bladder surgery w Dr. Sanchez on 1/30/19 and was d/c home with a lynne catheter in place. The catheter was removed yesterday. Patient had two episodes of normal urination after removal, but has since been experiencing retention and dribbling at most. Denies fevers, chills, cough, chest pain, shortness of breath, flank pain, myalgias, incontinence, rashes, or recent illness.  ________________________________________________________________________________________________________   Patient presents to ED with family this am.  Dtr reports that patient s/p cysto with bladder scrapping for Bladder Ca on 1/30/19.  Patient tolerated procedure well and was discharged home with Lynne.  Patient did very well since then and went to follow up at office ystd.  At that point lynne was removed and patient went home.  Dtr reports that patient urinated x 2 since removal however last evening began only dribbling and began having severe bladder spasms, unrelieved by oxybutynin.  Family brought patient to ED 2/2 to this.  Patient reports she is feeling well.  No CP.  SOB at baseline.  Still with some minor abdominal cramping, suprapubic area.  No fever.  Last BM ystd AM and regular. (05 Feb 2019 07:48)  Patient was on the floor and had rapid response due to low oxygen saturation and respiratory distress. Patient and her family agreed for intubation after she was put on BIPAP and still had difficulty breathing. She was brought to ICU and intubated.        PAST MEDICAL & SURGICAL HISTORY:  Anemia: 4 PRBC 11/2018  Oxygen dependent: O2 2L Via NC  Bladder cancer: 2018  CHF (congestive heart failure)  Breast tumor: removal benign bl breasts  Anal cancer: Chemo and radiation 1992  COPD (chronic obstructive pulmonary disease)  Lung disease: COPD  Hypertension  History of cystoscopy: 10/2018  H/O breast surgery: 39 yrs. ago, bilateral  History of back surgery: 10 yrs. ago stimulator implant 2001, 2011( cervical and Lumbar)        MEDICATIONS  (STANDING):  amLODIPine   Tablet 10 milliGRAM(s) Oral daily  aspirin  chewable 81 milliGRAM(s) Oral daily  atorvastatin 20 milliGRAM(s) Oral at bedtime  busPIRone 5 milliGRAM(s) Oral three times a day  chlorhexidine 4% Liquid 1 Application(s) Topical <User Schedule>  docusate sodium 200 milliGRAM(s) Oral two times a day  DULoxetine 30 milliGRAM(s) Oral at bedtime  epoetin spencer Injectable 83523 Unit(s) SubCutaneous every 7 days  famotidine    Tablet 20 milliGRAM(s) Oral daily  fentaNYL    Injectable 50 MICROGram(s) IV Push once  fentaNYL   Infusion. 0.5 MICROgram(s)/kG/Hr (3.15 mL/Hr) IV Continuous <Continuous>  hydrocortisone 2.5% Rectal Cream 1 Application(s) Rectal daily  iohexol 300 mG (iodine)/mL Oral Solution 30 milliLiter(s) Oral once  metoprolol succinate ER 50 milliGRAM(s) Oral daily  senna 2 Tablet(s) Oral daily  vancomycin    Solution 125 milliGRAM(s) Oral every 6 hours    Allergies    hair dyes (Hives)  sulfa drugs (Headache)  Zoloft (Headache)    Intolerances      Vital Signs Last 24 Hrs  T(C): 36.1 (09 Mar 2019 16:00), Max: 36.1 (09 Mar 2019 16:00)  T(F): 96.9 (09 Mar 2019 16:00), Max: 96.9 (09 Mar 2019 16:00)  HR: 82 (09 Mar 2019 15:22) (23 - 94)  BP: 125/57 (09 Mar 2019 15:22) (102/50 - 163/86)  BP(mean): 82 (09 Mar 2019 15:22) (72 - 84)  RR: 42 (09 Mar 2019 15:22) (16 - 42)  SpO2: 84% (09 Mar 2019 15:22) (59% - 100%)  PHYSICAL EXAM:      Constitutional: fair    Eyes: no icterus.    ENMT: normal    Neck: no mass    Breasts:    Back:    Respiratory: no wheezing. few crepitations on both sides.    Cardiovascular: regular. no murmur heard.    Gastrointestinal: no abdominal tenderness.    Genitourinary:    Rectal:    Extremities: no leg edema.    Vascular:    Neurological: awake and alert on BIPAP.    Skin:    Lymph Nodes:    Musculoskeletal:    Psychiatric:      03-09    152<H>  |  113<H>  |  47<H>  ----------------------------<  113<H>  5.4<H>   |  31  |  0.6<L>    Ca    8.0<L>      09 Mar 2019 06:17    TPro  4.7<L>  /  Alb  2.6<L>  /  TBili  0.3  /  DBili  x   /  AST  45<H>  /  ALT  99<H>  /  AlkPhos  860<H>  03-09      CBC Full  -  ( 09 Mar 2019 06:17 )  WBC Count : 9.19 K/uL  Hemoglobin : 7.3 g/dL  Hematocrit : 25.1 %  Platelet Count - Automated : 213 K/uL  Mean Cell Volume : 112.1 fL  Mean Cell Hemoglobin : 32.6 pg  Mean Cell Hemoglobin Concentration : 29.1 g/dL  Auto Neutrophil # : x  Auto Lymphocyte # : x  Auto Monocyte # : x  Auto Eosinophil # : x  Auto Basophil # : x  Auto Neutrophil % : x  Auto Lymphocyte % : x  Auto Monocyte % : x  Auto Eosinophil % : x  Auto Basophil % : x    PT/INR - ( 08 Mar 2019 15:26 )   PT: 11.90 sec;   INR: 1.04 ratio         PTT - ( 08 Mar 2019 15:26 )  PTT:29.2 sec  ABG - ( 09 Mar 2019 14:14 )  pH, Arterial: 7.30  pH, Blood: x     /  pCO2: 66    /  pO2: 245   / HCO3: 32    / Base Excess: 4.6   /  SaO2: 100                 EKG NSR.    Radiology: ET tube above jenni , mild congestion and small left pleural effusion.

## 2019-03-09 NOTE — CONSULT NOTE ADULT - ATTENDING COMMENTS
I examined and wrote the note.
Attending Statement: I have personally performed a face to face diagnostic evaluation on this patient. I have reviewed the above note and agree with the history, exam and plan of care, except as I have noted in the text.
Patient seen and examined at bedside in ER  patient went 12 hours without voiding yesterday after catheter was removed S/p TURBT last week  catheter was replaced with 500ml of orange urine due to pyridium  feels better w cath in   per family who is at bedside, patient was becoming more aggravated and uncomfortable throughout the night  has been constiptated  WBC is high with normal Cr and + UA   recommend continued catheter, IV abx until culture returns  treat constipation, ensure patient is able to ambulate back to normal, decrease pain meds as possible  can follow up with Dr Sanchez as outpt when discharged

## 2019-03-09 NOTE — PHARMACOTHERAPY INTERVENTION NOTE - NS PHARM COM OUTCOMES
patient received it in the past
CASEY Villegas and nurse Askew will inform the family to bring the original RX bottle.

## 2019-03-09 NOTE — PROGRESS NOTE ADULT - ASSESSMENT
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 UTI with   1 Resp. Failure  ?  etiology  R/O  PE   B/L  lower ext  doppler  today D-dimer  asap  2- leukocytosis likely sec to c diff    3 Small effusions on CT right > left with lung nodules.  repeat  chest xray  shows No  sig. change   left lung atelectasis from mucous plug..s/p mucus plug removed    4 anemia   Transfuse  with 2  units  today  weakness ... improving   5-  Elevated  Alk phosphatse  ?  etiology   may need  ERCP  when is  more  stable       discussed  with family  and  nursing  staff  as  well  as  HO

## 2019-03-09 NOTE — CONSULT NOTE ADULT - CONSULT REQUESTED BY NAME
Ambrose
CODY GALLARDO
DR. GALLARDO
Dr Vines
Jed Boggs MD
Luis Daniel [facility]U
Medicine
Medicine
Primary Team
medicine
Dr. Boggs

## 2019-03-10 LAB
ALBUMIN SERPL ELPH-MCNC: 2.2 G/DL — LOW (ref 3.5–5.2)
ALP SERPL-CCNC: 467 U/L — HIGH (ref 30–115)
ALT FLD-CCNC: 48 U/L — HIGH (ref 0–41)
ANION GAP SERPL CALC-SCNC: 10 MMOL/L — SIGNIFICANT CHANGE UP (ref 7–14)
ANION GAP SERPL CALC-SCNC: 5 MMOL/L — LOW (ref 7–14)
APPEARANCE UR: CLEAR — SIGNIFICANT CHANGE UP
AST SERPL-CCNC: 19 U/L — SIGNIFICANT CHANGE UP (ref 0–41)
BASE EXCESS BLDA CALC-SCNC: 6.1 MMOL/L — HIGH (ref -2–2)
BASOPHILS # BLD AUTO: 0.05 K/UL — SIGNIFICANT CHANGE UP (ref 0–0.2)
BASOPHILS # BLD AUTO: 0.07 K/UL — SIGNIFICANT CHANGE UP (ref 0–0.2)
BASOPHILS NFR BLD AUTO: 0.3 % — SIGNIFICANT CHANGE UP (ref 0–1)
BASOPHILS NFR BLD AUTO: 0.3 % — SIGNIFICANT CHANGE UP (ref 0–1)
BILIRUB SERPL-MCNC: 0.4 MG/DL — SIGNIFICANT CHANGE UP (ref 0.2–1.2)
BILIRUB UR-MCNC: NEGATIVE — SIGNIFICANT CHANGE UP
BUN SERPL-MCNC: 51 MG/DL — HIGH (ref 10–20)
BUN SERPL-MCNC: 60 MG/DL — HIGH (ref 10–20)
CALCIUM SERPL-MCNC: 7.5 MG/DL — LOW (ref 8.5–10.1)
CALCIUM SERPL-MCNC: 7.7 MG/DL — LOW (ref 8.5–10.1)
CHLORIDE SERPL-SCNC: 111 MMOL/L — HIGH (ref 98–110)
CHLORIDE SERPL-SCNC: 113 MMOL/L — HIGH (ref 98–110)
CO2 SERPL-SCNC: 29 MMOL/L — SIGNIFICANT CHANGE UP (ref 17–32)
CO2 SERPL-SCNC: 32 MMOL/L — SIGNIFICANT CHANGE UP (ref 17–32)
COLOR SPEC: YELLOW — SIGNIFICANT CHANGE UP
CREAT SERPL-MCNC: 0.8 MG/DL — SIGNIFICANT CHANGE UP (ref 0.7–1.5)
CREAT SERPL-MCNC: 1 MG/DL — SIGNIFICANT CHANGE UP (ref 0.7–1.5)
DIFF PNL FLD: ABNORMAL
EOSINOPHIL # BLD AUTO: 0 K/UL — SIGNIFICANT CHANGE UP (ref 0–0.7)
EOSINOPHIL # BLD AUTO: 0 K/UL — SIGNIFICANT CHANGE UP (ref 0–0.7)
EOSINOPHIL NFR BLD AUTO: 0 % — SIGNIFICANT CHANGE UP (ref 0–8)
EOSINOPHIL NFR BLD AUTO: 0 % — SIGNIFICANT CHANGE UP (ref 0–8)
GLUCOSE BLDC GLUCOMTR-MCNC: 201 MG/DL — HIGH (ref 70–99)
GLUCOSE BLDC GLUCOMTR-MCNC: 263 MG/DL — HIGH (ref 70–99)
GLUCOSE SERPL-MCNC: 191 MG/DL — HIGH (ref 70–99)
GLUCOSE SERPL-MCNC: 213 MG/DL — HIGH (ref 70–99)
GLUCOSE UR QL: NEGATIVE MG/DL — SIGNIFICANT CHANGE UP
HCO3 BLDA-SCNC: 32 MMOL/L — HIGH (ref 23–27)
HCT VFR BLD CALC: 26.4 % — LOW (ref 37–47)
HCT VFR BLD CALC: 27.3 % — LOW (ref 37–47)
HGB BLD-MCNC: 8.1 G/DL — LOW (ref 12–16)
HGB BLD-MCNC: 8.7 G/DL — LOW (ref 12–16)
HOROWITZ INDEX BLDA+IHG-RTO: 70 — SIGNIFICANT CHANGE UP
IMM GRANULOCYTES NFR BLD AUTO: 5.1 % — HIGH (ref 0.1–0.3)
IMM GRANULOCYTES NFR BLD AUTO: 7.3 % — HIGH (ref 0.1–0.3)
KETONES UR-MCNC: NEGATIVE — SIGNIFICANT CHANGE UP
LEUKOCYTE ESTERASE UR-ACNC: ABNORMAL
LYMPHOCYTES # BLD AUTO: 0.28 K/UL — LOW (ref 1.2–3.4)
LYMPHOCYTES # BLD AUTO: 0.49 K/UL — LOW (ref 1.2–3.4)
LYMPHOCYTES # BLD AUTO: 1.7 % — LOW (ref 20.5–51.1)
LYMPHOCYTES # BLD AUTO: 2 % — LOW (ref 20.5–51.1)
MAGNESIUM SERPL-MCNC: 2.1 MG/DL — SIGNIFICANT CHANGE UP (ref 1.8–2.4)
MCHC RBC-ENTMCNC: 30.7 G/DL — LOW (ref 32–37)
MCHC RBC-ENTMCNC: 31.9 G/DL — LOW (ref 32–37)
MCHC RBC-ENTMCNC: 32.9 PG — HIGH (ref 27–31)
MCHC RBC-ENTMCNC: 33.6 PG — HIGH (ref 27–31)
MCV RBC AUTO: 105.4 FL — HIGH (ref 81–99)
MCV RBC AUTO: 107.3 FL — HIGH (ref 81–99)
MONOCYTES # BLD AUTO: 0.45 K/UL — SIGNIFICANT CHANGE UP (ref 0.1–0.6)
MONOCYTES # BLD AUTO: 1.32 K/UL — HIGH (ref 0.1–0.6)
MONOCYTES NFR BLD AUTO: 2.7 % — SIGNIFICANT CHANGE UP (ref 1.7–9.3)
MONOCYTES NFR BLD AUTO: 5.5 % — SIGNIFICANT CHANGE UP (ref 1.7–9.3)
NEUTROPHILS # BLD AUTO: 15 K/UL — HIGH (ref 1.4–6.5)
NEUTROPHILS # BLD AUTO: 20.49 K/UL — HIGH (ref 1.4–6.5)
NEUTROPHILS NFR BLD AUTO: 84.9 % — HIGH (ref 42.2–75.2)
NEUTROPHILS NFR BLD AUTO: 90.2 % — HIGH (ref 42.2–75.2)
NITRITE UR-MCNC: NEGATIVE — SIGNIFICANT CHANGE UP
NRBC # BLD: 0 /100 WBCS — SIGNIFICANT CHANGE UP (ref 0–0)
NRBC # BLD: 1 /100 WBCS — HIGH (ref 0–0)
PCO2 BLDA: 57 MMHG — HIGH (ref 38–42)
PH BLDA: 7.36 — LOW (ref 7.38–7.42)
PH UR: 5.5 — SIGNIFICANT CHANGE UP (ref 5–8)
PHOSPHATE SERPL-MCNC: 3.5 MG/DL — SIGNIFICANT CHANGE UP (ref 2.1–4.9)
PLATELET # BLD AUTO: 173 K/UL — SIGNIFICANT CHANGE UP (ref 130–400)
PLATELET # BLD AUTO: 185 K/UL — SIGNIFICANT CHANGE UP (ref 130–400)
PO2 BLDA: 110 MMHG — HIGH (ref 78–95)
POTASSIUM SERPL-MCNC: 5.8 MMOL/L — HIGH (ref 3.5–5)
POTASSIUM SERPL-MCNC: 5.9 MMOL/L — HIGH (ref 3.5–5)
POTASSIUM SERPL-SCNC: 5.8 MMOL/L — HIGH (ref 3.5–5)
POTASSIUM SERPL-SCNC: 5.9 MMOL/L — HIGH (ref 3.5–5)
PROT SERPL-MCNC: 4.5 G/DL — LOW (ref 6–8)
PROT UR-MCNC: 30 MG/DL
RBC # BLD: 2.46 M/UL — LOW (ref 4.2–5.4)
RBC # BLD: 2.59 M/UL — LOW (ref 4.2–5.4)
RBC # FLD: 24.3 % — HIGH (ref 11.5–14.5)
RBC # FLD: 24.5 % — HIGH (ref 11.5–14.5)
SAO2 % BLDA: 99 % — HIGH (ref 94–98)
SODIUM SERPL-SCNC: 150 MMOL/L — HIGH (ref 135–146)
SODIUM SERPL-SCNC: 150 MMOL/L — HIGH (ref 135–146)
SP GR SPEC: 1.01 — SIGNIFICANT CHANGE UP (ref 1.01–1.03)
UROBILINOGEN FLD QL: 0.2 MG/DL — SIGNIFICANT CHANGE UP (ref 0.2–0.2)
WBC # BLD: 16.63 K/UL — HIGH (ref 4.8–10.8)
WBC # BLD: 24.12 K/UL — HIGH (ref 4.8–10.8)
WBC # FLD AUTO: 16.63 K/UL — HIGH (ref 4.8–10.8)
WBC # FLD AUTO: 24.12 K/UL — HIGH (ref 4.8–10.8)

## 2019-03-10 RX ORDER — CALCIUM GLUCONATE 100 MG/ML
1 VIAL (ML) INTRAVENOUS ONCE
Qty: 0 | Refills: 0 | Status: COMPLETED | OUTPATIENT
Start: 2019-03-10 | End: 2019-03-10

## 2019-03-10 RX ORDER — DEXTROSE 50 % IN WATER 50 %
50 SYRINGE (ML) INTRAVENOUS ONCE
Qty: 0 | Refills: 0 | Status: COMPLETED | OUTPATIENT
Start: 2019-03-10 | End: 2019-03-10

## 2019-03-10 RX ORDER — DEXMEDETOMIDINE HYDROCHLORIDE IN 0.9% SODIUM CHLORIDE 4 UG/ML
0.2 INJECTION INTRAVENOUS
Qty: 200 | Refills: 0 | Status: DISCONTINUED | OUTPATIENT
Start: 2019-03-10 | End: 2019-03-11

## 2019-03-10 RX ORDER — CHLORHEXIDINE GLUCONATE 213 G/1000ML
15 SOLUTION TOPICAL
Qty: 0 | Refills: 0 | Status: DISCONTINUED | OUTPATIENT
Start: 2019-03-10 | End: 2019-03-13

## 2019-03-10 RX ORDER — SODIUM POLYSTYRENE SULFONATE 4.1 MEQ/G
30 POWDER, FOR SUSPENSION ORAL ONCE
Qty: 0 | Refills: 0 | Status: COMPLETED | OUTPATIENT
Start: 2019-03-10 | End: 2019-03-10

## 2019-03-10 RX ORDER — MIDAZOLAM HYDROCHLORIDE 1 MG/ML
0.02 INJECTION, SOLUTION INTRAMUSCULAR; INTRAVENOUS
Qty: 100 | Refills: 0 | Status: DISCONTINUED | OUTPATIENT
Start: 2019-03-10 | End: 2019-03-13

## 2019-03-10 RX ORDER — MEROPENEM 1 G/30ML
INJECTION INTRAVENOUS
Qty: 0 | Refills: 0 | Status: DISCONTINUED | OUTPATIENT
Start: 2019-03-10 | End: 2019-03-13

## 2019-03-10 RX ORDER — MEROPENEM 1 G/30ML
1000 INJECTION INTRAVENOUS EVERY 12 HOURS
Qty: 0 | Refills: 0 | Status: DISCONTINUED | OUTPATIENT
Start: 2019-03-11 | End: 2019-03-13

## 2019-03-10 RX ORDER — MIDAZOLAM HYDROCHLORIDE 1 MG/ML
2 INJECTION, SOLUTION INTRAMUSCULAR; INTRAVENOUS ONCE
Qty: 0 | Refills: 0 | Status: DISCONTINUED | OUTPATIENT
Start: 2019-03-10 | End: 2019-03-10

## 2019-03-10 RX ORDER — ACETAMINOPHEN 500 MG
650 TABLET ORAL EVERY 6 HOURS
Qty: 0 | Refills: 0 | Status: DISCONTINUED | OUTPATIENT
Start: 2019-03-10 | End: 2019-03-13

## 2019-03-10 RX ORDER — MEROPENEM 1 G/30ML
1000 INJECTION INTRAVENOUS ONCE
Qty: 0 | Refills: 0 | Status: COMPLETED | OUTPATIENT
Start: 2019-03-10 | End: 2019-03-10

## 2019-03-10 RX ORDER — SODIUM CHLORIDE 9 MG/ML
1000 INJECTION, SOLUTION INTRAVENOUS
Qty: 0 | Refills: 0 | Status: DISCONTINUED | OUTPATIENT
Start: 2019-03-10 | End: 2019-03-11

## 2019-03-10 RX ORDER — INSULIN HUMAN 100 [IU]/ML
10 INJECTION, SOLUTION SUBCUTANEOUS ONCE
Qty: 0 | Refills: 0 | Status: COMPLETED | OUTPATIENT
Start: 2019-03-10 | End: 2019-03-10

## 2019-03-10 RX ADMIN — INSULIN HUMAN 10 UNIT(S): 100 INJECTION, SOLUTION SUBCUTANEOUS at 22:00

## 2019-03-10 RX ADMIN — MIDAZOLAM HYDROCHLORIDE 2 MILLIGRAM(S): 1 INJECTION, SOLUTION INTRAMUSCULAR; INTRAVENOUS at 10:27

## 2019-03-10 RX ADMIN — Medication 125 MILLIGRAM(S): at 17:31

## 2019-03-10 RX ADMIN — Medication 5 MILLIGRAM(S): at 13:03

## 2019-03-10 RX ADMIN — Medication 125 MILLIGRAM(S): at 05:00

## 2019-03-10 RX ADMIN — Medication 650 MILLIGRAM(S): at 12:40

## 2019-03-10 RX ADMIN — Medication 50 MILLILITER(S): at 22:03

## 2019-03-10 RX ADMIN — MEROPENEM 100 MILLIGRAM(S): 1 INJECTION INTRAVENOUS at 17:02

## 2019-03-10 RX ADMIN — DEXMEDETOMIDINE HYDROCHLORIDE IN 0.9% SODIUM CHLORIDE 3.15 MICROGRAM(S)/KG/HR: 4 INJECTION INTRAVENOUS at 22:59

## 2019-03-10 RX ADMIN — FAMOTIDINE 20 MILLIGRAM(S): 10 INJECTION INTRAVENOUS at 11:41

## 2019-03-10 RX ADMIN — Medication 200 GRAM(S): at 22:06

## 2019-03-10 RX ADMIN — MIDAZOLAM HYDROCHLORIDE 1.26 MG/KG/HR: 1 INJECTION, SOLUTION INTRAMUSCULAR; INTRAVENOUS at 12:40

## 2019-03-10 RX ADMIN — CHLORHEXIDINE GLUCONATE 15 MILLILITER(S): 213 SOLUTION TOPICAL at 17:32

## 2019-03-10 RX ADMIN — Medication 650 MILLIGRAM(S): at 04:53

## 2019-03-10 RX ADMIN — Medication 81 MILLIGRAM(S): at 11:40

## 2019-03-10 RX ADMIN — Medication 125 MILLIGRAM(S): at 11:40

## 2019-03-10 RX ADMIN — FENTANYL CITRATE 3.15 MICROGRAM(S)/KG/HR: 50 INJECTION INTRAVENOUS at 05:01

## 2019-03-10 RX ADMIN — Medication 650 MILLIGRAM(S): at 03:43

## 2019-03-10 RX ADMIN — CHLORHEXIDINE GLUCONATE 1 APPLICATION(S): 213 SOLUTION TOPICAL at 05:02

## 2019-03-10 RX ADMIN — CHLORHEXIDINE GLUCONATE 15 MILLILITER(S): 213 SOLUTION TOPICAL at 09:36

## 2019-03-10 RX ADMIN — Medication 5 MILLIGRAM(S): at 05:00

## 2019-03-10 RX ADMIN — Medication 125 MILLIGRAM(S): at 01:59

## 2019-03-10 RX ADMIN — Medication 650 MILLIGRAM(S): at 17:34

## 2019-03-10 RX ADMIN — SODIUM POLYSTYRENE SULFONATE 30 GRAM(S): 4.1 POWDER, FOR SUSPENSION ORAL at 22:06

## 2019-03-10 RX ADMIN — AMLODIPINE BESYLATE 10 MILLIGRAM(S): 2.5 TABLET ORAL at 05:00

## 2019-03-10 RX ADMIN — Medication 650 MILLIGRAM(S): at 11:41

## 2019-03-10 RX ADMIN — Medication 5 MILLIGRAM(S): at 22:03

## 2019-03-10 RX ADMIN — Medication 1 APPLICATION(S): at 11:41

## 2019-03-10 RX ADMIN — Medication 25 MILLIGRAM(S): at 17:31

## 2019-03-10 NOTE — CHART NOTE - NSCHARTNOTEFT_GEN_A_CORE
RRT called on 3/9 for hypoxia. Patient was hypoxic on 100% nonrebreather and NIPPV. She was intubated for hypoxic respiratory failure and transferred to the unit. Patient was alert and wished to remain full code.       PHYSICAL EXAM:  GENERAL: acutely ill appearing, in respiratory distress, tachypneic, using accessory muscles   HEAD:  Atraumatic, Normocephalic  EYES: conjunctiva and sclera clear  NECK: Supple, No JVD  CHEST/LUNG: Decreased air entry bilaterally   HEART: Regular rate and rhythm; No murmurs, rubs, or gallops  ABDOMEN: Soft, Nontender, Nondistended; Bowel sounds present  EXTREMITIES:  2+ Peripheral Pulses, No clubbing, cyanosis, or edema  PSYCH: AAOx3  NEUROLOGY: non-focal  SKIN: No rashes or lesions     AB     Plan:  - transfer to ICU  - vent mgmt as per PCCM, repeat ABG after intubation   - daily sedation vacation   - LE duplex   - dvt ppx

## 2019-03-10 NOTE — PROGRESS NOTE ADULT - SUBJECTIVE AND OBJECTIVE BOX
Patient is a 84y old  Female who presents with a chief complaint of Urinary Retention/Hperkalemia with EKG changes / respiratory failure (10 Mar 2019 12:25)       Pt is seen and examined  pt is awake and lying in bed  pt seems comfortable .          ROS:  Negative except for:    MEDICATIONS  (STANDING):  amLODIPine   Tablet 10 milliGRAM(s) Oral daily  aspirin  chewable 81 milliGRAM(s) Oral daily  busPIRone 5 milliGRAM(s) Oral three times a day  chlorhexidine 0.12% Liquid 15 milliLiter(s) Oral Mucosa two times a day  chlorhexidine 4% Liquid 1 Application(s) Topical <User Schedule>  dexmedetomidine Infusion 0.2 MICROgram(s)/kG/Hr (3.15 mL/Hr) IV Continuous <Continuous>  docusate sodium 200 milliGRAM(s) Oral two times a day  DULoxetine 30 milliGRAM(s) Oral at bedtime  epoetin spencer Injectable 30579 Unit(s) SubCutaneous every 7 days  famotidine    Tablet 20 milliGRAM(s) Oral daily  fentaNYL    Injectable 50 MICROGram(s) IV Push once  fentaNYL   Infusion. 0.5 MICROgram(s)/kG/Hr (3.15 mL/Hr) IV Continuous <Continuous>  hydrocortisone 2.5% Rectal Cream 1 Application(s) Rectal daily  iohexol 300 mG (iodine)/mL Oral Solution 30 milliLiter(s) Oral once  lactated ringers. 1000 milliLiter(s) (60 mL/Hr) IV Continuous <Continuous>  meropenem  IVPB      metoprolol tartrate 25 milliGRAM(s) Enteral Tube two times a day  midazolam Infusion 0.02 mG/kG/Hr (1.26 mL/Hr) IV Continuous <Continuous>  senna 2 Tablet(s) Oral daily  vancomycin    Solution 125 milliGRAM(s) Oral every 6 hours    MEDICATIONS  (PRN):  acetaminophen   Tablet .. 650 milliGRAM(s) Oral every 6 hours PRN Temp greater or equal to 38C (100.4F)  bisacodyl Suppository 10 milliGRAM(s) Rectal every 24 hours PRN Constipation  cloNIDine 0.1 milliGRAM(s) Oral every 8 hours PRN for sbp > 180  HYDROcodone 10 mG/acetaminophen 325 mG 1 Tablet(s) Oral every 4 hours PRN Moderate Pain (4 - 6)  ondansetron Injectable 4 milliGRAM(s) IV Push every 8 hours PRN Nausea and/or Vomiting  simethicone 80 milliGRAM(s) Chew every 8 hours PRN Gas      Allergies    hair dyes (Hives)  Levaquin (Rash)  sulfa drugs (Headache)  Zoloft (Headache)    Intolerances        Vital Signs Last 24 Hrs  T(C): 37.3 (10 Mar 2019 19:00), Max: 38.6 (10 Mar 2019 11:00)  T(F): 99.1 (10 Mar 2019 19:00), Max: 101.5 (10 Mar 2019 11:00)  HR: 102 (10 Mar 2019 20:01) (83 - 120)  BP: 93/44 (10 Mar 2019 20:01) (68/30 - 139/57)  BP(mean): 64 (10 Mar 2019 20:01) (43 - 81)  RR: 23 (10 Mar 2019 20:01) (22 - 44)  SpO2: 96% (10 Mar 2019 20:01) (93% - 100%)    PHYSICAL EXAM  General: adult in NAD  HEENT: clear oropharynx, anicteric sclera, pink conjunctiva  Neck: supple  CV: normal S1/S2 with no murmur rubs or gallops  Lungs: positive air movement b/l ant lungs,clear to auscultation, no wheezes, no rales  Abdomen: soft non-tender non-distended, no hepatosplenomegaly  Ext: no clubbing cyanosis or edema  Skin: no rashes and no petechiae  Neuro: alert and oriented X 4, no focal deficits  LABS:                          8.1    24.12 )-----------( 173      ( 10 Mar 2019 15:14 )             26.4         Mean Cell Volume : 107.3 fL  Mean Cell Hemoglobin : 32.9 pg  Mean Cell Hemoglobin Concentration : 30.7 g/dL  Auto Neutrophil # : 20.49 K/uL  Auto Lymphocyte # : 0.49 K/uL  Auto Monocyte # : 1.32 K/uL  Auto Eosinophil # : 0.00 K/uL  Auto Basophil # : 0.07 K/uL  Auto Neutrophil % : 84.9 %  Auto Lymphocyte % : 2.0 %  Auto Monocyte % : 5.5 %  Auto Eosinophil % : 0.0 %  Auto Basophil % : 0.3 %    Serial CBC's  03-10 @ 15:14  Hct-26.4 / Hgb-8.1 / Plat-173 / RBC-2.46 / WBC-24.12          Serial CBC's  03-10 @ 01:00  Hct-27.3 / Hgb-8.7 / Plat-185 / RBC-2.59 / WBC-16.63          Serial CBC's  03-09 @ 06:17  Hct-25.1 / Hgb-7.3 / Plat-213 / RBC-2.24 / WBC-9.19          Serial CBC's  03-08 @ 15:26  Hct-23.6 / Hgb-6.8 / Plat-204 / RBC-2.14 / WBC-8.47          Serial CBC's  03-08 @ 06:57  Hct-23.7 / Hgb-7.1 / Plat-203 / RBC-2.18 / WBC-8.63            03-10    150<H>  |  113<H>  |  51<H>  ----------------------------<  191<H>  5.8<H>   |  32  |  0.8    Ca    7.7<L>      10 Mar 2019 15:14  Phos  3.5     03-10  Mg     2.1     03-10    TPro  4.5<L>  /  Alb  2.2<L>  /  TBili  0.4  /  DBili  x   /  AST  19  /  ALT  48<H>  /  AlkPhos  467<H>  03-10          Platelet Count - Automated: 173 K/uL (03-10-19 @ 15:14)  Hemoglobin: 8.1 g/dL (03-10-19 @ 15:14)  WBC Count: 24.12 K/uL (03-10-19 @ 15:14)  Hematocrit: 26.4 % (03-10-19 @ 15:14)  Hemoglobin: 8.7 g/dL (03-10-19 @ 01:00)  WBC Count: 16.63 K/uL (03-10-19 @ 01:00)  Hematocrit: 27.3 % (03-10-19 @ 01:00)  Platelet Count - Automated: 185 K/uL (03-10-19 @ 01:00)  Hemoglobin: 7.3 g/dL (03-09-19 @ 06:17)  WBC Count: 9.19 K/uL (03-09-19 @ 06:17)  Hematocrit: 25.1 % (03-09-19 @ 06:17)  Platelet Count - Automated: 213 K/uL (03-09-19 @ 06:17)  Hemoglobin: 6.8 g/dL (03-08-19 @ 15:26)  WBC Count: 8.47 K/uL (03-08-19 @ 15:26)  Hematocrit: 23.6 % (03-08-19 @ 15:26)  Platelet Count - Automated: 204 K/uL (03-08-19 @ 15:26)  WBC Count: 8.63 K/uL (03-08-19 @ 06:57)  Hematocrit: 23.7 % (03-08-19 @ 06:57)  Hemoglobin: 7.1 g/dL (03-08-19 @ 06:57)  Platelet Count - Automated: 203 K/uL (03-08-19 @ 06:57)  Hematocrit: 28.4 % (03-06-19 @ 06:31)  Hemoglobin: 8.5 g/dL (03-06-19 @ 06:31)  Platelet Count - Automated: 198 K/uL (03-06-19 @ 06:31)  WBC Count: 17.86 K/uL (03-06-19 @ 06:31)  WBC Count: 21.19 K/uL (03-04-19 @ 07:06)  Hematocrit: 28.2 % (03-04-19 @ 07:06)  Platelet Count - Automated: 183 K/uL (03-04-19 @ 07:06)  Hemoglobin: 8.9 g/dL (03-04-19 @ 07:06)  Hemoglobin: 10.0 g/dL (03-02-19 @ 07:59)  WBC Count: 29.57 K/uL (03-02-19 @ 07:59)  Hematocrit: 31.6 % (03-02-19 @ 07:59)  Platelet Count - Automated: 192 K/uL (03-02-19 @ 07:59)                BLOOD SMEAR INTERPRETATION:       RADIOLOGY & ADDITIONAL STUDIES:

## 2019-03-10 NOTE — PROGRESS NOTE ADULT - SUBJECTIVE AND OBJECTIVE BOX
KOTA SCHAFER    Patient is a 84y old  Female who presents with a chief complaint of Urinary Retention/Hperkalemia with EKG changes (10 Mar 2019 11:51)      Interval History/Overnight events: Patient developed acute hypoxic respiratory failure requiting intubation. Now spiking fevers    T(C): 38.6 (03-10-19 @ 11:00), Max: 38.6 (03-10-19 @ 11:00)  HR: 108 (03-10-19 @ 10:27)  BP: 139/57 (03-10-19 @ 09:27)  RR: 22 (03-10-19 @ 11:00)  SpO2: 100% (03-10-19 @ 10:27)    PHYSICAL EXAM:    GENERAL: patient intubated   CHEST/LUNG: Reduced breath sounds on the left  HEART: Regular rhythm; tachycardic  ABDOMEN: Soft, Nontender, Nondistended  EXTREMITIES: No edema  NEURO: patient with jerky movements, but is arousable and follows commands    LABS:                          8.7    16.63 )-----------( 185      ( 10 Mar 2019 01:00 )             27.3         152<H>  |  113<H>  |  47<H>  ----------------------------<  113<H>  5.4<H>   |  31  |  0.6<L>    Ca    8.0<L>      09 Mar 2019 06:17    TPro  4.7<L>  /  Alb  2.6<L>  /  TBili  0.3  /  DBili  x   /  AST  45<H>  /  ALT  99<H>  /  AlkPhos  860<H>      PT/INR - ( 08 Mar 2019 15:26 )   PT: 11.90 sec;   INR: 1.04 ratio       PTT - ( 08 Mar 2019 15:26 )  PTT:29.2 sec    < from: Xray Chest 1 View-PORTABLE IMMEDIATE (19 @ 14:49) >  Impression:      Left lower lobe opacity/pleural effusion and right lower lobe opacity/pleural effusion unchanged. No air leak.    < end of copied text >    Urinalysis Basic - ( 10 Mar 2019 08:14 )    Color: Yellow / Appearance: Clear / S.010 / pH: x  Gluc: x / Ketone: Negative  / Bili: Negative / Urobili: 0.2 mg/dL   Blood: x / Protein: 30 mg/dL / Nitrite: Negative   Leuk Esterase: Moderate / RBC: 6-10 /HPF / WBC >50 /HPF   Sq Epi: x / Non Sq Epi: x / Bacteria: Few    LIVER FUNCTIONS - ( 09 Mar 2019 06:17 )  Alb: 2.6 g/dL / Pro: 4.7 g/dL / ALK PHOS: 860 U/L / ALT: 99 U/L / AST: 45 U/L / GGT: x           < from: US Abdomen Limited (19 @ 17:30) >  IMPRESSION:    1.  Negative examination of the liver.    2.  Cholelithiasis, sludge and wall thickening. The absence of a positive   sonographic Rebollar's sign prevents the sonographic diagnosis of   cholecystitis.    3.  Right hydronephrosis correlating with CT examination.    4.  Right pleural effusion. Perihepatic trace fluid.    < end of copied text >        MEDICATIONS:    MEDICATIONS  (STANDING):  amLODIPine   Tablet 10 milliGRAM(s) Oral daily  aspirin  chewable 81 milliGRAM(s) Oral daily  busPIRone 5 milliGRAM(s) Oral three times a day  chlorhexidine 0.12% Liquid 15 milliLiter(s) Oral Mucosa two times a day  chlorhexidine 4% Liquid 1 Application(s) Topical <User Schedule>  docusate sodium 200 milliGRAM(s) Oral two times a day  DULoxetine 30 milliGRAM(s) Oral at bedtime  epoetin spencer Injectable 55617 Unit(s) SubCutaneous every 7 days  famotidine    Tablet 20 milliGRAM(s) Oral daily  fentaNYL    Injectable 50 MICROGram(s) IV Push once  fentaNYL   Infusion. 0.5 MICROgram(s)/kG/Hr (3.15 mL/Hr) IV Continuous <Continuous>  hydrocortisone 2.5% Rectal Cream 1 Application(s) Rectal daily  iohexol 300 mG (iodine)/mL Oral Solution 30 milliLiter(s) Oral once  lactated ringers. 1000 milliLiter(s) (60 mL/Hr) IV Continuous <Continuous>  metoprolol tartrate 25 milliGRAM(s) Enteral Tube two times a day  midazolam Infusion 0.02 mG/kG/Hr (1.26 mL/Hr) IV Continuous <Continuous>  senna 2 Tablet(s) Oral daily  vancomycin    Solution 125 milliGRAM(s) Oral every 6 hours      MEDICATIONS  (PRN):  acetaminophen   Tablet .. 650 milliGRAM(s) Oral every 6 hours PRN Temp greater or equal to 38C (100.4F)  bisacodyl Suppository 10 milliGRAM(s) Rectal every 24 hours PRN Constipation  cloNIDine 0.1 milliGRAM(s) Oral every 8 hours PRN for sbp > 180  HYDROcodone 10 mG/acetaminophen 325 mG 1 Tablet(s) Oral every 4 hours PRN Moderate Pain (4 - 6)  ondansetron Injectable 4 milliGRAM(s) IV Push every 8 hours PRN Nausea and/or Vomiting  simethicone 80 milliGRAM(s) Chew every 8 hours PRN Gas

## 2019-03-10 NOTE — PROGRESS NOTE ADULT - SUBJECTIVE AND OBJECTIVE BOX
SUBJECTIVE:    Patient is a 84y old Female who presents with a chief complaint of Urinary Retention/Hperkalemia with EKG changes (09 Mar 2019 18:47)    Currently admitted to medicine with the primary diagnosis of Bladder cancer     Today is hospital day 33d. This morning she is resting comfortably in bed and reports no new issues or overnight events.     PAST MEDICAL & SURGICAL HISTORY  Anemia: 4 PRBC 2018  Oxygen dependent: O2 2L Via NC  Bladder cancer: 2018  CHF (congestive heart failure)  Breast tumor: removal benign bl breasts  Anal cancer: Chemo and radiation   COPD (chronic obstructive pulmonary disease)  Lung disease: COPD  Hypertension  History of cystoscopy: 10/2018  H/O breast surgery: 39 yrs. ago, bilateral  History of back surgery: 10 yrs. ago stimulator implant , ( cervical and Lumbar)    SOCIAL HISTORY:  Negative for smoking/alcohol/drug use.     ALLERGIES:  hair dyes (Hives)  sulfa drugs (Headache)  Zoloft (Headache)    MEDICATIONS:  STANDING MEDICATIONS  amLODIPine   Tablet 10 milliGRAM(s) Oral daily  aspirin  chewable 81 milliGRAM(s) Oral daily  atorvastatin 20 milliGRAM(s) Oral at bedtime  busPIRone 5 milliGRAM(s) Oral three times a day  chlorhexidine 0.12% Liquid 15 milliLiter(s) Oral Mucosa two times a day  chlorhexidine 4% Liquid 1 Application(s) Topical <User Schedule>  docusate sodium 200 milliGRAM(s) Oral two times a day  DULoxetine 30 milliGRAM(s) Oral at bedtime  epoetin spencer Injectable 50603 Unit(s) SubCutaneous every 7 days  famotidine    Tablet 20 milliGRAM(s) Oral daily  fentaNYL    Injectable 50 MICROGram(s) IV Push once  fentaNYL   Infusion. 0.5 MICROgram(s)/kG/Hr IV Continuous <Continuous>  hydrocortisone 2.5% Rectal Cream 1 Application(s) Rectal daily  iohexol 300 mG (iodine)/mL Oral Solution 30 milliLiter(s) Oral once  lactated ringers. 1000 milliLiter(s) IV Continuous <Continuous>  metoprolol tartrate 25 milliGRAM(s) Enteral Tube two times a day  senna 2 Tablet(s) Oral daily  vancomycin    Solution 125 milliGRAM(s) Oral every 6 hours    PRN MEDICATIONS  acetaminophen   Tablet .. 650 milliGRAM(s) Oral every 6 hours PRN  bisacodyl Suppository 10 milliGRAM(s) Rectal every 24 hours PRN  cloNIDine 0.1 milliGRAM(s) Oral every 8 hours PRN  HYDROcodone 10 mG/acetaminophen 325 mG 1 Tablet(s) Oral every 4 hours PRN  ondansetron Injectable 4 milliGRAM(s) IV Push every 8 hours PRN  simethicone 80 milliGRAM(s) Chew every 8 hours PRN    VITALS:   T(F): 101.5  HR: 108  BP: 139/57  RR: 22  SpO2: 100%    LABS:                        8.7    16.63 )-----------( 185      ( 10 Mar 2019 01:00 )             27.3     03    152<H>  |  113<H>  |  47<H>  ----------------------------<  113<H>  5.4<H>   |  31  |  0.6<L>    Ca    8.0<L>      09 Mar 2019 06:17    TPro  4.7<L>  /  Alb  2.6<L>  /  TBili  0.3  /  DBili  x   /  AST  45<H>  /  ALT  99<H>  /  AlkPhos  860<H>  03-09    PT/INR - ( 08 Mar 2019 15:26 )   PT: 11.90 sec;   INR: 1.04 ratio         PTT - ( 08 Mar 2019 15:26 )  PTT:29.2 sec  Urinalysis Basic - ( 10 Mar 2019 08:14 )    Color: Yellow / Appearance: Clear / S.010 / pH: x  Gluc: x / Ketone: Negative  / Bili: Negative / Urobili: 0.2 mg/dL   Blood: x / Protein: 30 mg/dL / Nitrite: Negative   Leuk Esterase: Moderate / RBC: 6-10 /HPF / WBC >50 /HPF   Sq Epi: x / Non Sq Epi: x / Bacteria: Few      ABG - ( 10 Mar 2019 06:34 )  pH, Arterial: 7.36  pH, Blood: x     /  pCO2: 57    /  pO2: 110   / HCO3: 32    / Base Excess: 6.1   /  SaO2: 99                        RADIOLOGY:    PHYSICAL EXAM:  GEN: on vent   LUNGS: dimished BS  HEART: Regular  ABD: Soft, non-tender, non-distended.  EXT: NC/NC/NE/2+PP/SARAVIA/Skin I  NEURO: intubated sedates    Intravenous access:   NG tube:   Rowe Catheter:   Indwelling Urethral Catheter:     Connect To:  Straight Drainage/[place]    Indication:  Urinary Retention / Obstruction    Additional Instructions:  if more then 250cc, keep in place (19 @ 18:13) (not performed) [active]

## 2019-03-10 NOTE — PROGRESS NOTE ADULT - ASSESSMENT
1 Resp. Failure  ?  etiolgy  likely  sec  to  sepsis    B/L  lower ext  doppler  was negative  for  DVT   2- leukocytosis likely  sec  to  sepsis  progressive  rising   to  24,000    3 Small effusions on CT right > left with lung nodules.  repeat  chest xray  shows No  sig. change   left lung atelectasis from mucous plug..s/p mucus plug removed    4 anemia     s/p  blood Transfusion 2  units   and  hgb is 8.3  gm     5-  Elevated  Alk phosphatse  ?  etiology   may need  ERCP  when  she  is  more  stable       discussed  with family  and  nursing  staff  as  well  as  HO

## 2019-03-10 NOTE — PROGRESS NOTE ADULT - ASSESSMENT
84 y.o female patient with PMH of MDS, CHF, COPD on home oxygen, bladder cancer, HTN initially admitted for urinary retention s/p urologic procedure and lynne catheter removal and hyperkalemia. She had an extensive hospital course where she was treated for UTI, had a left mucous plug s/p bronchoscopy. Hospital stay further complicated by C.diff colitis. On 3/9/2019, patient developed acute hypoxic respiratory failure and was intubated and upgraded to the CCU    # Acute hypoxic respiratory failure  - Initially thought to be secondary to CHF exacerbation; given Lasix  - CXR showed no changes from previous CXR (left pleural effusion/atelectasis?)  - Keep intubated for now; will try SBT once on minimal vent settings  - Holding lasix for now  - Check repeat labs for renal function and electrolytes  - Check daily CXR and ABG  - Monitor strict I&Os and avoid I>O  - F/U LE duplex    # Fever  - Associated with leukocytosis  - UA positive   - Patient with C.diff colitis  - Continue PO vanco  - Check KUB to R/O toxic megacolon  - Will add Meropenem  - Will repeat blood cultures  - F/U urine cultures  - ID follow-up  - Continue gentle hydration for now (started by ICU team overnight)    # C.diff colitis  - Continue PO vanco for now  - Check KUB to R/O toxic megacolon  - Monitor VS    # Abnormal movements  - During movements, patient is awake and follows commands  - Symptoms improved with Versed 2mg IV push  - Check EEG (although seizure less likely at this point)  - Metabolic? (in the setting of liver injury)  - Versed added to sedation (patient not very well sedated currently and is trying to reach the ET tube)  - Monitor VS    # Elevated LFTs  - See abdominal results above  - Due to episodes of hypotension?  - Avoid further hypotension  - Will D/C statin; avoid hepatotoxic agents  - GI following; will need work-up with LFTs don't improve  - Monitor LFTs    # MDS  - Heme/onc following patient  - Monitor cbc    # Hyperkalemia  - Resolved  - Monitor renal function and electrolytes  - Keep on holding spironolactone for now    # DVT prophylaxis: SCD due to MDS  # GI prophylaxis

## 2019-03-11 LAB
ALBUMIN SERPL ELPH-MCNC: 1.9 G/DL — LOW (ref 3.5–5.2)
ALBUMIN SERPL ELPH-MCNC: 2 G/DL — LOW (ref 3.5–5.2)
ALP SERPL-CCNC: 323 U/L — HIGH (ref 30–115)
ALP SERPL-CCNC: 337 U/L — HIGH (ref 30–115)
ALT FLD-CCNC: 35 U/L — SIGNIFICANT CHANGE UP (ref 0–41)
ALT FLD-CCNC: >700 U/L — HIGH (ref 0–41)
ANION GAP SERPL CALC-SCNC: 12 MMOL/L — SIGNIFICANT CHANGE UP (ref 7–14)
ANION GAP SERPL CALC-SCNC: 14 MMOL/L — SIGNIFICANT CHANGE UP (ref 7–14)
ANION GAP SERPL CALC-SCNC: 9 MMOL/L — SIGNIFICANT CHANGE UP (ref 7–14)
ANION GAP SERPL CALC-SCNC: 9 MMOL/L — SIGNIFICANT CHANGE UP (ref 7–14)
APTT BLD: 26.6 SEC — LOW (ref 27–39.2)
AST SERPL-CCNC: 20 U/L — SIGNIFICANT CHANGE UP (ref 0–41)
AST SERPL-CCNC: 71 U/L — HIGH (ref 0–41)
BASE EXCESS BLDA CALC-SCNC: -8.8 MMOL/L — LOW (ref -2–2)
BASOPHILS # BLD AUTO: 0 K/UL — SIGNIFICANT CHANGE UP (ref 0–0.2)
BASOPHILS NFR BLD AUTO: 0 % — SIGNIFICANT CHANGE UP (ref 0–1)
BILIRUB SERPL-MCNC: 0.4 MG/DL — SIGNIFICANT CHANGE UP (ref 0.2–1.2)
BILIRUB SERPL-MCNC: 0.5 MG/DL — SIGNIFICANT CHANGE UP (ref 0.2–1.2)
BUN SERPL-MCNC: 55 MG/DL — HIGH (ref 10–20)
BUN SERPL-MCNC: 60 MG/DL — HIGH (ref 10–20)
BUN SERPL-MCNC: 61 MG/DL — CRITICAL HIGH (ref 10–20)
BUN SERPL-MCNC: 63 MG/DL — CRITICAL HIGH (ref 10–20)
CALCIUM SERPL-MCNC: 6.6 MG/DL — LOW (ref 8.5–10.1)
CALCIUM SERPL-MCNC: 6.8 MG/DL — LOW (ref 8.5–10.1)
CALCIUM SERPL-MCNC: 6.9 MG/DL — LOW (ref 8.5–10.1)
CALCIUM SERPL-MCNC: 7.7 MG/DL — LOW (ref 8.5–10.1)
CHLORIDE SERPL-SCNC: 110 MMOL/L — SIGNIFICANT CHANGE UP (ref 98–110)
CHLORIDE SERPL-SCNC: 112 MMOL/L — HIGH (ref 98–110)
CHLORIDE SERPL-SCNC: 113 MMOL/L — HIGH (ref 98–110)
CHLORIDE SERPL-SCNC: 113 MMOL/L — HIGH (ref 98–110)
CK MB CFR SERPL CALC: 8 NG/ML — HIGH (ref 0.6–6.3)
CK SERPL-CCNC: 188 U/L — SIGNIFICANT CHANGE UP (ref 0–225)
CO2 SERPL-SCNC: 21 MMOL/L — SIGNIFICANT CHANGE UP (ref 17–32)
CO2 SERPL-SCNC: 25 MMOL/L — SIGNIFICANT CHANGE UP (ref 17–32)
CO2 SERPL-SCNC: 27 MMOL/L — SIGNIFICANT CHANGE UP (ref 17–32)
CO2 SERPL-SCNC: 29 MMOL/L — SIGNIFICANT CHANGE UP (ref 17–32)
CREAT SERPL-MCNC: 1 MG/DL — SIGNIFICANT CHANGE UP (ref 0.7–1.5)
CREAT SERPL-MCNC: 1.1 MG/DL — SIGNIFICANT CHANGE UP (ref 0.7–1.5)
CREAT SERPL-MCNC: 1.3 MG/DL — SIGNIFICANT CHANGE UP (ref 0.7–1.5)
CREAT SERPL-MCNC: 1.3 MG/DL — SIGNIFICANT CHANGE UP (ref 0.7–1.5)
CULTURE RESULTS: NO GROWTH — SIGNIFICANT CHANGE UP
EOSINOPHIL NFR BLD AUTO: 0 % — SIGNIFICANT CHANGE UP (ref 0–8)
GLUCOSE SERPL-MCNC: 163 MG/DL — HIGH (ref 70–99)
GLUCOSE SERPL-MCNC: 198 MG/DL — HIGH (ref 70–99)
GLUCOSE SERPL-MCNC: 207 MG/DL — HIGH (ref 70–99)
GLUCOSE SERPL-MCNC: 213 MG/DL — HIGH (ref 70–99)
GRAM STN FLD: SIGNIFICANT CHANGE UP
GRAM STN FLD: SIGNIFICANT CHANGE UP
HCO3 BLDA-SCNC: 20 MMOL/L — LOW (ref 23–27)
HCT VFR BLD CALC: 26.3 % — LOW (ref 37–47)
HGB BLD-MCNC: 7.8 G/DL — LOW (ref 12–16)
HOROWITZ INDEX BLDA+IHG-RTO: 80 — SIGNIFICANT CHANGE UP
INR BLD: 1.24 RATIO — SIGNIFICANT CHANGE UP (ref 0.65–1.3)
LYMPHOCYTES # BLD AUTO: 0.74 K/UL — LOW (ref 1.2–3.4)
LYMPHOCYTES # BLD AUTO: 3 % — LOW (ref 20.5–51.1)
MAGNESIUM SERPL-MCNC: 2 MG/DL — SIGNIFICANT CHANGE UP (ref 1.8–2.4)
MCHC RBC-ENTMCNC: 29.7 G/DL — LOW (ref 32–37)
MCHC RBC-ENTMCNC: 33.2 PG — HIGH (ref 27–31)
MCV RBC AUTO: 111.9 FL — HIGH (ref 81–99)
METHOD TYPE: SIGNIFICANT CHANGE UP
MONOCYTES # BLD AUTO: 1.23 K/UL — HIGH (ref 0.1–0.6)
MONOCYTES NFR BLD AUTO: 5 % — SIGNIFICANT CHANGE UP (ref 1.7–9.3)
NEUTROPHILS # BLD AUTO: 22.44 K/UL — HIGH (ref 1.4–6.5)
NEUTROPHILS NFR BLD AUTO: 81 % — HIGH (ref 42.2–75.2)
NRBC # BLD: SIGNIFICANT CHANGE UP /100 WBCS (ref 0–0)
P AERUGINOSA DNA BLD POS NAA+NON-PROBE: SIGNIFICANT CHANGE UP
PCO2 BLDA: 62 MMHG — CRITICAL HIGH (ref 38–42)
PH BLDA: 7.12 — CRITICAL LOW (ref 7.38–7.42)
PHOSPHATE SERPL-MCNC: 3.9 MG/DL — SIGNIFICANT CHANGE UP (ref 2.1–4.9)
PLATELET # BLD AUTO: 130 K/UL — SIGNIFICANT CHANGE UP (ref 130–400)
PO2 BLDA: 104 MMHG — HIGH (ref 78–95)
POTASSIUM SERPL-MCNC: 5.6 MMOL/L — HIGH (ref 3.5–5)
POTASSIUM SERPL-MCNC: 5.7 MMOL/L — HIGH (ref 3.5–5)
POTASSIUM SERPL-MCNC: 5.9 MMOL/L — HIGH (ref 3.5–5)
POTASSIUM SERPL-MCNC: 6.2 MMOL/L — CRITICAL HIGH (ref 3.5–5)
POTASSIUM SERPL-SCNC: 5.6 MMOL/L — HIGH (ref 3.5–5)
POTASSIUM SERPL-SCNC: 5.7 MMOL/L — HIGH (ref 3.5–5)
POTASSIUM SERPL-SCNC: 5.9 MMOL/L — HIGH (ref 3.5–5)
POTASSIUM SERPL-SCNC: 6.2 MMOL/L — CRITICAL HIGH (ref 3.5–5)
PROT SERPL-MCNC: 3.7 G/DL — LOW (ref 6–8)
PROT SERPL-MCNC: 3.9 G/DL — LOW (ref 6–8)
PROTHROM AB SERPL-ACNC: 14.2 SEC — HIGH (ref 9.95–12.87)
RBC # BLD: 2.35 M/UL — LOW (ref 4.2–5.4)
RBC # FLD: 23.8 % — HIGH (ref 11.5–14.5)
SAO2 % BLDA: 98 % — SIGNIFICANT CHANGE UP (ref 94–98)
SODIUM SERPL-SCNC: 148 MMOL/L — HIGH (ref 135–146)
SODIUM SERPL-SCNC: 148 MMOL/L — HIGH (ref 135–146)
SODIUM SERPL-SCNC: 149 MMOL/L — HIGH (ref 135–146)
SODIUM SERPL-SCNC: 149 MMOL/L — HIGH (ref 135–146)
SPECIMEN SOURCE: SIGNIFICANT CHANGE UP
TROPONIN T SERPL-MCNC: 0.11 NG/ML — CRITICAL HIGH
TROPONIN T SERPL-MCNC: 0.22 NG/ML — CRITICAL HIGH
TYPE + AB SCN PNL BLD: SIGNIFICANT CHANGE UP
WBC # BLD: 24.66 K/UL — HIGH (ref 4.8–10.8)
WBC # FLD AUTO: 24.66 K/UL — HIGH (ref 4.8–10.8)

## 2019-03-11 RX ORDER — SODIUM CHLORIDE 9 MG/ML
1000 INJECTION INTRAMUSCULAR; INTRAVENOUS; SUBCUTANEOUS ONCE
Qty: 0 | Refills: 0 | Status: COMPLETED | OUTPATIENT
Start: 2019-03-11 | End: 2019-03-11

## 2019-03-11 RX ORDER — NOREPINEPHRINE BITARTRATE/D5W 8 MG/250ML
0.05 PLASTIC BAG, INJECTION (ML) INTRAVENOUS
Qty: 16 | Refills: 0 | Status: DISCONTINUED | OUTPATIENT
Start: 2019-03-11 | End: 2019-03-13

## 2019-03-11 RX ORDER — SODIUM CHLORIDE 9 MG/ML
1000 INJECTION, SOLUTION INTRAVENOUS
Qty: 0 | Refills: 0 | Status: DISCONTINUED | OUTPATIENT
Start: 2019-03-11 | End: 2019-03-12

## 2019-03-11 RX ORDER — PHENYLEPHRINE HYDROCHLORIDE 10 MG/ML
0.1 INJECTION INTRAVENOUS
Qty: 160 | Refills: 0 | Status: DISCONTINUED | OUTPATIENT
Start: 2019-03-11 | End: 2019-03-12

## 2019-03-11 RX ORDER — METRONIDAZOLE 500 MG
TABLET ORAL
Qty: 0 | Refills: 0 | Status: DISCONTINUED | OUTPATIENT
Start: 2019-03-11 | End: 2019-03-13

## 2019-03-11 RX ORDER — SODIUM BICARBONATE 1 MEQ/ML
0.18 SYRINGE (ML) INTRAVENOUS
Qty: 150 | Refills: 0 | Status: DISCONTINUED | OUTPATIENT
Start: 2019-03-11 | End: 2019-03-12

## 2019-03-11 RX ORDER — SODIUM BICARBONATE 1 MEQ/ML
100 SYRINGE (ML) INTRAVENOUS ONCE
Qty: 0 | Refills: 0 | Status: COMPLETED | OUTPATIENT
Start: 2019-03-11 | End: 2019-03-11

## 2019-03-11 RX ORDER — METRONIDAZOLE 500 MG
500 TABLET ORAL ONCE
Qty: 0 | Refills: 0 | Status: COMPLETED | OUTPATIENT
Start: 2019-03-11 | End: 2019-03-11

## 2019-03-11 RX ORDER — METRONIDAZOLE 500 MG
500 TABLET ORAL EVERY 8 HOURS
Qty: 0 | Refills: 0 | Status: DISCONTINUED | OUTPATIENT
Start: 2019-03-12 | End: 2019-03-13

## 2019-03-11 RX ORDER — DEXTROSE 50 % IN WATER 50 %
50 SYRINGE (ML) INTRAVENOUS ONCE
Qty: 0 | Refills: 0 | Status: COMPLETED | OUTPATIENT
Start: 2019-03-11 | End: 2019-03-11

## 2019-03-11 RX ORDER — SODIUM POLYSTYRENE SULFONATE 4.1 MEQ/G
30 POWDER, FOR SUSPENSION ORAL ONCE
Qty: 0 | Refills: 0 | Status: COMPLETED | OUTPATIENT
Start: 2019-03-11 | End: 2019-03-11

## 2019-03-11 RX ORDER — INSULIN HUMAN 100 [IU]/ML
10 INJECTION, SOLUTION SUBCUTANEOUS ONCE
Qty: 0 | Refills: 0 | Status: COMPLETED | OUTPATIENT
Start: 2019-03-11 | End: 2019-03-11

## 2019-03-11 RX ADMIN — Medication 50 MILLILITER(S): at 13:36

## 2019-03-11 RX ADMIN — FENTANYL CITRATE 3.15 MICROGRAM(S)/KG/HR: 50 INJECTION INTRAVENOUS at 21:30

## 2019-03-11 RX ADMIN — Medication 81 MILLIGRAM(S): at 11:11

## 2019-03-11 RX ADMIN — SODIUM POLYSTYRENE SULFONATE 30 GRAM(S): 4.1 POWDER, FOR SUSPENSION ORAL at 18:38

## 2019-03-11 RX ADMIN — CHLORHEXIDINE GLUCONATE 1 APPLICATION(S): 213 SOLUTION TOPICAL at 05:07

## 2019-03-11 RX ADMIN — Medication 2.95 MICROGRAM(S)/KG/MIN: at 15:00

## 2019-03-11 RX ADMIN — DULOXETINE HYDROCHLORIDE 30 MILLIGRAM(S): 30 CAPSULE, DELAYED RELEASE ORAL at 21:46

## 2019-03-11 RX ADMIN — PHENYLEPHRINE HYDROCHLORIDE 1.18 MICROGRAM(S)/KG/MIN: 10 INJECTION INTRAVENOUS at 07:58

## 2019-03-11 RX ADMIN — PHENYLEPHRINE HYDROCHLORIDE 1.18 MICROGRAM(S)/KG/MIN: 10 INJECTION INTRAVENOUS at 20:25

## 2019-03-11 RX ADMIN — MIDAZOLAM HYDROCHLORIDE 1.26 MG/KG/HR: 1 INJECTION, SOLUTION INTRAMUSCULAR; INTRAVENOUS at 10:54

## 2019-03-11 RX ADMIN — SODIUM CHLORIDE 60 MILLILITER(S): 9 INJECTION, SOLUTION INTRAVENOUS at 08:07

## 2019-03-11 RX ADMIN — Medication 125 MILLIGRAM(S): at 00:05

## 2019-03-11 RX ADMIN — CHLORHEXIDINE GLUCONATE 15 MILLILITER(S): 213 SOLUTION TOPICAL at 18:37

## 2019-03-11 RX ADMIN — MEROPENEM 100 MILLIGRAM(S): 1 INJECTION INTRAVENOUS at 18:38

## 2019-03-11 RX ADMIN — Medication 650 MILLIGRAM(S): at 03:14

## 2019-03-11 RX ADMIN — Medication 125 MILLIGRAM(S): at 18:37

## 2019-03-11 RX ADMIN — Medication 5 MILLIGRAM(S): at 18:37

## 2019-03-11 RX ADMIN — Medication 75 MEQ/KG/HR: at 20:26

## 2019-03-11 RX ADMIN — SODIUM CHLORIDE 1000 MILLILITER(S): 9 INJECTION INTRAMUSCULAR; INTRAVENOUS; SUBCUTANEOUS at 05:33

## 2019-03-11 RX ADMIN — SODIUM CHLORIDE 6000 MILLILITER(S): 9 INJECTION INTRAMUSCULAR; INTRAVENOUS; SUBCUTANEOUS at 12:09

## 2019-03-11 RX ADMIN — Medication 100 MILLIGRAM(S): at 22:49

## 2019-03-11 RX ADMIN — MEROPENEM 100 MILLIGRAM(S): 1 INJECTION INTRAVENOUS at 08:07

## 2019-03-11 RX ADMIN — SODIUM CHLORIDE 1000 MILLILITER(S): 9 INJECTION INTRAMUSCULAR; INTRAVENOUS; SUBCUTANEOUS at 07:33

## 2019-03-11 RX ADMIN — SODIUM CHLORIDE 9999 MILLILITER(S): 9 INJECTION INTRAMUSCULAR; INTRAVENOUS; SUBCUTANEOUS at 10:55

## 2019-03-11 RX ADMIN — Medication 100 MILLIEQUIVALENT(S): at 20:11

## 2019-03-11 RX ADMIN — Medication 1 APPLICATION(S): at 11:14

## 2019-03-11 RX ADMIN — Medication 125 MILLIGRAM(S): at 11:11

## 2019-03-11 RX ADMIN — Medication 125 MILLIGRAM(S): at 05:08

## 2019-03-11 RX ADMIN — FAMOTIDINE 20 MILLIGRAM(S): 10 INJECTION INTRAVENOUS at 11:11

## 2019-03-11 RX ADMIN — Medication 5 MILLIGRAM(S): at 21:46

## 2019-03-11 RX ADMIN — Medication 5 MILLIGRAM(S): at 05:08

## 2019-03-11 RX ADMIN — SODIUM POLYSTYRENE SULFONATE 30 GRAM(S): 4.1 POWDER, FOR SUSPENSION ORAL at 08:08

## 2019-03-11 RX ADMIN — INSULIN HUMAN 10 UNIT(S): 100 INJECTION, SOLUTION SUBCUTANEOUS at 13:37

## 2019-03-11 RX ADMIN — Medication 650 MILLIGRAM(S): at 02:30

## 2019-03-11 RX ADMIN — CHLORHEXIDINE GLUCONATE 15 MILLILITER(S): 213 SOLUTION TOPICAL at 05:07

## 2019-03-11 RX ADMIN — SODIUM CHLORIDE 10 MILLILITER(S): 9 INJECTION, SOLUTION INTRAVENOUS at 20:27

## 2019-03-11 NOTE — PROGRESS NOTE ADULT - SUBJECTIVE AND OBJECTIVE BOX
Patient is a 84y old  Female who presents with a chief complaint of Urinary Retention/Hperkalemia with EKG changes (22 Feb 2019 09:55)       Pt is seen and examined  pt transferred to ICU with septic shock and respiratory failure.  on high dose pressor  on high Fio2 at 80%.        ROS:  Negative   MEDICATIONS  (STANDING):  ALBUTerol/ipratropium for Nebulization 3 milliLiter(s) Nebulizer every 6 hours  amLODIPine   Tablet 10 milliGRAM(s) Oral daily  aspirin  chewable 81 milliGRAM(s) Oral daily  atorvastatin 20 milliGRAM(s) Oral at bedtime  buDESOnide 160 MICROgram(s)/formoterol 4.5 MICROgram(s) Inhaler 2 Puff(s) Inhalation two times a day  busPIRone 5 milliGRAM(s) Oral three times a day  cefepime   IVPB      cefepime   IVPB 1000 milliGRAM(s) IV Intermittent daily  chlorhexidine 4% Liquid 1 Application(s) Topical <User Schedule>  docusate sodium 200 milliGRAM(s) Oral two times a day  DULoxetine 20 milliGRAM(s) Oral daily  epoetin spencer Injectable 27822 Unit(s) SubCutaneous every 7 days  heparin  Injectable 5000 Unit(s) SubCutaneous every 8 hours  hydrocortisone 2.5% Rectal Cream 1 Application(s) Rectal daily  metoprolol succinate ER 50 milliGRAM(s) Oral daily  senna 2 Tablet(s) Oral daily  vancomycin    Solution 125 milliGRAM(s) Oral every 6 hours    MEDICATIONS  (PRN):  bisacodyl Suppository 10 milliGRAM(s) Rectal every 24 hours PRN Constipation  cloNIDine 0.1 milliGRAM(s) Oral every 8 hours PRN for sbp > 180  HYDROcodone 10 mG/acetaminophen 325 mG 1 Tablet(s) Oral every 4 hours PRN Moderate Pain (4 - 6)  ondansetron Injectable 4 milliGRAM(s) IV Push every 8 hours PRN Nausea and/or Vomiting  simethicone 80 milliGRAM(s) Chew every 8 hours PRN Gas  sodium chloride 3%  Inhalation 3 milliLiter(s) Inhalation every 4 hours PRN ..      Allergies    hair dyes (Hives)  sulfa drugs (Headache)  Zoloft (Headache)    Intolerances        Vital Signs Last 24 Hrs  T(C): 36.7 (22 Feb 2019 14:00), Max: 36.7 (22 Feb 2019 10:12)  T(F): 98 (22 Feb 2019 14:00), Max: 98 (22 Feb 2019 10:12)  HR: 77 (22 Feb 2019 14:00) (77 - 97)  BP: 141/63 (22 Feb 2019 14:00) (133/58 - 154/67)  BP(mean): --  RR: 16 (22 Feb 2019 14:00) (16 - 20)  SpO2: 96% (22 Feb 2019 11:19) (94% - 98%)    PHYSICAL EXAM  General: adult in NAD  HEENT: clear oropharynx, anicteric sclera, pink conjunctiva  Neck: supple  CV: normal S1/S2 with no murmur rubs or gallops  Lungs: positive air movement b/l ant lungs,clear to auscultation, no wheezes, no rales  Abdomen: soft non-tender non-distended, no hepatosplenomegaly  Ext: no clubbing cyanosis or edema  Skin: no rashes and no petechiae  Neuro: alert and oriented X 4, no focal deficits  LABS:                wbc21k>>17k    hb 8.9 >>7  platelet 183>>198    creat 0..9         Mean Cell Volume : 102.4 fL  Mean Cell Hemoglobin : 32.8 pg  Mean Cell Hemoglobin Concentration : 32.0 g/dL  Auto Neutrophil # : x  Auto Lymphocyte # : x  Auto Monocyte # : x  Auto Eosinophil # : x  Auto Basophil # : x  Auto Neutrophil % : x  Auto Lymphocyte % : x  Auto Monocyte % : x  Auto Eosinophil % : x  Auto Basophil % : x    Serial CBC's  02-22 @ 07:48  Hct-29.4 / Hgb-9.4 / Plat-278 / RBC-2.87 / WBC-26.45          Serial CBC's  02-20 @ 18:58  Hct-31.0 / Hgb-9.9 / Plat-302 / RBC-3.02 / WBC-25.13          Serial CBC's  02-19 @ 13:10  Hct-32.6 / Hgb-10.3 / Plat-293 / RBC-3.17 / WBC-19.66            02-22    139  |  105  |  50<H>  ----------------------------<  130<H>  5.0   |  27  |  0.8    Ca    7.7<L>      22 Feb 2019 07:48  Phos  3.2     02-20  Mg     2.2     02-20    TPro  4.3<L>  /  Alb  2.5<L>  /  TBili  0.2  /  DBili  x   /  AST  16  /  ALT  15  /  AlkPhos  71  02-22          Platelet Count - Automated: 278 K/uL (02-22-19 @ 07:48)  Hemoglobin: 9.4 g/dL (02-22-19 @ 07:48)  WBC Count: 26.45 K/uL (02-22-19 @ 07:48)  Hematocrit: 29.4 % (02-22-19 @ 07:48)  Hematocrit: 31.0 % (02-20-19 @ 18:58)  Platelet Count - Automated: 302 K/uL (02-20-19 @ 18:58)  Hemoglobin: 9.9 g/dL (02-20-19 @ 18:58)  WBC Count: 25.13 K/uL (02-20-19 @ 18:58)  Hemoglobin: 10.3 g/dL (02-19-19 @ 13:10)  WBC Count: 19.66 K/uL (02-19-19 @ 13:10)  Hematocrit: 32.6 % (02-19-19 @ 13:10)  Platelet Count - Automated: 293 K/uL (02-19-19 @ 13:10)  WBC Count: 20.00 K/uL (02-17-19 @ 08:42)  Hematocrit: 31.3 % (02-17-19 @ 08:42)  Platelet Count - Automated: 230 K/uL (02-17-19 @ 08:42)  Hemoglobin: 9.9 g/dL (02-17-19 @ 08:42)  WBC Count: 24.04 K/uL (02-16-19 @ 15:09)  Hematocrit: 30.3 % (02-16-19 @ 15:09)  Platelet Count - Automated: 213 K/uL (02-16-19 @ 15:09)  Hemoglobin: 9.9 g/dL (02-16-19 @ 15:09)  Platelet Count - Automated: 214 K/uL (02-15-19 @ 06:13)  Hemoglobin: 10.1 g/dL (02-15-19 @ 06:13)  WBC Count: 34.64 K/uL (02-15-19 @ 06:13)  Hematocrit: 30.7 % (02-15-19 @ 06:13)  Hematocrit: 29.4 % (02-13-19 @ 14:50)  Platelet Count - Automated: 219 K/uL (02-13-19 @ 14:50)  Hemoglobin: 9.7 g/dL (02-13-19 @ 14:50)  WBC Count: 38.51 K/uL (02-13-19 @ 14:50)                BLOOD SMEAR INTERPRETATION:       RADIOLOGY & ADDITIONAL STUDIES:

## 2019-03-11 NOTE — PROGRESS NOTE ADULT - SUBJECTIVE AND OBJECTIVE BOX
NEPHROLOGY FOLLOW UP NOTE    pt seen and examined in ICU  critically ill  d/w family  pt now vented and on pressors             PAST MEDICAL & SURGICAL HISTORY:  CHF (congestive heart failure)  Breast tumor: removal benign bl breasts  Anal cancer  COPD (chronic obstructive pulmonary disease)  Lung disease: COPD  Hypertension  H/O breast surgery: 39 yrs. ago, bilateral  History of back surgery: 10 yrs. ago stimulator implant ,     Allergies:  hair dyes (Hives)  sulfa drugs (Headache)    Home Medications Reviewed    SOCIAL HISTORY:  Denies ETOH,Smoking,   FAMILY HISTORY:  No pertinent family history in first degree relatives        REVIEW OF SYSTEMS:  All other review of systems is negative unless indicated above.        PHYSICAL EXAM:  intubated  sedated  ETT  70%o2  no jvd  b/l bs  tachy  soft, + bs  + edema  + lynne - poor UOP    Hospital Medications:   MEDICATIONS  (STANDING):  amLODIPine   Tablet 10 milliGRAM(s) Oral daily  aspirin  chewable 81 milliGRAM(s) Oral daily  busPIRone 5 milliGRAM(s) Oral three times a day  chlorhexidine 0.12% Liquid 15 milliLiter(s) Oral Mucosa two times a day  chlorhexidine 4% Liquid 1 Application(s) Topical <User Schedule>  dexmedetomidine Infusion 0.2 MICROgram(s)/kG/Hr (3.15 mL/Hr) IV Continuous <Continuous>  docusate sodium 200 milliGRAM(s) Oral two times a day  DULoxetine 30 milliGRAM(s) Oral at bedtime  epoetin spencer Injectable 33959 Unit(s) SubCutaneous every 7 days  famotidine    Tablet 20 milliGRAM(s) Oral daily  fentaNYL    Injectable 50 MICROGram(s) IV Push once  fentaNYL   Infusion. 0.5 MICROgram(s)/kG/Hr (3.15 mL/Hr) IV Continuous <Continuous>  hydrocortisone 2.5% Rectal Cream 1 Application(s) Rectal daily  iohexol 300 mG (iodine)/mL Oral Solution 30 milliLiter(s) Oral once  lactated ringers. 1000 milliLiter(s) (60 mL/Hr) IV Continuous <Continuous>  meropenem  IVPB      meropenem  IVPB 1000 milliGRAM(s) IV Intermittent every 12 hours  metoprolol tartrate 25 milliGRAM(s) Enteral Tube two times a day  midazolam Infusion 0.02 mG/kG/Hr (1.26 mL/Hr) IV Continuous <Continuous>  norepinephrine Infusion 0.05 MICROgram(s)/kG/Min (2.953 mL/Hr) IV Continuous <Continuous>  phenylephrine    Infusion 0.1 MICROgram(s)/kG/Min (1.181 mL/Hr) IV Continuous <Continuous>  senna 2 Tablet(s) Oral daily  sodium chloride 0.9% Bolus 1000 milliLiter(s) IV Bolus once  vancomycin    Solution 125 milliGRAM(s) Oral every 6 hours        VITALS:  T(F): 96.2 (19 @ 07:01), Max: 101.5 (03-10-19 @ 11:00)  HR: 107 (19 10:14)  BP: 105/44 (19 @ 08:30)  RR: 29 (19 08:30)  SpO2: 98% (19 10:14)  Wt(kg): --     @ 06:01  -  03-10 @ 07:00  --------------------------------------------------------  IN: 678 mL / OUT: 1355 mL / NET: -677 mL    03-10 @ 07:01  -   @ 07:00  --------------------------------------------------------  IN: 3327 mL / OUT: 285 mL / NET: 3042 mL      Height (cm): 157.48 (03-10 @ 11:51)  Weight (kg): 63 (03-10 @ 11:51)  BMI (kg/m2): 25.4 (03-10 @ 11:51)  BSA (m2): 1.64 (03-10 @ 11:51)    LABS:      149<H>  |  112<H>  |  60<H>  ----------------------------<  207<H>  5.6<H>   |  25  |  1.1    Ca    6.9<L>      11 Mar 2019 08:00  Phos  3.9     -  Mg     2.0         TPro  3.7<L>  /  Alb  1.9<L>  /  TBili  0.4  /  DBili      /  AST  20  /  ALT  35  /  AlkPhos  323<H>                            8.1    24.12 )-----------( 173      ( 10 Mar 2019 15:14 )             26.4       Urine Studies:  Urinalysis Basic - ( 10 Mar 2019 08:14 )    Color: Yellow / Appearance: Clear / S.010 / pH:   Gluc:  / Ketone: Negative  / Bili: Negative / Urobili: 0.2 mg/dL   Blood:  / Protein: 30 mg/dL / Nitrite: Negative   Leuk Esterase: Moderate / RBC: 6-10 /HPF / WBC >50 /HPF   Sq Epi:  / Non Sq Epi:  / Bacteria: Few          RADIOLOGY & ADDITIONAL STUDIES:

## 2019-03-11 NOTE — PROGRESS NOTE ADULT - ASSESSMENT
# Complicated UTI  # Moderate Right sided hydronephrosis  # C.difficile colitis  # Low grade  MDS - on procrit  # Pneumonia-  on CXR 2/27/19      would recommend:  1. Add Levaquin to cover Stenotrophomonas since patient is allergic to Bactrim ( eventhough Bactrim would be ideal in the setting of C.difficile infection)  2. Continue IV Flagyl and oral vancomycin  3. Monitor WBC count , stay elevated  4. OOB to chair     d/w patient,  family  at the bed side and nursing staff    will follow the patient with you # Complicated UTI  # Moderate Right sided hydronephrosis  # C.difficile colitis  # Low grade  MDS - on procrit  # Pneumonia-  on CXR 2/27/19 , 2/27/19, shows Left apical opacity and sputum culture from 2/21/19 grew Stenotrophomonas.  # Septic shock - on pressors  #Pseudomonas Bacteremia - 3/10/19  and UTI      would recommend:  1. Follow up sensitivity of Pseudomonas and Need double Pseudomonal coverage in the setting of septic shock  2. Repeat blood cultures X 2 sets in AM to document clearing the blood stream  3. Continue oral vancomycin  and Add IV Flagyl since WBC count >15 K  4. Monitor WBC count , stay elevated  5. Management of Vent as per CCU protocol    d/w CCU team    - prognosis guarded

## 2019-03-11 NOTE — PROGRESS NOTE ADULT - ATTENDING COMMENTS
Pt seen and examined with CASEY Porras and case discussed with CCU resident.  Originally we were asked to see patient for abnormal LFTs which started to rise on 3/6.  They peaked on 3/8 and now have been trending downward.  She is critically ill and is believed to have urosepsis, and has been intubated.  Intermittently she is hypotensive and she is on pressor agents.  No new medications were started around the time of the significant rise in LFTs to account for the rise however her hypotension began around that time.  For that reason I believe the most likely cause for her abnormal LFTs is ischemic hepatopathy.  Her blood cultures are positive however now for E. coli despite antibiotics.  For that reason I would recommend an echocardiogram to rule out SBE and recalling ID.  Her US showed a NL caliber CBD with gallstones but before her LFTs began to rise; therefore for the less likely possibility of a CBD stone recently passed into the CBD I would suggest a repeat US to check to see if the CBD is now dilated.

## 2019-03-11 NOTE — PROGRESS NOTE ADULT - ASSESSMENT
GEETA    - oliguric   - worsening Cr (low Cr due to poor muscle mass)   - s/p iv dye  hyperkalemia  ARF - vented  cdiff colitis  hypotension / septic shock  left lung atelectasis from mucous plug  left moderate pleural effusion  PNA  malnutrition  left 2F complex renal cyst - enlarging   HTN   bladder ca  s/p TURBT  right hydro   UTI - enterococci  COPD  CHF  anemia / MDS  chronic back pain with spinal stimulator  rash  poor appetite     Plan:    ICU care   kayexalate 30-60g GT PRN for K+ > 5.6  change tubed feeds to nepro  add free water to feeding  pressor support  dc maintenance fluids and use PRN 250cc fluid boluses for hypotension, poor UOP or based on [place] findings  vent support  very poor prognosis

## 2019-03-11 NOTE — PROGRESS NOTE ADULT - SUBJECTIVE AND OBJECTIVE BOX
Patient is seen and examined at the bed side, is afebrile.   The repeat CXR shows  from today, 19, shows Left apical opacity and sputum culture from 19 grew Stenotrophomonas.  The WBC count stay elevated.      REVIEW OF SYSTEMS: All other review systems are negative        ALLERGIES: SULFA drugs       Vital Signs Last 24 Hrs  T(C): 35.6 (11 Mar 2019 19:00), Max: 38 (11 Mar 2019 01:01)  T(F): 96 (11 Mar 2019 19:00), Max: 100.4 (11 Mar 2019 01:01)  HR: 96 (11 Mar 2019 20:09) (75 - 128)  BP: 142/62 (11 Mar 2019 20:09) (68/35 - 142/62)  BP(mean): 89 (11 Mar 2019 20:09) (47 - 94)  RR: 22 (11 Mar 2019 20:09) (22 - 33)  SpO2: 100% (11 Mar 2019 20:09) (96% - 100%)        PHYSICAL EXAM:  GENERAL: Not in distress  CHEST/LUNG: Air  entry bilaterally  HEART: s1 and s2 present  ABDOMEN: mild distended  : Rowe catheter in placed  EXTREMITIES: B/L pedal  edema  CNS: Awake and alert        LABS:                                    7.8    24.66 )-----------( 130      ( 11 Mar 2019 08:00 )             26.3                        9.6    22.70 )-----------( 172      ( 2019 06:26 )             29.8                9.7    38.51 )-----------( 219      ( 2019 14:50 )             29.4             148<H>  |  113<H>  |  63<HH>  ----------------------------<  213<H>  5.7<H>   |  21  |  1.3    Ca    6.6<L>      11 Mar 2019 17:20  Phos  3.9       Mg     2.0         TPro  3.9<L>  /  Alb  2.0<L>  /  TBili  0.5  /  DBili  x   /  AST  71<H>  /  ALT  >700<H>  /  AlkPhos  337<H>      141  |  107  |  43<H>  ----------------------------<  97  5.3<H>   |  28  |  0.8    Ca    7.7<L>      2019 06:26        Urinalysis Basic - ( 2019 04:25 )    Color: Orange / Appearance: Slightly Cloudy / S.025 / pH: x  Gluc: x / Ketone: Trace  / Bili: Negative / Urobili: 1.0 mg/dL   Blood: x / Protein: >=300 mg/dL / Nitrite: Positive   Leuk Esterase: Negative / RBC: >50 /HPF / WBC 10-25 /HPF   Sq Epi: x / Non Sq Epi: Few /HPF / Bacteria: Moderate        MEDICATIONS  (STANDING):  aspirin  chewable 81 milliGRAM(s) Oral daily  busPIRone 5 milliGRAM(s) Oral three times a day  chlorhexidine 0.12% Liquid 15 milliLiter(s) Oral Mucosa two times a day  chlorhexidine 4% Liquid 1 Application(s) Topical <User Schedule>  docusate sodium 200 milliGRAM(s) Oral two times a day  DULoxetine 30 milliGRAM(s) Oral at bedtime  epoetin spencer Injectable 00213 Unit(s) SubCutaneous every 7 days  famotidine    Tablet 20 milliGRAM(s) Oral daily  fentaNYL    Injectable 50 MICROGram(s) IV Push once  fentaNYL   Infusion. 0.5 MICROgram(s)/kG/Hr (3.15 mL/Hr) IV Continuous <Continuous>  hydrocortisone 2.5% Rectal Cream 1 Application(s) Rectal daily  iohexol 300 mG (iodine)/mL Oral Solution 30 milliLiter(s) Oral once  lactated ringers. 1000 milliLiter(s) (10 mL/Hr) IV Continuous <Continuous>  meropenem  IVPB      meropenem  IVPB 1000 milliGRAM(s) IV Intermittent every 12 hours  metoprolol tartrate 25 milliGRAM(s) Enteral Tube two times a day  midazolam Infusion 0.02 mG/kG/Hr (1.26 mL/Hr) IV Continuous <Continuous>  norepinephrine Infusion 0.05 MICROgram(s)/kG/Min (2.953 mL/Hr) IV Continuous <Continuous>  phenylephrine    Infusion 0.1 MICROgram(s)/kG/Min (1.181 mL/Hr) IV Continuous <Continuous>  senna 2 Tablet(s) Oral daily  sodium bicarbonate  Infusion 0.179 mEq/kG/Hr (75 mL/Hr) IV Continuous <Continuous>  vancomycin    Solution 125 milliGRAM(s) Oral every 6 hours    MEDICATIONS  (PRN):  acetaminophen   Tablet .. 650 milliGRAM(s) Oral every 6 hours PRN Temp greater or equal to 38C (100.4F)  bisacodyl Suppository 10 milliGRAM(s) Rectal every 24 hours PRN Constipation  HYDROcodone 10 mG/acetaminophen 325 mG 1 Tablet(s) Oral every 4 hours PRN Moderate Pain (4 - 6)  ondansetron Injectable 4 milliGRAM(s) IV Push every 8 hours PRN Nausea and/or Vomiting  simethicone 80 milliGRAM(s) Chew every 8 hours PRN Gas        RADIOLOGY & ADDITIONAL TEST    < from: Xray Chest 1 View- PORTABLE-Urgent (19 @ 07:40) >  Lung parenchyma/Pleura: Stable left lower lobe opacity as well as right   lower lobe opacity. No evidence of pneumothorax    < end of copied text >      < from: Xray Chest 1 View- PORTABLE-Urgent (19 @ 09:19) >  Left apical opacity. Left opacity/left pleural effusion, decreased. Right   basilar opacity/pleural effusion, stable. .        < end of copied text >      < from: CT Abdomen and Pelvis w/ Oral Cont (19 @ 23:46) >    New diffuse colonic wall thickening involving the cecum, right colon and   transverse colon at the hepatic flexure. There is additional involvement   of the terminal ileum. Findings are consistent with colitis and enteritis   of unclear etiology, likely infectious or inflammatory.    Moderate right hydroureteronephrosis to the level of the bladder without   a definitive obstructing cause, as seen on recent ultrasound.     New small amount of abdominopelvic ascites.    New small to moderateright pleural effusion and trace left pleural   effusion.    Resolution of some pulmonary nodules at the left lung base with new   pulmonary nodules measuring up to 12 mm. Follow-up noncontrast chest CT   is recommended in 3 months to demonstrate resolution.    < end of copied text >    < from: Xray Chest 1 View- PORTABLE-Routine (02.10.19 @ 17:59) >    Layering density in the right lower lung with a blunted costophrenic   angle, which may represent a pleural effusion. Cannot exclude right lower   lobe underlying consolidation. Recommend follow-up.     < end of copied text >      < from: US Retroperitoneal B-Scan Limited (19 @ 09:40) >  Moderate right-sided hydronephrosis, unchanged.      < end of copied text >      < from: Xray Chest 1 View- PORTABLE-Urgent (19 @ 07:00) >    No radiographic evidence of acute cardiopulmonary disease.      < end of copied text >        MICROBIOLOGY DATA:    Clostridium difficile Toxin by PCR (19 @ 16:20)    Clostridium difficile Toxin by PCR: RESULT INTERPRETATION:    Detected - Clostridium difficile toxin B detected by amplified DNA PCR    C.difficile PCR test results should be interpreted only with  consideration of the patient's clinical situation and history.  This test  will detect the presence of toxigenic C. difficile.  However it cannot be  used as the sole criteria  for the diagnosis of antibiotic associated diarrhea, antibiotic  associated colitis, or pseudomembranous colitis.  Colonization with  C.difficile may exceed 20% in hosptial patients, the majority of whom are  without Toxigenic  Clostridium Difficile disease.  Testing is generally not recommened in  children below the age of 1 year, as up to half of healthy infants are  asymptomatically colonized with C.difficile.  In addition, C.difficile  PCR testing  cannot be used as a "test of cure" as dead organism nucleic acids will  persist and be detected after treatment.  Successful treatment of  C.difficile disease is determined by resolution of clinical symptoms. Method: CDPCR  TOXIGENIC CLOST.DIFFICILE POSITIVE;  027-NAP1-B1 PRESUMPTIVE POSITIVE.      C Diff by PCR Result: Positive        Urine Microscopic-Add On (NC) (02.10.19 @ 19:08)    Bacteria: Moderate    Epithelial Cells: Few /HPF    Red Blood Cell - Urine: 26-50 /HPF    White Blood Cell - Urine: 3-5 /HPF        Culture - Blood (19 @ 13:16)    Specimen Source: .Blood Blood    Culture Results:   No growth to date.        Culture - Urine (19 @ 04:25)    Specimen Source: .Urine Clean Catch (Midstream)    Culture Results:   No growth Patient is seen and examined at the bed side, is afebrile currently.  She is intubated and on pressors. The WBC count elevated, and Blood cultures grew Pseudomonas, sensitivity is pending.       REVIEW OF SYSTEMS: All other review systems are negative        ALLERGIES: SULFA drugs , Levaquin      Vital Signs Last 24 Hrs  T(C): 35.6 (11 Mar 2019 19:00), Max: 38 (11 Mar 2019 01:01)  T(F): 96 (11 Mar 2019 19:00), Max: 100.4 (11 Mar 2019 01:01)  HR: 96 (11 Mar 2019 20:09) (75 - 128)  BP: 142/62 (11 Mar 2019 20:09) (68/35 - 142/62)  BP(mean): 89 (11 Mar 2019 20:09) (47 - 94)  RR: 22 (11 Mar 2019 20:09) (22 - 33)  SpO2: 100% (11 Mar 2019 20:09) (96% - 100%)        PHYSICAL EXAM:  GENERAL: Intubated/vented  CHEST/LUNG: Air  entry bilaterally  HEART: s1 and s2 present  ABDOMEN: Not distended  : Rowe catheter in placed  EXTREMITIES: B/L pedal  edema  CNS: Intubted/vented        LABS:                                    7.8    24.66 )-----------( 130      ( 11 Mar 2019 08:00 )             26.3                        9.6    22.70 )-----------( 172      ( 2019 06:26 )             29.8                9.7    38.51 )-----------( 219      ( 2019 14:50 )             29.4             148<H>  |  113<H>  |  63<HH>  ----------------------------<  213<H>  5.7<H>   |  21  |  1.3    Ca    6.6<L>      11 Mar 2019 17:20  Phos  3.9       Mg     2.0         TPro  3.9<L>  /  Alb  2.0<L>  /  TBili  0.5  /  DBili  x   /  AST  71<H>  /  ALT  >700<H>  /  AlkPhos  337<H>      141  |  107  |  43<H>  ----------------------------<  97  5.3<H>   |  28  |  0.8    Ca    7.7<L>      2019 06:26        Urinalysis Basic - ( 2019 04:25 )    Color: Orange / Appearance: Slightly Cloudy / S.025 / pH: x  Gluc: x / Ketone: Trace  / Bili: Negative / Urobili: 1.0 mg/dL   Blood: x / Protein: >=300 mg/dL / Nitrite: Positive   Leuk Esterase: Negative / RBC: >50 /HPF / WBC 10-25 /HPF   Sq Epi: x / Non Sq Epi: Few /HPF / Bacteria: Moderate        MEDICATIONS  (STANDING):  aspirin  chewable 81 milliGRAM(s) Oral daily  busPIRone 5 milliGRAM(s) Oral three times a day  chlorhexidine 0.12% Liquid 15 milliLiter(s) Oral Mucosa two times a day  chlorhexidine 4% Liquid 1 Application(s) Topical <User Schedule>  docusate sodium 200 milliGRAM(s) Oral two times a day  DULoxetine 30 milliGRAM(s) Oral at bedtime  epoetin spencer Injectable 34911 Unit(s) SubCutaneous every 7 days  famotidine    Tablet 20 milliGRAM(s) Oral daily  fentaNYL    Injectable 50 MICROGram(s) IV Push once  fentaNYL   Infusion. 0.5 MICROgram(s)/kG/Hr (3.15 mL/Hr) IV Continuous <Continuous>  hydrocortisone 2.5% Rectal Cream 1 Application(s) Rectal daily  iohexol 300 mG (iodine)/mL Oral Solution 30 milliLiter(s) Oral once  lactated ringers. 1000 milliLiter(s) (10 mL/Hr) IV Continuous <Continuous>  meropenem  IVPB      meropenem  IVPB 1000 milliGRAM(s) IV Intermittent every 12 hours  metoprolol tartrate 25 milliGRAM(s) Enteral Tube two times a day  midazolam Infusion 0.02 mG/kG/Hr (1.26 mL/Hr) IV Continuous <Continuous>  norepinephrine Infusion 0.05 MICROgram(s)/kG/Min (2.953 mL/Hr) IV Continuous <Continuous>  phenylephrine    Infusion 0.1 MICROgram(s)/kG/Min (1.181 mL/Hr) IV Continuous <Continuous>  senna 2 Tablet(s) Oral daily  sodium bicarbonate  Infusion 0.179 mEq/kG/Hr (75 mL/Hr) IV Continuous <Continuous>  vancomycin    Solution 125 milliGRAM(s) Oral every 6 hours    MEDICATIONS  (PRN):  acetaminophen   Tablet .. 650 milliGRAM(s) Oral every 6 hours PRN Temp greater or equal to 38C (100.4F)  bisacodyl Suppository 10 milliGRAM(s) Rectal every 24 hours PRN Constipation  HYDROcodone 10 mG/acetaminophen 325 mG 1 Tablet(s) Oral every 4 hours PRN Moderate Pain (4 - 6)  ondansetron Injectable 4 milliGRAM(s) IV Push every 8 hours PRN Nausea and/or Vomiting  simethicone 80 milliGRAM(s) Chew every 8 hours PRN Gas        RADIOLOGY & ADDITIONAL TEST    < from: Xray Chest 1 View- PORTABLE-Urgent (19 @ 07:40) >  Lung parenchyma/Pleura: Stable left lower lobe opacity as well as right   lower lobe opacity. No evidence of pneumothorax    < end of copied text >      < from: Xray Chest 1 View- PORTABLE-Urgent (19 @ 09:19) >  Left apical opacity. Left opacity/left pleural effusion, decreased. Right   basilar opacity/pleural effusion, stable. .        < end of copied text >      < from: CT Abdomen and Pelvis w/ Oral Cont (19 @ 23:46) >    New diffuse colonic wall thickening involving the cecum, right colon and   transverse colon at the hepatic flexure. There is additional involvement   of the terminal ileum. Findings are consistent with colitis and enteritis   of unclear etiology, likely infectious or inflammatory.    Moderate right hydroureteronephrosis to the level of the bladder without   a definitive obstructing cause, as seen on recent ultrasound.     New small amount of abdominopelvic ascites.    New small to moderateright pleural effusion and trace left pleural   effusion.    Resolution of some pulmonary nodules at the left lung base with new   pulmonary nodules measuring up to 12 mm. Follow-up noncontrast chest CT   is recommended in 3 months to demonstrate resolution.    < end of copied text >    < from: Xray Chest 1 View- PORTABLE-Routine (02.10.19 @ 17:59) >    Layering density in the right lower lung with a blunted costophrenic   angle, which may represent a pleural effusion. Cannot exclude right lower   lobe underlying consolidation. Recommend follow-up.     < end of copied text >      < from: US Retroperitoneal B-Scan Limited (19 @ 09:40) >  Moderate right-sided hydronephrosis, unchanged.      < end of copied text >      < from: Xray Chest 1 View- PORTABLE-Urgent (19 @ 07:00) >    No radiographic evidence of acute cardiopulmonary disease.      < end of copied text >        MICROBIOLOGY DATA:    Culture - Blood (03.10.19 @ 15:14)    Gram Stain:   Growth in aerobic bottle: Gram Negative Rods    Specimen Source: .Blood None    Culture Results:   Growth in aerobic bottle: Gram Negative Rods        Urine Microscopic-Add On (NC) (03.10.19 @ 08:14)    White Blood Cell - Urine: >50 /HPF    Red Blood Cell - Urine: 6-10 /HPF    Bacteria: Few      Culture - Blood (03.10.19 @ 05:55)    -  Pseudomonas aeruginosa: Detec    Gram Stain:   Growth in aerobic bottle: Gram Negative Rods    Specimen Source: .Blood None    Organism: Blood Culture PCR    Culture Results:   Growth in aerobic bottle: Gram Negative Rods  "Due to technical problems, Proteus sp. will Not be reported as part of  the BCID panel until further notice"  ***Blood Panel PCR results on this specimen are available  approximately 3 hours after the Gram stain result.***  Gram stain, PCR, and/or culture results may not always  correspond due to difference in methodologies.  ************************************************************  This PCR assay was performed using [employer].  The following targets are tested for: Enterococcus,  vancomycin resistant enterococci, Listeria monocytogenes,  coagulase negative staphylococci, S. aureus,  methicillin resistant S. aureus, Streptococcus agalactiae  (Group B), S. pneumoniae, S. pyogenes (Group A),  Acinetobacter baumannii, Enterobacter cloacae, E. coli,  Klebsiella oxytoca, K. pneumoniae, Proteus sp.,  Serratia marcescens, Haemophilus influenzae,  Neisseria meningitidis, Pseudomonas aeruginosa, Candida  albicans, C. glabrata, C krusei, C parapsilosis,  C. tropicalis and the KPC resistance gene.    Organism Identification: Blood Culture PCR    Method Type: PCR        Clostridium difficile Toxin by PCR (19 @ 16:20)    Clostridium difficile Toxin by PCR: RESULT INTERPRETATION:    Detected - Clostridium difficile toxin B detected by amplified DNA PCR    C.difficile PCR test results should be interpreted only with  consideration of the patient's clinical situation and history.  This test  will detect the presence of toxigenic C. difficile.  However it cannot be  used as the sole criteria  for the diagnosis of antibiotic associated diarrhea, antibiotic  associated colitis, or pseudomembranous colitis.  Colonization with  C.difficile may exceed 20% in hosptial patients, the majority of whom are  without Toxigenic  Clostridium Difficile disease.  Testing is generally not recommened in  children below the age of 1 year, as up to half of healthy infants are  asymptomatically colonized with C.difficile.  In addition, C.difficile  PCR testing  cannot be used as a "test of cure" as dead organism nucleic acids will  persist and be detected after treatment.  Successful treatment of  C.difficile disease is determined by resolution of clinical symptoms. Method: CDPCR  TOXIGENIC CLOST.DIFFICILE POSITIVE;  027-NAP1-B1 PRESUMPTIVE POSITIVE.      C Diff by PCR Result: Positive        Urine Microscopic-Add On (NC) (02.10.19 @ 19:08)    Bacteria: Moderate    Epithelial Cells: Few /HPF    Red Blood Cell - Urine: 26-50 /HPF    White Blood Cell - Urine: 3-5 /HPF        Culture - Blood (19 @ 13:16)    Specimen Source: .Blood Blood    Culture Results:   No growth to date.        Culture - Urine (19 @ 04:25)    Specimen Source: .Urine Clean Catch (Midstream)    Culture Results:   No growth

## 2019-03-11 NOTE — PROGRESS NOTE ADULT - ASSESSMENT
GEETA    - oliguric   - worsening Cr (low Cr due to poor muscle mass)   - s/p iv dye  hyperkalemia  ARF - vented  cdiff colitis  hypotension / septic shock  left lung atelectasis from mucous plug  left moderate pleural effusion  PNA  malnutrition  left 2F complex renal cyst - enlarging   HTN   bladder ca  s/p TURBT  right hydro   UTI - enterococci  COPD  CHF  anemia / MDS  chronic back pain with spinal stimulator  rash  poor appetite     Plan:    ICU care   kayexalate 30-60g GT PRN for K+ > 5.6  change tubed feeds to nepro  add free water to feeding  pressor support  dc maintenance fluids and use PRN 250cc fluid boluses for hypotension, poor UOP or based on [place] findings  vent support  very poor prognosis  spoke with family       critical care 35min

## 2019-03-11 NOTE — PROGRESS NOTE ADULT - ASSESSMENT
85 yo female ex smoker with a history of COPD on home O2,Anemia sec to low grade MDS by recent bmbx,on weekly procrit   Admitted for electrolyte imbalance, GEETA,. UTI with     1  septic shock with respiratory failure and hyperkal.  on pressor double   high Fio2   family made pt DNR    2  C diff on antibx     3 Small effusions on CT right > left with lung nodules.  left lung atelectasis from mucous plug..s/p mucus plug removed    4 anemia for hb 8 to 9 ,,hx of MDS GI bleed resolved >> hb 7   weakness ...     5 decreased UO with kidney failure.         will follow  spoke to pts family to provide support.

## 2019-03-11 NOTE — PROGRESS NOTE ADULT - ASSESSMENT
84 y.o female patient with PMH of MDS, CHF, COPD on home oxygen, bladder cancer, HTN initially admitted for urinary retention s/p urologic procedure and lynne catheter removal and hyperkalemia. She had an extensive hospital course where she was treated for UTI, had a left mucous plug s/p bronchoscopy. Hospital stay further complicated by C.diff colitis. On 3/9/2019, patient developed acute hypoxic respiratory failure and was intubated and upgraded to the CCU    # Acute hypoxic respiratory failure  - Initially thought to be secondary to CHF exacerbation; given Lasix upon upgrade to CCU  - CXR showed no changes from previous CXR (left pleural effusion/atelectasis?)  - Patient still intubated, requiring increased oxygenation  - Holding lasix for now  - Check daily CXR and ABG  - Monitor strict I&Os and avoid I>O  - LE duplex negative for DVT   - F/U with pulm    # Fever  - Associated with leukocytosis  - Patient now in septic shock, requiring pressors   - UA positive   - Blood cultures positive for Gram negative rods  - Patient with C.diff colitis  - Continue PO vanco  - See KUB results above  - Continue Meropenem  - Check daily blood cultures  - F/U urine cultures  - ID follow-up    # C.diff colitis  - Continue PO vanco for now  - See KUB results above  - Monitor VS    # Abnormal movements  - No more abnormal movements today    # Elevated LFTs  - See abdominal results above  - Due to episodes of hypotension?  - Avoid further hypotension  - Will D/C statin; avoid hepatotoxic agents  - GI following: repeat abdominal ultrasound since the patient's status worsened  - Monitor LFTs    # MDS  - Heme/onc following patient  - Monitor cbc    # Hyperkalemia  - s/p kayexalate in AM, still hyperkalemic at 6.2; will give another dose of kayexalate (as per nephro's recommendations)  - Monitor renal function and electrolytes  - Keep on holding spironolactone for now  - F/U with nephrology    # DVT prophylaxis: SCD due to MDS  # GI prophylaxis 84 y.o female patient with PMH of MDS, CHF, COPD on home oxygen, bladder cancer, HTN initially admitted for urinary retention s/p urologic procedure and lynne catheter removal and hyperkalemia. She had an extensive hospital course where she was treated for UTI, had a left mucous plug s/p bronchoscopy. Hospital stay further complicated by C.diff colitis. On 3/9/2019, patient developed acute hypoxic respiratory failure and was intubated and upgraded to the CCU    # Acute hypoxic respiratory failure  - Initially thought to be secondary to CHF exacerbation; given Lasix upon upgrade to CCU  - CXR showed no changes from previous CXR (left pleural effusion/atelectasis?)  - Patient still intubated, requiring increased oxygenation  - Holding lasix for now  - Check daily CXR and ABG  - Monitor strict I&Os and avoid I>O  - LE duplex negative for DVT  - F/U with pulm    # Fever  - Associated with leukocytosis  - In the last 24h, Tmax of 101.5F on 3/10/2019 at 15:00  - Patient now in septic shock, requiring pressors   - UA positive   - Blood cultures positive for Gram negative rods  - Patient with C.diff colitis  - Continue PO vanco  - See KUB results above  - Continue Meropenem  - Check daily blood cultures  - F/U urine cultures  - Monitor VS  - ID follow-up    # C.diff colitis  - Continue PO vanco for now  - See KUB results above  - Monitor VS    # Hyperkalemia  - s/p kayexalate in AM, still hyperkalemic at 6.2; will give another dose of kayexalate (as per nephro's recommendations)  - Monitor renal function and electrolytes  - Keep on holding spironolactone and ACE-I/ARB for now  - F/U with nephrology    # Hypernatremia  - Free water added  - Monitor electrolytes  - F/U with nephrology    # Abnormal movements  - No more abnormal movements today    # Elevated LFTs  - See abdominal results above  - Due to episodes of hypotension?  - Avoid further hypotension  - Will D/C statin; avoid hepatotoxic agents  - GI following: repeat abdominal ultrasound since the patient's status worsened  - Monitor LFTs    # MDS  - Heme/onc following patient  - Monitor cbc    # DVT prophylaxis: SCD due to MDS  # GI prophylaxis 84 y.o female patient with PMH of MDS, CHF, COPD on home oxygen, bladder cancer, HTN initially admitted for urinary retention s/p urologic procedure and lynne catheter removal and hyperkalemia. She had an extensive hospital course where she was treated for UTI, had a left mucous plug s/p bronchoscopy. Hospital stay further complicated by C.diff colitis. On 3/9/2019, patient developed acute hypoxic respiratory failure and was intubated and upgraded to the CCU    # Acute hypoxic respiratory failure  - Initially thought to be secondary to CHF exacerbation; given Lasix upon upgrade to CCU  - CXR showed no changes from previous CXR (left pleural effusion/atelectasis?)  - Patient still intubated, requiring increased oxygenation  - Holding lasix for now  - Check daily CXR and ABG  - Monitor strict I&Os and avoid I>O  - LE duplex negative for DVT  - F/U with pulm    # Fever  - Associated with leukocytosis  - In the last 24h, Tmax of 101.5F on 3/10/2019 at 15:00  - Patient now in septic shock, requiring pressors   - UA positive   - Blood cultures positive for Gram negative rods  - Patient with C.diff colitis  - Continue PO vanco  - See KUB results above  - Continue Meropenem  - Check daily blood cultures  - F/U urine cultures  - Monitor VS  - ID follow-up    # C.diff colitis  - Continue PO vanco for now  - See KUB results above  - Monitor VS    # GEETA  - Patient now in GEETA  - Most likely secondary to hypotension  - Renally dose medications  - Monitor renal function and electrolytes  - F/U with nephrology    # Hyperkalemia  - s/p kayexalate in AM, still hyperkalemic at 6.2; will give another dose of kayexalate (as per nephro's recommendations)  - Monitor renal function and electrolytes  - Keep on holding spironolactone and ACE-I/ARB for now  - F/U with nephrology    # Hypernatremia  - Free water added  - Monitor electrolytes  - F/U with nephrology    # Abnormal movements  - No more abnormal movements today    # Elevated LFTs  - See abdominal results above  - Due to episodes of hypotension?  - Avoid further hypotension  - Will D/C statin; avoid hepatotoxic agents  - GI following: repeat abdominal ultrasound since the patient's status worsened  - Monitor LFTs    # MDS  - Heme/onc following patient  - Monitor cbc    # DVT prophylaxis: SCD due to MDS  # GI prophylaxis

## 2019-03-11 NOTE — PROGRESS NOTE ADULT - ASSESSMENT
IMPRESSION:  Arf secondary to septic shock muliorgan failure   ??PE   bacteremia   c-diff   uti     PLAN:    CNS: fentanyl for now versed if needed     HEENT: oral care     PULMONARY: repeat abg  and adjust vent setting   doppler ext     CARDIOVASCULAR:  apply the cheetah for now and fluid bolus if needed   echo , on pérez   GI: GI prophylaxis.  NPO  free water     RENAL: follow lytes K     INFECTIOUS DISEASE: merop Iv follow cx     HEMATOLOGICAL:  DVT prophylaxis. doppler lower ext stat start heparin if positive     ENDOCRINE:  Follow up FS.  Insulin protocol if needed.    spoke with family they dont want any escalation in the care no HD , not to add any pressirs   possible terminal wean vanesa

## 2019-03-11 NOTE — PROGRESS NOTE ADULT - SUBJECTIVE AND OBJECTIVE BOX
Patient is a 84y old  Female who presents with a chief complaint of Urinary Retention/Hperkalemia with EKG changes (10 Mar 2019 19:23)      Over Night Events:  Patient seen and examined s/p intubation on saturday now on pressors pérez hypotension   patient was supposed to be DNR/DNI     ROS:  See HPI    PHYSICAL EXAM    ICU Vital Signs Last 24 Hrs  T(C): 35.7 (11 Mar 2019 07:01), Max: 38.6 (10 Mar 2019 11:00)  T(F): 96.2 (11 Mar 2019 07:01), Max: 101.5 (10 Mar 2019 11:00)  HR: 126 (11 Mar 2019 08:30) (75 - 128)  BP: 105/44 (11 Mar 2019 08:30) (68/30 - 124/56)  BP(mean): 64 (11 Mar 2019 08:30) (43 - 94)  ABP: --  ABP(mean): --  RR: 29 (11 Mar 2019 08:30) (22 - 44)  SpO2: 97% (11 Mar 2019 08:30) (93% - 100%)      General: on fentanyl    HEENT:  et tube              Lymph Nodes: NO cervical LN   Lungs: Bilateral crackles mils   Cardiovascular: Regular   Abdomen: Soft, Positive BS  Extremities: No clubbing   Skin: warm   Neurological: ca not assess   Musculoskeletal: move all ext     I&O's Detail    10 Mar 2019 07:01  -  11 Mar 2019 07:00  --------------------------------------------------------  IN:    dexmedetomidine Infusion: 38 mL    fentaNYL Infusion.: 320 mL    lactated ringers.: 1500 mL    midazolam Infusion: 5 mL    Osmolite: 1364 mL    Solution: 100 mL  Total IN: 3327 mL    OUT:    Indwelling Catheter - Urethral: 285 mL  Total OUT: 285 mL    Total NET: 3042 mL          LABS:                          8.1    24.12 )-----------( 173      ( 10 Mar 2019 15:14 )             26.4         11 Mar 2019 08:00    149    |  112    |  60     ----------------------------<  207    5.6     |  25     |  1.1      Ca    6.9        11 Mar 2019 08:00  Phos  3.9       11 Mar 2019 08:00  Mg     2.0       11 Mar 2019 08:00    TPro  3.7    /  Alb  1.9    /  TBili  0.4    /  DBili  x      /  AST  20     /  ALT  35     /  AlkPhos  323    11 Mar 2019 08:00  Amylase x     lipase x                                                 PT/INR - ( 11 Mar 2019 08:00 )   PT: 14.20 sec;   INR: 1.24 ratio         PTT - ( 11 Mar 2019 08:00 )  PTT:26.6 sec                                       Urinalysis Basic - ( 10 Mar 2019 08:14 )    Color: Yellow / Appearance: Clear / S.010 / pH: x  Gluc: x / Ketone: Negative  / Bili: Negative / Urobili: 0.2 mg/dL   Blood: x / Protein: 30 mg/dL / Nitrite: Negative   Leuk Esterase: Moderate / RBC: 6-10 /HPF / WBC >50 /HPF   Sq Epi: x / Non Sq Epi: x / Bacteria: Few          CARDIAC MARKERS ( 11 Mar 2019 08:00 )  x     / 0.11 ng/mL / x     / x     / x                                                            Culture - Blood (collected 10 Mar 2019 05:55)  Source: .Blood None  Gram Stain (11 Mar 2019 08:42):    Growth in aerobic bottle: Gram Negative Rods  Preliminary Report (11 Mar 2019 08:42):    Growth in aerobic bottle: Gram Negative Rods    "Due to technical problems, Proteus sp. will Not be reported as part of    the BCID panel until further notice"    ***Blood Panel PCR results on this specimen are available    approximately 3 hours after the Gram stain result.***    Gram stain, PCR, and/or culture results may not always    correspond due to difference in methodologies.    ************************************************************    This PCR assay was performed using [employer].    The following targets are tested for: Enterococcus,    vancomycin resistant enterococci, Listeria monocytogenes,    coagulase negative staphylococci, S. aureus,    methicillin resistant S. aureus, Streptococcus agalactiae    (Group B), S. pneumoniae, S. pyogenes (Group A),    Acinetobacter baumannii, Enterobacter cloacae, E. coli,    Klebsiella oxytoca, K. pneumoniae, Proteus sp.,    Serratia marcescens, Haemophilus influenzae,    Neisseria meningitidis, Pseudomonas aeruginosa, Candida    albicans, C. glabrata, C krusei, C parapsilosis,    C. tropicalis and the KPC resistance gene.                                                   Mode: Auto Mode: PRVC/ Volume Support  RR (machine): 22  TV (machine): 400  FiO2: 70  PEEP: 8  MAP: 18  PIP: 30                                      ABG - ( 11 Mar 2019 05:01 )  pH, Arterial: 7.34  pH, Blood: x     /  pCO2: 57    /  pO2: 67    / HCO3: 30    / Base Excess: 3.2   /  SaO2: 94                  MEDICATIONS  (STANDING):  amLODIPine   Tablet 10 milliGRAM(s) Oral daily  aspirin  chewable 81 milliGRAM(s) Oral daily  busPIRone 5 milliGRAM(s) Oral three times a day  chlorhexidine 0.12% Liquid 15 milliLiter(s) Oral Mucosa two times a day  chlorhexidine 4% Liquid 1 Application(s) Topical <User Schedule>  dexmedetomidine Infusion 0.2 MICROgram(s)/kG/Hr (3.15 mL/Hr) IV Continuous <Continuous>  docusate sodium 200 milliGRAM(s) Oral two times a day  DULoxetine 30 milliGRAM(s) Oral at bedtime  epoetin spencer Injectable 15296 Unit(s) SubCutaneous every 7 days  famotidine    Tablet 20 milliGRAM(s) Oral daily  fentaNYL    Injectable 50 MICROGram(s) IV Push once  fentaNYL   Infusion. 0.5 MICROgram(s)/kG/Hr (3.15 mL/Hr) IV Continuous <Continuous>  hydrocortisone 2.5% Rectal Cream 1 Application(s) Rectal daily  iohexol 300 mG (iodine)/mL Oral Solution 30 milliLiter(s) Oral once  lactated ringers. 1000 milliLiter(s) (60 mL/Hr) IV Continuous <Continuous>  meropenem  IVPB      meropenem  IVPB 1000 milliGRAM(s) IV Intermittent every 12 hours  metoprolol tartrate 25 milliGRAM(s) Enteral Tube two times a day  midazolam Infusion 0.02 mG/kG/Hr (1.26 mL/Hr) IV Continuous <Continuous>  norepinephrine Infusion 0.05 MICROgram(s)/kG/Min (2.953 mL/Hr) IV Continuous <Continuous>  phenylephrine    Infusion 0.1 MICROgram(s)/kG/Min (1.181 mL/Hr) IV Continuous <Continuous>  senna 2 Tablet(s) Oral daily  sodium chloride 0.9% Bolus 1000 milliLiter(s) IV Bolus once  vancomycin    Solution 125 milliGRAM(s) Oral every 6 hours    MEDICATIONS  (PRN):  acetaminophen   Tablet .. 650 milliGRAM(s) Oral every 6 hours PRN Temp greater or equal to 38C (100.4F)  bisacodyl Suppository 10 milliGRAM(s) Rectal every 24 hours PRN Constipation  cloNIDine 0.1 milliGRAM(s) Oral every 8 hours PRN for sbp > 180  HYDROcodone 10 mG/acetaminophen 325 mG 1 Tablet(s) Oral every 4 hours PRN Moderate Pain (4 - 6)  ondansetron Injectable 4 milliGRAM(s) IV Push every 8 hours PRN Nausea and/or Vomiting  simethicone 80 milliGRAM(s) Chew every 8 hours PRN Gas          Xrays:  TLC:  OG:  ET tube:                                                                                    left effusion /opacity    ECHO:

## 2019-03-11 NOTE — PROGRESS NOTE ADULT - SUBJECTIVE AND OBJECTIVE BOX
KOTA SCHAFER    Patient is a 84y old  Female who presents with a chief complaint of Urinary Retention/Hperkalemia with EKG changes (11 Mar 2019 11:53)      Interval History/Overnight events: Patient developed hypotension requiring central line placement and pressors    T(C): 35.7 (19 @ 07:01), Max: 38.6 (03-10-19 @ 15:01)  HR: 107 (19 @ 10:14)  BP: 105/44 (19 @ 08:30)  RR: 29 (19 @ 08:30)  SpO2: 98% (19 @ 10:14)    PHYSICAL EXAM:    GENERAL: patient intubated, sedated  CHEST/LUNG: Clear to auscultation bilaterally  HEART: Regular rhythm, tachycardic  ABDOMEN: Soft, Nontender, Nondistended  EXTREMITIES: No edema      LABS:                          7.8    24.66 )-----------( 130      ( 11 Mar 2019 08:00 )             26.3         149<H>  |  113<H>  |  55<H>  ----------------------------<  163<H>  6.2<HH>   |  27  |  1.3    Ca    6.8<L>      11 Mar 2019 11:00  Phos  3.9       Mg     2.0         TPro  3.7<L>  /  Alb  1.9<L>  /  TBili  0.4  /  DBili  x   /  AST  20  /  ALT  35  /  AlkPhos  323<H>      PT/INR - ( 11 Mar 2019 08:00 )   PT: 14.20 sec;   INR: 1.24 ratio         PTT - ( 11 Mar 2019 08:00 )  PTT:26.6 sec  CARDIAC MARKERS ( 11 Mar 2019 08:00 )  x     / 0.11 ng/mL / x     / x     / x          < from: Xray Chest 1 View- PORTABLE-Urgent (19 @ 07:40) >  ddendum:    There is a right IJ catheter which terminates in distal SVC.      *** END OF ADDENDUM 2019  ***    Impression:    Stable pulmonary findings.    < end of copied text >        Culture - Urine (collected 10 Mar 2019 08:14)  Source: .Urine Catheterized  Final Report (11 Mar 2019 10:14):    No growth    Culture - Blood (collected 10 Mar 2019 05:55)  Source: .Blood None  Gram Stain (11 Mar 2019 08:42):    Growth in aerobic bottle: Gram Negative Rods  Preliminary Report (11 Mar 2019 08:42):    Growth in aerobic bottle: Gram Negative Rods    "Due to technical problems, Proteus sp. will Not be reported as part of    the BCID panel until further notice"    ***Blood Panel PCR results on this specimen are available    approximately 3 hours after the Gram stain result.***    Gram stain, PCR, and/or culture results may not always    correspond due to difference in methodologies.    ************************************************************    This PCR assay was performed using [employer].    The following targets are tested for: Enterococcus,    vancomycin resistant enterococci, Listeria monocytogenes,    coagulase negative staphylococci, S. aureus,    methicillin resistant S. aureus, Streptococcus agalactiae    (Group B), S. pneumoniae, S. pyogenes (Group A),    Acinetobacter baumannii, Enterobacter cloacae, E. coli,    Klebsiella oxytoca, K. pneumoniae, Proteus sp.,    Serratia marcescens, Haemophilus influenzae,    Neisseria meningitidis, Pseudomonas aeruginosa, Candida    albicans, C. glabrata, C krusei, C parapsilosis,    C. tropicalis and the KPC resistance gene.  Organism: Blood Culture PCR (11 Mar 2019 11:26)  Organism: Blood Culture PCR (11 Mar 2019 11:26)      Urinalysis Basic - ( 10 Mar 2019 08:14 )    Color: Yellow / Appearance: Clear / S.010 / pH: x  Gluc: x / Ketone: Negative  / Bili: Negative / Urobili: 0.2 mg/dL   Blood: x / Protein: 30 mg/dL / Nitrite: Negative   Leuk Esterase: Moderate / RBC: 6-10 /HPF / WBC >50 /HPF   Sq Epi: x / Non Sq Epi: x / Bacteria: Few      LIVER FUNCTIONS - ( 11 Mar 2019 08:00 )  Alb: 1.9 g/dL / Pro: 3.7 g/dL / ALK PHOS: 323 U/L / ALT: 35 U/L / AST: 20 U/L / GGT: x             MEDICATIONS:    MEDICATIONS  (STANDING):  amLODIPine   Tablet 10 milliGRAM(s) Oral daily  aspirin  chewable 81 milliGRAM(s) Oral daily  busPIRone 5 milliGRAM(s) Oral three times a day  chlorhexidine 0.12% Liquid 15 milliLiter(s) Oral Mucosa two times a day  chlorhexidine 4% Liquid 1 Application(s) Topical <User Schedule>  docusate sodium 200 milliGRAM(s) Oral two times a day  DULoxetine 30 milliGRAM(s) Oral at bedtime  epoetin spencer Injectable 92498 Unit(s) SubCutaneous every 7 days  famotidine    Tablet 20 milliGRAM(s) Oral daily  fentaNYL    Injectable 50 MICROGram(s) IV Push once  fentaNYL   Infusion. 0.5 MICROgram(s)/kG/Hr (3.15 mL/Hr) IV Continuous <Continuous>  hydrocortisone 2.5% Rectal Cream 1 Application(s) Rectal daily  iohexol 300 mG (iodine)/mL Oral Solution 30 milliLiter(s) Oral once  lactated ringers. 1000 milliLiter(s) (60 mL/Hr) IV Continuous <Continuous>  meropenem  IVPB      meropenem  IVPB 1000 milliGRAM(s) IV Intermittent every 12 hours  metoprolol tartrate 25 milliGRAM(s) Enteral Tube two times a day  midazolam Infusion 0.02 mG/kG/Hr (1.26 mL/Hr) IV Continuous <Continuous>  norepinephrine Infusion 0.05 MICROgram(s)/kG/Min (2.953 mL/Hr) IV Continuous <Continuous>  phenylephrine    Infusion 0.1 MICROgram(s)/kG/Min (1.181 mL/Hr) IV Continuous <Continuous>  senna 2 Tablet(s) Oral daily  sodium polystyrene sulfonate Suspension 30 Gram(s) Oral once  vancomycin    Solution 125 milliGRAM(s) Oral every 6 hours      MEDICATIONS  (PRN):  acetaminophen   Tablet .. 650 milliGRAM(s) Oral every 6 hours PRN Temp greater or equal to 38C (100.4F)  bisacodyl Suppository 10 milliGRAM(s) Rectal every 24 hours PRN Constipation  cloNIDine 0.1 milliGRAM(s) Oral every 8 hours PRN for sbp > 180  HYDROcodone 10 mG/acetaminophen 325 mG 1 Tablet(s) Oral every 4 hours PRN Moderate Pain (4 - 6)  ondansetron Injectable 4 milliGRAM(s) IV Push every 8 hours PRN Nausea and/or Vomiting  simethicone 80 milliGRAM(s) Chew every 8 hours PRN Gas KOTA SCHAFER    Patient is a 84y old  Female who presents with a chief complaint of Urinary Retention/Hperkalemia with EKG changes (11 Mar 2019 11:53)      Interval History/Overnight events: Patient developed hypotension requiring central line placement and pressors    T(C): 35.7 (19 @ 07:01), Max: 38.6 (03-10-19 @ 15:01)  HR: 107 (19 @ 10:14)  BP: 105/44 (19 @ 08:30)  RR: 29 (19 @ 08:30)  SpO2: 98% (19 @ 10:14)    PHYSICAL EXAM:    GENERAL: patient intubated, sedated  CHEST/LUNG: Clear to auscultation bilaterally  HEART: Regular rhythm, tachycardic  ABDOMEN: Soft, Nontender, Nondistended  EXTREMITIES: No edema      LABS:                          7.8    24.66 )-----------( 130      ( 11 Mar 2019 08:00 )             26.3         149<H>  |  113<H>  |  55<H>  ----------------------------<  163<H>  6.2<HH>   |  27  |  1.3    Ca    6.8<L>      11 Mar 2019 11:00  Phos  3.9       Mg     2.0         TPro  3.7<L>  /  Alb  1.9<L>  /  TBili  0.4  /  DBili  x   /  AST  20  /  ALT  35  /  AlkPhos  323<H>      PT/INR - ( 11 Mar 2019 08:00 )   PT: 14.20 sec;   INR: 1.24 ratio         PTT - ( 11 Mar 2019 08:00 )  PTT:26.6 sec  CARDIAC MARKERS ( 11 Mar 2019 08:00 )  x     / 0.11 ng/mL / x     / x     / x          < from: Xray Chest 1 View- PORTABLE-Urgent (19 @ 07:40) >  ddendum:    There is a right IJ catheter which terminates in distal SVC.      *** END OF ADDENDUM 2019  ***    Impression:    Stable pulmonary findings.    < end of copied text >      Culture - Urine (collected 10 Mar 2019 08:14)  Source: .Urine Catheterized  Final Report (11 Mar 2019 10:14):    No growth    Culture - Blood (collected 10 Mar 2019 05:55)  Source: .Blood None  Gram Stain (11 Mar 2019 08:42):    Growth in aerobic bottle: Gram Negative Rods    Urinalysis Basic - ( 10 Mar 2019 08:14 )    Color: Yellow / Appearance: Clear / S.010 / pH: x  Gluc: x / Ketone: Negative  / Bili: Negative / Urobili: 0.2 mg/dL   Blood: x / Protein: 30 mg/dL / Nitrite: Negative   Leuk Esterase: Moderate / RBC: 6-10 /HPF / WBC >50 /HPF   Sq Epi: x / Non Sq Epi: x / Bacteria: Few    LIVER FUNCTIONS - ( 11 Mar 2019 08:00 )  Alb: 1.9 g/dL / Pro: 3.7 g/dL / ALK PHOS: 323 U/L / ALT: 35 U/L / AST: 20 U/L / GGT: x           < from: [facility] Duplex Lower Ext Vein Scan, Bilat (19 @ 21:13) >  Impression:    No evidence of deep venous thrombosis or superficial thrombophlebitis in   bilateral lower extremities.    < end of copied text >      < from: Xray Kidney Ureter Bladder (03.10.19 @ 12:21) >  Impression:  Nonobstructive bowel gas pattern.    < end of copied text >      < from: US Abdomen Limited (19 @ 17:30) >  IMPRESSION:    1.  Negative examination of the liver.    2.  Cholelithiasis, sludge and wall thickening. The absence of a positive   sonographic Rebollar's sign prevents the sonographic diagnosis of   cholecystitis.    3.  Right hydronephrosis correlating with CT examination.    4.  Right pleural effusion. Perihepatic trace fluid.    < end of copied text >        MEDICATIONS:    MEDICATIONS  (STANDING):  amLODIPine   Tablet 10 milliGRAM(s) Oral daily  aspirin  chewable 81 milliGRAM(s) Oral daily  busPIRone 5 milliGRAM(s) Oral three times a day  chlorhexidine 0.12% Liquid 15 milliLiter(s) Oral Mucosa two times a day  chlorhexidine 4% Liquid 1 Application(s) Topical <User Schedule>  docusate sodium 200 milliGRAM(s) Oral two times a day  DULoxetine 30 milliGRAM(s) Oral at bedtime  epoetin spencer Injectable 82793 Unit(s) SubCutaneous every 7 days  famotidine    Tablet 20 milliGRAM(s) Oral daily  fentaNYL    Injectable 50 MICROGram(s) IV Push once  fentaNYL   Infusion. 0.5 MICROgram(s)/kG/Hr (3.15 mL/Hr) IV Continuous <Continuous>  hydrocortisone 2.5% Rectal Cream 1 Application(s) Rectal daily  iohexol 300 mG (iodine)/mL Oral Solution 30 milliLiter(s) Oral once  lactated ringers. 1000 milliLiter(s) (60 mL/Hr) IV Continuous <Continuous>  meropenem  IVPB      meropenem  IVPB 1000 milliGRAM(s) IV Intermittent every 12 hours  metoprolol tartrate 25 milliGRAM(s) Enteral Tube two times a day  midazolam Infusion 0.02 mG/kG/Hr (1.26 mL/Hr) IV Continuous <Continuous>  norepinephrine Infusion 0.05 MICROgram(s)/kG/Min (2.953 mL/Hr) IV Continuous <Continuous>  phenylephrine    Infusion 0.1 MICROgram(s)/kG/Min (1.181 mL/Hr) IV Continuous <Continuous>  senna 2 Tablet(s) Oral daily  sodium polystyrene sulfonate Suspension 30 Gram(s) Oral once  vancomycin    Solution 125 milliGRAM(s) Oral every 6 hours      MEDICATIONS  (PRN):  acetaminophen   Tablet .. 650 milliGRAM(s) Oral every 6 hours PRN Temp greater or equal to 38C (100.4F)  bisacodyl Suppository 10 milliGRAM(s) Rectal every 24 hours PRN Constipation  cloNIDine 0.1 milliGRAM(s) Oral every 8 hours PRN for sbp > 180  HYDROcodone 10 mG/acetaminophen 325 mG 1 Tablet(s) Oral every 4 hours PRN Moderate Pain (4 - 6)  ondansetron Injectable 4 milliGRAM(s) IV Push every 8 hours PRN Nausea and/or Vomiting  simethicone 80 milliGRAM(s) Chew every 8 hours PRN Gas

## 2019-03-11 NOTE — PROGRESS NOTE ADULT - SUBJECTIVE AND OBJECTIVE BOX
84yFemale  Being followed for Transaminitis and C-Diff  Interval history: 83 yo female being followed by the GI service for C-Diff Colitis and transaminitis, patient went into respiratory distress requiring intubation and patient became septic she was started on meropenem, sedated, and now requiring pressors.      PMHX/PSHX: PAST MEDICAL & SURGICAL HISTORY:  Anemia: 4 PRBC 11/2018  Oxygen dependent: O2 2L Via NC  Bladder cancer: 2018  CHF (congestive heart failure)  Breast tumor: removal benign bl breasts  Anal cancer: Chemo and radiation 1992  COPD (chronic obstructive pulmonary disease)  Lung disease: COPD  Hypertension  History of cystoscopy: 10/2018  H/O breast surgery: 39 yrs. ago, bilateral  History of back surgery: 10 yrs. ago stimulator implant 2001, 2011( cervical and Lumbar)          Social History: Pack and a half a day cigarette smoking for 50 years and quit 5 years ago. No alcohol. No illegal drug use.          MEDICATIONS:  MEDICATIONS  (STANDING):  amLODIPine   Tablet 10 milliGRAM(s) Oral daily  aspirin  chewable 81 milliGRAM(s) Oral daily  busPIRone 5 milliGRAM(s) Oral three times a day  chlorhexidine 0.12% Liquid 15 milliLiter(s) Oral Mucosa two times a day  chlorhexidine 4% Liquid 1 Application(s) Topical <User Schedule>  dexmedetomidine Infusion 0.2 MICROgram(s)/kG/Hr (3.15 mL/Hr) IV Continuous <Continuous>  docusate sodium 200 milliGRAM(s) Oral two times a day  DULoxetine 30 milliGRAM(s) Oral at bedtime  epoetin spencer Injectable 29241 Unit(s) SubCutaneous every 7 days  famotidine    Tablet 20 milliGRAM(s) Oral daily  fentaNYL    Injectable 50 MICROGram(s) IV Push once  fentaNYL   Infusion. 0.5 MICROgram(s)/kG/Hr (3.15 mL/Hr) IV Continuous <Continuous>  hydrocortisone 2.5% Rectal Cream 1 Application(s) Rectal daily  iohexol 300 mG (iodine)/mL Oral Solution 30 milliLiter(s) Oral once  lactated ringers. 1000 milliLiter(s) (60 mL/Hr) IV Continuous <Continuous>  meropenem  IVPB      meropenem  IVPB 1000 milliGRAM(s) IV Intermittent every 12 hours  metoprolol tartrate 25 milliGRAM(s) Enteral Tube two times a day  midazolam Infusion 0.02 mG/kG/Hr (1.26 mL/Hr) IV Continuous <Continuous>  norepinephrine Infusion 0.05 MICROgram(s)/kG/Min (2.953 mL/Hr) IV Continuous <Continuous>  phenylephrine    Infusion 0.1 MICROgram(s)/kG/Min (1.181 mL/Hr) IV Continuous <Continuous>  senna 2 Tablet(s) Oral daily  sodium chloride 0.9% Bolus 1000 milliLiter(s) IV Bolus once  vancomycin    Solution 125 milliGRAM(s) Oral every 6 hours    MEDICATIONS  (PRN):  acetaminophen   Tablet .. 650 milliGRAM(s) Oral every 6 hours PRN Temp greater or equal to 38C (100.4F)  bisacodyl Suppository 10 milliGRAM(s) Rectal every 24 hours PRN Constipation  cloNIDine 0.1 milliGRAM(s) Oral every 8 hours PRN for sbp > 180  HYDROcodone 10 mG/acetaminophen 325 mG 1 Tablet(s) Oral every 4 hours PRN Moderate Pain (4 - 6)  ondansetron Injectable 4 milliGRAM(s) IV Push every 8 hours PRN Nausea and/or Vomiting  simethicone 80 milliGRAM(s) Chew every 8 hours PRN Gas    Allergies:    hair dyes (Hives)  Levaquin (Rash)  sulfa drugs (Headache)  Zoloft (Headache)    Intolerances          REVIEW OF SYSTEMS:  Unable to obtain patient is intubated       VITAL SIGNS:   T(F): 96.2 (03-11-19 @ 07:01), Max: 101.5 (03-10-19 @ 11:00)  HR: 107 (03-11-19 @ 10:14) (75 - 128)  BP: 105/44 (03-11-19 @ 08:30) (68/30 - 124/56)  RR: 29 (03-11-19 @ 08:30) (22 - 35)  SpO2: 98% (03-11-19 @ 10:14) (93% - 100%)    PHYSICAL EXAM:  GENERAL: +intubated   HEAD:  Atraumatic, Normocephalic  EYES: conjunctiva and sclera clear  NECK: Supple, no JVD or thyromegaly  CHEST/LUNG: Clear to auscultation bilaterally; No wheeze, rhonchi, or rales  HEART: Regular rate and rhythm; normal S1, S2, No murmurs.  ABDOMEN: Soft, nontender, nondistended; Bowel sounds present, no abdominal bruit, masses, or hepatosplenomegaly  SKIN: Intact, no jaundice            LABS:                        8.1    24.12 )-----------( 173      ( 10 Mar 2019 15:14 )             26.4     03-11    149<H>  |  112<H>  |  60<H>  ----------------------------<  207<H>  5.6<H>   |  25  |  1.1    Ca    6.9<L>      11 Mar 2019 08:00  Phos  3.9     03-11  Mg     2.0     03-11    TPro  3.7<L>  /  Alb  1.9<L>  /  TBili  0.4  /  DBili  x   /  AST  20  /  ALT  35  /  AlkPhos  323<H>  03-11    LIVER FUNCTIONS - ( 11 Mar 2019 08:00 )  Alb: 1.9 g/dL / Pro: 3.7 g/dL / ALK PHOS: 323 U/L / ALT: 35 U/L / AST: 20 U/L / GGT: x           PT/INR - ( 11 Mar 2019 08:00 )   PT: 14.20 sec;   INR: 1.24 ratio         PTT - ( 11 Mar 2019 08:00 )  PTT:26.6 sec    IMAGING:  < from: CT Abdomen and Pelvis w/ Oral Cont and w/ IV Cont (02.24.19 @ 18:18) >  EXAM:  CT ABDOMEN AND PELVIS OC IC            PROCEDURE DATE:  02/24/2019            INTERPRETATION:  CLINICAL STATEMENT: Colitis. Gastrointestinal bleeding.      TECHNIQUE: Contiguous axial CT images were obtained from the lower chest   to the pubic symphysis following administration of 100cc Optiray 320   intravenous contrast.  Oral contrast was administered.  Reformatted   images in the coronal and sagittal planes were acquired.    Comparison made with CT abdomen and pelvis February 11, 2019, October 19, 2018.    FINDINGS:    LOWER CHEST: New moderate left pleural effusion with overlying   compressive atelectasis. Stable right pleural effusion.    HEPATOBILIARY: Gallbladder with sludge/stones. Subcentimeter right   hepatic lobe hypodensity, likely cyst or hemangioma..    SPLEEN: Unremarkable.    PANCREAS: Unremarkable.    ADRENAL GLANDS: Stable left adrenal gland thickening.    KIDNEYS: Unchanged right hydroureteronephrosis. Enlarging exophytic   indeterminate left lower renal polecystic lesion with perceivable   enhancement of wall (series 601, image 80). Additional subcentimeter   renal hypodensities, too small to fully characterize    ABDOMINOPELVIC NODES: Evaluation for lymph nodes limited by patient   condition..    PELVIC ORGANS: Urinary bladder distended, with posterior diverticulum.   Stable gynecological structures.    PERITONEUM/MESENTERY/BOWEL: Worsening, now pancolitis; no evidence of   abscess. Increased moderate ascites.    BONES/SOFT TISSUES: Stable osseous structures..    OTHER: Unchanged spinal stimulator. Vascular calcifications.      IMPRESSION:   Since February 11, 2019:    1. Enlarging (since October 19, 2018) exophytic indeterminate left lower   renal pole cystic lesion with perceivable enhancement of wall, Bosniak   2F; outpatient MRI abdomen with and without IV contrast recommended   following discharge from hospital.    2. Worsening, now pancolitis; no evidence of abscess.     3. Increased moderate ascites.    4. Unchanged right hydroureteronephrosis.    5. New moderate left pleural effusion with overlying compressive   atelectasis. Stable right pleural effusion.                  HARI CROWLEY M.D., ATTENDING RADIOLOGIST  This document has been electronically signed. Feb 25 2019  8:26AM       < end of copied text >

## 2019-03-11 NOTE — PROCEDURE NOTE - NSPROCDETAILS_GEN_ALL_CORE
lumen(s) aspirated and flushed/sterile dressing applied/guidewire recovered/sterile technique, catheter placed

## 2019-03-11 NOTE — PROGRESS NOTE ADULT - SUBJECTIVE AND OBJECTIVE BOX
SUBJECTIVE:    Patient is a 84y old Female who presents with a chief complaint of Urinary Retention/Hperkalemia with EKG changes (11 Mar 2019 10:49)    Currently admitted to medicine with the primary diagnosis of Bladder cancer     Today is hospital day 34d.   critically ill  multiorgan failure     PAST MEDICAL & SURGICAL HISTORY  Anemia: 4 PRBC 2018  Oxygen dependent: O2 2L Via NC  Bladder cancer: 2018  CHF (congestive heart failure)  Breast tumor: removal benign bl breasts  Anal cancer: Chemo and radiation   COPD (chronic obstructive pulmonary disease)  Lung disease: COPD  Hypertension  History of cystoscopy: 10/2018  H/O breast surgery: 39 yrs. ago, bilateral  History of back surgery: 10 yrs. ago stimulator implant , ( cervical and Lumbar)    SOCIAL HISTORY:  Negative for smoking/alcohol/drug use.     ALLERGIES:  hair dyes (Hives)  Levaquin (Rash)  sulfa drugs (Headache)  Zoloft (Headache)    MEDICATIONS:  STANDING MEDICATIONS  amLODIPine   Tablet 10 milliGRAM(s) Oral daily  aspirin  chewable 81 milliGRAM(s) Oral daily  busPIRone 5 milliGRAM(s) Oral three times a day  chlorhexidine 0.12% Liquid 15 milliLiter(s) Oral Mucosa two times a day  chlorhexidine 4% Liquid 1 Application(s) Topical <User Schedule>  docusate sodium 200 milliGRAM(s) Oral two times a day  DULoxetine 30 milliGRAM(s) Oral at bedtime  epoetin spencer Injectable 97091 Unit(s) SubCutaneous every 7 days  famotidine    Tablet 20 milliGRAM(s) Oral daily  fentaNYL    Injectable 50 MICROGram(s) IV Push once  fentaNYL   Infusion. 0.5 MICROgram(s)/kG/Hr IV Continuous <Continuous>  hydrocortisone 2.5% Rectal Cream 1 Application(s) Rectal daily  iohexol 300 mG (iodine)/mL Oral Solution 30 milliLiter(s) Oral once  lactated ringers. 1000 milliLiter(s) IV Continuous <Continuous>  meropenem  IVPB      meropenem  IVPB 1000 milliGRAM(s) IV Intermittent every 12 hours  metoprolol tartrate 25 milliGRAM(s) Enteral Tube two times a day  midazolam Infusion 0.02 mG/kG/Hr IV Continuous <Continuous>  norepinephrine Infusion 0.05 MICROgram(s)/kG/Min IV Continuous <Continuous>  phenylephrine    Infusion 0.1 MICROgram(s)/kG/Min IV Continuous <Continuous>  senna 2 Tablet(s) Oral daily  vancomycin    Solution 125 milliGRAM(s) Oral every 6 hours    PRN MEDICATIONS  acetaminophen   Tablet .. 650 milliGRAM(s) Oral every 6 hours PRN  bisacodyl Suppository 10 milliGRAM(s) Rectal every 24 hours PRN  cloNIDine 0.1 milliGRAM(s) Oral every 8 hours PRN  HYDROcodone 10 mG/acetaminophen 325 mG 1 Tablet(s) Oral every 4 hours PRN  ondansetron Injectable 4 milliGRAM(s) IV Push every 8 hours PRN  simethicone 80 milliGRAM(s) Chew every 8 hours PRN    VITALS:   T(F): 96.2  HR: 107  BP: 105/44  RR: 29  SpO2: 98%    LABS:                        8.1    24.12 )-----------( 173      ( 10 Mar 2019 15:14 )             26.4     03-11    149<H>  |  112<H>  |  60<H>  ----------------------------<  207<H>  5.6<H>   |  25  |  1.1    Ca    6.9<L>      11 Mar 2019 08:00  Phos  3.9     03-11  Mg     2.0     -11    TPro  3.7<L>  /  Alb  1.9<L>  /  TBili  0.4  /  DBili  x   /  AST  20  /  ALT  35  /  AlkPhos  323<H>  03-11    PT/INR - ( 11 Mar 2019 08:00 )   PT: 14.20 sec;   INR: 1.24 ratio         PTT - ( 11 Mar 2019 08:00 )  PTT:26.6 sec  Urinalysis Basic - ( 10 Mar 2019 08:14 )    Color: Yellow / Appearance: Clear / S.010 / pH: x  Gluc: x / Ketone: Negative  / Bili: Negative / Urobili: 0.2 mg/dL   Blood: x / Protein: 30 mg/dL / Nitrite: Negative   Leuk Esterase: Moderate / RBC: 6-10 /HPF / WBC >50 /HPF   Sq Epi: x / Non Sq Epi: x / Bacteria: Few      ABG - ( 11 Mar 2019 05:01 )  pH, Arterial: 7.34  pH, Blood: x     /  pCO2: 57    /  pO2: 67    / HCO3: 30    / Base Excess: 3.2   /  SaO2: 94                Troponin T, Serum: 0.11 ng/mL <HH> (19 @ 08:00)      Culture - Urine (collected 10 Mar 2019 08:14)  Source: .Urine Catheterized  Final Report (11 Mar 2019 10:14):    No growth    Culture - Blood (collected 10 Mar 2019 05:55)  Source: .Blood None  Gram Stain (11 Mar 2019 08:42):    Growth in aerobic bottle: Gram Negative Rods  Preliminary Report (11 Mar 2019 08:42):    Growth in aerobic bottle: Gram Negative Rods    "Due to technical problems, Proteus sp. will Not be reported as part of    the BCID panel until further notice"    ***Blood Panel PCR results on this specimen are available    approximately 3 hours after the Gram stain result.***    Gram stain, PCR, and/or culture results may not always    correspond due to difference in methodologies.    ************************************************************    This PCR assay was performed using [employer].    The following targets are tested for: Enterococcus,    vancomycin resistant enterococci, Listeria monocytogenes,    coagulase negative staphylococci, S. aureus,    methicillin resistant S. aureus, Streptococcus agalactiae    (Group B), S. pneumoniae, S. pyogenes (Group A),    Acinetobacter baumannii, Enterobacter cloacae, E. coli,    Klebsiella oxytoca, K. pneumoniae, Proteus sp.,    Serratia marcescens, Haemophilus influenzae,    Neisseria meningitidis, Pseudomonas aeruginosa, Candida    albicans, C. glabrata, C krusei, C parapsilosis,    C. tropicalis and the KPC resistance gene.  Organism: Blood Culture PCR (11 Mar 2019 11:26)  Organism: Blood Culture PCR (11 Mar 2019 11:26)      CARDIAC MARKERS ( 11 Mar 2019 08:00 )  x     / 0.11 ng/mL / x     / x     / x          RADIOLOGY:    PHYSICAL EXAM:  PHYSICAL EXAM:  intubated  sedated  ETT  70%o2  no jvd  b/l bs  tachy  soft, + bs  + edema  + lynne - poor UOP      Intravenous access:   NG tube:   Lynne Catheter:   Indwelling Urethral Catheter:     Connect To:  Straight Drainage/[place]    Indication:  Urinary Retention / Obstruction    Additional Instructions:  if more then 250cc, keep in place (19 @ 18:13) (not performed) [active]

## 2019-03-12 DIAGNOSIS — A41.52 SEPSIS DUE TO PSEUDOMONAS: ICD-10-CM

## 2019-03-12 LAB
ALBUMIN SERPL ELPH-MCNC: 1.8 G/DL — LOW (ref 3.5–5.2)
ALP SERPL-CCNC: 331 U/L — HIGH (ref 30–115)
ALT FLD-CCNC: 22 U/L — SIGNIFICANT CHANGE UP (ref 0–41)
ANION GAP SERPL CALC-SCNC: 13 MMOL/L — SIGNIFICANT CHANGE UP (ref 7–14)
AST SERPL-CCNC: 5 U/L — SIGNIFICANT CHANGE UP (ref 0–41)
BASOPHILS # BLD AUTO: 0.04 K/UL — SIGNIFICANT CHANGE UP (ref 0–0.2)
BASOPHILS NFR BLD AUTO: 0.1 % — SIGNIFICANT CHANGE UP (ref 0–1)
BILIRUB DIRECT SERPL-MCNC: 0.3 MG/DL — HIGH (ref 0–0.2)
BILIRUB INDIRECT FLD-MCNC: 0.1 MG/DL — LOW (ref 0.2–1.2)
BILIRUB SERPL-MCNC: 0.4 MG/DL — SIGNIFICANT CHANGE UP (ref 0.2–1.2)
BUN SERPL-MCNC: 69 MG/DL — CRITICAL HIGH (ref 10–20)
CALCIUM SERPL-MCNC: 6.5 MG/DL — LOW (ref 8.5–10.1)
CHLORIDE SERPL-SCNC: 111 MMOL/L — HIGH (ref 98–110)
CO2 SERPL-SCNC: 27 MMOL/L — SIGNIFICANT CHANGE UP (ref 17–32)
CREAT SERPL-MCNC: 1.4 MG/DL — SIGNIFICANT CHANGE UP (ref 0.7–1.5)
EOSINOPHIL # BLD AUTO: 0.01 K/UL — SIGNIFICANT CHANGE UP (ref 0–0.7)
EOSINOPHIL NFR BLD AUTO: 0 % — SIGNIFICANT CHANGE UP (ref 0–8)
GLUCOSE SERPL-MCNC: 247 MG/DL — HIGH (ref 70–99)
HCO3 BLDA-SCNC: 28 MMOL/L — HIGH (ref 23–27)
HCT VFR BLD CALC: 25.9 % — LOW (ref 37–47)
HGB BLD-MCNC: 7.6 G/DL — LOW (ref 12–16)
HOROWITZ INDEX BLDA+IHG-RTO: 80 — SIGNIFICANT CHANGE UP
IMM GRANULOCYTES NFR BLD AUTO: 4 % — HIGH (ref 0.1–0.3)
LYMPHOCYTES # BLD AUTO: 0.72 K/UL — LOW (ref 1.2–3.4)
LYMPHOCYTES # BLD AUTO: 2.5 % — LOW (ref 20.5–51.1)
MAGNESIUM SERPL-MCNC: 2 MG/DL — SIGNIFICANT CHANGE UP (ref 1.8–2.4)
MCHC RBC-ENTMCNC: 29.3 G/DL — LOW (ref 32–37)
MCHC RBC-ENTMCNC: 32.5 PG — HIGH (ref 27–31)
MCV RBC AUTO: 110.7 FL — HIGH (ref 81–99)
MONOCYTES # BLD AUTO: 0.67 K/UL — HIGH (ref 0.1–0.6)
MONOCYTES NFR BLD AUTO: 2.3 % — SIGNIFICANT CHANGE UP (ref 1.7–9.3)
NEUTROPHILS # BLD AUTO: 26.25 K/UL — HIGH (ref 1.4–6.5)
NEUTROPHILS NFR BLD AUTO: 91.1 % — HIGH (ref 42.2–75.2)
NRBC # BLD: 3 /100 WBCS — HIGH (ref 0–0)
PCO2 BLDA: 63 MMHG — CRITICAL HIGH (ref 38–42)
PH BLDA: 7.26 — LOW (ref 7.38–7.42)
PHOSPHATE SERPL-MCNC: 6 MG/DL — HIGH (ref 2.1–4.9)
PLATELET # BLD AUTO: 115 K/UL — LOW (ref 130–400)
PO2 BLDA: 151 MMHG — HIGH (ref 78–95)
POTASSIUM SERPL-MCNC: 4.5 MMOL/L — SIGNIFICANT CHANGE UP (ref 3.5–5)
POTASSIUM SERPL-SCNC: 4.5 MMOL/L — SIGNIFICANT CHANGE UP (ref 3.5–5)
PROT SERPL-MCNC: 3.7 G/DL — LOW (ref 6–8)
RBC # BLD: 2.34 M/UL — LOW (ref 4.2–5.4)
RBC # FLD: 22.6 % — HIGH (ref 11.5–14.5)
SAO2 % BLDA: 100 % — HIGH (ref 94–98)
SODIUM SERPL-SCNC: 151 MMOL/L — HIGH (ref 135–146)
WBC # BLD: 28.84 K/UL — HIGH (ref 4.8–10.8)
WBC # FLD AUTO: 28.84 K/UL — HIGH (ref 4.8–10.8)

## 2019-03-12 RX ORDER — PHENYLEPHRINE HYDROCHLORIDE 10 MG/ML
0.1 INJECTION INTRAVENOUS
Qty: 160 | Refills: 0 | Status: DISCONTINUED | OUTPATIENT
Start: 2019-03-12 | End: 2019-03-13

## 2019-03-12 RX ORDER — SODIUM CHLORIDE 9 MG/ML
1000 INJECTION INTRAMUSCULAR; INTRAVENOUS; SUBCUTANEOUS
Qty: 0 | Refills: 0 | Status: DISCONTINUED | OUTPATIENT
Start: 2019-03-12 | End: 2019-03-12

## 2019-03-12 RX ORDER — SODIUM CHLORIDE 9 MG/ML
1000 INJECTION, SOLUTION INTRAVENOUS
Qty: 0 | Refills: 0 | Status: DISCONTINUED | OUTPATIENT
Start: 2019-03-12 | End: 2019-03-13

## 2019-03-12 RX ADMIN — Medication 125 MILLIGRAM(S): at 23:58

## 2019-03-12 RX ADMIN — Medication 5 MILLIGRAM(S): at 16:43

## 2019-03-12 RX ADMIN — MIDAZOLAM HYDROCHLORIDE 1.26 MG/KG/HR: 1 INJECTION, SOLUTION INTRAMUSCULAR; INTRAVENOUS at 05:32

## 2019-03-12 RX ADMIN — SODIUM CHLORIDE 50 MILLILITER(S): 9 INJECTION, SOLUTION INTRAVENOUS at 10:44

## 2019-03-12 RX ADMIN — CHLORHEXIDINE GLUCONATE 15 MILLILITER(S): 213 SOLUTION TOPICAL at 05:35

## 2019-03-12 RX ADMIN — FAMOTIDINE 20 MILLIGRAM(S): 10 INJECTION INTRAVENOUS at 12:48

## 2019-03-12 RX ADMIN — MEROPENEM 100 MILLIGRAM(S): 1 INJECTION INTRAVENOUS at 05:33

## 2019-03-12 RX ADMIN — Medication 200 MILLIGRAM(S): at 05:34

## 2019-03-12 RX ADMIN — Medication 100 MILLIGRAM(S): at 16:43

## 2019-03-12 RX ADMIN — Medication 100 MILLIGRAM(S): at 05:32

## 2019-03-12 RX ADMIN — Medication 125 MILLIGRAM(S): at 00:00

## 2019-03-12 RX ADMIN — Medication 1 APPLICATION(S): at 12:50

## 2019-03-12 RX ADMIN — MIDAZOLAM HYDROCHLORIDE 1.26 MG/KG/HR: 1 INJECTION, SOLUTION INTRAMUSCULAR; INTRAVENOUS at 10:46

## 2019-03-12 RX ADMIN — PHENYLEPHRINE HYDROCHLORIDE 1.18 MICROGRAM(S)/KG/MIN: 10 INJECTION INTRAVENOUS at 20:30

## 2019-03-12 RX ADMIN — DULOXETINE HYDROCHLORIDE 30 MILLIGRAM(S): 30 CAPSULE, DELAYED RELEASE ORAL at 21:30

## 2019-03-12 RX ADMIN — SENNA PLUS 2 TABLET(S): 8.6 TABLET ORAL at 12:48

## 2019-03-12 RX ADMIN — Medication 2.95 MICROGRAM(S)/KG/MIN: at 10:45

## 2019-03-12 RX ADMIN — Medication 125 MILLIGRAM(S): at 18:52

## 2019-03-12 RX ADMIN — CHLORHEXIDINE GLUCONATE 15 MILLILITER(S): 213 SOLUTION TOPICAL at 18:53

## 2019-03-12 RX ADMIN — PHENYLEPHRINE HYDROCHLORIDE 1.18 MICROGRAM(S)/KG/MIN: 10 INJECTION INTRAVENOUS at 10:46

## 2019-03-12 RX ADMIN — Medication 125 MILLIGRAM(S): at 05:33

## 2019-03-12 RX ADMIN — Medication 81 MILLIGRAM(S): at 12:48

## 2019-03-12 RX ADMIN — Medication 25 MILLIGRAM(S): at 05:34

## 2019-03-12 RX ADMIN — Medication 200 MILLIGRAM(S): at 18:52

## 2019-03-12 RX ADMIN — MEROPENEM 100 MILLIGRAM(S): 1 INJECTION INTRAVENOUS at 18:52

## 2019-03-12 RX ADMIN — Medication 100 MILLIGRAM(S): at 21:32

## 2019-03-12 RX ADMIN — CHLORHEXIDINE GLUCONATE 1 APPLICATION(S): 213 SOLUTION TOPICAL at 05:35

## 2019-03-12 RX ADMIN — Medication 125 MILLIGRAM(S): at 12:48

## 2019-03-12 RX ADMIN — Medication 5 MILLIGRAM(S): at 05:34

## 2019-03-12 RX ADMIN — FENTANYL CITRATE 3.15 MICROGRAM(S)/KG/HR: 50 INJECTION INTRAVENOUS at 10:42

## 2019-03-12 RX ADMIN — Medication 5 MILLIGRAM(S): at 21:29

## 2019-03-12 RX ADMIN — SODIUM CHLORIDE 75 MILLILITER(S): 9 INJECTION INTRAMUSCULAR; INTRAVENOUS; SUBCUTANEOUS at 10:00

## 2019-03-12 NOTE — PROGRESS NOTE ADULT - SUBJECTIVE AND OBJECTIVE BOX
NEPHROLOGY FOLLOW UP NOTE    pt seen and examined in ICU  critically ill  d/w family and team  family declining dialysis            PAST MEDICAL & SURGICAL HISTORY:  CHF (congestive heart failure)  Breast tumor: removal benign bl breasts  Anal cancer  COPD (chronic obstructive pulmonary disease)  Lung disease: COPD  Hypertension  H/O breast surgery: 39 yrs. ago, bilateral  History of back surgery: 10 yrs. ago stimulator implant ,     Allergies:  hair dyes (Hives)  sulfa drugs (Headache)    Home Medications Reviewed    SOCIAL HISTORY:  Denies ETOH,Smoking,   FAMILY HISTORY:  No pertinent family history in first degree relatives        REVIEW OF SYSTEMS:  All other review of systems is negative unless indicated above.        PHYSICAL EXAM:  intubated  sedated  ETT  70%o2  no jvd  b/l bs  tachy  soft, + bs  + edema  + lynne - poor UOP    Hospital Medications:   MEDICATIONS  (STANDING):  aspirin  chewable 81 milliGRAM(s) Oral daily  busPIRone 5 milliGRAM(s) Oral three times a day  chlorhexidine 0.12% Liquid 15 milliLiter(s) Oral Mucosa two times a day  chlorhexidine 4% Liquid 1 Application(s) Topical <User Schedule>  docusate sodium 200 milliGRAM(s) Oral two times a day  DULoxetine 30 milliGRAM(s) Oral at bedtime  epoetin spencer Injectable 69041 Unit(s) SubCutaneous every 7 days  famotidine    Tablet 20 milliGRAM(s) Oral daily  fentaNYL   Infusion. 0.5 MICROgram(s)/kG/Hr (3.15 mL/Hr) IV Continuous <Continuous>  hydrocortisone 2.5% Rectal Cream 1 Application(s) Rectal daily  lactated ringers. 1000 milliLiter(s) (10 mL/Hr) IV Continuous <Continuous>  meropenem  IVPB      meropenem  IVPB 1000 milliGRAM(s) IV Intermittent every 12 hours  metoprolol tartrate 25 milliGRAM(s) Enteral Tube two times a day  metroNIDAZOLE  IVPB      metroNIDAZOLE  IVPB 500 milliGRAM(s) IV Intermittent every 8 hours  midazolam Infusion 0.02 mG/kG/Hr (1.26 mL/Hr) IV Continuous <Continuous>  norepinephrine Infusion 0.05 MICROgram(s)/kG/Min (2.953 mL/Hr) IV Continuous <Continuous>  phenylephrine    Infusion 0.1 MICROgram(s)/kG/Min (1.181 mL/Hr) IV Continuous <Continuous>  senna 2 Tablet(s) Oral daily  sodium bicarbonate  Infusion 0.179 mEq/kG/Hr (75 mL/Hr) IV Continuous <Continuous>  vancomycin    Solution 125 milliGRAM(s) Oral every 6 hours        VITALS:  T(F): 96 (19 @ 07:01), Max: 97.5 (19 @ 15:40)  HR: 89 (19 @ 08:35)  BP: 125/59 (19 @ 05:00)  RR: 24 (19 @ 07:01)  SpO2: 100% (19 @ 08:35)  Wt(kg): --    03-10 @ 07:01  -   @ 07:00  --------------------------------------------------------  IN: 3327 mL / OUT: 285 mL / NET: 3042 mL     07:01  -   @ 07:00  --------------------------------------------------------  IN: 5742.4 mL / OUT: 180 mL / NET: 5562.4 mL     @ 07:01  -   @ 08:50  --------------------------------------------------------  IN: 0 mL / OUT: 0 mL / NET: 0 mL      Height (cm): 157.48 ( 11:53)  Weight (kg): 63 ( 11:53)  BMI (kg/m2): 25.4 ( 11:53)  BSA (m2): 1.64 ( 11:53)    LABS:      151<H>  |  111<H>  |  69<HH>  ----------------------------<  247<H>  4.5   |  27  |  1.4    Ca    6.5<L>      12 Mar 2019 06:17  Phos  6.0       Mg     2.0         TPro  3.7<L>  /  Alb  1.8<L>  /  TBili  0.4  /  DBili  0.3<H>  /  AST  5   /  ALT  22  /  AlkPhos  331<H>                            7.6    28.84 )-----------( 115      ( 12 Mar 2019 06:17 )             25.9       Urine Studies:  Urinalysis Basic - ( 10 Mar 2019 08:14 )    Color: Yellow / Appearance: Clear / S.010 / pH:   Gluc:  / Ketone: Negative  / Bili: Negative / Urobili: 0.2 mg/dL   Blood:  / Protein: 30 mg/dL / Nitrite: Negative   Leuk Esterase: Moderate / RBC: 6-10 /HPF / WBC >50 /HPF   Sq Epi:  / Non Sq Epi:  / Bacteria: Few          RADIOLOGY & ADDITIONAL STUDIES:

## 2019-03-12 NOTE — PROGRESS NOTE ADULT - SUBJECTIVE AND OBJECTIVE BOX
Patient is a 84y old  Female who presents with a chief complaint of Urinary Retention/Hperkalemia with EKG changes (22 Feb 2019 09:55)       Pt is seen and examined  pt transferred to ICU with septic shock and respiratory failure.  on high dose pressor  on high Fio2 at 80%. ..70%        ROS:  Negative   MEDICATIONS  (STANDING):  ALBUTerol/ipratropium for Nebulization 3 milliLiter(s) Nebulizer every 6 hours  amLODIPine   Tablet 10 milliGRAM(s) Oral daily  aspirin  chewable 81 milliGRAM(s) Oral daily  atorvastatin 20 milliGRAM(s) Oral at bedtime  buDESOnide 160 MICROgram(s)/formoterol 4.5 MICROgram(s) Inhaler 2 Puff(s) Inhalation two times a day  busPIRone 5 milliGRAM(s) Oral three times a day  cefepime   IVPB      cefepime   IVPB 1000 milliGRAM(s) IV Intermittent daily  chlorhexidine 4% Liquid 1 Application(s) Topical <User Schedule>  docusate sodium 200 milliGRAM(s) Oral two times a day  DULoxetine 20 milliGRAM(s) Oral daily  epoetin spencer Injectable 41848 Unit(s) SubCutaneous every 7 days  heparin  Injectable 5000 Unit(s) SubCutaneous every 8 hours  hydrocortisone 2.5% Rectal Cream 1 Application(s) Rectal daily  metoprolol succinate ER 50 milliGRAM(s) Oral daily  senna 2 Tablet(s) Oral daily  vancomycin    Solution 125 milliGRAM(s) Oral every 6 hours    MEDICATIONS  (PRN):  bisacodyl Suppository 10 milliGRAM(s) Rectal every 24 hours PRN Constipation  cloNIDine 0.1 milliGRAM(s) Oral every 8 hours PRN for sbp > 180  HYDROcodone 10 mG/acetaminophen 325 mG 1 Tablet(s) Oral every 4 hours PRN Moderate Pain (4 - 6)  ondansetron Injectable 4 milliGRAM(s) IV Push every 8 hours PRN Nausea and/or Vomiting  simethicone 80 milliGRAM(s) Chew every 8 hours PRN Gas  sodium chloride 3%  Inhalation 3 milliLiter(s) Inhalation every 4 hours PRN ..      Allergies    hair dyes (Hives)  sulfa drugs (Headache)  Zoloft (Headache)    Intolerances        Vital Signs Last 24 Hrs  T(C): 36.7 (22 Feb 2019 14:00), Max: 36.7 (22 Feb 2019 10:12)  T(F): 98 (22 Feb 2019 14:00), Max: 98 (22 Feb 2019 10:12)  HR: 77 (22 Feb 2019 14:00) (77 - 97)  BP: 141/63 (22 Feb 2019 14:00) (133/58 - 154/67)  BP(mean): --  RR: 16 (22 Feb 2019 14:00) (16 - 20)  SpO2: 96% (22 Feb 2019 11:19) (94% - 98%)    PHYSICAL EXAM  General: adult in NAD  HEENT: clear oropharynx, anicteric sclera, pink conjunctiva  Neck: supple  CV: normal S1/S2 with no murmur rubs or gallops  Lungs: positive air movement b/l ant lungs,clear to auscultation, no wheezes, no rales  Abdomen: soft non-tender non-distended, no hepatosplenomegaly  Ext: no clubbing cyanosis or edema  Skin: no rashes and no petechiae  Neuro: alert and oriented X 4, no focal deficits  LABS:                wbc21k>>17k    hb 8.9 >>7  platelet 183>>198    creat 0..9         Mean Cell Volume : 102.4 fL  Mean Cell Hemoglobin : 32.8 pg  Mean Cell Hemoglobin Concentration : 32.0 g/dL  Auto Neutrophil # : x  Auto Lymphocyte # : x  Auto Monocyte # : x  Auto Eosinophil # : x  Auto Basophil # : x  Auto Neutrophil % : x  Auto Lymphocyte % : x  Auto Monocyte % : x  Auto Eosinophil % : x  Auto Basophil % : x    Serial CBC's  02-22 @ 07:48  Hct-29.4 / Hgb-9.4 / Plat-278 / RBC-2.87 / WBC-26.45          Serial CBC's  02-20 @ 18:58  Hct-31.0 / Hgb-9.9 / Plat-302 / RBC-3.02 / WBC-25.13          Serial CBC's  02-19 @ 13:10  Hct-32.6 / Hgb-10.3 / Plat-293 / RBC-3.17 / WBC-19.66            02-22    139  |  105  |  50<H>  ----------------------------<  130<H>  5.0   |  27  |  0.8    Ca    7.7<L>      22 Feb 2019 07:48  Phos  3.2     02-20  Mg     2.2     02-20    TPro  4.3<L>  /  Alb  2.5<L>  /  TBili  0.2  /  DBili  x   /  AST  16  /  ALT  15  /  AlkPhos  71  02-22          Platelet Count - Automated: 278 K/uL (02-22-19 @ 07:48)  Hemoglobin: 9.4 g/dL (02-22-19 @ 07:48)  WBC Count: 26.45 K/uL (02-22-19 @ 07:48)  Hematocrit: 29.4 % (02-22-19 @ 07:48)  Hematocrit: 31.0 % (02-20-19 @ 18:58)  Platelet Count - Automated: 302 K/uL (02-20-19 @ 18:58)  Hemoglobin: 9.9 g/dL (02-20-19 @ 18:58)  WBC Count: 25.13 K/uL (02-20-19 @ 18:58)  Hemoglobin: 10.3 g/dL (02-19-19 @ 13:10)  WBC Count: 19.66 K/uL (02-19-19 @ 13:10)  Hematocrit: 32.6 % (02-19-19 @ 13:10)  Platelet Count - Automated: 293 K/uL (02-19-19 @ 13:10)  WBC Count: 20.00 K/uL (02-17-19 @ 08:42)  Hematocrit: 31.3 % (02-17-19 @ 08:42)  Platelet Count - Automated: 230 K/uL (02-17-19 @ 08:42)  Hemoglobin: 9.9 g/dL (02-17-19 @ 08:42)  WBC Count: 24.04 K/uL (02-16-19 @ 15:09)  Hematocrit: 30.3 % (02-16-19 @ 15:09)  Platelet Count - Automated: 213 K/uL (02-16-19 @ 15:09)  Hemoglobin: 9.9 g/dL (02-16-19 @ 15:09)  Platelet Count - Automated: 214 K/uL (02-15-19 @ 06:13)  Hemoglobin: 10.1 g/dL (02-15-19 @ 06:13)  WBC Count: 34.64 K/uL (02-15-19 @ 06:13)  Hematocrit: 30.7 % (02-15-19 @ 06:13)  Hematocrit: 29.4 % (02-13-19 @ 14:50)  Platelet Count - Automated: 219 K/uL (02-13-19 @ 14:50)  Hemoglobin: 9.7 g/dL (02-13-19 @ 14:50)  WBC Count: 38.51 K/uL (02-13-19 @ 14:50)                BLOOD SMEAR INTERPRETATION:       RADIOLOGY & ADDITIONAL STUDIES:

## 2019-03-12 NOTE — PROGRESS NOTE ADULT - SUBJECTIVE AND OBJECTIVE BOX
pt examined and seen with ICU team on am rounds.  pt now intubated, sedated, requiring pressors (levo and pérez) and bicarb drip added overnight.. pt anuric this morning.  abd soft, mildly distended  + large BM this morning - pt had been receiving kayexalate  increasing anasarca  Vital Signs Last 24 Hrs  T(C): 35.6 (12 Mar 2019 07:01), Max: 36.4 (11 Mar 2019 15:40)  T(F): 96 (12 Mar 2019 07:01), Max: 97.5 (11 Mar 2019 15:40)  HR: 90 (12 Mar 2019 08:46) (89 - 154)  BP: 135/63 (12 Mar 2019 08:46) (78/50 - 151/66)  BP(mean): 91 (12 Mar 2019 08:46) (58 - 95)  RR: 24 (12 Mar 2019 09:00) (19 - 27)  SpO2: 100% (12 Mar 2019 08:46) (100% - 100%)    MEDICATIONS  (STANDING):  aspirin  chewable 81 milliGRAM(s) Oral daily  busPIRone 5 milliGRAM(s) Oral three times a day  chlorhexidine 0.12% Liquid 15 milliLiter(s) Oral Mucosa two times a day  chlorhexidine 4% Liquid 1 Application(s) Topical <User Schedule>  dextrose 5%. 1000 milliLiter(s) (50 mL/Hr) IV Continuous <Continuous>  docusate sodium 200 milliGRAM(s) Oral two times a day  DULoxetine 30 milliGRAM(s) Oral at bedtime  epoetin spencer Injectable 88650 Unit(s) SubCutaneous every 7 days  famotidine    Tablet 20 milliGRAM(s) Oral daily  fentaNYL   Infusion. 0.5 MICROgram(s)/kG/Hr (3.15 mL/Hr) IV Continuous <Continuous>  hydrocortisone 2.5% Rectal Cream 1 Application(s) Rectal daily  meropenem  IVPB 1000 milliGRAM(s) IV Intermittent every 12 hours  metoprolol tartrate 25 milliGRAM(s) Enteral Tube two times a day    metroNIDAZOLE  IVPB 500 milliGRAM(s) IV Intermittent every 8 hours  midazolam Infusion 0.02 mG/kG/Hr (1.26 mL/Hr) IV Continuous <Continuous>  norepinephrine Infusion 0.05 MICROgram(s)/kG/Min (2.953 mL/Hr) IV Continuous <Continuous>  phenylephrine    Infusion 0.1 MICROgram(s)/kG/Min (1.181 mL/Hr) IV Continuous <Continuous>  senna 2 Tablet(s) Oral daily  vancomycin    Solution 125 milliGRAM(s) Oral every 6 hours                        7.6    28.84 )-----------( 115      ( 12 Mar 2019 06:17 )    and RDW 23             25.9   03-12    151<H>  |  111<H>  |  69<HH>  ----------------------------<  247<H>  4.5   |  27  |  1.4    Ca    6.5<L>      12 Mar 2019 06:17  Phos  6.0     03-12  Mg     2.0     03-12    TPro  3.7<L>  /  Alb  1.8<L>  /  TBili  0.4  /  DBili  0.3<H>  /  AST  5   /  ALT  22  /  AlkPhos  331<H>  03-12

## 2019-03-12 NOTE — PROGRESS NOTE ADULT - ASSESSMENT
84 y.o female patient with PMH of MDS, CHF, COPD on home oxygen, bladder cancer, HTN initially admitted for urinary retention s/p urologic procedure and lynne catheter removal and hyperkalemia. She had an extensive hospital course where she was treated for UTI, had a left mucous plug s/p bronchoscopy. Hospital stay further complicated by C.diff colitis. On 3/9/2019, patient developed acute hypoxic respiratory failure and was intubated and upgraded to the CCU    # Acute hypoxic respiratory failure  - Initially thought to be secondary to CHF exacerbation; given Lasix upon upgrade to CCU  - Patient still intubated  - Holding lasix for now  - Check daily CXR and ABG  - Monitor strict I&Os and avoid I>O  - LE duplex negative for DVT  - F/U with pulm    # Fever  - Associated with worsening leukocytosis  - Afebrile in the last 24h  - Patient now in septic shock, requiring pressors   - UA positive   - Blood cultures positive for Gram negative rods: Pseudomonas  - Patient with C.diff colitis  - Continue PO vanco  - Continue Meropenem  - Flagyl added as per ID  - Will need double coverage for pseudomonas as per ID; F/U sensitivities  - Check daily blood cultures  - F/U urine cultures  - Monitor VS  - ID follow-up    # C.diff colitis  - Continue PO vanco for now  - Flagyl added  - See KUB results above  - Monitor VS    # GEETA  - Patient now anuric  - Renal function worsening  - Renally dose medications  - Monitor renal function and electrolytes  - F/U with nephrology    # Hyperkalemia  - s/p kayexalate   - Resolved  - Monitor renal function and electrolytes  - Keep on holding spironolactone and ACE-I/ARB for now  - F/U with nephrology    # Hypernatremia  - Started D5W  - Monitor electrolytes  - F/U with nephrology    # Elevated LFTs  - See repeat abdominal ultrasound results above  - Avoid further hypotension  - Will D/C statin; avoid hepatotoxic agents  - GI following  - Monitor LFTs    # MDS  - Heme/onc following patient  - Monitor cbc    # DVT prophylaxis: SCD due to MDS  # GI prophylaxis    Goals of care: poor prognosis explained to patient. They wish not to escalate care and possible terminal wean tomorrow 84 y.o female patient with PMH of MDS, CHF, COPD on home oxygen, bladder cancer, HTN initially admitted for urinary retention s/p urologic procedure and lynne catheter removal and hyperkalemia. She had an extensive hospital course where she was treated for UTI, had a left mucous plug s/p bronchoscopy. Hospital stay further complicated by C.diff colitis. On 3/9/2019, patient developed acute hypoxic respiratory failure and was intubated and upgraded to the CCU    # Acute hypoxic respiratory failure  - Initially thought to be secondary to CHF exacerbation; given Lasix upon upgrade to CCU  - Patient still intubated  - Holding lasix for now  - Check daily CXR and ABG  - Monitor strict I&Os and avoid I>O  - LE duplex negative for DVT  - F/U with pulm    # Fever  - Associated with worsening leukocytosis  - Afebrile in the last 24h  - Patient now in septic shock, requiring pressors   - UA positive   - Blood cultures positive for Gram negative rods: Pseudomonas  - Patient with C.diff colitis  - Continue PO vanco  - Continue Meropenem  - Flagyl added as per ID  - Will need double coverage for pseudomonas as per ID; F/U sensitivities  - Check daily blood cultures  - F/U urine cultures  - Monitor VS  - ID follow-up    # C.diff colitis  - Continue PO vanco for now  - Flagyl added  - See KUB results above  - Monitor VS    # GEETA  - Patient now anuric  - Renal function worsening  - Renally dose medications  - Monitor renal function and electrolytes  - F/U with nephrology    # Hyperkalemia  - s/p kayexalate   - Resolved  - Monitor renal function and electrolytes  - Keep on holding spironolactone and ACE-I/ARB for now  - F/U with nephrology    # Hypernatremia  - Started D5W  - Monitor electrolytes  - F/U with nephrology    # Elevated LFTs  - See repeat abdominal ultrasound results above  - Avoid further hypotension  - Will D/C statin; avoid hepatotoxic agents  - GI following  - Monitor LFTs    # MDS  - Heme/onc following patient  - Monitor cbc    # DVT prophylaxis: SCD due to MDS  # GI prophylaxis    Goals of care: poor prognosis explained to the patient's family. They wish not to escalate care and possible terminal wean tomorrow

## 2019-03-12 NOTE — PROGRESS NOTE ADULT - ASSESSMENT
IMPRESSION:  Acute hypercapnic and hypoxemic respiratory failure intubated  Anaemia  H/O G I Bleed in the past  H/O Bladder Ca  C diff +    PLAN:    CNS: Continue current sedation RASS -2    HEENT: Oral care    PULMONARY:  HOB @ 45 degrees.  Vent changes as follows: decrease FiO2 as tolerated and increase RR to 24    CARDIOVASCULAR: D/C bicarb gtt. Continue NS at 75cc/hr. NPO for now    GI: GI prophylaxis. Increase free water     RENAL:  Follow up lytes.  Correct as needed. Monitor BMP and Na+ q2-4 hours    INFECTIOUS DISEASE: Follow up cultures. Continue with meropenem until sensitives  Flagyl and vanco PO     HEMATOLOGICAL:  DVT prophylaxis.    ENDOCRINE:  Follow up FS.  Insulin protocol if needed.    MUSCULOSKELETAL:    ICU care for now. Family thinking of possible termination of care IMPRESSION:  Acute hypercapnic and hypoxemic respiratory failure intubated  Anaemia  H/O G I Bleed in the past  H/O Bladder Ca  C diff +    PLAN:    CNS: Continue current sedation RASS -2    HEENT: Oral care    PULMONARY:  HOB @ 45 degrees.  Vent changes as follows: decrease FiO2 60 as tolerated and increase RR to 26    CARDIOVASCULAR: D/C bicarb gtt. start d5w@50cc per hr   taper pressors     GI: GI prophylaxis. Increase free water     RENAL:  Follow up lytes.  Correct as needed. Monitor BMP Q shift     INFECTIOUS DISEASE: Follow up cultures. Continue with meropenem until sensitives  Flagyl and vanco PO , vanco IV     HEMATOLOGICAL:  DVT prophylaxis.    ENDOCRINE:  Follow up FS.  Insulin protocol if needed.    MUSCULOSKELETAL:    ICU care for now. Family thinking of possible termination of care    pending family decision regarding terminal wean   no escalation of care no HD or CVVH as per patient and family wishes and it is futile

## 2019-03-12 NOTE — PROGRESS NOTE ADULT - SUBJECTIVE AND OBJECTIVE BOX
infectious diseases progress note:  KOTA SCHAFER is a 84yFemale patient    HYPERKALEMIA UTI URINARY RETENTION LEUKOCYTOSIS GEETA    Chronic obstructive pulmonary disease, unspecified COPD type  Clostridium difficile colitis  Leukocytosis, unspecified type  VUR (vesicoureteric reflux)  Urinary tract infection without hematuria, site unspecified  Urinary tract infection associated with indwelling urethral catheter, subsequent encounter  CHF (congestive heart failure)  COPD (chronic obstructive pulmonary disease)  GEETA (acute kidney injury)  UTI (urinary tract infection)  Urinary retention  Hyperkalemia      ROS:  UTO    Allergies    hair dyes (Hives)  Levaquin (Rash)  sulfa drugs (Headache)  Zoloft (Headache)    Intolerances        ANTIBIOTICS/RELEVANT:  antimicrobials  meropenem  IVPB      meropenem  IVPB 1000 milliGRAM(s) IV Intermittent every 12 hours  metroNIDAZOLE  IVPB      metroNIDAZOLE  IVPB 500 milliGRAM(s) IV Intermittent every 8 hours  vancomycin    Solution 125 milliGRAM(s) Oral every 6 hours    immunologic:  epoetin spencer Injectable 08510 Unit(s) SubCutaneous every 7 days    OTHER:  acetaminophen   Tablet .. 650 milliGRAM(s) Oral every 6 hours PRN  aspirin  chewable 81 milliGRAM(s) Oral daily  bisacodyl Suppository 10 milliGRAM(s) Rectal every 24 hours PRN  busPIRone 5 milliGRAM(s) Oral three times a day  chlorhexidine 0.12% Liquid 15 milliLiter(s) Oral Mucosa two times a day  chlorhexidine 4% Liquid 1 Application(s) Topical <User Schedule>  docusate sodium 200 milliGRAM(s) Oral two times a day  DULoxetine 30 milliGRAM(s) Oral at bedtime  famotidine    Tablet 20 milliGRAM(s) Oral daily  fentaNYL    Injectable 50 MICROGram(s) IV Push once  fentaNYL   Infusion. 0.5 MICROgram(s)/kG/Hr IV Continuous <Continuous>  HYDROcodone 10 mG/acetaminophen 325 mG 1 Tablet(s) Oral every 4 hours PRN  hydrocortisone 2.5% Rectal Cream 1 Application(s) Rectal daily  iohexol 300 mG (iodine)/mL Oral Solution 30 milliLiter(s) Oral once  lactated ringers. 1000 milliLiter(s) IV Continuous <Continuous>  metoprolol tartrate 25 milliGRAM(s) Enteral Tube two times a day  midazolam Infusion 0.02 mG/kG/Hr IV Continuous <Continuous>  norepinephrine Infusion 0.05 MICROgram(s)/kG/Min IV Continuous <Continuous>  ondansetron Injectable 4 milliGRAM(s) IV Push every 8 hours PRN  phenylephrine    Infusion 0.1 MICROgram(s)/kG/Min IV Continuous <Continuous>  senna 2 Tablet(s) Oral daily  simethicone 80 milliGRAM(s) Chew every 8 hours PRN  sodium bicarbonate  Infusion 0.179 mEq/kG/Hr IV Continuous <Continuous>      Objective:  T(F): 96.4 (19 @ 03:00), Max: 97.5 (19 @ 15:40)  HR: 90 (19 @ 03:00) (90 - 154)  BP: 129/60 (19 @ 03:00) (68/35 - 151/66)  RR: 22 (19 @ 05:00) (19 - 31)  SpO2: 100% (19 @ 05:00) (96% - 100%)    PHYSICAL EXAM:  Constitutional: comfortable  Vented  Neck:no JVD, no lymphadenopathy, supple  Respiratory: few rhonchi dalia  Cardiovascular: S1S2 tachy   Gastrointestinal:soft, (+) BS, no HSM  Extremities: cyanotic +        LABS:                        7.8    24.66 )-----------( 130      ( 11 Mar 2019 08:00 )             26.3         148<H>  |  113<H>  |  63<HH>  ----------------------------<  213<H>  5.7<H>   |  21  |  1.3    Ca    6.6<L>      11 Mar 2019 17:20  Phos  3.9       Mg     2.0         TPro  3.9<L>  /  Alb  2.0<L>  /  TBili  0.5  /  DBili  x   /  AST  71<H>  /  ALT  >700<H>  /  AlkPhos  337<H>      PT/INR - ( 11 Mar 2019 08:00 )   PT: 14.20 sec;   INR: 1.24 ratio         PTT - ( 11 Mar 2019 08:00 )  PTT:26.6 sec  Urinalysis Basic - ( 10 Mar 2019 08:14 )    Color: Yellow / Appearance: Clear / S.010 / pH: x  Gluc: x / Ketone: Negative  / Bili: Negative / Urobili: 0.2 mg/dL   Blood: x / Protein: 30 mg/dL / Nitrite: Negative   Leuk Esterase: Moderate / RBC: 6-10 /HPF / WBC >50 /HPF   Sq Epi: x / Non Sq Epi: x / Bacteria: Few          MICROBIOLOGY:    Culture - Blood (collected 03-10-19 @ 15:14)  Source: .Blood None  Gram Stain (19 @ 17:20):    Growth in aerobic bottle: Gram Negative Rods  Preliminary Report (19 @ 17:21):    Growth in aerobic bottle: Gram Negative Rods    Culture - Urine (collected 03-10-19 @ 08:14)  Source: .Urine Catheterized  Final Report (19 @ 10:14):    No growth    Culture - Blood (collected 03-10-19 @ 05:55)  Source: .Blood None  Gram Stain (19 @ 08:42):    Growth in aerobic bottle: Gram Negative Rods  Preliminary Report (19 @ 08:42):    Growth in aerobic bottle: Gram Negative Rods    "Due to technical problems, Proteus sp. will Not be reported as part of    the BCID panel until further notice"    ***Blood Panel PCR results on this specimen are available    approximately 3 hours after the Gram stain result.***    Gram stain, PCR, and/or culture results may not always    correspond due to difference in methodologies.    ************************************************************    This PCR assay was performed using [employer].    The following targets are tested for: Enterococcus,    vancomycin resistant enterococci, Listeria monocytogenes,    coagulase negative staphylococci, S. aureus,    methicillin resistant S. aureus, Streptococcus agalactiae    (Group B), S. pneumoniae, S. pyogenes (Group A),    Acinetobacter baumannii, Enterobacter cloacae, E. coli,    Klebsiella oxytoca, K. pneumoniae, Proteus sp.,    Serratia marcescens, Haemophilus influenzae,    Neisseria meningitidis, Pseudomonas aeruginosa, Candida    albicans, C. glabrata, C krusei, C parapsilosis,    C. tropicalis and the KPC resistance gene.  Organism: Blood Culture PCR (19 @ 11:26)  Organism: Blood Culture PCR (19 @ 11:26)      -  Pseudomonas aeruginosa: Detec      Method Type: PCR        Culture - Blood (collected 10 Mar 2019 15:14)  Source: .Blood None  Gram Stain (11 Mar 2019 17:20):    Growth in aerobic bottle: Gram Negative Rods  Preliminary Report (11 Mar 2019 17:21):    Growth in aerobic bottle: Gram Negative Rods    Culture - Urine (collected 10 Mar 2019 08:14)  Source: .Urine Catheterized  Final Report (11 Mar 2019 10:14):    No growth    Culture - Blood (collected 10 Mar 2019 05:55)  Source: .Blood None  Gram Stain (11 Mar 2019 08:42):    Growth in aerobic bottle: Gram Negative Rods  Preliminary Report (11 Mar 2019 08:42):    Growth in aerobic bottle: Gram Negative Rods    "Due to technical problems, Proteus sp. will Not be reported as part of    the BCID panel until further notice"    ***Blood Panel PCR results on this specimen are available    approximately 3 hours after the Gram stain result.***    Gram stain, PCR, and/or culture results may not always    correspond due to difference in methodologies.    ************************************************************    This PCR assay was performed using [employer].    The following targets are tested for: Enterococcus,    vancomycin resistant enterococci, Listeria monocytogenes,    coagulase negative staphylococci, S. aureus,    methicillin resistant S. aureus, Streptococcus agalactiae    (Group B), S. pneumoniae, S. pyogenes (Group A),    Acinetobacter baumannii, Enterobacter cloacae, E. coli,    Klebsiella oxytoca, K. pneumoniae, Proteus sp.,    Serratia marcescens, Haemophilus influenzae,    Neisseria meningitidis, Pseudomonas aeruginosa, Candida    albicans, C. glabrata, C krusei, C parapsilosis,    C. tropicalis and the KPC resistance gene.  Organism: Blood Culture PCR (11 Mar 2019 11:26)  Organism: Blood Culture PCR (11 Mar 2019 11:26)      Culture Results:   Growth in aerobic bottle: Gram Negative Rods (03-10 @ 15:14)  Culture Results:   No growth (03-10 @ 08:14)  Culture Results:   Growth in aerobic bottle: Gram Negative Rods  "Due to technical problems, Proteus sp. will Not be reported as part of  the BCID panel until further notice"  ***Blood Panel PCR results on this specimen are available  approximately 3 hours after the Gram stain result.***  Gram stain, PCR, and/or culture results may not always  correspond due to difference in methodologies.  ************************************************************  This PCR assay was performed using [employer].  The following targets are tested for: Enterococcus,  vancomycin resistant enterococci, Listeria monocytogenes,  coagulase negative staphylococci, S. aureus,  methicillin resistant S. aureus, Streptococcus agalactiae  (Group B), S. pneumoniae, S. pyogenes (Group A),  Acinetobacter baumannii, Enterobacter cloacae, E. coli,  Klebsiella oxytoca, K. pneumoniae, Proteus sp.,  Serratia marcescens, Haemophilus influenzae,  Neisseria meningitidis, Pseudomonas aeruginosa, Candida  albicans, C. glabrata, C krusei, C parapsilosis,  C. tropicalis and the KPC resistance gene. (03-10 @ 05:55)        RADIOLOGY & ADDITIONAL STUDIES:

## 2019-03-12 NOTE — PROGRESS NOTE ADULT - ATTENDING COMMENTS
Pt seen and examined.  Pt is sedated and intubated and does not grimace with palpation over the abdomen.  Today's LFTs show a NL TB, AST and ALT and an AP of 331, yesterday it was 323.  Repeat US done yesterday does not show biliary dilatation.  I therefore think that the patient's LFTs abnormalities are multifactorial in origin including both recent hypotension, and urosepsis.  Continue to follow periodically, recall us if needed. Pt seen and examined.  Pt is sedated and intubated and does not grimace with palpation over the abdomen.  Today's LFTs show a NL TB, AST and ALT and an AP of 331, yesterday it was 323.  Repeat US done yesterday does not show biliary dilatation.  I therefore think that the patient's LFTs abnormalities are multifactorial in origin including both recent hypotension, and urosepsis.  Continue to follow periodically, recall us if needed.  Discussed with Med Res in CCU.

## 2019-03-12 NOTE — PROGRESS NOTE ADULT - SUBJECTIVE AND OBJECTIVE BOX
critically ill  family declining dialysis            PAST MEDICAL & SURGICAL HISTORY:  CHF (congestive heart failure)  Breast tumor: removal benign bl breasts  Anal cancer  COPD (chronic obstructive pulmonary disease)  Lung disease: COPD  Hypertension  H/O breast surgery: 39 yrs. ago, bilateral  History of back surgery: 10 yrs. ago stimulator implant ,     Allergies:  hair dyes (Hives)  sulfa drugs (Headache)    Home Medications Reviewed    SOCIAL HISTORY:  Denies ETOH,Smoking,   FAMILY HISTORY:  No pertinent family history in first degree relatives        REVIEW OF SYSTEMS:  All other review of systems is negative unless indicated above.        PHYSICAL EXAM:  intubated  sedated  ETT  70%o2  no jvd  b/l bs  tachy  soft, + bs  + edema  + lynne - poor UOP    Hospital Medications:   MEDICATIONS  (STANDING):  aspirin  chewable 81 milliGRAM(s) Oral daily  busPIRone 5 milliGRAM(s) Oral three times a day  chlorhexidine 0.12% Liquid 15 milliLiter(s) Oral Mucosa two times a day  chlorhexidine 4% Liquid 1 Application(s) Topical <User Schedule>  docusate sodium 200 milliGRAM(s) Oral two times a day  DULoxetine 30 milliGRAM(s) Oral at bedtime  epoetin spencer Injectable 26673 Unit(s) SubCutaneous every 7 days  famotidine    Tablet 20 milliGRAM(s) Oral daily  fentaNYL   Infusion. 0.5 MICROgram(s)/kG/Hr (3.15 mL/Hr) IV Continuous <Continuous>  hydrocortisone 2.5% Rectal Cream 1 Application(s) Rectal daily  lactated ringers. 1000 milliLiter(s) (10 mL/Hr) IV Continuous <Continuous>  meropenem  IVPB      meropenem  IVPB 1000 milliGRAM(s) IV Intermittent every 12 hours  metoprolol tartrate 25 milliGRAM(s) Enteral Tube two times a day  metroNIDAZOLE  IVPB      metroNIDAZOLE  IVPB 500 milliGRAM(s) IV Intermittent every 8 hours  midazolam Infusion 0.02 mG/kG/Hr (1.26 mL/Hr) IV Continuous <Continuous>  norepinephrine Infusion 0.05 MICROgram(s)/kG/Min (2.953 mL/Hr) IV Continuous <Continuous>  phenylephrine    Infusion 0.1 MICROgram(s)/kG/Min (1.181 mL/Hr) IV Continuous <Continuous>  senna 2 Tablet(s) Oral daily  sodium bicarbonate  Infusion 0.179 mEq/kG/Hr (75 mL/Hr) IV Continuous <Continuous>  vancomycin    Solution 125 milliGRAM(s) Oral every 6 hours        VITALS:  T(F): 96 (19 @ 07:01), Max: 97.5 (19 @ 15:40)  HR: 89 (19 @ 08:35)  BP: 125/59 (19 @ 05:00)  RR: 24 (19 @ 07:01)  SpO2: 100% (19 @ 08:35)  Wt(kg): --    03-10 @ 07:01  -   @ 07:00  --------------------------------------------------------  IN: 3327 mL / OUT: 285 mL / NET: 3042 mL     07:01  -   @ 07:00  --------------------------------------------------------  IN: 5742.4 mL / OUT: 180 mL / NET: 5562.4 mL     @ 07:01  -   @ 08:50  --------------------------------------------------------  IN: 0 mL / OUT: 0 mL / NET: 0 mL      Height (cm): 157.48 ( 11:53)  Weight (kg): 63 ( 11:53)  BMI (kg/m2): 25.4 ( 11:53)  BSA (m2): 1.64 ( 11:53)    LABS:      151<H>  |  111<H>  |  69<HH>  ----------------------------<  247<H>  4.5   |  27  |  1.4    Ca    6.5<L>      12 Mar 2019 06:17  Phos  6.0     -  Mg     2.0     -12    TPro  3.7<L>  /  Alb  1.8<L>  /  TBili  0.4  /  DBili  0.3<H>  /  AST  5   /  ALT  22  /  AlkPhos  331<H>                            7.6    28.84 )-----------( 115      ( 12 Mar 2019 06:17 )             25.9       Urine Studies:  Urinalysis Basic - ( 10 Mar 2019 08:14 )    Color: Yellow / Appearance: Clear / S.010 / pH:   Gluc:  / Ketone: Negative  / Bili: Negative / Urobili: 0.2 mg/dL   Blood:  / Protein: 30 mg/dL / Nitrite: Negative   Leuk Esterase: Moderate / RBC: 6-10 /HPF / WBC >50 /HPF   Sq Epi:  / Non Sq Epi:  / Bacteria: Few          RADIOLOGY & ADDITIONAL STUDIES:

## 2019-03-12 NOTE — PROGRESS NOTE ADULT - SUBJECTIVE AND OBJECTIVE BOX
84yFemale  Being followed for Transaminitis and C-Diff  Interval history: Pt intubated and sedated. Discussed with family and ICU team and decision is in process of being made about conservative management. Patient is still on pressors and being treated for sepsis.        PMHX/PSHX:  PAST MEDICAL & SURGICAL HISTORY:  Anemia: 4 PRBC 11/2018  Oxygen dependent: O2 2L Via NC  Bladder cancer: 2018  CHF (congestive heart failure)  Breast tumor: removal benign bl breasts  Anal cancer: Chemo and radiation 1992  COPD (chronic obstructive pulmonary disease)  Lung disease: COPD  Hypertension  History of cystoscopy: 10/2018  H/O breast surgery: 39 yrs. ago, bilateral  History of back surgery: 10 yrs. ago stimulator implant 2001, 2011( cervical and Lumbar)          Social History: pack and a half for 50 years quit 5 years ago smoking. No alcohol. No illegal drug use.          MEDICATIONS:  MEDICATIONS  (STANDING):  aspirin  chewable 81 milliGRAM(s) Oral daily  busPIRone 5 milliGRAM(s) Oral three times a day  chlorhexidine 0.12% Liquid 15 milliLiter(s) Oral Mucosa two times a day  chlorhexidine 4% Liquid 1 Application(s) Topical <User Schedule>  dextrose 5%. 1000 milliLiter(s) (50 mL/Hr) IV Continuous <Continuous>  docusate sodium 200 milliGRAM(s) Oral two times a day  DULoxetine 30 milliGRAM(s) Oral at bedtime  epoetin spencer Injectable 81622 Unit(s) SubCutaneous every 7 days  famotidine    Tablet 20 milliGRAM(s) Oral daily  fentaNYL   Infusion. 0.5 MICROgram(s)/kG/Hr (3.15 mL/Hr) IV Continuous <Continuous>  hydrocortisone 2.5% Rectal Cream 1 Application(s) Rectal daily  meropenem  IVPB      meropenem  IVPB 1000 milliGRAM(s) IV Intermittent every 12 hours  metoprolol tartrate 25 milliGRAM(s) Enteral Tube two times a day  metroNIDAZOLE  IVPB      metroNIDAZOLE  IVPB 500 milliGRAM(s) IV Intermittent every 8 hours  midazolam Infusion 0.02 mG/kG/Hr (1.26 mL/Hr) IV Continuous <Continuous>  norepinephrine Infusion 0.05 MICROgram(s)/kG/Min (2.953 mL/Hr) IV Continuous <Continuous>  senna 2 Tablet(s) Oral daily  vancomycin    Solution 125 milliGRAM(s) Oral every 6 hours    MEDICATIONS  (PRN):  acetaminophen   Tablet .. 650 milliGRAM(s) Oral every 6 hours PRN Temp greater or equal to 38C (100.4F)  bisacodyl Suppository 10 milliGRAM(s) Rectal every 24 hours PRN Constipation  HYDROcodone 10 mG/acetaminophen 325 mG 1 Tablet(s) Oral every 4 hours PRN Moderate Pain (4 - 6)  ondansetron Injectable 4 milliGRAM(s) IV Push every 8 hours PRN Nausea and/or Vomiting  simethicone 80 milliGRAM(s) Chew every 8 hours PRN Gas        Allergies:  hair dyes (Hives)  Levaquin (Rash)  sulfa drugs (Headache)  Zoloft (Headache)    Intolerances        REVIEW OF SYSTEMS:  Unobtainable         VITAL SIGNS:   T(F): 96 (03-12-19 @ 07:01), Max: 97.5 (03-11-19 @ 15:40)  HR: 93 (03-12-19 @ 11:35) (89 - 154)  BP: 118/56 (03-12-19 @ 11:35) (78/50 - 151/66)  RR: 24 (03-12-19 @ 11:35) (19 - 27)  SpO2: 100% (03-12-19 @ 11:35) (99% - 100%)    PHYSICAL EXAM:  GENERAL: +intubated  HEAD:  Atraumatic, Normocephalic  EYES: conjunctiva and sclera clear  NECK: Supple, no JVD or thyromegaly  CHEST/LUNG: Clear to auscultation bilaterally; No wheeze, rhonchi, or rales  HEART: Regular rate and rhythm; normal S1, S2, No murmurs.  ABDOMEN: Soft,nondistended; Bowel sounds present, no abdominal bruit, masses, or hepatosplenomegaly  NEUROLOGY: No asterixis or tremor.   SKIN: Intact, no jaundice            LABS:                        7.6    28.84 )-----------( 115      ( 12 Mar 2019 06:17 )             25.9     03-12    151<H>  |  111<H>  |  69<HH>  ----------------------------<  247<H>  4.5   |  27  |  1.4    Ca    6.5<L>      12 Mar 2019 06:17  Phos  6.0     03-12  Mg     2.0     03-12    TPro  3.7<L>  /  Alb  1.8<L>  /  TBili  0.4  /  DBili  0.3<H>  /  AST  5   /  ALT  22  /  AlkPhos  331<H>  03-12    LIVER FUNCTIONS - ( 12 Mar 2019 06:17 )  Alb: 1.8 g/dL / Pro: 3.7 g/dL / ALK PHOS: 331 U/L / ALT: 22 U/L / AST: 5 U/L / GGT: x           PT/INR - ( 11 Mar 2019 08:00 )   PT: 14.20 sec;   INR: 1.24 ratio         PTT - ( 11 Mar 2019 08:00 )  PTT:26.6 sec    IMAGING:  < from: CT Abdomen and Pelvis w/ Oral Cont and w/ IV Cont (02.24.19 @ 18:18) >    EXAM:  CT ABDOMEN AND PELVIS OC IC            PROCEDURE DATE:  02/24/2019            INTERPRETATION:  CLINICAL STATEMENT: Colitis. Gastrointestinal bleeding.      TECHNIQUE: Contiguous axial CT images were obtained from the lower chest   to the pubic symphysis following administration of 100cc Optiray 320   intravenous contrast.  Oral contrast was administered.  Reformatted   images in the coronal and sagittal planes were acquired.    Comparison made with CT abdomen and pelvis February 11, 2019, October 19, 2018.    FINDINGS:    LOWER CHEST: New moderate left pleural effusion with overlying   compressive atelectasis. Stable right pleural effusion.    HEPATOBILIARY: Gallbladder with sludge/stones. Subcentimeter right   hepatic lobe hypodensity, likely cyst or hemangioma..    SPLEEN: Unremarkable.    PANCREAS: Unremarkable.    ADRENAL GLANDS: Stable left adrenal gland thickening.    KIDNEYS: Unchanged right hydroureteronephrosis. Enlarging exophytic   indeterminate left lower renal polecystic lesion with perceivable   enhancement of wall (series 601, image 80). Additional subcentimeter   renal hypodensities, too small to fully characterize    ABDOMINOPELVIC NODES: Evaluation for lymph nodes limited by patient   condition..    PELVIC ORGANS: Urinary bladder distended, with posterior diverticulum.   Stable gynecological structures.    PERITONEUM/MESENTERY/BOWEL: Worsening, now pancolitis; no evidence of   abscess. Increased moderate ascites.    BONES/SOFT TISSUES: Stable osseous structures..    OTHER: Unchanged spinal stimulator. Vascular calcifications.      IMPRESSION:   Since February 11, 2019:    1. Enlarging (since October 19, 2018) exophytic indeterminate left lower   renal pole cystic lesion with perceivable enhancement of wall, Bosniak   2F; outpatient MRI abdomen with and without IV contrast recommended   following discharge from hospital.    2. Worsening, now pancolitis; no evidence of abscess.     3. Increased moderate ascites.    4. Unchanged right hydroureteronephrosis.    5. New moderate left pleural effusion with overlying compressive   atelectasis. Stable right pleural effusion.                  HARI CROWLEY M.D., ATTENDING RADIOLOGIST  This document has been electronically signed. Feb 25 2019  8:26AM       < end of copied text >    < from: US Abdomen Limited (03.11.19 @ 18:10) >  EXAM:  US ABDOMEN LIMITED            PROCEDURE DATE:  03/11/2019            INTERPRETATION:  CLINICAL HISTORY: Increasing liver function tests.    COMPARISON: 3/8/2019.    PROCEDURE: Ultrasound of the right upper quadrant was performed.    FINDINGS:    LIVER:  Normal in contour and echogenicity measuring 19.7 cm in length.   No focal mass.    GALLBLADDER/BILIARY TREE:  Cholelithiasis. Again noted wall thickening of   the gallbladder wall with small amount of pericholecystic fluid. Negative   sonographic Rebollar's sign. No intrahepatic biliary ductal dilatation. The   common bile duct measures 5 mm, which is normal.     PANCREAS:  Visualized head and body are unremarkable. Remainder obscured   by overlying bowel gas.    KIDNEY:  Right kidney measures 10.7 cm in length. Subcentimeter mid pole   cyst measuring 8 mm. No hydronephrosis, calculi or solid mass.    AORTA/IVC:  Visualized proximal portions unremarkable.    ASCITES:  Mild ascites.    Right pleural effusion again noted..    IMPRESSION:    Redemonstrated cholelithiasis with wall thickening and small   pericholecystic fluid. Negative sonographic Rebollar's sign. Findings are   indeterminate for cholecystitis. If there is high suspicion consider HIDA   scan.    Mild ascites and a right pleural effusion.    Previous right hydronephrosis has resolved.                  TOMMIE NARAYANAN M.D., ATTENDING RADIOLOGIST  This document has been electronically signed. Mar 12 2019  8:38AM        < end of copied text >

## 2019-03-12 NOTE — PROGRESS NOTE ADULT - PROBLEM SELECTOR PROBLEM 2
Chronic obstructive pulmonary disease, unspecified COPD type
Clostridium difficile colitis
Clostridium difficile colitis
Leukocytosis, unspecified type
Sepsis due to Pseudomonas species
Clostridium difficile colitis
Urinary tract infection without hematuria, site unspecified

## 2019-03-12 NOTE — PROGRESS NOTE ADULT - ASSESSMENT
85 yo female being followed by the GI service for C-Diff Colitis and transaminitis, patient went into respiratory distress requiring intubation and patient became septic she was started on meropenem, sedated, and still requiring pressors.    Problem 1-C-Diff Colitis pancolitis, responding to Vanco- diarrhea resolved  Rec  -Continue Antibiotics  -Monitor WBC and CBC daily   -Monitor Stool for blood, frequency, and number of bowel movements daily    Problem 2-Transaminitis and CBD 5mm  Rec  -Trend LFTs, LFTs trending downward   -Avoid hepatotoxic medications  -If LFTs continue to rise Check Hepatitis B Sag, Hep B SAB, Hep A Ab, Hep C Ab,Smooth Muscle Antibody, Transferrin Saturation, SPEP, Anti LK M1 antibody, AMA, ROWDY, Ceruloplasmin, Ferritin,    Problem 3-Sepsis   Rec  -Continue Care as per ICU team

## 2019-03-12 NOTE — PROGRESS NOTE ADULT - SUBJECTIVE AND OBJECTIVE BOX
KOTA SCHAFER    Patient is a 84y old  Female who presents with a chief complaint of Urinary Retention/Hperkalemia with EKG changes (12 Mar 2019 11:13)    Interval History/Overnight events: no events; patient still requiring pressors    T(C): 35.6 (03-12-19 @ 07:01), Max: 36.4 (03-11-19 @ 15:40)  HR: 93 (03-12-19 @ 11:35)  BP: 118/56 (03-12-19 @ 11:35)  RR: 24 (03-12-19 @ 11:35)  SpO2: 100% (03-12-19 @ 11:35)    PHYSICAL EXAM:    GENERAL: patient intubated, sedated  CHEST/LUNG: Clear to auscultation bilaterally  HEART: Regular rhythm, tachycardic  ABDOMEN: Soft, Nontender, Nondistended  EXTREMITIES: Bilateral upper and lower extremity pitting edema      LABS:                          7.6    28.84 )-----------( 115      ( 12 Mar 2019 06:17 )             25.9     03-12    151<H>  |  111<H>  |  69<HH>  ----------------------------<  247<H>  4.5   |  27  |  1.4    Ca    6.5<L>      12 Mar 2019 06:17  Phos  6.0     03-12  Mg     2.0     03-12    TPro  3.7<L>  /  Alb  1.8<L>  /  TBili  0.4  /  DBili  0.3<H>  /  AST  5   /  ALT  22  /  AlkPhos  331<H>  03-12    PT/INR - ( 11 Mar 2019 08:00 )   PT: 14.20 sec;   INR: 1.24 ratio         PTT - ( 11 Mar 2019 08:00 )  PTT:26.6 sec  CARDIAC MARKERS ( 11 Mar 2019 17:20 )  x     / 0.22 ng/mL / 188 U/L / x     / 8.0 ng/mL  CARDIAC MARKERS ( 11 Mar 2019 08:00 )  x     / 0.11 ng/mL / x     / x     / x            Culture - Blood (collected 10 Mar 2019 15:14)  Source: .Blood None  Gram Stain (11 Mar 2019 17:20):    Growth in aerobic bottle: Gram Negative Rods  Preliminary Report (11 Mar 2019 17:21):    Growth in aerobic bottle: Gram Negative Rods    Culture - Urine (collected 10 Mar 2019 08:14)  Source: .Urine Catheterized  Final Report (11 Mar 2019 10:14):    No growth    Culture - Blood (collected 10 Mar 2019 05:55)  Source: .Blood None  Gram Stain (11 Mar 2019 08:42):    Growth in aerobic bottle: Gram Negative Rods  Preliminary Report (12 Mar 2019 11:44):    Growth in aerobic bottle: Pseudomonas aeruginosa      LIVER FUNCTIONS - ( 12 Mar 2019 06:17 )  Alb: 1.8 g/dL / Pro: 3.7 g/dL / ALK PHOS: 331 U/L / ALT: 22 U/L / AST: 5 U/L / GGT: x           < from: Xray Chest 1 View- PORTABLE-Routine (03.12.19 @ 05:40) >  Impression:     Bilateral opacities, unchanged. Support devices in place.    < end of copied text >      < from: US Abdomen Limited (03.11.19 @ 18:10) >  IMPRESSION:    Redemonstrated cholelithiasis with wall thickening and small   pericholecystic fluid. Negative sonographic Rebollar's sign. Findings are   indeterminate for cholecystitis. If there is high suspicion consider HIDA   scan.    Mild ascites and a right pleural effusion.    Previous right hydronephrosis has resolved.    < end of copied text >        MEDICATIONS:    MEDICATIONS  (STANDING):  aspirin  chewable 81 milliGRAM(s) Oral daily  busPIRone 5 milliGRAM(s) Oral three times a day  chlorhexidine 0.12% Liquid 15 milliLiter(s) Oral Mucosa two times a day  chlorhexidine 4% Liquid 1 Application(s) Topical <User Schedule>  dextrose 5%. 1000 milliLiter(s) (50 mL/Hr) IV Continuous <Continuous>  docusate sodium 200 milliGRAM(s) Oral two times a day  DULoxetine 30 milliGRAM(s) Oral at bedtime  epoetin spencer Injectable 58416 Unit(s) SubCutaneous every 7 days  famotidine    Tablet 20 milliGRAM(s) Oral daily  fentaNYL   Infusion. 0.5 MICROgram(s)/kG/Hr (3.15 mL/Hr) IV Continuous <Continuous>  hydrocortisone 2.5% Rectal Cream 1 Application(s) Rectal daily  meropenem  IVPB      meropenem  IVPB 1000 milliGRAM(s) IV Intermittent every 12 hours  metoprolol tartrate 25 milliGRAM(s) Enteral Tube two times a day  metroNIDAZOLE  IVPB      metroNIDAZOLE  IVPB 500 milliGRAM(s) IV Intermittent every 8 hours  midazolam Infusion 0.02 mG/kG/Hr (1.26 mL/Hr) IV Continuous <Continuous>  norepinephrine Infusion 0.05 MICROgram(s)/kG/Min (2.953 mL/Hr) IV Continuous <Continuous>  senna 2 Tablet(s) Oral daily  vancomycin    Solution 125 milliGRAM(s) Oral every 6 hours      MEDICATIONS  (PRN):  acetaminophen   Tablet .. 650 milliGRAM(s) Oral every 6 hours PRN Temp greater or equal to 38C (100.4F)  bisacodyl Suppository 10 milliGRAM(s) Rectal every 24 hours PRN Constipation  HYDROcodone 10 mG/acetaminophen 325 mG 1 Tablet(s) Oral every 4 hours PRN Moderate Pain (4 - 6)  ondansetron Injectable 4 milliGRAM(s) IV Push every 8 hours PRN Nausea and/or Vomiting  simethicone 80 milliGRAM(s) Chew every 8 hours PRN Gas

## 2019-03-12 NOTE — PROGRESS NOTE ADULT - ASSESSMENT
GEETA    - oliguric   - worsening Cr (low Cr due to poor muscle mass)  hyperkalemia  ARF - vented  multiorgan failure  pseudomonas bacteremia  cdiff colitis  hypotension / septic shock  left lung atelectasis from mucous plug  left moderate pleural effusion  PNA  malnutrition  left 2F complex renal cyst - enlarging   HTN   bladder ca  s/p TURBT  right hydro   UTI - enterococci  COPD  CHF  anemia / MDS  chronic back pain with spinal stimulator  rash  poor appetite     Plan:    grave prognosis  DNR  possible vent liberation  family declining dialysis as an option  kayexalate 30-60g GT PRN for K+ > 5.6  change tubed feeds to nepro  add free water to feeding  pressor support  dc maintenance fluids   vent support  very poor prognosis  spoke with family / team      critical care 35min

## 2019-03-12 NOTE — PROGRESS NOTE ADULT - HEIGHT IN CM
157.48

## 2019-03-12 NOTE — PROGRESS NOTE ADULT - ASSESSMENT
IMP:  - acute hypercapnic and hypoxic resp failure  - GEETA, anuric - family declining HD  - shock on 2 pressors  - bladder ca and + C diff this admission  - hyperglycemia (no DM) r/t acute events    holding feedings this morning due to increased pressor needs  giving free water enterally due to hypernatremia  family considering withdrawal, do not wish acceleration of care  will f/u with ICU team, and rec feeding regimen if situation stabilizes over the next 24h

## 2019-03-12 NOTE — PROGRESS NOTE ADULT - WEIGHT IN LBS
138.8
116.4
138.8

## 2019-03-12 NOTE — PROGRESS NOTE ADULT - SUBJECTIVE AND OBJECTIVE BOX
Patient is a 84y old  Female who presents with a chief complaint of Urinary Retention/Hperkalemia with EKG changes (12 Mar 2019 08:49)        Over Night Events: Remains intubated. On levophed and neosynephrine    ROS:  See HPI    PHYSICAL EXAM    ICU Vital Signs Last 24 Hrs  T(C): 35.6 (12 Mar 2019 07:01), Max: 36.4 (11 Mar 2019 15:40)  T(F): 96 (12 Mar 2019 07:01), Max: 97.5 (11 Mar 2019 15:40)  HR: 90 (12 Mar 2019 08:46) (89 - 154)  BP: 135/63 (12 Mar 2019 08:46) (69/38 - 151/66)  BP(mean): 91 (12 Mar 2019 08:46) (48 - 95)  RR: 24 (12 Mar 2019 09:00) (19 - 30)  SpO2: 100% (12 Mar 2019 08:46) (98% - 100%)      General: Intubated and sedated  HEENT: ANDRÉS  Lymphatic system: No cervical LN  Lungs: Bilateral BS  Cardiovascular: Regular   Gastrointestinal: Soft, Positive BS  Extremities: No clubbing.  Moves extremities.  Full Range of motion   Skin: Warm, intact  Neurological: No motor or sensory deficit       03-11-19 @ 07:01  -  03-12-19 @ 07:00  --------------------------------------------------------  IN:    Enteral Tube Flush: 250 mL    fentaNYL Infusion.: 282 mL    IV PiggyBack: 2000 mL    lactated ringers.: 110 mL    lactated ringers.: 780 mL    midazolam Infusion: 90 mL    norepinephrine Infusion: 109 mL    Osmolite: 520 mL    phenylephrine   Infusion: 1005 mL    sodium bicarbonate  Infusion: 750 mL  Total IN: 5896 mL    OUT:    Indwelling Catheter - Urethral: 70 mL    Voided: 110 mL  Total OUT: 180 mL    Total NET: 5716 mL      03-12-19 @ 07:01  -  03-12-19 @ 09:26  --------------------------------------------------------  IN:    fentaNYL Infusion.: 37.8 mL    lactated ringers.: 30 mL    midazolam Infusion: 15 mL    norepinephrine Infusion: 13 mL    phenylephrine   Infusion: 125 mL    sodium bicarbonate  Infusion: 225 mL  Total IN: 445.8 mL    OUT:  Total OUT: 0 mL    Total NET: 445.8 mL          LABS:                            7.6    28.84 )-----------( 115      ( 12 Mar 2019 06:17 )             25.9                                               03-12    151<H>  |  111<H>  |  69<HH>  ----------------------------<  247<H>  4.5   |  27  |  1.4    Ca    6.5<L>      12 Mar 2019 06:17  Phos  6.0     03-12  Mg     2.0     03-12    TPro  3.7<L>  /  Alb  1.8<L>  /  TBili  0.4  /  DBili  0.3<H>  /  AST  5   /  ALT  22  /  AlkPhos  331<H>  03-12      PT/INR - ( 11 Mar 2019 08:00 )   PT: 14.20 sec;   INR: 1.24 ratio         PTT - ( 11 Mar 2019 08:00 )  PTT:26.6 sec                                           CARDIAC MARKERS ( 11 Mar 2019 17:20 )  x     / 0.22 ng/mL / 188 U/L / x     / 8.0 ng/mL  CARDIAC MARKERS ( 11 Mar 2019 08:00 )  x     / 0.11 ng/mL / x     / x     / x                                                LIVER FUNCTIONS - ( 12 Mar 2019 06:17 )  Alb: 1.8 g/dL / Pro: 3.7 g/dL / ALK PHOS: 331 U/L / ALT: 22 U/L / AST: 5 U/L / GGT: x                                                  Culture - Blood (collected 10 Mar 2019 15:14)  Source: .Blood None  Gram Stain (11 Mar 2019 17:20):    Growth in aerobic bottle: Gram Negative Rods  Preliminary Report (11 Mar 2019 17:21):    Growth in aerobic bottle: Gram Negative Rods    Culture - Urine (collected 10 Mar 2019 08:14)  Source: .Urine Catheterized  Final Report (11 Mar 2019 10:14):    No growth    Culture - Blood (collected 10 Mar 2019 05:55)  Source: .Blood None  Gram Stain (11 Mar 2019 08:42):    Growth in aerobic bottle: Gram Negative Rods  Preliminary Report (11 Mar 2019 08:42):    Growth in aerobic bottle: Gram Negative Rods    "Due to technical problems, Proteus sp. will Not be reported as part of    the BCID panel until further notice"    ***Blood Panel PCR results on this specimen are available    approximately 3 hours after the Gram stain result.***    Gram stain, PCR, and/or culture results may not always    correspond due to difference in methodologies.    ************************************************************    This PCR assay was performed using [employer].    The following targets are tested for: Enterococcus,    vancomycin resistant enterococci, Listeria monocytogenes,    coagulase negative staphylococci, S. aureus,    methicillin resistant S. aureus, Streptococcus agalactiae    (Group B), S. pneumoniae, S. pyogenes (Group A),    Acinetobacter baumannii, Enterobacter cloacae, E. coli,    Klebsiella oxytoca, K. pneumoniae, Proteus sp.,    Serratia marcescens, Haemophilus influenzae,    Neisseria meningitidis, Pseudomonas aeruginosa, Candida    albicans, C. glabrata, C krusei, C parapsilosis,    C. tropicalis and the KPC resistance gene.  Organism: Blood Culture PCR (11 Mar 2019 11:26)  Organism: Blood Culture PCR (11 Mar 2019 11:26)                                                   Mode: Auto Mode: PRVC/ Volume Support  RR (machine): 24  TV (machine): 400  FiO2: 70  PEEP: 8  MAP: 13  PIP: 23                                      ABG - ( 12 Mar 2019 05:56 )  pH, Arterial: 7.26  pH, Blood: x     /  pCO2: 63    /  pO2: 151   / HCO3: 28    / Base Excess: x     /  SaO2: 100         MEDICATIONS  (STANDING):  aspirin  chewable 81 milliGRAM(s) Oral daily  busPIRone 5 milliGRAM(s) Oral three times a day  chlorhexidine 0.12% Liquid 15 milliLiter(s) Oral Mucosa two times a day  chlorhexidine 4% Liquid 1 Application(s) Topical <User Schedule>  docusate sodium 200 milliGRAM(s) Oral two times a day  DULoxetine 30 milliGRAM(s) Oral at bedtime  epoetin spencer Injectable 34843 Unit(s) SubCutaneous every 7 days  famotidine    Tablet 20 milliGRAM(s) Oral daily  fentaNYL   Infusion. 0.5 MICROgram(s)/kG/Hr (3.15 mL/Hr) IV Continuous <Continuous>  hydrocortisone 2.5% Rectal Cream 1 Application(s) Rectal daily  lactated ringers. 1000 milliLiter(s) (10 mL/Hr) IV Continuous <Continuous>  meropenem  IVPB      meropenem  IVPB 1000 milliGRAM(s) IV Intermittent every 12 hours  metoprolol tartrate 25 milliGRAM(s) Enteral Tube two times a day  metroNIDAZOLE  IVPB      metroNIDAZOLE  IVPB 500 milliGRAM(s) IV Intermittent every 8 hours  midazolam Infusion 0.02 mG/kG/Hr (1.26 mL/Hr) IV Continuous <Continuous>  norepinephrine Infusion 0.05 MICROgram(s)/kG/Min (2.953 mL/Hr) IV Continuous <Continuous>  phenylephrine    Infusion 0.1 MICROgram(s)/kG/Min (1.181 mL/Hr) IV Continuous <Continuous>  senna 2 Tablet(s) Oral daily  sodium bicarbonate  Infusion 0.179 mEq/kG/Hr (75 mL/Hr) IV Continuous <Continuous>  vancomycin    Solution 125 milliGRAM(s) Oral every 6 hours    MEDICATIONS  (PRN):  acetaminophen   Tablet .. 650 milliGRAM(s) Oral every 6 hours PRN Temp greater or equal to 38C (100.4F)  bisacodyl Suppository 10 milliGRAM(s) Rectal every 24 hours PRN Constipation  HYDROcodone 10 mG/acetaminophen 325 mG 1 Tablet(s) Oral every 4 hours PRN Moderate Pain (4 - 6)  ondansetron Injectable 4 milliGRAM(s) IV Push every 8 hours PRN Nausea and/or Vomiting  simethicone 80 milliGRAM(s) Chew every 8 hours PRN Gas

## 2019-03-12 NOTE — PROGRESS NOTE ADULT - ASSESSMENT
85 yo female ex smoker with a history of COPD on home O2,Anemia sec to low grade MDS by recent bmbx,on weekly procrit   Admitted for electrolyte imbalance, GEETA,. UTI with     1  septic shock with respiratory failure and hyperkal.  on pressor double   high Fio2   family made pt DNR  discussing terminal weaning for comfort care.     2  C diff on antibx     3 Small effusions on CT right > left with lung nodules.  left lung atelectasis from mucous plug..s/p mucus plug removed    4 anemia for hb 8 to 9 ,,hx of MDS GI bleed resolved >> hb 7   weakness ...     5 decreased UO with kidney failure.         will follow  spoke to pts family to provide support.

## 2019-03-12 NOTE — PROGRESS NOTE ADULT - PROBLEM SELECTOR PROBLEM 1
Clostridium difficile colitis
UTI (urinary tract infection)
Urinary tract infection associated with indwelling urethral catheter, subsequent encounter
Urinary tract infection without hematuria, site unspecified
Urinary tract infection without hematuria, site unspecified
UTI (urinary tract infection)
Urinary retention

## 2019-03-13 LAB
-  AMIKACIN: SIGNIFICANT CHANGE UP
-  AZTREONAM: SIGNIFICANT CHANGE UP
-  CEFEPIME: SIGNIFICANT CHANGE UP
-  CEFTAZIDIME: SIGNIFICANT CHANGE UP
-  CIPROFLOXACIN: SIGNIFICANT CHANGE UP
-  GENTAMICIN: SIGNIFICANT CHANGE UP
-  IMIPENEM: SIGNIFICANT CHANGE UP
-  LEVOFLOXACIN: SIGNIFICANT CHANGE UP
-  MEROPENEM: SIGNIFICANT CHANGE UP
-  PIPERACILLIN/TAZOBACTAM: SIGNIFICANT CHANGE UP
-  TOBRAMYCIN: SIGNIFICANT CHANGE UP
ALBUMIN SERPL ELPH-MCNC: 1.6 G/DL — LOW (ref 3.5–5.2)
ALP SERPL-CCNC: 334 U/L — HIGH (ref 30–115)
ALT FLD-CCNC: 3731 U/L — HIGH (ref 0–41)
ANION GAP SERPL CALC-SCNC: 12 MMOL/L — SIGNIFICANT CHANGE UP (ref 7–14)
AST SERPL-CCNC: 2080 U/L — HIGH (ref 0–41)
BILIRUB SERPL-MCNC: 0.4 MG/DL — SIGNIFICANT CHANGE UP (ref 0.2–1.2)
BUN SERPL-MCNC: 76 MG/DL — CRITICAL HIGH (ref 10–20)
CALCIUM SERPL-MCNC: 6.1 MG/DL — LOW (ref 8.5–10.1)
CHLORIDE SERPL-SCNC: 106 MMOL/L — SIGNIFICANT CHANGE UP (ref 98–110)
CO2 SERPL-SCNC: 26 MMOL/L — SIGNIFICANT CHANGE UP (ref 17–32)
CREAT SERPL-MCNC: 1.8 MG/DL — HIGH (ref 0.7–1.5)
CULTURE RESULTS: SIGNIFICANT CHANGE UP
CULTURE RESULTS: SIGNIFICANT CHANGE UP
GLUCOSE SERPL-MCNC: 87 MG/DL — SIGNIFICANT CHANGE UP (ref 70–99)
HCT VFR BLD CALC: 24.4 % — LOW (ref 37–47)
HGB BLD-MCNC: 7.3 G/DL — CRITICAL LOW (ref 12–16)
MAGNESIUM SERPL-MCNC: 1.8 MG/DL — SIGNIFICANT CHANGE UP (ref 1.8–2.4)
MCHC RBC-ENTMCNC: 29.9 G/DL — LOW (ref 32–37)
MCHC RBC-ENTMCNC: 32.2 PG — HIGH (ref 27–31)
MCV RBC AUTO: 107.5 FL — HIGH (ref 81–99)
METHOD TYPE: SIGNIFICANT CHANGE UP
NRBC # BLD: 1 /100 WBCS — HIGH (ref 0–0)
ORGANISM # SPEC MICROSCOPIC CNT: SIGNIFICANT CHANGE UP
PHOSPHATE SERPL-MCNC: 6.2 MG/DL — HIGH (ref 2.1–4.9)
PLATELET # BLD AUTO: 103 K/UL — LOW (ref 130–400)
POTASSIUM SERPL-MCNC: 4.9 MMOL/L — SIGNIFICANT CHANGE UP (ref 3.5–5)
POTASSIUM SERPL-SCNC: 4.9 MMOL/L — SIGNIFICANT CHANGE UP (ref 3.5–5)
PROT SERPL-MCNC: 3.5 G/DL — LOW (ref 6–8)
RBC # BLD: 2.27 M/UL — LOW (ref 4.2–5.4)
RBC # FLD: 21.7 % — HIGH (ref 11.5–14.5)
SODIUM SERPL-SCNC: 144 MMOL/L — SIGNIFICANT CHANGE UP (ref 135–146)
SPECIMEN SOURCE: SIGNIFICANT CHANGE UP
SPECIMEN SOURCE: SIGNIFICANT CHANGE UP
WBC # BLD: 21.09 K/UL — HIGH (ref 4.8–10.8)
WBC # FLD AUTO: 21.09 K/UL — HIGH (ref 4.8–10.8)

## 2019-03-13 RX ORDER — MORPHINE SULFATE 50 MG/1
4 CAPSULE, EXTENDED RELEASE ORAL
Qty: 100 | Refills: 0 | Status: DISCONTINUED | OUTPATIENT
Start: 2019-03-13 | End: 2019-03-13

## 2019-03-13 RX ORDER — MORPHINE SULFATE 50 MG/1
2 CAPSULE, EXTENDED RELEASE ORAL
Qty: 100 | Refills: 0 | Status: DISCONTINUED | OUTPATIENT
Start: 2019-03-13 | End: 2019-03-13

## 2019-03-13 RX ORDER — SCOPALAMINE 1 MG/3D
1 PATCH, EXTENDED RELEASE TRANSDERMAL ONCE
Qty: 0 | Refills: 0 | Status: COMPLETED | OUTPATIENT
Start: 2019-03-13 | End: 2019-03-13

## 2019-03-13 RX ADMIN — Medication 1 APPLICATION(S): at 12:26

## 2019-03-13 RX ADMIN — Medication 125 MILLIGRAM(S): at 05:17

## 2019-03-13 RX ADMIN — SODIUM CHLORIDE 50 MILLILITER(S): 9 INJECTION, SOLUTION INTRAVENOUS at 07:50

## 2019-03-13 RX ADMIN — CHLORHEXIDINE GLUCONATE 1 APPLICATION(S): 213 SOLUTION TOPICAL at 05:16

## 2019-03-13 RX ADMIN — SENNA PLUS 2 TABLET(S): 8.6 TABLET ORAL at 12:16

## 2019-03-13 RX ADMIN — Medication 200 MILLIGRAM(S): at 05:16

## 2019-03-13 RX ADMIN — Medication 2 MILLIGRAM(S): at 14:03

## 2019-03-13 RX ADMIN — Medication 2 MILLIGRAM(S): at 14:46

## 2019-03-13 RX ADMIN — Medication 5 MILLIGRAM(S): at 06:34

## 2019-03-13 RX ADMIN — PHENYLEPHRINE HYDROCHLORIDE 1.18 MICROGRAM(S)/KG/MIN: 10 INJECTION INTRAVENOUS at 07:53

## 2019-03-13 RX ADMIN — MIDAZOLAM HYDROCHLORIDE 1.26 MG/KG/HR: 1 INJECTION, SOLUTION INTRAMUSCULAR; INTRAVENOUS at 07:51

## 2019-03-13 RX ADMIN — MEROPENEM 100 MILLIGRAM(S): 1 INJECTION INTRAVENOUS at 05:17

## 2019-03-13 RX ADMIN — Medication 125 MILLIGRAM(S): at 12:15

## 2019-03-13 RX ADMIN — Medication 2.95 MICROGRAM(S)/KG/MIN: at 07:51

## 2019-03-13 RX ADMIN — SODIUM CHLORIDE 50 MILLILITER(S): 9 INJECTION, SOLUTION INTRAVENOUS at 06:00

## 2019-03-13 RX ADMIN — MIDAZOLAM HYDROCHLORIDE 1.26 MG/KG/HR: 1 INJECTION, SOLUTION INTRAMUSCULAR; INTRAVENOUS at 05:00

## 2019-03-13 RX ADMIN — FAMOTIDINE 20 MILLIGRAM(S): 10 INJECTION INTRAVENOUS at 12:15

## 2019-03-13 RX ADMIN — Medication 81 MILLIGRAM(S): at 12:15

## 2019-03-13 RX ADMIN — PHENYLEPHRINE HYDROCHLORIDE 1.18 MICROGRAM(S)/KG/MIN: 10 INJECTION INTRAVENOUS at 06:33

## 2019-03-13 RX ADMIN — Medication 25 MILLIGRAM(S): at 05:17

## 2019-03-13 RX ADMIN — CHLORHEXIDINE GLUCONATE 15 MILLILITER(S): 213 SOLUTION TOPICAL at 05:17

## 2019-03-13 RX ADMIN — MORPHINE SULFATE 2 MG/HR: 50 CAPSULE, EXTENDED RELEASE ORAL at 14:00

## 2019-03-13 RX ADMIN — FENTANYL CITRATE 3.15 MICROGRAM(S)/KG/HR: 50 INJECTION INTRAVENOUS at 07:50

## 2019-03-13 RX ADMIN — Medication 100 MILLIGRAM(S): at 05:17

## 2019-03-13 NOTE — PHARMACOTHERAPY INTERVENTION NOTE - COMMENTS
CASEY Yen aware of allergy to sulfa.
The patient is currently not on DVT prophylaxis. I spoke with Dr. Ramirez regarding this and he informed me the patient has a history of MDS and her Hgb levels are trending down. She is currently on sequentials as an alternative method of prophylaxis.
Renewal of Vicodin order. The vicodin bottle does not have a RX label. As per Nurse Askew and CASEY Ortega, patient bring bottle and transfer it in the small pharmacy bottle. Med was verified by pharmacist when the original order was placed.

## 2019-03-13 NOTE — PROGRESS NOTE ADULT - ASSESSMENT
84 y.o female patient with PMH of MDS, CHF, COPD on home oxygen, bladder cancer, HTN initially admitted for urinary retention s/p urologic procedure and lynne catheter removal and hyperkalemia. She had an extensive hospital course where she was treated for UTI, had a left mucous plug s/p bronchoscopy. Hospital stay further complicated by C.diff colitis. On 3/9/2019, patient developed acute hypoxic respiratory failure and was intubated and upgraded to the CCU    # Acute hypoxic respiratory failure  - Initially thought to be secondary to CHF exacerbation; given Lasix upon upgrade to CCU  - Patient still intubated  - Holding lasix for now  - Check daily CXR and ABG  - Monitor strict I&Os and avoid I>O  - LE duplex negative for DVT  - F/U with pulm    # Fever  - Associated with worsening leukocytosis  - Tmax 100.1F in the last 24h  - Patient now in septic shock, requiring pressors   - UA positive   - Blood cultures positive for Gram negative rods: Pseudomonas  - Patient with C.diff colitis  - Continue PO vanco  - Continue Meropenem  - Flagyl added as per ID  - Will need double coverage for pseudomonas as per ID; F/U sensitivities to adjust antibiotics  - Check daily blood cultures  - Monitor VS  - ID follow-up    # C.diff colitis  - Continue PO vanco for now  - Flagyl added  - See KUB results above  - Monitor VS    # GEETA  - Patient now anuric  - Renal function worsening  - Renally dose medications  - Monitor renal function and electrolytes  - F/U with nephrology    # Hyperkalemia  - s/p kayexalate   - Resolved  - Monitor renal function and electrolytes  - Keep on holding spironolactone and ACE-I/ARB for now  - F/U with nephrology    # Hypernatremia  - Started D5W  - Monitor electrolytes  - F/U with nephrology    # Elevated LFTs  - See repeat abdominal ultrasound results above  - Avoid further hypotension  - Will D/C statin; avoid hepatotoxic agents  - GI following: most likely secondary to hypotension and septic shock; no interventions recommended at this time  - Monitor LFTs    # MDS  - Heme/onc following patient  - Monitor cbc    # DVT prophylaxis: SCD due to MDS  # GI prophylaxis    Goals of care: poor prognosis explained to the patient's family. They wish not to escalate care and leaning towards liberation 84 y.o female patient with PMH of MDS, CHF, COPD on home oxygen, bladder cancer, HTN initially admitted for urinary retention s/p urologic procedure and lynne catheter removal and hyperkalemia. She had an extensive hospital course where she was treated for UTI, had a left mucous plug s/p bronchoscopy. Hospital stay further complicated by C.diff colitis. On 3/9/2019, patient developed acute hypoxic respiratory failure and was intubated and upgraded to the CCU    # Acute hypoxic respiratory failure  - Initially thought to be secondary to CHF exacerbation; given Lasix upon upgrade to CCU  - Patient still intubated; still requiring increased oxygenation  - Holding lasix for now  - Check daily CXR and ABG  - Monitor strict I&Os and avoid I>O  - LE duplex negative for DVT  - F/U with pulm    # Fever  - Associated with worsening leukocytosis  - Tmax 100.1F in the last 24h  - Patient now in septic shock, requiring pressors   - UA positive   - Blood cultures positive for Gram negative rods: Pseudomonas  - Patient with C.diff colitis  - Continue PO vanco  - Continue Meropenem  - Flagyl added as per ID  - Will need double coverage for pseudomonas as per ID; F/U sensitivities to adjust antibiotics  - Check daily blood cultures  - Monitor VS  - ID follow-up    # C.diff colitis  - Continue PO vanco for now  - Flagyl added  - See KUB results above  - Monitor VS    # GEETA  - Patient now anuric  - Renal function worsening  - Renally dose medications  - Monitor renal function and electrolytes  - F/U with nephrology    # Hyperkalemia  - s/p kayexalate   - Resolved  - Monitor renal function and electrolytes  - Keep on holding spironolactone and ACE-I/ARB for now  - F/U with nephrology    # Hypernatremia  - Resolved  - Continue Free water for now (250cc q6h)  - Monitor electrolytes  - F/U with nephrology    # Elevated LFTs  -  Worsening today  - Avoid further hypotension  - Will D/C statin; avoid hepatotoxic agents  - GI following: most likely secondary to hypotension and septic shock; no interventions recommended at this time  - Monitor LFTs    # MDS  - Heme/onc following patient  - Monitor cbc    # DVT prophylaxis: SCD due to MDS  # GI prophylaxis    Goals of care: poor prognosis explained to the patient's family. They wish not to escalate care and leaning towards liberation

## 2019-03-13 NOTE — PROGRESS NOTE ADULT - ASSESSMENT
GEETA   ARF  multiorgan failure  septic shock  bladder cancer  cdiff colitis      Plan:    icu care  for vent liberation today  no dialysis  dnr   grave prognosis  family support      critical care 35min

## 2019-03-13 NOTE — PROGRESS NOTE ADULT - REASON FOR ADMISSION
Urinary Retention/Hperkalemia with EKG changes
Urinary Retention/Hyperkalemia with EKG changes
Urinary Retention/Hperkalemia with EKG changes
Urinary Retention/Hperkalemia with EKG changes / respiratory failure
Urinary Retention/Hperkalemia with EKG changes
Urinary Retention/Hyperkalemia with EKG changes
Urinary Retention/Hyperkalemia with EKG changes
Urinary Retention/Hperkalemia with EKG changes

## 2019-03-13 NOTE — PROGRESS NOTE ADULT - SUBJECTIVE AND OBJECTIVE BOX
NEPHROLOGY FOLLOW UP NOTE    pt critically ill in ICU  events noted  family chose for terminal wean  poor uop and worsening renal function        PAST MEDICAL & SURGICAL HISTORY:  CHF (congestive heart failure)  Breast tumor: removal benign bl breasts  Anal cancer  COPD (chronic obstructive pulmonary disease)  Lung disease: COPD  Hypertension  H/O breast surgery: 39 yrs. ago, bilateral  History of back surgery: 10 yrs. ago stimulator implant ,     Allergies:  hair dyes (Hives)  sulfa drugs (Headache)    Home Medications Reviewed    SOCIAL HISTORY:  Denies ETOH,Smoking,   FAMILY HISTORY:  No pertinent family history in first degree relatives        REVIEW OF SYSTEMS:  All other review of systems is negative unless indicated above.        PHYSICAL EXAM:  intubated  sedated  ETT  70%o2  no jvd  b/l bs  tachy  soft, + bs  + edema  + lynne - poor UOP    Hospital Medications:   MEDICATIONS  (STANDING):  aspirin  chewable 81 milliGRAM(s) Oral daily  busPIRone 5 milliGRAM(s) Oral three times a day  chlorhexidine 0.12% Liquid 15 milliLiter(s) Oral Mucosa two times a day  chlorhexidine 4% Liquid 1 Application(s) Topical <User Schedule>  docusate sodium 200 milliGRAM(s) Oral two times a day  DULoxetine 30 milliGRAM(s) Oral at bedtime  epoetin spencer Injectable 29158 Unit(s) SubCutaneous every 7 days  famotidine    Tablet 20 milliGRAM(s) Oral daily  fentaNYL   Infusion. 0.5 MICROgram(s)/kG/Hr (3.15 mL/Hr) IV Continuous <Continuous>  hydrocortisone 2.5% Rectal Cream 1 Application(s) Rectal daily  LORazepam   Injectable 2 milliGRAM(s) IV Push once  meropenem  IVPB      meropenem  IVPB 1000 milliGRAM(s) IV Intermittent every 12 hours  metoprolol tartrate 25 milliGRAM(s) Enteral Tube two times a day  metroNIDAZOLE  IVPB      metroNIDAZOLE  IVPB 500 milliGRAM(s) IV Intermittent every 8 hours  midazolam Infusion 0.02 mG/kG/Hr (1.26 mL/Hr) IV Continuous <Continuous>  morphine  Infusion 4 mG/Hr (4 mL/Hr) IV Continuous <Continuous>  norepinephrine Infusion 0.05 MICROgram(s)/kG/Min (2.953 mL/Hr) IV Continuous <Continuous>  phenylephrine    Infusion 0.1 MICROgram(s)/kG/Min (1.181 mL/Hr) IV Continuous <Continuous>  scopolamine   Patch 1 Patch Transdermal once  senna 2 Tablet(s) Oral daily  vancomycin    Solution 125 milliGRAM(s) Oral every 6 hours        VITALS:  T(F): 99.4 (19 @ 11:00), Max: 100.1 (19 @ 02:34)  HR: 89 (19 @ 12:02)  BP: 118/55 (19 @ 12:02)  RR: 21 (19 @ 13:04)  SpO2: 100% (19 @ 12:02)  Wt(kg): --     @ 07:01  -   @ 07:00  --------------------------------------------------------  IN: 5896 mL / OUT: 180 mL / NET: 5716 mL     @ 07:01  -   @ 07:00  --------------------------------------------------------  IN: 3086.2 mL / OUT: 175 mL / NET: 2911.2 mL     @ 07:01  -   @ 14:23  --------------------------------------------------------  IN: 742.9 mL / OUT: 35 mL / NET: 707.9 mL          LABS:      144  |  106  |  76<HH>  ----------------------------<  87  4.9   |  26  |  1.8<H>    Ca    6.1<L>      13 Mar 2019 06:09  Phos  6.2       Mg     1.8         TPro  3.5<L>  /  Alb  1.6<L>  /  TBili  0.4  /  DBili      /  AST  2080<H>  /  ALT  3731<H>  /  AlkPhos  334<H>  03-13                          7.3    21.09 )-----------( 103      ( 13 Mar 2019 06:09 )             24.4       Urine Studies:  Urinalysis Basic - ( 10 Mar 2019 08:14 )    Color: Yellow / Appearance: Clear / S.010 / pH:   Gluc:  / Ketone: Negative  / Bili: Negative / Urobili: 0.2 mg/dL   Blood:  / Protein: 30 mg/dL / Nitrite: Negative   Leuk Esterase: Moderate / RBC: 6-10 /HPF / WBC >50 /HPF   Sq Epi:  / Non Sq Epi:  / Bacteria: Few          RADIOLOGY & ADDITIONAL STUDIES:

## 2019-03-13 NOTE — DISCHARGE NOTE FOR THE EXPIRED PATIENT - HOSPITAL COURSE
84 y.o female patient with PMH of MDS, CHF, COPD on home oxygen, bladder cancer, HTN initially admitted for urinary retention s/p urologic procedure and lynne catheter removal and hyperkalemia. She had an extensive hospital course where she was treated for UTI, had a left mucous plug s/p bronchoscopy. Hospital stay further complicated by C.diff colitis. On 3/9/2019, patient developed acute hypoxic respiratory failure and was intubated and upgraded to the CCU. She then developed septic shock secondary to UTI and pneumonia with positive blood cultures (pseudomonas). Patient was being treated with antibiotics but was still requiring pressors and increased oxygen requirements. Due to the very poor prognosis, family decided to withdraw life sustaining treatment and proceed with comfort care. Patient was liberated on 3/13/2019 and  at 14:38

## 2019-03-13 NOTE — PROGRESS NOTE ADULT - SUBJECTIVE AND OBJECTIVE BOX
Patient is a 84y old  Female who presents with a chief complaint of Urinary Retention/Hperkalemia with EKG changes (13 Mar 2019 07:14)      HPI:  From ED:  84 year old female w hx of HTN, CHF, COPD on 2 LPM NC daily, bladder cancer presents to the ED with urinary retention, crampy/spasm-like lower abdominal pain and mild distention. Patient had bladder surgery w Dr. Sanchez on 1/30/19 and was d/c home with a lynne catheter in place. The catheter was removed yesterday. Patient had two episodes of normal urination after removal, but has since been experiencing retention and dribbling at most. Denies fevers, chills, cough, chest pain, shortness of breath, flank pain, myalgias, incontinence, rashes, or recent illness.  ________________________________________________________________________________________________________   Patient presents to ED with family this am.  Dtr reports that patient s/p cysto with bladder scrapping for Bladder Ca on 1/30/19.  Patient tolerated procedure well and was discharged home with Lynne.  Patient did very well since then and went to follow up at office ystd.  At that point lynne was removed and patient went home.  Dtr reports that patient urinated x 2 since removal however last evening began only dribbling and began having severe bladder spasms, unrelieved by oxybutynin.  Family brought patient to ED 2/2 to this.  Patient reports she is feeling well.  No CP.  SOB at baseline.  Still with some minor abdominal cramping, suprapubic area.  No fever.  Last BM ystd AM and regular. (05 Feb 2019 07:48)  anuric still on pérez, levo    PAST MEDICAL & SURGICAL HISTORY:  Anemia: 4 PRBC 11/2018  Oxygen dependent: O2 2L Via NC  Bladder cancer: 2018  CHF (congestive heart failure)  Breast tumor: removal benign bl breasts  Anal cancer: Chemo and radiation 1992  COPD (chronic obstructive pulmonary disease)  Lung disease: COPD  Hypertension  History of cystoscopy: 10/2018  H/O breast surgery: 39 yrs. ago, bilateral  History of back surgery: 10 yrs. ago stimulator implant 2001, 2011( cervical and Lumbar)    Allergies    hair dyes (Hives)  Levaquin (Rash)  sulfa drugs (Headache)  Zoloft (Headache)    Intolerances      Family history : no cardiovscular family history   Home Medications:  amLODIPine 10 mg oral tablet: 1 tab(s) orally once a day (15 Feb 2019 11:20)  aspirin 81 mg oral tablet, chewable: 1 tab(s) orally once a day (15 Feb 2019 11:20)  atorvastatin 20 mg oral tablet: 1 tab(s) orally once a day (at bedtime) (15 Feb 2019 11:20)  bisacodyl 10 mg rectal suppository: 1 suppository(ies) rectal every 24 hours, As needed, Constipation (15 Feb 2019 11:20)  budesonide-formoterol 160 mcg-4.5 mcg/inh inhalation aerosol:  inhaled  (15 Feb 2019 11:20)  busPIRone 5 mg oral tablet: 1 tab(s) orally 3 times a day (15 Feb 2019 11:20)  Calcium 500+D oral tablet, chewable: 1 tab(s) orally 2 times a day (05 Feb 2019 07:54)  cloNIDine 0.1 mg oral tablet: 1 tab(s) orally every 8 hours, As needed, for sbp &gt; 180 (15 Feb 2019 11:20)  DULoxetine 20 mg oral delayed release capsule: 1 cap(s) orally once a day (15 Feb 2019 11:20)  hydrocodone-acetaminophen 10 mg-325 mg oral tablet: 1 tab(s) orally every 4 hours, As needed, Moderate Pain (4 - 6) (15 Feb 2019 11:20)  hydrocortisone 2.5% rectal cream with applicator: 1 application rectal once a day (15 Feb 2019 11:20)  ipratropium-albuterol 0.5 mg-2.5 mg/3 mLinhalation solution: 3 milliliter(s) inhaled every 6 hours (15 Feb 2019 11:20)  metoprolol succinate 50 mg oral tablet, extended release: 1 tab(s) orally once a day (15 Feb 2019 11:20)  ProAir HFA 90 mcg/inh inhalation aerosol: 2 puff(s) inhaled 4 times a day (05 Feb 2019 07:54)  Procrit: injectable once a week (05 Feb 2019 07:54)  spironolactone 25 mg oral tablet: 1 tab(s) orally once a day (05 Feb 2019 07:54)  vancomycin 50 mg/mL oral liquid: 125 milliliter(s) orally every 6 hours (15 Feb 2019 11:20)    Occupation:  Alochol: Denied  Smoking: Denied  Drug Use: Denied  Marital Status:         ROS: as in HPI; All other systems reviewed are negative    ICU Vital Signs Last 24 Hrs  T(C): 37.4 (13 Mar 2019 07:05), Max: 37.8 (13 Mar 2019 02:34)  T(F): 99.4 (13 Mar 2019 07:05), Max: 100.1 (13 Mar 2019 02:34)  HR: 87 (13 Mar 2019 08:02) (85 - 145)  BP: 116/55 (13 Mar 2019 08:02) (96/48 - 136/63)  BP(mean): 79 (13 Mar 2019 08:02) (67 - 91)  ABP: --  ABP(mean): --  RR: 24 (13 Mar 2019 08:02) (14 - 24)  SpO2: 99% (13 Mar 2019 08:02) (98% - 100%)        Physical Examination:    General: on sedation fentanyl versed     HEENT: Pupils equal, reactive to light.  Symmetric.    PULM: crackles     CVS: Regular rate and rhythm, no murmurs, rubs, or gallops    ABD: Soft, nondistended, nontender, normoactive bowel sounds, no masses    EXT: No edema, nontender, no clubbing     SKIN: Warm and well perfused, no rashes noted.    Neurology : sedated     Musculoskeletal : move all extremety     Lymphatic system: no Palpable node       Mode: Auto Mode: PRVC/ Volume Support  RR (machine): 24  TV (machine): 400  FiO2: 75  PEEP: 8  MAP: 13  PIP: 26      ABG - ( 13 Mar 2019 04:23 )  pH, Arterial: 7.27  pH, Blood: x     /  pCO2: 60    /  pO2: 69    / HCO3: 28    / Base Excess: 0.3   /  SaO2: 93                  I&O's Detail    12 Mar 2019 07:01  -  13 Mar 2019 07:00  --------------------------------------------------------  IN:    dextrose 5%.: 1000 mL    Enteral Tube Flush: 250 mL    fentaNYL Infusion.: 289.2 mL    Free Water: 250 mL    IV PiggyBack: 100 mL    lactated ringers.: 30 mL    midazolam Infusion: 115 mL    norepinephrine Infusion: 98 mL    phenylephrine   Infusion: 354 mL    phenylephrine   Infusion: 300 mL    sodium bicarbonate  Infusion: 225 mL    sodium chloride 0.9%: 75 mL  Total IN: 3086.2 mL    OUT:    Indwelling Catheter - Urethral: 175 mL  Total OUT: 175 mL    Total NET: 2911.2 mL      13 Mar 2019 07:01  -  13 Mar 2019 08:58  --------------------------------------------------------  IN:    dextrose 5%.: 100 mL    fentaNYL Infusion.: 25.5 mL    midazolam Infusion: 10 mL    norepinephrine Infusion: 2 mL    phenylephrine   Infusion: 71.4 mL  Total IN: 208.9 mL    OUT:  Total OUT: 0 mL    Total NET: 208.9 mL            LABS:                        7.3    21.09 )-----------( 103      ( 13 Mar 2019 06:09 )             24.4     13 Mar 2019 06:09    144    |  106    |  76     ----------------------------<  87     4.9     |  26     |  1.8      Ca    6.1        13 Mar 2019 06:09  Phos  6.2       13 Mar 2019 06:09  Mg     1.8       13 Mar 2019 06:09    TPro  3.5    /  Alb  1.6    /  TBili  0.4    /  DBili  x      /  AST  2080   /  ALT  3731   /  AlkPhos  334    13 Mar 2019 06:09  Amylase x     lipase x          CARDIAC MARKERS ( 11 Mar 2019 17:20 )  x     / 0.22 ng/mL / 188 U/L / x     / 8.0 ng/mL      CAPILLARY BLOOD GLUCOSE            Culture    Culture - Blood (collected 10 Mar 2019 15:14)  Source: .Blood None  Gram Stain (11 Mar 2019 17:20):    Growth in aerobic bottle: Gram Negative Rods  Preliminary Report (12 Mar 2019 17:27):    Growth in aerobic bottle: Pseudomonas aeruginosa    See previous culture [id number]          MEDICATIONS  (STANDING):  aspirin  chewable 81 milliGRAM(s) Oral daily  busPIRone 5 milliGRAM(s) Oral three times a day  chlorhexidine 0.12% Liquid 15 milliLiter(s) Oral Mucosa two times a day  chlorhexidine 4% Liquid 1 Application(s) Topical <User Schedule>  dextrose 5%. 1000 milliLiter(s) (50 mL/Hr) IV Continuous <Continuous>  docusate sodium 200 milliGRAM(s) Oral two times a day  DULoxetine 30 milliGRAM(s) Oral at bedtime  epoetin spencer Injectable 68809 Unit(s) SubCutaneous every 7 days  famotidine    Tablet 20 milliGRAM(s) Oral daily  fentaNYL   Infusion. 0.5 MICROgram(s)/kG/Hr (3.15 mL/Hr) IV Continuous <Continuous>  hydrocortisone 2.5% Rectal Cream 1 Application(s) Rectal daily  meropenem  IVPB      meropenem  IVPB 1000 milliGRAM(s) IV Intermittent every 12 hours  metoprolol tartrate 25 milliGRAM(s) Enteral Tube two times a day  metroNIDAZOLE  IVPB      metroNIDAZOLE  IVPB 500 milliGRAM(s) IV Intermittent every 8 hours  midazolam Infusion 0.02 mG/kG/Hr (1.26 mL/Hr) IV Continuous <Continuous>  norepinephrine Infusion 0.05 MICROgram(s)/kG/Min (2.953 mL/Hr) IV Continuous <Continuous>  phenylephrine    Infusion 0.1 MICROgram(s)/kG/Min (1.181 mL/Hr) IV Continuous <Continuous>  senna 2 Tablet(s) Oral daily  vancomycin    Solution 125 milliGRAM(s) Oral every 6 hours    MEDICATIONS  (PRN):  acetaminophen   Tablet .. 650 milliGRAM(s) Oral every 6 hours PRN Temp greater or equal to 38C (100.4F)  bisacodyl Suppository 10 milliGRAM(s) Rectal every 24 hours PRN Constipation  HYDROcodone 10 mG/acetaminophen 325 mG 1 Tablet(s) Oral every 4 hours PRN Moderate Pain (4 - 6)  ondansetron Injectable 4 milliGRAM(s) IV Push every 8 hours PRN Nausea and/or Vomiting  simethicone 80 milliGRAM(s) Chew every 8 hours PRN Gas        RADIOLOGY: ***     CXR: b/l opacity L>R   TLC:  OG:  ET tube:          ECHO:

## 2019-03-13 NOTE — PROGRESS NOTE ADULT - ASSESSMENT
85 yo female ex smoker with a history of COPD on home O2,Anemia sec to low grade MDS by recent bmbx,on weekly procrit   Admitted for electrolyte imbalance, GEETA,. UTI with     1  septic shock with respiratory failure and hyperkal.  on pressor double   high Fio2   family made pt DNR   terminal weaning for comfort care. today     2  C diff on antibx     3 Small effusions on CT right > left with lung nodules.  left lung atelectasis from mucous plug..s/p mucus plug removed    4 anemia for hb 8 to 9 ,,hx of MDS GI bleed resolved >> hb 7   weakness ...     5 decreased UO with kidney failure.         will follow  spoke to pts family to provide support.

## 2019-03-13 NOTE — PROGRESS NOTE ADULT - PROVIDER SPECIALTY LIST ADULT
Critical Care
Gastroenterology
Heme/Onc
Infectious Disease
Internal Medicine
Nephrology
Nutrition Support
Psychiatry
Pulmonology
Urology
Heme/Onc
Internal Medicine
Heme/Onc
Urology
Internal Medicine

## 2019-03-13 NOTE — PROGRESS NOTE ADULT - SUBJECTIVE AND OBJECTIVE BOX
Patient is a 84y old  Female who presents with a chief complaint of Urinary Retention/Hperkalemia with EKG changes (22 Feb 2019 09:55)       Pt is seen and examined  pt transferred to ICU with septic shock and respiratory failure.  on high dose pressor  on high Fio2 at 80%. ..70%        ROS:  Negative   MEDICATIONS  (STANDING):  ALBUTerol/ipratropium for Nebulization 3 milliLiter(s) Nebulizer every 6 hours  amLODIPine   Tablet 10 milliGRAM(s) Oral daily  aspirin  chewable 81 milliGRAM(s) Oral daily  atorvastatin 20 milliGRAM(s) Oral at bedtime  buDESOnide 160 MICROgram(s)/formoterol 4.5 MICROgram(s) Inhaler 2 Puff(s) Inhalation two times a day  busPIRone 5 milliGRAM(s) Oral three times a day  cefepime   IVPB      cefepime   IVPB 1000 milliGRAM(s) IV Intermittent daily  chlorhexidine 4% Liquid 1 Application(s) Topical <User Schedule>  docusate sodium 200 milliGRAM(s) Oral two times a day  DULoxetine 20 milliGRAM(s) Oral daily  epoetin spencer Injectable 43696 Unit(s) SubCutaneous every 7 days  heparin  Injectable 5000 Unit(s) SubCutaneous every 8 hours  hydrocortisone 2.5% Rectal Cream 1 Application(s) Rectal daily  metoprolol succinate ER 50 milliGRAM(s) Oral daily  senna 2 Tablet(s) Oral daily  vancomycin    Solution 125 milliGRAM(s) Oral every 6 hours    MEDICATIONS  (PRN):  bisacodyl Suppository 10 milliGRAM(s) Rectal every 24 hours PRN Constipation  cloNIDine 0.1 milliGRAM(s) Oral every 8 hours PRN for sbp > 180  HYDROcodone 10 mG/acetaminophen 325 mG 1 Tablet(s) Oral every 4 hours PRN Moderate Pain (4 - 6)  ondansetron Injectable 4 milliGRAM(s) IV Push every 8 hours PRN Nausea and/or Vomiting  simethicone 80 milliGRAM(s) Chew every 8 hours PRN Gas  sodium chloride 3%  Inhalation 3 milliLiter(s) Inhalation every 4 hours PRN ..      Allergies    hair dyes (Hives)  sulfa drugs (Headache)  Zoloft (Headache)    Intolerances        Vital Signs Last 24 Hrs  T(C): 36.7 (22 Feb 2019 14:00), Max: 36.7 (22 Feb 2019 10:12)  T(F): 98 (22 Feb 2019 14:00), Max: 98 (22 Feb 2019 10:12)  HR: 77 (22 Feb 2019 14:00) (77 - 97)  BP: 141/63 (22 Feb 2019 14:00) (133/58 - 154/67)  BP(mean): --  RR: 16 (22 Feb 2019 14:00) (16 - 20)  SpO2: 96% (22 Feb 2019 11:19) (94% - 98%)    PHYSICAL EXAM  family at bed side  General: adult in NAD  HEENT: clear oropharynx, anicteric sclera, pink conjunctiva  Neck: supple  CV: normal S1/S2 with no murmur rubs or gallops  Lungs: positive air movement b/l ant lungs,clear to auscultation, no wheezes, no rales  Abdomen: soft non-tender non-distended, no hepatosplenomegaly  Ext: no clubbing cyanosis or edema  Skin: no rashes and no petechiae  Neuro: alert and oriented X 4, no focal deficits    LABS:                wbc21k>>17k    hb 8.9 >>7  platelet 183>>198    creat 0..9         Mean Cell Volume : 102.4 fL  Mean Cell Hemoglobin : 32.8 pg  Mean Cell Hemoglobin Concentration : 32.0 g/dL  Auto Neutrophil # : x  Auto Lymphocyte # : x  Auto Monocyte # : x  Auto Eosinophil # : x  Auto Basophil # : x  Auto Neutrophil % : x  Auto Lymphocyte % : x  Auto Monocyte % : x  Auto Eosinophil % : x  Auto Basophil % : x    Serial CBC's  02-22 @ 07:48  Hct-29.4 / Hgb-9.4 / Plat-278 / RBC-2.87 / WBC-26.45          Serial CBC's  02-20 @ 18:58  Hct-31.0 / Hgb-9.9 / Plat-302 / RBC-3.02 / WBC-25.13          Serial CBC's  02-19 @ 13:10  Hct-32.6 / Hgb-10.3 / Plat-293 / RBC-3.17 / WBC-19.66            02-22    139  |  105  |  50<H>  ----------------------------<  130<H>  5.0   |  27  |  0.8    Ca    7.7<L>      22 Feb 2019 07:48  Phos  3.2     02-20  Mg     2.2     02-20    TPro  4.3<L>  /  Alb  2.5<L>  /  TBili  0.2  /  DBili  x   /  AST  16  /  ALT  15  /  AlkPhos  71  02-22          Platelet Count - Automated: 278 K/uL (02-22-19 @ 07:48)  Hemoglobin: 9.4 g/dL (02-22-19 @ 07:48)  WBC Count: 26.45 K/uL (02-22-19 @ 07:48)  Hematocrit: 29.4 % (02-22-19 @ 07:48)  Hematocrit: 31.0 % (02-20-19 @ 18:58)  Platelet Count - Automated: 302 K/uL (02-20-19 @ 18:58)  Hemoglobin: 9.9 g/dL (02-20-19 @ 18:58)  WBC Count: 25.13 K/uL (02-20-19 @ 18:58)  Hemoglobin: 10.3 g/dL (02-19-19 @ 13:10)  WBC Count: 19.66 K/uL (02-19-19 @ 13:10)  Hematocrit: 32.6 % (02-19-19 @ 13:10)  Platelet Count - Automated: 293 K/uL (02-19-19 @ 13:10)  WBC Count: 20.00 K/uL (02-17-19 @ 08:42)  Hematocrit: 31.3 % (02-17-19 @ 08:42)  Platelet Count - Automated: 230 K/uL (02-17-19 @ 08:42)  Hemoglobin: 9.9 g/dL (02-17-19 @ 08:42)  WBC Count: 24.04 K/uL (02-16-19 @ 15:09)  Hematocrit: 30.3 % (02-16-19 @ 15:09)  Platelet Count - Automated: 213 K/uL (02-16-19 @ 15:09)  Hemoglobin: 9.9 g/dL (02-16-19 @ 15:09)  Platelet Count - Automated: 214 K/uL (02-15-19 @ 06:13)  Hemoglobin: 10.1 g/dL (02-15-19 @ 06:13)  WBC Count: 34.64 K/uL (02-15-19 @ 06:13)  Hematocrit: 30.7 % (02-15-19 @ 06:13)  Hematocrit: 29.4 % (02-13-19 @ 14:50)  Platelet Count - Automated: 219 K/uL (02-13-19 @ 14:50)  Hemoglobin: 9.7 g/dL (02-13-19 @ 14:50)  WBC Count: 38.51 K/uL (02-13-19 @ 14:50)                BLOOD SMEAR INTERPRETATION:       RADIOLOGY & ADDITIONAL STUDIES:

## 2019-03-13 NOTE — PROGRESS NOTE ADULT - SUBJECTIVE AND OBJECTIVE BOX
KOTA SCHAFER    Patient is a 84y old  Female who presents with a chief complaint of Urinary Retention/Hperkalemia with EKG changes (13 Mar 2019 08:57)      Interval History/Overnight events: no acute events, patient still requiring pressors    T(C): 37.4 (03-13-19 @ 07:05), Max: 37.8 (03-13-19 @ 02:34)  HR: 87 (03-13-19 @ 09:30)  BP: 112/53 (03-13-19 @ 09:30)  RR: 24 (03-13-19 @ 09:30)  SpO2: 100% (03-13-19 @ 09:30)    PHYSICAL EXAM:    GENERAL: patient intubated, sedated  CHEST/LUNG: Bilateral crackles heard  HEART: Regular rhythm  ABDOMEN: Soft, Nontender, Nondistended  EXTREMITIES: Bilateral upper and lower extremity pitting edema      LABS:                          7.3    21.09 )-----------( 103      ( 13 Mar 2019 06:09 )             24.4     03-13    144  |  106  |  76<HH>  ----------------------------<  87  4.9   |  26  |  1.8<H>    Ca    6.1<L>      13 Mar 2019 06:09  Phos  6.2     03-13  Mg     1.8     03-13    TPro  3.5<L>  /  Alb  1.6<L>  /  TBili  0.4  /  DBili  x   /  AST  2080<H>  /  ALT  3731<H>  /  AlkPhos  334<H>  03-13      CARDIAC MARKERS ( 11 Mar 2019 17:20 )  x     / 0.22 ng/mL / 188 U/L / x     / 8.0 ng/mL        Culture - Blood (collected 10 Mar 2019 15:14)  Source: .Blood None  Gram Stain (11 Mar 2019 17:20):    Growth in aerobic bottle: Gram Negative Rods  Preliminary Report (12 Mar 2019 17:27):    Growth in aerobic bottle: Pseudomonas aeruginosa    See previous culture [id number]    LIVER FUNCTIONS - ( 13 Mar 2019 06:09 )  Alb: 1.6 g/dL / Pro: 3.5 g/dL / ALK PHOS: 334 U/L / ALT: 3731 U/L / AST: 2080 U/L / GGT: x           < from: Xray Chest 1 View- PORTABLE-Routine (03.13.19 @ 06:04) >  Impression:      Bilateral opacities, stable. Support devices in place.    < end of copied text >        MEDICATIONS:    MEDICATIONS  (STANDING):  aspirin  chewable 81 milliGRAM(s) Oral daily  busPIRone 5 milliGRAM(s) Oral three times a day  chlorhexidine 0.12% Liquid 15 milliLiter(s) Oral Mucosa two times a day  chlorhexidine 4% Liquid 1 Application(s) Topical <User Schedule>  docusate sodium 200 milliGRAM(s) Oral two times a day  DULoxetine 30 milliGRAM(s) Oral at bedtime  epoetin spencer Injectable 05645 Unit(s) SubCutaneous every 7 days  famotidine    Tablet 20 milliGRAM(s) Oral daily  fentaNYL   Infusion. 0.5 MICROgram(s)/kG/Hr (3.15 mL/Hr) IV Continuous <Continuous>  hydrocortisone 2.5% Rectal Cream 1 Application(s) Rectal daily  meropenem  IVPB      meropenem  IVPB 1000 milliGRAM(s) IV Intermittent every 12 hours  metoprolol tartrate 25 milliGRAM(s) Enteral Tube two times a day  metroNIDAZOLE  IVPB      metroNIDAZOLE  IVPB 500 milliGRAM(s) IV Intermittent every 8 hours  midazolam Infusion 0.02 mG/kG/Hr (1.26 mL/Hr) IV Continuous <Continuous>  norepinephrine Infusion 0.05 MICROgram(s)/kG/Min (2.953 mL/Hr) IV Continuous <Continuous>  phenylephrine    Infusion 0.1 MICROgram(s)/kG/Min (1.181 mL/Hr) IV Continuous <Continuous>  senna 2 Tablet(s) Oral daily  vancomycin    Solution 125 milliGRAM(s) Oral every 6 hours      MEDICATIONS  (PRN):  acetaminophen   Tablet .. 650 milliGRAM(s) Oral every 6 hours PRN Temp greater or equal to 38C (100.4F)  bisacodyl Suppository 10 milliGRAM(s) Rectal every 24 hours PRN Constipation  HYDROcodone 10 mG/acetaminophen 325 mG 1 Tablet(s) Oral every 4 hours PRN Moderate Pain (4 - 6)  ondansetron Injectable 4 milliGRAM(s) IV Push every 8 hours PRN Nausea and/or Vomiting  simethicone 80 milliGRAM(s) Chew every 8 hours PRN Gas

## 2019-03-13 NOTE — PROGRESS NOTE ADULT - ASSESSMENT
# Complicated UTI  # Moderate Right sided hydronephrosis  # C.difficile colitis  # Low grade  MDS - on procrit  # Pneumonia-  on CXR 2/27/19 , 2/27/19, shows Left apical opacity and sputum culture from 2/21/19 grew Stenotrophomonas.  # Septic shock - on pressors  #Pseudomonas Bacteremia - 3/10/19  and UTI      would recommend:  1. Follow up Repeat blood cultures to document clearing the blood stream  2. Continue Meropenem  and Add second anti pseudomonal agent  3. Continue oral vancomycin  and IV Flagyl since WBC count >15 K  4. Monitor WBC count , is trending down  5. Management of Vent as per CCU protocol    d/w CCU team    - prognosis guarded

## 2019-03-13 NOTE — PROGRESS NOTE ADULT - SUBJECTIVE AND OBJECTIVE BOX
Patient is seen and examined at the bed side, is afebrile currently.  She is intubated and on pressors. The Leukocytosis is trending down.      REVIEW OF SYSTEMS: All other review systems are negative        ALLERGIES: SULFA drugs , Levaquin        Vital Signs Last 24 Hrs  T(C): 37.4 (13 Mar 2019 11:00), Max: 37.8 (13 Mar 2019 02:34)  T(F): 99.4 (13 Mar 2019 11:00), Max: 100.1 (13 Mar 2019 02:34)  HR: 0 (13 Mar 2019 14:38) (0 - 101)  BP: 98/44 (13 Mar 2019 14:02) (98/44 - 134/63)  BP(mean): 63 (13 Mar 2019 14:02) (63 - 91)  RR: 24 (13 Mar 2019 14:02) (20 - 24)  SpO2: 100% (13 Mar 2019 13:02) (99% - 100%)      PHYSICAL EXAM:  GENERAL: Intubated/vented  CHEST/LUNG: Air  entry bilaterally  HEART: s1 and s2 present  ABDOMEN: Not distended  : Rowe catheter in placed  EXTREMITIES: B/L pedal  edema  CNS: Intubated/vented        LABS:                                    7.3    21.09 )-----------( 103      ( 13 Mar 2019 06:09 )             24.4                         7.8    24.66 )-----------( 130      ( 11 Mar 2019 08:00 )             26.3                         9.7    38.51 )-----------( 219      ( 2019 14:50 )             29.4               144  |  106  |  76<HH>  ----------------------------<  87  4.9   |  26  |  1.8<H>    Ca    6.1<L>      13 Mar 2019 06:09  Phos  6.2       Mg     1.8         TPro  3.5<L>  /  Alb  1.6<L>  /  TBili  0.4  /  DBili  x   /  AST  2080<H>  /  ALT  3731<H>  /  AlkPhos  334<H>      148<H>  |  113<H>  |  63<HH>  ----------------------------<  213<H>  5.7<H>   |  21  |  1.3    Ca    6.6<L>      11 Mar 2019 17:20  Phos  3.9     -  Mg     2.0         TPro  3.9<L>  /  Alb  2.0<L>  /  TBili  0.5  /  DBili  x   /  AST  71<H>  /  ALT  >700<H>  /  AlkPhos  337<H>          Urinalysis Basic - ( 2019 04:25 )    Color: Orange / Appearance: Slightly Cloudy / S.025 / pH: x  Gluc: x / Ketone: Trace  / Bili: Negative / Urobili: 1.0 mg/dL   Blood: x / Protein: >=300 mg/dL / Nitrite: Positive   Leuk Esterase: Negative / RBC: >50 /HPF / WBC 10-25 /HPF   Sq Epi: x / Non Sq Epi: Few /HPF / Bacteria: Moderate        MEDICATIONS  (STANDING):  aspirin  chewable 81 milliGRAM(s) Oral daily  busPIRone 5 milliGRAM(s) Oral three times a day  chlorhexidine 0.12% Liquid 15 milliLiter(s) Oral Mucosa two times a day  chlorhexidine 4% Liquid 1 Application(s) Topical <User Schedule>  docusate sodium 200 milliGRAM(s) Oral two times a day  DULoxetine 30 milliGRAM(s) Oral at bedtime  epoetin spencer Injectable 73113 Unit(s) SubCutaneous every 7 days  famotidine    Tablet 20 milliGRAM(s) Oral daily  fentaNYL   Infusion. 0.5 MICROgram(s)/kG/Hr (3.15 mL/Hr) IV Continuous <Continuous>  hydrocortisone 2.5% Rectal Cream 1 Application(s) Rectal daily  meropenem  IVPB      meropenem  IVPB 1000 milliGRAM(s) IV Intermittent every 12 hours  metoprolol tartrate 25 milliGRAM(s) Enteral Tube two times a day  metroNIDAZOLE  IVPB      metroNIDAZOLE  IVPB 500 milliGRAM(s) IV Intermittent every 8 hours  midazolam Infusion 0.02 mG/kG/Hr (1.26 mL/Hr) IV Continuous <Continuous>  morphine  Infusion 4 mG/Hr (4 mL/Hr) IV Continuous <Continuous>  norepinephrine Infusion 0.05 MICROgram(s)/kG/Min (2.953 mL/Hr) IV Continuous <Continuous>  phenylephrine    Infusion 0.1 MICROgram(s)/kG/Min (1.181 mL/Hr) IV Continuous <Continuous>  senna 2 Tablet(s) Oral daily  vancomycin    Solution 125 milliGRAM(s) Oral every 6 hours    MEDICATIONS  (PRN):  acetaminophen   Tablet .. 650 milliGRAM(s) Oral every 6 hours PRN Temp greater or equal to 38C (100.4F)  bisacodyl Suppository 10 milliGRAM(s) Rectal every 24 hours PRN Constipation  HYDROcodone 10 mG/acetaminophen 325 mG 1 Tablet(s) Oral every 4 hours PRN Moderate Pain (4 - 6)  LORazepam   Injectable 2 milliGRAM(s) IV Push every 4 hours PRN Anxiety  ondansetron Injectable 4 milliGRAM(s) IV Push every 8 hours PRN Nausea and/or Vomiting  simethicone 80 milliGRAM(s) Chew every 8 hours PRN Gas          RADIOLOGY & ADDITIONAL TEST    < from: Xray Chest 1 View- PORTABLE-Urgent (19 @ 07:40) >  Lung parenchyma/Pleura: Stable left lower lobe opacity as well as right   lower lobe opacity. No evidence of pneumothorax    < end of copied text >      < from: Xray Chest 1 View- PORTABLE-Urgent (19 @ 09:19) >  Left apical opacity. Left opacity/left pleural effusion, decreased. Right   basilar opacity/pleural effusion, stable. .        < end of copied text >      < from: CT Abdomen and Pelvis w/ Oral Cont (19 @ 23:46) >    New diffuse colonic wall thickening involving the cecum, right colon and   transverse colon at the hepatic flexure. There is additional involvement   of the terminal ileum. Findings are consistent with colitis and enteritis   of unclear etiology, likely infectious or inflammatory.    Moderate right hydroureteronephrosis to the level of the bladder without   a definitive obstructing cause, as seen on recent ultrasound.     New small amount of abdominopelvic ascites.    New small to moderateright pleural effusion and trace left pleural   effusion.    Resolution of some pulmonary nodules at the left lung base with new   pulmonary nodules measuring up to 12 mm. Follow-up noncontrast chest CT   is recommended in 3 months to demonstrate resolution.    < end of copied text >    < from: Xray Chest 1 View- PORTABLE-Routine (02.10.19 @ 17:59) >    Layering density in the right lower lung with a blunted costophrenic   angle, which may represent a pleural effusion. Cannot exclude right lower   lobe underlying consolidation. Recommend follow-up.             MICROBIOLOGY DATA:    Culture - Blood (03.10.19 @ 15:14)    Gram Stain:   Growth in aerobic bottle: Gram Negative Rods    Specimen Source: .Blood None    Culture Results:   Growth in aerobic bottle: Pseudomonas aeruginosa  See previous culture [id number]      Culture - Blood (03.10.19 @ 05:55)    -  Pseudomonas aeruginosa: Detec    -  Ceftazidime: S 4    -  Aztreonam: I 16    -  Cefepime: S 4    Gram Stain:   Growth in aerobic bottle: Gram Negative Rods    -  Meropenem: S <=1    -  Levofloxacin: S 2    -  Ciprofloxacin: S 1    -  Gentamicin: S <=1    -  Imipenem: S <=1    -  Amikacin: S <=8    -  Piperacillin/Tazobactam: S 16    -  Tobramycin: S <=2    Specimen Source: .Blood None    Organism: Blood Culture PCR    Organism: Pseudomonas aeruginosa    Culture Results:   Growth in aerobic bottle: Pseudomonas aeruginosa  "Due to technical problems, Proteus sp. will Not be reported as part of  the BCID panel until further notice"  ***Blood Panel PCR results on this specimen are available  approximately 3 hours after the Gram stain result.***  Gram stain, PCR, and/or culture results may not always  correspond due to difference in methodologies.  ************************************************************  This PCR assay was performed using [employer].  Thefollowing targets are tested for: Enterococcus,  vancomycin resistant enterococci, Listeria monocytogenes,  coagulase negative staphylococci, S. aureus,  methicillin resistant S. aureus, Streptococcus agalactiae  (Group B), S. pneumoniae, S. pyogenes(Group A),  Acinetobacter baumannii, Enterobacter cloacae, E. coli,  Klebsiella oxytoca, K. pneumoniae, Proteus sp.,  Serratia marcescens, Haemophilus influenzae,  Neisseria meningitidis, Pseudomonas aeruginosa, Candida  albicans, C. glabrata, C krusei, C parapsilosis,  C. tropicalis and the KPC resistance gene.    Organism Identification: Blood Culture PCR  Pseudomonas aeruginosa    Method Type: PCR    Method Type: ANUM        Culture - Blood (03.10.19 @ 15:14)    Gram Stain:   Growth in aerobic bottle: Gram Negative Rods    Specimen Source: .Blood None    Culture Results:   Growth in aerobic bottle: Gram Negative Rods        Urine Microscopic-Add On (NC) (03.10.19 @ 08:14)    White Blood Cell - Urine: >50 /HPF    Red Blood Cell - Urine: 6-10 /HPF    Bacteria: Few      Culture - Blood (03.10.19 @ 05:55)    -  Pseudomonas aeruginosa: Detec    Gram Stain:   Growth in aerobic bottle: Gram Negative Rods    Specimen Source: .Blood None    Organism: Blood Culture PCR    Culture Results:   Growth in aerobic bottle: Gram Negative Rods  "Due to technical problems, Proteus sp. will Not be reported as part of  the BCID panel until further notice"  ***Blood Panel PCR results on this specimen are available  approximately 3 hours after the Gram stain result.***  Gram stain, PCR, and/or culture results may not always  correspond due to difference in methodologies.  ************************************************************  This PCR assay was performed using [employer].  The following targets are tested for: Enterococcus,  vancomycin resistant enterococci, Listeria monocytogenes,  coagulase negative staphylococci, S. aureus,  methicillin resistant S. aureus, Streptococcus agalactiae  (Group B), S. pneumoniae, S. pyogenes (Group A),  Acinetobacter baumannii, Enterobacter cloacae, E. coli,  Klebsiella oxytoca, K. pneumoniae, Proteus sp.,  Serratia marcescens, Haemophilus influenzae,  Neisseria meningitidis, Pseudomonas aeruginosa, Candida  albicans, C. glabrata, C krusei, C parapsilosis,  C. tropicalis and the KPC resistance gene.    Organism Identification: Blood Culture PCR    Method Type: PCR        Clostridium difficile Toxin by PCR (19 @ 16:20)    Clostridium difficile Toxin by PCR: RESULT INTERPRETATION:    Detected - Clostridium difficile toxin B detected by amplified DNA PCR    C.difficile PCR test results should be interpreted only with  consideration of the patient's clinical situation and history.  This test  will detect the presence of toxigenic C. difficile.  However it cannot be  used as the sole criteria  for the diagnosis of antibiotic associated diarrhea, antibiotic  associated colitis, or pseudomembranous colitis.  Colonization with  C.difficile may exceed 20% in hosptial patients, the majority of whom are  without Toxigenic  Clostridium Difficile disease.  Testing is generally not recommened in  children below the age of 1 year, as up to half of healthy infants are  asymptomatically colonized with C.difficile.  In addition, C.difficile  PCR testing  cannot be used as a "test of cure" as dead organism nucleic acids will  persist and be detected after treatment.  Successful treatment of  C.difficile disease is determined by resolution of clinical symptoms. Method: CDPCR  TOXIGENIC CLOST.DIFFICILE POSITIVE;  [id number] PRESUMPTIVE POSITIVE.      C Diff by PCR Result: Positive        Urine Microscopic-Add On (NC) (02.10.19 @ 19:08)    Bacteria: Moderate    Epithelial Cells: Few /HPF    Red Blood Cell - Urine: 26-50 /HPF    White Blood Cell - Urine: 3-5 /HPF        Culture - Blood (19 @ 13:16)    Specimen Source: .Blood Blood    Culture Results:   No growth to date.        Culture - Urine (19 @ 04:25)    Specimen Source: .Urine Clean Catch (Midstream)    Culture Results:   No growth

## 2019-03-13 NOTE — PROGRESS NOTE ADULT - ASSESSMENT
IMPRESSION:  Acute hypercapnic and hypoxemic respiratory failure intubated  Anaemia  H/O G I Bleed in the past  H/O Bladder Ca  C diff +    PLAN:    CNS: Continue current sedation RASS -2    HEENT: Oral care    PULMONARY:  HOB @ 45 degrees.  Vent changes as follows: decrease FiO2 60 as tolerated and increase RR to 26    CARDIOVASCULAR: d/c D5W  taper pressors     GI: GI prophylaxis. Increase free water 250 Q6 hrs   start feed     RENAL:  Follow up lytes.  Correct as needed. Monitor BMP Q shift   bladder scan bed side   INFECTIOUS DISEASE: Follow up cultures. Continue with meropenem until sensitives  Flagyl and vanco PO , vanco IV     HEMATOLOGICAL:  DVT prophylaxis.    ENDOCRINE:  Follow up FS.  Insulin protocol if needed.    MUSCULOSKELETAL:    ICU care for now. Family thinking of possible termination of care    pending family decision regarding terminal wean   no escalation of care no HD or CVVH as per patient and family wishes and it is futile

## 2019-03-13 NOTE — CHART NOTE - NSCHARTNOTEFT_GEN_A_CORE
Patient's family decided to withdraw life-sustaining treatment including removal of ET tube and discontinuing pressors and proceed with comfort measures. They're still waiting for other family members to arrive prior to extubation.  MOLST form completed

## 2019-03-17 LAB
CULTURE RESULTS: SIGNIFICANT CHANGE UP
CULTURE RESULTS: SIGNIFICANT CHANGE UP
SPECIMEN SOURCE: SIGNIFICANT CHANGE UP
SPECIMEN SOURCE: SIGNIFICANT CHANGE UP

## 2019-06-03 ENCOUNTER — APPOINTMENT (OUTPATIENT)
Dept: UROLOGY | Facility: CLINIC | Age: 84
End: 2019-06-03

## 2019-08-12 NOTE — CONSULT NOTE ADULT - SUBJECTIVE AND OBJECTIVE BOX
[facility]: MED RECONCILIATION    Comments/Recommendations:      Patient was alert and orientated during medication reconciliation.  Patient confirms NKDA and mentioned to continue using the [facility] listed in her profile for now but will be switching.  Patient denies taking any other medications regularly other than medications listed.  Patient admits to being non-compliant with her medications except for the Humira - last dose on  and next dose is expected to be this Thursday (8/15). Medications added:     · Plexus plus drink   · Probiotic   · Juice plus gummy     Medications removed:    · Prednisone     Medications adjusted:    · None     Information obtained from: Rx Query, Patient     Allergies: Patient has no known allergies. Prior to Admission Medications:     Prior to Admission Medications   Prescriptions Last Dose Informant Patient Reported? Taking? L.acid/L.casei/B.bif/B.sindy/FOS (PROBIOTIC BLEND PO) 2019 at am Self Yes Yes   Sig: Take 1 Cap by mouth daily. OTHER,NON-FORMULARY, 2019 at pm  Self Yes Yes   Sig: Take 1 Dose by mouth. Juice plus gummy   OTHER,NON-FORMULARY, 2019 at am Self Yes Yes   Sig: Take 1 Packet by mouth daily. Plexus Pink Drink   adalimumab (HUMIRA) 40 mg/0.8 mL injection 2019 at pm Self Yes Yes   Si mg by SubCUTAneous route every fourteen (14) days. Indications: Crohn's Disease   cholecalciferol (VITAMIN D3) 1,000 unit tablet 2019 at pm Self Yes Yes   Sig: Take 1,000 Units by mouth daily. ondansetron (ZOFRAN ODT) 4 mg disintegrating tablet 2019 at am Self No Yes   Sig: Take 1 Tab by mouth every eight (8) hours as needed for Nausea. Facility-Administered Medications: None     Thank you,     Mercedes Calderon   Contact: [phone]   Reviewed by Demarco Silva, Pharm. D. Patient is a 83y old  Female who presents with a chief complaint of Lanie Hematuria (24 Oct 2018 12:09)      HPI:  82 yo F with a PMH of COPD ( previously has seen Dr. Donis once), HTN, HLD, and Chronic Back Pain with spinal stimulator, CHF (GIDD) brought in for lanie hematuria. Pt reports she noticed some blood in her urine for the past month, however within the last 2 days it has become much worse. Pt denies any trauma, abd pain, dysuria, fever, chills, vaginal bleeding or rectal bleeding.   Pt recently admitted for COPD and CHF exacerbation  On presentation /80 HR 66 T98.3. Hgb 6.4 (baseline 9), K 6.6 w/o EKG changes.  Pt received 2 unit PRBC in ED, Insulin, D50   patient was taken to OR today for cystoscopy she was extubated after procedure seen in recovery she is awake on 2 liter oxygen talking full sentence not on distress       PAST MEDICAL & SURGICAL HISTORY:  CHF (congestive heart failure)  Breast tumor: removal benign bl breasts  Anal cancer  COPD (chronic obstructive pulmonary disease)  Lung disease: COPD  Hypertension  H/O breast surgery: 39 yrs. ago, bilateral  History of back surgery: 10 yrs. ago stimulator implant ,     Allergies    hair dyes (Hives)  sulfa drugs (Headache)    Intolerances      Family history : no cardiovscular family history   Home Medications:  amLODIPine 5 mg oral tablet: 1 tab(s) orally once a day (17 Oct 2018 23:37)  ampicillin-sulbactam: 3 gram(s) intravenous every 6 hours (23 Oct 2018 11:18)  aspirin 81 mg oral tablet, chewable: 1 tab(s) orally once a day (23 Oct 2018 11:18)  atorvastatin 20 mg oral tablet: 1 tab(s) orally once a day (at bedtime) (17 Oct 2018 23:37)  bisacodyl 10 mg rectal suppository: 1 suppository(ies) rectal once a day, As needed, Constipation (23 Oct 2018 11:18)  Calcium 500+D oral tablet, chewable: 1 tab(s) orally 2 times a day (17 Oct 2018 23:37)  Centrum Adults oral tablet: 1 tab(s) orally once a day (17 Oct 2018 23:37)  freetext medication  -: 10 milligram(s) orally every 4 hours, As needed, pain (23 Oct 2018 11:18)  ipratropium-albuterol 0.5 mg-2.5 mg/3 mLinhalation solution: 3 milliliter(s) inhaled every 6 hours (23 Oct 2018 11:18)  metoprolol succinate 50 mg oral tablet, extended release: 1 tab(s) orally once a day (17 Oct 2018 23:37)  ProAir HFA 90 mcg/inh inhalation aerosol: 2 puff(s) inhaled 4 times a day (17 Oct 2018 23:37)    Occupation:  Alochol: Denied  Smoking: ex  Drug Use: Denied  Marital Status:         ROS: as in HPI; All other systems reviewed are negative    ICU Vital Signs Last 24 Hrs  T(C): 36.6 (24 Oct 2018 18:52), Max: 37.3 (23 Oct 2018 23:55)  T(F): 97.9 (24 Oct 2018 18:52), Max: 99.1 (23 Oct 2018 23:55)  HR: 65 (24 Oct 2018 19:22) (65 - 83)  BP: 154/69 (24 Oct 2018 19:22) (150/67 - 190/79)  BP(mean): --  ABP: --  ABP(mean): --  RR: 18 (24 Oct 2018 19:22) (18 - 20)  SpO2: 98% (24 Oct 2018 19:22) (97% - 99%)        Physical Examination:    General: No acute distress.  Alert, oriented, interactive, nonfocal    HEENT: Pupils equal, reactive to light.  Symmetric.    PULM: Clear to auscultation bilaterally, no significant sputum production    CVS: Regular rate and rhythm, no murmurs, rubs, or gallops    ABD: Soft, nondistended, nontender, normoactive bowel sounds, no masses    EXT: No edema, nontender, no clubbing     SKIN: Warm and well perfused, no rashes noted.    Neurology : no motor or sensory deficit     Musculoskeletal : move all extremety     Lymphatic system: no Palpable node               I&O's Detail    23 Oct 2018 07:01  -  24 Oct 2018 07:00  --------------------------------------------------------  IN:  Total IN: 0 mL    OUT:    Voided: 250 mL  Total OUT: 250 mL    Total NET: -250 mL      24 Oct 2018 07:01  -  24 Oct 2018 19:40  --------------------------------------------------------  IN:    sodium chloride 0.9%.: 50 mL  Total IN: 50 mL    OUT:  Total OUT: 0 mL    Total NET: 50 mL            LABS:                        10.1   7.43  )-----------( 162      ( 24 Oct 2018 07:03 )             30.8     24 Oct 2018 07:03    139    |  100    |  28     ----------------------------<  93     4.9     |  29     |  0.8      Ca    8.2        24 Oct 2018 07:03  Mg     2.1       24 Oct 2018 07:03            CAPILLARY BLOOD GLUCOSE          Urinalysis Basic - ( 22 Oct 2018 21:10 )    Color: Red / Appearance: Turbid / S.025 / pH: x  Gluc: x / Ketone: 40  / Bili: Large / Urobili: 1.0 mg/dL   Blood: x / Protein: >=300 mg/dL / Nitrite: Positive   Leuk Esterase: Large / RBC: >50 /HPF / WBC 10-25 /HPF   Sq Epi: x / Non Sq Epi: Occasional /HPF / Bacteria: Moderate /HPF      Culture        MEDICATIONS  (STANDING):  sodium chloride 0.9%. 1000 milliLiter(s) (50 mL/Hr) IV Continuous <Continuous>    MEDICATIONS  (PRN):  HYDROmorphone  Injectable 0.5 milliGRAM(s) IV Push every 15 minutes PRN Severe Pain (7 - 10)  ondansetron Injectable 4 milliGRAM(s) IV Push once PRN Nausea and/or Vomiting  oxyCODONE    5 mG/acetaminophen 325 mG 1 Tablet(s) Oral once PRN Moderate Pain (4 - 6)        RADIOLOGY: ***     CXR: n/A  TLC:  OG:  ET tube:          ECHO:

## 2020-10-05 NOTE — PROGRESS NOTE ADULT - ASSESSMENT
83 yo female being followed by the GI service for C-Diff Colitis and transaminitis, patient went into respiratory distress requiring intubation and patient became septic she was started on meropenem, sedated, and now requiring pressors.    Problem 1-C-Diff Colitis pancolitis, responding to Vanco- diarrhea resolved  Rec  -Continue Antibiotics  -Monitor WBC and CBC daily   -Monitor Stool for blood, frequency, and number of bowel movements daily  -Probiotics bid x 2 months    Problem 2-Transaminitis   Rec  -Trend LFTs, LFTs trending downward   -Avoid hepatotoxic medications  -If LFTs continue to rise Check Hepatitis B Sag, Hep B SAB, Hep A Ab, Hep C Ab,Smooth Muscle Antibody, Transferrin Saturation, SPEP, Anti LK M1 antibody, AMA, ROWDY, Ceruloplasmin, Ferritin,    Problem 3-Sepsis   Rec  -Continue Care as per ICU team No

## 2021-02-26 NOTE — PHYSICAL THERAPY INITIAL EVALUATION ADULT - LEVEL OF INDEPENDENCE: SIT/STAND, REHAB EVAL
Form was picked up. Patients date of birth was verified and parents photo ID was checked.    minimum assist (75% patients effort)

## 2021-03-09 NOTE — PROGRESS NOTE ADULT - SUBJECTIVE AND OBJECTIVE BOX
3/9/2021      RE: Joni Vizcarra Brittani  1954 [facility] [id number]       Joni is a 19 year old who is being evaluated via a billable video visit.      How would you like to obtain your AVS? MyChart  If the video visit is dropped, the invitation should be resent by: MyChart  Will anyone else be joining your video visit? No       Patient is in [place] for visit.          Jazmyn Clement MD     infectious diseases progress note:  KOTA SCHAFER is a 84yFemale patient    HYPERKALEMIA UTI URINARY RETENTION LEUKOCYTOSIS GEETA    Leukocytosis, unspecified type  VUR (vesicoureteric reflux)  Urinary tract infection without hematuria, site unspecified  Urinary tract infection associated with indwelling urethral catheter, subsequent encounter  CHF (congestive heart failure)  COPD (chronic obstructive pulmonary disease)  GEETA (acute kidney injury)  UTI (urinary tract infection)  Urinary retention  Hyperkalemia      ROS:  not relevant     Allergies    hair dyes (Hives)  sulfa drugs (Headache)  Zoloft (Headache)    Intolerances        ANTIBIOTICS/RELEVANT:  antimicrobials  amoxicillin 500 milliGRAM(s) Oral every 8 hours  vancomycin    Solution 125 milliGRAM(s) Oral every 6 hours    immunologic:    OTHER:  ALBUTerol/ipratropium for Nebulization 3 milliLiter(s) Nebulizer every 6 hours  amLODIPine   Tablet 10 milliGRAM(s) Oral daily  aspirin  chewable 81 milliGRAM(s) Oral daily  atorvastatin 20 milliGRAM(s) Oral at bedtime  bisacodyl Suppository 10 milliGRAM(s) Rectal every 24 hours PRN  buDESOnide 160 MICROgram(s)/formoterol 4.5 MICROgram(s) Inhaler 2 Puff(s) Inhalation two times a day  busPIRone 5 milliGRAM(s) Oral three times a day  chlorhexidine 4% Liquid 1 Application(s) Topical <User Schedule>  cloNIDine 0.1 milliGRAM(s) Oral every 8 hours PRN  docusate sodium 200 milliGRAM(s) Oral at bedtime  DULoxetine 20 milliGRAM(s) Oral daily  heparin  Injectable 5000 Unit(s) SubCutaneous every 8 hours  HYDROcodone 10 mG/acetaminophen 325 mG 1 Tablet(s) Oral every 4 hours PRN  hydrocortisone 2.5% Rectal Cream 1 Application(s) Rectal daily  metoprolol succinate ER 50 milliGRAM(s) Oral daily  ondansetron Injectable 4 milliGRAM(s) IV Push every 8 hours PRN  senna 2 Tablet(s) Oral daily  simethicone 80 milliGRAM(s) Chew every 8 hours PRN      Objective:  T(F): 98.4 (02-14-19 @ 05:15), Max: 98.4 (02-14-19 @ 05:15)  HR: 104 (02-14-19 @ 05:15) (79 - 104)  BP: 171/71 (02-14-19 @ 05:15) (135/62 - 171/71)  RR: 16 (02-14-19 @ 05:15) (16 - 16)  SpO2: --    PHYSICAL EXAM:  Constitutional: comfortable 	  Neck:no JVD, no lymphadenopathy, supple  Respiratory: CTA dalia  Cardiovascular: S1S2 RRR  Gastrointestinal:soft, (+) BS, mildly tender  Extremities:no phlebitis         LABS:                        9.7    38.51 )-----------( 219      ( 13 Feb 2019 14:50 )             29.4     02-13    125<L>  |  89<L>  |  46<H>  ----------------------------<  97  4.9   |  25  |  1.0    Ca    8.0<L>      13 Feb 2019 14:50  Mg     1.7     02-12    TPro  5.1<L>  /  Alb  3.0<L>  /  TBili  0.5  /  DBili  x   /  AST  13  /  ALT  15  /  AlkPhos  111  02-12            MICROBIOLOGY:    Culture - Blood (collected 02-12-19 @ 08:01)  Source: .Blood None  Preliminary Report (02-13-19 @ 23:01):    No growth to date.    Culture - Blood (collected 02-11-19 @ 12:09)  Source: .Blood Blood  Preliminary Report (02-12-19 @ 23:01):    No growth to date.    Culture - Urine (collected 02-10-19 @ 19:08)  Source: .Urine Catheterized  Final Report (02-11-19 @ 18:43):    No growth        Culture - Blood (collected 12 Feb 2019 08:01)  Source: .Blood None  Preliminary Report (13 Feb 2019 23:01):    No growth to date.    Culture - Blood (collected 11 Feb 2019 12:09)  Source: .Blood Blood  Preliminary Report (12 Feb 2019 23:01):    No growth to date.      Culture Results:   No growth to date. (02-12 @ 08:01)  Culture Results:   No growth to date. (02-11 @ 12:09)  Culture Results:   No growth (02-10 @ 19:08)    C Diff by PCR Result: Positive (02-13 @ 16:20)      RADIOLOGY & ADDITIONAL STUDIES:

## 2022-12-01 NOTE — PROCEDURAL SAFETY CHECKLIST WITH OR WITHOUT SEDATION - [id number]_GEN_ALL_CORE
PAST SURGICAL HISTORY:  H/O colectomy Right    History of cardiac cath stents x3    IOL present in anterior chamber     Right leg claudication stent      
done

## 2023-03-06 NOTE — PHYSICAL THERAPY INITIAL EVALUATION ADULT - IMPAIRMENTS CONTRIBUTING TO GAIT DEVIATIONS, PT EVAL
DISPLAY PLAN FREE TEXT DISPLAY PLAN FREE TEXT DISPLAY PLAN FREE TEXT narrow base of support/impaired postural control/decreased endurance with note of dec in spO2 to 86% on room air after ambulation from 91% on room air at rest; inc to 92% once O2 at 2 Li/min via nasal cannula replaced upon return to sitting/decreased strength

## 2023-06-20 NOTE — PATIENT PROFILE ADULT - FALL HARM RISK
bones(Osteoporosis,prev fx,steroid use,metastatic bone ca) Terbinafine Pregnancy And Lactation Text: This medication is Pregnancy Category B and is considered safe during pregnancy. It is also excreted in breast milk and breast feeding isn't recommended.

## 2023-06-26 NOTE — PATIENT PROFILE ADULT - HAVE YOU EXPERIENCED A TRAUMATIC EVENT?
Hpi Title: Evaluation of Skin Lesions How Severe Are Your Spot(S)?: moderate Have Your Spot(S) Been Treated In The Past?: has not been treated Family Member: Father and Maternal Uncle and Paternal Uncle no

## 2023-11-14 NOTE — ED ADULT NURSE NOTE - PLAN OF CARE
Spoke with Letitia and let her know that we did receive this. She said she will give the patient a call to come pick it up.   Explanation of exam/test

## 2023-12-14 NOTE — PROGRESS NOTE ADULT - ASSESSMENT
GEETA  hyperkalemia  pedal edema  cdiff colitis  leukocytosis   left lung atelectasis from mucous plug  left moderate pleural effusion  PNA  malnutrition  debility  left 2F complex renal cyst  HTN   bladder ca  s/p TURBT  right hydro   UTI - enterococci  COPD  CHF  anemia / MDS  chronic back pain with spinal stimulator  rash  poor appetite     Plan:    lasix 20mg ivp x 1  may need po lasix  low k diet  cont PO vanco   off other abx  trend wbc  protonix   procrit per hem  cont norvasc and lopressor  off aldactone - do not restart  PT / rehab / OOB   f/u/ check path / oupt f/u of complex renal cyst  d/w daughter stairs to enter home/stairs within home

## 2024-07-10 NOTE — ED PROVIDER NOTE - CHIEF COMPLAINT
"RN at bedside, pt asking to go to bathroom. Upon arising to go to the bathroom, blood is dripping down her legs and she sits down and reports feeling dizzy (via martti translating). Rocio Reyna CNM called to bedside (Curt in OR). Concern expressed to pt about her bleeding, thorough description of her laceration and need for repair given to pt. Pt states \"I don't believe you.\" Mirror offered to pt to show the laceration to her, she reports that it will not change her mind. Insisting that she needs to eat and that is why she is dizzy. Curt to bedside to talk to pt. Additional 365mL QBL noted at this time. Notable decrease in BP at this time. 1L bolus initiated. Plan of care ongoing.   " The patient is a 83y Female complaining of bloody stools.

## 2024-07-22 NOTE — DISCHARGE NOTE ADULT - CONDITION (STATED IN TERMS THAT PERMIT A SPECIFIC MEASURABLE COMPARISON WITH CONDITION ON ADMISSION):
Subjective   Patient ID:  Violette Esposito is a 33 y.o. female who presents for evaluation of No chief complaint on file.      HPI33 yo WF with elevated cholesterol. Denies chest pain, SOB, or edema  Denies palpitations, weak spells, and syncope . Max cholesterol 280 but last one 204 with LDL of 134. Has moderate elevation of LDL-P and LDL -C    Review of Systems   Constitutional: Negative for decreased appetite, fever, malaise/fatigue, weight gain and weight loss.   HENT:  Negative for hearing loss and nosebleeds.    Eyes:  Negative for visual disturbance.   Cardiovascular:  Negative for chest pain, claudication, cyanosis, dyspnea on exertion, irregular heartbeat, leg swelling, near-syncope, orthopnea, palpitations, paroxysmal nocturnal dyspnea and syncope.   Respiratory:  Negative for cough, hemoptysis, shortness of breath, sleep disturbances due to breathing, snoring and wheezing.    Endocrine: Negative for cold intolerance, heat intolerance, polydipsia and polyuria.   Hematologic/Lymphatic: Negative for adenopathy and bleeding problem. Does not bruise/bleed easily.   Skin:  Negative for color change, itching, poor wound healing, rash and suspicious lesions.   Musculoskeletal:  Negative for arthritis, back pain, falls, joint pain, joint swelling, muscle cramps, muscle weakness and myalgias.   Gastrointestinal:  Negative for bloating, abdominal pain, change in bowel habit, constipation, flatus, heartburn, hematemesis, hematochezia, hemorrhoids, jaundice, melena, nausea and vomiting.   Genitourinary:  Negative for bladder incontinence, decreased libido, frequency, hematuria, hesitancy and urgency.   Neurological:  Negative for brief paralysis, difficulty with concentration, excessive daytime sleepiness, dizziness, focal weakness, headaches, light-headedness, loss of balance, numbness, vertigo and weakness.   Psychiatric/Behavioral:  Negative for altered mental status, depression and memory loss. The  "patient does not have insomnia and is not nervous/anxious.    Allergic/Immunologic: Negative for environmental allergies, hives and persistent infections.          Objective     Physical Exam  Vitals and nursing note reviewed.   Constitutional:       Appearance: She is well-developed.      Comments: /68 (BP Location: Left arm, Patient Position: Sitting)   Pulse 80   Ht 5' 2" (1.575 m)   Wt 55.5 kg (122 lb 6.4 oz)   SpO2 99%   BMI 22.39 kg/m²      HENT:      Head: Normocephalic and atraumatic.      Right Ear: External ear normal.      Left Ear: External ear normal.      Nose: Nose normal.   Eyes:      General: Lids are normal. No scleral icterus.        Right eye: No discharge.         Left eye: No discharge.      Conjunctiva/sclera: Conjunctivae normal.      Right eye: No hemorrhage.     Pupils: Pupils are equal, round, and reactive to light.   Neck:      Thyroid: No thyromegaly.      Vascular: No JVD.      Trachea: No tracheal deviation.   Cardiovascular:      Rate and Rhythm: Normal rate and regular rhythm.      Pulses: Intact distal pulses.      Heart sounds: Normal heart sounds. No murmur heard.     No friction rub. No gallop.   Pulmonary:      Effort: Pulmonary effort is normal. No respiratory distress.      Breath sounds: Normal breath sounds. No wheezing or rales.   Chest:      Chest wall: No tenderness.   Breasts:     Breasts are symmetrical.   Abdominal:      General: Bowel sounds are normal. There is no distension.      Palpations: Abdomen is soft. There is no hepatomegaly or mass.      Tenderness: There is no abdominal tenderness. There is no guarding or rebound.   Musculoskeletal:         General: No tenderness. Normal range of motion.      Cervical back: Normal range of motion and neck supple.   Lymphadenopathy:      Cervical: No cervical adenopathy.   Skin:     General: Skin is warm and dry.      Coloration: Skin is not pale.      Findings: No erythema or rash.   Neurological:      Mental " Status: She is alert and oriented to person, place, and time.      Cranial Nerves: No cranial nerve deficit.      Coordination: Coordination normal.      Deep Tendon Reflexes: Reflexes are normal and symmetric. Reflexes normal.   Psychiatric:         Behavior: Behavior normal.         Thought Content: Thought content normal.         Judgment: Judgment normal.            Assessment and Plan     1. Pure hypercholesterolemia        Plan:  Would not add medication at this time especially with no other risk factors  Will get opinion from Dr Garrett concerning the subcomponents.    Patient advised to modify risk factors such as weight, exercise, diet,  tobacco and alcohol exposure   No orders of the defined types were placed in this encounter.  She will get more lab thru primary  Follow up in about 6 months (around 1/22/2025).   Advance Care Planning     Date: 07/22/2024                MAURICIO likely prerenal azotemia  - Bladder scan to assess for obstruction  - Avoid nephrotoxic drugs  - Nephro (Dr Goldsmith) consulted, f/u recs stable MAURICIO likely prerenal azotemia  - Bladder scan to assess for obstruction  - Avoid nephrotoxic drugs  - Nephro (Dr Goldsmith) consulted, f/u recs  - Daughter to find out baseline Cr. - F/U with daughter in AM

## 2025-01-01 NOTE — H&P PST ADULT - ADDITIONAL PE
0 (no pain/absence of nonverbal indicators of pain) H/O anxiety and depression. No suicidal thoughts

## 2025-01-18 NOTE — PROGRESS NOTE ADULT - PROBLEM SELECTOR PLAN 2
ON IV meropenem   Likely HCAP   debilitated and non mobile  [facility] [phone]

## 2025-01-26 NOTE — PROGRESS NOTE ADULT - GASTROINTESTINAL
Name of Patient : TAHIR PENA  MRN: [id number]  Date of visit: 01-26-25       Subjective: Patient seen and examined. No new events except as noted.     REVIEW OF SYSTEMS:    CONSTITUTIONAL: No weakness, fevers or chills  EYES/ENT: No visual changes;  No vertigo or throat pain   NECK: No pain or stiffness  RESPIRATORY: No cough, wheezing, hemoptysis; No shortness of breath  CARDIOVASCULAR: No chest pain or palpitations  GASTROINTESTINAL: No abdominal or epigastric pain. No nausea, vomiting, or hematemesis; No diarrhea or constipation. No melena or hematochezia.  GENITOURINARY: No dysuria, frequency or hematuria  NEUROLOGICAL: No numbness or weakness  SKIN: No itching, burning, rashes, or lesions   All other review of systems is negative unless indicated above.    MEDICATIONS:  MEDICATIONS  (STANDING):  acetaminophen     Tablet .. 1000 milliGRAM(s) Oral every 8 hours  alteplase  Injectable for Pleural Effusion 10 milliGRAM(s) IntraPleural. every 12 hours  artificial tears (preservative free) Ophthalmic Solution 1 Drop(s) Both EYES three times a day  benzonatate 100 milliGRAM(s) Oral three times a day  dornase lauryn Solution for Pleural Effusion 5 milliGRAM(s) IntraPleural. every 12 hours  famotidine    Tablet 20 milliGRAM(s) Oral at bedtime  gabapentin 300 milliGRAM(s) Oral <User Schedule>  gabapentin 200 milliGRAM(s) Oral <User Schedule>  heparin   Injectable 5000 Unit(s) SubCutaneous every 12 hours  lidocaine   4% Patch 1 Patch Transdermal daily  pantoprazole    Tablet 40 milliGRAM(s) Oral before breakfast  polyethylene glycol 3350 17 Gram(s) Oral daily  senna 2 Tablet(s) Oral at bedtime  sodium chloride 0.9% Solution for Pleural Effusion 30 milliLiter(s) IntraPleural. every 12 hours      PHYSICAL EXAM:  T(C): 36.8 (01-26-25 @ 19:00), Max: 37.1 (01-26-25 @ 10:15)  HR: 95 (01-26-25 @ 19:00) (94 - 99)  BP: 116/87 (01-26-25 @ 19:00) (105/75 - 116/87)  RR: 17 (01-26-25 @ 19:00) (14 - 18)  SpO2: 99% (01-26-25 @ 19:00) (98% - 99%)  Wt(kg): --  I&O's Summary    25 Jan 2025 07:01  -  26 Jan 2025 07:00  --------------------------------------------------------  IN: 0 mL / OUT: 25 mL / NET: -25 mL    Appearance: Normal	  HEENT:  PERRLA   Lymphatic: No lymphadenopathy   Cardiovascular: Normal S1 S2, no JVD  Respiratory: normal effort , clear  Gastrointestinal:  Soft, Non-tender  Skin: No rashes,  warm to touch  Psychiatry:  Mood & affect appropriate  Musculuskeletal: No edema    recent labs, Imaging and EKGs personally reviewed   CODE status discussed with the patient in detail    01-25-25 @ 07:01  -  01-26-25 @ 07:00  --------------------------------------------------------  IN: 0 mL / OUT: 25 mL / NET: -25 mL                          8.5    8.45  )-----------( 151      ( 26 Jan 2025 05:37 )             27.7               01-26    140  |  104  |  22  ----------------------------<  87  4.1   |  23  |  0.67    Ca    9.2      26 Jan 2025 05:37  Phos  3.3     01-26  Mg     2.00     01-26    TPro  6.1  /  Alb  2.8[L]  /  TBili  0.2  /  DBili  x   /  AST  20  /  ALT  16  /  AlkPhos  355[H]  01-26                       Urinalysis Basic - ( 26 Jan 2025 05:37 )    Color: x / Appearance: x / SG: x / pH: x  Gluc: 87 mg/dL / Ketone: x  / Bili: x / Urobili: x   Blood: x / Protein: x / Nitrite: x   Leuk Esterase: x / RBC: x / WBC x   Sq Epi: x / Non Sq Epi: x / Bacteria: x               Soft, non-tender, no hepatosplenomegaly, normal bowel sounds
